# Patient Record
Sex: FEMALE | Race: BLACK OR AFRICAN AMERICAN | NOT HISPANIC OR LATINO | Employment: OTHER | URBAN - METROPOLITAN AREA
[De-identification: names, ages, dates, MRNs, and addresses within clinical notes are randomized per-mention and may not be internally consistent; named-entity substitution may affect disease eponyms.]

---

## 2017-05-23 ENCOUNTER — OFFICE VISIT (OUTPATIENT)
Dept: CARDIOLOGY | Facility: CLINIC | Age: 72
End: 2017-05-23
Payer: COMMERCIAL

## 2017-05-23 VITALS
WEIGHT: 238 LBS | SYSTOLIC BLOOD PRESSURE: 147 MMHG | HEIGHT: 67 IN | OXYGEN SATURATION: 97 % | HEART RATE: 85 BPM | BODY MASS INDEX: 37.35 KG/M2 | DIASTOLIC BLOOD PRESSURE: 65 MMHG

## 2017-05-23 DIAGNOSIS — R94.31 ABNORMAL EKG: ICD-10-CM

## 2017-05-23 DIAGNOSIS — R09.89 BILATERAL CAROTID BRUITS: ICD-10-CM

## 2017-05-23 DIAGNOSIS — R01.1 HEART MURMUR: ICD-10-CM

## 2017-05-23 DIAGNOSIS — E11.9 TYPE 2 DIABETES MELLITUS WITHOUT COMPLICATION, WITHOUT LONG-TERM CURRENT USE OF INSULIN: ICD-10-CM

## 2017-05-23 DIAGNOSIS — R07.9 CHEST PAIN, UNSPECIFIED TYPE: Primary | ICD-10-CM

## 2017-05-23 DIAGNOSIS — I10 ESSENTIAL HYPERTENSION: ICD-10-CM

## 2017-05-23 DIAGNOSIS — I15.9 SECONDARY HYPERTENSION: ICD-10-CM

## 2017-05-23 DIAGNOSIS — E78.2 MIXED HYPERLIPIDEMIA: ICD-10-CM

## 2017-05-23 PROCEDURE — 1126F AMNT PAIN NOTED NONE PRSNT: CPT | Mod: S$GLB,,, | Performed by: INTERNAL MEDICINE

## 2017-05-23 PROCEDURE — 3045F PR MOST RECENT HEMOGLOBIN A1C LEVEL 7.0-9.0%: CPT | Mod: S$GLB,,, | Performed by: INTERNAL MEDICINE

## 2017-05-23 PROCEDURE — 3077F SYST BP >= 140 MM HG: CPT | Mod: S$GLB,,, | Performed by: INTERNAL MEDICINE

## 2017-05-23 PROCEDURE — 99204 OFFICE O/P NEW MOD 45 MIN: CPT | Mod: S$GLB,,, | Performed by: INTERNAL MEDICINE

## 2017-05-23 PROCEDURE — 3078F DIAST BP <80 MM HG: CPT | Mod: S$GLB,,, | Performed by: INTERNAL MEDICINE

## 2017-05-23 PROCEDURE — 4010F ACE/ARB THERAPY RXD/TAKEN: CPT | Mod: S$GLB,,, | Performed by: INTERNAL MEDICINE

## 2017-05-23 PROCEDURE — 1160F RVW MEDS BY RX/DR IN RCRD: CPT | Mod: S$GLB,,, | Performed by: INTERNAL MEDICINE

## 2017-05-23 PROCEDURE — 1159F MED LIST DOCD IN RCRD: CPT | Mod: S$GLB,,, | Performed by: INTERNAL MEDICINE

## 2017-05-23 PROCEDURE — 99999 PR PBB SHADOW E&M-EST. PATIENT-LVL III: CPT | Mod: PBBFAC,,, | Performed by: INTERNAL MEDICINE

## 2017-05-23 NOTE — PROGRESS NOTES
Subjective:    Patient ID:  Mena Odonnell is a 71 y.o. female who presents for evaluation of Establish Care      HPI  Patient is here for evaluation of abnormal EKG and shortness of breath.  She's previously been seen at main campus and underwent PET stress and echo last year which were within normal limits.  She denies any worsening car to primary complaints.  She mainly is just been feeling more fatigued lately.  She gets short of breath with minimal activity but relieved with rest.  She denies any chest pain or palpitations.  She's expands no PND, orthopnea or lower edema.  She denies any dizziness, presyncope or syncope.    Review of Systems   Constitution: Positive for weakness and malaise/fatigue.   HENT: Negative.    Eyes: Negative.    Cardiovascular: Positive for dyspnea on exertion. Negative for chest pain, irregular heartbeat, leg swelling, near-syncope, orthopnea, palpitations, paroxysmal nocturnal dyspnea and syncope.   Respiratory: Positive for shortness of breath.    Skin: Negative.    Musculoskeletal: Negative.    Gastrointestinal: Negative for abdominal pain, constipation and diarrhea.   Genitourinary: Negative for dysuria.   Psychiatric/Behavioral: Negative.      Past Medical History:   Diagnosis Date    Diabetes mellitus type II     Hyperlipidemia     Hypertension     Obesity     Osteoarthritis     Peripheral neuropathy     Tendonitis     left foot     Past Surgical History:   Procedure Laterality Date    ABDOMINAL SURGERY      BLADDER SUSPENSION      CATARACT EXTRACTION      COLONOSCOPY N/A 9/26/2015    Procedure: COLONOSCOPY;  Surgeon: Javed Wood MD;  Location: [address];  Service: Endoscopy;  Laterality: N/A;    HYSTERECTOMY  1978    fibroids    Tonsillectomy      TONSILLECTOMY       Social History   Substance Use Topics    Smoking status: Former Smoker     Years: 15.00     Quit date: 12/20/1982    Smokeless tobacco: Never Used    Alcohol use No     Family  History   Problem Relation Age of Onset    Thyroid disease Mother     Cataracts Mother     Diabetes Mother     Stroke Mother     Hypertension Mother     Thyroid disease Sister     Diabetes Sister     Hypertension Sister     Hypertension Daughter     Diabetes Son     Diabetes Sister     Hypertension Sister     Hypertension Brother     Hypertension Sister     Hypertension Sister     Hypertension Brother     Thyroid disease Maternal Aunt     Thyroid disease Maternal Aunt     Colon cancer Maternal Grandfather     Breast cancer Neg Hx     Ovarian cancer Neg Hx     Amblyopia Neg Hx     Blindness Neg Hx     Cancer Neg Hx     Glaucoma Neg Hx     Macular degeneration Neg Hx     Retinal detachment Neg Hx     Strabismus Neg Hx         Objective:    Physical Exam   Constitutional: She is oriented to person, place, and time. She appears well-developed and well-nourished.   HENT:   Head: Normocephalic and atraumatic.   Eyes: Conjunctivae and EOM are normal. Pupils are equal, round, and reactive to light.   Neck: Normal range of motion. Neck supple. No thyromegaly present.   Cardiovascular: Normal rate and regular rhythm.    No murmur heard.  Pulmonary/Chest: Effort normal and breath sounds normal. No respiratory distress.   Abdominal: Soft. Bowel sounds are normal.   Musculoskeletal: She exhibits no edema.   Neurological: She is alert and oriented to person, place, and time.   Skin: Skin is warm and dry.   Psychiatric: She has a normal mood and affect. Her behavior is normal.           Echo: 2-16  CONCLUSIONS     1 - Normal left ventricular systolic function (EF 60-65%).     2 - Mild left atrial enlargement.     3 - Left ventricular diastolic dysfunction.     4 - Normal right ventricular systolic function .     5 - The estimated PA systolic pressure is 26 mmHg.     PET stress: 2-16  CONCLUSIONS: NORMAL MYOCARDIAL PERFUSION PET STRESS TEST  1. The perfusion scan is free of evidence for myocardial  ischemia or injury.   2. Resting wall motion is physiologic. Stress wall motion is physiologic.   3. Visually estimated LV function is normal at rest and normal at stress.   4. The ventricular volumes are normal at rest and stress.   5. The extracardiac distribution of radioactivity is normal.   6. There was no previous study available to compare.    Assessment:       1. Chest pain, unspecified type    2. Essential hypertension    3. Secondary hypertension    4. Heart murmur    5. Abnormal EKG    6. Bilateral carotid bruits    7. Type 2 diabetes mellitus without complication, without long-term current use of insulin    8. Mixed hyperlipidemia         Plan:       -Mainly reassurance in light of previous testing, repeat echo to ensure no change  -Likely symptoms currently secondary to deconditioning  -Encouraged diet and exercise    Return to clinic in 3 months

## 2017-05-23 NOTE — LETTER
May 23, 2017      Gerry Coreas MD  [address] [facility] [address]  [facility] [address]           [facility]  [address] [facility] [address]  [place] [address]  Phone: [phone]          Patient: Mena Odonnell   MR Number: [id number]   YOB: 1945   Date of Visit: 5/23/2017       Dear Dr. Gerry Coreas:    Thank you for referring Mena Odonnell to me for evaluation. Attached you will find relevant portions of my assessment and plan of care.    If you have questions, please do not hesitate to call me. I look forward to following Mena Odonnell along with you.    Sincerely,    Negro Lincoln MD    Enclosure  CC:  No Recipients    If you would like to receive this communication electronically, please contact [email or web address] or [phone] to request more information on [facility] access.    For providers and/or their staff who would like to refer a patient to [facility], please contact us through our one-stop-shop provider referral line, [facility] , at [phone].    If you feel you have received this communication in error or would no longer like to receive these types of communications, please e-mail [email or web address]

## 2017-05-29 ENCOUNTER — HOSPITAL ENCOUNTER (OUTPATIENT)
Dept: CARDIOLOGY | Facility: HOSPITAL | Age: 72
Discharge: HOME | End: 2017-05-29
Attending: INTERNAL MEDICINE
Payer: COMMERCIAL

## 2017-05-29 DIAGNOSIS — R07.9 CHEST PAIN, UNSPECIFIED TYPE: ICD-10-CM

## 2017-05-29 LAB
AORTIC VALVE STENOSIS: ABNORMAL
ESTIMATED PA SYSTOLIC PRESSURE: 14.02
GLOBAL PERICARDIAL EFFUSION: ABNORMAL
RETIRED EF AND QEF - SEE NOTES: 60 (ref 55–65)
TRICUSPID VALVE REGURGITATION: ABNORMAL

## 2017-05-29 PROCEDURE — 93306 TTE W/DOPPLER COMPLETE: CPT

## 2017-05-29 PROCEDURE — 93306 TTE W/DOPPLER COMPLETE: CPT | Mod: 26,,, | Performed by: INTERNAL MEDICINE

## 2017-07-02 DIAGNOSIS — Z79.4 TYPE 2 DIABETES MELLITUS WITH DIABETIC POLYNEUROPATHY, WITH LONG-TERM CURRENT USE OF INSULIN: ICD-10-CM

## 2017-07-02 DIAGNOSIS — E11.42 TYPE 2 DIABETES MELLITUS WITH DIABETIC POLYNEUROPATHY, WITH LONG-TERM CURRENT USE OF INSULIN: ICD-10-CM

## 2017-07-03 RX ORDER — INSULIN DEGLUDEC 200 U/ML
INJECTION, SOLUTION SUBCUTANEOUS
Qty: 6 SYRINGE | Refills: 3 | Status: SHIPPED | OUTPATIENT
Start: 2017-07-03 | End: 2017-10-21 | Stop reason: SDUPTHER

## 2017-09-18 ENCOUNTER — TELEPHONE (OUTPATIENT)
Dept: ENDOCRINOLOGY | Facility: CLINIC | Age: 72
End: 2017-09-18

## 2017-09-18 DIAGNOSIS — E11.42 TYPE 2 DIABETES MELLITUS WITH DIABETIC POLYNEUROPATHY, WITH LONG-TERM CURRENT USE OF INSULIN: Primary | ICD-10-CM

## 2017-09-18 DIAGNOSIS — Z79.4 TYPE 2 DIABETES MELLITUS WITH DIABETIC POLYNEUROPATHY, WITH LONG-TERM CURRENT USE OF INSULIN: Primary | ICD-10-CM

## 2017-09-18 NOTE — TELEPHONE ENCOUNTER
----- Message from Lainey Spencer sent at 9/18/2017 12:54 PM CDT -----  Contact: Pt Mena Odonnell [phone]  Pt is requesting her additional lab orders be entered and attached to her appt that is scheduled for Sat 11.4.17.  Pt normally gets the metabolic panel and the orders are not there but we did schedule her for the A1C and Lipid to reserve her time prior to her appt on 11.10.17.    Pt may be reached at [phone].    Thank you.  YVONNE

## 2017-10-21 DIAGNOSIS — E78.2 MIXED HYPERLIPIDEMIA: ICD-10-CM

## 2017-10-21 DIAGNOSIS — Z79.4 TYPE 2 DIABETES MELLITUS WITH DIABETIC POLYNEUROPATHY, WITH LONG-TERM CURRENT USE OF INSULIN: ICD-10-CM

## 2017-10-21 DIAGNOSIS — E11.42 TYPE 2 DIABETES MELLITUS WITH DIABETIC POLYNEUROPATHY, WITH LONG-TERM CURRENT USE OF INSULIN: ICD-10-CM

## 2017-10-23 RX ORDER — INSULIN DEGLUDEC 200 U/ML
INJECTION, SOLUTION SUBCUTANEOUS
Qty: 6 SYRINGE | Refills: 6 | Status: SHIPPED | OUTPATIENT
Start: 2017-10-23 | End: 2018-04-23 | Stop reason: SDUPTHER

## 2017-10-24 RX ORDER — ATORVASTATIN CALCIUM 40 MG/1
TABLET, FILM COATED ORAL
Qty: 30 TABLET | Refills: 11 | Status: SHIPPED | OUTPATIENT
Start: 2017-10-24 | End: 2018-11-28 | Stop reason: SDUPTHER

## 2017-11-04 ENCOUNTER — LAB VISIT (OUTPATIENT)
Dept: LAB | Facility: HOSPITAL | Age: 72
End: 2017-11-04
Payer: COMMERCIAL

## 2017-11-04 DIAGNOSIS — Z79.4 TYPE 2 DIABETES MELLITUS WITH DIABETIC POLYNEUROPATHY, WITH LONG-TERM CURRENT USE OF INSULIN: ICD-10-CM

## 2017-11-04 DIAGNOSIS — E11.42 TYPE 2 DIABETES MELLITUS WITH DIABETIC POLYNEUROPATHY, WITH LONG-TERM CURRENT USE OF INSULIN: ICD-10-CM

## 2017-11-04 DIAGNOSIS — Z86.39 H/O VITAMIN D DEFICIENCY: Chronic | ICD-10-CM

## 2017-11-04 LAB
25(OH)D3+25(OH)D2 SERPL-MCNC: 31 NG/ML
ALBUMIN SERPL BCP-MCNC: 3.6 G/DL
ALP SERPL-CCNC: 76 U/L
ALT SERPL W/O P-5'-P-CCNC: 20 U/L
ANION GAP SERPL CALC-SCNC: 10 MMOL/L
AST SERPL-CCNC: 15 U/L
BILIRUB SERPL-MCNC: 0.4 MG/DL
BUN SERPL-MCNC: 19 MG/DL
CALCIUM SERPL-MCNC: 10.2 MG/DL
CHLORIDE SERPL-SCNC: 106 MMOL/L
CHOLEST SERPL-MCNC: 186 MG/DL
CHOLEST/HDLC SERPL: 4.2 {RATIO}
CO2 SERPL-SCNC: 27 MMOL/L
CREAT SERPL-MCNC: 0.8 MG/DL
EST. GFR  (AFRICAN AMERICAN): >60 ML/MIN/1.73 M^2
EST. GFR  (NON AFRICAN AMERICAN): >60 ML/MIN/1.73 M^2
ESTIMATED AVG GLUCOSE: 174 MG/DL
GLUCOSE SERPL-MCNC: 117 MG/DL
HBA1C MFR BLD HPLC: 7.7 %
HDLC SERPL-MCNC: 44 MG/DL
HDLC SERPL: 23.7 %
LDLC SERPL CALC-MCNC: 117.2 MG/DL
NONHDLC SERPL-MCNC: 142 MG/DL
POTASSIUM SERPL-SCNC: 4.1 MMOL/L
PROT SERPL-MCNC: 7.5 G/DL
SODIUM SERPL-SCNC: 143 MMOL/L
TRIGL SERPL-MCNC: 124 MG/DL

## 2017-11-04 PROCEDURE — 82306 VITAMIN D 25 HYDROXY: CPT

## 2017-11-04 PROCEDURE — 80061 LIPID PANEL: CPT

## 2017-11-04 PROCEDURE — 36415 COLL VENOUS BLD VENIPUNCTURE: CPT | Mod: PO

## 2017-11-04 PROCEDURE — 83036 HEMOGLOBIN GLYCOSYLATED A1C: CPT

## 2017-11-04 PROCEDURE — 80053 COMPREHEN METABOLIC PANEL: CPT

## 2017-11-10 ENCOUNTER — OFFICE VISIT (OUTPATIENT)
Dept: ENDOCRINOLOGY | Facility: CLINIC | Age: 72
End: 2017-11-10
Payer: COMMERCIAL

## 2017-11-10 VITALS
SYSTOLIC BLOOD PRESSURE: 137 MMHG | DIASTOLIC BLOOD PRESSURE: 69 MMHG | HEIGHT: 67 IN | BODY MASS INDEX: 37.84 KG/M2 | WEIGHT: 241.13 LBS | HEART RATE: 79 BPM

## 2017-11-10 DIAGNOSIS — E66.01 SEVERE OBESITY (BMI 35.0-39.9) WITH COMORBIDITY: ICD-10-CM

## 2017-11-10 DIAGNOSIS — Z79.4 TYPE 2 DIABETES MELLITUS WITH DIABETIC POLYNEUROPATHY, WITH LONG-TERM CURRENT USE OF INSULIN: Primary | ICD-10-CM

## 2017-11-10 DIAGNOSIS — E78.2 MIXED HYPERLIPIDEMIA: ICD-10-CM

## 2017-11-10 DIAGNOSIS — G63 POLYNEUROPATHY ASSOCIATED WITH UNDERLYING DISEASE: ICD-10-CM

## 2017-11-10 DIAGNOSIS — E11.42 TYPE 2 DIABETES MELLITUS WITH DIABETIC POLYNEUROPATHY, WITH LONG-TERM CURRENT USE OF INSULIN: Primary | ICD-10-CM

## 2017-11-10 DIAGNOSIS — M17.11 OSTEOARTHRITIS OF RIGHT KNEE, UNSPECIFIED OSTEOARTHRITIS TYPE: ICD-10-CM

## 2017-11-10 DIAGNOSIS — I10 ESSENTIAL HYPERTENSION: ICD-10-CM

## 2017-11-10 DIAGNOSIS — R09.89 BILATERAL CAROTID BRUITS: ICD-10-CM

## 2017-11-10 PROCEDURE — 99214 OFFICE O/P EST MOD 30 MIN: CPT | Mod: S$GLB,,, | Performed by: NURSE PRACTITIONER

## 2017-11-10 PROCEDURE — 99999 PR PBB SHADOW E&M-EST. PATIENT-LVL III: CPT | Mod: PBBFAC,,, | Performed by: NURSE PRACTITIONER

## 2017-11-10 RX ORDER — GABAPENTIN 100 MG/1
200 CAPSULE ORAL 3 TIMES DAILY
Qty: 540 CAPSULE | Refills: 3 | Status: SHIPPED | OUTPATIENT
Start: 2017-11-10 | End: 2018-04-26

## 2017-11-10 RX ORDER — INSULIN ASPART 100 [IU]/ML
30 INJECTION, SOLUTION INTRAVENOUS; SUBCUTANEOUS
Qty: 6 BOX | Refills: 11 | Status: SHIPPED | OUTPATIENT
Start: 2017-11-10 | End: 2018-11-28 | Stop reason: SDUPTHER

## 2017-11-10 RX ORDER — CHLORTHALIDONE 25 MG/1
25 TABLET ORAL DAILY
Qty: 90 TABLET | Refills: 3 | Status: SHIPPED | OUTPATIENT
Start: 2017-11-10 | End: 2019-10-30 | Stop reason: CLARIF

## 2017-11-10 RX ORDER — AMLODIPINE BESYLATE 10 MG/1
10 TABLET ORAL DAILY
Qty: 90 TABLET | Refills: 3 | Status: SHIPPED | OUTPATIENT
Start: 2017-11-10 | End: 2018-11-28 | Stop reason: SDUPTHER

## 2017-11-10 RX ORDER — LISINOPRIL 20 MG/1
20 TABLET ORAL DAILY
Qty: 90 TABLET | Refills: 3 | Status: SHIPPED | OUTPATIENT
Start: 2017-11-10 | End: 2018-11-21 | Stop reason: SDUPTHER

## 2017-11-10 NOTE — PROGRESS NOTES
"CC: Management of T2DM, review of chronic medical conditions    HPI: Ms. Mena Odonnell is a 71 y.o. female who was diagnosed with Type 2 in [id number]. Experienced blurry vision at the time of diagnosis r/t BG >400. Started on orals. Began insulin in 2005. She is currently on MDI. Metformin stopped December 2016 r/t GI upset. No DM hospitalizations.     Last visit 12/2016: Metformin d/c'd r/t GI upset, kept on MDI + Invokana.     Continues to see chiropractor monthly for sciatica.     Drinks ~5-6 bottles of water daily.     No BG logs presented to review. States the last time she checked her BG was one week ago and this reading was 169. Infrequently checking BG readings.     Denies hypoglycemic symptoms.     Reports good appetite eating 3 meals a day. Doesn't often snack between meals    No exercise.     CURRENT DIABETIC MEDS: Tresiba 110 units DAILY, Novolog 30 units AC + correction scale (ISF 25, goal 150); Invokana 300 mg daily    Last Eye Exam: May 2016. Had cataract surgery in June 2016 on bilateral eyes  Last Podiatry Exam: None     REVIEW OF SYSTEMS  General: no fatigue or fever.   Eyes: denies blurry vision, eye pain, or redness.  Cardiac: no palpitations or chest pain; chronic pedal edema  Respiratory: no cough; (+) intermittent dyspnea on exertion at times.   GI: good appetite, no nausea or abdominal pain. - diarrhea    Skin: no rashes, itching, or injection site reactions.   Neuro: right leg/foot pain, tingling at times  MS: chronic lower back pain    Vital Signs  /69 (BP Location: Left arm, Patient Position: Sitting)   Pulse 79   Ht 5' 7" (1.702 m)   Wt 109.4 kg (241 lb 1.6 oz)   BMI 37.76 kg/m²     Hemoglobin A1C   Date Value Ref Range Status   11/04/2017 7.7 (H) 4.0 - 5.6 % Final     Comment:     According to ADA guidelines, hemoglobin A1c <7.0% represents  optimal control in non-pregnant diabetic patients. Different  metrics may apply to specific patient populations.   Standards of " Medical Care in Diabetes-2016.  For the purpose of screening for the presence of diabetes:  <5.7%     Consistent with the absence of diabetes  5.7-6.4%  Consistent with increasing risk for diabetes   (prediabetes)  >or=6.5%  Consistent with diabetes  Currently, no consensus exists for use of hemoglobin A1c  for diagnosis of diabetes for children.  This Hemoglobin A1c assay has significant interference with fetal   hemoglobin   (HbF). The results are invalid for patients with abnormal amounts of   HbF,   including those with known Hereditary Persistence   of Fetal Hemoglobin. Heterozygous hemoglobin variants (HbAS, HbAC,   HbAD, HbAE, HbA2) do not significantly interfere with this assay;   however, presence of multiple variants in a sample may impact the %   interference.     12/10/2016 7.1 (H) 4.5 - 6.2 % Final     Comment:     According to ADA guidelines, hemoglobin A1C <7.0% represents  optimal control in non-pregnant diabetic patients.  Different  metrics may apply to specific populations.   Standards of Medical Care in Diabetes - 2016.  For the purpose of screening for the presence of diabetes:  <5.7%     Consistent with the absence of diabetes  5.7-6.4%  Consistent with increasing risk for diabetes   (prediabetes)  >or=6.5%  Consistent with diabetes  Currently no consensus exists for use of hemoglobin A1C  for diagnosis of diabetes for children.     09/24/2016 7.4 (H) 4.5 - 6.2 % Final     Comment:     According to ADA guidelines, hemoglobin A1C <7.0% represents  optimal control in non-pregnant diabetic patients.  Different  metrics may apply to specific populations.   Standards of Medical Care in Diabetes - 2016.  For the purpose of screening for the presence of diabetes:  <5.7%     Consistent with the absence of diabetes  5.7-6.4%  Consistent with increasing risk for diabetes   (prediabetes)  >or=6.5%  Consistent with diabetes  Currently no consensus exists for use of hemoglobin A1C  for diagnosis of diabetes  for children.         Chemistry        Component Value Date/Time     11/04/2017 0814    K 4.1 11/04/2017 0814     11/04/2017 0814    CO2 27 11/04/2017 0814    BUN 19 11/04/2017 0814    CREATININE 0.8 11/04/2017 0814     (H) 11/04/2017 0814        Component Value Date/Time    CALCIUM 10.2 11/04/2017 0814    ALKPHOS 76 11/04/2017 0814    AST 15 11/04/2017 0814    ALT 20 11/04/2017 0814    BILITOT 0.4 11/04/2017 0814          Lab Results   Component Value Date    CHOL 186 11/04/2017    CHOL 152 04/16/2016    CHOL 159 06/27/2015     Lab Results   Component Value Date    HDL 44 11/04/2017    HDL 44 04/16/2016    HDL 43 06/27/2015     Lab Results   Component Value Date    LDLCALC 117.2 11/04/2017    LDLCALC 87.2 04/16/2016    LDLCALC 97.2 06/27/2015     Lab Results   Component Value Date    TRIG 124 11/04/2017    TRIG 104 04/16/2016    TRIG 94 06/27/2015     Lab Results   Component Value Date    CHOLHDL 23.7 11/04/2017    CHOLHDL 28.9 04/16/2016    CHOLHDL 27.0 06/27/2015       Lab Results   Component Value Date    MICALBCREAT 7.9 09/29/2016       Lab Results   Component Value Date    TSH 0.472 09/24/2016     Component      Latest Ref Rng & Units 11/4/2017   Vit D, 25-Hydroxy      30 - 96 ng/mL 31     PHYSICAL EXAMINATION  Constitutional: Appears well, no distress  Neck: Supple, trachea midline. No thyromegaly.    Respiratory: CTA without wheezes, even and unlabored.  Cardiovascular: RRR; bilateral carotid bruits, cardiac murmur   Lymph: bilateral 1+ edema (R>L).   Skin: warm and dry; no injection site reactions; slight acanthosis nigracans observed.  Neuro:patient alert and cooperative.   Feet: appropriate footwear; see foot exam dated 12/2016    ASSESSMENT and PLAN    Type 2 diabetes mellitus with diabetic polyneuropathy   A1C trended up  No logs to review, no changes today  Resume BG monitoring 4x/day and bring logs to next visit  Continue diabetes regimen as documented above  Continue Gabapentin  (taking 2 tablets AM, 1 lunch, 1 at bedtime)    Hyperlipidemia  Stable  LDL trended up  Continue Atorvastatin    Essential hypertension  Stable  Continue Norvasc, Lisinopril, and Chlorthalidone    Non morbid obesity, unspecified obesity type, Body mass index is 37.76 kg/m².   9 lb weight gain since last visit  Increases insulin resistance  Discussed diet and exercise  Weight loss encouraged    Bilateral Carotid Bruits  Has been evaluated by cardiology in the past  On ASA, statin     Right Knee Osteoarthritis  Pain can worsen BG readings  Sees Chiropractor     FOLLOW UP  Return in about 3 months (around 2/10/2018).

## 2018-04-21 ENCOUNTER — LAB VISIT (OUTPATIENT)
Dept: LAB | Facility: HOSPITAL | Age: 73
End: 2018-04-21
Attending: NURSE PRACTITIONER
Payer: COMMERCIAL

## 2018-04-21 DIAGNOSIS — Z79.4 TYPE 2 DIABETES MELLITUS WITH DIABETIC POLYNEUROPATHY, WITH LONG-TERM CURRENT USE OF INSULIN: ICD-10-CM

## 2018-04-21 DIAGNOSIS — E11.42 TYPE 2 DIABETES MELLITUS WITH DIABETIC POLYNEUROPATHY, WITH LONG-TERM CURRENT USE OF INSULIN: ICD-10-CM

## 2018-04-21 LAB
ESTIMATED AVG GLUCOSE: 151 MG/DL
HBA1C MFR BLD HPLC: 6.9 %
T4 FREE SERPL-MCNC: 0.8 NG/DL
TSH SERPL DL<=0.005 MIU/L-ACNC: 0.34 UIU/ML

## 2018-04-21 PROCEDURE — 83036 HEMOGLOBIN GLYCOSYLATED A1C: CPT

## 2018-04-21 PROCEDURE — 84443 ASSAY THYROID STIM HORMONE: CPT

## 2018-04-21 PROCEDURE — 36415 COLL VENOUS BLD VENIPUNCTURE: CPT | Mod: PO

## 2018-04-21 PROCEDURE — 84439 ASSAY OF FREE THYROXINE: CPT

## 2018-04-23 DIAGNOSIS — E11.42 TYPE 2 DIABETES MELLITUS WITH DIABETIC POLYNEUROPATHY, WITH LONG-TERM CURRENT USE OF INSULIN: ICD-10-CM

## 2018-04-23 DIAGNOSIS — Z79.4 TYPE 2 DIABETES MELLITUS WITH DIABETIC POLYNEUROPATHY, WITH LONG-TERM CURRENT USE OF INSULIN: ICD-10-CM

## 2018-04-23 RX ORDER — INSULIN DEGLUDEC 200 U/ML
110 INJECTION, SOLUTION SUBCUTANEOUS DAILY
Qty: 6 SYRINGE | Refills: 0 | Status: SHIPPED | OUTPATIENT
Start: 2018-04-23 | End: 2018-05-19 | Stop reason: SDUPTHER

## 2018-04-25 NOTE — PROGRESS NOTES
"CC: Management of T2DM, review of chronic medical conditions    HPI: Ms. Mena Odonnell is a 72 y.o. female who was diagnosed with Type 2 in [id number]. Experienced blurry vision at the time of diagnosis r/t BG >400. Started on orals. Began insulin in 2005. She is currently on MDI. Metformin stopped December 2016 r/t GI upset. No DM hospitalizations.     Last visit 12/2016: Metformin d/c'd r/t GI upset, kept on MDI + Invokana.     Continues to see chiropractor monthly for sciatica.     Drinks ~5-6 bottles of water daily.     No BG logs presented to review.    Much improved. Last a1c went from 7.7% to 6.9%     Social hx: continues to work.   Lab Results   Component Value Date    HGBA1C 6.9 (H) 04/21/2018       Pt was last seen by ELENO Rodriguez DNP - 11/2017 and is now being seen by me for the first time.     Denies hypoglycemic symptoms.     Reports good appetite eating 3 meals a day. Doesn't often snack between meals    No exercise.     CURRENT DIABETIC MEDS: Tresiba 110 units DAILY, Novolog 30 units AC + correction scale (ISF 25, goal 150); Invokana 300 mg daily    Last Eye Exam: 6/2017  Last Podiatry Exam: None     REVIEW OF SYSTEMS  General: no fatigue or fever.   Eyes: denies blurry vision, eye pain, or redness.  Cardiac: no palpitations or chest pain; chronic pedal edema  Respiratory: no cough; (+) intermittent dyspnea on exertion at times.   GI: good appetite, no nausea or abdominal pain. - diarrhea    Skin: no rashes, itching, or injection site reactions.   Neuro: right leg/foot pain, tingling at times  MS: chronic lower back pain    Vital Signs  /80 (BP Location: Right arm, Patient Position: Sitting, BP Method: Large (Manual))   Pulse 92   Ht 5' 5" (1.651 m)   Wt 108.6 kg (239 lb 6.4 oz)   BMI 39.84 kg/m²     Hemoglobin A1C   Date Value Ref Range Status   04/21/2018 6.9 (H) 4.0 - 5.6 % Final     Comment:     According to ADA guidelines, hemoglobin A1c <7.0% represents  optimal control in non-pregnant " diabetic patients. Different  metrics may apply to specific patient populations.   Standards of Medical Care in Diabetes-2016.  For the purpose of screening for the presence of diabetes:  <5.7%     Consistent with the absence of diabetes  5.7-6.4%  Consistent with increasing risk for diabetes   (prediabetes)  >or=6.5%  Consistent with diabetes  Currently, no consensus exists for use of hemoglobin A1c  for diagnosis of diabetes for children.  This Hemoglobin A1c assay has significant interference with fetal   hemoglobin   (HbF). The results are invalid for patients with abnormal amounts of   HbF,   including those with known Hereditary Persistence   of Fetal Hemoglobin. Heterozygous hemoglobin variants (HbAS, HbAC,   HbAD, HbAE, HbA2) do not significantly interfere with this assay;   however, presence of multiple variants in a sample may impact the %   interference.     11/04/2017 7.7 (H) 4.0 - 5.6 % Final     Comment:     According to ADA guidelines, hemoglobin A1c <7.0% represents  optimal control in non-pregnant diabetic patients. Different  metrics may apply to specific patient populations.   Standards of Medical Care in Diabetes-2016.  For the purpose of screening for the presence of diabetes:  <5.7%     Consistent with the absence of diabetes  5.7-6.4%  Consistent with increasing risk for diabetes   (prediabetes)  >or=6.5%  Consistent with diabetes  Currently, no consensus exists for use of hemoglobin A1c  for diagnosis of diabetes for children.  This Hemoglobin A1c assay has significant interference with fetal   hemoglobin   (HbF). The results are invalid for patients with abnormal amounts of   HbF,   including those with known Hereditary Persistence   of Fetal Hemoglobin. Heterozygous hemoglobin variants (HbAS, HbAC,   HbAD, HbAE, HbA2) do not significantly interfere with this assay;   however, presence of multiple variants in a sample may impact the %   interference.     12/10/2016 7.1 (H) 4.5 - 6.2 % Final      Comment:     According to ADA guidelines, hemoglobin A1C <7.0% represents  optimal control in non-pregnant diabetic patients.  Different  metrics may apply to specific populations.   Standards of Medical Care in Diabetes - 2016.  For the purpose of screening for the presence of diabetes:  <5.7%     Consistent with the absence of diabetes  5.7-6.4%  Consistent with increasing risk for diabetes   (prediabetes)  >or=6.5%  Consistent with diabetes  Currently no consensus exists for use of hemoglobin A1C  for diagnosis of diabetes for children.         Chemistry        Component Value Date/Time     11/04/2017 0814    K 4.1 11/04/2017 0814     11/04/2017 0814    CO2 27 11/04/2017 0814    BUN 19 11/04/2017 0814    CREATININE 0.8 11/04/2017 0814     (H) 11/04/2017 0814        Component Value Date/Time    CALCIUM 10.2 11/04/2017 0814    ALKPHOS 76 11/04/2017 0814    AST 15 11/04/2017 0814    ALT 20 11/04/2017 0814    BILITOT 0.4 11/04/2017 0814          Lab Results   Component Value Date    CHOL 186 11/04/2017    CHOL 152 04/16/2016    CHOL 159 06/27/2015     Lab Results   Component Value Date    HDL 44 11/04/2017    HDL 44 04/16/2016    HDL 43 06/27/2015     Lab Results   Component Value Date    LDLCALC 117.2 11/04/2017    LDLCALC 87.2 04/16/2016    LDLCALC 97.2 06/27/2015     Lab Results   Component Value Date    TRIG 124 11/04/2017    TRIG 104 04/16/2016    TRIG 94 06/27/2015     Lab Results   Component Value Date    CHOLHDL 23.7 11/04/2017    CHOLHDL 28.9 04/16/2016    CHOLHDL 27.0 06/27/2015       Lab Results   Component Value Date    MICALBCREAT 7.9 09/29/2016       Lab Results   Component Value Date    TSH 0.343 (L) 04/21/2018     Component      Latest Ref Rng & Units 11/4/2017   Vit D, 25-Hydroxy      30 - 96 ng/mL 31     PHYSICAL EXAMINATION  Constitutional: Appears well, no distress  Neck: Supple, trachea midline. No thyromegaly.    Respiratory: No wheezes, even and  unlabored.  Cardiovascular: RRR  Lymph: bilateral 1+ edema (R>L).   Skin: warm and dry; no injection site reactions; slight acanthosis nigracans observed.  Neuro:patient alert and cooperative.   Feet: appropriate footwear; defer til next time    ASSESSMENT and PLAN  1. Type 2 diabetes mellitus with diabetic polyneuropathy, with long-term current use of insulin  Hemoglobin A1c next time  a1c at goal  Goal less than 7%  Continue regimen above  bg at least 2 times a day-rx printed for freestyle jannet  Contact info given  Counseling >35 mins     TSH next time     RENAL FUNCTION PANEL next time-on invokana 300 mg daily   2. Severe obesity (BMI 35.0-39.9) with comorbidity  Body mass index is 39.84 kg/m². may increase insulin resistance  Goal lose 5-10% in the next 3-6 mos  Walking at least 3 times a week for 30 mins -instructed   3. Mixed hyperlipidemia  Lab Results   Component Value Date    LDLCALC 117.2 11/04/2017   continue lipitor/atorvastatin     4. Essential hypertension  RENAL FUNCTION PANEL next time-controlled, continue med(s)   5. Osteoarthritis of right knee, unspecified osteoarthritis type  F/u with specialist and/or pcp  If pain worsens, may increase insulin resistance  Increase gabapentin to 300 mg tid-for next time.     FOLLOW UP  Follow-up in about 3 months (around 7/26/2018).

## 2018-04-26 ENCOUNTER — OFFICE VISIT (OUTPATIENT)
Dept: ENDOCRINOLOGY | Facility: CLINIC | Age: 73
End: 2018-04-26
Payer: COMMERCIAL

## 2018-04-26 VITALS
DIASTOLIC BLOOD PRESSURE: 80 MMHG | SYSTOLIC BLOOD PRESSURE: 138 MMHG | HEART RATE: 92 BPM | WEIGHT: 239.38 LBS | HEIGHT: 65 IN | BODY MASS INDEX: 39.88 KG/M2

## 2018-04-26 DIAGNOSIS — M17.11 OSTEOARTHRITIS OF RIGHT KNEE, UNSPECIFIED OSTEOARTHRITIS TYPE: ICD-10-CM

## 2018-04-26 DIAGNOSIS — Z79.4 TYPE 2 DIABETES MELLITUS WITH DIABETIC POLYNEUROPATHY, WITH LONG-TERM CURRENT USE OF INSULIN: Primary | ICD-10-CM

## 2018-04-26 DIAGNOSIS — E11.42 TYPE 2 DIABETES MELLITUS WITH DIABETIC POLYNEUROPATHY, WITH LONG-TERM CURRENT USE OF INSULIN: Primary | ICD-10-CM

## 2018-04-26 DIAGNOSIS — I10 ESSENTIAL HYPERTENSION: ICD-10-CM

## 2018-04-26 DIAGNOSIS — E78.2 MIXED HYPERLIPIDEMIA: ICD-10-CM

## 2018-04-26 DIAGNOSIS — E66.01 SEVERE OBESITY (BMI 35.0-39.9) WITH COMORBIDITY: ICD-10-CM

## 2018-04-26 PROCEDURE — 3075F SYST BP GE 130 - 139MM HG: CPT | Mod: CPTII,S$GLB,, | Performed by: NURSE PRACTITIONER

## 2018-04-26 PROCEDURE — 3079F DIAST BP 80-89 MM HG: CPT | Mod: CPTII,S$GLB,, | Performed by: NURSE PRACTITIONER

## 2018-04-26 PROCEDURE — 99999 PR PBB SHADOW E&M-EST. PATIENT-LVL III: CPT | Mod: PBBFAC,,, | Performed by: NURSE PRACTITIONER

## 2018-04-26 PROCEDURE — 99215 OFFICE O/P EST HI 40 MIN: CPT | Mod: S$GLB,,, | Performed by: NURSE PRACTITIONER

## 2018-04-26 PROCEDURE — 3044F HG A1C LEVEL LT 7.0%: CPT | Mod: CPTII,S$GLB,, | Performed by: NURSE PRACTITIONER

## 2018-04-26 RX ORDER — GABAPENTIN 300 MG/1
300 CAPSULE ORAL 3 TIMES DAILY
Qty: 90 CAPSULE | Refills: 11
Start: 2018-04-26 | End: 2018-11-28 | Stop reason: SDUPTHER

## 2018-04-26 NOTE — PATIENT INSTRUCTIONS
Snacks can be an important part of a balanced, healthy meal plan. They allow you to eat more frequently, feeling full and satisfied throughout the day. Also, they allow you to spread carbohydrates evenly, which may stabilize blood sugars.  Plus, snacks are enjoyable!     The amount of carbohydrate needed at snacks varies. Generally, about 15-30 grams of carbohydrate per snack is recommended.  Below you will find some tasty treats.       0-5 gm carb   Crystal Light   Vitamin Water Zero   Herbal tea, unsweetened   2 tsp peanut butter on celery   1./2 cup sugar-free jell-o   1 sugar-free popsicle   ¼ cup blueberries   8oz Blue Jewell unsweetened almond milk   5 baby carrots & celery sticks, cucumbers, bell peppers dipped in ¼ cup salsa, 2Tbsp light ranch dressing or 2Tbsp plain Greek yogurt   10 Goldfish crackers   ½ oz low-fat cheese or string cheese   1 closed handful of nuts, unsalted   1 Tbsp of sunflower seeds, unsalted   1 cup Smart Pop popcorn   1 whole grain brown rice cake        15 gm carb   1 small piece of fruit or ½ banana or 1/2 cup lite canned fruit   3 omar cracker squares   3 cups Smart Pop popcorn, top spray butter, Self lite salt or cinnamon and Truvia   5 Vanilla Wafers   ½ cup low fat, no added sugar ice cream or frozen yogurt (Blue bell, Blue Bunny, Weight Watchers, Skinny Cow)   ½ turkey, ham, or chicken sandwich   ½ c fruit with ½ c Cottage cheese   4-6 unsalted wheat crackers with 1 oz low fat cheese or 1 tbsp peanut butter    30-45 goldfish crackers (depending on flavor)    7-8 Caodaism mini brown rice cakes (caramel, apple cinnamon, chocolate)    12 Caodaism mini brown rice cakes (cheddar, bbq, ranch)    1/3 cup hummus dip with raw veg   1/2 whole wheat emile, 1Tbsp hummus   Mini Pizza (1/2 whole wheat English muffin, low-fat  cheese, tomato sauce)   100 calorie snack pack (Oreo, Chips Ahoy, Ritz Mix, Baked Cheetos)   4-6 oz. light or Greek Style yogurt  (Silvana Coyle, Jonna, [facility])   ½ cup sugar-free pudding     6 in. wheat tortilla or emile oven toasted chips (topped with spray butter flavoring, cinnamon, Truvia OR spray butter, garlic powder, chili powder)    18 BBQ Popchips (available at [employer], [employer] Foods, Fresh Market)                   Diabetes Support Group Meetings         Date: Topic:   February 8 Health Promotion/Cooking Demo   March 8 Taking Care of Your Kidneys   April 12 Taking Care of Your Feet   May 10 Ease Your Mind with Diabetes   Lorena 14 Summer Treats/Cooking Demo   July 12 Super Market Sweep   August 9 Taking Care of Your Eyes   Sept 13 Technology/ADA updates   October 11 Recipes & Treats/Cooking Demo   November 8 Heart Health/Pump it up!   December 13 Year-End Close Out        Meetings are held in the [facility] (A) of the [facility] located at [address] [place] [address]. Please call [phone] for additional information.    Free service, offered every 2nd Thursday of every month! Family members and/or friends are welcome as well!  Support group is for patients with type 1 or type 2 diabetes.    From 3:30p to 4:30p

## 2018-05-19 DIAGNOSIS — E11.42 TYPE 2 DIABETES MELLITUS WITH DIABETIC POLYNEUROPATHY, WITH LONG-TERM CURRENT USE OF INSULIN: ICD-10-CM

## 2018-05-19 DIAGNOSIS — Z79.4 TYPE 2 DIABETES MELLITUS WITH DIABETIC POLYNEUROPATHY, WITH LONG-TERM CURRENT USE OF INSULIN: ICD-10-CM

## 2018-05-20 RX ORDER — INSULIN DEGLUDEC 200 U/ML
INJECTION, SOLUTION SUBCUTANEOUS
Qty: 15 ML | Refills: 0 | Status: SHIPPED | OUTPATIENT
Start: 2018-05-20 | End: 2018-09-17 | Stop reason: SDUPTHER

## 2018-09-17 DIAGNOSIS — E11.42 TYPE 2 DIABETES MELLITUS WITH DIABETIC POLYNEUROPATHY, WITH LONG-TERM CURRENT USE OF INSULIN: ICD-10-CM

## 2018-09-17 DIAGNOSIS — Z79.4 TYPE 2 DIABETES MELLITUS WITH DIABETIC POLYNEUROPATHY, WITH LONG-TERM CURRENT USE OF INSULIN: ICD-10-CM

## 2018-09-17 RX ORDER — INSULIN DEGLUDEC 200 U/ML
INJECTION, SOLUTION SUBCUTANEOUS
Qty: 18 ML | Refills: 1 | Status: SHIPPED | OUTPATIENT
Start: 2018-09-17 | End: 2018-11-05 | Stop reason: SDUPTHER

## 2018-11-05 ENCOUNTER — TELEPHONE (OUTPATIENT)
Dept: ENDOCRINOLOGY | Facility: CLINIC | Age: 73
End: 2018-11-05

## 2018-11-05 DIAGNOSIS — Z79.4 TYPE 2 DIABETES MELLITUS WITH DIABETIC POLYNEUROPATHY, WITH LONG-TERM CURRENT USE OF INSULIN: ICD-10-CM

## 2018-11-05 DIAGNOSIS — E11.42 TYPE 2 DIABETES MELLITUS WITH DIABETIC POLYNEUROPATHY, WITH LONG-TERM CURRENT USE OF INSULIN: ICD-10-CM

## 2018-11-05 RX ORDER — INSULIN DEGLUDEC 200 U/ML
INJECTION, SOLUTION SUBCUTANEOUS
Qty: 6 SYRINGE | Refills: 3 | Status: SHIPPED | OUTPATIENT
Start: 2018-11-05 | End: 2018-11-28 | Stop reason: SDUPTHER

## 2018-11-17 ENCOUNTER — LAB VISIT (OUTPATIENT)
Dept: LAB | Facility: HOSPITAL | Age: 73
End: 2018-11-17
Attending: NURSE PRACTITIONER
Payer: COMMERCIAL

## 2018-11-17 DIAGNOSIS — I10 ESSENTIAL HYPERTENSION: ICD-10-CM

## 2018-11-17 DIAGNOSIS — E11.42 TYPE 2 DIABETES MELLITUS WITH DIABETIC POLYNEUROPATHY, WITH LONG-TERM CURRENT USE OF INSULIN: ICD-10-CM

## 2018-11-17 DIAGNOSIS — Z79.4 TYPE 2 DIABETES MELLITUS WITH DIABETIC POLYNEUROPATHY, WITH LONG-TERM CURRENT USE OF INSULIN: ICD-10-CM

## 2018-11-17 LAB
ALBUMIN SERPL BCP-MCNC: 3.7 G/DL
ANION GAP SERPL CALC-SCNC: 11 MMOL/L
BUN SERPL-MCNC: 23 MG/DL
CALCIUM SERPL-MCNC: 10.1 MG/DL
CHLORIDE SERPL-SCNC: 105 MMOL/L
CO2 SERPL-SCNC: 28 MMOL/L
CREAT SERPL-MCNC: 0.8 MG/DL
EST. GFR  (AFRICAN AMERICAN): >60 ML/MIN/1.73 M^2
EST. GFR  (NON AFRICAN AMERICAN): >60 ML/MIN/1.73 M^2
ESTIMATED AVG GLUCOSE: 169 MG/DL
GLUCOSE SERPL-MCNC: 122 MG/DL
HBA1C MFR BLD HPLC: 7.5 %
PHOSPHATE SERPL-MCNC: 3.3 MG/DL
POTASSIUM SERPL-SCNC: 4 MMOL/L
SODIUM SERPL-SCNC: 144 MMOL/L
TSH SERPL DL<=0.005 MIU/L-ACNC: 0.45 UIU/ML

## 2018-11-17 PROCEDURE — 80069 RENAL FUNCTION PANEL: CPT

## 2018-11-17 PROCEDURE — 84443 ASSAY THYROID STIM HORMONE: CPT

## 2018-11-17 PROCEDURE — 83036 HEMOGLOBIN GLYCOSYLATED A1C: CPT

## 2018-11-17 PROCEDURE — 36415 COLL VENOUS BLD VENIPUNCTURE: CPT | Mod: PO

## 2018-11-23 RX ORDER — LISINOPRIL 20 MG/1
20 TABLET ORAL DAILY
Qty: 90 TABLET | Refills: 3 | Status: SHIPPED | OUTPATIENT
Start: 2018-11-23 | End: 2018-11-28

## 2018-11-28 ENCOUNTER — OFFICE VISIT (OUTPATIENT)
Dept: ENDOCRINOLOGY | Facility: CLINIC | Age: 73
End: 2018-11-28
Payer: COMMERCIAL

## 2018-11-28 VITALS
WEIGHT: 244.94 LBS | SYSTOLIC BLOOD PRESSURE: 134 MMHG | DIASTOLIC BLOOD PRESSURE: 72 MMHG | BODY MASS INDEX: 40.81 KG/M2 | HEART RATE: 78 BPM | HEIGHT: 65 IN

## 2018-11-28 DIAGNOSIS — E66.01 SEVERE OBESITY (BMI 35.0-39.9) WITH COMORBIDITY: ICD-10-CM

## 2018-11-28 DIAGNOSIS — E11.42 TYPE 2 DIABETES MELLITUS WITH DIABETIC POLYNEUROPATHY, WITH LONG-TERM CURRENT USE OF INSULIN: Primary | ICD-10-CM

## 2018-11-28 DIAGNOSIS — E78.2 MIXED HYPERLIPIDEMIA: ICD-10-CM

## 2018-11-28 DIAGNOSIS — I10 ESSENTIAL HYPERTENSION: ICD-10-CM

## 2018-11-28 DIAGNOSIS — E66.01 MORBID OBESITY WITH BODY MASS INDEX (BMI) OF 40.0 OR HIGHER: ICD-10-CM

## 2018-11-28 DIAGNOSIS — Z79.4 TYPE 2 DIABETES MELLITUS WITH DIABETIC POLYNEUROPATHY, WITH LONG-TERM CURRENT USE OF INSULIN: Primary | ICD-10-CM

## 2018-11-28 PROCEDURE — 99214 OFFICE O/P EST MOD 30 MIN: CPT | Mod: S$GLB,,, | Performed by: NURSE PRACTITIONER

## 2018-11-28 PROCEDURE — 3075F SYST BP GE 130 - 139MM HG: CPT | Mod: CPTII,S$GLB,, | Performed by: NURSE PRACTITIONER

## 2018-11-28 PROCEDURE — 99999 PR PBB SHADOW E&M-EST. PATIENT-LVL IV: CPT | Mod: PBBFAC,,, | Performed by: NURSE PRACTITIONER

## 2018-11-28 PROCEDURE — 3045F PR MOST RECENT HEMOGLOBIN A1C LEVEL 7.0-9.0%: CPT | Mod: CPTII,S$GLB,, | Performed by: NURSE PRACTITIONER

## 2018-11-28 PROCEDURE — 3078F DIAST BP <80 MM HG: CPT | Mod: CPTII,S$GLB,, | Performed by: NURSE PRACTITIONER

## 2018-11-28 RX ORDER — ATORVASTATIN CALCIUM 40 MG/1
40 TABLET, FILM COATED ORAL DAILY
Qty: 30 TABLET | Refills: 11 | Status: SHIPPED | OUTPATIENT
Start: 2018-11-28 | End: 2018-11-28

## 2018-11-28 RX ORDER — GABAPENTIN 300 MG/1
300 CAPSULE ORAL 3 TIMES DAILY
Qty: 90 CAPSULE | Refills: 11 | Status: SHIPPED | OUTPATIENT
Start: 2018-11-28 | End: 2020-01-02 | Stop reason: SDUPTHER

## 2018-11-28 RX ORDER — INSULIN DEGLUDEC 200 U/ML
INJECTION, SOLUTION SUBCUTANEOUS
Qty: 6 SYRINGE | Refills: 11 | Status: SHIPPED | OUTPATIENT
Start: 2018-11-28 | End: 2019-10-30 | Stop reason: CLARIF

## 2018-11-28 RX ORDER — INSULIN ASPART 100 [IU]/ML
30 INJECTION, SOLUTION INTRAVENOUS; SUBCUTANEOUS
Qty: 2 BOX | Refills: 11 | Status: SHIPPED | OUTPATIENT
Start: 2018-11-28 | End: 2019-09-17

## 2018-11-28 RX ORDER — AMLODIPINE BESYLATE 10 MG/1
10 TABLET ORAL DAILY
Qty: 90 TABLET | Refills: 3 | Status: SHIPPED | OUTPATIENT
Start: 2018-11-28 | End: 2019-05-22 | Stop reason: SDUPTHER

## 2018-11-28 RX ORDER — ATORVASTATIN CALCIUM 40 MG/1
40 TABLET, FILM COATED ORAL DAILY
Qty: 30 TABLET | Refills: 11
Start: 2018-11-28 | End: 2019-04-15

## 2018-11-28 NOTE — PATIENT INSTRUCTIONS
Snacks can be an important part of a balanced, healthy meal plan. They allow you to eat more frequently, feeling full and satisfied throughout the day. Also, they allow you to spread carbohydrates evenly, which may stabilize blood sugars.  Plus, snacks are enjoyable!     The amount of carbohydrate needed at snacks varies. Generally, about 15-30 grams of carbohydrate per snack is recommended.  Below you will find some tasty treats.       0-5 gm carb   Crystal Light   Vitamin Water Zero   Herbal tea, unsweetened   2 tsp peanut butter on celery   1./2 cup sugar-free jell-o   1 sugar-free popsicle   ¼ cup blueberries   8oz Blue [place]ell unsweetened almond milk   5 baby carrots & celery sticks, cucumbers, bell peppers dipped in ¼ cup salsa, 2Tbsp light ranch dressing or 2Tbsp plain Greek yogurt   10 Goldfish crackers   ½ oz low-fat cheese or string cheese   1 closed handful of nuts, unsalted   1 Tbsp of sunflower seeds, unsalted   1 cup Smart Pop popcorn   1 whole grain brown rice cake        15 gm carb   1 small piece of fruit or ½ banana or 1/2 cup lite canned fruit   3 omar cracker squares   3 cups Smart Pop popcorn, top spray butter, Self lite salt or cinnamon and Truvia   5 Vanilla Wafers   ½ cup low fat, no added sugar ice cream or frozen yogurt (Blue bell, Blue Bunny, Weight Watchers, Skinny Cow)   ½ turkey, ham, or chicken sandwich   ½ c fruit with ½ c Cottage cheese   4-6 unsalted wheat crackers with 1 oz low fat cheese or 1 tbsp peanut butter    30-45 goldfish crackers (depending on flavor)    7-8 Mormonism mini brown rice cakes (caramel, apple cinnamon, chocolate)    12 Mormonism mini brown rice cakes (cheddar, bbq, ranch)    1/3 cup hummus dip with raw veg   1/2 whole wheat emile, 1Tbsp hummus   Mini Pizza (1/2 whole wheat English muffin, low-fat  cheese, tomato sauce)   100 calorie snack pack (Oreo, Chips Ahoy, Ritz Mix, Baked Cheetos)   4-6 oz. light or Greek Style yogurt  (Leonides, Silvana, [facility], [facility])   ½ cup sugar-free pudding     6 in. wheat tortilla or emile oven toasted chips (topped with spray butter flavoring, cinnamon, Truvia OR spray butter, garlic powder, chili powder)    18 BBQ Popchips (available at [employer], [employer], [employer])

## 2018-11-28 NOTE — PROGRESS NOTES
"CC: Management of T2DM, review of chronic medical conditions    HPI: Ms. Mena Odonnell is a 73 y.o. female who was diagnosed with Type 2 in 9788-4095. Experienced blurry vision at the time of diagnosis r/t BG >400. Started on orals. Began insulin in 2005. She is currently on MDI. Metformin stopped December 2016 r/t GI upset. No DM hospitalizations.     Metformin d/c'd r/t GI upset, kept on MDI + Invokana.     Continues to see chiropractor monthly for sciatica.     Drinks ~5-6 bottles of water daily.     No BG logs presented to review.    Last a1c went from 6.9% to 7.5%      Social hx: continues to work.     Familial hx diabetes-sisters, mother    Pt has concerns w/ kidney functions,   Lab Results   Component Value Date    HGBA1C 7.5 (H) 11/17/2018     Last seen by me in April 2018 and is now being seen by me again today.   Has interest in freestyle jannet/[employer].     Denies hypoglycemic symptoms.     Reports good appetite eating 3 meals a day. Doesn't often snack between meals    No exercise.     CURRENT DIABETIC MEDS: Tresiba 110 units DAILY, Novolog 30 units AC + correction scale (ISF 25, goal 150); Invokana 300 mg daily    Last Eye Exam: 6/2017  Last Podiatry Exam: None     REVIEW OF SYSTEMS  General: no fatigue or fever.   Eyes: denies blurry vision, eye pain, or redness.  Cardiac: no palpitations or chest pain; chronic pedal edema  Respiratory: no cough; (+) intermittent dyspnea on exertion at times.   GI: good appetite, no nausea or abdominal pain. - diarrhea    Skin: no rashes, itching, or injection site reactions.   Neuro: right leg/foot pain, tingling at times  MS: chronic lower back pain    Vital Signs  /72 (BP Location: Left arm, Patient Position: Sitting, BP Method: Medium (Manual))   Pulse 78   Ht 5' 5" (1.651 m)   Wt 111.1 kg (244 lb 14.9 oz)   BMI 40.76 kg/m²     Hemoglobin A1C   Date Value Ref Range Status   11/17/2018 7.5 (H) 4.0 - 5.6 % Final     Comment:     ADA Screening " Guidelines:  5.7-6.4%  Consistent with prediabetes  >or=6.5%  Consistent with diabetes  High levels of fetal hemoglobin interfere with the HbA1C  assay. Heterozygous hemoglobin variants (HbS, HgC, etc)do  not significantly interfere with this assay.   However, presence of multiple variants may affect accuracy.     04/21/2018 6.9 (H) 4.0 - 5.6 % Final     Comment:     According to ADA guidelines, hemoglobin A1c <7.0% represents  optimal control in non-pregnant diabetic patients. Different  metrics may apply to specific patient populations.   Standards of Medical Care in Diabetes-2016.  For the purpose of screening for the presence of diabetes:  <5.7%     Consistent with the absence of diabetes  5.7-6.4%  Consistent with increasing risk for diabetes   (prediabetes)  >or=6.5%  Consistent with diabetes  Currently, no consensus exists for use of hemoglobin A1c  for diagnosis of diabetes for children.  This Hemoglobin A1c assay has significant interference with fetal   hemoglobin   (HbF). The results are invalid for patients with abnormal amounts of   HbF,   including those with known Hereditary Persistence   of Fetal Hemoglobin. Heterozygous hemoglobin variants (HbAS, HbAC,   HbAD, HbAE, HbA2) do not significantly interfere with this assay;   however, presence of multiple variants in a sample may impact the %   interference.     11/04/2017 7.7 (H) 4.0 - 5.6 % Final     Comment:     According to ADA guidelines, hemoglobin A1c <7.0% represents  optimal control in non-pregnant diabetic patients. Different  metrics may apply to specific patient populations.   Standards of Medical Care in Diabetes-2016.  For the purpose of screening for the presence of diabetes:  <5.7%     Consistent with the absence of diabetes  5.7-6.4%  Consistent with increasing risk for diabetes   (prediabetes)  >or=6.5%  Consistent with diabetes  Currently, no consensus exists for use of hemoglobin A1c  for diagnosis of diabetes for children.  This  Hemoglobin A1c assay has significant interference with fetal   hemoglobin   (HbF). The results are invalid for patients with abnormal amounts of   HbF,   including those with known Hereditary Persistence   of Fetal Hemoglobin. Heterozygous hemoglobin variants (HbAS, HbAC,   HbAD, HbAE, HbA2) do not significantly interfere with this assay;   however, presence of multiple variants in a sample may impact the %   interference.         Chemistry        Component Value Date/Time     11/17/2018 0820    K 4.0 11/17/2018 0820     11/17/2018 0820    CO2 28 11/17/2018 0820    BUN 23 11/17/2018 0820    CREATININE 0.8 11/17/2018 0820     (H) 11/17/2018 0820        Component Value Date/Time    CALCIUM 10.1 11/17/2018 0820    ALKPHOS 76 11/04/2017 0814    AST 15 11/04/2017 0814    ALT 20 11/04/2017 0814    BILITOT 0.4 11/04/2017 0814          Lab Results   Component Value Date    CHOL 186 11/04/2017    CHOL 152 04/16/2016    CHOL 159 06/27/2015     Lab Results   Component Value Date    HDL 44 11/04/2017    HDL 44 04/16/2016    HDL 43 06/27/2015     Lab Results   Component Value Date    LDLCALC 117.2 11/04/2017    LDLCALC 87.2 04/16/2016    LDLCALC 97.2 06/27/2015     Lab Results   Component Value Date    TRIG 124 11/04/2017    TRIG 104 04/16/2016    TRIG 94 06/27/2015     Lab Results   Component Value Date    CHOLHDL 23.7 11/04/2017    CHOLHDL 28.9 04/16/2016    CHOLHDL 27.0 06/27/2015       Lab Results   Component Value Date    MICALBCREAT 7.9 09/29/2016       Lab Results   Component Value Date    TSH 0.445 11/17/2018     Component      Latest Ref Rng & Units 11/4/2017   Vit D, 25-Hydroxy      30 - 96 ng/mL 31     PHYSICAL EXAMINATION  Constitutional: Appears well, no distress  Neck: Supple, trachea midline. No thyromegaly.    Respiratory: No wheezes, even and unlabored.  Cardiovascular: RRR  Lymph: bilateral 1+ edema (R>L).   Skin: warm and dry; no injection site reactions; slight acanthosis nigracans  observed.  Neuro:patient alert and cooperative.   Feet: appropriate footwear;    Visual Inspection:  Normal -  Bilateral and Dry Skin -  Bilateral    Pedal Pulses:   Right: Present  Left: Present    Posterior tibialis:   Right:Present  Left: Present    ASSESSMENT and PLAN  1. Type 2 diabetes mellitus with diabetic polyneuropathy, with long-term current use of insulin  insulin degludec (TRESIBA FLEXTOUCH U-200) 200 unit/mL (3 mL) InPn    canagliflozin (INVOKANA) 300 mg Tab tablet    insulin aspart U-100 (NOVOLOG FLEXPEN U-100 INSULIN) 100 unit/mL InPn pen    Ambulatory Referral to Diabetes Education      F/u in 3 mos  a1c goal less than 7%     2. Essential hypertension  Controlled, continue med(s)   3. Mixed hyperlipidemia  atorvastatin (LIPITOR) 40 MG tablet  Lab Results   Component Value Date    LDLCALC 117.2 11/04/2017     Above goal    4. Severe obesity (BMI 35.0-39.9) with comorbidity  Body mass index is 40.76 kg/m². may increase insulin resistance.    5. Morbid obesity with body mass index (BMI) of 40.0 or higher  Body mass index is 40.76 kg/m². may increase insulin resistance.    FOLLOW UP  Follow-up in about 3 months (around 2/28/2019).

## 2018-12-04 ENCOUNTER — OFFICE VISIT (OUTPATIENT)
Dept: CARDIOLOGY | Facility: CLINIC | Age: 73
End: 2018-12-04
Payer: COMMERCIAL

## 2018-12-04 VITALS
HEART RATE: 90 BPM | OXYGEN SATURATION: 92 % | SYSTOLIC BLOOD PRESSURE: 122 MMHG | RESPIRATION RATE: 20 BRPM | DIASTOLIC BLOOD PRESSURE: 68 MMHG | WEIGHT: 242.5 LBS | BODY MASS INDEX: 40.36 KG/M2

## 2018-12-04 DIAGNOSIS — R01.1 HEART MURMUR: ICD-10-CM

## 2018-12-04 DIAGNOSIS — E11.9 TYPE 2 DIABETES MELLITUS WITHOUT COMPLICATION, WITHOUT LONG-TERM CURRENT USE OF INSULIN: ICD-10-CM

## 2018-12-04 DIAGNOSIS — R94.31 ABNORMAL EKG: ICD-10-CM

## 2018-12-04 DIAGNOSIS — E78.2 MIXED HYPERLIPIDEMIA: ICD-10-CM

## 2018-12-04 DIAGNOSIS — R09.89 BILATERAL CAROTID BRUITS: ICD-10-CM

## 2018-12-04 DIAGNOSIS — I15.9 SECONDARY HYPERTENSION: ICD-10-CM

## 2018-12-04 DIAGNOSIS — R07.9 CHEST PAIN, UNSPECIFIED TYPE: Primary | ICD-10-CM

## 2018-12-04 DIAGNOSIS — Z09 FOLLOW UP: ICD-10-CM

## 2018-12-04 DIAGNOSIS — I10 ESSENTIAL HYPERTENSION: ICD-10-CM

## 2018-12-04 PROCEDURE — 3078F DIAST BP <80 MM HG: CPT | Mod: CPTII,S$GLB,, | Performed by: INTERNAL MEDICINE

## 2018-12-04 PROCEDURE — 93000 ELECTROCARDIOGRAM COMPLETE: CPT | Mod: S$GLB,,, | Performed by: INTERNAL MEDICINE

## 2018-12-04 PROCEDURE — 3045F PR MOST RECENT HEMOGLOBIN A1C LEVEL 7.0-9.0%: CPT | Mod: CPTII,S$GLB,, | Performed by: INTERNAL MEDICINE

## 2018-12-04 PROCEDURE — 99999 PR PBB SHADOW E&M-EST. PATIENT-LVL III: CPT | Mod: PBBFAC,,, | Performed by: INTERNAL MEDICINE

## 2018-12-04 PROCEDURE — 99214 OFFICE O/P EST MOD 30 MIN: CPT | Mod: S$GLB,,, | Performed by: INTERNAL MEDICINE

## 2018-12-04 PROCEDURE — 1101F PT FALLS ASSESS-DOCD LE1/YR: CPT | Mod: CPTII,S$GLB,, | Performed by: INTERNAL MEDICINE

## 2018-12-04 PROCEDURE — 3074F SYST BP LT 130 MM HG: CPT | Mod: CPTII,S$GLB,, | Performed by: INTERNAL MEDICINE

## 2018-12-04 NOTE — PROGRESS NOTES
Subjective:    Patient ID:  Mena Odonnell is a 73 y.o. female who presents for follow-up of No chief complaint on file.      HPI  Here for follow-up of chest pain, shortness of breath and abnormal EKG.  She mainly just feels tired and weak.  She has not been exercising like we discussed last visit.  He has to pay 30 [place] month with her insurance to get a gym membership.  She denies any chest pain symptoms.  She does get shortness of breath with heavy activity but relieved with rest.  She denies any PND, orthopnea or lower extremity edema.  She has not experienced any dizziness, presyncope or syncope.    Review of Systems   Constitution: Negative.   HENT: Negative.    Eyes: Negative.    Cardiovascular: Negative for chest pain, dyspnea on exertion, irregular heartbeat, leg swelling, near-syncope, orthopnea, palpitations, paroxysmal nocturnal dyspnea and syncope.   Respiratory: Negative for shortness of breath.    Skin: Negative.    Musculoskeletal: Negative.    Gastrointestinal: Negative for abdominal pain, constipation and diarrhea.   Genitourinary: Negative for dysuria.   Neurological: Negative.    Psychiatric/Behavioral: Negative.         Objective:    Physical Exam   Constitutional: She is oriented to person, place, and time. She appears well-developed and well-nourished.   HENT:   Head: Normocephalic and atraumatic.   Eyes: Conjunctivae and EOM are normal. Pupils are equal, round, and reactive to light.   Neck: Normal range of motion. Neck supple. No thyromegaly present.   Cardiovascular: Normal rate and regular rhythm.   No murmur heard.  Pulmonary/Chest: Effort normal and breath sounds normal. No respiratory distress.   Abdominal: Soft. Bowel sounds are normal.   Musculoskeletal: She exhibits no edema.   Neurological: She is alert and oriented to person, place, and time.   Skin: Skin is warm and dry.   Psychiatric: She has a normal mood and affect. Her behavior is normal.       Echo: 2-16  CONCLUSIONS      1 - Normal left ventricular systolic function (EF 60-65%).     2 - Mild left atrial enlargement.     3 - Left ventricular diastolic dysfunction.     4 - Normal right ventricular systolic function .     5 - The estimated PA systolic pressure is 26 mmHg.      PET stress: 2-16  CONCLUSIONS: NORMAL MYOCARDIAL PERFUSION PET STRESS TEST  1. The perfusion scan is free of evidence for myocardial ischemia or injury.   2. Resting wall motion is physiologic. Stress wall motion is physiologic.   3. Visually estimated LV function is normal at rest and normal at stress.   4. The ventricular volumes are normal at rest and stress.   5. The extracardiac distribution of radioactivity is normal.   6. There was no previous study available to compare.    ECHO:  5-17  CONCLUSIONS     1 - Normal left ventricular systolic function (EF 60-65%).     2 - Concentric remodeling.     3 - Indeterminate LV diastolic function.     4 - Mild aortic stenosis, CHRIS = 2.04 cm2, peak velocity = 2.61 m/s, mean gradient = 17 mmHg.     EKG today shows normal sinus rhythm with criteria for LVH and secondary ST-T changes, no significant change from previous  Assessment:       1. Chest pain, unspecified type    2. Essential hypertension    3. Secondary hypertension    4. Heart murmur    5. Abnormal EKG    6. Bilateral carotid bruits    7. Type 2 diabetes mellitus without complication, without long-term current use of insulin    8. Mixed hyperlipidemia         Plan:       -mainly reassurance in light of previous testing in no current symptoms  -strongly encouraged exercise which she knows she needs to do    Return to clinic in 1 year

## 2019-03-09 ENCOUNTER — LAB VISIT (OUTPATIENT)
Dept: LAB | Facility: HOSPITAL | Age: 74
End: 2019-03-09
Attending: NURSE PRACTITIONER
Payer: COMMERCIAL

## 2019-03-09 DIAGNOSIS — E11.42 TYPE 2 DIABETES MELLITUS WITH DIABETIC POLYNEUROPATHY, WITH LONG-TERM CURRENT USE OF INSULIN: ICD-10-CM

## 2019-03-09 DIAGNOSIS — E78.2 MIXED HYPERLIPIDEMIA: ICD-10-CM

## 2019-03-09 DIAGNOSIS — Z79.4 TYPE 2 DIABETES MELLITUS WITH DIABETIC POLYNEUROPATHY, WITH LONG-TERM CURRENT USE OF INSULIN: ICD-10-CM

## 2019-03-09 LAB
ALBUMIN SERPL BCP-MCNC: 3.7 G/DL
ALBUMIN SERPL BCP-MCNC: 3.7 G/DL
ALP SERPL-CCNC: 68 U/L
ALT SERPL W/O P-5'-P-CCNC: 19 U/L
ANION GAP SERPL CALC-SCNC: 9 MMOL/L
ANION GAP SERPL CALC-SCNC: 9 MMOL/L
AST SERPL-CCNC: 17 U/L
BILIRUB SERPL-MCNC: 0.4 MG/DL
BUN SERPL-MCNC: 22 MG/DL
BUN SERPL-MCNC: 22 MG/DL
CALCIUM SERPL-MCNC: 10.1 MG/DL
CALCIUM SERPL-MCNC: 10.1 MG/DL
CHLORIDE SERPL-SCNC: 107 MMOL/L
CHLORIDE SERPL-SCNC: 107 MMOL/L
CHOLEST SERPL-MCNC: 271 MG/DL
CHOLEST/HDLC SERPL: 5.4 {RATIO}
CO2 SERPL-SCNC: 29 MMOL/L
CO2 SERPL-SCNC: 29 MMOL/L
CREAT SERPL-MCNC: 0.7 MG/DL
CREAT SERPL-MCNC: 0.7 MG/DL
EST. GFR  (AFRICAN AMERICAN): >60 ML/MIN/1.73 M^2
EST. GFR  (AFRICAN AMERICAN): >60 ML/MIN/1.73 M^2
EST. GFR  (NON AFRICAN AMERICAN): >60 ML/MIN/1.73 M^2
EST. GFR  (NON AFRICAN AMERICAN): >60 ML/MIN/1.73 M^2
GLUCOSE SERPL-MCNC: 107 MG/DL
GLUCOSE SERPL-MCNC: 107 MG/DL
HDLC SERPL-MCNC: 50 MG/DL
HDLC SERPL: 18.5 %
LDLC SERPL CALC-MCNC: 194.6 MG/DL
NONHDLC SERPL-MCNC: 221 MG/DL
PHOSPHATE SERPL-MCNC: 3.8 MG/DL
POTASSIUM SERPL-SCNC: 3.9 MMOL/L
POTASSIUM SERPL-SCNC: 3.9 MMOL/L
PROT SERPL-MCNC: 7 G/DL
SODIUM SERPL-SCNC: 145 MMOL/L
SODIUM SERPL-SCNC: 145 MMOL/L
TRIGL SERPL-MCNC: 132 MG/DL

## 2019-03-09 PROCEDURE — 80061 LIPID PANEL: CPT

## 2019-03-09 PROCEDURE — 84100 ASSAY OF PHOSPHORUS: CPT

## 2019-03-09 PROCEDURE — 36415 COLL VENOUS BLD VENIPUNCTURE: CPT | Mod: PO

## 2019-03-09 PROCEDURE — 80053 COMPREHEN METABOLIC PANEL: CPT

## 2019-03-13 ENCOUNTER — LAB VISIT (OUTPATIENT)
Dept: LAB | Facility: HOSPITAL | Age: 74
End: 2019-03-13
Payer: COMMERCIAL

## 2019-03-13 ENCOUNTER — OFFICE VISIT (OUTPATIENT)
Dept: ENDOCRINOLOGY | Facility: CLINIC | Age: 74
End: 2019-03-13
Payer: COMMERCIAL

## 2019-03-13 VITALS
DIASTOLIC BLOOD PRESSURE: 73 MMHG | WEIGHT: 246.94 LBS | SYSTOLIC BLOOD PRESSURE: 118 MMHG | BODY MASS INDEX: 41.14 KG/M2 | HEART RATE: 71 BPM | HEIGHT: 65 IN

## 2019-03-13 DIAGNOSIS — E78.2 MIXED HYPERLIPIDEMIA: ICD-10-CM

## 2019-03-13 DIAGNOSIS — E11.42 TYPE 2 DIABETES MELLITUS WITH DIABETIC POLYNEUROPATHY, WITH LONG-TERM CURRENT USE OF INSULIN: Primary | ICD-10-CM

## 2019-03-13 DIAGNOSIS — E11.42 TYPE 2 DIABETES MELLITUS WITH DIABETIC POLYNEUROPATHY, WITH LONG-TERM CURRENT USE OF INSULIN: ICD-10-CM

## 2019-03-13 DIAGNOSIS — R09.89 BILATERAL CAROTID BRUITS: ICD-10-CM

## 2019-03-13 DIAGNOSIS — Z79.4 TYPE 2 DIABETES MELLITUS WITH DIABETIC POLYNEUROPATHY, WITH LONG-TERM CURRENT USE OF INSULIN: ICD-10-CM

## 2019-03-13 DIAGNOSIS — I10 ESSENTIAL HYPERTENSION: ICD-10-CM

## 2019-03-13 DIAGNOSIS — E66.01 MORBID OBESITY WITH BODY MASS INDEX (BMI) OF 40.0 OR HIGHER: ICD-10-CM

## 2019-03-13 DIAGNOSIS — Z79.4 TYPE 2 DIABETES MELLITUS WITH DIABETIC POLYNEUROPATHY, WITH LONG-TERM CURRENT USE OF INSULIN: Primary | ICD-10-CM

## 2019-03-13 LAB
ESTIMATED AVG GLUCOSE: 140 MG/DL
HBA1C MFR BLD HPLC: 6.5 %

## 2019-03-13 PROCEDURE — 99215 PR OFFICE/OUTPT VISIT, EST, LEVL V, 40-54 MIN: ICD-10-PCS | Mod: S$GLB,,, | Performed by: NURSE PRACTITIONER

## 2019-03-13 PROCEDURE — 36415 COLL VENOUS BLD VENIPUNCTURE: CPT

## 2019-03-13 PROCEDURE — 83036 HEMOGLOBIN GLYCOSYLATED A1C: CPT

## 2019-03-13 PROCEDURE — 99215 OFFICE O/P EST HI 40 MIN: CPT | Mod: S$GLB,,, | Performed by: NURSE PRACTITIONER

## 2019-03-13 PROCEDURE — 99999 PR PBB SHADOW E&M-EST. PATIENT-LVL V: CPT | Mod: PBBFAC,,, | Performed by: NURSE PRACTITIONER

## 2019-03-13 PROCEDURE — 99999 PR PBB SHADOW E&M-EST. PATIENT-LVL V: ICD-10-PCS | Mod: PBBFAC,,, | Performed by: NURSE PRACTITIONER

## 2019-03-13 RX ORDER — INSULIN LISPRO 100 [IU]/ML
INJECTION, SOLUTION INTRAVENOUS; SUBCUTANEOUS
Qty: 2 BOX | Refills: 6 | Status: SHIPPED | OUTPATIENT
Start: 2019-03-13 | End: 2019-04-15

## 2019-03-13 RX ORDER — INSULIN GLARGINE 300 [IU]/ML
INJECTION, SOLUTION SUBCUTANEOUS
Qty: 4 SYRINGE | Refills: 6 | Status: SHIPPED | OUTPATIENT
Start: 2019-03-13 | End: 2019-03-13

## 2019-03-13 RX ORDER — INSULIN GLARGINE 300 [IU]/ML
INJECTION, SOLUTION SUBCUTANEOUS
Qty: 4 SYRINGE | Refills: 6 | Status: SHIPPED | OUTPATIENT
Start: 2019-03-13 | End: 2019-04-15

## 2019-03-13 RX ORDER — INSULIN LISPRO 100 [IU]/ML
INJECTION, SOLUTION INTRAVENOUS; SUBCUTANEOUS
Qty: 2 BOX | Refills: 6 | Status: SHIPPED | OUTPATIENT
Start: 2019-03-13 | End: 2019-03-13

## 2019-03-13 RX ORDER — INSULIN GLARGINE 300 [IU]/ML
INJECTION, SOLUTION SUBCUTANEOUS
Qty: 4 SYRINGE | Refills: 6 | Status: SHIPPED | OUTPATIENT
Start: 2019-03-13 | End: 2019-03-13 | Stop reason: SDUPTHER

## 2019-03-13 NOTE — PATIENT INSTRUCTIONS
Alirocumab injection  What is this medicine?  ALIROCUMAB (al i HAYDEN ue mab) is known as a PCSK9 inhibitor. It is used to lower the level of cholesterol in the blood. This medicine is only for patients whose cholesterol is not controlled by diet and statin therapy.  How should I use this medicine?  This medicine is for injection under the skin. You will be taught how to prepare and give this medicine. Use exactly as directed. Take your medicine at regular intervals. Do not take your medicine more often than directed.  It is important that you put your used needles and syringes in a special sharps container. Do not put them in a trash can. If you do not have a sharps container, call your pharmacist or healthcare provider to get one.  Talk to your pediatrician regarding the use of this medicine in children. Special care may be needed.  What side effects may I notice from receiving this medicine?  Side effects that you should report to your doctor or health care professional as soon as possible:  · allergic reactions like skin rash, itching or hives, swelling of the face, lips, or tongue  · signs and symptoms of infection like fever or chills; cough; sore throat; pain or trouble passing urine  · signs and symptoms of liver injury like dark yellow or brown urine; general ill feeling or flu-like symptoms; light-colored stools; loss of appetite; nausea; right upper belly pain; unusually weak or tired; yellowing of the eyes or skin  Side effects that usually do not require medical attention (report these to your doctor or health care professional if they continue or are bothersome):  · diarrhea  · muscle cramps  · muscle pain  · pain, redness, or irritation at site where injected  What may interact with this medicine?  Interactions are not expected.  What if I miss a dose?  If you miss a dose, take it as soon as you can. If your next dose is to be taken in less than 7 days, then do not take the missed dose. Take the next  dose at your regular time. Do not take double or extra doses.  Where should I keep my medicine?  Keep out of the reach of children.  You will be instructed on how to store this medicine. Throw away any unused medicine after the expiration date on the label.  What should I tell my health care provider before I take this medicine?  They need to know if you have any of these conditions:  · any unusual or allergic reaction to alirocumab, other medicines, foods, dyes, or preservatives  · pregnant or trying to get pregnant  · breast-feeding  What should I watch for while using this medicine?  You may need blood work done while you are taking this medicine.  NOTE:This sheet is a summary. It may not cover all possible information. If you have questions about this medicine, talk to your doctor, pharmacist, or health care provider. Copyright© 2017 [employer]

## 2019-03-13 NOTE — PROGRESS NOTES
"CC: Management of T2DM, review of chronic medical conditions    HPI: Ms. Mena Odonnell is a 73 y.o. female who was diagnosed with Type 2 in 1374-3760. Experienced blurry vision at the time of diagnosis r/t BG >400. Started on orals. Began insulin in 2005. She is currently on MDI. Metformin stopped December 2016 r/t GI upset. No DM hospitalizations.     Metformin d/c'd r/t GI upset, kept on MDI + Invokana.     Continues to see chiropractor monthly for sciatica.     Drinks ~5-6 bottles of water daily.                         Needs lab work.   Lab Results   Component Value Date    HGBA1C 7.5 (H) 11/17/2018     Social hx: continues to work.     Familial hx diabetes-sisters, mother    Pt has concerns w/ kidney functions,     Last seen by me in November 2018 and is now being seen by me again today.   Has interest in freestyle jannet/[employer].     Denies hypoglycemic symptoms.     Reports good appetite eating 3 meals a day. Doesn't often snack between meals    No exercise.     CURRENT DIABETIC MEDS: Tresiba 110 units DAILY, Novolog 30 units AC + correction scale (ISF 25, goal 150); Invokana 300 mg daily    Last Eye Exam: 7/2018  Last Podiatry Exam: 11/2018 (foot exam)     REVIEW OF SYSTEMS  General: no fatigue or fever. Wt loss #2-in the past 4 mos   Eyes: denies blurry vision, eye pain, or redness.  Cardiac: no palpitations or chest pain; chronic pedal edema  Respiratory: no cough; (+) intermittent dyspnea on exertion at times.   GI: good appetite, no nausea or abdominal pain. - diarrhea    Skin: no rashes, itching, or injection site reactions.   Neuro: right leg/foot pain, tingling at times  MS: chronic lower back pain    Vital Signs  /73 (BP Location: Left arm, Patient Position: Sitting, BP Method: Medium (Manual))   Pulse 71   Ht 5' 5" (1.651 m)   Wt 112 kg (246 lb 14.6 oz)   BMI 41.09 kg/m²     Hemoglobin A1C   Date Value Ref Range Status   11/17/2018 7.5 (H) 4.0 - 5.6 % Final     Comment:     ADA Screening " Guidelines:  5.7-6.4%  Consistent with prediabetes  >or=6.5%  Consistent with diabetes  High levels of fetal hemoglobin interfere with the HbA1C  assay. Heterozygous hemoglobin variants (HbS, HgC, etc)do  not significantly interfere with this assay.   However, presence of multiple variants may affect accuracy.     04/21/2018 6.9 (H) 4.0 - 5.6 % Final     Comment:     According to ADA guidelines, hemoglobin A1c <7.0% represents  optimal control in non-pregnant diabetic patients. Different  metrics may apply to specific patient populations.   Standards of Medical Care in Diabetes-2016.  For the purpose of screening for the presence of diabetes:  <5.7%     Consistent with the absence of diabetes  5.7-6.4%  Consistent with increasing risk for diabetes   (prediabetes)  >or=6.5%  Consistent with diabetes  Currently, no consensus exists for use of hemoglobin A1c  for diagnosis of diabetes for children.  This Hemoglobin A1c assay has significant interference with fetal   hemoglobin   (HbF). The results are invalid for patients with abnormal amounts of   HbF,   including those with known Hereditary Persistence   of Fetal Hemoglobin. Heterozygous hemoglobin variants (HbAS, HbAC,   HbAD, HbAE, HbA2) do not significantly interfere with this assay;   however, presence of multiple variants in a sample may impact the %   interference.     11/04/2017 7.7 (H) 4.0 - 5.6 % Final     Comment:     According to ADA guidelines, hemoglobin A1c <7.0% represents  optimal control in non-pregnant diabetic patients. Different  metrics may apply to specific patient populations.   Standards of Medical Care in Diabetes-2016.  For the purpose of screening for the presence of diabetes:  <5.7%     Consistent with the absence of diabetes  5.7-6.4%  Consistent with increasing risk for diabetes   (prediabetes)  >or=6.5%  Consistent with diabetes  Currently, no consensus exists for use of hemoglobin A1c  for diagnosis of diabetes for children.  This  Hemoglobin A1c assay has significant interference with fetal   hemoglobin   (HbF). The results are invalid for patients with abnormal amounts of   HbF,   including those with known Hereditary Persistence   of Fetal Hemoglobin. Heterozygous hemoglobin variants (HbAS, HbAC,   HbAD, HbAE, HbA2) do not significantly interfere with this assay;   however, presence of multiple variants in a sample may impact the %   interference.         Chemistry        Component Value Date/Time     03/09/2019 0833     03/09/2019 0833    K 3.9 03/09/2019 0833    K 3.9 03/09/2019 0833     03/09/2019 0833     03/09/2019 0833    CO2 29 03/09/2019 0833    CO2 29 03/09/2019 0833    BUN 22 03/09/2019 0833    BUN 22 03/09/2019 0833    CREATININE 0.7 03/09/2019 0833    CREATININE 0.7 03/09/2019 0833     03/09/2019 0833     03/09/2019 0833        Component Value Date/Time    CALCIUM 10.1 03/09/2019 0833    CALCIUM 10.1 03/09/2019 0833    ALKPHOS 68 03/09/2019 0833    AST 17 03/09/2019 0833    ALT 19 03/09/2019 0833    BILITOT 0.4 03/09/2019 0833          Lab Results   Component Value Date    CHOL 271 (H) 03/09/2019    CHOL 186 11/04/2017    CHOL 152 04/16/2016     Lab Results   Component Value Date    HDL 50 03/09/2019    HDL 44 11/04/2017    HDL 44 04/16/2016     Lab Results   Component Value Date    LDLCALC 194.6 (H) 03/09/2019    LDLCALC 117.2 11/04/2017    LDLCALC 87.2 04/16/2016     Lab Results   Component Value Date    TRIG 132 03/09/2019    TRIG 124 11/04/2017    TRIG 104 04/16/2016     Lab Results   Component Value Date    CHOLHDL 18.5 (L) 03/09/2019    CHOLHDL 23.7 11/04/2017    CHOLHDL 28.9 04/16/2016       Lab Results   Component Value Date    MICALBCREAT 7.9 09/29/2016       Lab Results   Component Value Date    TSH 0.445 11/17/2018     Component      Latest Ref Rng & Units 11/4/2017   Vit D, 25-Hydroxy      30 - 96 ng/mL 31     PHYSICAL EXAMINATION  Constitutional: Appears well, no distress  Neck:  Supple, trachea midline. No thyromegaly.    Respiratory: No wheezes, even and unlabored.  Cardiovascular: RRR  Lymph: bilateral 1+ edema (R>L).   Skin: warm and dry; no injection site reactions; slight acanthosis nigracans observed.  Neuro:patient alert and cooperative.   Feet: appropriate footwear;    ASSESSMENT and PLAN  1. Type 2 diabetes mellitus with diabetic polyneuropathy, with long-term current use of insulin  Hemoglobin A1c next time    Vit d next time     Hemoglobin A1c today  a1c goal less than 7%    Print out rx for toujeo 100 units and humalog 26 units ac w/ mod dose scale  Same doses for tresiba and novolog-back up at home  Add ozempic 0.25 mg weekly week 1-4 then 0.5 mg weekly week 5 and on  Discussed SEs, MOA  No h/o medullary thyroid ca or pancreatitis  bg monitoring 4 times a day  Pt stated that she will do finger-sticks, freestyle jannet $60   Printed rx for toujeo and humalog alternatives for this year  Printed rx for ozempic -coupon given for glp1a and toujeo     Counseling >35 mins    A1c today   Lab Results   Component Value Date    HGBA1C 6.5 (H) 03/13/2019          2. Morbid obesity with body mass index (BMI) of 40.0 or higher  Body mass index is 41.09 kg/m². may increase insulin resistance.  Encourage exercise 3-5 times a week.      3. Mixed hyperlipidemia  Lab Results   Component Value Date    LDLCALC 194.6 (H) 03/09/2019     Did not tolerate crestor or lipitor  Send rx for praluent lower dose q 14 days to specialty pharmacy  Discussed MOA, ldl goal   4. Essential hypertension  Controlled, continue med(s)   5. Bilateral carotid bruits  F/u with cards, [id number] will be beneficial      FOLLOW UP  Follow-up in about 4 months (around 7/13/2019).

## 2019-03-15 ENCOUNTER — TELEPHONE (OUTPATIENT)
Dept: PHARMACY | Facility: CLINIC | Age: 74
End: 2019-03-15

## 2019-04-01 NOTE — TELEPHONE ENCOUNTER
----- Message from Bettie Hernandez sent at 4/1/2019  1:39 PM CDT -----  Contact: [phone]  /[phone]  Pt is calling to speak with the nurse concerning her medication for cholestrol medication name is Praluent .     Pt has not been able to get medication because prescription for medication has not been received at pharmacy.      Please give pt a callback.      Thank you!

## 2019-04-03 ENCOUNTER — TELEPHONE (OUTPATIENT)
Dept: PHARMACY | Facility: CLINIC | Age: 74
End: 2019-04-03

## 2019-04-15 ENCOUNTER — OFFICE VISIT (OUTPATIENT)
Dept: PULMONOLOGY | Facility: CLINIC | Age: 74
End: 2019-04-15
Payer: COMMERCIAL

## 2019-04-15 ENCOUNTER — HOSPITAL ENCOUNTER (OUTPATIENT)
Dept: RADIOLOGY | Facility: HOSPITAL | Age: 74
Discharge: HOME | End: 2019-04-15
Attending: INTERNAL MEDICINE
Payer: COMMERCIAL

## 2019-04-15 VITALS
HEIGHT: 65 IN | DIASTOLIC BLOOD PRESSURE: 73 MMHG | BODY MASS INDEX: 41.51 KG/M2 | WEIGHT: 249.13 LBS | SYSTOLIC BLOOD PRESSURE: 127 MMHG | OXYGEN SATURATION: 96 % | HEART RATE: 88 BPM

## 2019-04-15 DIAGNOSIS — R05.3 CHRONIC COUGH: ICD-10-CM

## 2019-04-15 DIAGNOSIS — R06.09 DYSPNEA ON EXERTION: ICD-10-CM

## 2019-04-15 DIAGNOSIS — G47.33 OSA (OBSTRUCTIVE SLEEP APNEA): Primary | ICD-10-CM

## 2019-04-15 PROCEDURE — 71046 XR CHEST PA AND LATERAL: ICD-10-PCS | Mod: 26,,, | Performed by: RADIOLOGY

## 2019-04-15 PROCEDURE — 71046 X-RAY EXAM CHEST 2 VIEWS: CPT | Mod: 26,,, | Performed by: RADIOLOGY

## 2019-04-15 PROCEDURE — 99999 PR PBB SHADOW E&M-EST. PATIENT-LVL III: CPT | Mod: PBBFAC,,, | Performed by: INTERNAL MEDICINE

## 2019-04-15 PROCEDURE — 99999 PR PBB SHADOW E&M-EST. PATIENT-LVL III: ICD-10-PCS | Mod: PBBFAC,,, | Performed by: INTERNAL MEDICINE

## 2019-04-15 PROCEDURE — 99214 OFFICE O/P EST MOD 30 MIN: CPT | Mod: S$GLB,,, | Performed by: INTERNAL MEDICINE

## 2019-04-15 PROCEDURE — 71046 X-RAY EXAM CHEST 2 VIEWS: CPT | Mod: TC,FY

## 2019-04-15 PROCEDURE — 99214 PR OFFICE/OUTPT VISIT, EST, LEVL IV, 30-39 MIN: ICD-10-PCS | Mod: S$GLB,,, | Performed by: INTERNAL MEDICINE

## 2019-04-15 RX ORDER — BUDESONIDE AND FORMOTEROL FUMARATE DIHYDRATE 80; 4.5 UG/1; UG/1
2 AEROSOL RESPIRATORY (INHALATION) 2 TIMES DAILY
Qty: 10.2 G | Refills: 3 | Status: SHIPPED | OUTPATIENT
Start: 2019-04-15 | End: 2019-10-30 | Stop reason: CLARIF

## 2019-04-15 RX ORDER — LOSARTAN POTASSIUM AND HYDROCHLOROTHIAZIDE 12.5; 1 MG/1; MG/1
1 TABLET ORAL DAILY
COMMUNITY
End: 2019-09-17 | Stop reason: SDUPTHER

## 2019-04-15 NOTE — PROGRESS NOTES
Mena Odonnell  was seen as a new patient at the request  Gerry Coreas MD for the evaluation of  sob.    CHIEF COMPLAINT:  Wheezing      HISTORY OF PRESENT ILLNESS: Mena Odonnell is a 73 y.o. female  has a past medical history of Diabetes mellitus type II, Hyperlipidemia, Hypertension, Obesity, Osteoarthritis, Peripheral neuropathy, Severe obesity (BMI 35.0-39.9) with comorbidity (12/30/2016), and Tendonitis.  Patient with chronic brennan.  Over past year, patient noticed worsening dyspnea and associated with coughing and wheezing.  No chest pain.  Coughing is intermittent and non-productive.  Cough improve with otc tussin.  Patient was provide albuterol with improvement in cough and dyspnea.  No fever/chills.  Wheezing and cough worsen with supine position.  Sleep on 2 pillow.  No nasal congestion.  No gerd symptoms.  No pnd.      PAST MEDICAL HISTORY:    Active Ambulatory Problems     Diagnosis Date Noted    Type 2 diabetes mellitus with diabetic polyneuropathy     Hyperlipidemia     HTN (hypertension) 06/20/2013    Knee pain 04/25/2014    H/O vitamin D deficiency 11/06/2014    Non morbid obesity due to excess calories 04/21/2016    Bilateral carotid bruits 04/21/2016    Osteoarthritis of right knee 12/30/2016    Morbid obesity with body mass index (BMI) of 40.0 or higher 11/28/2018    Dyspnea on exertion 04/15/2019    Chronic cough 04/15/2019    MEKA (obstructive sleep apnea) 04/15/2019     Resolved Ambulatory Problems     Diagnosis Date Noted    Hypertension     Peripheral neuropathy     Chest pain at rest 06/20/2013    Unspecified essential hypertension 06/20/2013    Vaginal candidiasis 03/27/2015    BMI 39.0-39.9,adult 06/30/2015    Special screening for malignant neoplasms, colon 09/26/2015    BMI 37.0-37.9, adult 01/25/2016    Severe obesity (BMI 35.0-39.9) with comorbidity 12/30/2016     Past Medical History:   Diagnosis Date    Diabetes mellitus type II     Hyperlipidemia      Hypertension     Obesity     Osteoarthritis     Peripheral neuropathy     Severe obesity (BMI 35.0-39.9) with comorbidity 2016    Tendonitis                 PAST SURGICAL HISTORY:    Past Surgical History:   Procedure Laterality Date    BLADDER SUSPENSION      CATARACT EXTRACTION      COLONOSCOPY N/A 2015    Performed by J[facility] at [facility] ([facility])    HYSTERECTOMY  1978    fibroids    TONSILLECTOMY           FAMILY HISTORY:                Family History   Problem Relation Age of Onset    Thyroid disease Mother     Cataracts Mother     Diabetes Mother     Stroke Mother     Hypertension Mother     Thyroid disease Sister     Diabetes Sister     Hypertension Sister     Hypertension Daughter     Diabetes Son     Diabetes Sister     Hypertension Sister     Hypertension Brother     Hypertension Sister     Hypertension Sister     Hypertension Brother     Thyroid disease Maternal Aunt     Thyroid disease Maternal Aunt     Colon cancer Maternal Grandfather     Breast cancer Neg Hx     Ovarian cancer Neg Hx     Amblyopia Neg Hx     Blindness Neg Hx     Cancer Neg Hx     Glaucoma Neg Hx     Macular degeneration Neg Hx     Retinal detachment Neg Hx     Strabismus Neg Hx        SOCIAL HISTORY:          Tobacco:   Social History     Tobacco Use   Smoking Status Former Smoker    Packs/day: 0.25    Years: 15.00    Pack years: 3.75    Last attempt to quit: 1982    Years since quittin.3   Smokeless Tobacco Never Used     alcohol use:    Social History     Substance and Sexual Activity   Alcohol Use No               Occupation:  [employer]  in [place]    ALLERGIES:    Review of patient's allergies indicates:   Allergen Reactions    Crestor [rosuvastatin] Other (See Comments)     Leg Weakness.        CURRENT MEDICATIONS:    Current Outpatient Medications   Medication Sig Dispense Refill    amLODIPine (NORVASC) 10 MG tablet Take 1  "tablet (10 mg total) by mouth once daily. 90 tablet 3    aspirin 81 MG Chew Take 1 tablet (81 mg total) by mouth once daily. 30 tablet 0    canagliflozin (INVOKANA) 300 mg Tab tablet Take 1 tablet (300 mg total) by mouth once daily. 30 tablet 11    gabapentin (NEURONTIN) 300 MG capsule Take 1 capsule (300 mg total) by mouth 3 (three) times daily. 90 capsule 11    insulin aspart U-100 (NOVOLOG FLEXPEN U-100 INSULIN) 100 unit/mL InPn pen Inject 30 Units into the skin 3 (three) times daily with meals. 2 Box 11    insulin degludec (TRESIBA FLEXTOUCH U-200) 200 unit/mL (3 mL) InPn Inject 110 units daily. 6 Syringe 11    losartan-hydrochlorothiazide 100-12.5 mg (HYZAAR) 100-12.5 mg Tab Take 1 tablet by mouth once daily.      semaglutide (OZEMPIC) 0.25 mg or 0.5 mg(2 mg/1.5 mL) PnIj Inject 0.25 mg weekly week 1-4, 0.5 mg weekly, week 5-8. 1 Syringe 4    budesonide-formoterol 80-4.5 mcg (SYMBICORT) 80-4.5 mcg/actuation HFAA Inhale 2 puffs into the lungs 2 (two) times daily. 10.2 g 3    chlorthalidone (HYGROTEN) 25 MG Tab Take 1 tablet (25 mg total) by mouth once daily. 90 tablet 3     No current facility-administered medications for this visit.                   REVIEW OF SYSTEMS:     Pulmonary related symptoms as per HPI.  Gen:  no weight loss, no fever, no night sweat  HEENT:  no visual changes, no sore throat, no hearing loss  CV:  No chest pain, no orthopnea, no PND  GI:  no melena, no hematochezia, no diarhea, no constipation.  :  no dysuria, no hematuria, no hesistancy, no dribbling  Neuro:  no syncope, no vertigo, no tinitus  Psych:  No homocide or suicide ideation; no depression.  Endocrine:  No heat or cold intolerance.  Sleep:  +loud snoring; no witnessed apnea.  Tire upon awake.    Otherwise, a balance of systems reviewed is negative.          PHYSICAL EXAM:  Vitals:    04/15/19 1027   BP: 127/73   Pulse: 88   SpO2: 96%   Weight: 113 kg (249 lb 1.6 oz)   Height: 5' 5" (1.651 m)   PainSc:   7   PainLoc: " Leg     Body mass index is 41.45 kg/m².     GENERAL:  well develop; no apparent distress  HEENT:  no nasal congestion; no discharge noted; class 2 modified mallampatti.   NECK:  supple; no palpable masses.  CARDIO: regular rate and rhythm  PULM:  clear to auscultation bilaterally; no intercostals retractions; no accessory muscle usage   ABDOMEN:  soft nontender/nondistended.  +bowel sound  EXTREMITIES no cc; +pitting edema  NEURO:  CN II-XII intact.  5/5 motor in all extremities.  sensation grossly intact   to light touch.  PSYCH:  normal affect.  Alert and oriented x 4    LABS  Pulmonary Functions Testing Results(personally reviewed):  none  ABG (personally reviewed):  none  CXR (personally reviewed):  4/15/19 no effusion or consolidation  CT CHEST(personally reviewed):  none    bnp 4/15/19 20    ECHO:  5-17  CONCLUSIONS     1 - Normal left ventricular systolic function (EF 60-65%).     2 - Concentric remodeling.     3 - Indeterminate LV diastolic function.     4 - Mild aortic stenosis, CHRIS = 2.04 cm2, peak velocity = 2.61 m/s, mean gradient = 17 mmHg.       ASSESSMENT/PLAN  Problem List Items Addressed This Visit     Chronic cough    Overview     await cxr.  Suspect reactive airway.  Will reassess with laba/ics.           Relevant Medications    budesonide-formoterol 80-4.5 mcg (SYMBICORT) 80-4.5 mcg/actuation HFAA    Dyspnea on exertion    Overview     habitus +/- volume +/- reactive airway.  Baseline  Pft, 6 min and cxr.  Improve with albuterol.  Will start ics/laba and reassess.  Bnp wnl.  Will not start diuretic.          Relevant Medications    budesonide-formoterol 80-4.5 mcg (SYMBICORT) 80-4.5 mcg/actuation HFAA    Other Relevant Orders    Brain natriuretic peptide (Completed)    X-Ray Chest PA And Lateral (Completed)    Complete PFT without bronchodilator    Stress test, pulmonary    MEKA (obstructive sleep apnea) - Primary    Overview     The patient symptomatically has loud snoring, restless sleep with  findings of elevated bmi, htn. This warrants further investigation for possible obstructive sleep apnea.  Patient will be contacted after sleep study is done.            Relevant Orders    Polysomnogram (CPAP will be added if patient meets diagnostic criteria.)            Patient will Follow up after sleep study. with md/np.    CC: Send copy of this note to Gerry Coreas MD

## 2019-04-15 NOTE — PATIENT INSTRUCTIONS
Bri or Re will contact you to schedule your sleep study. Their number is [phone]. The [facility] is located on 2nd floor of [facility].    We will call you when the sleep study results are ready - if you have not heard from us by 2 weeks from the date of the study, please call [phone].    You are advised to abstain from driving should you feel sleepy or drowsy.

## 2019-04-15 NOTE — LETTER
April 15, 2019      Gerry Coreas MD  [address]  [facility] [address]           [facility]  [address] [facility] [address]  [place] [address]  Phone: [phone]  Fax: [phone]          Patient: Mena Odonenll   MR Number: [id number]   YOB: 1945   Date of Visit: 4/15/2019       Dear Dr. Gerry Coreas:    Thank you for referring Mena Odonnell to me for evaluation. Attached you will find relevant portions of my assessment and plan of care.    If you have questions, please do not hesitate to call me. I look forward to following Mena Odonnell along with you.    Sincerely,    Harvinder Mejias MD    Enclosure  CC:  No Recipients    If you would like to receive this communication electronically, please contact [email or web address] or [phone] to request more information on [employer] access.    For providers and/or their staff who would like to refer a patient to [facility], please contact us through our one-stop-shop provider referral line, [facility] [place], at [phone].    If you feel you have received this communication in error or would no longer like to receive these types of communications, please e-mail [email or web address]

## 2019-04-18 ENCOUNTER — TELEPHONE (OUTPATIENT)
Dept: SLEEP MEDICINE | Facility: HOSPITAL | Age: 74
End: 2019-04-18

## 2019-04-19 ENCOUNTER — HOSPITAL ENCOUNTER (OUTPATIENT)
Dept: SLEEP MEDICINE | Facility: HOSPITAL | Age: 74
Discharge: HOME | End: 2019-04-19
Attending: INTERNAL MEDICINE
Payer: COMMERCIAL

## 2019-04-19 DIAGNOSIS — G47.33 OSA (OBSTRUCTIVE SLEEP APNEA): ICD-10-CM

## 2019-04-19 PROCEDURE — 95810 POLYSOM 6/> YRS 4/> PARAM: CPT | Mod: 26,,, | Performed by: INTERNAL MEDICINE

## 2019-04-19 PROCEDURE — 95810 PR POLYSOMNOGRAPHY, 4 OR MORE: ICD-10-PCS | Mod: 26,,, | Performed by: INTERNAL MEDICINE

## 2019-04-19 PROCEDURE — 95810 POLYSOM 6/> YRS 4/> PARAM: CPT

## 2019-04-20 NOTE — PROGRESS NOTES
Baseline PSG was performed on EDDY BENDER. The entire procedure was explained, including Bio calibration procedure, patient was informed that there may be a need to enter the room during the night to fix lead or make adjustments to the equipment. Questions were answered prior to start of study. She was given instructions on how to call out for help including how to use the nurse call unit in the bathroom.     Patient education was performed. This includes the possible use of CPAP machine and different CPAP masks. She was given the after visit summary.     The lowest oxygen saturation observed during sleep was 84%     She did not meet criteria PAP treatment.     EKG: The EKG appeared to be NSR.

## 2019-04-20 NOTE — PATIENT INSTRUCTIONS
Your sleep study will be scored and interpreted by one of our physicians who are board certified in sleep medicine.  Within two weeks the results will be sent to the physician who referred you. Your physician should then contact you to go over the results, along with any recommendations. If you do not hear from your physician within two weeks, please call them.  Please feel free to contact us with any questions or concerns regarding this process.  Once again, thank you for allowing us serve you.  We can be reached at [phone].

## 2019-04-30 ENCOUNTER — TELEPHONE (OUTPATIENT)
Dept: PULMONOLOGY | Facility: CLINIC | Age: 74
End: 2019-04-30

## 2019-04-30 NOTE — PROCEDURES
"Dear Provider,     You have ordered sleep LAB services to perform the sleep study for Mena Odonnell.  The sleep study that you ordered is complete.      Please find Sleep Study result in "Chart Review" under the "Media tab."      As the ordering provider, you are responsible for reviewing the results and implementing a treatment plan with your patient.    If you need a Sleep Medicine provider to explain the sleep study findings and arrange treatment for the patient, please refer patient for consultation to our Sleep Clinic via [facility] with Ambulatory Consult Sleep.    To do that please place an order for an  "Ambulatory Consult Sleep" - it will go to our clinic work queue for our Medical Assistant to contact the patient for an appointment.     For any questions, please contact our clinic staff at [phone] to talk to clinical staff.   "

## 2019-04-30 NOTE — TELEPHONE ENCOUNTER
----- Message from Harvinder Mejias MD sent at 4/30/2019  8:47 AM CDT -----  Please schedule sleep clinic appointmetnt with md/np in 1-2 weeks to discuss sleep study result.

## 2019-05-13 ENCOUNTER — CLINICAL SUPPORT (OUTPATIENT)
Dept: OTOLARYNGOLOGY | Facility: CLINIC | Age: 74
End: 2019-05-13
Payer: COMMERCIAL

## 2019-05-13 ENCOUNTER — OFFICE VISIT (OUTPATIENT)
Dept: OTOLARYNGOLOGY | Facility: CLINIC | Age: 74
End: 2019-05-13
Payer: COMMERCIAL

## 2019-05-13 ENCOUNTER — LAB VISIT (OUTPATIENT)
Dept: LAB | Facility: OTHER | Age: 74
End: 2019-05-13
Attending: SPECIALIST
Payer: COMMERCIAL

## 2019-05-13 VITALS — BODY MASS INDEX: 41.48 KG/M2 | WEIGHT: 249 LBS | HEIGHT: 65 IN

## 2019-05-13 DIAGNOSIS — H90.A22 SENSORINEURAL HEARING LOSS (SNHL) OF LEFT EAR WITH RESTRICTED HEARING OF RIGHT EAR: ICD-10-CM

## 2019-05-13 DIAGNOSIS — Z13.9 SCREENING FOR CONDITION: ICD-10-CM

## 2019-05-13 DIAGNOSIS — H93.13 TINNITUS OF BOTH EARS: ICD-10-CM

## 2019-05-13 DIAGNOSIS — H90.A31 MIXED CONDUCTIVE AND SENSORINEURAL HEARING LOSS OF RIGHT EAR WITH RESTRICTED HEARING OF LEFT EAR: Primary | ICD-10-CM

## 2019-05-13 DIAGNOSIS — H90.A21 SENSORINEURAL HEARING LOSS (SNHL) OF RIGHT EAR WITH RESTRICTED HEARING OF LEFT EAR: Primary | ICD-10-CM

## 2019-05-13 DIAGNOSIS — H61.22 IMPACTED CERUMEN OF LEFT EAR: ICD-10-CM

## 2019-05-13 DIAGNOSIS — H60.8X3 CHRONIC ECZEMATOUS OTITIS EXTERNA OF BOTH EARS: ICD-10-CM

## 2019-05-13 DIAGNOSIS — J30.9 ALLERGIC RHINITIS, UNSPECIFIED SEASONALITY, UNSPECIFIED TRIGGER: ICD-10-CM

## 2019-05-13 DIAGNOSIS — H69.93 DYSFUNCTION OF BOTH EUSTACHIAN TUBES: ICD-10-CM

## 2019-05-13 DIAGNOSIS — R29.2 ABNORMAL ACOUSTIC REFLEX: ICD-10-CM

## 2019-05-13 DIAGNOSIS — H69.93 TYPE C TYMPANOGRAM OF BOTH EARS: ICD-10-CM

## 2019-05-13 DIAGNOSIS — J34.2 NASAL SEPTAL DEVIATION: ICD-10-CM

## 2019-05-13 LAB
BUN SERPL-MCNC: 18 MG/DL (ref 8–23)
CREAT SERPL-MCNC: 0.7 MG/DL (ref 0.5–1.4)
EST. GFR  (AFRICAN AMERICAN): >60 ML/MIN/1.73 M^2
EST. GFR  (NON AFRICAN AMERICAN): >60 ML/MIN/1.73 M^2

## 2019-05-13 PROCEDURE — 36415 COLL VENOUS BLD VENIPUNCTURE: CPT

## 2019-05-13 PROCEDURE — 92550 PR TYMPANOMETRY AND REFLEX THRESHOLD MEASUREMENTS: ICD-10-PCS | Mod: S$GLB,,, | Performed by: AUDIOLOGIST-HEARING AID FITTER

## 2019-05-13 PROCEDURE — 69210 PR REMOVAL IMPACTED CERUMEN REQUIRING INSTRUMENTATION, UNILATERAL: ICD-10-PCS | Mod: S$GLB,,, | Performed by: SPECIALIST

## 2019-05-13 PROCEDURE — 99214 PR OFFICE/OUTPT VISIT, EST, LEVL IV, 30-39 MIN: ICD-10-PCS | Mod: 25,S$GLB,, | Performed by: SPECIALIST

## 2019-05-13 PROCEDURE — 99214 OFFICE O/P EST MOD 30 MIN: CPT | Mod: 25,S$GLB,, | Performed by: SPECIALIST

## 2019-05-13 PROCEDURE — 69210 REMOVE IMPACTED EAR WAX UNI: CPT | Mod: S$GLB,,, | Performed by: SPECIALIST

## 2019-05-13 PROCEDURE — 92557 COMPREHENSIVE HEARING TEST: CPT | Mod: S$GLB,,, | Performed by: AUDIOLOGIST-HEARING AID FITTER

## 2019-05-13 PROCEDURE — 92550 TYMPANOMETRY & REFLEX THRESH: CPT | Mod: S$GLB,,, | Performed by: AUDIOLOGIST-HEARING AID FITTER

## 2019-05-13 PROCEDURE — 92557 PR COMPREHENSIVE HEARING TEST: ICD-10-PCS | Mod: S$GLB,,, | Performed by: AUDIOLOGIST-HEARING AID FITTER

## 2019-05-13 PROCEDURE — 82565 ASSAY OF CREATININE: CPT

## 2019-05-13 PROCEDURE — 84520 ASSAY OF UREA NITROGEN: CPT

## 2019-05-13 RX ORDER — INSULIN GLARGINE 300 U/ML
INJECTION, SOLUTION SUBCUTANEOUS
Refills: 6 | COMMUNITY
Start: 2019-04-23 | End: 2019-05-22 | Stop reason: SDUPTHER

## 2019-05-13 RX ORDER — DEXAMETHASONE 4 MG/1
TABLET ORAL
Refills: 1 | COMMUNITY
Start: 2019-03-26 | End: 2019-09-17

## 2019-05-13 NOTE — LETTER
May 13, 2019      Gerry Coreas MD  [address]  [place] [address]           [facility] [address]  [address]  [place] [address]  Phone: [phone]  Fax: [phone]          Patient: Mena Odonnell   MR Number: [id number]   YOB: 1945   Date of Visit: 5/13/2019       Dear Dr. Gerry Coreas:    Thank you for referring Mena Odonnell to me for evaluation. Attached you will find relevant portions of my assessment and plan of care.    If you have questions, please do not hesitate to call me. I look forward to following Mena Odonnell along with you.    Sincerely,    MIKALA Galvan MD    Enclosure  CC:  No Recipients    If you would like to receive this communication electronically, please contact [email or web address] or [phone] to request more information on [employer] access.    For providers and/or their staff who would like to refer a patient to [facility], please contact us through our one-stop-shop provider referral line, [facility], at [phone].    If you feel you have received this communication in error or would no longer like to receive these types of communications, please e-mail [email or web address]

## 2019-05-13 NOTE — PROCEDURES
"Ear Cerumen Removal  Date/Time: 5/13/2019 3:18 PM  Performed by: MIKALA Galvan MD  Authorized by: MIKALA Galvan MD     Time out: Immediately prior to procedure a "time out" was called to verify the correct patient, procedure, equipment, support staff and site/side marked as required.    Consent Done?:  Yes (Verbal)    Local anesthetic:  None  Location details:  Left ear  Procedure type comment:  Wire loop  Cerumen  Removal Results:  Cerumen completely removed  Patient tolerance:  Patient tolerated the procedure well with no immediate complications      "

## 2019-05-13 NOTE — PROGRESS NOTES
Subjective:       Patient ID: Mena Odonnell is a 73 y.o. female.    Chief Complaint: Sinus Problem and Hearing Loss    The patient is coming in for an evaluation of her hearing.  She is having to ask people to repeat this.  She is not have any episodes with imbalance or vertigo.  Symptoms have been worsening over the last year.  She does have 1 cousin who wears hearing aids.  There is no significant exposure to noise in her workplace history.    Review of Systems   Constitutional: Negative for activity change, appetite change, chills, fatigue, fever and unexpected weight change.   HENT: Positive for congestion, ear pain, hearing loss, postnasal drip, rhinorrhea, sore throat and tinnitus. Negative for ear discharge, facial swelling, mouth sores, sinus pressure, sinus pain, sneezing, trouble swallowing and voice change.    Eyes: Negative for photophobia, pain, discharge, redness, itching and visual disturbance.   Respiratory: Positive for cough. Negative for apnea, choking, shortness of breath and wheezing.    Cardiovascular: Negative for chest pain and palpitations.   Gastrointestinal: Negative for abdominal distention, abdominal pain, nausea and vomiting.   Musculoskeletal: Negative for arthralgias, myalgias, neck pain and neck stiffness.   Skin: Negative.  Negative for color change, pallor and rash.   Allergic/Immunologic: Positive for environmental allergies. Negative for food allergies and immunocompromised state.   Neurological: Positive for headaches. Negative for dizziness, facial asymmetry, speech difficulty, weakness, light-headedness and numbness.   Hematological: Negative for adenopathy. Does not bruise/bleed easily.   Psychiatric/Behavioral: Negative for confusion, decreased concentration and sleep disturbance.       Procedure-wax is removed from the left ear canal with wire loop.  The patient tolerated procedure well.    Objective:      Physical Exam   Constitutional: She is oriented to person,  place, and time. She appears well-developed and well-nourished. She is cooperative.   HENT:   Head: Normocephalic.   Right Ear: External ear and ear canal normal. Tympanic membrane is retracted.   Left Ear: External ear and ear canal normal. No drainage (Wax blocks the ear canal). Tympanic membrane is retracted.   Nose: Mucosal edema (cyanotic, boggy inferior turbinates bilaterally), rhinorrhea (clear mucus bilaterally) and septal deviation (To the right) present.   Mouth/Throat: Uvula is midline, oropharynx is clear and moist and mucous membranes are normal. No oral lesions. Tonsils are 2+ on the right. Tonsils are 2+ on the left. No tonsillar exudate.   Eyes: Pupils are equal, round, and reactive to light. EOM and lids are normal. Right eye exhibits no discharge and no exudate. Left eye exhibits no discharge and no exudate. Right conjunctiva is injected. Left conjunctiva is injected.   Neck: Trachea normal and normal range of motion. No muscular tenderness present. No tracheal deviation present. No thyroid mass and no thyromegaly present.   Cardiovascular: Normal rate, regular rhythm, normal heart sounds and normal pulses.   Pulmonary/Chest: Effort normal and breath sounds normal. No stridor. She has no decreased breath sounds. She has no wheezes. She has no rhonchi. She has no rales.   Abdominal: Soft. Bowel sounds are normal. There is no tenderness.   Musculoskeletal: Normal range of motion.   Lymphadenopathy:        Head (right side): No submental, no submandibular, no preauricular, no posterior auricular and no occipital adenopathy present.        Head (left side): No submental, no submandibular, no preauricular, no posterior auricular and no occipital adenopathy present.     She has no cervical adenopathy.   Neurological: She is alert and oriented to person, place, and time. She has normal strength. No cranial nerve deficit or sensory deficit. Gait normal.   Skin: Skin is warm and dry. No petechiae and no  rash noted. No cyanosis. Nails show no clubbing.   Psychiatric: She has a normal mood and affect. Her speech is normal and behavior is normal. Judgment and thought content normal. Cognition and memory are normal.           Assessment:       1. Sensorineural hearing loss (SNHL) of right ear with restricted hearing of left ear    2. Tinnitus of both ears    3. Allergic rhinitis, unspecified seasonality, unspecified trigger    4. Dysfunction of both eustachian tubes    5. Nasal septal deviation    6. Impacted cerumen of left ear    7. Screening for condition    8. Chronic eczematous otitis externa of both ears        Plan:       I will schedule patient for for an MRI of the temporal bone with and without IV contrast   to rule out an acoustic neuroma.  She will need BUN and creatinine drawn before the test.  Relative to her allergies and eustachian tube dysfunction I am starting her on daily use of Zyrtec and Flonase.  She will use Dermotic oil for the itching in her ears.  I will recheck her in 4 weeks.

## 2019-05-15 ENCOUNTER — TELEPHONE (OUTPATIENT)
Dept: PULMONOLOGY | Facility: CLINIC | Age: 74
End: 2019-05-15

## 2019-05-16 ENCOUNTER — OFFICE VISIT (OUTPATIENT)
Dept: PULMONOLOGY | Facility: CLINIC | Age: 74
End: 2019-05-16
Payer: COMMERCIAL

## 2019-05-16 VITALS
OXYGEN SATURATION: 97 % | SYSTOLIC BLOOD PRESSURE: 151 MMHG | HEIGHT: 65 IN | WEIGHT: 250.31 LBS | DIASTOLIC BLOOD PRESSURE: 73 MMHG | HEART RATE: 100 BPM | BODY MASS INDEX: 41.7 KG/M2

## 2019-05-16 DIAGNOSIS — G47.33 OSA (OBSTRUCTIVE SLEEP APNEA): ICD-10-CM

## 2019-05-16 DIAGNOSIS — R05.3 CHRONIC COUGH: ICD-10-CM

## 2019-05-16 DIAGNOSIS — R06.09 DYSPNEA ON EXERTION: ICD-10-CM

## 2019-05-16 PROCEDURE — 99999 PR PBB SHADOW E&M-EST. PATIENT-LVL III: ICD-10-PCS | Mod: PBBFAC,,, | Performed by: INTERNAL MEDICINE

## 2019-05-16 PROCEDURE — 99214 PR OFFICE/OUTPT VISIT, EST, LEVL IV, 30-39 MIN: ICD-10-PCS | Mod: S$GLB,,, | Performed by: INTERNAL MEDICINE

## 2019-05-16 PROCEDURE — 99214 OFFICE O/P EST MOD 30 MIN: CPT | Mod: S$GLB,,, | Performed by: INTERNAL MEDICINE

## 2019-05-16 PROCEDURE — 99999 PR PBB SHADOW E&M-EST. PATIENT-LVL III: CPT | Mod: PBBFAC,,, | Performed by: INTERNAL MEDICINE

## 2019-05-16 NOTE — PROGRESS NOTES
Mena Odonnell  was seen as a follow up  CHIEF COMPLAINT:  No chief complaint on file.      HISTORY OF PRESENT ILLNESS: Mena Odonnell is a 73 y.o. female  has a past medical history of Diabetes mellitus type II, Hyperlipidemia, Hypertension, Obesity, Osteoarthritis, Peripheral neuropathy, Severe obesity (BMI 35.0-39.9) with comorbidity (12/30/2016), and Tendonitis.  Patient with chronic brennan.  Over past year, patient noticed worsening dyspnea and associated with coughing and wheezing.  No chest pain.  Coughing is intermittent and non-productive.  Cough improve with otc tussin.  Patient was provide albuterol with improvement in cough and dyspnea.  No fever/chills.  Wheezing and cough worsen with supine position.  Sleep on 2 pillow.  No nasal congestion.  No gerd symptoms.  No pnd.      S/p sleep study since last visit.  Cough has resolved since last visit.  Patient stopped symbicort after 2 times.  Stopped due tachycardia.  S/p ent evaluation with Dr. House and was told to have allergy.      SLEEP ROUTINE:  Activity the hour prior to sleep: watch tv    Bed partner:  alone  Time to bed:  9 pm   Lights off:  off  Sleep onset latency:  Few minutes        Disruptions or awakenings:    none    Wakeup time:      3:30 pm  Perceived sleep quality:  rested       Daytime naps:      Nap on saturday  Weekend sleep routine:      9 pm to 6 am   Caffeine use: none  exercise habit:   none         PAST MEDICAL HISTORY:    Active Ambulatory Problems     Diagnosis Date Noted    Type 2 diabetes mellitus with diabetic polyneuropathy     Hyperlipidemia     HTN (hypertension) 06/20/2013    Knee pain 04/25/2014    H/O vitamin D deficiency 11/06/2014    Non morbid obesity due to excess calories 04/21/2016    Bilateral carotid bruits 04/21/2016    Osteoarthritis of right knee 12/30/2016    Morbid obesity with body mass index (BMI) of 40.0 or higher 11/28/2018    Dyspnea on exertion 04/15/2019    Chronic cough 04/15/2019    MEKA  (obstructive sleep apnea) 04/15/2019    Sensorineural hearing loss (SNHL) of right ear with restricted hearing of left ear 05/13/2019    Tinnitus of both ears 05/13/2019    Allergic rhinitis 05/13/2019    Dysfunction of both eustachian tubes 05/13/2019    Nasal septal deviation 05/13/2019    Chronic eczematous otitis externa of both ears 05/13/2019     Resolved Ambulatory Problems     Diagnosis Date Noted    Hypertension     Peripheral neuropathy     Chest pain at rest 06/20/2013    Unspecified essential hypertension 06/20/2013    Vaginal candidiasis 03/27/2015    BMI 39.0-39.9,adult 06/30/2015    Special screening for malignant neoplasms, colon 09/26/2015    BMI 37.0-37.9, adult 01/25/2016    Severe obesity (BMI 35.0-39.9) with comorbidity 12/30/2016     Past Medical History:   Diagnosis Date    Diabetes mellitus type II     Hyperlipidemia     Hypertension     Obesity     Osteoarthritis     Peripheral neuropathy     Severe obesity (BMI 35.0-39.9) with comorbidity 12/30/2016    Tendonitis                 PAST SURGICAL HISTORY:    Past Surgical History:   Procedure Laterality Date    BLADDER SUSPENSION      CATARACT EXTRACTION      COLONOSCOPY N/A 9/26/2015    Performed by Javed [facility] MD at [facility] (4[facility] FLR)    HYSTERECTOMY  1978    fibroids    TONSILLECTOMY           FAMILY HISTORY:                Family History   Problem Relation Age of Onset    Thyroid disease Mother     Cataracts Mother     Diabetes Mother     Stroke Mother     Hypertension Mother     Thyroid disease Sister     Diabetes Sister     Hypertension Sister     Hypertension Daughter     Diabetes Son     Diabetes Sister     Hypertension Sister     Hypertension Brother     Hypertension Sister     Hypertension Sister     Hypertension Brother     Thyroid disease Maternal Aunt     Thyroid disease Maternal Aunt     Colon cancer Maternal Grandfather     Breast cancer Neg Hx     Ovarian cancer Neg Hx      Amblyopia Neg Hx     Blindness Neg Hx     Cancer Neg Hx     Glaucoma Neg Hx     Macular degeneration Neg Hx     Retinal detachment Neg Hx     Strabismus Neg Hx        SOCIAL HISTORY:          Tobacco:   Social History     Tobacco Use   Smoking Status Former Smoker    Packs/day: 0.25    Years: 15.00    Pack years: 3.75    Last attempt to quit: 1982    Years since quittin.4   Smokeless Tobacco Never Used     alcohol use:    Social History     Substance and Sexual Activity   Alcohol Use No               Occupation:  [employer]  in [place]    ALLERGIES:    Review of patient's allergies indicates:   Allergen Reactions    Crestor [rosuvastatin] Other (See Comments)     Leg Weakness.        CURRENT MEDICATIONS:    Current Outpatient Medications   Medication Sig Dispense Refill    amLODIPine (NORVASC) 10 MG tablet Take 1 tablet (10 mg total) by mouth once daily. 90 tablet 3    budesonide-formoterol 80-4.5 mcg (SYMBICORT) 80-4.5 mcg/actuation HFAA Inhale 2 puffs into the lungs 2 (two) times daily. 10.2 g 3    canagliflozin (INVOKANA) 300 mg Tab tablet Take 1 tablet (300 mg total) by mouth once daily. 30 tablet 11    FLOVENT  mcg/actuation inhaler   1    gabapentin (NEURONTIN) 300 MG capsule Take 1 capsule (300 mg total) by mouth 3 (three) times daily. 90 capsule 11    insulin aspart U-100 (NOVOLOG FLEXPEN U-100 INSULIN) 100 unit/mL InPn pen Inject 30 Units into the skin 3 (three) times daily with meals. 2 Box 11    insulin degludec (TRESIBA FLEXTOUCH U-200) 200 unit/mL (3 mL) InPn Inject 110 units daily. 6 Syringe 11    losartan-hydrochlorothiazide 100-12.5 mg (HYZAAR) 100-12.5 mg Tab Take 1 tablet by mouth once daily.      semaglutide (OZEMPIC) 0.25 mg or 0.5 mg(2 mg/1.5 mL) PnIj Inject 0.25 mg weekly week 1-4, 0.5 mg weekly, week 5-8. 1 Syringe 4    TOUJEO MAX U-300 SOLOSTAR 300 unit/mL (3 mL) InPn   6    aspirin 81 MG Chew Take 1 tablet (81 mg total) by  "mouth once daily. 30 tablet 0    chlorthalidone (HYGROTEN) 25 MG Tab Take 1 tablet (25 mg total) by mouth once daily. 90 tablet 3     No current facility-administered medications for this visit.                   REVIEW OF SYSTEMS:   No acute changes from previous encounter dated 4/15/2019 with exceptions mentioned in HPI.      PHYSICAL EXAM:  Vitals:    05/16/19 1010   BP: (!) 151/73   Pulse: 100   SpO2: 97%   Weight: 113.6 kg (250 lb 5.3 oz)   Height: 5' 5" (1.651 m)   PainSc: 0-No pain     Body mass index is 41.66 kg/m².     GENERAL:  well develop; no apparent distress  HEENT:  no nasal congestion; no discharge noted; class 2 modified mallampatti.   NECK:  supple; no palpable masses.  CARDIO: regular rate and rhythm  PULM:  clear to auscultation bilaterally; no intercostals retractions; no accessory muscle usage   ABDOMEN:  soft nontender/nondistended.  +bowel sound  EXTREMITIES no cc; +pitting edema  NEURO:  CN II-XII intact.  5/5 motor in all extremities.  sensation grossly intact   to light touch.  PSYCH:  normal affect.  Alert and oriented x 4    LABS  Pulmonary Functions Testing Results(personally reviewed):  none  ABG (personally reviewed):  none  CXR (personally reviewed):  4/15/19 no effusion or consolidation  CT CHEST(personally reviewed):  none    4/19/19 psg ahi of 12.7; rdi 16    bnp 4/15/19 20    ECHO:  5-17  CONCLUSIONS     1 - Normal left ventricular systolic function (EF 60-65%).     2 - Concentric remodeling.     3 - Indeterminate LV diastolic function.     4 - Mild aortic stenosis, CHRIS = 2.04 cm2, peak velocity = 2.61 m/s, mean gradient = 17 mmHg.       ASSESSMENT/PLAN  Problem List Items Addressed This Visit     Chronic cough    Overview     Resolved.           Dyspnea on exertion    Overview     habitus +/- volume +/- reactive airway.  Bnp wnl.           MEKA (obstructive sleep apnea)    Overview     Mild to mod with  ahi of 12, rdi of 16         Current Assessment & Plan     Recommend " treatment due to severity and baseline htn.  Patient is willing to look into apap.  Will set up.           Relevant Orders    CPAP FOR HOME USE            Patient will No follow-ups on file. with md/np.

## 2019-05-16 NOTE — PATIENT INSTRUCTIONS
Step 1:  Wear the CPAP mask at home while awake for one hour each day. Practice breathing through the mask while watching television, reading, or performing another sedentary activity that keeps your mind off your anxiety.     Step 2: Use the CPAP mask  during scheduled one hour naps at home.     Step 3: Use CPAP mask  during the initial 3-4 hours of nocturnal sleep.     Step 4: Use CPAP mask  through the entire night of sleep.

## 2019-05-16 NOTE — ASSESSMENT & PLAN NOTE
Recommend treatment due to severity and baseline htn.  Patient is willing to look into apap.  Will set up.

## 2019-05-18 NOTE — PROGRESS NOTES
Keenan Hernandes, CCC-A  Audiologist - [facility] [address] [place] [address]  [email or web address]  [phone]    Patient: Mena Odonnell   MRN: [id number]  : 1945  TRACY: 2019      AUDIOLOGICAL EVALUATION        RECOMMENDATIONS:   It is recommended that she:  Follow up medically with a physician to assess hearing loss.  Receive binaural hearing aids to improve speech understanding.  Continue to receive audiological monitoring annually.  Use precaution and/or hearing protection in noisy environments.    If you should have any questions or concerns regarding the above information, please do not hesitate to contact me at [phone].      _______________________________  Keenan Hernandes, GARTH-A  Audiologist

## 2019-05-21 ENCOUNTER — TELEPHONE (OUTPATIENT)
Dept: OTOLARYNGOLOGY | Facility: CLINIC | Age: 74
End: 2019-05-21

## 2019-05-21 NOTE — TELEPHONE ENCOUNTER
Informed pt her ear drop rx was sent to [facility] pharmacy yesterday. She verbalizes understanding.        Informed pt Dr Galvan will send in her rx upon his return    Office note confirms Dr. Galvan wanted pt to start Dermotic ear drops. Informed pt that provider is out of the office. However will send message to covering provider. Will contact her with response. She verbalizes understanding.    ----- Message from Jelani Holliday sent at 5/21/2019 10:13 AM CDT -----  Contact: EDDY BENDER [[id number]]  Name of Who is Calling: EDDY BENDER [[id number]]      What is the request in detail: Would like to speak with staff in regards to ear drop that was suppose to be prescribed from 5/13 appointment. Pharm is [facility] [address] [place] [facility] [address] [facility]      Can the clinic reply by [facility]: no      What Number to Call Back if not in [facility]: [phone]

## 2019-05-22 DIAGNOSIS — I10 ESSENTIAL HYPERTENSION: ICD-10-CM

## 2019-05-22 DIAGNOSIS — E11.42 TYPE 2 DIABETES MELLITUS WITH DIABETIC POLYNEUROPATHY, WITH LONG-TERM CURRENT USE OF INSULIN: ICD-10-CM

## 2019-05-22 DIAGNOSIS — Z79.4 TYPE 2 DIABETES MELLITUS WITH DIABETIC POLYNEUROPATHY, WITH LONG-TERM CURRENT USE OF INSULIN: ICD-10-CM

## 2019-05-22 RX ORDER — INSULIN GLARGINE 300 U/ML
INJECTION, SOLUTION SUBCUTANEOUS
Qty: 4 SYRINGE | Refills: 6 | Status: SHIPPED | OUTPATIENT
Start: 2019-05-22 | End: 2020-01-02 | Stop reason: SDUPTHER

## 2019-05-22 RX ORDER — AMLODIPINE BESYLATE 10 MG/1
10 TABLET ORAL DAILY
Qty: 90 TABLET | Refills: 3 | Status: SHIPPED | OUTPATIENT
Start: 2019-05-22 | End: 2019-06-18 | Stop reason: SDUPTHER

## 2019-05-22 NOTE — TELEPHONE ENCOUNTER
----- Message from Marjorie Watts sent at 5/22/2019  2:25 PM CDT -----  Contact: self [phone]  .Needs Advice    Reason for call:        Communication Preference:phone    Additional Information:  TOUJEO MAX U-300 SOLOSTAR 300 unit/mL (3 mL) InPn   amLODIPine (NORVASC) 10 MG tablet  semaglutide (OZEMPIC) 0.25 mg or 0.5 mg(2 mg/1.5 mL) PnIj     Pt states she needs all these medications to be refilled states she needs an apt and blood work orders to be placed states she needs to speak to nurse because she took herself off of her canagliflozin (INVOKANA) 300 mg Tab tablet

## 2019-05-28 ENCOUNTER — TELEPHONE (OUTPATIENT)
Dept: ENDOCRINOLOGY | Facility: CLINIC | Age: 74
End: 2019-05-28

## 2019-05-28 NOTE — TELEPHONE ENCOUNTER
----- Message from Ankita Haque sent at 5/28/2019 10:17 AM CDT -----  Contact: [phone]  brandyn   / Mena  Needs Advice    Reason for call:   Caller says, to this date, she still has not received the Cholesterol Pen  Medication that was supposed to have been sent to her since March, and it was never ordered according to her pharmacy.  Karrie has no orders on file from you.     Wants to come in and see you asap.            Communication Preference:  Phone     Additional Information:  Pls call ref.this.

## 2019-05-29 ENCOUNTER — TELEPHONE (OUTPATIENT)
Dept: ENDOCRINOLOGY | Facility: CLINIC | Age: 74
End: 2019-05-29

## 2019-05-29 RX ORDER — FLUOCINOLONE ACETONIDE 0.11 MG/ML
4 OIL AURICULAR (OTIC) 2 TIMES DAILY
Qty: 20 ML | Refills: 5 | Status: SHIPPED | OUTPATIENT
Start: 2019-05-29 | End: 2023-04-03

## 2019-05-29 NOTE — TELEPHONE ENCOUNTER
----- Message from Ankita Haque sent at 5/29/2019 10:44 AM CDT -----  Contact: Agnieszka w/ [facility]   tel: [phone]  ref.no:[id number]   Needs Advice    Reason for call:  Caller is asking for the ICD.10 code/   Medication:  PRALUENT .         Communication Preference:  Phone     Additional Information:  Pls call w/ this.

## 2019-06-18 ENCOUNTER — CLINICAL SUPPORT (OUTPATIENT)
Dept: OTOLARYNGOLOGY | Facility: CLINIC | Age: 74
End: 2019-06-18
Payer: COMMERCIAL

## 2019-06-18 ENCOUNTER — OFFICE VISIT (OUTPATIENT)
Dept: OTOLARYNGOLOGY | Facility: CLINIC | Age: 74
End: 2019-06-18
Payer: COMMERCIAL

## 2019-06-18 ENCOUNTER — LAB VISIT (OUTPATIENT)
Dept: LAB | Facility: OTHER | Age: 74
End: 2019-06-18
Attending: SPECIALIST
Payer: COMMERCIAL

## 2019-06-18 VITALS
HEIGHT: 65 IN | SYSTOLIC BLOOD PRESSURE: 166 MMHG | HEART RATE: 84 BPM | BODY MASS INDEX: 41.85 KG/M2 | DIASTOLIC BLOOD PRESSURE: 76 MMHG | WEIGHT: 251.19 LBS | TEMPERATURE: 99 F

## 2019-06-18 DIAGNOSIS — J34.2 NASAL SEPTAL DEVIATION: ICD-10-CM

## 2019-06-18 DIAGNOSIS — H90.A21 SENSORINEURAL HEARING LOSS (SNHL) OF RIGHT EAR WITH RESTRICTED HEARING OF LEFT EAR: ICD-10-CM

## 2019-06-18 DIAGNOSIS — Z13.9 SCREENING FOR CONDITION: ICD-10-CM

## 2019-06-18 DIAGNOSIS — J30.9 ALLERGIC RHINITIS, UNSPECIFIED SEASONALITY, UNSPECIFIED TRIGGER: ICD-10-CM

## 2019-06-18 DIAGNOSIS — Z71.89 ENCOUNTER FOR HEARING AID CONSULTATION: Primary | ICD-10-CM

## 2019-06-18 DIAGNOSIS — H60.8X3 CHRONIC ECZEMATOUS OTITIS EXTERNA OF BOTH EARS: ICD-10-CM

## 2019-06-18 DIAGNOSIS — H69.93 DYSFUNCTION OF BOTH EUSTACHIAN TUBES: ICD-10-CM

## 2019-06-18 DIAGNOSIS — Z13.9 SCREENING FOR CONDITION: Primary | ICD-10-CM

## 2019-06-18 LAB
BUN SERPL-MCNC: 23 MG/DL (ref 8–23)
CREAT SERPL-MCNC: 0.8 MG/DL (ref 0.5–1.4)
EST. GFR  (AFRICAN AMERICAN): >60 ML/MIN/1.73 M^2
EST. GFR  (NON AFRICAN AMERICAN): >60 ML/MIN/1.73 M^2

## 2019-06-18 PROCEDURE — 99499 UNLISTED E&M SERVICE: CPT | Mod: S$GLB,,, | Performed by: AUDIOLOGIST-HEARING AID FITTER

## 2019-06-18 PROCEDURE — 36415 COLL VENOUS BLD VENIPUNCTURE: CPT

## 2019-06-18 PROCEDURE — 84520 ASSAY OF UREA NITROGEN: CPT

## 2019-06-18 PROCEDURE — 99214 OFFICE O/P EST MOD 30 MIN: CPT | Mod: S$GLB,,, | Performed by: SPECIALIST

## 2019-06-18 PROCEDURE — 99499 NO LOS: ICD-10-PCS | Mod: S$GLB,,, | Performed by: AUDIOLOGIST-HEARING AID FITTER

## 2019-06-18 PROCEDURE — 99214 PR OFFICE/OUTPT VISIT, EST, LEVL IV, 30-39 MIN: ICD-10-PCS | Mod: S$GLB,,, | Performed by: SPECIALIST

## 2019-06-18 PROCEDURE — 82565 ASSAY OF CREATININE: CPT

## 2019-06-18 RX ORDER — METOPROLOL SUCCINATE 25 MG/1
TABLET, EXTENDED RELEASE ORAL
Refills: 2 | COMMUNITY
Start: 2019-05-27 | End: 2020-08-18

## 2019-06-18 RX ORDER — MONTELUKAST SODIUM 10 MG/1
TABLET ORAL
Qty: 30 TABLET | Refills: 11 | Status: SHIPPED | OUTPATIENT
Start: 2019-06-18 | End: 2019-10-30 | Stop reason: CLARIF

## 2019-06-18 RX ORDER — CETIRIZINE HYDROCHLORIDE 10 MG/1
TABLET ORAL
Refills: 3 | COMMUNITY
Start: 2019-05-27 | End: 2023-04-03 | Stop reason: SDUPTHER

## 2019-06-18 NOTE — PROGRESS NOTES
Subjective:       Patient ID: Mena Odonnell is a 73 y.o. female.    Chief Complaint: Follow-up (with results)    The patient is returning for a follow-up visit.  The hearing in her left ear has improved.  There is no change in the hearing in her right ear. She feels as though there is some drainage in the right ear although none is produce.  She is not having itching.  The MRI ordered was not done because of questions about insurance authorization.  She is taking Zyrtec and Flonase every day.  She has been using the Dermotic since the last visit.    Review of Systems   Constitutional: Positive for fatigue. Negative for activity change, appetite change, chills, fever and unexpected weight change.   HENT: Positive for congestion, ear discharge, hearing loss, postnasal drip, rhinorrhea and sore throat. Negative for ear pain (Fullness and blockage in the right ear), facial swelling, mouth sores, sinus pressure, sinus pain, sneezing, tinnitus, trouble swallowing and voice change.    Eyes: Negative for photophobia, pain, discharge, redness, itching and visual disturbance.   Respiratory: Negative for apnea, cough, choking, shortness of breath and wheezing.    Cardiovascular: Negative for chest pain and palpitations.   Gastrointestinal: Negative for abdominal distention, abdominal pain, nausea and vomiting.   Musculoskeletal: Negative for arthralgias, myalgias, neck pain and neck stiffness.   Skin: Negative.  Negative for color change, pallor and rash.   Allergic/Immunologic: Positive for environmental allergies. Negative for food allergies and immunocompromised state.   Neurological: Positive for headaches. Negative for dizziness, facial asymmetry, speech difficulty, weakness, light-headedness and numbness.   Hematological: Negative for adenopathy. Does not bruise/bleed easily.   Psychiatric/Behavioral: Negative for confusion, decreased concentration and sleep disturbance.       Objective:      Physical Exam    Constitutional: She is oriented to person, place, and time. She appears well-developed and well-nourished. She is cooperative.   HENT:   Head: Normocephalic.   Right Ear: External ear and ear canal normal. Tympanic membrane is retracted. Tympanic membrane mobility is abnormal.   Left Ear: External ear and ear canal normal. Tympanic membrane is retracted. Tympanic membrane mobility is abnormal.   Nose: Mucosal edema (cyanotic, boggy inferior turbinates bilaterally), rhinorrhea (clear mucus bilaterally) and septal deviation (To the right) present.   Mouth/Throat: Uvula is midline, oropharynx is clear and moist and mucous membranes are normal. No oral lesions. Tonsils are 2+ on the right. Tonsils are 2+ on the left. No tonsillar exudate.   Eyes: Pupils are equal, round, and reactive to light. EOM and lids are normal. Right eye exhibits no discharge and no exudate. Left eye exhibits no discharge and no exudate. Right conjunctiva is injected. Left conjunctiva is injected.   Neck: Trachea normal and normal range of motion. No muscular tenderness present. No tracheal deviation present. No thyroid mass and no thyromegaly present.   Cardiovascular: Normal rate, regular rhythm, normal heart sounds and normal pulses.   Pulmonary/Chest: Effort normal and breath sounds normal. No stridor. She has no decreased breath sounds. She has no wheezes. She has no rhonchi. She has no rales.   Abdominal: Soft. Bowel sounds are normal. There is no tenderness.   Musculoskeletal: Normal range of motion.   Lymphadenopathy:        Head (right side): No submental, no submandibular, no preauricular, no posterior auricular and no occipital adenopathy present.        Head (left side): No submental, no submandibular, no preauricular, no posterior auricular and no occipital adenopathy present.     She has no cervical adenopathy.   Neurological: She is alert and oriented to person, place, and time. She has normal strength. No cranial nerve deficit  or sensory deficit. Gait normal.   Skin: Skin is warm and dry. No petechiae and no rash noted. No cyanosis. Nails show no clubbing.   Psychiatric: She has a normal mood and affect. Her speech is normal and behavior is normal. Judgment and thought content normal. Cognition and memory are normal.       Assessment:       1. Screening for condition    2. Chronic eczematous otitis externa of both ears    3. Allergic rhinitis, unspecified seasonality, unspecified trigger    4. Nasal septal deviation    5. Dysfunction of both eustachian tubes    6. Sensorineural hearing loss (SNHL) of right ear with restricted hearing of left ear        Plan:       We are scheduling the patient for her MRI.  I will have her take Singulair at night and Zyrtec in the morning and I will recheck her in 4 weeks.

## 2019-06-26 ENCOUNTER — HOSPITAL ENCOUNTER (OUTPATIENT)
Dept: RADIOLOGY | Facility: OTHER | Age: 74
Discharge: HOME | End: 2019-06-26
Attending: SPECIALIST
Payer: COMMERCIAL

## 2019-06-26 DIAGNOSIS — H90.A21 SENSORINEURAL HEARING LOSS (SNHL) OF RIGHT EAR WITH RESTRICTED HEARING OF LEFT EAR: ICD-10-CM

## 2019-06-26 DIAGNOSIS — H93.13 TINNITUS OF BOTH EARS: ICD-10-CM

## 2019-06-26 PROCEDURE — A9585 GADOBUTROL INJECTION: HCPCS | Performed by: SPECIALIST

## 2019-06-26 PROCEDURE — 25500020 PHARM REV CODE 255: Performed by: SPECIALIST

## 2019-06-26 PROCEDURE — 70553 MRI BRAIN STEM W/O & W/DYE: CPT | Mod: TC

## 2019-06-26 PROCEDURE — 70553 MRI IAC/TEMPORAL BONES W W/O CONTRAST: ICD-10-PCS | Mod: 26,,, | Performed by: INTERNAL MEDICINE

## 2019-06-26 PROCEDURE — 70553 MRI BRAIN STEM W/O & W/DYE: CPT | Mod: 26,,, | Performed by: INTERNAL MEDICINE

## 2019-06-26 RX ORDER — GADOBUTROL 604.72 MG/ML
10 INJECTION INTRAVENOUS
Status: COMPLETED | OUTPATIENT
Start: 2019-06-26 | End: 2019-06-26

## 2019-06-26 RX ADMIN — GADOBUTROL 10 ML: 604.72 INJECTION INTRAVENOUS at 08:06

## 2019-06-27 ENCOUNTER — CLINICAL SUPPORT (OUTPATIENT)
Dept: DIABETES | Facility: CLINIC | Age: 74
End: 2019-06-27
Payer: COMMERCIAL

## 2019-06-27 DIAGNOSIS — E11.42 TYPE 2 DIABETES MELLITUS WITH DIABETIC POLYNEUROPATHY, WITH LONG-TERM CURRENT USE OF INSULIN: Primary | ICD-10-CM

## 2019-06-27 DIAGNOSIS — E11.42 TYPE 2 DIABETES MELLITUS WITH DIABETIC POLYNEUROPATHY, WITH LONG-TERM CURRENT USE OF INSULIN: ICD-10-CM

## 2019-06-27 DIAGNOSIS — Z79.4 TYPE 2 DIABETES MELLITUS WITH DIABETIC POLYNEUROPATHY, WITH LONG-TERM CURRENT USE OF INSULIN: Primary | ICD-10-CM

## 2019-06-27 DIAGNOSIS — Z79.4 TYPE 2 DIABETES MELLITUS WITH DIABETIC POLYNEUROPATHY, WITH LONG-TERM CURRENT USE OF INSULIN: ICD-10-CM

## 2019-06-27 PROCEDURE — G0108 DIAB MANAGE TRN  PER INDIV: HCPCS | Mod: S$GLB,,, | Performed by: DIETITIAN, REGISTERED

## 2019-06-27 PROCEDURE — G0108 PR DIAB MANAGE TRN  PER INDIV: ICD-10-PCS | Mod: S$GLB,,, | Performed by: DIETITIAN, REGISTERED

## 2019-06-27 NOTE — PROGRESS NOTES
"Diabetes Education  Author: Phuong Palmer RD, CDE  Date: 6/27/2019    Diabetes Care Management Summary  Diabetes Education Record Assessment: Initial    Current Diabetes Risk Level: Low     Diabetes Type : Type II  Diabetes Diagnosis: >10 years (dx ~1995)      Current Treatment: Insulin, Oral Medication, Injectable   Toujeo 100 units every morning;   Novolog 26 units with meals;   Ozempic 0.5 mg weekly    stopped Invokana 2-3 months ago 2/2 "too much insulin"          Reviewed Problem List with Patient: Yes    Health Maintenance was reviewed today with patient. Discussed with patient importance of routine eye exams, foot exams/foot care, blood work (i.e.: A1c, microalbumin, and lipid), dental visits, yearly flu vaccine, and pneumonia vaccine as indicated by PCP. Patient verbalized understanding.     Health Maintenance Topics with due status: Not Due       Topic Last Completion Date    Colonoscopy 09/26/2015    Foot Exam 11/28/2018    Eye Exam 12/21/2018    Influenza Vaccine 01/20/2019    Lipid Panel 03/09/2019    Hemoglobin A1c 03/13/2019     Health Maintenance Due   Topic Date Due    Hepatitis C Screening  1945    TETANUS VACCINE  11/27/1964    Low Dose Statin  11/27/1967    Pneumococcal Vaccine (65+ Low/Medium Risk) (1 of 2 - PCV13) 11/27/2011    Mammogram  07/29/2017    DEXA SCAN  08/10/2018       Nutrition  Meal Planning: 3 meals per day, drinks regular soda (cooks bigger meals on the weekends: spaghetti, pasta dish)  What type of beverages do you drink?: regular soda/tea, diet soda/tea (u/s tea, Coke or Root Beer)    Meal Plan 24 Hour Recall - Breakfast: (M-F ham and cheese sandwich)  Meal Plan 24 Hour Recall - Lunch: (Subway sandwich OR salad)  Meal Plan 24 Hour Recall - Dinner: (meat and vegetables)      Monitoring   Self Monitoring : (not testing)  Blood Glucose Logs: No  Do you use a personal continuous glucose monitor?: No  In the last month, how often have you had a low blood sugar reaction?: " never  Can you tell when your blood sugar is too high?: (c/o headaches and some blurry vision but not testing BG, so unsure if related to hyperglycemia)    Exercise   Exercise Type: none    Social History  Preferred Learning Method: Face to Face, Demonstration, Hands On  Primary Support: Self, Daughter, Family  Occupation: (works FT managing an apartment complex)  Smoking Status: Ex Smoker  Alcohol Use: Rarely    Barriers to Change: None  Learning Challenges : None  Readiness to Learn : Acceptance  Cultural Influences: No          Diabetes Education Assessment/Progress  Diabetes Disease Process (diabetes disease process and treatment options): Discussion, Instructed, Individual Session, Written Materials Provided, Comprehends Key Points  Reviewed disease process and progression. Discussed pt's most likely causes of recently elevated A1c. Discussed current treatment options, especially dietary and lifestyle changes as well as medication additions and/or changes.    Nutrition (Incorporating nutritional management into one's lifestyle): Discussion, Instructed, Individual Session, Written Materials Provided, Comprehends Key Points  Emphasized importance of eliminating all SSB. Discussed SF drink options. Discussed carb vs non-carb foods and reviewed appropriate amounts of carbs to have at meals vs snacks.     Physical Activity (incorporating physical activity into one's lifestyle): Discussion, Instructed, Individual Session, Written Materials Provided, Comprehends Key Points  Discussed goals and benefits of regular PA. Encouraged getting up from her desk every hour as she is sedentary for long periods of time.    Medications (states correct name, dose, onset, peak, duration, side effects & timing of meds): Discussion, Instructed, Individual Session, Written Materials Provided, Comprehends Key Points  Instructed her to resume invokana as prescribed. No s/e reported when she stopped, simply stopped bc she felt she was  taking too much medicine. Reviewed MOA of invokana and how it will help with decreasing insulin needs and weight loss. Discussed possibility of increasing Ozempic to max dose of 1.0 mg weekly. Pt would like updated Rx - will request from NP.    Monitoring (monitoring blood glucose/other parameters & using results): Discussion, Instructed, Individual Session, Written Materials Provided, Comprehends Key Points  Discussed goal BGs for different times of day and in relation to meals. Instructed pt to test BG 3 times daily AC. Reviewed need for updated BG logs for all endo, PCP, and education appts.    Acute Complications (preventing, detecting, and treating acute complications): Discussion, Instructed, Individual Session, Written Materials Provided, Comprehends Key Points  Discussed hypoglycemia vs hyperglycemia symptoms and discussed appropriate treatments for each. Reviewed that pt is at high risk of hypo with current medication regimen. Discussed general vs severe hyperglycemia and risk of DKA.    Chronic Complications (preventing, detecting, and treating chronic complications): Discussion, Instructed, Individual Session, Written Materials Provided, Comprehends Key Points  Reviewed annual diabetes care schedule and patient priorities.    Cognitive (knowledge of self-management skills, functional health literacy): Individual Session  Arrives with general health management knowledge - with some specific knowledge of diabetes management. Leaves with increased knowledge base - will benefit from f/u in 2 months.    Psychosocial (emotional response to diabetes): Individual Session  No negative feelings toward diabetes dx or disease management noted.    Diabetes Distress and Support Systems: Individual Session  No distress noted.    Behavioral (readiness for change, lifestyle practices, self-care behaviors): Individual Session  Question motivation to make recommended changes.          Goals  Patient has selected/evaluated  goals during today's session: Yes, selected    Monitoring: Set (SMBG 2+ times daily and bring logs to f/u OR purchase Freestyle Giulia and begin use)  Start Date: 06/27/19  Target Date: 08/30/19         Diabetes Care Plan/Intervention  Education Plan/Intervention: Individual Follow-Up DSMT   2 mo f/u scheduled - bring logs having testing twice daily; resume invokana as prescribed; increase Ozempic to 1.0 mg weekly per NP; assess cost of freestyle giulia sensors and call me if purchased so we can set up training; try to eliminate all SSB      Diabetes Meal Plan  Restrictions: Restricted Carbohydrate  Carbohydrate Per Meal: 30-45g  Carbohydrate Per Snack : 7-15g      Today's Self-Management Care Plan was developed with the patient's input and is based on barriers identified during today's assessment.    The long and short-term goals in the care plan were written with the patient/caregiver's input. The patient has agreed to work toward these goals to improve her overall diabetes control.      The patient received a copy of today's self-management plan and verbalized understanding of the care plan, goals, and all of today's instructions.      The patient was encouraged to communicate with her physician and care team regarding her condition(s) and treatment.  I provided the patient with my contact information today and encouraged her to contact me via phone or patient portal as needed.         Education Units of Time   Time Spent: 60 min

## 2019-07-03 NOTE — PROGRESS NOTES
Keenan Hernandes, CCC-A  Audiologist - [facility] [address] [place] [address]  [email or web address]  [phone]    Patient: Mena Odonnell   MRN: [id number]  : 1945  TRACY: 2019      HEARING AID CONSULT      Hearing aid prices and styles were discussed with patient at length.  She would like to consider her financial options with her insurance and will call the clinic if she decides to place an order.        _______________________________  Keenan Hernandes, GARTH-A  Audiologist

## 2019-08-02 ENCOUNTER — TELEPHONE (OUTPATIENT)
Dept: ENDOCRINOLOGY | Facility: CLINIC | Age: 74
End: 2019-08-02

## 2019-08-02 DIAGNOSIS — E11.42 TYPE 2 DIABETES MELLITUS WITH DIABETIC POLYNEUROPATHY, WITH LONG-TERM CURRENT USE OF INSULIN: Primary | ICD-10-CM

## 2019-08-02 DIAGNOSIS — Z79.4 TYPE 2 DIABETES MELLITUS WITH DIABETIC POLYNEUROPATHY, WITH LONG-TERM CURRENT USE OF INSULIN: Primary | ICD-10-CM

## 2019-08-02 NOTE — TELEPHONE ENCOUNTER
Called patient and tried to make her a sooner appointment and she refused.  Patient would like to keep her appointment in September.  I scheduled her Lab appt 1 wk before her office visit.

## 2019-08-02 NOTE — TELEPHONE ENCOUNTER
----- Message from Lala Patel sent at 8/2/2019  2:59 PM CDT -----  Contact: Self/  [phone]  Type: Patient Call Back    Who called:  Patient    What is the request in detail:  Patient would like orders for blood work.  Thank you    Would the patient rather a call back or a response via [facility]?  Call back    Best call back number:  [phone]

## 2019-08-02 NOTE — TELEPHONE ENCOUNTER
Labs ordered.   Please see if pt can be rescheduled to an earlier date since her appt with me is scheduled in Sept. Schedule labs about a week prior to next visit.

## 2019-09-07 ENCOUNTER — LAB VISIT (OUTPATIENT)
Dept: LAB | Facility: HOSPITAL | Age: 74
End: 2019-09-07
Payer: COMMERCIAL

## 2019-09-07 DIAGNOSIS — Z79.4 TYPE 2 DIABETES MELLITUS WITH DIABETIC POLYNEUROPATHY, WITH LONG-TERM CURRENT USE OF INSULIN: ICD-10-CM

## 2019-09-07 DIAGNOSIS — E11.42 TYPE 2 DIABETES MELLITUS WITH DIABETIC POLYNEUROPATHY, WITH LONG-TERM CURRENT USE OF INSULIN: ICD-10-CM

## 2019-09-07 LAB
ANION GAP SERPL CALC-SCNC: 10 MMOL/L (ref 8–16)
BUN SERPL-MCNC: 16 MG/DL (ref 8–23)
CALCIUM SERPL-MCNC: 9.7 MG/DL (ref 8.7–10.5)
CHLORIDE SERPL-SCNC: 109 MMOL/L (ref 95–110)
CHOLEST SERPL-MCNC: 240 MG/DL (ref 120–199)
CHOLEST/HDLC SERPL: 5.6 {RATIO} (ref 2–5)
CO2 SERPL-SCNC: 26 MMOL/L (ref 23–29)
CREAT SERPL-MCNC: 0.7 MG/DL (ref 0.5–1.4)
EST. GFR  (AFRICAN AMERICAN): >60 ML/MIN/1.73 M^2
EST. GFR  (NON AFRICAN AMERICAN): >60 ML/MIN/1.73 M^2
ESTIMATED AVG GLUCOSE: 140 MG/DL (ref 68–131)
GLUCOSE SERPL-MCNC: 103 MG/DL (ref 70–110)
HBA1C MFR BLD HPLC: 6.5 % (ref 4–5.6)
HDLC SERPL-MCNC: 43 MG/DL (ref 40–75)
HDLC SERPL: 17.9 % (ref 20–50)
LDLC SERPL CALC-MCNC: 165.8 MG/DL (ref 63–159)
NONHDLC SERPL-MCNC: 197 MG/DL
POTASSIUM SERPL-SCNC: 4.2 MMOL/L (ref 3.5–5.1)
SODIUM SERPL-SCNC: 145 MMOL/L (ref 136–145)
TRIGL SERPL-MCNC: 156 MG/DL (ref 30–150)

## 2019-09-07 PROCEDURE — 36415 COLL VENOUS BLD VENIPUNCTURE: CPT | Mod: PO

## 2019-09-07 PROCEDURE — 80061 LIPID PANEL: CPT

## 2019-09-07 PROCEDURE — 83036 HEMOGLOBIN GLYCOSYLATED A1C: CPT

## 2019-09-07 PROCEDURE — 80048 BASIC METABOLIC PNL TOTAL CA: CPT

## 2019-09-12 ENCOUNTER — OFFICE VISIT (OUTPATIENT)
Dept: ENDOCRINOLOGY | Facility: CLINIC | Age: 74
End: 2019-09-12
Payer: COMMERCIAL

## 2019-09-12 VITALS
HEART RATE: 94 BPM | DIASTOLIC BLOOD PRESSURE: 83 MMHG | WEIGHT: 250.25 LBS | BODY MASS INDEX: 41.64 KG/M2 | SYSTOLIC BLOOD PRESSURE: 185 MMHG

## 2019-09-12 DIAGNOSIS — I10 ESSENTIAL HYPERTENSION: ICD-10-CM

## 2019-09-12 DIAGNOSIS — E66.01 MORBID OBESITY WITH BODY MASS INDEX (BMI) OF 40.0 OR HIGHER: ICD-10-CM

## 2019-09-12 DIAGNOSIS — E11.42 TYPE 2 DIABETES MELLITUS WITH DIABETIC POLYNEUROPATHY, WITH LONG-TERM CURRENT USE OF INSULIN: Primary | ICD-10-CM

## 2019-09-12 DIAGNOSIS — Z79.4 TYPE 2 DIABETES MELLITUS WITH DIABETIC POLYNEUROPATHY, WITH LONG-TERM CURRENT USE OF INSULIN: Primary | ICD-10-CM

## 2019-09-12 DIAGNOSIS — L02.91 ABSCESS: ICD-10-CM

## 2019-09-12 PROCEDURE — 99999 PR PBB SHADOW E&M-EST. PATIENT-LVL IV: CPT | Mod: PBBFAC,,, | Performed by: NURSE PRACTITIONER

## 2019-09-12 PROCEDURE — 99214 PR OFFICE/OUTPT VISIT, EST, LEVL IV, 30-39 MIN: ICD-10-PCS | Mod: S$GLB,,, | Performed by: NURSE PRACTITIONER

## 2019-09-12 PROCEDURE — 99999 PR PBB SHADOW E&M-EST. PATIENT-LVL IV: ICD-10-PCS | Mod: PBBFAC,,, | Performed by: NURSE PRACTITIONER

## 2019-09-12 PROCEDURE — 99214 OFFICE O/P EST MOD 30 MIN: CPT | Mod: S$GLB,,, | Performed by: NURSE PRACTITIONER

## 2019-09-12 NOTE — PATIENT INSTRUCTIONS
Please start checking blood pressure 1-2x/day. Keep a log.   Follow up with your PCP for blood pressure control and headache.     Resume Invokana 100 mg once daily   Reduce Novolog to 20 units before breakfast.   Reduce Toujeo to 90 units once daily in the morning.   Increase Ozempic to 1 mg once weekly. -- she has 2 more pens of 0.5 mg dose however.     Monitor blood sugar before meals and bedtime.     Difficult to optimize insulin doses since blood sugar logs not available.   A1c of 6.5% indicates controlled diabetes but does raise suspicion of hypoglycemia.

## 2019-09-17 ENCOUNTER — OFFICE VISIT (OUTPATIENT)
Dept: FAMILY MEDICINE | Facility: CLINIC | Age: 74
End: 2019-09-17
Payer: COMMERCIAL

## 2019-09-17 ENCOUNTER — INITIAL CONSULT (OUTPATIENT)
Dept: SURGERY | Facility: CLINIC | Age: 74
End: 2019-09-17
Payer: COMMERCIAL

## 2019-09-17 VITALS
OXYGEN SATURATION: 96 % | WEIGHT: 250.25 LBS | SYSTOLIC BLOOD PRESSURE: 170 MMHG | HEART RATE: 78 BPM | RESPIRATION RATE: 17 BRPM | TEMPERATURE: 99 F | DIASTOLIC BLOOD PRESSURE: 80 MMHG | HEIGHT: 65 IN | BODY MASS INDEX: 41.69 KG/M2

## 2019-09-17 VITALS
DIASTOLIC BLOOD PRESSURE: 75 MMHG | WEIGHT: 211.19 LBS | HEART RATE: 92 BPM | SYSTOLIC BLOOD PRESSURE: 130 MMHG | BODY MASS INDEX: 35.18 KG/M2 | HEIGHT: 65 IN

## 2019-09-17 DIAGNOSIS — Z86.39 H/O VITAMIN D DEFICIENCY: Chronic | ICD-10-CM

## 2019-09-17 DIAGNOSIS — Z79.4 TYPE 2 DIABETES MELLITUS WITH DIABETIC POLYNEUROPATHY, WITH LONG-TERM CURRENT USE OF INSULIN: ICD-10-CM

## 2019-09-17 DIAGNOSIS — E66.01 MORBID OBESITY WITH BODY MASS INDEX (BMI) OF 40.0 OR HIGHER: ICD-10-CM

## 2019-09-17 DIAGNOSIS — I10 ESSENTIAL HYPERTENSION: ICD-10-CM

## 2019-09-17 DIAGNOSIS — Z00.00 HEALTHCARE MAINTENANCE: ICD-10-CM

## 2019-09-17 DIAGNOSIS — E78.2 MIXED HYPERLIPIDEMIA: ICD-10-CM

## 2019-09-17 DIAGNOSIS — L02.11 NECK ABSCESS: Primary | ICD-10-CM

## 2019-09-17 DIAGNOSIS — E11.42 TYPE 2 DIABETES MELLITUS WITH DIABETIC POLYNEUROPATHY, WITH LONG-TERM CURRENT USE OF INSULIN: ICD-10-CM

## 2019-09-17 DIAGNOSIS — G47.33 OSA (OBSTRUCTIVE SLEEP APNEA): ICD-10-CM

## 2019-09-17 DIAGNOSIS — R03.0 ELEVATED BLOOD PRESSURE READING: ICD-10-CM

## 2019-09-17 DIAGNOSIS — L72.0 EPIDERMAL INCLUSION CYST: Primary | ICD-10-CM

## 2019-09-17 PROCEDURE — 90471 FLU VACCINE - HIGH DOSE (65+) PRESERVATIVE FREE IM: ICD-10-PCS | Mod: S$GLB,,, | Performed by: INTERNAL MEDICINE

## 2019-09-17 PROCEDURE — 99204 PR OFFICE/OUTPT VISIT, NEW, LEVL IV, 45-59 MIN: ICD-10-PCS | Mod: 25,S$GLB,, | Performed by: INTERNAL MEDICINE

## 2019-09-17 PROCEDURE — 99999 PR PBB SHADOW E&M-EST. PATIENT-LVL V: CPT | Mod: PBBFAC,,, | Performed by: INTERNAL MEDICINE

## 2019-09-17 PROCEDURE — 90662 IIV NO PRSV INCREASED AG IM: CPT | Mod: S$GLB,,, | Performed by: INTERNAL MEDICINE

## 2019-09-17 PROCEDURE — 99204 OFFICE O/P NEW MOD 45 MIN: CPT | Mod: S$GLB,,, | Performed by: SURGERY

## 2019-09-17 PROCEDURE — 90471 IMMUNIZATION ADMIN: CPT | Mod: S$GLB,,, | Performed by: INTERNAL MEDICINE

## 2019-09-17 PROCEDURE — 99204 OFFICE O/P NEW MOD 45 MIN: CPT | Mod: 25,S$GLB,, | Performed by: INTERNAL MEDICINE

## 2019-09-17 PROCEDURE — 99204 PR OFFICE/OUTPT VISIT, NEW, LEVL IV, 45-59 MIN: ICD-10-PCS | Mod: S$GLB,,, | Performed by: SURGERY

## 2019-09-17 PROCEDURE — 90662 FLU VACCINE - HIGH DOSE (65+) PRESERVATIVE FREE IM: ICD-10-PCS | Mod: S$GLB,,, | Performed by: INTERNAL MEDICINE

## 2019-09-17 PROCEDURE — 99999 PR PBB SHADOW E&M-EST. PATIENT-LVL V: ICD-10-PCS | Mod: PBBFAC,,, | Performed by: INTERNAL MEDICINE

## 2019-09-17 RX ORDER — NITROFURANTOIN 25; 75 MG/1; MG/1
CAPSULE ORAL
Refills: 0 | COMMUNITY
Start: 2019-08-09 | End: 2019-10-30 | Stop reason: CLARIF

## 2019-09-17 RX ORDER — INSULIN ASPART 100 [IU]/ML
INJECTION, SOLUTION INTRAVENOUS; SUBCUTANEOUS
Qty: 2 BOX | Refills: 11 | Status: SHIPPED | OUTPATIENT
Start: 2019-09-17 | End: 2019-10-03 | Stop reason: ALTCHOICE

## 2019-09-17 RX ORDER — PRAVASTATIN SODIUM 20 MG/1
20 TABLET ORAL NIGHTLY
Qty: 30 TABLET | Refills: 5 | Status: SHIPPED | OUTPATIENT
Start: 2019-09-17 | End: 2020-01-02 | Stop reason: DRUGHIGH

## 2019-09-17 RX ORDER — LOSARTAN POTASSIUM AND HYDROCHLOROTHIAZIDE 25; 100 MG/1; MG/1
1 TABLET ORAL 2 TIMES DAILY
Refills: 0 | COMMUNITY
Start: 2019-08-09 | End: 2019-10-30 | Stop reason: CLARIF

## 2019-09-17 RX ORDER — CEPHALEXIN 250 MG/1
250 CAPSULE ORAL 4 TIMES DAILY
Qty: 40 CAPSULE | Refills: 0 | Status: SHIPPED | OUTPATIENT
Start: 2019-09-17 | End: 2019-10-30 | Stop reason: CLARIF

## 2019-09-17 RX ORDER — LOSARTAN POTASSIUM AND HYDROCHLOROTHIAZIDE 12.5; 1 MG/1; MG/1
1 TABLET ORAL DAILY
Qty: 90 TABLET | Refills: 1 | Status: SHIPPED | OUTPATIENT
Start: 2019-09-17 | End: 2020-01-02 | Stop reason: SDUPTHER

## 2019-09-17 RX ORDER — SEMAGLUTIDE 1.34 MG/ML
INJECTION, SOLUTION SUBCUTANEOUS
Refills: 4 | Status: ON HOLD | COMMUNITY
Start: 2019-09-12 | End: 2019-11-06 | Stop reason: DRUGHIGH

## 2019-09-17 RX ORDER — FLUCONAZOLE 100 MG/1
100 TABLET ORAL DAILY
Qty: 30 TABLET | Refills: 0 | Status: SHIPPED | OUTPATIENT
Start: 2019-09-17 | End: 2019-10-17

## 2019-09-17 RX ORDER — CIPROFLOXACIN HYDROCHLORIDE 3 MG/ML
SOLUTION/ DROPS OPHTHALMIC
COMMUNITY
Start: 2019-07-31 | End: 2019-10-30 | Stop reason: CLARIF

## 2019-09-17 RX ORDER — DOXYCYCLINE 100 MG/1
100 CAPSULE ORAL 2 TIMES DAILY
Refills: 0 | COMMUNITY
Start: 2019-09-13 | End: 2019-10-30 | Stop reason: CLARIF

## 2019-09-17 RX ORDER — AMLODIPINE BESYLATE 5 MG/1
5 TABLET ORAL DAILY
Qty: 30 TABLET | Refills: 11 | Status: SHIPPED | OUTPATIENT
Start: 2019-09-17 | End: 2020-01-02 | Stop reason: DRUGHIGH

## 2019-09-17 RX ORDER — PHENAZOPYRIDINE HYDROCHLORIDE 200 MG/1
TABLET, FILM COATED ORAL
Refills: 0 | COMMUNITY
Start: 2019-08-09 | End: 2019-09-17

## 2019-09-17 NOTE — PROGRESS NOTES
History & Physical    SUBJECTIVE:     History of Present Illness:  Patient is a 73 y.o. female presents with infected epidermal inclusion cyst with one course of abx.     Chief Complaint   Patient presents with    Consult    Mass     Neck       Review of patient's allergies indicates:   Allergen Reactions    Crestor [rosuvastatin] Other (See Comments)     Leg Weakness.        Current Outpatient Medications   Medication Sig Dispense Refill    alirocumab (PRALUENT PEN) 75 mg/mL PnIj Inject 1 mL (75 mg total) into the skin every 14 (fourteen) days. 6 Syringe 3    amLODIPine (NORVASC) 5 MG tablet Take 1 tablet (5 mg total) by mouth once daily. 30 tablet 11    aspirin 81 MG Chew Take 1 tablet (81 mg total) by mouth once daily. 30 tablet 0    budesonide-formoterol 80-4.5 mcg (SYMBICORT) 80-4.5 mcg/actuation HFAA Inhale 2 puffs into the lungs 2 (two) times daily. 10.2 g 3    canagliflozin (INVOKANA) 300 mg Tab tablet Take 1 tablet (300 mg total) by mouth once daily. 30 tablet 11    cetirizine (ZYRTEC) 10 MG tablet TAKE 1 TABLET BY MOUTH ONCE DAILY FOR 30 DAYS  3    ciprofloxacin HCl (CILOXAN) 0.3 % ophthalmic solution       doxycycline (VIBRAMYCIN) 100 MG Cap Take 100 mg by mouth 2 (two) times daily.  0    flash glucose sensor (FREESTYLE ROSAURA 14 DAY SENSOR) Kit 1 sensor kit every 14 days (requires 2 kits per month) 2 kit 6    fluocinolone acetonide oil 0.01 % Drop Place 4 drops in ear(s) 2 (two) times daily. 20 mL 5    gabapentin (NEURONTIN) 300 MG capsule Take 1 capsule (300 mg total) by mouth 3 (three) times daily. (Patient taking differently: Take 200 mg by mouth 3 (three) times daily. ) 90 capsule 11    insulin aspart U-100 (NOVOLOG FLEXPEN U-100 INSULIN) 100 unit/mL (3 mL) InPn pen Take 20 units before breakfast and 26 units before lunch, 26 units before dinner SC. 2 Box 11    insulin degludec (TRESIBA FLEXTOUCH U-200) 200 unit/mL (3 mL) InPn Inject 110 units daily. 6 Syringe 11     losartan-hydrochlorothiazide 100-12.5 mg (HYZAAR) 100-12.5 mg Tab Take 1 tablet by mouth once daily. 90 tablet 1    losartan-hydrochlorothiazide 100-25 mg (HYZAAR) 100-25 mg per tablet Take 1 tablet by mouth 2 (two) times daily.  0    metoprolol succinate (TOPROL-XL) 25 MG 24 hr tablet TAKE 1 TABLET BY MOUTH ONCE DAILY FOR 30 DAYS  2    montelukast (SINGULAIR) 10 mg tablet One tablet by mouth every evening 30 tablet 11    OZEMPIC 0.25 mg or 0.5 mg(2 mg/1.5 mL) PnIj INJECT 0.25 MG SUBCUTANEOUSLY ONCE WEEKLY FOR WEEK 1 4 THEN 0.5 MG WEEKLY ON WEEK 5 8.  4    pravastatin (PRAVACHOL) 20 MG tablet Take 1 tablet (20 mg total) by mouth every evening. 30 tablet 5    TOUJEO MAX U-300 SOLOSTAR 300 unit/mL (3 mL) InPn Inject 100 units daily. 4 Syringe 6    chlorthalidone (HYGROTEN) 25 MG Tab Take 1 tablet (25 mg total) by mouth once daily. 90 tablet 3    nitrofurantoin, macrocrystal-monohydrate, (MACROBID) 100 MG capsule TAKE 1 CAPSULE BY MOUTH EVERY 12 HOURS FOR 7 DAYS  0     No current facility-administered medications for this visit.        Past Medical History:   Diagnosis Date    Diabetes mellitus type II     Hyperlipidemia     Hypertension     Obesity     Osteoarthritis     Peripheral neuropathy     Severe obesity (BMI 35.0-39.9) with comorbidity 12/30/2016    Tendonitis     left foot     Past Surgical History:   Procedure Laterality Date    BLADDER SUSPENSION      CATARACT EXTRACTION      COLONOSCOPY N/A 9/26/2015    [facility]formed [facility] at [facility] ([facility]    HYSTERECTOMY  1978    fibroids    TONSILLECTOMY       Family History   Problem Relation Age of Onset    Thyroid disease Mother     Cataracts Mother     Diabetes Mother     Stroke Mother     Hypertension Mother     Thyroid disease Sister     Diabetes Sister     Hypertension Sister     Hypertension Daughter     Diabetes Son     Diabetes Sister     Hypertension Sister     Hypertension Brother     Hypertension Sister  "    Hypertension Sister     Hypertension Brother     Thyroid disease Maternal Aunt     Thyroid disease Maternal Aunt     Colon cancer Maternal Grandfather     Breast cancer Neg Hx     Ovarian cancer Neg Hx     Amblyopia Neg Hx     Blindness Neg Hx     Cancer Neg Hx     Glaucoma Neg Hx     Macular degeneration Neg Hx     Retinal detachment Neg Hx     Strabismus Neg Hx      Social History     Tobacco Use    Smoking status: Former Smoker     Packs/day: 0.25     Years: 15.00     Pack years: 3.75     Last attempt to quit: 1982     Years since quittin.7    Smokeless tobacco: Never Used   Substance Use Topics    Alcohol use: No    Drug use: No        Review of Systems:  Review of Systems   Constitutional: Negative.    HENT: Negative.    Eyes: Negative.    Respiratory: Negative.    Cardiovascular: Negative.    Gastrointestinal: Negative.    Endocrine: Negative.    Musculoskeletal: Negative.    Skin: Negative.    Allergic/Immunologic: Negative.    Neurological: Negative.    Hematological: Negative.    Psychiatric/Behavioral: Negative.    All other systems reviewed and are negative.      OBJECTIVE:     Vital Signs (Most Recent)  Pulse: 92 (19 1317)  BP: 130/75 (19 1317)  5' 5" (1.651 m)  95.8 kg (211 lb 3.2 oz)     Physical Exam:  Physical Exam   Constitutional: She is oriented to person, place, and time. She appears well-developed and well-nourished.   HENT:   Head: Normocephalic and atraumatic.   Right Ear: External ear normal.   Left Ear: External ear normal.   Nose: Nose normal.   Mouth/Throat: Oropharynx is clear and moist.   Eyes: Pupils are equal, round, and reactive to light. Conjunctivae and EOM are normal.   Neck: Normal range of motion. Neck supple.       Cardiovascular: Normal rate, regular rhythm, normal heart sounds and intact distal pulses.   Pulmonary/Chest: Effort normal and breath sounds normal.   Abdominal: Soft. Bowel sounds are normal.   Musculoskeletal: Normal " range of motion.   Neurological: She is alert and oriented to person, place, and time. She has normal reflexes.   Skin: Skin is warm and dry.   Psychiatric: She has a normal mood and affect. Her behavior is normal. Thought content normal.   Vitals reviewed.      Laboratory  none    Diagnostic Results:  none    ASSESSMENT/PLAN:     Infected epidermal inclusion cyst    PLAN:Plan     I will start Keflex and then see her back in 2 weeks

## 2019-09-17 NOTE — PROGRESS NOTES
Patient tolerated flu med. administration well.  Instructed to wait for 15 minutes to monitor for any adverse reactions.

## 2019-09-17 NOTE — PROGRESS NOTES
"HISTORY OF PRESENT ILLNESS:  Mena Odonnell is a 73 y.o. female who presents to the clinic today for bump on neck    Last PCP was Dr. Coreas and now establishing with us.    "Bump on neck"  Noticed for first time on Thursday of last week.  She reports she had never noted any similar lesions like it before, no h/o abscesses.  Went to urgent care on [facility] on last Friday.  She said they tried to do an incision at that visit and only blood came out.  She was Rx'd doxycycline twice daily.  Redness and heat are reduced since taking abx.  She has scheduled appt with dermatologist for this Friday.    Type 2 DM  Seen by Candy Villanueva 9/12/19.  Current Tx: Invokana 100 mg daily, Novolog 20 units before bkfst and Novolog 26 units before lunch and dinner, Toujeo 90 units in the morning, Ozempic 1 mg q weekly.  Last A1C 6.5% on 9/7/19.  Eye exam by Dr. Bebe Gee 12/21/18.  Reports one low reading on Sunday at 77.  Does not have log with her today.    Hypertension  Current tx: Losartan-HCTZ 100-12.5 mg daily.  BP reading noted to be elevated on last several visits.    Hyperlipidemia  Praluent was too expensive and so she did not take it.  Reports muscle pain with Crestor and atorvastatin.    MEKA  Has been seen in sleep apnea clinic but she does not wish to use the CPAP.    Works as apt  Mon-Fri.    PAST MEDICAL HISTORY:  Past Medical History:   Diagnosis Date    Diabetes mellitus type II     Hyperlipidemia     Hypertension     Obesity     Osteoarthritis     Peripheral neuropathy     Severe obesity (BMI 35.0-39.9) with comorbidity 12/30/2016    Tendonitis     left foot       PAST SURGICAL HISTORY:  Past Surgical History:   Procedure Laterality Date    BLADDER SUSPENSION      CATARACT EXTRACTION      COLONOSCOPY N/A 9/26/2015    Performed by [facility] at [facility] (4TH FLR)    HYSTERECTOMY  1978    fibroids    TONSILLECTOMY         SOCIAL HISTORY:  Social History "     Socioeconomic History    Marital status: Single     Spouse name: Not on file    Number of children: 2    Years of education: Not on file    Highest education level: Not on file   Occupational History     Employer: [employer]   Social Needs    Financial resource strain: Not on file    Food insecurity:     Worry: Not on file     Inability: Not on file    Transportation needs:     Medical: Not on file     Non-medical: Not on file   Tobacco Use    Smoking status: Former Smoker     Packs/day: 0.25     Years: 15.00     Pack years: 3.75     Last attempt to quit: 1982     Years since quittin.7    Smokeless tobacco: Never Used   Substance and Sexual Activity    Alcohol use: No    Drug use: No    Sexual activity: Not Currently     Partners: Male     Birth control/protection: Abstinence   Lifestyle    Physical activity:     Days per week: Not on file     Minutes per session: Not on file    Stress: Not on file   Relationships    Social connections:     Talks on phone: Not on file     Gets together: Not on file     Attends Bahai service: Not on file     Active member of club or organization: Not on file     Attends meetings of clubs or organizations: Not on file     Relationship status: Not on file   Other Topics Concern    Not on file   Social History Narrative    . One daughter. Works as an Apartment        FAMILY HISTORY:  Family History   Problem Relation Age of Onset    Thyroid disease Mother     Cataracts Mother     Diabetes Mother     Stroke Mother     Hypertension Mother     Thyroid disease Sister     Diabetes Sister     Hypertension Sister     Hypertension Daughter     Diabetes Son     Diabetes Sister     Hypertension Sister     Hypertension Brother     Hypertension Sister     Hypertension Sister     Hypertension Brother     Thyroid disease Maternal Aunt     Thyroid disease Maternal Aunt     Colon cancer Maternal Grandfather      Breast cancer Neg Hx     Ovarian cancer Neg Hx     Amblyopia Neg Hx     Blindness Neg Hx     Cancer Neg Hx     Glaucoma Neg Hx     Macular degeneration Neg Hx     Retinal detachment Neg Hx     Strabismus Neg Hx        ALLERGIES AND MEDICATIONS: updated and reviewed.  Review of patient's allergies indicates:   Allergen Reactions    Crestor [rosuvastatin] Other (See Comments)     Leg Weakness.      Medication List with Changes/Refills   Current Medications    ALIROCUMAB (PRALUENT PEN) 75 MG/ML PNIJ    Inject 1 mL (75 mg total) into the skin every 14 (fourteen) days.    ASPIRIN 81 MG CHEW    Take 1 tablet (81 mg total) by mouth once daily.    BUDESONIDE-FORMOTEROL 80-4.5 MCG (SYMBICORT) 80-4.5 MCG/ACTUATION HFAA    Inhale 2 puffs into the lungs 2 (two) times daily.    CANAGLIFLOZIN (INVOKANA) 300 MG TAB TABLET    Take 1 tablet (300 mg total) by mouth once daily.    CETIRIZINE (ZYRTEC) 10 MG TABLET    TAKE 1 TABLET BY MOUTH ONCE DAILY FOR 30 DAYS    CHLORTHALIDONE (HYGROTEN) 25 MG TAB    Take 1 tablet (25 mg total) by mouth once daily.    FLASH GLUCOSE SENSOR (FREESTYLE ROSAURA 14 DAY SENSOR) KIT    1 sensor kit every 14 days (requires 2 kits per month)    FLOVENT  MCG/ACTUATION INHALER        FLUOCINOLONE ACETONIDE OIL 0.01 % DROP    Place 4 drops in ear(s) 2 (two) times daily.    GABAPENTIN (NEURONTIN) 300 MG CAPSULE    Take 1 capsule (300 mg total) by mouth 3 (three) times daily.    INSULIN ASPART U-100 (NOVOLOG FLEXPEN U-100 INSULIN) 100 UNIT/ML INPN PEN    Inject 30 Units into the skin 3 (three) times daily with meals.    INSULIN DEGLUDEC (TRESIBA FLEXTOUCH U-200) 200 UNIT/ML (3 ML) INPN    Inject 110 units daily.    LOSARTAN-HYDROCHLOROTHIAZIDE 100-12.5 MG (HYZAAR) 100-12.5 MG TAB    Take 1 tablet by mouth once daily.    METOPROLOL SUCCINATE (TOPROL-XL) 25 MG 24 HR TABLET    TAKE 1 TABLET BY MOUTH ONCE DAILY FOR 30 DAYS    MONTELUKAST (SINGULAIR) 10 MG TABLET    One tablet by mouth every evening     TOUJEO MAX U-300 SOLOSTAR 300 UNIT/ML (3 ML) INPN    Inject 100 units daily.          CARE TEAM:  Patient Care Team:  Leonard Wells MD as PCP - General (Internal Medicine)         REVIEW OF SYSTEMS:  Review of Systems   Constitutional: Negative for chills and fever.   HENT: Negative for congestion and postnasal drip.    Eyes: Negative for photophobia and visual disturbance.   Respiratory: Negative for cough and shortness of breath.    Cardiovascular: Negative for chest pain and palpitations.   Gastrointestinal: Negative for abdominal pain, nausea and vomiting.   Genitourinary: Negative for dysuria and frequency.   Musculoskeletal: Negative for back pain and neck pain.   Skin: Positive for wound. Negative for rash.   Neurological: Negative for light-headedness and headaches.   Psychiatric/Behavioral: Negative for dysphoric mood and sleep disturbance.         PHYSICAL EXAM:   Vitals:    09/17/19 0902   BP: (!) 170/80   Pulse: 78   Resp: 17   Temp: 98.6 °F (37 °C)             Body mass index is 41.64 kg/m².     General appearance - alert, well appearing, and in no distress and obese  Mental status - normal mood, behavior, speech, dress, motor activity, and thought processes  Eyes - sclera anicteric, left eye normal, right eye normal  Chest - clear to auscultation, no wheezes, rales or rhonchi, symmetric air entry  Heart - normal rate and regular rhythm, no murmurs noted  Neurological - alert, oriented, normal speech, no focal findings or movement disorder noted, motor and sensory grossly normal bilaterally  Extremities - no pedal edema noted, intact peripheral pulses  Skin - 4 x 4 cm area of induration to posterior neck, no drainage, no warmth      ASSESSMENT AND PLAN:  1. Neck abscess  - complete course of doxycycline; appt with surgery (Dr. Barrow) today to assess for I&D  - CBC auto differential; Future  - Ambulatory referral to General Surgery    2. Essential hypertension  3. Elevated blood pressure reading  -  uncontrolled - to add amlodipine 5 mg daily and return for nurse BP check.  Referral to digital medicine program.  - Comprehensive metabolic panel; Future  - losartan-hydrochlorothiazide 100-12.5 mg (HYZAAR) 100-12.5 mg Tab; Take 1 tablet by mouth once daily.  Dispense: 90 tablet; Refill: 1  - amLODIPine (NORVASC) 5 MG tablet; Take 1 tablet (5 mg total) by mouth once daily.  Dispense: 30 tablet; Refill: 11  - Hypertension Digital Medicine (HDMP) Enrollment Order  - Hypertension Digital Medicine (HDMP): Assign Onboarding Questionnaires    4. Type 2 diabetes mellitus with diabetic polyneuropathy, with long-term current use of insulin  - Advised to bring log to her next visit.  - controlled with A1C 6.5% on 9/7/19. Recheck in 3 months.  - reinforced healthy lifestyle choices.  - insulin aspart U-100 (NOVOLOG FLEXPEN U-100 INSULIN) 100 unit/mL (3 mL) InPn pen; Take 20 units before breakfast and 26 units before lunch, 26 units before dinner SC.  Dispense: 2 Box; Refill: 11  - pravastatin (PRAVACHOL) 20 MG tablet; Take 1 tablet (20 mg total) by mouth every evening.  Dispense: 30 tablet; Refill: 5  - Diabetes Digital Medicine (DDMP) Enrollment Order  - Diabetes Digital Medicine (DDMP): Assign Onboarding Questionnaires    5. Morbid obesity with body mass index (BMI) of 40.0 or higher    6. Mixed hyperlipidemia  - adverse reaction to Crestor and atorvastatin.  Trial of pravastatin and advised to call should she experience any muscle soreness.  - Recheck lipids at 3 months with CMP.  - pravastatin (PRAVACHOL) 20 MG tablet; Take 1 tablet (20 mg total) by mouth every evening.  Dispense: 30 tablet; Refill: 5  - Lipid panel; Future    7. H/O vitamin D deficiency  - Vitamin D; Future    8. MEKA (obstructive sleep apnea)  - reviewed symptoms and effects of MEKA. Does not wish to use CPAP at this time.    9. Healthcare maintenance  - Hepatitis C antibody; Future  - Mammo Digital Screening Bilat; Future  - DXA Bone Density Spine And  Hip; Future  - Influenza - High Dose (65+) (PF) (IM)  - Comprehensive metabolic panel; Future  - Vitamin D; Future  - CBC auto differential; Future           Follow up 3 months or sooner as needed.

## 2019-09-17 NOTE — LETTER
September 17, 2019      Leonard Wells MD  [address]  [facility] [address]           [facility]  [address] [facility] [address], [address]  [facility] [address]  Phone: [phone]  Fax: [phone]          Patient: Mena Odonnell   MR Number: [id number]   YOB: 1945   Date of Visit: 9/17/2019       Dear Dr. Leonard Wells:    Thank you for referring Mena Odonnell to me for evaluation. Attached you will find relevant portions of my assessment and plan of care.    If you have questions, please do not hesitate to call me. I look forward to following Mena Odonnell along with you.    Sincerely,    Edwin Barrow MD    Enclosure  CC:  No Recipients    If you would like to receive this communication electronically, please contact [email or web address] or [phone] to request more information on [facility] Link access.    For providers and/or their staff who would like to refer a patient to [facility], please contact us through our one-stop-shop provider referral line, [facility], at [phone].    If you feel you have received this communication in error or would no longer like to receive these types of communications, please e-mail [email or web address]

## 2019-09-19 ENCOUNTER — TELEPHONE (OUTPATIENT)
Dept: FAMILY MEDICINE | Facility: CLINIC | Age: 74
End: 2019-09-19

## 2019-09-19 NOTE — TELEPHONE ENCOUNTER
PA for Novolog Flex Pen was denied. I spoke to [facility] and they will cover Humalog. Please advise.

## 2019-10-01 ENCOUNTER — OFFICE VISIT (OUTPATIENT)
Dept: SURGERY | Facility: CLINIC | Age: 74
End: 2019-10-01
Payer: COMMERCIAL

## 2019-10-01 ENCOUNTER — CLINICAL SUPPORT (OUTPATIENT)
Dept: FAMILY MEDICINE | Facility: CLINIC | Age: 74
End: 2019-10-01
Payer: COMMERCIAL

## 2019-10-01 ENCOUNTER — APPOINTMENT (OUTPATIENT)
Dept: LAB | Facility: HOSPITAL | Age: 74
End: 2019-10-01
Attending: SURGERY
Payer: COMMERCIAL

## 2019-10-01 VITALS — OXYGEN SATURATION: 95 % | SYSTOLIC BLOOD PRESSURE: 132 MMHG | DIASTOLIC BLOOD PRESSURE: 68 MMHG | HEART RATE: 92 BPM

## 2019-10-01 VITALS
HEIGHT: 65 IN | DIASTOLIC BLOOD PRESSURE: 74 MMHG | WEIGHT: 244.63 LBS | SYSTOLIC BLOOD PRESSURE: 128 MMHG | OXYGEN SATURATION: 97 % | BODY MASS INDEX: 40.76 KG/M2 | HEART RATE: 95 BPM

## 2019-10-01 DIAGNOSIS — L72.0 EPIDERMAL INCLUSION CYST: Primary | ICD-10-CM

## 2019-10-01 DIAGNOSIS — I10 ESSENTIAL HYPERTENSION: Primary | ICD-10-CM

## 2019-10-01 DIAGNOSIS — L72.0 EPITHELIAL INCLUSION CYST: Primary | ICD-10-CM

## 2019-10-01 PROCEDURE — 99214 OFFICE O/P EST MOD 30 MIN: CPT | Mod: 25,S$GLB,, | Performed by: SURGERY

## 2019-10-01 PROCEDURE — 87076 CULTURE ANAEROBE IDENT EACH: CPT | Mod: 59

## 2019-10-01 PROCEDURE — 99999 PR PBB SHADOW E&M-EST. PATIENT-LVL II: CPT | Mod: PBBFAC,,,

## 2019-10-01 PROCEDURE — 10061 I&D ABSCESS COMP/MULTIPLE: CPT | Mod: S$GLB,,, | Performed by: SURGERY

## 2019-10-01 PROCEDURE — 99499 NO LOS: ICD-10-PCS | Mod: S$GLB,,, | Performed by: INTERNAL MEDICINE

## 2019-10-01 PROCEDURE — 87070 CULTURE OTHR SPECIMN AEROBIC: CPT

## 2019-10-01 PROCEDURE — 99999 PR PBB SHADOW E&M-EST. PATIENT-LVL II: ICD-10-PCS | Mod: PBBFAC,,,

## 2019-10-01 PROCEDURE — 99499 UNLISTED E&M SERVICE: CPT | Mod: S$GLB,,, | Performed by: INTERNAL MEDICINE

## 2019-10-01 PROCEDURE — 99214 PR OFFICE/OUTPT VISIT, EST, LEVL IV, 30-39 MIN: ICD-10-PCS | Mod: 25,S$GLB,, | Performed by: SURGERY

## 2019-10-01 PROCEDURE — 87075 CULTR BACTERIA EXCEPT BLOOD: CPT

## 2019-10-01 PROCEDURE — 10061 PR DRAIN SKIN ABSCESS COMPLIC: ICD-10-PCS | Mod: S$GLB,,, | Performed by: SURGERY

## 2019-10-01 RX ORDER — OXYCODONE AND ACETAMINOPHEN 5; 325 MG/1; MG/1
1 TABLET ORAL EVERY 4 HOURS PRN
Qty: 30 TABLET | Refills: 0 | Status: SHIPPED | OUTPATIENT
Start: 2019-10-01 | End: 2021-02-09

## 2019-10-01 NOTE — PROGRESS NOTES
Subjective:       Patient ID: Mena Odonnell is a 73 y.o. female.    Chief Complaint: Cyst (Pt states of having throbbing pain, it's causing her to have pain down her neck and on her shoulder.)    HPI 72 yo female with neck mass epidermal inclusion cyst that is infected and has increased in symptoms  Review of Systems   Constitutional: Negative.    HENT: Negative.    Eyes: Negative.    Respiratory: Negative.    Cardiovascular: Negative.    Gastrointestinal: Negative.    Endocrine: Negative.    Musculoskeletal: Negative.    Skin: Negative.    Allergic/Immunologic: Negative.    Neurological: Negative.    Hematological: Negative.    Psychiatric/Behavioral: Negative.    All other systems reviewed and are negative.      Objective:      Physical Exam   Constitutional: She is oriented to person, place, and time. She appears well-developed and well-nourished.   HENT:   Head: Normocephalic and atraumatic.   Right Ear: External ear normal.   Left Ear: External ear normal.   Nose: Nose normal.   Mouth/Throat: Oropharynx is clear and moist.   Eyes: Pupils are equal, round, and reactive to light. Conjunctivae and EOM are normal.   Neck: Normal range of motion. Neck supple.       Cardiovascular: Normal rate, regular rhythm, normal heart sounds and intact distal pulses.   Pulmonary/Chest: Effort normal and breath sounds normal.   Abdominal: Soft. Bowel sounds are normal.   Musculoskeletal: Normal range of motion.   Neurological: She is alert and oriented to person, place, and time. She has normal reflexes.   Skin: Skin is warm and dry.   Psychiatric: She has a normal mood and affect. Her behavior is normal. Thought content normal.   Vitals reviewed.      Assessment:       1. Epidermal inclusion cyst      infected  Plan:       I will I&D it and then see her back in 1 week

## 2019-10-01 NOTE — PROGRESS NOTES
.  Mena Odonnell 73 y.o. female is here today for Blood Pressure check.   History of HTN yes.    Review of patient's allergies indicates:   Allergen Reactions    Crestor [rosuvastatin] Other (See Comments)     Leg Weakness.      Creatinine   Date Value Ref Range Status   09/07/2019 0.7 0.5 - 1.4 mg/dL Final     Sodium   Date Value Ref Range Status   09/07/2019 145 136 - 145 mmol/L Final     Potassium   Date Value Ref Range Status   09/07/2019 4.2 3.5 - 5.1 mmol/L Final   ]  Patient verifies taking blood pressure medications on a regular basis at the same time of the day.     Current Outpatient Medications:     alirocumab (PRALUENT PEN) 75 mg/mL PnIj, Inject 1 mL (75 mg total) into the skin every 14 (fourteen) days., Disp: 6 Syringe, Rfl: 3    amLODIPine (NORVASC) 5 MG tablet, Take 1 tablet (5 mg total) by mouth once daily., Disp: 30 tablet, Rfl: 11    aspirin 81 MG Chew, Take 1 tablet (81 mg total) by mouth once daily., Disp: 30 tablet, Rfl: 0    budesonide-formoterol 80-4.5 mcg (SYMBICORT) 80-4.5 mcg/actuation HFAA, Inhale 2 puffs into the lungs 2 (two) times daily., Disp: 10.2 g, Rfl: 3    canagliflozin (INVOKANA) 300 mg Tab tablet, Take 1 tablet (300 mg total) by mouth once daily., Disp: 30 tablet, Rfl: 11    cephALEXin (KEFLEX) 250 MG capsule, Take 1 capsule (250 mg total) by mouth 4 (four) times daily., Disp: 40 capsule, Rfl: 0    cetirizine (ZYRTEC) 10 MG tablet, TAKE 1 TABLET BY MOUTH ONCE DAILY FOR 30 DAYS, Disp: , Rfl: 3    chlorthalidone (HYGROTEN) 25 MG Tab, Take 1 tablet (25 mg total) by mouth once daily., Disp: 90 tablet, Rfl: 3    ciprofloxacin HCl (CILOXAN) 0.3 % ophthalmic solution, , Disp: , Rfl:     doxycycline (VIBRAMYCIN) 100 MG Cap, Take 100 mg by mouth 2 (two) times daily., Disp: , Rfl: 0    flash glucose sensor (FREESTYLE ROSAURA 14 DAY SENSOR) Kit, 1 sensor kit every 14 days (requires 2 kits per month), Disp: 2 kit, Rfl: 6    fluconazole (DIFLUCAN) 100 MG tablet, Take 1 tablet  (100 mg total) by mouth once daily., Disp: 30 tablet, Rfl: 0    fluocinolone acetonide oil 0.01 % Drop, Place 4 drops in ear(s) 2 (two) times daily., Disp: 20 mL, Rfl: 5    gabapentin (NEURONTIN) 300 MG capsule, Take 1 capsule (300 mg total) by mouth 3 (three) times daily. (Patient taking differently: Take 200 mg by mouth 3 (three) times daily. ), Disp: 90 capsule, Rfl: 11    insulin aspart U-100 (NOVOLOG FLEXPEN U-100 INSULIN) 100 unit/mL (3 mL) InPn pen, Take 20 units before breakfast and 26 units before lunch, 26 units before dinner SC., Disp: 2 Box, Rfl: 11    insulin degludec (TRESIBA FLEXTOUCH U-200) 200 unit/mL (3 mL) InPn, Inject 110 units daily., Disp: 6 Syringe, Rfl: 11    losartan-hydrochlorothiazide 100-12.5 mg (HYZAAR) 100-12.5 mg Tab, Take 1 tablet by mouth once daily., Disp: 90 tablet, Rfl: 1    losartan-hydrochlorothiazide 100-25 mg (HYZAAR) 100-25 mg per tablet, Take 1 tablet by mouth 2 (two) times daily., Disp: , Rfl: 0    metoprolol succinate (TOPROL-XL) 25 MG 24 hr tablet, TAKE 1 TABLET BY MOUTH ONCE DAILY FOR 30 DAYS, Disp: , Rfl: 2    montelukast (SINGULAIR) 10 mg tablet, One tablet by mouth every evening, Disp: 30 tablet, Rfl: 11    nitrofurantoin, macrocrystal-monohydrate, (MACROBID) 100 MG capsule, TAKE 1 CAPSULE BY MOUTH EVERY 12 HOURS FOR 7 DAYS, Disp: , Rfl: 0    oxyCODONE-acetaminophen (PERCOCET) 5-325 mg per tablet, Take 1 tablet by mouth every 4 (four) hours as needed for Pain., Disp: 30 tablet, Rfl: 0    OZEMPIC 0.25 mg or 0.5 mg(2 mg/1.5 mL) PnIj, INJECT 0.25 MG SUBCUTANEOUSLY ONCE WEEKLY FOR WEEK 1 4 THEN 0.5 MG WEEKLY ON WEEK 5 8., Disp: , Rfl: 4    pravastatin (PRAVACHOL) 20 MG tablet, Take 1 tablet (20 mg total) by mouth every evening., Disp: 30 tablet, Rfl: 5    TOUJEO MAX U-300 SOLOSTAR 300 unit/mL (3 mL) InPn, Inject 100 units daily., Disp: 4 Syringe, Rfl: 6  Does patient have record of home blood pressure readings no. Readings have been averaging .   Last dose of  blood pressure medication was taken at 6:00AM.  Patient is asymptomatic.   Complains of no complaints.    BP: 132/68 , Pulse: 92 .    Blood pressure reading after 15 minutes was not repeated, Pulse  Dr. Wells notified.  Pt had cyst drained this afternoon with Dr. Stephenson.

## 2019-10-03 DIAGNOSIS — E11.42 TYPE 2 DIABETES MELLITUS WITH DIABETIC POLYNEUROPATHY, WITH LONG-TERM CURRENT USE OF INSULIN: Primary | ICD-10-CM

## 2019-10-03 DIAGNOSIS — Z79.4 TYPE 2 DIABETES MELLITUS WITH DIABETIC POLYNEUROPATHY, WITH LONG-TERM CURRENT USE OF INSULIN: Primary | ICD-10-CM

## 2019-10-03 RX ORDER — INSULIN LISPRO 100 [IU]/ML
INJECTION, SOLUTION INTRAVENOUS; SUBCUTANEOUS
Qty: 8 SYRINGE | Refills: 5 | Status: SHIPPED | OUTPATIENT
Start: 2019-10-03 | End: 2020-03-30

## 2019-10-05 LAB — BACTERIA SPEC AEROBE CULT: ABNORMAL

## 2019-10-06 LAB
BACTERIA SPEC ANAEROBE CULT: ABNORMAL
BACTERIA SPEC ANAEROBE CULT: ABNORMAL

## 2019-10-08 ENCOUNTER — OFFICE VISIT (OUTPATIENT)
Dept: SURGERY | Facility: CLINIC | Age: 74
End: 2019-10-08
Payer: COMMERCIAL

## 2019-10-08 VITALS
WEIGHT: 245.94 LBS | HEART RATE: 86 BPM | SYSTOLIC BLOOD PRESSURE: 151 MMHG | OXYGEN SATURATION: 97 % | HEIGHT: 65 IN | DIASTOLIC BLOOD PRESSURE: 77 MMHG | BODY MASS INDEX: 40.98 KG/M2

## 2019-10-08 DIAGNOSIS — L72.0 EPIDERMAL INCLUSION CYST: Primary | ICD-10-CM

## 2019-10-08 PROCEDURE — 99024 POSTOP FOLLOW-UP VISIT: CPT | Mod: S$GLB,,, | Performed by: SURGERY

## 2019-10-08 PROCEDURE — 99024 PR POST-OP FOLLOW-UP VISIT: ICD-10-PCS | Mod: S$GLB,,, | Performed by: SURGERY

## 2019-10-08 RX ORDER — MUPIROCIN 20 MG/G
OINTMENT TOPICAL
Status: CANCELLED | OUTPATIENT
Start: 2019-10-08

## 2019-10-08 RX ORDER — SODIUM CHLORIDE 9 MG/ML
INJECTION, SOLUTION INTRAVENOUS CONTINUOUS
Status: CANCELLED | OUTPATIENT
Start: 2019-10-08

## 2019-10-08 RX ORDER — LIDOCAINE HYDROCHLORIDE 10 MG/ML
1 INJECTION, SOLUTION EPIDURAL; INFILTRATION; INTRACAUDAL; PERINEURAL ONCE
Status: DISCONTINUED | OUTPATIENT
Start: 2019-10-08 | End: 2019-11-06 | Stop reason: HOSPADM

## 2019-10-08 NOTE — PROGRESS NOTES
History & Physical    SUBJECTIVE:     History of Present Illness:  Patient is a 73 y.o. female presents with epidermal inclusion cyst with recent infection and now resolved.    Chief Complaint   Patient presents with    Post-op Evaluation       Review of patient's allergies indicates:   Allergen Reactions    Crestor [rosuvastatin] Other (See Comments)     Leg Weakness.        Current Outpatient Medications   Medication Sig Dispense Refill    alirocumab (PRALUENT PEN) 75 mg/mL PnIj Inject 1 mL (75 mg total) into the skin every 14 (fourteen) days. 6 Syringe 3    amLODIPine (NORVASC) 5 MG tablet Take 1 tablet (5 mg total) by mouth once daily. 30 tablet 11    aspirin 81 MG Chew Take 1 tablet (81 mg total) by mouth once daily. 30 tablet 0    budesonide-formoterol 80-4.5 mcg (SYMBICORT) 80-4.5 mcg/actuation HFAA Inhale 2 puffs into the lungs 2 (two) times daily. 10.2 g 3    canagliflozin (INVOKANA) 300 mg Tab tablet Take 1 tablet (300 mg total) by mouth once daily. 30 tablet 11    cephALEXin (KEFLEX) 250 MG capsule Take 1 capsule (250 mg total) by mouth 4 (four) times daily. 40 capsule 0    cetirizine (ZYRTEC) 10 MG tablet TAKE 1 TABLET BY MOUTH ONCE DAILY FOR 30 DAYS  3    chlorthalidone (HYGROTEN) 25 MG Tab Take 1 tablet (25 mg total) by mouth once daily. 90 tablet 3    ciprofloxacin HCl (CILOXAN) 0.3 % ophthalmic solution       doxycycline (VIBRAMYCIN) 100 MG Cap Take 100 mg by mouth 2 (two) times daily.  0    flash glucose sensor (FREESTYLE ROSAURA 14 DAY SENSOR) Kit 1 sensor kit every 14 days (requires 2 kits per month) 2 kit 6    fluconazole (DIFLUCAN) 100 MG tablet Take 1 tablet (100 mg total) by mouth once daily. 30 tablet 0    fluocinolone acetonide oil 0.01 % Drop Place 4 drops in ear(s) 2 (two) times daily. 20 mL 5    gabapentin (NEURONTIN) 300 MG capsule Take 1 capsule (300 mg total) by mouth 3 (three) times daily. (Patient taking differently: Take 200 mg by mouth 3 (three) times daily. ) 90  capsule 11    insulin degludec (TRESIBA FLEXTOUCH U-200) 200 unit/mL (3 mL) InPn Inject 110 units daily. 6 Syringe 11    insulin lispro (HUMALOG KWIKPEN INSULIN) 100 unit/mL pen Take 20 units before breakfast and 26 units before lunch, 26 units before dinner SC 8 Syringe 5    losartan-hydrochlorothiazide 100-12.5 mg (HYZAAR) 100-12.5 mg Tab Take 1 tablet by mouth once daily. 90 tablet 1    losartan-hydrochlorothiazide 100-25 mg (HYZAAR) 100-25 mg per tablet Take 1 tablet by mouth 2 (two) times daily.  0    metoprolol succinate (TOPROL-XL) 25 MG 24 hr tablet TAKE 1 TABLET BY MOUTH ONCE DAILY FOR 30 DAYS  2    montelukast (SINGULAIR) 10 mg tablet One tablet by mouth every evening 30 tablet 11    nitrofurantoin, macrocrystal-monohydrate, (MACROBID) 100 MG capsule TAKE 1 CAPSULE BY MOUTH EVERY 12 HOURS FOR 7 DAYS  0    oxyCODONE-acetaminophen (PERCOCET) 5-325 mg per tablet Take 1 tablet by mouth every 4 (four) hours as needed for Pain. 30 tablet 0    OZEMPIC 0.25 mg or 0.5 mg(2 mg/1.5 mL) PnIj INJECT 0.25 MG SUBCUTANEOUSLY ONCE WEEKLY FOR WEEK 1 4 THEN 0.5 MG WEEKLY ON WEEK 5 8.  4    pravastatin (PRAVACHOL) 20 MG tablet Take 1 tablet (20 mg total) by mouth every evening. 30 tablet 5    TOUJEO MAX U-300 SOLOSTAR 300 unit/mL (3 mL) InPn Inject 100 units daily. 4 Syringe 6     No current facility-administered medications for this visit.        Past Medical History:   Diagnosis Date    Diabetes mellitus type II     Hyperlipidemia     Hypertension     Obesity     Osteoarthritis     Peripheral neuropathy     Severe obesity (BMI 35.0-39.9) with comorbidity 12/30/2016    Tendonitis     left foot     Past Surgical History:   Procedure Laterality Date    BLADDER SUSPENSION      CATARACT EXTRACTION      COLONOSCOPY N/A 9/26/2015    Procedure: COLONOSCOPY;  Surgeon: Javed Wood MD;  Location: [address]);  Service: Endoscopy;  Laterality: N/A;    HYSTERECTOMY  1978    fibroids     "TONSILLECTOMY       Family History   Problem Relation Age of Onset    Thyroid disease Mother     Cataracts Mother     Diabetes Mother     Stroke Mother     Hypertension Mother     Thyroid disease Sister     Diabetes Sister     Hypertension Sister     Hypertension Daughter     Diabetes Son     Diabetes Sister     Hypertension Sister     Hypertension Brother     Hypertension Sister     Hypertension Sister     Hypertension Brother     Thyroid disease Maternal Aunt     Thyroid disease Maternal Aunt     Colon cancer Maternal Grandfather     Breast cancer Neg Hx     Ovarian cancer Neg Hx     Amblyopia Neg Hx     Blindness Neg Hx     Cancer Neg Hx     Glaucoma Neg Hx     Macular degeneration Neg Hx     Retinal detachment Neg Hx     Strabismus Neg Hx      Social History     Tobacco Use    Smoking status: Former Smoker     Packs/day: 0.25     Years: 15.00     Pack years: 3.75     Last attempt to quit: 1982     Years since quittin.8    Smokeless tobacco: Never Used   Substance Use Topics    Alcohol use: No    Drug use: No        Review of Systems:  Review of Systems   Constitutional: Negative.    HENT: Negative.    Eyes: Negative.    Respiratory: Negative.    Cardiovascular: Negative.    Gastrointestinal: Negative.    Endocrine: Negative.    Musculoskeletal: Negative.    Skin: Negative.    Allergic/Immunologic: Negative.    Neurological: Negative.    Hematological: Negative.    Psychiatric/Behavioral: Negative.    All other systems reviewed and are negative.      OBJECTIVE:     Vital Signs (Most Recent)  Pulse: 86 (10/08/19 1443)  BP: (!) 151/77 (10/08/19 1443)  SpO2: 97 % (10/08/19 1443)  5' 5" (1.651 m)  111.5 kg (245 lb 14.8 oz)     Physical Exam:  Physical Exam   Constitutional: She is oriented to person, place, and time. She appears well-developed and well-nourished.   HENT:   Head: Normocephalic and atraumatic.   Right Ear: External ear normal.   Left Ear: External ear normal. "   Nose: Nose normal.   Mouth/Throat: Oropharynx is clear and moist.   Eyes: Pupils are equal, round, and reactive to light. Conjunctivae and EOM are normal.   Neck: Normal range of motion. Neck supple.       Cardiovascular: Normal rate, regular rhythm, normal heart sounds and intact distal pulses.   Pulmonary/Chest: Effort normal and breath sounds normal.   Abdominal: Soft. Bowel sounds are normal.   Musculoskeletal: Normal range of motion.   Neurological: She is alert and oriented to person, place, and time. She has normal reflexes.   Skin: Skin is warm and dry.   Psychiatric: She has a normal mood and affect. Her behavior is normal. Thought content normal.   Vitals reviewed.      Laboratory  none    Diagnostic Results:  none    ASSESSMENT/PLAN:     Epidermal inclusion cyst    PLAN:Plan     I will take her to the OR for excision of mass with the risks explained

## 2019-10-08 NOTE — H&P (VIEW-ONLY)
History & Physical    SUBJECTIVE:     History of Present Illness:  Patient is a 73 y.o. female presents with epidermal inclusion cyst with recent infection and now resolved.    Chief Complaint   Patient presents with    Post-op Evaluation       Review of patient's allergies indicates:   Allergen Reactions    Crestor [rosuvastatin] Other (See Comments)     Leg Weakness.        Current Outpatient Medications   Medication Sig Dispense Refill    alirocumab (PRALUENT PEN) 75 mg/mL PnIj Inject 1 mL (75 mg total) into the skin every 14 (fourteen) days. 6 Syringe 3    amLODIPine (NORVASC) 5 MG tablet Take 1 tablet (5 mg total) by mouth once daily. 30 tablet 11    aspirin 81 MG Chew Take 1 tablet (81 mg total) by mouth once daily. 30 tablet 0    budesonide-formoterol 80-4.5 mcg (SYMBICORT) 80-4.5 mcg/actuation HFAA Inhale 2 puffs into the lungs 2 (two) times daily. 10.2 g 3    canagliflozin (INVOKANA) 300 mg Tab tablet Take 1 tablet (300 mg total) by mouth once daily. 30 tablet 11    cephALEXin (KEFLEX) 250 MG capsule Take 1 capsule (250 mg total) by mouth 4 (four) times daily. 40 capsule 0    cetirizine (ZYRTEC) 10 MG tablet TAKE 1 TABLET BY MOUTH ONCE DAILY FOR 30 DAYS  3    chlorthalidone (HYGROTEN) 25 MG Tab Take 1 tablet (25 mg total) by mouth once daily. 90 tablet 3    ciprofloxacin HCl (CILOXAN) 0.3 % ophthalmic solution       doxycycline (VIBRAMYCIN) 100 MG Cap Take 100 mg by mouth 2 (two) times daily.  0    flash glucose sensor (FREESTYLE ROSAURA 14 DAY SENSOR) Kit 1 sensor kit every 14 days (requires 2 kits per month) 2 kit 6    fluconazole (DIFLUCAN) 100 MG tablet Take 1 tablet (100 mg total) by mouth once daily. 30 tablet 0    fluocinolone acetonide oil 0.01 % Drop Place 4 drops in ear(s) 2 (two) times daily. 20 mL 5    gabapentin (NEURONTIN) 300 MG capsule Take 1 capsule (300 mg total) by mouth 3 (three) times daily. (Patient taking differently: Take 200 mg by mouth 3 (three) times daily. ) 90  capsule 11    insulin degludec (TRESIBA FLEXTOUCH U-200) 200 unit/mL (3 mL) InPn Inject 110 units daily. 6 Syringe 11    insulin lispro (HUMALOG KWIKPEN INSULIN) 100 unit/mL pen Take 20 units before breakfast and 26 units before lunch, 26 units before dinner SC 8 Syringe 5    losartan-hydrochlorothiazide 100-12.5 mg (HYZAAR) 100-12.5 mg Tab Take 1 tablet by mouth once daily. 90 tablet 1    losartan-hydrochlorothiazide 100-25 mg (HYZAAR) 100-25 mg per tablet Take 1 tablet by mouth 2 (two) times daily.  0    metoprolol succinate (TOPROL-XL) 25 MG 24 hr tablet TAKE 1 TABLET BY MOUTH ONCE DAILY FOR 30 DAYS  2    montelukast (SINGULAIR) 10 mg tablet One tablet by mouth every evening 30 tablet 11    nitrofurantoin, macrocrystal-monohydrate, (MACROBID) 100 MG capsule TAKE 1 CAPSULE BY MOUTH EVERY 12 HOURS FOR 7 DAYS  0    oxyCODONE-acetaminophen (PERCOCET) 5-325 mg per tablet Take 1 tablet by mouth every 4 (four) hours as needed for Pain. 30 tablet 0    OZEMPIC 0.25 mg or 0.5 mg(2 mg/1.5 mL) PnIj INJECT 0.25 MG SUBCUTANEOUSLY ONCE WEEKLY FOR WEEK 1 4 THEN 0.5 MG WEEKLY ON WEEK 5 8.  4    pravastatin (PRAVACHOL) 20 MG tablet Take 1 tablet (20 mg total) by mouth every evening. 30 tablet 5    TOUJEO MAX U-300 SOLOSTAR 300 unit/mL (3 mL) InPn Inject 100 units daily. 4 Syringe 6     No current facility-administered medications for this visit.        Past Medical History:   Diagnosis Date    Diabetes mellitus type II     Hyperlipidemia     Hypertension     Obesity     Osteoarthritis     Peripheral neuropathy     Severe obesity (BMI 35.0-39.9) with comorbidity 12/30/2016    Tendonitis     left foot     Past Surgical History:   Procedure Laterality Date    BLADDER SUSPENSION      CATARACT EXTRACTION      COLONOSCOPY N/A 9/26/2015    Procedure: COLONOSCOPY;  Surgeon: Javed Wood MD;  Location: [address]);  Service: Endoscopy;  Laterality: N/A;    HYSTERECTOMY  1978    fibroids     "TONSILLECTOMY       Family History   Problem Relation Age of Onset    Thyroid disease Mother     Cataracts Mother     Diabetes Mother     Stroke Mother     Hypertension Mother     Thyroid disease Sister     Diabetes Sister     Hypertension Sister     Hypertension Daughter     Diabetes Son     Diabetes Sister     Hypertension Sister     Hypertension Brother     Hypertension Sister     Hypertension Sister     Hypertension Brother     Thyroid disease Maternal Aunt     Thyroid disease Maternal Aunt     Colon cancer Maternal Grandfather     Breast cancer Neg Hx     Ovarian cancer Neg Hx     Amblyopia Neg Hx     Blindness Neg Hx     Cancer Neg Hx     Glaucoma Neg Hx     Macular degeneration Neg Hx     Retinal detachment Neg Hx     Strabismus Neg Hx      Social History     Tobacco Use    Smoking status: Former Smoker     Packs/day: 0.25     Years: 15.00     Pack years: 3.75     Last attempt to quit: 1982     Years since quittin.8    Smokeless tobacco: Never Used   Substance Use Topics    Alcohol use: No    Drug use: No        Review of Systems:  Review of Systems   Constitutional: Negative.    HENT: Negative.    Eyes: Negative.    Respiratory: Negative.    Cardiovascular: Negative.    Gastrointestinal: Negative.    Endocrine: Negative.    Musculoskeletal: Negative.    Skin: Negative.    Allergic/Immunologic: Negative.    Neurological: Negative.    Hematological: Negative.    Psychiatric/Behavioral: Negative.    All other systems reviewed and are negative.      OBJECTIVE:     Vital Signs (Most Recent)  Pulse: 86 (10/08/19 1443)  BP: (!) 151/77 (10/08/19 1443)  SpO2: 97 % (10/08/19 1443)  5' 5" (1.651 m)  111.5 kg (245 lb 14.8 oz)     Physical Exam:  Physical Exam   Constitutional: She is oriented to person, place, and time. She appears well-developed and well-nourished.   HENT:   Head: Normocephalic and atraumatic.   Right Ear: External ear normal.   Left Ear: External ear normal. "   Nose: Nose normal.   Mouth/Throat: Oropharynx is clear and moist.   Eyes: Pupils are equal, round, and reactive to light. Conjunctivae and EOM are normal.   Neck: Normal range of motion. Neck supple.       Cardiovascular: Normal rate, regular rhythm, normal heart sounds and intact distal pulses.   Pulmonary/Chest: Effort normal and breath sounds normal.   Abdominal: Soft. Bowel sounds are normal.   Musculoskeletal: Normal range of motion.   Neurological: She is alert and oriented to person, place, and time. She has normal reflexes.   Skin: Skin is warm and dry.   Psychiatric: She has a normal mood and affect. Her behavior is normal. Thought content normal.   Vitals reviewed.      Laboratory  none    Diagnostic Results:  none    ASSESSMENT/PLAN:     Epidermal inclusion cyst    PLAN:Plan     I will take her to the OR for excision of mass with the risks explained

## 2019-10-30 ENCOUNTER — HOSPITAL ENCOUNTER (OUTPATIENT)
Dept: PREADMISSION TESTING | Facility: HOSPITAL | Age: 74
Discharge: HOME | End: 2019-10-30
Attending: SURGERY
Payer: COMMERCIAL

## 2019-10-30 VITALS
RESPIRATION RATE: 18 BRPM | TEMPERATURE: 98 F | HEART RATE: 72 BPM | HEIGHT: 66 IN | DIASTOLIC BLOOD PRESSURE: 75 MMHG | BODY MASS INDEX: 39.46 KG/M2 | SYSTOLIC BLOOD PRESSURE: 160 MMHG | OXYGEN SATURATION: 97 % | WEIGHT: 245.56 LBS

## 2019-10-30 DIAGNOSIS — L72.0 EPIDERMAL INCLUSION CYST: ICD-10-CM

## 2019-10-30 DIAGNOSIS — Z01.818 PREOP TESTING: Primary | ICD-10-CM

## 2019-10-30 LAB
ALBUMIN SERPL BCP-MCNC: 3.8 G/DL (ref 3.5–5.2)
ALP SERPL-CCNC: 72 U/L (ref 55–135)
ALT SERPL W/O P-5'-P-CCNC: 18 U/L (ref 10–44)
ANION GAP SERPL CALC-SCNC: 8 MMOL/L (ref 8–16)
AST SERPL-CCNC: 13 U/L (ref 10–40)
BASOPHILS # BLD AUTO: 0.08 K/UL (ref 0–0.2)
BASOPHILS NFR BLD: 1 % (ref 0–1.9)
BILIRUB SERPL-MCNC: 0.3 MG/DL (ref 0.1–1)
BUN SERPL-MCNC: 17 MG/DL (ref 8–23)
CALCIUM SERPL-MCNC: 9.6 MG/DL (ref 8.7–10.5)
CHLORIDE SERPL-SCNC: 109 MMOL/L (ref 95–110)
CO2 SERPL-SCNC: 29 MMOL/L (ref 23–29)
CREAT SERPL-MCNC: 0.9 MG/DL (ref 0.5–1.4)
DIFFERENTIAL METHOD: ABNORMAL
EOSINOPHIL # BLD AUTO: 0.3 K/UL (ref 0–0.5)
EOSINOPHIL NFR BLD: 3.8 % (ref 0–8)
ERYTHROCYTE [DISTWIDTH] IN BLOOD BY AUTOMATED COUNT: 14.6 % (ref 11.5–14.5)
EST. GFR  (AFRICAN AMERICAN): >60 ML/MIN/1.73 M^2
EST. GFR  (NON AFRICAN AMERICAN): >60 ML/MIN/1.73 M^2
GLUCOSE SERPL-MCNC: 106 MG/DL (ref 70–110)
HCT VFR BLD AUTO: 40.5 % (ref 37–48.5)
HGB BLD-MCNC: 13.1 G/DL (ref 12–16)
IMM GRANULOCYTES # BLD AUTO: 0.03 K/UL (ref 0–0.04)
IMM GRANULOCYTES NFR BLD AUTO: 0.4 % (ref 0–0.5)
LYMPHOCYTES # BLD AUTO: 2.7 K/UL (ref 1–4.8)
LYMPHOCYTES NFR BLD: 34.4 % (ref 18–48)
MCH RBC QN AUTO: 29.7 PG (ref 27–31)
MCHC RBC AUTO-ENTMCNC: 32.3 G/DL (ref 32–36)
MCV RBC AUTO: 92 FL (ref 82–98)
MONOCYTES # BLD AUTO: 0.9 K/UL (ref 0.3–1)
MONOCYTES NFR BLD: 11.1 % (ref 4–15)
NEUTROPHILS # BLD AUTO: 3.9 K/UL (ref 1.8–7.7)
NEUTROPHILS NFR BLD: 49.3 % (ref 38–73)
NRBC BLD-RTO: 0 /100 WBC
PLATELET # BLD AUTO: 270 K/UL (ref 150–350)
PMV BLD AUTO: 11 FL (ref 9.2–12.9)
POTASSIUM SERPL-SCNC: 3.8 MMOL/L (ref 3.5–5.1)
PROT SERPL-MCNC: 7.1 G/DL (ref 6–8.4)
RBC # BLD AUTO: 4.41 M/UL (ref 4–5.4)
SODIUM SERPL-SCNC: 146 MMOL/L (ref 136–145)
WBC # BLD AUTO: 7.91 K/UL (ref 3.9–12.7)

## 2019-10-30 PROCEDURE — 93010 EKG 12-LEAD: ICD-10-PCS | Mod: ,,, | Performed by: INTERNAL MEDICINE

## 2019-10-30 PROCEDURE — 93010 ELECTROCARDIOGRAM REPORT: CPT | Mod: ,,, | Performed by: INTERNAL MEDICINE

## 2019-10-30 PROCEDURE — 85025 COMPLETE CBC W/AUTO DIFF WBC: CPT

## 2019-10-30 PROCEDURE — 93005 ELECTROCARDIOGRAM TRACING: CPT

## 2019-10-30 PROCEDURE — 80053 COMPREHEN METABOLIC PANEL: CPT

## 2019-10-30 NOTE — DISCHARGE INSTRUCTIONS
"Your procedure  is scheduled for _11/6/2019_________.    Call [phone] between 2pm and 5pm on _11/5/2019______to find out your arrival time for the day of surgery.    Report to Same Day Surgery Unit at ____ AM on the 2nd floor of the hospital.  Use the front entrance of the hospital.  The front doors of the hospital open promptly at 5:30am.  If you need wheelchair assistance, call [phone] from your cell phone, or call "0" from the courtesy phone in the lobby.    Important instructions:   Do not eat or drink after 12 midnight, including water.  It is okay to brush your teeth.  Do not have gum, candy or mints.     Take only these medications with a small swallow of water on the morning of your surgery _amlodipine, metoprolol, gabapentin____________    Do not take any diabetic medication on the morning of surgery unless instructed to do so by your doctor or pre op nurse.    Stop taking Aspirin, Ibuprofen, Motrin and Aleve , Fish oil, and Vitamin E for at least 7 days before your surgery. You may use Tylenol unless otherwise instructed by your doctor.         Please shower the night before and the morning of your surgery.        Use Hibiclens soap as instructed by your pre op nurse.   Please place clean linens on your bed the night before surgery. Please wear fresh clean clothing after each shower.     No shaving of procedural area at least 4-5 days before surgery due to increased risk of skin irritation and/or possible infection.      Do not wear make- up, including mascara.     You may wear deodorant only.      Do not wear powder, body lotion or perfume/cologne.     Do not wear any jewelry or have any metal on your body.     You will be asked to remove any dentures or partials for the procedure.     Please bring any documents given to you by your doctor.     If you are going home on the same day of surgery, you must arrange for a family member or a friend to drive you home.  Public transportation is " prohibited.  You will not be able to drive home if you were given anesthesia or sedation.     Wear loose fitting clothes allowing for bandages.     Please leave money and valuables home.       You may bring your cell phone.     Call the doctor if fever or illness should occur before your surgery.    Call [phone] to contact us here if needed.

## 2019-10-31 ENCOUNTER — ANESTHESIA EVENT (OUTPATIENT)
Dept: SURGERY | Facility: HOSPITAL | Age: 74
End: 2019-10-31
Payer: COMMERCIAL

## 2019-11-04 ENCOUNTER — HOSPITAL ENCOUNTER (OUTPATIENT)
Dept: RADIOLOGY | Facility: HOSPITAL | Age: 74
Discharge: HOME | End: 2019-11-04
Attending: INTERNAL MEDICINE
Payer: COMMERCIAL

## 2019-11-04 DIAGNOSIS — Z00.00 HEALTHCARE MAINTENANCE: ICD-10-CM

## 2019-11-04 DIAGNOSIS — Z13.820 SCREENING FOR OSTEOPOROSIS: ICD-10-CM

## 2019-11-04 DIAGNOSIS — Z12.31 ENCOUNTER FOR SCREENING MAMMOGRAM FOR BREAST CANCER: ICD-10-CM

## 2019-11-04 PROCEDURE — 77067 MAMMO DIGITAL SCREENING BILAT WITH TOMOSYNTHESIS_CAD: ICD-10-PCS | Mod: 26,,, | Performed by: RADIOLOGY

## 2019-11-04 PROCEDURE — 77063 MAMMO DIGITAL SCREENING BILAT WITH TOMOSYNTHESIS_CAD: ICD-10-PCS | Mod: 26,,, | Performed by: RADIOLOGY

## 2019-11-04 PROCEDURE — 77067 SCR MAMMO BI INCL CAD: CPT | Mod: TC

## 2019-11-04 PROCEDURE — 77080 DEXA BONE DENSITY SPINE HIP: ICD-10-PCS | Mod: 26,,, | Performed by: RADIOLOGY

## 2019-11-04 PROCEDURE — 77067 SCR MAMMO BI INCL CAD: CPT | Mod: 26,,, | Performed by: RADIOLOGY

## 2019-11-04 PROCEDURE — 77080 DXA BONE DENSITY AXIAL: CPT | Mod: TC

## 2019-11-04 PROCEDURE — 77080 DXA BONE DENSITY AXIAL: CPT | Mod: 26,,, | Performed by: RADIOLOGY

## 2019-11-04 PROCEDURE — 77063 BREAST TOMOSYNTHESIS BI: CPT | Mod: 26,,, | Performed by: RADIOLOGY

## 2019-11-04 PROCEDURE — 77063 BREAST TOMOSYNTHESIS BI: CPT | Mod: TC

## 2019-11-06 ENCOUNTER — TELEPHONE (OUTPATIENT)
Dept: SURGERY | Facility: CLINIC | Age: 74
End: 2019-11-06

## 2019-11-06 ENCOUNTER — TELEPHONE (OUTPATIENT)
Dept: FAMILY MEDICINE | Facility: CLINIC | Age: 74
End: 2019-11-06

## 2019-11-06 ENCOUNTER — ANESTHESIA (OUTPATIENT)
Dept: SURGERY | Facility: HOSPITAL | Age: 74
End: 2019-11-06
Payer: COMMERCIAL

## 2019-11-06 ENCOUNTER — HOSPITAL ENCOUNTER (OUTPATIENT)
Facility: HOSPITAL | Age: 74
Discharge: HOME | End: 2019-11-06
Attending: SURGERY | Admitting: SURGERY
Payer: COMMERCIAL

## 2019-11-06 VITALS
SYSTOLIC BLOOD PRESSURE: 178 MMHG | HEART RATE: 82 BPM | HEIGHT: 66 IN | TEMPERATURE: 98 F | WEIGHT: 245.56 LBS | BODY MASS INDEX: 39.46 KG/M2 | OXYGEN SATURATION: 99 % | RESPIRATION RATE: 19 BRPM | DIASTOLIC BLOOD PRESSURE: 75 MMHG

## 2019-11-06 DIAGNOSIS — L72.0 EPIDERMAL INCLUSION CYST: Primary | ICD-10-CM

## 2019-11-06 LAB — POCT GLUCOSE: 167 MG/DL (ref 70–110)

## 2019-11-06 PROCEDURE — 36000706: Performed by: SURGERY

## 2019-11-06 PROCEDURE — 12041 PR LAYR CLOS WND REST BODY <2.5 CM: ICD-10-PCS | Mod: ,,, | Performed by: SURGERY

## 2019-11-06 PROCEDURE — 37000009 HC ANESTHESIA EA ADD 15 MINS: Performed by: SURGERY

## 2019-11-06 PROCEDURE — 63600175 PHARM REV CODE 636 W HCPCS: Performed by: NURSE ANESTHETIST, CERTIFIED REGISTERED

## 2019-11-06 PROCEDURE — D9220A PRA ANESTHESIA: ICD-10-PCS | Mod: ,,, | Performed by: ANESTHESIOLOGY

## 2019-11-06 PROCEDURE — D9220A PRA ANESTHESIA: Mod: ,,, | Performed by: ANESTHESIOLOGY

## 2019-11-06 PROCEDURE — 82962 GLUCOSE BLOOD TEST: CPT | Performed by: SURGERY

## 2019-11-06 PROCEDURE — 25000003 PHARM REV CODE 250: Performed by: SURGERY

## 2019-11-06 PROCEDURE — 37000008 HC ANESTHESIA 1ST 15 MINUTES: Performed by: SURGERY

## 2019-11-06 PROCEDURE — 88304 TISSUE SPECIMEN TO PATHOLOGY - SURGERY: ICD-10-PCS | Mod: 26,,, | Performed by: PATHOLOGY

## 2019-11-06 PROCEDURE — 71000016 HC POSTOP RECOV ADDL HR: Performed by: SURGERY

## 2019-11-06 PROCEDURE — 11422 PR EXC SKIN BENIG 1.1-2 CM REMAINDR BODY: ICD-10-PCS | Mod: 51,,, | Performed by: SURGERY

## 2019-11-06 PROCEDURE — 36000707: Performed by: SURGERY

## 2019-11-06 PROCEDURE — 71000015 HC POSTOP RECOV 1ST HR: Performed by: SURGERY

## 2019-11-06 PROCEDURE — 63600175 PHARM REV CODE 636 W HCPCS: Performed by: SURGERY

## 2019-11-06 PROCEDURE — 88304 TISSUE EXAM BY PATHOLOGIST: CPT | Performed by: PATHOLOGY

## 2019-11-06 PROCEDURE — 12041 INTMD RPR N-HF/GENIT 2.5CM/<: CPT | Mod: ,,, | Performed by: SURGERY

## 2019-11-06 PROCEDURE — 11422 EXC H-F-NK-SP B9+MARG 1.1-2: CPT | Mod: 51,,, | Performed by: SURGERY

## 2019-11-06 PROCEDURE — 88304 TISSUE EXAM BY PATHOLOGIST: CPT | Mod: 26,,, | Performed by: PATHOLOGY

## 2019-11-06 RX ORDER — SODIUM CHLORIDE 9 MG/ML
INJECTION, SOLUTION INTRAVENOUS CONTINUOUS
Status: DISCONTINUED | OUTPATIENT
Start: 2019-11-06 | End: 2019-11-06 | Stop reason: HOSPADM

## 2019-11-06 RX ORDER — SEMAGLUTIDE 1.34 MG/ML
INJECTION, SOLUTION SUBCUTANEOUS
Refills: 4 | OUTPATIENT
Start: 2019-11-06

## 2019-11-06 RX ORDER — MIDAZOLAM HYDROCHLORIDE 1 MG/ML
INJECTION, SOLUTION INTRAMUSCULAR; INTRAVENOUS
Status: DISCONTINUED | OUTPATIENT
Start: 2019-11-06 | End: 2019-11-06

## 2019-11-06 RX ORDER — BUPIVACAINE HYDROCHLORIDE 2.5 MG/ML
INJECTION, SOLUTION EPIDURAL; INFILTRATION; INTRACAUDAL
Status: DISCONTINUED | OUTPATIENT
Start: 2019-11-06 | End: 2019-11-06 | Stop reason: HOSPADM

## 2019-11-06 RX ORDER — LIDOCAINE HYDROCHLORIDE 10 MG/ML
1 INJECTION, SOLUTION EPIDURAL; INFILTRATION; INTRACAUDAL; PERINEURAL ONCE
Status: DISCONTINUED | OUTPATIENT
Start: 2019-11-06 | End: 2019-11-06 | Stop reason: HOSPADM

## 2019-11-06 RX ORDER — MORPHINE SULFATE 10 MG/ML
3 INJECTION INTRAMUSCULAR; INTRAVENOUS; SUBCUTANEOUS
Status: DISCONTINUED | OUTPATIENT
Start: 2019-11-06 | End: 2019-11-06 | Stop reason: HOSPADM

## 2019-11-06 RX ORDER — FENTANYL CITRATE 50 UG/ML
INJECTION, SOLUTION INTRAMUSCULAR; INTRAVENOUS
Status: DISCONTINUED | OUTPATIENT
Start: 2019-11-06 | End: 2019-11-06

## 2019-11-06 RX ORDER — HYDROMORPHONE HYDROCHLORIDE 2 MG/ML
0.2 INJECTION, SOLUTION INTRAMUSCULAR; INTRAVENOUS; SUBCUTANEOUS EVERY 5 MIN PRN
Status: DISCONTINUED | OUTPATIENT
Start: 2019-11-06 | End: 2019-11-06 | Stop reason: HOSPADM

## 2019-11-06 RX ORDER — CEFAZOLIN SODIUM 2 G/50ML
2 SOLUTION INTRAVENOUS
Status: COMPLETED | OUTPATIENT
Start: 2019-11-06 | End: 2019-11-06

## 2019-11-06 RX ORDER — ACETAMINOPHEN 10 MG/ML
1000 INJECTION, SOLUTION INTRAVENOUS ONCE
Status: DISCONTINUED | OUTPATIENT
Start: 2019-11-06 | End: 2019-11-06 | Stop reason: HOSPADM

## 2019-11-06 RX ORDER — PROPOFOL 10 MG/ML
INJECTION, EMULSION INTRAVENOUS
Status: DISCONTINUED | OUTPATIENT
Start: 2019-11-06 | End: 2019-11-06

## 2019-11-06 RX ORDER — SODIUM CHLORIDE, SODIUM LACTATE, POTASSIUM CHLORIDE, CALCIUM CHLORIDE 600; 310; 30; 20 MG/100ML; MG/100ML; MG/100ML; MG/100ML
INJECTION, SOLUTION INTRAVENOUS CONTINUOUS PRN
Status: DISCONTINUED | OUTPATIENT
Start: 2019-11-06 | End: 2019-11-06

## 2019-11-06 RX ORDER — LIDOCAINE HYDROCHLORIDE 10 MG/ML
INJECTION, SOLUTION EPIDURAL; INFILTRATION; INTRACAUDAL; PERINEURAL
Status: DISCONTINUED | OUTPATIENT
Start: 2019-11-06 | End: 2019-11-06 | Stop reason: HOSPADM

## 2019-11-06 RX ORDER — OXYCODONE AND ACETAMINOPHEN 5; 325 MG/1; MG/1
1 TABLET ORAL EVERY 4 HOURS PRN
Qty: 30 TABLET | Refills: 0 | Status: SHIPPED | OUTPATIENT
Start: 2019-11-06 | End: 2021-02-09

## 2019-11-06 RX ORDER — MUPIROCIN 20 MG/G
OINTMENT TOPICAL
Status: DISCONTINUED | OUTPATIENT
Start: 2019-11-06 | End: 2019-11-06 | Stop reason: HOSPADM

## 2019-11-06 RX ORDER — SODIUM CHLORIDE 0.9 % (FLUSH) 0.9 %
10 SYRINGE (ML) INJECTION
Status: DISCONTINUED | OUTPATIENT
Start: 2019-11-06 | End: 2019-11-06 | Stop reason: HOSPADM

## 2019-11-06 RX ORDER — LIDOCAINE HCL/PF 100 MG/5ML
SYRINGE (ML) INTRAVENOUS
Status: DISCONTINUED | OUTPATIENT
Start: 2019-11-06 | End: 2019-11-06

## 2019-11-06 RX ADMIN — MUPIROCIN: 20 OINTMENT TOPICAL at 06:11

## 2019-11-06 RX ADMIN — PROPOFOL 20 MG: 10 INJECTION, EMULSION INTRAVENOUS at 07:11

## 2019-11-06 RX ADMIN — CEFAZOLIN SODIUM 2 G: 2 SOLUTION INTRAVENOUS at 07:11

## 2019-11-06 RX ADMIN — SODIUM CHLORIDE, SODIUM LACTATE, POTASSIUM CHLORIDE, AND CALCIUM CHLORIDE: .6; .31; .03; .02 INJECTION, SOLUTION INTRAVENOUS at 07:11

## 2019-11-06 RX ADMIN — MIDAZOLAM HYDROCHLORIDE 2 MG: 1 INJECTION, SOLUTION INTRAMUSCULAR; INTRAVENOUS at 07:11

## 2019-11-06 RX ADMIN — FENTANYL CITRATE 100 MCG: 50 INJECTION INTRAMUSCULAR; INTRAVENOUS at 07:11

## 2019-11-06 RX ADMIN — LIDOCAINE HYDROCHLORIDE 50 MG: 20 INJECTION, SOLUTION INTRAVENOUS at 07:11

## 2019-11-06 NOTE — TELEPHONE ENCOUNTER
----- Message from Leonard Wells MD sent at 11/4/2019  4:55 PM CST -----  Please call the patient regarding her result.  Normal bone mineral density test, no medication needed.

## 2019-11-06 NOTE — BRIEF OP NOTE
[facility]  Brief Operative Note     SUMMARY     Surgery Date: 11/6/2019     Surgeon(s) and Role:     * Edwin Barrow MD - Primary    Assisting Surgeon: Zeke Kearns MD    Pre-op Diagnosis:  Epidermal inclusion cyst [L72.0]    Post-op Diagnosis:  Post-Op Diagnosis Codes:     * Epidermal inclusion cyst [L72.0]    Procedure(s):  EXCISION, MASS, NECK    Anesthesia: Local MAC    Description of the findings of the procedure: 1.5 cm mass    Findings/Key Components: same    Estimated Blood Loss: minimal         Specimens:   Specimen (12h ago, onward)     Start     Ordered    11/06/19 0743  Specimen to Pathology - Surgery  Once     Comments:  Pre-op Diagnosis: Epidermal inclusion cyst [L72.0]Procedure(s):EXCISION, MASS, NECK Number of specimens: 1Name of specimens: Incisional Neck Mass      11/06/19 0744                Discharge Note    SUMMARY     Admit Date: 11/6/2019    Discharge Date and Time:  11/06/2019 8:20 AM    Hospital Course (synopsis of major diagnoses, care, treatment, and services provided during the course of the hospital stay): uneventful post op course     Final Diagnosis: Post-Op Diagnosis Codes:     * Epidermal inclusion cyst [L72.0]    Disposition: Home or Self Care    Follow Up/Patient Instructions:     Medications:  Reconciled Home Medications:      Medication List      CHANGE how you take these medications    gabapentin 300 MG capsule  Commonly known as:  NEURONTIN  Take 1 capsule (300 mg total) by mouth 3 (three) times daily.  What changed:  how much to take     * oxyCODONE-acetaminophen 5-325 mg per tablet  Commonly known as:  PERCOCET  Take 1 tablet by mouth every 4 (four) hours as needed for Pain.  What changed:  Another medication with the same name was added. Make sure you understand how and when to take each.     * oxyCODONE-acetaminophen 5-325 mg per tablet  Commonly known as:  PERCOCET  Take 1 tablet by mouth every 4 (four) hours as needed for Pain.  What changed:  You  were already taking a medication with the same name, and this prescription was added. Make sure you understand how and when to take each.         * This list has 2 medication(s) that are the same as other medications prescribed for you. Read the directions carefully, and ask your doctor or other care provider to review them with you.            CONTINUE taking these medications    amLODIPine 5 MG tablet  Commonly known as:  NORVASC  Take 1 tablet (5 mg total) by mouth once daily.     aspirin 81 MG Chew  Take 1 tablet (81 mg total) by mouth once daily.     canagliflozin 300 mg Tab tablet  Commonly known as:  INVOKANA  Take 1 tablet (300 mg total) by mouth once daily.     cetirizine 10 MG tablet  Commonly known as:  ZYRTEC  TAKE 1 TABLET BY MOUTH ONCE DAILY FOR 30 DAYS     flash glucose sensor Kit  Commonly known as:  FREESTYLE ROSAURA 14 DAY SENSOR  1 sensor kit every 14 days (requires 2 kits per month)     fluocinolone acetonide oil 0.01 % Drop  Place 4 drops in ear(s) 2 (two) times daily.     insulin lispro 100 unit/mL pen  Commonly known as:  HumaLOG KwikPen Insulin  Take 20 units before breakfast and 26 units before lunch, 26 units before dinner SC     losartan-hydrochlorothiazide 100-12.5 mg 100-12.5 mg Tab  Commonly known as:  HYZAAR  Take 1 tablet by mouth once daily.     metoprolol succinate 25 MG 24 hr tablet  Commonly known as:  TOPROL-XL  TAKE 1 TABLET BY MOUTH ONCE DAILY FOR 30 DAYS     OZEMPIC 0.25 mg or 0.5 mg(2 mg/1.5 mL) Pnij  Generic drug:  semaglutide  INJECT 0.25 MG SUBCUTANEOUSLY ONCE WEEKLY FOR WEEK 1 4 THEN 0.5 MG WEEKLY ON WEEK 5 8.     pravastatin 20 MG tablet  Commonly known as:  PRAVACHOL  Take 1 tablet (20 mg total) by mouth every evening.     TOUJEO MAX U-300 SOLOSTAR 300 unit/mL (3 mL) Inpn  Generic drug:  insulin glargine U-300 conc  Inject 100 units daily.          Discharge Procedure Orders   Diet general     Call MD for:  temperature >100.4     Call MD for:  persistent nausea and  vomiting     Call MD for:  severe uncontrolled pain     Call MD for:  difficulty breathing, headache or visual disturbances     Call MD for:  redness, tenderness, or signs of infection (pain, swelling, redness, odor or green/yellow discharge around incision site)     Call MD for:  hives     Remove dressing in 24 hours     Shower on day dressing removed (No bath)     Follow-up Information     Edwin Barrow MD In 1 week.    Specialties:  General Surgery, Oncology  Contact information:  [address]  [address] [place], [place] [id number] [phone]

## 2019-11-06 NOTE — ANESTHESIA POSTPROCEDURE EVALUATION
Anesthesia Post Evaluation    Patient: Mena Odonnell    Procedure(s) Performed: Procedure(s):  EXCISION, MASS, NECK    Final Anesthesia Type: general  Patient location during evaluation: PACU  Patient participation: Yes- Able to Participate  Level of consciousness: awake and alert, oriented and awake  Post-procedure vital signs: reviewed and stable  Airway patency: patent  PONV status at discharge: No PONV  Anesthetic complications: no      Cardiovascular status: blood pressure returned to baseline  Respiratory status: unassisted, spontaneous ventilation and room air  Hydration status: euvolemic  Follow-up not needed.          Vitals Value Taken Time   /75 11/6/2019  9:30 AM   Temp 36.5 °C (97.7 °F) 11/6/2019  9:30 AM   Pulse 82 11/6/2019  9:30 AM   Resp 19 11/6/2019  9:30 AM   SpO2 99 % 11/6/2019  9:30 AM         No case tracking events are documented in the log.      Pain/Sylwia Score: Modified Sylwia Score: 20 (11/6/2019  9:30 AM)

## 2019-11-06 NOTE — TRANSFER OF CARE
"Anesthesia Transfer of Care Note    Patient: Mena Odonnell    Procedure(s) Performed: Procedure(s):  EXCISION, MASS, NECK    Patient location: [facility]    Anesthesia Type: MAC    Transport from OR: Transported from OR on room air with adequate spontaneous ventilation    Post pain: adequate analgesia    Post assessment: no apparent anesthetic complications and tolerated procedure well    Post vital signs: stable    Level of consciousness: awake, alert and oriented    Nausea/Vomiting: no nausea/vomiting    Complications: none    Transfer of care protocol was followed      Last vitals:   Visit Vitals  BP (!) 115/51 (BP Location: Right arm)   Pulse 79   Temp 36.5 °C (97.7 °F) (Oral)   Resp 17   Ht 5' 6" (1.676 m)   Wt 111.4 kg (245 lb 9.5 oz)   SpO2 99%   Breastfeeding? No   BMI 39.64 kg/m²     "

## 2019-11-06 NOTE — ANESTHESIA PREPROCEDURE EVALUATION
11/06/2019  Mena Odonnell is a 73 y.o., female.  Pre-operative evaluation for Procedure(s):  COLONOSCOPY    Mena Odonnell is a 73 y.o. female     Patient Active Problem List   Diagnosis    Type 2 diabetes mellitus with diabetic polyneuropathy    Hyperlipidemia    HTN (hypertension)    Knee pain    H/O vitamin D deficiency    Non morbid obesity due to excess calories    Bilateral carotid bruits    Osteoarthritis of right knee    Morbid obesity with body mass index (BMI) of 40.0 or higher    Dyspnea on exertion    Chronic cough    MEKA (obstructive sleep apnea)    Sensorineural hearing loss (SNHL) of right ear with restricted hearing of left ear    Tinnitus of both ears    Allergic rhinitis    Dysfunction of both eustachian tubes    Nasal septal deviation    Chronic eczematous otitis externa of both ears    Neck abscess    Elevated blood pressure reading    Healthcare maintenance    Epidermal inclusion cyst       Review of patient's allergies indicates:   Allergen Reactions    Crestor [rosuvastatin] Other (See Comments)     Leg Weakness.        Current Facility-Administered Medications on File Prior to Visit   Medication Dose Route Frequency Provider Last Rate Last Dose    0.9%  NaCl infusion   Intravenous Continuous Cindy Luis MD        0.9%  NaCl infusion   Intravenous Continuous Edwin Barrow MD        cefazolin (ANCEF) 2 gram in dextrose 5% 50 mL IVPB (premix)  2 g Intravenous On Call Procedure Edwin Barrow MD        lidocaine (PF) 10 mg/ml (1%) injection 10 mg  1 mL Intradermal Once Cindy Luis MD        mupirocin 2 % ointment   Nasal On Call Procedure Edwin Barrow MD         Current Outpatient Medications on File Prior to Visit   Medication Sig Dispense Refill    amLODIPine (NORVASC) 5 MG tablet Take 1 tablet (5 mg total) by mouth once daily.  30 tablet 11    aspirin 81 MG Chew Take 1 tablet (81 mg total) by mouth once daily. 30 tablet 0    canagliflozin (INVOKANA) 300 mg Tab tablet Take 1 tablet (300 mg total) by mouth once daily. 30 tablet 11    cetirizine (ZYRTEC) 10 MG tablet TAKE 1 TABLET BY MOUTH ONCE DAILY FOR 30 DAYS  3    flash glucose sensor (FREESTYLE ROSAURA 14 DAY SENSOR) Kit 1 sensor kit every 14 days (requires 2 kits per month) 2 kit 6    fluocinolone acetonide oil 0.01 % Drop Place 4 drops in ear(s) 2 (two) times daily. 20 mL 5    gabapentin (NEURONTIN) 300 MG capsule Take 1 capsule (300 mg total) by mouth 3 (three) times daily. (Patient taking differently: Take 200 mg by mouth 3 (three) times daily. ) 90 capsule 11    insulin lispro (HUMALOG KWIKPEN INSULIN) 100 unit/mL pen Take 20 units before breakfast and 26 units before lunch, 26 units before dinner SC 8 Syringe 5    losartan-hydrochlorothiazide 100-12.5 mg (HYZAAR) 100-12.5 mg Tab Take 1 tablet by mouth once daily. 90 tablet 1    metoprolol succinate (TOPROL-XL) 25 MG 24 hr tablet TAKE 1 TABLET BY MOUTH ONCE DAILY FOR 30 DAYS  2    oxyCODONE-acetaminophen (PERCOCET) 5-325 mg per tablet Take 1 tablet by mouth every 4 (four) hours as needed for Pain. 30 tablet 0    OZEMPIC 0.25 mg or 0.5 mg(2 mg/1.5 mL) PnIj INJECT 0.25 MG SUBCUTANEOUSLY ONCE WEEKLY FOR WEEK 1 4 THEN 0.5 MG WEEKLY ON WEEK 5 8.  4    pravastatin (PRAVACHOL) 20 MG tablet Take 1 tablet (20 mg total) by mouth every evening. 30 tablet 5    TOUJEO MAX U-300 SOLOSTAR 300 unit/mL (3 mL) InPn Inject 100 units daily. 4 Syringe 6       Past Surgical History:   Procedure Laterality Date    BLADDER SUSPENSION      CATARACT EXTRACTION      COLONOSCOPY N/A 9/26/2015    Procedure: COLONOSCOPY;  Surgeon: Javed Wood MD;  Location: [address]);  Service: Endoscopy;  Laterality: N/A;    HYSTERECTOMY  1978    fibroids    TONSILLECTOMY         Social History     Socioeconomic History    Marital status:       Spouse name: Not on file    Number of children: 2    Years of education: Not on file    Highest education level: Not on file   Occupational History     Employer: [employer]   Social Needs    Financial resource strain: Not on file    Food insecurity:     Worry: Not on file     Inability: Not on file    Transportation needs:     Medical: Not on file     Non-medical: Not on file   Tobacco Use    Smoking status: Former Smoker     Packs/day: 0.25     Years: 15.00     Pack years: 3.75     Last attempt to quit: 1982     Years since quittin.9    Smokeless tobacco: Never Used   Substance and Sexual Activity    Alcohol use: No    Drug use: No    Sexual activity: Not Currently     Partners: Male     Birth control/protection: Abstinence   Lifestyle    Physical activity:     Days per week: Not on file     Minutes per session: Not on file    Stress: Not on file   Relationships    Social connections:     Talks on phone: Not on file     Gets together: Not on file     Attends [employer]ism service: Not on file     Active member of club or organization: Not on file     Attends meetings of clubs or organizations: Not on file     Relationship status: Not on file   Other Topics Concern    Not on file   Social History Narrative    . One daughter. Works as an Apartment      Stress Echo 2013:    1. The EKG portion of this study is uninterpretable for ischemia at a peak heart rate of 110 bpm (74% of predicted).   2. Blood pressure remained stable throughout the protocol (Presenting BP: 138/67 Peak BP: 142/68).   3. No significant arrhythmias were present.   4. There were no symptoms of chest discomfort or significant dyspnea throughout the protocol.     Nuclear Quantitative Functional Analysis:   LVEF: 64 %    Impression: NORMAL MYOCARDIAL PERFUSION  1. The perfusion scan is free of evidence for myocardial ischemia or injury.   2. Resting wall motion is physiologic.   3.  Resting LV function is normal.   4. The ventricular volumes are normal at rest and stress.   5. The extracardiac distribution of radioactivity is normal.     Pre-op Assessment    I have reviewed the Patient Summary Reports.     I have reviewed the Nursing Notes.   I have reviewed the Medications.     Review of Systems  Anesthesia Hx:  No problems with previous Anesthesia Denies Hx of Anesthetic complications  Neg history of prior surgery. Denies Family Hx of Anesthesia complications.   Denies Personal Hx of Anesthesia complications.   Social:  Non-Smoker, No Alcohol Use    Hematology/Oncology:  Hematology Normal   Oncology Normal     EENT/Dental:EENT/Dental Normal   Cardiovascular:   Exercise tolerance: good Hypertension, well controlled Denies CP or SOB   Pulmonary:   Sleep Apnea    Renal/:  Renal/ Normal     Hepatic/GI:  Hepatic/GI Normal    Musculoskeletal:  Musculoskeletal Normal    Neurological:  Neurology Normal    Endocrine:   Diabetes, well controlled, type 2, using insulin    Dermatological:  Skin Normal    Psych:  Psychiatric Normal           Physical Exam  General:  Well nourished    Airway/Jaw/Neck:  Airway Findings: Mouth Opening: Normal Tongue: Normal  General Airway Assessment: Adult  Mallampati: I      Dental:  Dental Findings: Upper Dentures, Lower Dentures   Chest/Lungs:  Chest/Lungs Findings: Clear to auscultation, Normal Respiratory Rate     Heart/Vascular:  Heart Findings: Rate: Normal  Rhythm: Regular Rhythm        Mental Status:  Mental Status Findings:  Cooperative, Alert and Oriented         Anesthesia Plan  Type of Anesthesia, risks & benefits discussed:  Anesthesia Type:  general  Patient's Preference:   Intra-op Monitoring Plan: standard ASA monitors  Intra-op Monitoring Plan Comments:   Post Op Pain Control Plan:   Post Op Pain Control Plan Comments:   Induction:   IV  Beta Blocker:         Informed Consent: Patient understands risks and agrees with Anesthesia plan.  Questions  answered. Anesthesia consent signed with patient.  ASA Score: 3     Day of Surgery Review of History & Physical:    H&P update referred to the surgeon.         Ready For Surgery From Anesthesia Perspective.

## 2019-11-06 NOTE — DISCHARGE INSTRUCTIONS
[facility]   Office # [phone]     Discharge Instructions for Same Day Surgery     Call the office for and appointment if one has not already been made.     Diet: Drink plenty of fluids the first 48 hours and you may resume your   usual diet.     Activity: No heavy lifting (over 10 pounds), pushing or pulling until your   post op visit. Your doctor's office may have told you to limit your lifting to less weight, or even no weight.  Be sure to follow those instructions.    Note: You may ride in a car and you may drive when comfortable.     Do not drive, drink alcohol, or sign legal documents for 24 hours, or if taking narcotic pain medication.    Dressings: Remove the dressing 48 hours after surgery. You may shower  48 hours after surgery and you may wash your hair.     If you have steri strips ( appears to be strips of white tape) on   your incision, leave them on.       Medical: Call the doctor for any of the following problems: fever above 101,   severe pain, bleeding, or abdominal distention (swelling).   If constipated you may take any stool softener you choose.     Occasionally small areas of skin numbness or an unpleasant skin sensation can result. Also, you may find that your incision is swollen and tender for a few days.  Some redness around sutures and staples is a normal reaction, but if the discomfort persists or worsens, call you doctor.       Fall Prevention  Millions of people fall every year and injure themselves. You may have had anesthesia or sedation which may increase your risk of falling. You may have health issues that put you at an increased risk of falling.     Here are ways to reduce your risk of falling.  ·   · Make your home safe by keeping walkways clear of objects you may trip over.  · Use non-slip pads under rugs. Do not use area rugs or small throw rugs.  · Use non-slip mats in bathtubs and showers.  · Install handrails and lights on staircases.  · Do not walk in poorly  lit areas.  · Do not stand on chairs or wobbly ladders.  · Use caution when reaching overhead or looking upward. This position can cause a loss of balance.  · Be sure your shoes fit properly, have non-slip bottoms and are in good condition.   · Wear shoes both inside and out. Avoid going barefoot or wearing slippers.  · Be cautious when going up and down stairs, curbs, and when walking on uneven sidewalks.  · If your balance is poor, consider using a cane or walker.  · If your fall was related to alcohol use, stop or limit alcohol intake.   · If your fall was related to use of sleeping medicines, talk to your doctor about this. You may need to reduce your dosage at bedtime if you awaken during the night to go to the bathroom.    · To reduce the need for nighttime bathroom trips:  ¨ Avoid drinking fluids for several hours before going to bed  ¨ Empty your bladder before going to bed  ¨ Men can keep a urinal at the bedside  · Stay as active as you can. Balance, flexibility, strength, and endurance all come from exercise. They all play a role in preventing falls. Ask your healthcare provider which types of activity are right for you.  · Get your vision checked on a regular basis.  · If you have pets, know where they are before you stand up or walk so you don't trip over them.  · Use night lights.

## 2019-11-06 NOTE — OP NOTE
[facility]  General Surgery  Operative Note    SUMMARY     Date of Procedure: 11/6/2019     Procedure: Procedure(s):  EXCISION, MASS, NECK       Surgeon(s) and Role:     * Edwin Barrow MD - Primary    Assisting Surgeon: Zeke Kearns MD    Pre-Operative Diagnosis: Epidermal inclusion cyst [L72.0]    Post-Operative Diagnosis: Post-Op Diagnosis Codes:     * Epidermal inclusion cyst [L72.0]    Anesthesia: Local MAC    Technical Procedures Used:  Patient was taken to the operating room and placed in a right lateral decubitus position prepped and draped around her neck in the usual sterile fashion. An ellipse of tissue was taken around the mass in the back of her neck.  This was deepened down and totally excised all the way to subcutaneous tissue. Hemostasis was obtained electrocautery. Specimen was sent to pathology for analysis and then wounds closed in layers with absorbable suture deep and interrupted nylon suture for the skin. Bandage was applied she was awakened transported to room in satisfactory condition.    Description of the Findings of the Procedure:  1.5 cm mass    Significant Surgical Tasks Conducted by the Assistant(s), if Applicable:  Greater than 50%    Complications: No    Estimated Blood Loss (EBL):  Minimal           Implants: * No implants in log *    Specimens:   Specimen (12h ago, onward)     Start     Ordered    11/06/19 0743  Specimen to Pathology - Surgery  Once     Comments:  Pre-op Diagnosis: Epidermal inclusion cyst [L72.0]Procedure(s):EXCISION, MASS, NECK Number of specimens: 1Name of specimens: Incisional Neck Mass      11/06/19 0744                        Condition: Good    Disposition: PACU - hemodynamically stable.    Attestation: I was present and scrubbed for the entire procedure.

## 2019-11-07 ENCOUNTER — TELEPHONE (OUTPATIENT)
Dept: SURGERY | Facility: CLINIC | Age: 74
End: 2019-11-07

## 2019-11-07 NOTE — TELEPHONE ENCOUNTER
"Left message for pts daughter re:work note      ----- Message from Christo Maddox MA sent at 11/6/2019  4:40 PM CST -----  Terri Pineda Staff  Caller: Jeff"daughter" [phone] (Today,  4:33 PM)         Type: Patient Call Back     Who called:Jeff "daughter"     What is the request in detail: calling in regards to getting a doctor's note for work the patient had a procedure done on today     Can the clinic reply by [facility]? Call back     Would the patient rather a call back or a response via [facility]? Call back     Best call back number: [phone]         "

## 2019-11-13 ENCOUNTER — OFFICE VISIT (OUTPATIENT)
Dept: SURGERY | Facility: CLINIC | Age: 74
End: 2019-11-13
Payer: COMMERCIAL

## 2019-11-13 VITALS
HEIGHT: 66 IN | DIASTOLIC BLOOD PRESSURE: 78 MMHG | HEART RATE: 87 BPM | WEIGHT: 240 LBS | SYSTOLIC BLOOD PRESSURE: 160 MMHG | BODY MASS INDEX: 38.57 KG/M2

## 2019-11-13 DIAGNOSIS — L72.0 EPIDERMAL INCLUSION CYST: Primary | ICD-10-CM

## 2019-11-13 PROCEDURE — 99024 PR POST-OP FOLLOW-UP VISIT: ICD-10-PCS | Mod: S$GLB,,, | Performed by: SURGERY

## 2019-11-13 PROCEDURE — 99024 POSTOP FOLLOW-UP VISIT: CPT | Mod: S$GLB,,, | Performed by: SURGERY

## 2019-11-13 NOTE — PROGRESS NOTES
Subjective:       Patient ID: Mena Odonnell is a 73 y.o. female.    Chief Complaint: Post-op Evaluation    HPI73 yo female with excision of EIC without complaints  Review of Systems   Constitutional: Negative.    HENT: Negative.    Eyes: Negative.    Respiratory: Negative.    Cardiovascular: Negative.    Gastrointestinal: Negative.    Endocrine: Negative.    Musculoskeletal: Negative.    Skin: Negative.    Allergic/Immunologic: Negative.    Neurological: Negative.    Hematological: Negative.    Psychiatric/Behavioral: Negative.    All other systems reviewed and are negative.      Objective:      Physical Exam   Constitutional: She is oriented to person, place, and time. She appears well-developed and well-nourished.   HENT:   Head: Normocephalic and atraumatic.   Right Ear: External ear normal.   Left Ear: External ear normal.   Nose: Nose normal.   Mouth/Throat: Oropharynx is clear and moist.   Eyes: Pupils are equal, round, and reactive to light. Conjunctivae and EOM are normal.   Neck: Normal range of motion. Neck supple.   Cardiovascular: Normal rate, regular rhythm, normal heart sounds and intact distal pulses.   Pulmonary/Chest: Effort normal and breath sounds normal.   Abdominal: Soft. Bowel sounds are normal.   Musculoskeletal: Normal range of motion.   Neurological: She is alert and oriented to person, place, and time. She has normal reflexes.   Skin: Skin is warm and dry.   Psychiatric: She has a normal mood and affect. Her behavior is normal. Thought content normal.   Vitals reviewed.      Assessment:       1. Epidermal inclusion cyst      healing well  Plan:       Follow up prn

## 2019-12-07 ENCOUNTER — LAB VISIT (OUTPATIENT)
Dept: LAB | Facility: HOSPITAL | Age: 74
End: 2019-12-07
Attending: INTERNAL MEDICINE
Payer: COMMERCIAL

## 2019-12-07 DIAGNOSIS — E78.2 MIXED HYPERLIPIDEMIA: ICD-10-CM

## 2019-12-07 DIAGNOSIS — L02.11 NECK ABSCESS: ICD-10-CM

## 2019-12-07 DIAGNOSIS — Z86.39 H/O VITAMIN D DEFICIENCY: Chronic | ICD-10-CM

## 2019-12-07 DIAGNOSIS — E11.42 TYPE 2 DIABETES MELLITUS WITH DIABETIC POLYNEUROPATHY, WITH LONG-TERM CURRENT USE OF INSULIN: ICD-10-CM

## 2019-12-07 DIAGNOSIS — Z79.4 TYPE 2 DIABETES MELLITUS WITH DIABETIC POLYNEUROPATHY, WITH LONG-TERM CURRENT USE OF INSULIN: ICD-10-CM

## 2019-12-07 DIAGNOSIS — I10 ESSENTIAL HYPERTENSION: ICD-10-CM

## 2019-12-07 DIAGNOSIS — Z00.00 HEALTHCARE MAINTENANCE: ICD-10-CM

## 2019-12-07 LAB
25(OH)D3+25(OH)D2 SERPL-MCNC: 40 NG/ML (ref 30–96)
ALBUMIN SERPL BCP-MCNC: 3.5 G/DL (ref 3.5–5.2)
ALP SERPL-CCNC: 70 U/L (ref 55–135)
ALT SERPL W/O P-5'-P-CCNC: 19 U/L (ref 10–44)
ANION GAP SERPL CALC-SCNC: 11 MMOL/L (ref 8–16)
AST SERPL-CCNC: 16 U/L (ref 10–40)
BASOPHILS # BLD AUTO: 0.07 K/UL (ref 0–0.2)
BASOPHILS NFR BLD: 1 % (ref 0–1.9)
BILIRUB SERPL-MCNC: 0.4 MG/DL (ref 0.1–1)
BUN SERPL-MCNC: 14 MG/DL (ref 8–23)
CALCIUM SERPL-MCNC: 9.8 MG/DL (ref 8.7–10.5)
CHLORIDE SERPL-SCNC: 108 MMOL/L (ref 95–110)
CHOLEST SERPL-MCNC: 223 MG/DL (ref 120–199)
CHOLEST/HDLC SERPL: 5.2 {RATIO} (ref 2–5)
CO2 SERPL-SCNC: 26 MMOL/L (ref 23–29)
CREAT SERPL-MCNC: 0.7 MG/DL (ref 0.5–1.4)
DIFFERENTIAL METHOD: ABNORMAL
EOSINOPHIL # BLD AUTO: 0.2 K/UL (ref 0–0.5)
EOSINOPHIL NFR BLD: 3.4 % (ref 0–8)
ERYTHROCYTE [DISTWIDTH] IN BLOOD BY AUTOMATED COUNT: 14.5 % (ref 11.5–14.5)
EST. GFR  (AFRICAN AMERICAN): >60 ML/MIN/1.73 M^2
EST. GFR  (NON AFRICAN AMERICAN): >60 ML/MIN/1.73 M^2
ESTIMATED AVG GLUCOSE: 166 MG/DL (ref 68–131)
ESTIMATED AVG GLUCOSE: 166 MG/DL (ref 68–131)
GLUCOSE SERPL-MCNC: 142 MG/DL (ref 70–110)
HBA1C MFR BLD HPLC: 7.4 % (ref 4–5.6)
HBA1C MFR BLD HPLC: 7.4 % (ref 4–5.6)
HCT VFR BLD AUTO: 43.3 % (ref 37–48.5)
HDLC SERPL-MCNC: 43 MG/DL (ref 40–75)
HDLC SERPL: 19.3 % (ref 20–50)
HGB BLD-MCNC: 13.4 G/DL (ref 12–16)
IMM GRANULOCYTES # BLD AUTO: 0.02 K/UL (ref 0–0.04)
IMM GRANULOCYTES NFR BLD AUTO: 0.3 % (ref 0–0.5)
LDLC SERPL CALC-MCNC: 153 MG/DL (ref 63–159)
LYMPHOCYTES # BLD AUTO: 2.8 K/UL (ref 1–4.8)
LYMPHOCYTES NFR BLD: 39.2 % (ref 18–48)
MCH RBC QN AUTO: 29.6 PG (ref 27–31)
MCHC RBC AUTO-ENTMCNC: 30.9 G/DL (ref 32–36)
MCV RBC AUTO: 96 FL (ref 82–98)
MONOCYTES # BLD AUTO: 0.6 K/UL (ref 0.3–1)
MONOCYTES NFR BLD: 8.8 % (ref 4–15)
NEUTROPHILS # BLD AUTO: 3.4 K/UL (ref 1.8–7.7)
NEUTROPHILS NFR BLD: 47.3 % (ref 38–73)
NONHDLC SERPL-MCNC: 180 MG/DL
NRBC BLD-RTO: 0 /100 WBC
PLATELET # BLD AUTO: 288 K/UL (ref 150–350)
PMV BLD AUTO: 12.2 FL (ref 9.2–12.9)
POTASSIUM SERPL-SCNC: 4.2 MMOL/L (ref 3.5–5.1)
PROT SERPL-MCNC: 7 G/DL (ref 6–8.4)
RBC # BLD AUTO: 4.52 M/UL (ref 4–5.4)
SODIUM SERPL-SCNC: 145 MMOL/L (ref 136–145)
TRIGL SERPL-MCNC: 135 MG/DL (ref 30–150)
WBC # BLD AUTO: 7.07 K/UL (ref 3.9–12.7)

## 2019-12-07 PROCEDURE — 83036 HEMOGLOBIN GLYCOSYLATED A1C: CPT

## 2019-12-07 PROCEDURE — 85025 COMPLETE CBC W/AUTO DIFF WBC: CPT

## 2019-12-07 PROCEDURE — 86803 HEPATITIS C AB TEST: CPT

## 2019-12-07 PROCEDURE — 82306 VITAMIN D 25 HYDROXY: CPT

## 2019-12-07 PROCEDURE — 80053 COMPREHEN METABOLIC PANEL: CPT

## 2019-12-07 PROCEDURE — 80061 LIPID PANEL: CPT

## 2019-12-07 PROCEDURE — 36415 COLL VENOUS BLD VENIPUNCTURE: CPT | Mod: PO

## 2019-12-09 LAB — HCV AB SERPL QL IA: NEGATIVE

## 2019-12-09 RX ORDER — CANAGLIFLOZIN 300 MG/1
TABLET, FILM COATED ORAL
Qty: 30 TABLET | Refills: 3 | Status: SHIPPED | OUTPATIENT
Start: 2019-12-09 | End: 2020-01-02 | Stop reason: SDUPTHER

## 2019-12-12 ENCOUNTER — PATIENT OUTREACH (OUTPATIENT)
Dept: ADMINISTRATIVE | Facility: OTHER | Age: 74
End: 2019-12-12

## 2019-12-18 ENCOUNTER — TELEPHONE (OUTPATIENT)
Dept: FAMILY MEDICINE | Facility: CLINIC | Age: 74
End: 2019-12-18

## 2019-12-18 NOTE — TELEPHONE ENCOUNTER
----- Message from Leonard Wells MD sent at 12/17/2019  5:00 PM CST -----  Please call the patient regarding her result.  Labs in stable range.  Please call her to schedule visit with in upcoming month to discuss results.

## 2019-12-20 LAB
LEFT EYE DM RETINOPATHY: POSITIVE
RIGHT EYE DM RETINOPATHY: POSITIVE

## 2019-12-23 PROBLEM — Z00.00 HEALTHCARE MAINTENANCE: Status: RESOLVED | Noted: 2019-09-17 | Resolved: 2019-12-23

## 2019-12-31 ENCOUNTER — PATIENT OUTREACH (OUTPATIENT)
Dept: ADMINISTRATIVE | Facility: OTHER | Age: 74
End: 2019-12-31

## 2020-01-02 ENCOUNTER — OFFICE VISIT (OUTPATIENT)
Dept: ENDOCRINOLOGY | Facility: CLINIC | Age: 75
End: 2020-01-02
Payer: COMMERCIAL

## 2020-01-02 VITALS
WEIGHT: 240.5 LBS | DIASTOLIC BLOOD PRESSURE: 68 MMHG | SYSTOLIC BLOOD PRESSURE: 154 MMHG | BODY MASS INDEX: 38.82 KG/M2 | HEART RATE: 81 BPM

## 2020-01-02 DIAGNOSIS — E78.5 HYPERLIPIDEMIA, UNSPECIFIED HYPERLIPIDEMIA TYPE: ICD-10-CM

## 2020-01-02 DIAGNOSIS — I10 ESSENTIAL HYPERTENSION: ICD-10-CM

## 2020-01-02 DIAGNOSIS — Z71.9 VISIT FOR COUNSELING: ICD-10-CM

## 2020-01-02 DIAGNOSIS — Z79.4 TYPE 2 DIABETES MELLITUS WITH DIABETIC POLYNEUROPATHY, WITH LONG-TERM CURRENT USE OF INSULIN: Primary | ICD-10-CM

## 2020-01-02 DIAGNOSIS — E66.01 SEVERE OBESITY (BMI 35.0-39.9) WITH COMORBIDITY: ICD-10-CM

## 2020-01-02 DIAGNOSIS — E11.42 TYPE 2 DIABETES MELLITUS WITH DIABETIC POLYNEUROPATHY, WITH LONG-TERM CURRENT USE OF INSULIN: Primary | ICD-10-CM

## 2020-01-02 PROCEDURE — 1126F PR PAIN SEVERITY QUANTIFIED, NO PAIN PRESENT: ICD-10-PCS | Mod: S$GLB,,, | Performed by: NURSE PRACTITIONER

## 2020-01-02 PROCEDURE — 99999 PR PBB SHADOW E&M-EST. PATIENT-LVL III: ICD-10-PCS | Mod: PBBFAC,,, | Performed by: NURSE PRACTITIONER

## 2020-01-02 PROCEDURE — 1126F AMNT PAIN NOTED NONE PRSNT: CPT | Mod: S$GLB,,, | Performed by: NURSE PRACTITIONER

## 2020-01-02 PROCEDURE — 99215 OFFICE O/P EST HI 40 MIN: CPT | Mod: S$GLB,,, | Performed by: NURSE PRACTITIONER

## 2020-01-02 PROCEDURE — 1159F MED LIST DOCD IN RCRD: CPT | Mod: S$GLB,,, | Performed by: NURSE PRACTITIONER

## 2020-01-02 PROCEDURE — 99999 PR PBB SHADOW E&M-EST. PATIENT-LVL III: CPT | Mod: PBBFAC,,, | Performed by: NURSE PRACTITIONER

## 2020-01-02 PROCEDURE — 99215 PR OFFICE/OUTPT VISIT, EST, LEVL V, 40-54 MIN: ICD-10-PCS | Mod: S$GLB,,, | Performed by: NURSE PRACTITIONER

## 2020-01-02 PROCEDURE — 1159F PR MEDICATION LIST DOCUMENTED IN MEDICAL RECORD: ICD-10-PCS | Mod: S$GLB,,, | Performed by: NURSE PRACTITIONER

## 2020-01-02 RX ORDER — LOSARTAN POTASSIUM AND HYDROCHLOROTHIAZIDE 12.5; 1 MG/1; MG/1
1 TABLET ORAL DAILY
Qty: 90 TABLET | Refills: 0 | Status: SHIPPED | OUTPATIENT
Start: 2020-01-02 | End: 2020-03-10 | Stop reason: ALTCHOICE

## 2020-01-02 RX ORDER — AMLODIPINE BESYLATE 10 MG/1
10 TABLET ORAL DAILY
Qty: 30 TABLET | Refills: 3 | Status: SHIPPED | OUTPATIENT
Start: 2020-01-02 | End: 2020-07-08 | Stop reason: SDUPTHER

## 2020-01-02 RX ORDER — GABAPENTIN 100 MG/1
200 CAPSULE ORAL 2 TIMES DAILY
Qty: 360 CAPSULE | Refills: 2 | Status: SHIPPED | OUTPATIENT
Start: 2020-01-02 | End: 2020-11-04 | Stop reason: SDUPTHER

## 2020-01-02 RX ORDER — PRAVASTATIN SODIUM 40 MG/1
40 TABLET ORAL DAILY
Qty: 30 TABLET | Refills: 3 | Status: SHIPPED | OUTPATIENT
Start: 2020-01-02 | End: 2020-03-24 | Stop reason: DRUGHIGH

## 2020-01-02 RX ORDER — INSULIN GLARGINE 300 [IU]/ML
INJECTION, SOLUTION SUBCUTANEOUS
Qty: 4 SYRINGE | Refills: 6 | Status: SHIPPED | OUTPATIENT
Start: 2020-01-02 | End: 2020-03-30

## 2020-01-02 NOTE — PATIENT INSTRUCTIONS
No changes to medications at this time as patient has not been testing glucoses often enough to determine safe insulin titration.   Test glucose 4x/day before meals and bedtime. Patient declines Giulia. You can send a 2 week glucose log for further instruction. Bring logs to every visit.   Return to clinic in 3 months with labs prior.    Check blood pressure at home a few times a week in the evenings - it should be less than 140/90.

## 2020-01-02 NOTE — PROGRESS NOTES
CC: This 74 y.o. Black or  female  is here for evaluation of  T2DM along with comorbidities indicated in the Visit Diagnosis section of this encounter.    HPI: Mena Odonnell was diagnosed with T2DM in ~ 1995. Experienced blurry vision at the time of diagnosis r/t BG >400. Started on orals. Began insulin in 2005. She is currently on MDI.    Initial visit 9/12/19  Last seen by IZABEL Landaverde NP, in 3/2019. New to me.   C/o headache x 3 weeks.  BP is high today at 185/83.   She stopped Invokana this year because of concerns seeing the Paxer. Denies  infection, dizziness, or dry mouth on Invokana. Cannot tell if it helped her BGs or not.   C/o tenderness and redness to the back of her neck.   Plan Resume Invokana 100 mg once daily   Reduce Novolog to 20 units before breakfast.   Reduce Toujeo to 90 units once daily in the morning.   Increase Ozempic to 1 mg once weekly. -- she has 2 more pens of 0.5 mg dose however.   Monitor blood sugar before meals and bedtime.   Difficult to optimize insulin doses since blood sugar logs not available.   A1c of 6.5% indicates controlled diabetes but does raise suspicion of hypoglycemia.   rtc in 3 mo with labs prior.       Interval history  A1c is up from 6.5 to 7.4%.   She has restarted Invokana and denies  symptoms. No dysuria. Invokana dose was increased to 300 mg once daily.   She has increased Ozempic from 0.5 to 1 mg weekly. Denies GI s/e - no diarrhea, constipation, nausea.   She has continued to take Toujeo at 100 units daily.   She has lost 10 lb since last visit. She did find her appetite was lower after increasing Ozempic dose.         LAST DIABETES EDUCATION: 6/2019 mira Palmer - Found visit helpful     HOSPITALIZED FOR DIABETES -  No.    DIABETES MEDICATIONS: Invokana 300 mg once daily in the AM, Ozempic 1 mg once weekly, Toujeo Max 100 units once daily in 6 am, Humalog Kwikpen 20-26-26 units ac     Misses medication doses - no     DM  COMPLICATIONS: peripheral neuropathy    SIGNIFICANT DIABETES MED HISTORY:   Metformin stopped December 2016 r/t GI upset and diarrhea     SELF MONITORING BLOOD GLUCOSE: Checks blood glucose at home a few times in the morning - -166, mostly 130-150s. Sometimes she might test her BG at lunch and it's lower like 95.     HYPOGLYCEMIC EPISODES: infrequent      CURRENT DIET: sometimes coke or ginger ale; drinking mostly water.   Breakfast ~ 8 am; lunch ~ 12 pm.   Lunch is 1/2 coke, mac n cheese, ham.      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 No acute distress.   PSYCH: AAOx3, appropriate mood and affect, conversant, well-groomed. Judgement and insight good.   NEURO: Cranial nerves grossly intact. Speech clear, no tremor.   CHEST: Respirations even and unlabored.         Hemoglobin A1C   Date Value Ref Range Status   12/07/2019 7.4 (H) 4.0 - 5.6 % Final     Comment:     ADA Screening Guidelines:  5.7-6.4%  Consistent with prediabetes  >or=6.5%  Consistent with diabetes  High levels of fetal hemoglobin interfere with the HbA1C  assay. Heterozygous hemoglobin variants (HbS, HgC, etc)do  not significantly interfere with this assay.   However, presence of multiple variants may affect accuracy.     12/07/2019 7.4 (H) 4.0 - 5.6 % Final     Comment:     ADA Screening Guidelines:  5.7-6.4%  Consistent with prediabetes  >or=6.5%  Consistent with diabetes  High levels of fetal hemoglobin interfere with the HbA1C  assay. Heterozygous hemoglobin variants (HbS, HgC, etc)do  not significantly interfere with this assay.   However, presence of  multiple variants may affect accuracy.     09/07/2019 6.5 (H) 4.0 - 5.6 % Final     Comment:     ADA Screening Guidelines:  5.7-6.4%  Consistent with prediabetes  >or=6.5%  Consistent with diabetes  High levels of fetal hemoglobin interfere with the HbA1C  assay. Heterozygous hemoglobin variants (HbS, HgC, etc)do  not significantly interfere with this assay.   However, presence of multiple variants may affect accuracy.             Chemistry        Component Value Date/Time     12/07/2019 0831    K 4.2 12/07/2019 0831     12/07/2019 0831    CO2 26 12/07/2019 0831    BUN 14 12/07/2019 0831    CREATININE 0.7 12/07/2019 0831     (H) 12/07/2019 0831        Component Value Date/Time    CALCIUM 9.8 12/07/2019 0831    ALKPHOS 70 12/07/2019 0831    AST 16 12/07/2019 0831    ALT 19 12/07/2019 0831    BILITOT 0.4 12/07/2019 0831    ESTGFRAFRICA >60.0 12/07/2019 0831    EGFRNONAA >60.0 12/07/2019 0831          Lab Results   Component Value Date    LDLCALC 153.0 12/07/2019          Ref. Range 12/7/2019 08:31   Cholesterol Latest Ref Range: 120 - 199 mg/dL 223 (H)   HDL Latest Ref Range: 40 - 75 mg/dL 43   Hdl/Cholesterol Ratio Latest Ref Range: 20.0 - 50.0 % 19.3 (L)   LDL Cholesterol External Latest Ref Range: 63.0 - 159.0 mg/dL 153.0   Non-HDL Cholesterol Latest Units: mg/dL 180   Total Cholesterol/HDL Ratio Latest Ref Range: 2.0 - 5.0  5.2 (H)   Triglycerides Latest Ref Range: 30 - 150 mg/dL 135         Lab Results   Component Value Date    MICALBCREAT 7.9 09/29/2016               ASSESSMENT and PLAN:    A1C GOAL: < 7%     1. Type 2 diabetes mellitus with diabetic polyneuropathy, with long-term current use of insulin  No changes to medications at this time as patient has not been testing glucoses often enough to determine safe insulin titration.   Test glucose 4x/day before meals and bedtime. Patient declines Giulia. You can send a 2 week glucose log for further instruction. Bring logs to every visit.    Return to clinic in 3 months with labs prior.    Check blood pressure at home a few times a week in the evenings - it should be less than 140/90.       Hemoglobin A1c    canagliflozin (INVOKANA) 300 mg Tab tablet   2. Essential hypertension  Check blood pressure at home a few times a week in the evenings - it should be less than 140/90.     INCREASE amLODIPine (NORVASC) 10 MG tablet    losartan-hydrochlorothiazide 100-12.5 mg (HYZAAR) 100-12.5 mg Tab   3. Hyperlipidemia, unspecified hyperlipidemia type  INCREASE pravastatin (PRAVACHOL) 40 MG tablet    Lipid panel    Hepatic function panel   4. Severe obesity (BMI 35.0-39.9) with comorbidity  Increases insulin resistance.          Spent 40 minutes with patient with >50% time spent in counseling, as noted in # 1-4.          Orders Placed This Encounter   Procedures    Hemoglobin A1c     Standing Status:   Future     Standing Expiration Date:   3/2/2021    Lipid panel     Standing Status:   Future     Standing Expiration Date:   1/1/2021    Hepatic function panel     Standing Status:   Future     Standing Expiration Date:   3/2/2021        Follow up in about 3 months (around 4/2/2020).

## 2020-01-22 ENCOUNTER — PATIENT OUTREACH (OUTPATIENT)
Dept: ADMINISTRATIVE | Facility: OTHER | Age: 75
End: 2020-01-22

## 2020-01-22 ENCOUNTER — TELEPHONE (OUTPATIENT)
Dept: OTOLARYNGOLOGY | Facility: CLINIC | Age: 75
End: 2020-01-22

## 2020-01-22 NOTE — TELEPHONE ENCOUNTER
----- Message from Kurtis Manjarrez sent at 1/22/2020  7:56 AM CST -----  Contact: EDDY BENDER   Name of Who is Calling: EDDY BENDER       What is the request in detail: Patient is requesting a call back regarding information about the person she was referred to for her hearing aids      Can the clinic reply by [facility]: NO      What Number to Call Back if not in [facility]: [phone]

## 2020-01-22 NOTE — LETTER
AUTHORIZATION FOR RELEASE OF   CONFIDENTIAL INFORMATION    Dear Bebe Gee MD,    We are seeing Mena Odonnell, date of birth 1945, in the clinic at [facility]. Leonard Wells MD is the patient's PCP. Mena Odonnell has an outstanding lab/procedure at the time we reviewed her chart. In order to help keep her health information updated, she has authorized us to request the following medical record(s):        (  )  MAMMOGRAM                                      (  )  COLONOSCOPY      (  )  PAP SMEAR                                          (  )  OUTSIDE LAB RESULTS     (  )  DEXA SCAN                                          ( x )  EYE EXAM            (  )  FOOT EXAM                                          (  )  ENTIRE RECORD     (  )  OUTSIDE IMMUNIZATIONS                 (  )  _______________         Please fax records to [facility], Kiran K Anand, MD, [phone]     If you have any questions, please contact Patsy Luz at [phone].           Patient Name: Mena Odonnell  : 1945  Patient Phone #: [phone]

## 2020-01-23 ENCOUNTER — CLINICAL SUPPORT (OUTPATIENT)
Dept: OTOLARYNGOLOGY | Facility: CLINIC | Age: 75
End: 2020-01-23
Payer: COMMERCIAL

## 2020-01-23 DIAGNOSIS — Z71.89 ENCOUNTER FOR HEARING AID CONSULTATION: Primary | ICD-10-CM

## 2020-01-23 PROCEDURE — 99499 NO LOS: ICD-10-PCS | Mod: S$GLB,,, | Performed by: AUDIOLOGIST-HEARING AID FITTER

## 2020-01-23 PROCEDURE — 99499 UNLISTED E&M SERVICE: CPT | Mod: S$GLB,,, | Performed by: AUDIOLOGIST-HEARING AID FITTER

## 2020-01-23 NOTE — PROGRESS NOTES
Keenan Hernandes, CCC-A  Audiologist - [facility] [address] [place] [address]  [email or web address]  [phone]    Patient: Mena Odonnell   MRN: [id number]  [address] [facility]  Home Phone [phone]   Work Phone [phone]   Mobile [phone]   : 1945  TRACY: 2020      HEARING AID CONSULT    Hearing aid prices and styles were discussed with Mena Odonnell at length.  She would like to order a hearing aid for both ears.  A hearing aid contract was reviewed and signed.     _______________________________  Keenan Hernandes, GARTH-A  Audiologist

## 2020-01-29 ENCOUNTER — PATIENT OUTREACH (OUTPATIENT)
Dept: ADMINISTRATIVE | Facility: OTHER | Age: 75
End: 2020-01-29

## 2020-02-03 ENCOUNTER — CLINICAL SUPPORT (OUTPATIENT)
Dept: OTOLARYNGOLOGY | Facility: CLINIC | Age: 75
End: 2020-02-03

## 2020-02-03 DIAGNOSIS — Z46.1 ENCOUNTER FOR HEARING AID FITTING OF BOTH EARS: Primary | ICD-10-CM

## 2020-02-03 PROCEDURE — V5261 PR HEARING AID, DIGIT, BIN, BTE: ICD-10-PCS | Mod: CSM,,, | Performed by: AUDIOLOGIST-HEARING AID FITTER

## 2020-02-03 PROCEDURE — 99499 NO LOS: ICD-10-PCS | Mod: S$GLB,,, | Performed by: AUDIOLOGIST-HEARING AID FITTER

## 2020-02-03 PROCEDURE — NOLTAX-P ORLEANS PRESCRIBED 4.5%: ICD-10-PCS | Mod: CSM,,, | Performed by: AUDIOLOGIST-HEARING AID FITTER

## 2020-02-03 PROCEDURE — NOLTAX-P ORLEANS PRESCRIBED 4.5%: Mod: CSM,,, | Performed by: AUDIOLOGIST-HEARING AID FITTER

## 2020-02-03 PROCEDURE — 99499 UNLISTED E&M SERVICE: CPT | Mod: S$GLB,,, | Performed by: AUDIOLOGIST-HEARING AID FITTER

## 2020-02-03 PROCEDURE — V5261 HEARING AID, DIGIT, BIN, BTE: HCPCS | Mod: CSM,,, | Performed by: AUDIOLOGIST-HEARING AID FITTER

## 2020-02-03 NOTE — PROGRESS NOTES
Keenan Hernandes, CCC-A  Audiologist - [facility] [address] [place] [address]  [email or web address]  [phone]    Patient: Mena Odonnell   MRN: [id number]  [address]  Home Phone [phone]   Work Phone [phone]   Mobile [phone]   : 1945  TRACY: 2/3/2020      HEARING AID FITTING      Right ear:  Serial number: [id number]  : Phonak  Model: M50-RT  Type: Audeo  Color: Sandalwood  Battery size: Lithium Ion  Tube length: 1R  Speaker power: M  Dome size and style: Medium vented  Warranty expiration: 2023  L and D expiration: 2023    Left ear:  Serial number: [id number]  : Phonak  Model: M50-RT  Type: Audeo  Color: Sandalwood  Battery size: Lithium Ion  Tube length: 1L  Speaker power: M  Dome size and style: Medium vented  Warranty expiration: 2023  L and D expiration: 2023      Accessory:   - #[id number]  Power Pack - #[id number]    Otoscopy unremarkable AU.  Dispensed hearing aids at 80% gain with MPO maxed and good feedback tests AU.  Set Left ear Bluetooth Phone, but patient not interested in connectivity at this time.  Onboard controls set for VC - red, Right, raises volume; blue, Left, lowers volume - practiced in office.  Reviewed Right vs. Left, On vs. Off (with  and light indicators), low battery & VC indicators, insertion, removal and storage.      _______________________________  Keenan Hernandes, GARTH-A  Audiologist

## 2020-02-08 ENCOUNTER — PATIENT OUTREACH (OUTPATIENT)
Dept: ADMINISTRATIVE | Facility: OTHER | Age: 75
End: 2020-02-08

## 2020-02-08 DIAGNOSIS — Z79.4 TYPE 2 DIABETES MELLITUS WITH DIABETIC POLYNEUROPATHY, WITH LONG-TERM CURRENT USE OF INSULIN: Primary | ICD-10-CM

## 2020-02-08 DIAGNOSIS — E11.42 TYPE 2 DIABETES MELLITUS WITH DIABETIC POLYNEUROPATHY, WITH LONG-TERM CURRENT USE OF INSULIN: Primary | ICD-10-CM

## 2020-02-08 NOTE — PROGRESS NOTES
Chart reviewed.   Immunizations: Triggered Imm Registry     Orders placed: n/a  Upcoming appts to satisfy ISI topics: n/a

## 2020-02-11 ENCOUNTER — CLINICAL SUPPORT (OUTPATIENT)
Dept: OTOLARYNGOLOGY | Facility: CLINIC | Age: 75
End: 2020-02-11
Payer: COMMERCIAL

## 2020-02-11 DIAGNOSIS — Z46.1 ENCOUNTER FOR FITTING AND ADJUSTMENT OF HEARING AID OF BOTH EARS: Primary | ICD-10-CM

## 2020-02-11 PROCEDURE — 99499 NO LOS: ICD-10-PCS | Mod: S$GLB,,, | Performed by: AUDIOLOGIST-HEARING AID FITTER

## 2020-02-11 PROCEDURE — 99499 UNLISTED E&M SERVICE: CPT | Mod: S$GLB,,, | Performed by: AUDIOLOGIST-HEARING AID FITTER

## 2020-02-11 NOTE — PROGRESS NOTES
"  Keenan Hernandes, CCC-A  Audiologist - [facility] [address] [place] [address]  [email or web address]  [phone]    Patient: Mena Odonnell   MRN: [id number]  [address]  Home Phone [phone]   Work Phone [phone]   Mobile [phone]   : 1945  TRACY: 2020      HEARING AID FOLLOW-UP    Mena Odonnell is doing well with her hearing aids and does not have any issues with hearing or charging her devices.  However, she is still having some difficulty with insertion, especially L > R.    Connected to 2/3/2020 session and datalog shows wearing devices ~11 hrs/day.  She admits she does not wear her hearing aids on weekends.  Per her request, no change to gain at 80% due to c/o "world is noisy."      Reviewed daily cleaning with cloth and brush, as well as, how to remove dome, change out cerushield and properly replace dome onto .  Also reviewed storage when using the hard case (not ) - turning hearing aids off using button (red light) and back on (blinking green light).   Mena Odonnell did well with today's instruction and demonstration in office.  She verbalized understanding and will call as needed.    _______________________________  Keenan Hernandes, CCC-A  Audiologist    "

## 2020-02-21 DIAGNOSIS — E11.9 TYPE 2 DIABETES MELLITUS WITHOUT COMPLICATION, UNSPECIFIED WHETHER LONG TERM INSULIN USE: ICD-10-CM

## 2020-03-10 ENCOUNTER — OFFICE VISIT (OUTPATIENT)
Dept: FAMILY MEDICINE | Facility: CLINIC | Age: 75
End: 2020-03-10
Payer: COMMERCIAL

## 2020-03-10 ENCOUNTER — PATIENT OUTREACH (OUTPATIENT)
Dept: ADMINISTRATIVE | Facility: OTHER | Age: 75
End: 2020-03-10

## 2020-03-10 VITALS
HEIGHT: 66 IN | HEART RATE: 83 BPM | RESPIRATION RATE: 17 BRPM | OXYGEN SATURATION: 97 % | DIASTOLIC BLOOD PRESSURE: 76 MMHG | TEMPERATURE: 98 F | BODY MASS INDEX: 38.67 KG/M2 | WEIGHT: 240.63 LBS | SYSTOLIC BLOOD PRESSURE: 154 MMHG

## 2020-03-10 DIAGNOSIS — E11.42 TYPE 2 DIABETES MELLITUS WITH DIABETIC POLYNEUROPATHY, WITH LONG-TERM CURRENT USE OF INSULIN: ICD-10-CM

## 2020-03-10 DIAGNOSIS — Z79.4 TYPE 2 DIABETES MELLITUS WITH DIABETIC POLYNEUROPATHY, WITH LONG-TERM CURRENT USE OF INSULIN: ICD-10-CM

## 2020-03-10 DIAGNOSIS — I10 ESSENTIAL HYPERTENSION: Primary | ICD-10-CM

## 2020-03-10 DIAGNOSIS — Z00.00 HEALTHCARE MAINTENANCE: ICD-10-CM

## 2020-03-10 DIAGNOSIS — G63 POLYNEUROPATHY ASSOCIATED WITH UNDERLYING DISEASE: ICD-10-CM

## 2020-03-10 PROCEDURE — 90670 PNEUMOCOCCAL CONJUGATE VACCINE 13-VALENT LESS THAN 5YO & GREATER THAN: ICD-10-PCS | Mod: S$GLB,,, | Performed by: INTERNAL MEDICINE

## 2020-03-10 PROCEDURE — 99999 PR PBB SHADOW E&M-EST. PATIENT-LVL IV: CPT | Mod: PBBFAC,,, | Performed by: INTERNAL MEDICINE

## 2020-03-10 PROCEDURE — 90471 IMMUNIZATION ADMIN: CPT | Mod: S$GLB,,, | Performed by: INTERNAL MEDICINE

## 2020-03-10 PROCEDURE — 90471 PNEUMOCOCCAL CONJUGATE VACCINE 13-VALENT LESS THAN 5YO & GREATER THAN: ICD-10-PCS | Mod: S$GLB,,, | Performed by: INTERNAL MEDICINE

## 2020-03-10 PROCEDURE — 99999 PR PBB SHADOW E&M-EST. PATIENT-LVL IV: ICD-10-PCS | Mod: PBBFAC,,, | Performed by: INTERNAL MEDICINE

## 2020-03-10 PROCEDURE — 90670 PCV13 VACCINE IM: CPT | Mod: S$GLB,,, | Performed by: INTERNAL MEDICINE

## 2020-03-10 PROCEDURE — 99214 PR OFFICE/OUTPT VISIT, EST, LEVL IV, 30-39 MIN: ICD-10-PCS | Mod: 25,S$GLB,, | Performed by: INTERNAL MEDICINE

## 2020-03-10 PROCEDURE — 99214 OFFICE O/P EST MOD 30 MIN: CPT | Mod: 25,S$GLB,, | Performed by: INTERNAL MEDICINE

## 2020-03-10 RX ORDER — BLOOD SUGAR DIAGNOSTIC
STRIP MISCELLANEOUS
COMMUNITY
Start: 2020-03-02 | End: 2021-05-10 | Stop reason: SDUPTHER

## 2020-03-10 RX ORDER — AMLODIPINE BESYLATE 5 MG/1
TABLET ORAL
COMMUNITY
Start: 2020-03-03 | End: 2020-07-08

## 2020-03-10 RX ORDER — HYDROCHLOROTHIAZIDE 12.5 MG/1
CAPSULE ORAL
Qty: 30 CAPSULE | Refills: 0 | Status: SHIPPED | OUTPATIENT
Start: 2020-03-10 | End: 2020-07-08

## 2020-03-10 RX ORDER — MONTELUKAST SODIUM 10 MG/1
TABLET ORAL
COMMUNITY
Start: 2020-03-03 | End: 2020-11-04 | Stop reason: SDUPTHER

## 2020-03-10 RX ORDER — LOSARTAN POTASSIUM AND HYDROCHLOROTHIAZIDE 25; 100 MG/1; MG/1
1 TABLET ORAL DAILY
Qty: 90 TABLET | Refills: 3 | Status: SHIPPED | OUTPATIENT
Start: 2020-03-10 | End: 2021-02-09 | Stop reason: SDUPTHER

## 2020-03-10 NOTE — PROGRESS NOTES
HISTORY OF PRESENT ILLNESS:  Mena Odonnell is a 74 y.o. female who presents to the clinic today for Follow-up    LOV 19.    Had excision of epidermal inclusion cyst on neck Dr. Barrow 19.    Type 2 DM  Tx: Invokana 300 mg daily, Ozempic 1 mg weekly, Toujeo 100 units daily.  Last A1C 7.4% on 19.    Essential HTN  On amlodipine 10 mg daily, Hyzaar 100-12.5 mg daily, Toprol XL 25 mg daily.  BP at home: 140's-150's/60's-70's.    PAST MEDICAL HISTORY:  Past Medical History:   Diagnosis Date    Diabetes mellitus type II     Hyperlipidemia     Hypertension     Obesity     Osteoarthritis     Peripheral neuropathy     Severe obesity (BMI 35.0-39.9) with comorbidity 2016    Tendonitis     left foot       PAST SURGICAL HISTORY:  Past Surgical History:   Procedure Laterality Date    BLADDER SUSPENSION      CATARACT EXTRACTION      COLONOSCOPY N/A 2015    Procedure: COLONOSCOPY;  Surgeon: Javed Wood MD;  Location: [address]);  Service: Endoscopy;  Laterality: N/A;    HYSTERECTOMY  1978    fibroids    NECK MASS EXCISION  2019    Procedure: EXCISION, MASS, NECK;  Surgeon: Edwin Barrow MD;  Location: [facility];  Service: General;;  RN PREOP 10/30/2019    TONSILLECTOMY         SOCIAL HISTORY:  Social History     Socioeconomic History    Marital status:      Spouse name: Not on file    Number of children: 2    Years of education: Not on file    Highest education level: Not on file   Occupational History     Employer: [employer]   Social Needs    Financial resource strain: Not on file    Food insecurity:     Worry: Not on file     Inability: Not on file    Transportation needs:     Medical: Not on file     Non-medical: Not on file   Tobacco Use    Smoking status: Former Smoker     Packs/day: 0.25     Years: 15.00     Pack years: 3.75     Last attempt to quit: 1982     Years since quittin.2    Smokeless tobacco: Never Used    Substance and Sexual Activity    Alcohol use: No    Drug use: No    Sexual activity: Not Currently     Partners: Male     Birth control/protection: Abstinence   Lifestyle    Physical activity:     Days per week: Not on file     Minutes per session: Not on file    Stress: Not on file   Relationships    Social connections:     Talks on phone: Not on file     Gets together: Not on file     Attends Yazdanism service: Not on file     Active member of club or organization: Not on file     Attends meetings of clubs or organizations: Not on file     Relationship status: Not on file   Other Topics Concern    Not on file   Social History Narrative    . One daughter. Works as an Apartment        FAMILY HISTORY:  Family History   Problem Relation Age of Onset    Thyroid disease Mother     Cataracts Mother     Diabetes Mother     Stroke Mother     Hypertension Mother     Thyroid disease Sister     Diabetes Sister     Hypertension Sister     Hypertension Daughter     Diabetes Son     Diabetes Sister     Hypertension Sister     Hypertension Brother     Hypertension Sister     Hypertension Sister     Hypertension Brother     Thyroid disease Maternal Aunt     Thyroid disease Maternal Aunt     Colon cancer Maternal Grandfather     Breast cancer Neg Hx     Ovarian cancer Neg Hx     Amblyopia Neg Hx     Blindness Neg Hx     Cancer Neg Hx     Glaucoma Neg Hx     Macular degeneration Neg Hx     Retinal detachment Neg Hx     Strabismus Neg Hx        ALLERGIES AND MEDICATIONS: updated and reviewed.  Review of patient's allergies indicates:   Allergen Reactions    Atorvastatin      Muscle pain     Crestor [rosuvastatin] Other (See Comments)     Leg Weakness.      Medication List with Changes/Refills   Current Medications    AMLODIPINE (NORVASC) 10 MG TABLET    Take 1 tablet (10 mg total) by mouth once daily.    ASPIRIN 81 MG CHEW    Take 1 tablet (81 mg total) by mouth once  daily.    CANAGLIFLOZIN (INVOKANA) 300 MG TAB TABLET    Take 1 tablet (300 mg total) by mouth once daily.    CETIRIZINE (ZYRTEC) 10 MG TABLET    TAKE 1 TABLET BY MOUTH ONCE DAILY FOR 30 DAYS    FLASH GLUCOSE SENSOR (FREESTYLE ROSAURA 14 DAY SENSOR) KIT    1 sensor kit every 14 days (requires 2 kits per month)    FLUOCINOLONE ACETONIDE OIL 0.01 % DROP    Place 4 drops in ear(s) 2 (two) times daily.    GABAPENTIN (NEURONTIN) 100 MG CAPSULE    Take 2 capsules (200 mg total) by mouth 2 (two) times daily.    INSULIN GLARGINE U-300 CONC (TOUJEO MAX U-300 SOLOSTAR) 300 UNIT/ML (3 ML) INPN    Inject 100 units daily.    INSULIN LISPRO (HUMALOG KWIKPEN INSULIN) 100 UNIT/ML PEN    Take 20 units before breakfast and 26 units before lunch, 26 units before dinner SC    LOSARTAN-HYDROCHLOROTHIAZIDE 100-12.5 MG (HYZAAR) 100-12.5 MG TAB    Take 1 tablet by mouth once daily.    METOPROLOL SUCCINATE (TOPROL-XL) 25 MG 24 HR TABLET    TAKE 1 TABLET BY MOUTH ONCE DAILY FOR 30 DAYS    OXYCODONE-ACETAMINOPHEN (PERCOCET) 5-325 MG PER TABLET    Take 1 tablet by mouth every 4 (four) hours as needed for Pain.    OXYCODONE-ACETAMINOPHEN (PERCOCET) 5-325 MG PER TABLET    Take 1 tablet by mouth every 4 (four) hours as needed for Pain.    PRAVASTATIN (PRAVACHOL) 40 MG TABLET    Take 1 tablet (40 mg total) by mouth once daily.    SEMAGLUTIDE (OZEMPIC) 1 MG/DOSE (2 MG/1.5 ML) PNIJ    Inject 1 mg once weekly          CARE TEAM:  Patient Care Team:  Leonard Wells MD as PCP - General (Internal Medicine)  Leonard Wells MD [facility]  Nga Benjamin MA as Care Coordinator  Bebe Gee MD as Consulting Physician (Ophthalmology)         REVIEW OF SYSTEMS:  Review of Systems   Constitutional: Negative for fatigue and fever.   HENT: Negative for congestion and postnasal drip.    Eyes: Negative for photophobia and visual disturbance.   Respiratory: Negative for cough and shortness of breath.    Cardiovascular: Negative for chest pain  and palpitations.   Gastrointestinal: Negative for nausea and vomiting.   Neurological: Negative for light-headedness and headaches.   Psychiatric/Behavioral: Negative for dysphoric mood. The patient is not nervous/anxious.          PHYSICAL EXAM:   Vitals:    03/10/20 1529   BP: (!) 154/76   Pulse: 83   Resp: 17   Temp: 98.2 °F (36.8 °C)             Body mass index is 38.85 kg/m².     General appearance - alert, well appearing, and in no distress  Mental status - normal mood, behavior, speech, dress, motor activity, and thought processes  Eyes - sclera anicteric, left eye normal, right eye normal  Chest - clear to auscultation, no wheezes, rales or rhonchi, symmetric air entry  Heart - normal rate and regular rhythm  Neurological - alert, oriented, normal speech, no focal findings or movement disorder noted, motor and sensory grossly normal bilaterally  Extremities - peripheral pulses normal, no pedal edema, no clubbing or cyanosis      ASSESSMENT AND PLAN:  Essential hypertension  -     losartan-hydrochlorothiazide 100-25 mg (HYZAAR) 100-25 mg per tablet; Take 1 tablet by mouth once daily.  Dispense: 90 tablet; Refill: 3  -     hydroCHLOROthiazide (MICROZIDE) 12.5 mg capsule; Take once daily with losartan-hydrochlorothiazide 100-12.5 mg until this bottle is finished then start losartan-hydrochlorothiazide 100-25 mg daily  Dispense: 30 capsule; Refill: 0    Healthcare maintenance  -     (In Office Administered) Pneumococcal Conjugate Vaccine (13 Valent) (IM)    Polyneuropathy associated with underlying disease    Type 2 diabetes mellitus with diabetic polyneuropathy, with long-term current use of insulin        - Improving control on current regimen       Nurse visit for BP and follow up with me in 2 months or sooner as needed.

## 2020-03-10 NOTE — PROGRESS NOTES
Contacted Dr. Bebe Gee office regarding recent eye exam. Left voicemail for medical record office to return call to [phone]

## 2020-03-11 ENCOUNTER — PATIENT OUTREACH (OUTPATIENT)
Dept: ADMINISTRATIVE | Facility: HOSPITAL | Age: 75
End: 2020-03-11

## 2020-03-12 ENCOUNTER — PATIENT OUTREACH (OUTPATIENT)
Dept: ADMINISTRATIVE | Facility: HOSPITAL | Age: 75
End: 2020-03-12

## 2020-03-16 DIAGNOSIS — E11.319 DIABETIC RETINOPATHY OF BOTH EYES ASSOCIATED WITH TYPE 2 DIABETES MELLITUS, MACULAR EDEMA PRESENCE UNSPECIFIED, UNSPECIFIED RETINOPATHY SEVERITY: Primary | ICD-10-CM

## 2020-03-21 ENCOUNTER — LAB VISIT (OUTPATIENT)
Dept: LAB | Facility: HOSPITAL | Age: 75
End: 2020-03-21
Attending: INTERNAL MEDICINE
Payer: COMMERCIAL

## 2020-03-21 DIAGNOSIS — Z79.4 TYPE 2 DIABETES MELLITUS WITH DIABETIC POLYNEUROPATHY, WITH LONG-TERM CURRENT USE OF INSULIN: ICD-10-CM

## 2020-03-21 DIAGNOSIS — E78.5 HYPERLIPIDEMIA, UNSPECIFIED HYPERLIPIDEMIA TYPE: ICD-10-CM

## 2020-03-21 DIAGNOSIS — E11.42 TYPE 2 DIABETES MELLITUS WITH DIABETIC POLYNEUROPATHY, WITH LONG-TERM CURRENT USE OF INSULIN: ICD-10-CM

## 2020-03-21 LAB
ALBUMIN SERPL BCP-MCNC: 3.5 G/DL (ref 3.5–5.2)
ALP SERPL-CCNC: 75 U/L (ref 55–135)
ALT SERPL W/O P-5'-P-CCNC: 18 U/L (ref 10–44)
AST SERPL-CCNC: 15 U/L (ref 10–40)
BILIRUB DIRECT SERPL-MCNC: 0.1 MG/DL (ref 0.1–0.3)
BILIRUB SERPL-MCNC: 0.3 MG/DL (ref 0.1–1)
CHOLEST SERPL-MCNC: 184 MG/DL (ref 120–199)
CHOLEST/HDLC SERPL: 4.2 {RATIO} (ref 2–5)
ESTIMATED AVG GLUCOSE: 137 MG/DL (ref 68–131)
HBA1C MFR BLD HPLC: 6.4 % (ref 4–5.6)
HDLC SERPL-MCNC: 44 MG/DL (ref 40–75)
HDLC SERPL: 23.9 % (ref 20–50)
LDLC SERPL CALC-MCNC: 123.6 MG/DL (ref 63–159)
NONHDLC SERPL-MCNC: 140 MG/DL
PROT SERPL-MCNC: 7.2 G/DL (ref 6–8.4)
TRIGL SERPL-MCNC: 82 MG/DL (ref 30–150)

## 2020-03-21 PROCEDURE — 36415 COLL VENOUS BLD VENIPUNCTURE: CPT | Mod: PO

## 2020-03-21 PROCEDURE — 80061 LIPID PANEL: CPT

## 2020-03-21 PROCEDURE — 83036 HEMOGLOBIN GLYCOSYLATED A1C: CPT

## 2020-03-21 PROCEDURE — 80076 HEPATIC FUNCTION PANEL: CPT

## 2020-03-24 DIAGNOSIS — Z79.4 TYPE 2 DIABETES MELLITUS WITH DIABETIC POLYNEUROPATHY, WITH LONG-TERM CURRENT USE OF INSULIN: Primary | ICD-10-CM

## 2020-03-24 DIAGNOSIS — E11.42 TYPE 2 DIABETES MELLITUS WITH DIABETIC POLYNEUROPATHY, WITH LONG-TERM CURRENT USE OF INSULIN: Primary | ICD-10-CM

## 2020-03-24 RX ORDER — PRAVASTATIN SODIUM 80 MG/1
80 TABLET ORAL DAILY
Qty: 90 TABLET | Refills: 0 | Status: SHIPPED | OUTPATIENT
Start: 2020-03-24 | End: 2020-07-08 | Stop reason: SDUPTHER

## 2020-03-30 ENCOUNTER — TELEPHONE (OUTPATIENT)
Dept: ENDOCRINOLOGY | Facility: CLINIC | Age: 75
End: 2020-03-30

## 2020-03-30 DIAGNOSIS — Z79.4 TYPE 2 DIABETES MELLITUS WITH DIABETIC POLYNEUROPATHY, WITH LONG-TERM CURRENT USE OF INSULIN: Primary | ICD-10-CM

## 2020-03-30 DIAGNOSIS — E11.42 TYPE 2 DIABETES MELLITUS WITH DIABETIC POLYNEUROPATHY, WITH LONG-TERM CURRENT USE OF INSULIN: Primary | ICD-10-CM

## 2020-03-30 RX ORDER — INSULIN GLARGINE 300 [IU]/ML
INJECTION, SOLUTION SUBCUTANEOUS
Qty: 4 SYRINGE | Refills: 5 | Status: SHIPPED | OUTPATIENT
Start: 2020-03-30 | End: 2020-12-03 | Stop reason: SDUPTHER

## 2020-03-30 RX ORDER — INSULIN LISPRO 100 [IU]/ML
INJECTION, SOLUTION INTRAVENOUS; SUBCUTANEOUS
Qty: 2 BOX | Refills: 5 | Status: SHIPPED | OUTPATIENT
Start: 2020-03-30 | End: 2020-09-04

## 2020-03-30 NOTE — TELEPHONE ENCOUNTER
Discussed with patient on the phone regarding diabetes instead of an in-person visit d/t COVID19 risk. Reviewed importance of DM control to avoid severe COVID-19 infection and death.    a1c down from 7.4 to 6.4%. She has been walking every other day.     DIABETES MEDICATIONS: Invokana 300 mg once daily in the AM, Ozempic 1 mg once weekly, Toujeo Max 100 units once daily in 6 am, Humalog Kwikpen 20-26-26 units ac   - misses Humalog at lunch sometimes.     Tests BG 2x/day: FBGs 110s and before supper 90s.   Feels like her BG is low before lunch about once a week. Breakfast is 8 am. Lunch is at noon.     Plan:   Reduce Humalog from 20 to 16 units before breakfast. Reduce Toujeo from 100 to 90 units once daily.   Test blood sugar ideally ac/hs 4x/day.   rtc in 3 mo with labs prior. Labs at [facility] on Saturday mornings.

## 2020-04-15 ENCOUNTER — OFFICE VISIT (OUTPATIENT)
Dept: FAMILY MEDICINE | Facility: CLINIC | Age: 75
End: 2020-04-15
Payer: COMMERCIAL

## 2020-04-15 VITALS
HEART RATE: 95 BPM | TEMPERATURE: 98 F | SYSTOLIC BLOOD PRESSURE: 150 MMHG | HEIGHT: 66 IN | OXYGEN SATURATION: 97 % | RESPIRATION RATE: 16 BRPM | DIASTOLIC BLOOD PRESSURE: 70 MMHG | WEIGHT: 239.88 LBS | BODY MASS INDEX: 38.55 KG/M2

## 2020-04-15 DIAGNOSIS — R30.0 DYSURIA: Primary | ICD-10-CM

## 2020-04-15 DIAGNOSIS — I10 ESSENTIAL HYPERTENSION: ICD-10-CM

## 2020-04-15 DIAGNOSIS — E11.319 DIABETIC RETINOPATHY OF BOTH EYES ASSOCIATED WITH TYPE 2 DIABETES MELLITUS, MACULAR EDEMA PRESENCE UNSPECIFIED, UNSPECIFIED RETINOPATHY SEVERITY: ICD-10-CM

## 2020-04-15 LAB
BACTERIA #/AREA URNS HPF: ABNORMAL /HPF
BILIRUB UR QL STRIP: NEGATIVE
CLARITY UR: ABNORMAL
COLOR UR: YELLOW
GLUCOSE UR QL STRIP: ABNORMAL
HGB UR QL STRIP: ABNORMAL
KETONES UR QL STRIP: NEGATIVE
LEUKOCYTE ESTERASE UR QL STRIP: ABNORMAL
MICROSCOPIC COMMENT: ABNORMAL
NITRITE UR QL STRIP: NEGATIVE
PH UR STRIP: 6 [PH] (ref 5–8)
PROT UR QL STRIP: NEGATIVE
RBC #/AREA URNS HPF: 10 /HPF (ref 0–4)
SP GR UR STRIP: 1.02 (ref 1–1.03)
SQUAMOUS #/AREA URNS HPF: 2 /HPF
URN SPEC COLLECT METH UR: ABNORMAL
UROBILINOGEN UR STRIP-ACNC: NEGATIVE EU/DL
WBC #/AREA URNS HPF: >100 /HPF (ref 0–5)
WBC CLUMPS URNS QL MICRO: ABNORMAL
YEAST URNS QL MICRO: ABNORMAL

## 2020-04-15 PROCEDURE — 87086 URINE CULTURE/COLONY COUNT: CPT

## 2020-04-15 PROCEDURE — 87186 SC STD MICRODIL/AGAR DIL: CPT

## 2020-04-15 PROCEDURE — 87077 CULTURE AEROBIC IDENTIFY: CPT

## 2020-04-15 PROCEDURE — 81000 URINALYSIS NONAUTO W/SCOPE: CPT

## 2020-04-15 PROCEDURE — 99214 PR OFFICE/OUTPT VISIT, EST, LEVL IV, 30-39 MIN: ICD-10-PCS | Mod: 95,,, | Performed by: FAMILY MEDICINE

## 2020-04-15 PROCEDURE — 99214 OFFICE O/P EST MOD 30 MIN: CPT | Mod: 95,,, | Performed by: FAMILY MEDICINE

## 2020-04-15 PROCEDURE — 87088 URINE BACTERIA CULTURE: CPT

## 2020-04-15 RX ORDER — NITROFURANTOIN 25; 75 MG/1; MG/1
100 CAPSULE ORAL 2 TIMES DAILY
Qty: 14 CAPSULE | Refills: 0 | Status: SHIPPED | OUTPATIENT
Start: 2020-04-15 | End: 2020-04-22

## 2020-04-15 RX ORDER — NITROFURANTOIN 25; 75 MG/1; MG/1
100 CAPSULE ORAL 2 TIMES DAILY
Qty: 10 CAPSULE | Refills: 0 | Status: SHIPPED | OUTPATIENT
Start: 2020-04-15 | End: 2020-04-15 | Stop reason: SDUPTHER

## 2020-04-15 NOTE — PROGRESS NOTES
"Subjective:       Patient ID: Mena Odonnell is a 74 y.o. female.    Chief Complaint: Dysuria    Dysuria    This is a new problem. The current episode started in the past 7 days. The problem occurs every urination. The problem has been unchanged. The quality of the pain is described as burning. The pain is moderate. There has been no fever. She is not sexually active. There is no history of pyelonephritis. Associated symptoms include urgency. Pertinent negatives include no behavior changes, chills, hematuria, sweats, vomiting, weight loss, rash or withholding. She has tried nothing for the symptoms. Her past medical history is significant for diabetes mellitus and hypertension.   The patient reports the the first symptoms were dark foul smelling urine.    Patient states she is taking her diabetic and blood pressure medications as prescribed. Reports no changes in medications. States her sugars have been unchanged.     Review of Systems   Constitutional: Negative for chills, fever, unexpected weight change and weight loss.   Respiratory: Negative for shortness of breath.    Cardiovascular: Negative for chest pain.   Gastrointestinal: Negative for abdominal pain, blood in stool and vomiting.   Genitourinary: Positive for dysuria and urgency. Negative for hematuria.   Musculoskeletal: Positive for back pain (low back).   Skin: Negative for rash.       Objective:     BP (!) 150/70 (BP Location: Right arm, Patient Position: Sitting, BP Method: Large (Manual))   Pulse 95   Temp 97.8 °F (36.6 °C) (Tympanic)   Resp 16   Ht 5' 6" (1.676 m)   Wt 108.8 kg (239 lb 13.8 oz)   SpO2 97%   BMI 38.71 kg/m²     Physical Exam   Constitutional: She appears well-developed. No distress.   HENT:   Head: Normocephalic and atraumatic.   Cardiovascular: Normal rate.   Abdominal: Soft. Bowel sounds are normal.       Assessment:       1. Dysuria    2. Essential hypertension    3. Diabetic retinopathy of both eyes associated with type " 2 diabetes mellitus, macular edema presence unspecified, unspecified retinopathy severity        Plan:       Mena was seen today for dysuria.    Diagnoses and all orders for this visit:    Dysuria  -     Urinalysis; Future  -     Urine culture; Future  -     nitrofurantoin, macrocrystal-monohydrate, (MACROBID) 100 MG capsule; Take 1 capsule (100 mg total) by mouth 2 (two) times daily. for 7 days  Will send for culture, and empirically start on Macrobid. Patient reports no antibiotic allergies.    Essential hypertension  BP not controlled. Two doses of amlodipine in the chart- 5mg and 10 mg. Patient states she only has 5 and was not aware anyone prescribed the 10 mg.  Advised that once she is feeling better from urine infection, should make appointment with PCP, and if BP high, may need adjustment.    Diabetic retinopathy of both eyes associated with type 2 diabetes mellitus, macular edema presence unspecified, unspecified retinopathy severity  Continue current regimen

## 2020-04-17 ENCOUNTER — TELEPHONE (OUTPATIENT)
Dept: FAMILY MEDICINE | Facility: CLINIC | Age: 75
End: 2020-04-17

## 2020-04-17 LAB — BACTERIA UR CULT: ABNORMAL

## 2020-04-17 NOTE — TELEPHONE ENCOUNTER
----- Message from Rick La Jr., MD sent at 4/17/2020 10:54 AM CDT -----  Please let patient know that her urine grew a bacteria that was sensitive to the Macrobid antibiotic I prescribed.

## 2020-04-17 NOTE — PROGRESS NOTES
Please let patient know that her urine grew a bacteria that was sensitive to the Macrobid antibiotic I prescribed.

## 2020-04-20 ENCOUNTER — TELEPHONE (OUTPATIENT)
Dept: ENDOCRINOLOGY | Facility: CLINIC | Age: 75
End: 2020-04-20

## 2020-04-20 NOTE — LETTER
April 20, 2020    Mena Odonnell  [address]  [place] [address]             [facility]  [address], [address]  [place] [address]  Phone: [phone]  Fax: [phone] Dear Ms. Mena Odonnell:    Below are the results from your recent visit:    Resulted Orders   Hemoglobin A1c   Result Value Ref Range    Hemoglobin A1C 6.4 (H) 4.0 - 5.6 %      Comment:      ADA Screening Guidelines:  5.7-6.4%  Consistent with prediabetes  >or=6.5%  Consistent with diabetes  High levels of fetal hemoglobin interfere with the HbA1C  assay. Heterozygous hemoglobin variants (HbS, HgC, etc)do  not significantly interfere with this assay.   However, presence of multiple variants may affect accuracy.      Estimated Avg Glucose 137 (H) 68 - 131 mg/dL   Lipid panel   Result Value Ref Range    Cholesterol 184 120 - 199 mg/dL      Comment:      The National Cholesterol Education Program (NCEP) has set the  following guidelines (reference ranges) for Cholesterol:  Optimal.....................<200 mg/dL  Borderline High.............200-239 mg/dL  High........................> or = 240 mg/dL      Triglycerides 82 30 - 150 mg/dL      Comment:      The National Cholesterol Education Program (NCEP) has set the  following guidelines (reference values) for triglycerides:  Normal......................<150 mg/dL  Borderline High.............150-199 mg/dL  High........................200-499 mg/dL      HDL 44 40 - 75 mg/dL      Comment:      The National Cholesterol Education Program (NCEP) has set the  following guidelines (reference values) for HDL Cholesterol:  Low...............<40 mg/dL  Optimal...........>60 mg/dL      LDL Cholesterol 123.6 63.0 - 159.0 mg/dL      Comment:      The National Cholesterol Education Program (NCEP) has set the  following guidelines (reference values) for LDL Cholesterol:  Optimal.......................<130 mg/dL  Borderline High...............130-159  mg/dL  High..........................160-189 mg/dL  Very High.....................>190 mg/dL      Hdl/Cholesterol Ratio 23.9 20.0 - 50.0 %    Total Cholesterol/HDL Ratio 4.2 2.0 - 5.0    Non-HDL Cholesterol 140 mg/dL      Comment:      Risk category and Non-HDL cholesterol goals:  Coronary heart disease (CHD)or equivalent (10-year risk of CHD >20%):  Non-HDL cholesterol goal     <130 mg/dL  Two or more CHD risk factors and 10-year risk of CHD <= 20%:  Non-HDL cholesterol goal     <160 mg/dL  0 to 1 CHD risk factor:  Non-HDL cholesterol goal     <190 mg/dL     Hepatic function panel   Result Value Ref Range    Total Protein 7.2 6.0 - 8.4 g/dL    Albumin 3.5 3.5 - 5.2 g/dL    Total Bilirubin 0.3 0.1 - 1.0 mg/dL      Comment:      For infants and newborns, interpretation of results should be based  on gestational age, weight and in agreement with clinical  observations.  Premature Infant recommended reference ranges:  Up to 24 hours.............<8.0 mg/dL  Up to 48 hours............<12.0 mg/dL  3-5 days..................<15.0 mg/dL  6-29 days.................<15.0 mg/dL      Bilirubin, Direct 0.1 0.1 - 0.3 mg/dL    AST 15 10 - 40 U/L    ALT 18 10 - 44 U/L    Alkaline Phosphatase 75 55 - 135 U/L     Your results are within an acceptable range.  Please don't hesitate to call our office if you have any questions or concerns.      Sincerely,        Candy Villanueva NP

## 2020-04-20 NOTE — TELEPHONE ENCOUNTER
Called pt and reviewed results , has spoken with NP Kay already . Wants results sent through mail and apt reminder . Both mailed

## 2020-04-20 NOTE — TELEPHONE ENCOUNTER
----- Message from Candy Villanueva NP sent at 3/24/2020  1:35 PM CDT -----  Your A1c is down from 7.4 to 6.4% which indicates your glucoses are overall lower than before. How often do you feel like your glucoses are too low or maybe break out out in sweats and feel weak? How often do you see glucoses lower than 80?     Cholesterol is a bit better than 3 months ago. Please increase pravastatin to 80 mg once daily. Liver enzymes are normal.

## 2020-05-11 ENCOUNTER — TELEPHONE (OUTPATIENT)
Dept: FAMILY MEDICINE | Facility: CLINIC | Age: 75
End: 2020-05-11

## 2020-05-11 ENCOUNTER — OFFICE VISIT (OUTPATIENT)
Dept: FAMILY MEDICINE | Facility: CLINIC | Age: 75
End: 2020-05-11
Payer: COMMERCIAL

## 2020-05-11 DIAGNOSIS — Z79.4 TYPE 2 DIABETES MELLITUS WITH DIABETIC POLYNEUROPATHY, WITH LONG-TERM CURRENT USE OF INSULIN: Primary | ICD-10-CM

## 2020-05-11 DIAGNOSIS — E11.42 TYPE 2 DIABETES MELLITUS WITH DIABETIC POLYNEUROPATHY, WITH LONG-TERM CURRENT USE OF INSULIN: Primary | ICD-10-CM

## 2020-05-11 DIAGNOSIS — I10 ESSENTIAL HYPERTENSION: ICD-10-CM

## 2020-05-11 PROCEDURE — 99441 PR PHYSICIAN TELEPHONE EVALUATION 5-10 MIN: ICD-10-PCS | Mod: 95,,, | Performed by: INTERNAL MEDICINE

## 2020-05-11 PROCEDURE — 99441 PR PHYSICIAN TELEPHONE EVALUATION 5-10 MIN: CPT | Mod: 95,,, | Performed by: INTERNAL MEDICINE

## 2020-05-11 NOTE — PROGRESS NOTES
Established Patient - Audio Only Telehealth Visit     The patient location is: [place]  The chief complaint leading to consultation is: follow up of diabetes and hypertension  Visit type: Virtual visit with audio only (telephone)  Total time spent with patient: 10 minutes       The reason for the audio only service rather than synchronous audio and video virtual visit was related to technical difficulties or patient preference/necessity.     Each patient to whom I provide medical services by telemedicine is:  (1) informed of the relationship between the physician and patient and the respective role of any other health care provider with respect to management of the patient; and (2) notified that they may decline to receive medical services by telemedicine and may withdraw from such care at any time. Patient verbally consented to receive this service via voice-only telephone call.       HPI: overall no complaints today.  She feels well and has been staying home with daughter and teenaged grandson.  Reports compliance with diabetes and hypertension meds.  Checks glc tid before meals with range 109-140 that she reports today.  Reports BP 130s/60-70s in last 3 days.  Denies hypoglycemia sx.     Assessment and plan:   Stable on current long term meds.  No further changes at this time.     This service was not originating from a related E/M service provided within the previous 7 days nor will  to an E/M service or procedure within the next 24 hours or my soonest available appointment.  Prevailing standard of care was able to be met in this audio-only visit.

## 2020-05-11 NOTE — TELEPHONE ENCOUNTER
----- Message from Leonard Wells MD sent at 5/11/2020  3:14 PM CDT -----  Schedule follow up in early August.

## 2020-05-29 ENCOUNTER — PATIENT MESSAGE (OUTPATIENT)
Dept: ADMINISTRATIVE | Facility: HOSPITAL | Age: 75
End: 2020-05-29

## 2020-06-15 PROBLEM — Z00.00 HEALTHCARE MAINTENANCE: Status: RESOLVED | Noted: 2020-03-10 | Resolved: 2020-06-15

## 2020-06-23 LAB
LEFT EYE DM RETINOPATHY: POSITIVE
RIGHT EYE DM RETINOPATHY: POSITIVE

## 2020-06-24 ENCOUNTER — LAB VISIT (OUTPATIENT)
Dept: LAB | Facility: HOSPITAL | Age: 75
End: 2020-06-24
Payer: COMMERCIAL

## 2020-06-24 DIAGNOSIS — Z79.4 TYPE 2 DIABETES MELLITUS WITH DIABETIC POLYNEUROPATHY, WITH LONG-TERM CURRENT USE OF INSULIN: ICD-10-CM

## 2020-06-24 DIAGNOSIS — E11.42 TYPE 2 DIABETES MELLITUS WITH DIABETIC POLYNEUROPATHY, WITH LONG-TERM CURRENT USE OF INSULIN: ICD-10-CM

## 2020-06-24 LAB
ALBUMIN SERPL BCP-MCNC: 4 G/DL (ref 3.5–5.2)
ALP SERPL-CCNC: 64 U/L (ref 55–135)
ALT SERPL W/O P-5'-P-CCNC: 19 U/L (ref 10–44)
AST SERPL-CCNC: 15 U/L (ref 10–40)
BILIRUB DIRECT SERPL-MCNC: 0.1 MG/DL (ref 0.1–0.3)
BILIRUB SERPL-MCNC: 0.3 MG/DL (ref 0.1–1)
CHOLEST SERPL-MCNC: 203 MG/DL (ref 120–199)
CHOLEST/HDLC SERPL: 4.8 {RATIO} (ref 2–5)
HDLC SERPL-MCNC: 42 MG/DL (ref 40–75)
HDLC SERPL: 20.7 % (ref 20–50)
LDLC SERPL CALC-MCNC: 133.8 MG/DL (ref 63–159)
NONHDLC SERPL-MCNC: 161 MG/DL
PROT SERPL-MCNC: 7.6 G/DL (ref 6–8.4)
TRIGL SERPL-MCNC: 136 MG/DL (ref 30–150)

## 2020-06-24 PROCEDURE — 83036 HEMOGLOBIN GLYCOSYLATED A1C: CPT

## 2020-06-24 PROCEDURE — 80076 HEPATIC FUNCTION PANEL: CPT

## 2020-06-24 PROCEDURE — 36415 COLL VENOUS BLD VENIPUNCTURE: CPT | Mod: PN

## 2020-06-24 PROCEDURE — 80061 LIPID PANEL: CPT

## 2020-06-25 LAB
ESTIMATED AVG GLUCOSE: 143 MG/DL (ref 68–131)
HBA1C MFR BLD HPLC: 6.6 % (ref 4–5.6)

## 2020-07-08 ENCOUNTER — OFFICE VISIT (OUTPATIENT)
Dept: ENDOCRINOLOGY | Facility: CLINIC | Age: 75
End: 2020-07-08
Payer: COMMERCIAL

## 2020-07-08 VITALS
DIASTOLIC BLOOD PRESSURE: 75 MMHG | SYSTOLIC BLOOD PRESSURE: 177 MMHG | TEMPERATURE: 97 F | BODY MASS INDEX: 38.69 KG/M2 | WEIGHT: 239.69 LBS | HEART RATE: 94 BPM

## 2020-07-08 DIAGNOSIS — E11.42 TYPE 2 DIABETES MELLITUS WITH DIABETIC POLYNEUROPATHY, WITH LONG-TERM CURRENT USE OF INSULIN: Primary | ICD-10-CM

## 2020-07-08 DIAGNOSIS — E66.01 SEVERE OBESITY (BMI 35.0-39.9) WITH COMORBIDITY: ICD-10-CM

## 2020-07-08 DIAGNOSIS — I10 ESSENTIAL HYPERTENSION: ICD-10-CM

## 2020-07-08 DIAGNOSIS — Z79.4 TYPE 2 DIABETES MELLITUS WITH DIABETIC POLYNEUROPATHY, WITH LONG-TERM CURRENT USE OF INSULIN: Primary | ICD-10-CM

## 2020-07-08 PROCEDURE — 99214 PR OFFICE/OUTPT VISIT, EST, LEVL IV, 30-39 MIN: ICD-10-PCS | Mod: S$GLB,,, | Performed by: NURSE PRACTITIONER

## 2020-07-08 PROCEDURE — 99999 PR PBB SHADOW E&M-EST. PATIENT-LVL V: CPT | Mod: PBBFAC,,, | Performed by: NURSE PRACTITIONER

## 2020-07-08 PROCEDURE — 99999 PR PBB SHADOW E&M-EST. PATIENT-LVL V: ICD-10-PCS | Mod: PBBFAC,,, | Performed by: NURSE PRACTITIONER

## 2020-07-08 PROCEDURE — 99214 OFFICE O/P EST MOD 30 MIN: CPT | Mod: S$GLB,,, | Performed by: NURSE PRACTITIONER

## 2020-07-08 RX ORDER — PRAVASTATIN SODIUM 80 MG/1
80 TABLET ORAL DAILY
Qty: 90 TABLET | Refills: 3 | Status: SHIPPED | OUTPATIENT
Start: 2020-07-08 | End: 2020-07-08

## 2020-07-08 RX ORDER — AMLODIPINE BESYLATE 10 MG/1
10 TABLET ORAL DAILY
Qty: 90 TABLET | Refills: 0 | Status: SHIPPED | OUTPATIENT
Start: 2020-07-08 | End: 2020-08-03 | Stop reason: SDUPTHER

## 2020-07-08 RX ORDER — EMPAGLIFLOZIN 10 MG/1
10 TABLET, FILM COATED ORAL DAILY
Qty: 90 TABLET | Refills: 2 | Status: SHIPPED | OUTPATIENT
Start: 2020-07-08 | End: 2021-02-09 | Stop reason: SDUPTHER

## 2020-07-08 RX ORDER — EMPAGLIFLOZIN 10 MG/1
10 TABLET, FILM COATED ORAL DAILY
Qty: 90 TABLET | Refills: 2 | Status: SHIPPED | OUTPATIENT
Start: 2020-07-08 | End: 2020-07-08

## 2020-07-08 RX ORDER — SEMAGLUTIDE 1.34 MG/ML
INJECTION, SOLUTION SUBCUTANEOUS
Qty: 2 SYRINGE | Refills: 6 | Status: SHIPPED | OUTPATIENT
Start: 2020-07-08 | End: 2021-03-23 | Stop reason: SDUPTHER

## 2020-07-08 RX ORDER — AMLODIPINE BESYLATE 10 MG/1
10 TABLET ORAL DAILY
Qty: 90 TABLET | Refills: 0 | Status: SHIPPED | OUTPATIENT
Start: 2020-07-08 | End: 2020-07-08

## 2020-07-08 RX ORDER — PRAVASTATIN SODIUM 80 MG/1
80 TABLET ORAL DAILY
Qty: 90 TABLET | Refills: 3 | Status: SHIPPED | OUTPATIENT
Start: 2020-07-08 | End: 2021-02-09 | Stop reason: SDUPTHER

## 2020-07-08 NOTE — PROGRESS NOTES
CC: This 74 y.o. Black or  female  is here for evaluation of  T2DM along with comorbidities indicated in the Visit Diagnosis section of this encounter.    HPI: Mena Odonnell was diagnosed with T2DM in ~ . Experienced blurry vision at the time of diagnosis r/t BG >400. Started on orals. Began insulin in . She is currently on MDI.        Prior visit 2020  A1c is up from 6.5 to 7.4%.   She has restarted Invokana and denies  symptoms. No dysuria. Invokana dose was increased to 300 mg once daily.   She has increased Ozempic from 0.5 to 1 mg weekly. Denies GI s/e - no diarrhea, constipation, nausea.   She has continued to take Toujeo at 100 units daily.   She has lost 10 lb since last visit. She did find her appetite was lower after increasing Ozempic dose.   Plan No changes to medications at this time as patient has not been testing glucoses often enough to determine safe insulin titration.   Test glucose 4x/day before meals and bedtime. Patient declines Giulia. You can send a 2 week glucose log for further instruction. Bring logs to every visit.   Return to clinic in 3 months with labs prior.      Interval history  Advised pt to reduce insulin doses in March during phone conversation.   A1c is mildly up from A1c of 6.4% in March to now 6.6%.   Invokana no longer covered by [employer]. She will need to switch to Farxiga or Jardiance.   /62 this morning at home. Brings a BP lo-147/60s.     She never decreased Humalog dose before breakfast but did decrease Toujeo from 100 to 90.   She often injects Humalog during lunch or right after lunch.     LAST DIABETES EDUCATION: 2019 mira Palmer - Found visit helpful     HOSPITALIZED FOR DIABETES -  No.    DIABETES MEDICATIONS: Invokana 300 mg once daily in the AM, Ozempic 1 mg once weekly, Toujeo Max 90 units once daily in 6 am, Humalog Kwikpen 20-26-26 units ac     Misses medication doses - no     DM COMPLICATIONS: peripheral  neuropathy    SIGNIFICANT DIABETES MED HISTORY:   Metformin stopped December 2016 r/t GI upset and diarrhea     SELF MONITORING BLOOD GLUCOSE: Checks blood glucose at home 1x/day. Cannot afford the Giulia.   FBGs 120s-140     HYPOGLYCEMIC EPISODES: infrequent; no symptoms overnight      CURRENT DIET: sometimes coke or ginger ale a few times a week; drinking mostly water.   Breakfast ~ 8 am; lunch ~ 12 pm. Eats 3 meals/day.      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 No acute distress.   PSYCH: AAOx3, appropriate mood and affect, conversant, well-groomed. Judgement and insight good.   NEURO: Cranial nerves grossly intact. Speech clear, no tremor.   CHEST: Respirations even and unlabored.         Hemoglobin A1C   Date Value Ref Range Status   06/24/2020 6.6 (H) 4.0 - 5.6 % Final     Comment:     ADA Screening Guidelines:  5.7-6.4%  Consistent with prediabetes  >or=6.5%  Consistent with diabetes  High levels of fetal hemoglobin interfere with the HbA1C  assay. Heterozygous hemoglobin variants (HbS, HgC, etc)do  not significantly interfere with this assay.   However, presence of multiple variants may affect accuracy.     03/21/2020 6.4 (H) 4.0 - 5.6 % Final     Comment:     ADA Screening Guidelines:  5.7-6.4%  Consistent with prediabetes  >or=6.5%  Consistent with diabetes  High levels of fetal hemoglobin interfere with the HbA1C  assay. Heterozygous hemoglobin variants (HbS, HgC, etc)do  not significantly interfere with this assay.   However, presence of multiple variants may affect accuracy.      12/07/2019 7.4 (H) 4.0 - 5.6 % Final     Comment:     ADA Screening Guidelines:  5.7-6.4%  Consistent with prediabetes  >or=6.5%  Consistent with diabetes  High levels of fetal hemoglobin interfere with the HbA1C  assay. Heterozygous hemoglobin variants (HbS, HgC, etc)do  not significantly interfere with this assay.   However, presence of multiple variants may affect accuracy.     12/07/2019 7.4 (H) 4.0 - 5.6 % Final     Comment:     ADA Screening Guidelines:  5.7-6.4%  Consistent with prediabetes  >or=6.5%  Consistent with diabetes  High levels of fetal hemoglobin interfere with the HbA1C  assay. Heterozygous hemoglobin variants (HbS, HgC, etc)do  not significantly interfere with this assay.   However, presence of multiple variants may affect accuracy.             Chemistry        Component Value Date/Time     12/07/2019 0831    K 4.2 12/07/2019 0831     12/07/2019 0831    CO2 26 12/07/2019 0831    BUN 14 12/07/2019 0831    CREATININE 0.7 12/07/2019 0831     (H) 12/07/2019 0831        Component Value Date/Time    CALCIUM 9.8 12/07/2019 0831    ALKPHOS 64 06/24/2020 0715    AST 15 06/24/2020 0715    ALT 19 06/24/2020 0715    BILITOT 0.3 06/24/2020 0715    ESTGFRAFRICA >60.0 12/07/2019 0831    EGFRNONAA >60.0 12/07/2019 0831          Lab Results   Component Value Date    LDLCALC 133.8 06/24/2020        Ref. Range 3/21/2020 08:27 6/24/2020 07:15   Cholesterol Latest Ref Range: 120 - 199 mg/dL 184 203 (H)   HDL Latest Ref Range: 40 - 75 mg/dL 44 42   Hdl/Cholesterol Ratio Latest Ref Range: 20.0 - 50.0 % 23.9 20.7   LDL Cholesterol External Latest Ref Range: 63.0 - 159.0 mg/dL 123.6 133.8   Non-HDL Cholesterol Latest Units: mg/dL 140 161   Total Cholesterol/HDL Ratio Latest Ref Range: 2.0 - 5.0  4.2 4.8   Triglycerides Latest Ref Range: 30 - 150 mg/dL 82 136           Lab Results   Component Value Date    MICALBCREAT 56.4 (H) 06/24/2020               ASSESSMENT and PLAN:    A1C GOAL: < 7%     1.  Type 2 diabetes mellitus with diabetic polyneuropathy, with long-term current use of insulin  Switch from Invokana to Jardiance because of formulary change.   Unable to adjust insulin doses because complete glucose logs are not available.   Reviewed importance of regularly monitoring. Low A1c raises suspicion of hypoglycemia. Unsure if patient needs such high doses of insulin.   Advised pt to monitor glucoses before each meal and bedtime. Keep a log. Send a log in 2 weeks.   Undergo  Continuous Glucose Monitoring (CGM)   Return to clinic in 4-6 weeks for CGM study.       GLUCOSE MONITORING CONTINUOUS MIN 72 HOURS   2. Essential hypertension  amLODIPine (NORVASC) 10 MG tablet   3. Severe obesity (BMI 35.0-39.9) with comorbidity  Increases insulin resistance.                 Orders Placed This Encounter   Procedures    GLUCOSE MONITORING CONTINUOUS MIN 72 HOURS     Standing Status:   Future     Standing Expiration Date:   7/9/2021        Follow up in about 4 weeks (around 8/5/2020).

## 2020-07-08 NOTE — PATIENT INSTRUCTIONS
Switch from Invokana to Jardiance because of formulary change.   Unable to adjust insulin doses because complete glucose logs are not available.   Reviewed importance of regularly monitoring. Low A1c raises suspicion of hypoglycemia. Unsure if patient needs such high doses of insulin.   Advised pt to monitor glucoses before each meal and bedtime. Keep a log. Send a log in 2 weeks.   Undergo  Continuous Glucose Monitoring (CGM)   Return to clinic in 4-6 weeks for CGM study.

## 2020-07-17 DIAGNOSIS — Z71.89 COMPLEX CARE COORDINATION: ICD-10-CM

## 2020-08-03 ENCOUNTER — OFFICE VISIT (OUTPATIENT)
Dept: FAMILY MEDICINE | Facility: CLINIC | Age: 75
End: 2020-08-03
Payer: COMMERCIAL

## 2020-08-03 VITALS
HEART RATE: 100 BPM | OXYGEN SATURATION: 95 % | SYSTOLIC BLOOD PRESSURE: 134 MMHG | DIASTOLIC BLOOD PRESSURE: 76 MMHG | TEMPERATURE: 98 F

## 2020-08-03 DIAGNOSIS — I35.0 MILD AORTIC STENOSIS: ICD-10-CM

## 2020-08-03 DIAGNOSIS — I10 ESSENTIAL HYPERTENSION: ICD-10-CM

## 2020-08-03 DIAGNOSIS — M17.0 PRIMARY OSTEOARTHRITIS OF BOTH KNEES: Primary | ICD-10-CM

## 2020-08-03 PROCEDURE — 99999 PR PBB SHADOW E&M-EST. PATIENT-LVL IV: ICD-10-PCS | Mod: PBBFAC,,, | Performed by: INTERNAL MEDICINE

## 2020-08-03 PROCEDURE — 99999 PR PBB SHADOW E&M-EST. PATIENT-LVL IV: CPT | Mod: PBBFAC,,, | Performed by: INTERNAL MEDICINE

## 2020-08-03 PROCEDURE — 99214 OFFICE O/P EST MOD 30 MIN: CPT | Mod: S$GLB,,, | Performed by: INTERNAL MEDICINE

## 2020-08-03 PROCEDURE — 99214 PR OFFICE/OUTPT VISIT, EST, LEVL IV, 30-39 MIN: ICD-10-PCS | Mod: S$GLB,,, | Performed by: INTERNAL MEDICINE

## 2020-08-03 RX ORDER — DICLOFENAC SODIUM 10 MG/G
GEL TOPICAL
Qty: 100 G | Refills: 5 | Status: SHIPPED | OUTPATIENT
Start: 2020-08-03 | End: 2021-05-05 | Stop reason: SDUPTHER

## 2020-08-03 RX ORDER — AMLODIPINE BESYLATE 10 MG/1
10 TABLET ORAL DAILY
Qty: 90 TABLET | Refills: 1 | Status: SHIPPED | OUTPATIENT
Start: 2020-08-03 | End: 2020-09-02 | Stop reason: SDUPTHER

## 2020-08-03 NOTE — PROGRESS NOTES
HISTORY OF PRESENT ILLNESS:  Mena Odonnell is a 74 y.o. female who presents to the clinic today for follow up.    She is on Ozempic and Jardiance along with Toujeo 90 units daily and Humalog with meals (16 with breakfast and 26 with dinner) for type 2 DM.    Last A1C 6.6% from 6.4 and 7.4 prior.    Notes occasional fatigue if she over-exerts herself, no CP or dypnea.  Not exercising but tries to watch what she eats most of the time.    Is most bothered by osteoarthritic knee pain, left greater than right.    PAST MEDICAL HISTORY:  Past Medical History:   Diagnosis Date    Diabetes mellitus type II     Hyperlipidemia     Hypertension     Obesity     Osteoarthritis     Peripheral neuropathy     Severe obesity (BMI 35.0-39.9) with comorbidity 12/30/2016    Tendonitis     left foot       PAST SURGICAL HISTORY:  Past Surgical History:   Procedure Laterality Date    BLADDER SUSPENSION      CATARACT EXTRACTION      COLONOSCOPY N/A 9/26/2015    Procedure: COLONOSCOPY;  Surgeon: Javed Wood MD;  Location: [address]);  Service: Endoscopy;  Laterality: N/A;    HYSTERECTOMY  1978    fibroids    NECK MASS EXCISION  11/6/2019    Procedure: EXCISION, MASS, NECK;  Surgeon: Edwin Barrow MD;  Location: [facility];  Service: General;;  RN PREOP 10/30/2019    TONSILLECTOMY         SOCIAL HISTORY:  Social History     Socioeconomic History    Marital status:      Spouse name: Not on file    Number of children: 2    Years of education: Not on file    Highest education level: Not on file   Occupational History     Employer: [employer]   Social Needs    Financial resource strain: Not on file    Food insecurity     Worry: Not on file     Inability: Not on file    Transportation needs     Medical: Not on file     Non-medical: Not on file   Tobacco Use    Smoking status: Former Smoker     Packs/day: 0.25     Years: 15.00     Pack years: 3.75     Quit date: 12/20/1982     Years since  quittin.6    Smokeless tobacco: Never Used   Substance and Sexual Activity    Alcohol use: No    Drug use: No    Sexual activity: Not Currently     Partners: Male     Birth control/protection: Abstinence   Lifestyle    Physical activity     Days per week: Not on file     Minutes per session: Not on file    Stress: Not on file   Relationships    Social connections     Talks on phone: Not on file     Gets together: Not on file     Attends Gnosticism service: Not on file     Active member of club or organization: Not on file     Attends meetings of clubs or organizations: Not on file     Relationship status: Not on file   Other Topics Concern    Not on file   Social History Narrative    . One daughter. Works as an Apartment        FAMILY HISTORY:  Family History   Problem Relation Age of Onset    Thyroid disease Mother     Cataracts Mother     Diabetes Mother     Stroke Mother     Hypertension Mother     Thyroid disease Sister     Diabetes Sister     Hypertension Sister     Hypertension Daughter     Diabetes Son     Diabetes Sister     Hypertension Sister     Hypertension Brother     Hypertension Sister     Hypertension Sister     Hypertension Brother     Thyroid disease Maternal Aunt     Thyroid disease Maternal Aunt     Colon cancer Maternal Grandfather     Breast cancer Neg Hx     Ovarian cancer Neg Hx     Amblyopia Neg Hx     Blindness Neg Hx     Cancer Neg Hx     Glaucoma Neg Hx     Macular degeneration Neg Hx     Retinal detachment Neg Hx     Strabismus Neg Hx        ALLERGIES AND MEDICATIONS: updated and reviewed.  Review of patient's allergies indicates:   Allergen Reactions    Atorvastatin      Muscle pain     Crestor [rosuvastatin] Other (See Comments)     Leg Weakness.      Medication List with Changes/Refills   Current Medications    AMLODIPINE (NORVASC) 10 MG TABLET    Take 1 tablet (10 mg total) by mouth once daily.    ASPIRIN 81 MG CHEW     Take 1 tablet (81 mg total) by mouth once daily.    CETIRIZINE (ZYRTEC) 10 MG TABLET    TAKE 1 TABLET BY MOUTH ONCE DAILY FOR 30 DAYS    CONTOUR NEXT TEST STRIPS STRP    USE 1 STRIP TO CHECK GLUCOSE 4 TIMES DAILY    EMPAGLIFLOZIN (JARDIANCE) 10 MG TABLET    Take 1 tablet (10 mg total) by mouth once daily.    FLASH GLUCOSE SENSOR (FREESTYLE ROSAURA 14 DAY SENSOR) KIT    1 sensor kit every 14 days (requires 2 kits per month)    FLUOCINOLONE ACETONIDE OIL 0.01 % DROP    Place 4 drops in ear(s) 2 (two) times daily.    GABAPENTIN (NEURONTIN) 100 MG CAPSULE    Take 2 capsules (200 mg total) by mouth 2 (two) times daily.    INSULIN GLARGINE U-300 CONC (TOUJEO MAX U-300 SOLOSTAR) 300 UNIT/ML (3 ML) INPN    Inject 90 units daily.    INSULIN LISPRO (HUMALOG KWIKPEN INSULIN) 100 UNIT/ML PEN    Take 16 units before breakfast and 26 units before lunch, 26 units before dinner    LOSARTAN-HYDROCHLOROTHIAZIDE 100-25 MG (HYZAAR) 100-25 MG PER TABLET    Take 1 tablet by mouth once daily.    METOPROLOL SUCCINATE (TOPROL-XL) 25 MG 24 HR TABLET    TAKE 1 TABLET BY MOUTH ONCE DAILY FOR 30 DAYS    MONTELUKAST (SINGULAIR) 10 MG TABLET        OXYCODONE-ACETAMINOPHEN (PERCOCET) 5-325 MG PER TABLET    Take 1 tablet by mouth every 4 (four) hours as needed for Pain.    OXYCODONE-ACETAMINOPHEN (PERCOCET) 5-325 MG PER TABLET    Take 1 tablet by mouth every 4 (four) hours as needed for Pain.    PRAVASTATIN (PRAVACHOL) 80 MG TABLET    Take 1 tablet (80 mg total) by mouth once daily.    SEMAGLUTIDE (OZEMPIC) 1 MG/DOSE (2 MG/1.5 ML) PNIJ    Inject 1 mg once weekly          CARE TEAM:  Patient Care Team:  Leonard Wells MD as PCP - General (Internal Medicine)  Nga Benjamin MA as Care Coordinator  Bebe Gee MD as Consulting Physician (Ophthalmology)  Praneeth Rios MD as Consulting Physician (Ophthalmology)         REVIEW OF SYSTEMS:  Review of Systems   Constitutional: Negative for diaphoresis and fever.   HENT: Negative for congestion and  postnasal drip.    Eyes: Negative for photophobia and visual disturbance.   Respiratory: Negative for cough and shortness of breath.    Cardiovascular: Negative for chest pain and palpitations.   Gastrointestinal: Negative for abdominal pain, nausea and vomiting.   Genitourinary: Negative for dysuria and frequency.   Musculoskeletal: Negative for back pain and neck pain.   Neurological: Negative for light-headedness and headaches.   Psychiatric/Behavioral: Negative for sleep disturbance.         PHYSICAL EXAM:   Vitals:    08/03/20 1539   BP: 134/76   Pulse: 100   Temp: 98.4 °F (36.9 °C)             There is no height or weight on file to calculate BMI.     General appearance - alert, well appearing, and in no distress and oriented to person, place, and time  Mental status - normal mood, behavior, speech, dress, motor activity, and thought processes  Eyes - sclera anicteric, left eye normal, right eye normal  Chest - clear to auscultation, no wheezes, rales or rhonchi, symmetric air entry, no tachypnea, retractions or cyanosis  Heart - normal rate and regular rhythm, systolic murmur  Abdomen - soft, nontender, nondistended, no masses or organomegaly  Neurological - alert, oriented, normal speech, no focal findings or movement disorder noted, motor and sensory grossly normal bilaterally  Extremities - peripheral pulses normal, no pedal edema, no clubbing or cyanosis      ASSESSMENT AND PLAN:  Primary osteoarthritis of both knees  -     Ambulatory referral/consult to Orthopedics; Future; Expected date: 08/10/2020  -     diclofenac sodium (VOLTAREN) 1 % Gel; Lower extremities: Apply the gel (4 g) to the affected area 4 times daily. Do not apply more than 16 g daily to any one affected joint of the lower extremities. Upper extremities: Apply the gel (2 g) to the affected area 4 times daily. Do not apply more than 8 g daily to any one affected joint of the upper extremities. Total dose should not exceed 32 g per day,  overall affected joints.  Dispense: 100 g; Refill: 5    Essential hypertension  -     amLODIPine (NORVASC) 10 MG tablet; Take 1 tablet (10 mg total) by mouth once daily.  Dispense: 90 tablet; Refill: 1    Mild aortic stenosis  -     Echo Color Flow Doppler? Yes; Bubble Contrast? No; Future  -     Ambulatory referral/consult to Cardiology; Future; Expected date: 08/10/2020           Follow up3 months or sooner as needed.

## 2020-08-12 ENCOUNTER — HOSPITAL ENCOUNTER (OUTPATIENT)
Dept: CARDIOLOGY | Facility: HOSPITAL | Age: 75
Discharge: HOME | End: 2020-08-12
Attending: INTERNAL MEDICINE
Payer: COMMERCIAL

## 2020-08-12 DIAGNOSIS — I35.0 MILD AORTIC STENOSIS: ICD-10-CM

## 2020-08-12 LAB
AORTIC ROOT ANNULUS: 3 CM
AORTIC VALVE CUSP SEPERATION: 1.59 CM
ASCENDING AORTA: 1.69 CM
AV INDEX (PROSTH): 0.56
AV MEAN GRADIENT: 14 MMHG
AV PEAK GRADIENT: 27 MMHG
AV VALVE AREA: 1.55 CM2
AV VELOCITY RATIO: 0.52
CV ECHO LV RWT: 0.56 CM
DOP CALC AO PEAK VEL: 2.58 M/S
DOP CALC AO VTI: 49.49 CM
DOP CALC LVOT AREA: 2.8 CM2
DOP CALC LVOT DIAMETER: 1.88 CM
DOP CALC LVOT PEAK VEL: 1.33 M/S
DOP CALC LVOT STROKE VOLUME: 76.77 CM3
DOP CALCLVOT PEAK VEL VTI: 27.67 CM
E WAVE DECELERATION TIME: 361.34 MSEC
E/A RATIO: 0.62
E/E' RATIO: 18.55 M/S
ECHO LV POSTERIOR WALL: 1.21 CM (ref 0.6–1.1)
FRACTIONAL SHORTENING: 27 % (ref 28–44)
INTERVENTRICULAR SEPTUM: 1.32 CM (ref 0.6–1.1)
IVRT: 94.58 MSEC
LA MAJOR: 4.82 CM
LA MINOR: 4.51 CM
LA WIDTH: 3.57 CM
LEFT ATRIUM SIZE: 3.76 CM
LEFT ATRIUM VOLUME: 53.17 CM3
LEFT INTERNAL DIMENSION IN SYSTOLE: 3.15 CM (ref 2.1–4)
LEFT VENTRICLE DIASTOLIC VOLUME: 83.49 ML
LEFT VENTRICLE SYSTOLIC VOLUME: 39.38 ML
LEFT VENTRICULAR INTERNAL DIMENSION IN DIASTOLE: 4.31 CM (ref 3.5–6)
LEFT VENTRICULAR MASS: 200.24 G
LV LATERAL E/E' RATIO: 20.4 M/S
LV SEPTAL E/E' RATIO: 17 M/S
MV PEAK A VEL: 1.64 M/S
MV PEAK E VEL: 1.02 M/S
MV STENOSIS PRESSURE HALF TIME: 104.79 MS
MV VALVE AREA P 1/2 METHOD: 2.1 CM2
PISA TR MAX VEL: 1.56 M/S
PULM VEIN S/D RATIO: 1.37
PV PEAK D VEL: 0.35 M/S
PV PEAK S VEL: 0.48 M/S
PV PEAK VELOCITY: 0.95 CM/S
RA MAJOR: 4.01 CM
RA PRESSURE: 3 MMHG
RA WIDTH: 3.2 CM
RIGHT VENTRICULAR END-DIASTOLIC DIMENSION: 3.37 CM
SINUS: 2.38 CM
STJ: 1.6 CM
TDI LATERAL: 0.05 M/S
TDI SEPTAL: 0.06 M/S
TDI: 0.06 M/S
TR MAX PG: 10 MMHG
TRICUSPID ANNULAR PLANE SYSTOLIC EXCURSION: 2.31 CM
TV REST PULMONARY ARTERY PRESSURE: 13 MMHG

## 2020-08-12 PROCEDURE — 93306 TTE W/DOPPLER COMPLETE: CPT

## 2020-08-12 PROCEDURE — 93306 TTE W/DOPPLER COMPLETE: CPT | Mod: 26,,, | Performed by: INTERNAL MEDICINE

## 2020-08-12 PROCEDURE — 93306 ECHO (CUPID ONLY): ICD-10-PCS | Mod: 26,,, | Performed by: INTERNAL MEDICINE

## 2020-08-18 ENCOUNTER — OFFICE VISIT (OUTPATIENT)
Dept: CARDIOLOGY | Facility: CLINIC | Age: 75
End: 2020-08-18
Payer: COMMERCIAL

## 2020-08-18 VITALS
HEIGHT: 65 IN | OXYGEN SATURATION: 95 % | SYSTOLIC BLOOD PRESSURE: 174 MMHG | BODY MASS INDEX: 39.67 KG/M2 | WEIGHT: 238.13 LBS | DIASTOLIC BLOOD PRESSURE: 77 MMHG | HEART RATE: 89 BPM

## 2020-08-18 DIAGNOSIS — E11.9 TYPE 2 DIABETES MELLITUS WITHOUT COMPLICATION, WITHOUT LONG-TERM CURRENT USE OF INSULIN: ICD-10-CM

## 2020-08-18 DIAGNOSIS — R07.9 CHEST PAIN, UNSPECIFIED TYPE: Primary | ICD-10-CM

## 2020-08-18 DIAGNOSIS — I35.0 NONRHEUMATIC AORTIC VALVE STENOSIS: ICD-10-CM

## 2020-08-18 DIAGNOSIS — I35.0 MILD AORTIC STENOSIS: ICD-10-CM

## 2020-08-18 DIAGNOSIS — E66.01 MORBID OBESITY: ICD-10-CM

## 2020-08-18 DIAGNOSIS — R94.31 ABNORMAL EKG: ICD-10-CM

## 2020-08-18 DIAGNOSIS — I10 ESSENTIAL HYPERTENSION: ICD-10-CM

## 2020-08-18 DIAGNOSIS — I15.9 SECONDARY HYPERTENSION: ICD-10-CM

## 2020-08-18 DIAGNOSIS — E78.2 MIXED HYPERLIPIDEMIA: ICD-10-CM

## 2020-08-18 DIAGNOSIS — R01.1 HEART MURMUR: ICD-10-CM

## 2020-08-18 PROCEDURE — 99205 OFFICE O/P NEW HI 60 MIN: CPT | Mod: S$GLB,,, | Performed by: INTERNAL MEDICINE

## 2020-08-18 PROCEDURE — 99205 PR OFFICE/OUTPT VISIT, NEW, LEVL V, 60-74 MIN: ICD-10-PCS | Mod: S$GLB,,, | Performed by: INTERNAL MEDICINE

## 2020-08-18 PROCEDURE — 99999 PR PBB SHADOW E&M-EST. PATIENT-LVL V: CPT | Mod: PBBFAC,,, | Performed by: INTERNAL MEDICINE

## 2020-08-18 PROCEDURE — 99999 PR PBB SHADOW E&M-EST. PATIENT-LVL V: ICD-10-PCS | Mod: PBBFAC,,, | Performed by: INTERNAL MEDICINE

## 2020-08-18 RX ORDER — METOPROLOL SUCCINATE 50 MG/1
50 TABLET, EXTENDED RELEASE ORAL DAILY
Qty: 90 TABLET | Refills: 3 | Status: SHIPPED | OUTPATIENT
Start: 2020-08-18 | End: 2020-08-18

## 2020-08-18 RX ORDER — METOPROLOL SUCCINATE 25 MG/1
25 TABLET, EXTENDED RELEASE ORAL DAILY
Qty: 90 TABLET | Refills: 3 | Status: SHIPPED | OUTPATIENT
Start: 2020-08-18 | End: 2020-08-18 | Stop reason: SDUPTHER

## 2020-08-18 NOTE — PROGRESS NOTES
CARDIOVASCULAR CONSULTATION    REASON FOR CONSULT:   Mena Odonnell is a 74 y.o. female who presents for aortic valve stenosis and dyspnea on exertion      HISTORY OF PRESENT ILLNESS:     Patient is a pleasant 74-year-old lady.  Multiple risk factors for coronary artery disease.  Recently had an echocardiogram done.  Showed normal left ventricle systolic function with mild aortic valve stenosis.  Has been sent to Cardiology Clinic further evaluation.  Denies any chest pains at rest on exertion, however does complain of dyspnea on exertion.  Does have elevated blood pressure in the clinic and states that home when she checks it stays around 150-160 mm systolic.  She is currently on losartan hydrochlorothiazide as well as Norvasc at home.  Denies orthopnea, PND, swelling of feet.      · Hyperdynamic left ventricular systolic function. The estimated ejection fraction is 75%.  · Concentric left ventricular hypertrophy.  · Indeterminate left ventricular diastolic function.  · Normal right ventricular systolic function.  · Mild left atrial enlargement.  · Mild aortic valve stenosis.  · Aortic valve area is 1.55 cm2; peak velocity is 2.58 m/s; mean gradient is 14 mmHg.  · The estimated PA systolic pressure is 13 mmHg.            PAST MEDICAL HISTORY:     Past Medical History:   Diagnosis Date    Diabetes mellitus type II     Hyperlipidemia     Hypertension     Obesity     Osteoarthritis     Peripheral neuropathy     Severe obesity (BMI 35.0-39.9) with comorbidity 12/30/2016    Tendonitis     left foot       PAST SURGICAL HISTORY:     Past Surgical History:   Procedure Laterality Date    BLADDER SUSPENSION      CATARACT EXTRACTION      COLONOSCOPY N/A 9/26/2015    Procedure: COLONOSCOPY;  Surgeon: Javed Wood MD;  Location: [address]);  Service: Endoscopy;  Laterality: N/A;    HYSTERECTOMY  1978    fibroids    NECK MASS EXCISION  11/6/2019    Procedure: EXCISION, MASS, NECK;  Surgeon: Edwin  TANESHA Barrow MD;  Location: [facility] OR;  Service: General;;  RN PREOP 10/30/2019    TONSILLECTOMY         ALLERGIES AND MEDICATION:     Review of patient's allergies indicates:   Allergen Reactions    Atorvastatin      Muscle pain     Crestor [rosuvastatin] Other (See Comments)     Leg Weakness.         Medication List          Accurate as of August 18, 2020  3:41 PM. If you have any questions, ask your nurse or doctor.            CHANGE how you take these medications    metoprolol succinate 25 MG 24 hr tablet  Commonly known as: TOPROL-XL  Take 1 tablet (25 mg total) by mouth once daily.  What changed: See the new instructions.  Changed by: Jenn Arreola MD        CONTINUE taking these medications    amLODIPine 10 MG tablet  Commonly known as: NORVASC  Take 1 tablet (10 mg total) by mouth once daily.     aspirin 81 MG Chew  Take 1 tablet (81 mg total) by mouth once daily.     cetirizine 10 MG tablet  Commonly known as: ZYRTEC     CONTOUR NEXT TEST STRIPS Strp  Generic drug: blood sugar diagnostic     diclofenac sodium 1 % Gel  Commonly known as: VOLTAREN  Lower extremities: Apply the gel (4 g) to the affected area 4 times daily. Do not apply more than 16 g daily to any one affected joint of the lower extremities. Upper extremities: Apply the gel (2 g) to the affected area 4 times daily. Do not apply more than 8 g daily to any one affected joint of the upper extremities. Total dose should not exceed 32 g per day, overall affected joints.     flash glucose sensor Kit  Commonly known as: FREESTYLE ROSAURA 14 DAY SENSOR  1 sensor kit every 14 days (requires 2 kits per month)     fluocinolone acetonide oiL 0.01 % Drop  Place 4 drops in ear(s) 2 (two) times daily.     gabapentin 100 MG capsule  Commonly known as: NEURONTIN  Take 2 capsules (200 mg total) by mouth 2 (two) times daily.     insulin glargine U-300 conc 300 unit/mL (3 mL) Inpn  Commonly known as: TOUJEO MAX U-300 SOLOSTAR  Inject 90 units daily.     insulin  lispro 100 unit/mL pen  Commonly known as: HumaLOG KwikPen Insulin  Take 16 units before breakfast and 26 units before lunch, 26 units before dinner     JARDIANCE 10 mg tablet  Generic drug: empagliflozin  Take 1 tablet (10 mg total) by mouth once daily.     losartan-hydrochlorothiazide 100-25 mg 100-25 mg per tablet  Commonly known as: HYZAAR  Take 1 tablet by mouth once daily.     montelukast 10 mg tablet  Commonly known as: SINGULAIR     * oxyCODONE-acetaminophen 5-325 mg per tablet  Commonly known as: PERCOCET  Take 1 tablet by mouth every 4 (four) hours as needed for Pain.     * oxyCODONE-acetaminophen 5-325 mg per tablet  Commonly known as: PERCOCET  Take 1 tablet by mouth every 4 (four) hours as needed for Pain.     OZEMPIC 1 mg/dose (2 mg/1.5 mL) Pnij  Generic drug: semaglutide  Inject 1 mg once weekly     pravastatin 80 MG tablet  Commonly known as: PRAVACHOL  Take 1 tablet (80 mg total) by mouth once daily.         * This list has 2 medication(s) that are the same as other medications prescribed for you. Read the directions carefully, and ask your doctor or other care provider to review them with you.               Where to Get Your Medications      These medications were sent to [id number] - JONATHAN HUFFMAN - [address] [facility]  [address] [address]    Phone: [phone]   · metoprolol succinate 25 MG 24 hr tablet         SOCIAL HISTORY:     Social History     Socioeconomic History    Marital status:      Spouse name: Not on file    Number of children: 2    Years of education: Not on file    Highest education level: Not on file   Occupational History     Employer: [employer]   Social Needs    Financial resource strain: Not on file    Food insecurity     Worry: Not on file     Inability: Not on file    Transportation needs     Medical: Not on file     Non-medical: Not on file   Tobacco Use    Smoking status: Former Smoker     Packs/day: 0.25      Years: 15.00     Pack years: 3.75     Quit date: 1982     Years since quittin.6    Smokeless tobacco: Never Used   Substance and Sexual Activity    Alcohol use: No    Drug use: No    Sexual activity: Not Currently     Partners: Male     Birth control/protection: Abstinence   Lifestyle    Physical activity     Days per week: Not on file     Minutes per session: Not on file    Stress: Not on file   Relationships    Social connections     Talks on phone: Not on file     Gets together: Not on file     Attends Scientologist service: Not on file     Active member of club or organization: Not on file     Attends meetings of clubs or organizations: Not on file     Relationship status: Not on file   Other Topics Concern    Not on file   Social History Narrative    . One daughter. Works as an Apartment        FAMILY HISTORY:     Family History   Problem Relation Age of Onset    Thyroid disease Mother     Cataracts Mother     Diabetes Mother     Stroke Mother     Hypertension Mother     Thyroid disease Sister     Diabetes Sister     Hypertension Sister     Hypertension Daughter     Diabetes Son     Diabetes Sister     Hypertension Sister     Hypertension Brother     Hypertension Sister     Hypertension Sister     Hypertension Brother     Thyroid disease Maternal Aunt     Thyroid disease Maternal Aunt     Colon cancer Maternal Grandfather     Breast cancer Neg Hx     Ovarian cancer Neg Hx     Amblyopia Neg Hx     Blindness Neg Hx     Cancer Neg Hx     Glaucoma Neg Hx     Macular degeneration Neg Hx     Retinal detachment Neg Hx     Strabismus Neg Hx        REVIEW OF SYSTEMS:   Review of Systems   Constitution: Negative.   HENT: Negative.    Eyes: Negative.    Cardiovascular: Positive for dyspnea on exertion.   Respiratory: Negative.    Endocrine: Negative.    Hematologic/Lymphatic: Negative.    Skin: Negative.    Musculoskeletal: Negative.    Gastrointestinal:  "Negative.    Genitourinary: Negative.    Neurological: Negative.    Psychiatric/Behavioral: Negative.    Allergic/Immunologic: Negative.        A 10 point review of systems was performed and all the pertinent positives have been mentioned. Rest of review of systems was negative.        PHYSICAL EXAM:     Vitals:    08/18/20 1454   BP: (!) 174/77   Pulse: 89    Body mass index is 39.62 kg/m².  Weight: 108 kg (238 lb 1.6 oz)   Height: 5' 5" (165.1 cm)     Physical Exam   Constitutional: She is oriented to person, place, and time. She appears well-developed and well-nourished.   HENT:   Head: Normocephalic.   Eyes: Pupils are equal, round, and reactive to light.   Neck: Normal range of motion. Neck supple.   Cardiovascular: Normal rate and regular rhythm.   Murmur heard.  Pulmonary/Chest: Effort normal and breath sounds normal.   Abdominal: Soft. Normal appearance and bowel sounds are normal. There is no abdominal tenderness.   Musculoskeletal: Normal range of motion.   Neurological: She is alert and oriented to person, place, and time.   Skin: Skin is warm.   Psychiatric: She has a normal mood and affect.         DATA:     Laboratory:  CBC:  Recent Labs   Lab 10/30/19  1500 12/07/19  0831   WBC 7.91 7.07   Hemoglobin 13.1 13.4   Hematocrit 40.5 43.3   Platelets 270 288       CHEMISTRIES:  Recent Labs   Lab 09/07/19  0858 10/30/19  1500 12/07/19  0831   Glucose 103 106 142 H   Sodium 145 146 H 145   Potassium 4.2 3.8 4.2   BUN, Bld 16 17 14   Creatinine 0.7 0.9 0.7   eGFR if  >60.0 >60 >60.0   eGFR if non African American >60.0 >60 >60.0   Calcium 9.7 9.6 9.8       CARDIAC BIOMARKERS:        COAGS:        LIPIDS/LFTS:  Recent Labs   Lab 12/07/19  0831 03/21/20  0827 06/24/20  0715   Cholesterol 223 H 184 203 H   Triglycerides 135 82 136   HDL 43 44 42   LDL Cholesterol 153.0 123.6 133.8   Non-HDL Cholesterol 180 140 161   AST 16 15 15   ALT 19 18 19       Hemoglobin A1C   Date Value Ref Range Status "   06/24/2020 6.6 (H) 4.0 - 5.6 % Final     Comment:     ADA Screening Guidelines:  5.7-6.4%  Consistent with prediabetes  >or=6.5%  Consistent with diabetes  High levels of fetal hemoglobin interfere with the HbA1C  assay. Heterozygous hemoglobin variants (HbS, HgC, etc)do  not significantly interfere with this assay.   However, presence of multiple variants may affect accuracy.     03/21/2020 6.4 (H) 4.0 - 5.6 % Final     Comment:     ADA Screening Guidelines:  5.7-6.4%  Consistent with prediabetes  >or=6.5%  Consistent with diabetes  High levels of fetal hemoglobin interfere with the HbA1C  assay. Heterozygous hemoglobin variants (HbS, HgC, etc)do  not significantly interfere with this assay.   However, presence of multiple variants may affect accuracy.     12/07/2019 7.4 (H) 4.0 - 5.6 % Final     Comment:     ADA Screening Guidelines:  5.7-6.4%  Consistent with prediabetes  >or=6.5%  Consistent with diabetes  High levels of fetal hemoglobin interfere with the HbA1C  assay. Heterozygous hemoglobin variants (HbS, HgC, etc)do  not significantly interfere with this assay.   However, presence of multiple variants may affect accuracy.     12/07/2019 7.4 (H) 4.0 - 5.6 % Final     Comment:     ADA Screening Guidelines:  5.7-6.4%  Consistent with prediabetes  >or=6.5%  Consistent with diabetes  High levels of fetal hemoglobin interfere with the HbA1C  assay. Heterozygous hemoglobin variants (HbS, HgC, etc)do  not significantly interfere with this assay.   However, presence of multiple variants may affect accuracy.         TSH  Recent Labs   Lab 04/21/18  0848 11/17/18  0820   TSH 0.343 L 0.445       The 10-year ASCVD risk score (Sang FRIDA Jr., et al., 2013) is: 43.3%    Values used to calculate the score:      Age: 74 years      Sex: Female      Is Non- : Yes      Diabetic: Yes      Tobacco smoker: No      Systolic Blood Pressure: 174 mmHg      Is BP treated: Yes      HDL Cholesterol: 42 mg/dL       Total Cholesterol: 203 mg/dL             ASSESSMENT AND PLAN     Patient Active Problem List   Diagnosis    Type 2 diabetes mellitus with diabetic polyneuropathy    Hyperlipidemia    HTN (hypertension)    Knee pain    H/O vitamin D deficiency    Non morbid obesity due to excess calories    Bilateral carotid bruits    Osteoarthritis of right knee    Morbid obesity with body mass index (BMI) of 40.0 or higher    Dyspnea on exertion    Chronic cough    MEKA (obstructive sleep apnea)    Sensorineural hearing loss (SNHL) of right ear with restricted hearing of left ear    Tinnitus of both ears    Allergic rhinitis    Dysfunction of both eustachian tubes    Nasal septal deviation    Chronic eczematous otitis externa of both ears    Neck abscess    Elevated blood pressure reading    Epidermal inclusion cyst    Polyneuropathy associated with underlying disease       Patient with multiple risk factors for coronary artery disease.  Complaining of dyspnea on exertion for the past 6 months.  Normal LV systolic function.  Mild AS.  Further evaluation with the help of a stress test to rule out large areas of ischemia as a cause of her dyspnea on exertion.    Mild AS    Diabetes    Hypertension:  Uncontrolled:  Add metoprolol 25 mg daily.  Titrate as needed for further blood pressure control.          Thank you very much for involving me in the care of your patient.  Please do not hesitate to contact me if there are any questions.      Jenn Arreola MD, FACC, [facility]  Interventional Cardiologist, [facility].           This note was dictated with the help of speech recognition software.  There might be un-intended errors and/or substitutions.

## 2020-08-18 NOTE — LETTER
August 18, 2020      Leonard Wells MD  [address]  [facility] [address]           [facility]  [address]  [place] [place] [address]  Phone: [phone]          Patient: Mena Odonnell   MR Number: [id number]   YOB: 1945   Date of Visit: 8/18/2020       Dear Dr. Leonard Wells:    Thank you for referring Mena Odonnell to me for evaluation. Attached you will find relevant portions of my assessment and plan of care.    If you have questions, please do not hesitate to call me. I look forward to following Mena Odonnell along with you.    Sincerely,    Jenn Arreola MD    Enclosure  CC:  No Recipients    If you would like to receive this communication electronically, please contact [email or web address] or [phone] to request more information on [employer] Link access.    For providers and/or their staff who would like to refer a patient to [facility], please contact us through our one-stop-shop provider referral line, [facility] , at [phone].    If you feel you have received this communication in error or would no longer like to receive these types of communications, please e-mail [email or web address]

## 2020-08-21 ENCOUNTER — CLINICAL SUPPORT (OUTPATIENT)
Dept: ENDOCRINOLOGY | Facility: CLINIC | Age: 75
End: 2020-08-21
Payer: COMMERCIAL

## 2020-08-21 NOTE — PROGRESS NOTES
"  PLACEMENT OF FREESTYLE ROSAURA PRO SENSOR    CONTINOUS GLUCOSE MONITORING SYSTEM (CGMS)    Patient is here in clinic today for placement of continuous glucose monitoring sensor.    Patient verified that they were here for CGMS procedure ordered by their provider and that they have a working glucose meter and supplies at home.    Patient will be provided with a Freestyle Rosaura Sensor and a copy of the Continuous Glucose Monitoring Patient Log to fill out during the study.    A detailed explanation of Continuous Glucose Monitoring was provided. Patient informed that this is a blind procedure and that they will not actually see the blood sugar tracing in real time. Reviewed with patient the patient education handout called "Freestyle Rosaura Pro Sensor User Instructions" to review self-care during the study to avoid sensor loosening or removal ie... Bathing, Swimming, dressing, and exercising.    Instructed patient to check blood sugar using home glucometer and to record the following on provided patient log sheets: Blood sugar taken at home, Meals and snacks, Activity, and Diabetes medications taken and dosage    Patient was brought to a private location. Arm for insertion was selected and prepared and allowed to dry. Glucose Sensor Serial Number [id number] was inserted to back of patient's Left upper arm.    The following forms were given and reviewed in detail with patient and all questions answered.    · Continuous Glucose Monitoring Patient Log #[id number]    · Freestyle Manufacture Patient Handout "Your Freestyle Rosaura Pro Sensor: What you need to know"        "

## 2020-08-21 NOTE — CONSULTS
"PLACEMENT OF FREESTYLE ROSAURA PRO SENSOR    CONTINOUS GLUCOSE MONITORING SYSTEM (CGMS)    Patient is here in clinic today for placement of continuous glucose monitoring sensor.    Patient verified that they were here for CGMS procedure ordered by their provider and that they have a working glucose meter and supplies at home.    Patient will be provided with a Freestyle Rosaura Sensor and a copy of the Continuous Glucose Monitoring Patient Log to fill out during the study.    A detailed explanation of Continuous Glucose Monitoring was provided. Patient informed that this is a blind procedure and that they will not actually see the blood sugar tracing in real time. Reviewed with patient the patient education handout called "Freestyle Rosaura Pro Sensor User Instructions" to review self-care during the study to avoid sensor loosening or removal ie... Bathing, Swimming, dressing, and exercising.    Instructed patient to check blood sugar using home glucometer and to record the following on provided patient log sheets: Blood sugar taken at home, Meals and snacks, Activity, and Diabetes medications taken and dosage    Patient was brought to a private location. Arm for insertion was selected and prepared and allowed to dry. Glucose Sensor Serial Number [id number] was inserted to back of patient's Left upper arm.    The following forms were given and reviewed in detail with patient and all questions answered.    · Continuous Glucose Monitoring Patient Log #[id number]    · Freestyle Manufacture Patient Handout "Your Freestyle Rosaura Pro Sensor: What you need to know"        "

## 2020-08-22 RX ORDER — METOPROLOL SUCCINATE 25 MG/1
25 TABLET, EXTENDED RELEASE ORAL DAILY
Qty: 90 TABLET | Refills: 3 | Status: SHIPPED | OUTPATIENT
Start: 2020-08-22 | End: 2021-05-10

## 2020-08-24 ENCOUNTER — TELEPHONE (OUTPATIENT)
Dept: FAMILY MEDICINE | Facility: CLINIC | Age: 75
End: 2020-08-24

## 2020-08-24 DIAGNOSIS — M25.561 PAIN IN BOTH KNEES, UNSPECIFIED CHRONICITY: Primary | ICD-10-CM

## 2020-08-24 DIAGNOSIS — M25.562 PAIN IN BOTH KNEES, UNSPECIFIED CHRONICITY: Primary | ICD-10-CM

## 2020-08-24 NOTE — TELEPHONE ENCOUNTER
----- Message from Verónica Nj sent at 8/24/2020  3:11 PM CDT -----  Regarding: pt  Type: Patient Call Back    Who called:pt    What is the request in detail:pt returned the nurse's phone call. Call pt    Can the clinic reply by [facility]?    Would the patient rather a call back or a response via [facility]? call    Best call back number:[phone] (home) [phone] (work)      Additional Information

## 2020-08-25 ENCOUNTER — OFFICE VISIT (OUTPATIENT)
Dept: ORTHOPEDICS | Facility: CLINIC | Age: 75
End: 2020-08-25
Payer: COMMERCIAL

## 2020-08-25 VITALS
RESPIRATION RATE: 17 BRPM | SYSTOLIC BLOOD PRESSURE: 130 MMHG | HEART RATE: 76 BPM | OXYGEN SATURATION: 98 % | BODY MASS INDEX: 39.08 KG/M2 | DIASTOLIC BLOOD PRESSURE: 80 MMHG | HEIGHT: 65 IN | WEIGHT: 234.56 LBS | TEMPERATURE: 99 F

## 2020-08-25 DIAGNOSIS — M17.0 PRIMARY OSTEOARTHRITIS OF BOTH KNEES: Primary | ICD-10-CM

## 2020-08-25 PROCEDURE — 99215 OFFICE O/P EST HI 40 MIN: CPT | Mod: PBBFAC,25,PN | Performed by: ORTHOPAEDIC SURGERY

## 2020-08-25 PROCEDURE — 20610 DRAIN/INJ JOINT/BURSA W/O US: CPT | Mod: PBBFAC,PN | Performed by: ORTHOPAEDIC SURGERY

## 2020-08-25 PROCEDURE — 99999 PR PBB SHADOW E&M-EST. PATIENT-LVL V: CPT | Mod: PBBFAC,,, | Performed by: ORTHOPAEDIC SURGERY

## 2020-08-25 PROCEDURE — 99204 PR OFFICE/OUTPT VISIT, NEW, LEVL IV, 45-59 MIN: ICD-10-PCS | Mod: 25,S$PBB,, | Performed by: ORTHOPAEDIC SURGERY

## 2020-08-25 PROCEDURE — 20610 LARGE JOINT ASPIRATION/INJECTION: L KNEE: ICD-10-PCS | Mod: S$PBB,LT,, | Performed by: ORTHOPAEDIC SURGERY

## 2020-08-25 PROCEDURE — 99999 PR PBB SHADOW E&M-EST. PATIENT-LVL V: ICD-10-PCS | Mod: PBBFAC,,, | Performed by: ORTHOPAEDIC SURGERY

## 2020-08-25 PROCEDURE — 99204 OFFICE O/P NEW MOD 45 MIN: CPT | Mod: 25,S$PBB,, | Performed by: ORTHOPAEDIC SURGERY

## 2020-08-25 RX ADMIN — LIDOCAINE HYDROCHLORIDE 5 ML: 10 INJECTION, SOLUTION EPIDURAL; INFILTRATION; INTRACAUDAL; PERINEURAL at 01:08

## 2020-08-25 RX ADMIN — TRIAMCINOLONE ACETONIDE 40 MG: 40 INJECTION, SUSPENSION INTRA-ARTICULAR; INTRAMUSCULAR at 01:08

## 2020-08-25 NOTE — LETTER
August 26, 2020      Leonard Wells MD  [address]  [facility] [address]           [facility]  [address]  [place] [address]  Phone: [phone]          Patient: Mena Odonnell   MR Number: [id number]   YOB: 1945   Date of Visit: 8/25/2020       Dear Dr. Leonard Wells:    Thank you for referring Mena Odonnell to me for evaluation. Attached you will find relevant portions of my assessment and plan of care.    If you have questions, please do not hesitate to call me. I look forward to following Mena Odonnell along with you.    Sincerely,    Maile Cuello MD    Enclosure  CC:  No Recipients    If you would like to receive this communication electronically, please contact [email or web address] or [phone] to request more information on [employer] Link access.    For providers and/or their staff who would like to refer a patient to [facility], please contact us through our one-stop-shop provider referral line, [facility], at [phone].    If you feel you have received this communication in error or would no longer like to receive these types of communications, please e-mail [email or web address]

## 2020-08-25 NOTE — PROGRESS NOTES
Chief Complaint   Patient presents with    Right Knee - Pain    Left Knee - Pain       This patient was seen in consultation at the request of Dr. Leonard Wells     [facility] (08/25/2020): Mena Odonnell is a 74 y.o. female who presents today complaining of Pain of the Right Knee and Pain of the Left Knee     Duration of symptoms: a little over 1  month  Trauma or new activity: no  Pain is constant   Left worse than right   Aggravating factors: weight bearing activity, going from sit to stand, stairs   Relieving factors: rest  Walks with a cane   Radicular symptoms: no numbness, paresthesias   Associated symptoms:  swelling, giving way and stiffness.    Prior treatment:  tylenol  or advil when pain is really bad. Has been seen by a chiropractor who has done acupuncture, ultrasound and electric stim without relief   Some relief with alternating voltaren gel and biofreeze. Has not done injections or PT     Pain does interfere with activities of daily living .    This is the extent of the patient's complaints at this time.     Occupation: Desk Worker     Review of Systems   All other systems reviewed and are negative.        Review of patient's allergies indicates:   Allergen Reactions    Atorvastatin      Muscle pain     Crestor [rosuvastatin] Other (See Comments)     Leg Weakness.          Current Outpatient Medications:     amLODIPine (NORVASC) 10 MG tablet, Take 1 tablet (10 mg total) by mouth once daily., Disp: 90 tablet, Rfl: 1    aspirin 81 MG Chew, Take 1 tablet (81 mg total) by mouth once daily., Disp: 30 tablet, Rfl: 0    cetirizine (ZYRTEC) 10 MG tablet, TAKE 1 TABLET BY MOUTH ONCE DAILY FOR 30 DAYS, Disp: , Rfl: 3    CONTOUR NEXT TEST STRIPS Strp, USE 1 STRIP TO CHECK GLUCOSE 4 TIMES DAILY, Disp: , Rfl:     diclofenac sodium (VOLTAREN) 1 % Gel, Lower extremities: Apply the gel (4 g) to the affected area 4 times daily. Do not apply more than 16 g daily to any one affected joint of the lower  extremities. Upper extremities: Apply the gel (2 g) to the affected area 4 times daily. Do not apply more than 8 g daily to any one affected joint of the upper extremities. Total dose should not exceed 32 g per day, overall affected joints., Disp: 100 g, Rfl: 5    empagliflozin (JARDIANCE) 10 mg tablet, Take 1 tablet (10 mg total) by mouth once daily., Disp: 90 tablet, Rfl: 2    flash glucose sensor (FREESTYLE ORSAURA 14 DAY SENSOR) Kit, 1 sensor kit every 14 days (requires 2 kits per month), Disp: 2 kit, Rfl: 6    fluocinolone acetonide oil 0.01 % Drop, Place 4 drops in ear(s) 2 (two) times daily., Disp: 20 mL, Rfl: 5    gabapentin (NEURONTIN) 100 MG capsule, Take 2 capsules (200 mg total) by mouth 2 (two) times daily., Disp: 360 capsule, Rfl: 2    insulin glargine U-300 conc (TOUJEO MAX U-300 SOLOSTAR) 300 unit/mL (3 mL) InPn, Inject 90 units daily., Disp: 4 Syringe, Rfl: 5    insulin lispro (HUMALOG KWIKPEN INSULIN) 100 unit/mL pen, Take 16 units before breakfast and 26 units before lunch, 26 units before dinner, Disp: 2 Box, Rfl: 5    losartan-hydrochlorothiazide 100-25 mg (HYZAAR) 100-25 mg per tablet, Take 1 tablet by mouth once daily., Disp: 90 tablet, Rfl: 3    metoprolol succinate (TOPROL-XL) 25 MG 24 hr tablet, Take 1 tablet (25 mg total) by mouth once daily., Disp: 90 tablet, Rfl: 3    montelukast (SINGULAIR) 10 mg tablet, , Disp: , Rfl:     oxyCODONE-acetaminophen (PERCOCET) 5-325 mg per tablet, Take 1 tablet by mouth every 4 (four) hours as needed for Pain., Disp: 30 tablet, Rfl: 0    oxyCODONE-acetaminophen (PERCOCET) 5-325 mg per tablet, Take 1 tablet by mouth every 4 (four) hours as needed for Pain., Disp: 30 tablet, Rfl: 0    pravastatin (PRAVACHOL) 80 MG tablet, Take 1 tablet (80 mg total) by mouth once daily., Disp: 90 tablet, Rfl: 3    semaglutide (OZEMPIC) 1 mg/dose (2 mg/1.5 mL) PnIj, Inject 1 mg once weekly, Disp: 2 Syringe, Rfl: 6    Past Medical History:   Diagnosis Date     Diabetes mellitus type II     Hyperlipidemia     Hypertension     Obesity     Osteoarthritis     Peripheral neuropathy     Severe obesity (BMI 35.0-39.9) with comorbidity 2016    Tendonitis     left foot       Patient Active Problem List   Diagnosis    Type 2 diabetes mellitus with diabetic polyneuropathy    Hyperlipidemia    HTN (hypertension)    Knee pain    H/O vitamin D deficiency    Non morbid obesity due to excess calories    Bilateral carotid bruits    Osteoarthritis of right knee    Morbid obesity with body mass index (BMI) of 40.0 or higher    Dyspnea on exertion    Chronic cough    MEKA (obstructive sleep apnea)    Sensorineural hearing loss (SNHL) of right ear with restricted hearing of left ear    Tinnitus of both ears    Allergic rhinitis    Dysfunction of both eustachian tubes    Nasal septal deviation    Chronic eczematous otitis externa of both ears    Neck abscess    Elevated blood pressure reading    Epidermal inclusion cyst    Polyneuropathy associated with underlying disease       Past Surgical History:   Procedure Laterality Date    BLADDER SUSPENSION      CATARACT EXTRACTION      COLONOSCOPY N/A 2015    Procedure: COLONOSCOPY;  Surgeon: Javed Wood MD;  Location: [address]);  Service: Endoscopy;  Laterality: N/A;    HYSTERECTOMY  1978    fibroids    NECK MASS EXCISION  2019    Procedure: EXCISION, MASS, NECK;  Surgeon: Edwin Barrow MD;  Location: [facility];  Service: General;;  RN PREOP 10/30/2019    TONSILLECTOMY         Social History     Tobacco Use    Smoking status: Former Smoker     Packs/day: 0.25     Years: 15.00     Pack years: 3.75     Quit date: 1982     Years since quittin.7    Smokeless tobacco: Never Used   Substance Use Topics    Alcohol use: No    Drug use: No       Family History   Problem Relation Age of Onset    Thyroid disease Mother     Cataracts Mother     Diabetes Mother     Stroke  "Mother     Hypertension Mother     Thyroid disease Sister     Diabetes Sister     Hypertension Sister     Hypertension Daughter     Diabetes Son     Diabetes Sister     Hypertension Sister     Hypertension Brother     Hypertension Sister     Hypertension Sister     Hypertension Brother     Thyroid disease Maternal Aunt     Thyroid disease Maternal Aunt     Colon cancer Maternal Grandfather     Breast cancer Neg Hx     Ovarian cancer Neg Hx     Amblyopia Neg Hx     Blindness Neg Hx     Cancer Neg Hx     Glaucoma Neg Hx     Macular degeneration Neg Hx     Retinal detachment Neg Hx     Strabismus Neg Hx        Physical Exam:   Vitals:    08/25/20 1313   BP: 130/80   Pulse: 76   Resp: 17   Temp: 98.6 °F (37 °C)   TempSrc: Oral   SpO2: 98%   Weight: 106.4 kg (234 lb 9.1 oz)   Height: 5' 5" (1.651 m)   PainSc: 10-Worst pain ever   PainLoc: Knee       General: Weight: 106.4 kg (234 lb 9.1 oz) Body mass index is 39.03 kg/m².  Patient is alert, awake and oriented to time, place and person. Mood and affect are appropriate.  Patient does not appear to be in any distress, denies any constitutional symptoms and appears stated age.   HEENT: Pupils are equal and round, sclera are not injected. External examination of ears and nose reveals no abnormalities. Cranial nerves II-X are grossly intact  Skin: no rashes, abrasions or open wounds on the affected extremity   Resp: No respiratory distress or audible wheezing   CV: 2+  pulses, all extremities warm and well perfused   Bilateral  knee(s)  Localizes pain over MJL  No soft tissue swelling or erythema   Mild effusion on R   Active ROM: R 0 - 120 L  0-100   no varus/valgus deformity   Tender to palpation over medial joint line   good  quadricep muscle tone and bulk; no atrophy compared to contralateral extremity   normal (0 - 2 mm) Anterior drawer   normal (0 - 2 mm) posterior drawer   negative valgus instability   negative varus instability   Normal patellar " tracking   No pain with patellar compression   Ltsi s/s/sp/dp/t  + ehl/fhl/ta/gs  2+ DP       Imaging:  3 views bilateral knees:  moderate tricompartmental OA with subchondral sclerosis, joint space narrowing, osteophyte formation    I personally reviewed and interpreted the patient's imaging obtained today in clinic     Assessment: 74 y.o. female with Bilateral  knee osteoarthritis     We discussed the findings on xray and clinical exam as well as the natural history of arthritis. We discussed non operative and operative treatment options including injections, viscosupplementation, physical therapy and PO NSAIDs.  She would like to start with non-operative treatment in the form of injection.     Plan:   - Injection of the left knee  performed, please see procedure note for more details.  Prior to the injection risks and benefits of corticosteroid injection were discussed with the patient including pain, infection, bleeding, skin color changes, swelling, steroid flare. We discussed that over time injections can result in chondral damage, acceleration of arthritis formation, damage to tendons and damage to joints.  The patient consented for the procedure.  Post-injection instructions were given to the patient in writing.I discussed the risk of increased blood glucose following steroid injection in the setting of diabetes- the patient will monitor closely for the next 24 hours and call her primary care physician with any questions or concerns regarding blood glucose control   - Return to clinic PRN    All questions were answered in detail. The patient is in full agreement with the treatment plan and will proceed accordingly.    A note notifying Dr. Leonard Wells of my findings was sent via the electronic medical record     This note was created by combination of typed  and M-Modal dictation. Transcription and phonetic errors may be present.  If there are any questions, please contact me.

## 2020-08-26 ENCOUNTER — HOSPITAL ENCOUNTER (OUTPATIENT)
Dept: RADIOLOGY | Facility: HOSPITAL | Age: 75
Discharge: HOME | End: 2020-08-26
Attending: INTERNAL MEDICINE
Payer: MEDICARE

## 2020-08-26 ENCOUNTER — HOSPITAL ENCOUNTER (OUTPATIENT)
Dept: RADIOLOGY | Facility: HOSPITAL | Age: 75
Discharge: HOME | End: 2020-08-26
Attending: INTERNAL MEDICINE
Payer: COMMERCIAL

## 2020-08-26 ENCOUNTER — HOSPITAL ENCOUNTER (OUTPATIENT)
Dept: CARDIOLOGY | Facility: HOSPITAL | Age: 75
Discharge: HOME | End: 2020-08-26
Attending: INTERNAL MEDICINE
Payer: COMMERCIAL

## 2020-08-26 DIAGNOSIS — I35.0 MILD AORTIC STENOSIS: ICD-10-CM

## 2020-08-26 LAB
CV STRESS BASE HR: 79 BPM
DIASTOLIC BLOOD PRESSURE: 62 MMHG
NUC REST EJECTION FRACTION: 78
OHS CV CPX 85 PERCENT MAX PREDICTED HEART RATE MALE: 120
OHS CV CPX MAX PREDICTED HEART RATE: 142
OHS CV CPX PATIENT IS FEMALE: 1
OHS CV CPX PATIENT IS MALE: 0
OHS CV CPX PEAK DIASTOLIC BLOOD PRESSURE: 44 MMHG
OHS CV CPX PEAK HEAR RATE: 103 BPM
OHS CV CPX PEAK RATE PRESSURE PRODUCT: NORMAL
OHS CV CPX PEAK SYSTOLIC BLOOD PRESSURE: 116 MMHG
OHS CV CPX PERCENT MAX PREDICTED HEART RATE ACHIEVED: 73
OHS CV CPX RATE PRESSURE PRODUCT PRESENTING: NORMAL
SYSTOLIC BLOOD PRESSURE: 138 MMHG

## 2020-08-26 PROCEDURE — 63600175 PHARM REV CODE 636 W HCPCS: Performed by: INTERNAL MEDICINE

## 2020-08-26 PROCEDURE — 93018 STRESS TEST WITH MYOCARDIAL PERFUSION (CUPID ONLY): ICD-10-PCS | Mod: ,,, | Performed by: INTERNAL MEDICINE

## 2020-08-26 PROCEDURE — 93016 CV STRESS TEST SUPVJ ONLY: CPT | Mod: ,,, | Performed by: INTERNAL MEDICINE

## 2020-08-26 PROCEDURE — 93017 CV STRESS TEST TRACING ONLY: CPT

## 2020-08-26 PROCEDURE — 78452 HT MUSCLE IMAGE SPECT MULT: CPT | Mod: 26,,, | Performed by: INTERNAL MEDICINE

## 2020-08-26 PROCEDURE — 93018 CV STRESS TEST I&R ONLY: CPT | Mod: ,,, | Performed by: INTERNAL MEDICINE

## 2020-08-26 PROCEDURE — 78452 STRESS TEST WITH MYOCARDIAL PERFUSION (CUPID ONLY): ICD-10-PCS | Mod: 26,,, | Performed by: INTERNAL MEDICINE

## 2020-08-26 PROCEDURE — 93016 STRESS TEST WITH MYOCARDIAL PERFUSION (CUPID ONLY): ICD-10-PCS | Mod: ,,, | Performed by: INTERNAL MEDICINE

## 2020-08-26 PROCEDURE — A9502 TC99M TETROFOSMIN: HCPCS

## 2020-08-26 RX ORDER — REGADENOSON 0.08 MG/ML
0.4 INJECTION, SOLUTION INTRAVENOUS ONCE
Status: COMPLETED | OUTPATIENT
Start: 2020-08-26 | End: 2020-08-26

## 2020-08-26 RX ORDER — TRIAMCINOLONE ACETONIDE 40 MG/ML
40 INJECTION, SUSPENSION INTRA-ARTICULAR; INTRAMUSCULAR
Status: DISCONTINUED | OUTPATIENT
Start: 2020-08-25 | End: 2020-08-26 | Stop reason: HOSPADM

## 2020-08-26 RX ORDER — LIDOCAINE HYDROCHLORIDE 10 MG/ML
5 INJECTION, SOLUTION EPIDURAL; INFILTRATION; INTRACAUDAL; PERINEURAL
Status: DISCONTINUED | OUTPATIENT
Start: 2020-08-25 | End: 2020-08-26 | Stop reason: HOSPADM

## 2020-08-26 RX ADMIN — REGADENOSON 0.4 MG: 0.08 INJECTION, SOLUTION INTRAVENOUS at 08:08

## 2020-08-26 NOTE — PROCEDURES
Large Joint Aspiration/Injection: L knee    Date/Time: 8/25/2020 1:15 PM  Performed by: Maile Cuello MD  Authorized by: Maile Cuello MD     Consent Done?:  Yes (Written)  Indications:  Arthritis and pain  Timeout: prior to procedure the correct patient, procedure, and site was verified    Prep: patient was prepped and draped in usual sterile fashion      Local anesthesia used?: Yes    Local anesthetic:  Topical anesthetic    Details:  Needle Size:  22 G  Approach:  Anteromedial  Location:  Knee  Site:  L knee  Medications:  5 mL lidocaine (PF) 10 mg/ml (1%) 10 mg/mL (1 %); 40 mg triamcinolone acetonide 40 mg/mL  Patient tolerance:  Patient tolerated the procedure well with no immediate complications

## 2020-08-27 ENCOUNTER — OFFICE VISIT (OUTPATIENT)
Dept: ORTHOPEDICS | Facility: CLINIC | Age: 75
End: 2020-08-27
Payer: MEDICARE

## 2020-08-27 VITALS
OXYGEN SATURATION: 96 % | BODY MASS INDEX: 39.34 KG/M2 | WEIGHT: 236.13 LBS | TEMPERATURE: 97 F | RESPIRATION RATE: 17 BRPM | SYSTOLIC BLOOD PRESSURE: 138 MMHG | DIASTOLIC BLOOD PRESSURE: 6 MMHG | HEIGHT: 65 IN | HEART RATE: 86 BPM

## 2020-08-27 DIAGNOSIS — M17.0 PRIMARY OSTEOARTHRITIS OF BOTH KNEES: Primary | ICD-10-CM

## 2020-08-27 PROCEDURE — 99499 UNLISTED E&M SERVICE: CPT | Mod: S$PBB,,, | Performed by: ORTHOPAEDIC SURGERY

## 2020-08-27 PROCEDURE — 99999 PR PBB SHADOW E&M-EST. PATIENT-LVL IV: CPT | Mod: PBBFAC,,, | Performed by: ORTHOPAEDIC SURGERY

## 2020-08-27 PROCEDURE — 99499 NO LOS: ICD-10-PCS | Mod: S$PBB,,, | Performed by: ORTHOPAEDIC SURGERY

## 2020-08-27 PROCEDURE — 99999 PR PBB SHADOW E&M-EST. PATIENT-LVL IV: ICD-10-PCS | Mod: PBBFAC,,, | Performed by: ORTHOPAEDIC SURGERY

## 2020-08-27 PROCEDURE — 20610 DRAIN/INJ JOINT/BURSA W/O US: CPT | Mod: 50,PBBFAC,PN | Performed by: ORTHOPAEDIC SURGERY

## 2020-08-27 PROCEDURE — 20610 LARGE JOINT ASPIRATION/INJECTION: BILATERAL KNEE: ICD-10-PCS | Mod: 50,S$PBB,, | Performed by: ORTHOPAEDIC SURGERY

## 2020-08-27 RX ORDER — TRIAMCINOLONE ACETONIDE 40 MG/ML
40 INJECTION, SUSPENSION INTRA-ARTICULAR; INTRAMUSCULAR
Status: DISCONTINUED | OUTPATIENT
Start: 2020-08-27 | End: 2020-08-27 | Stop reason: HOSPADM

## 2020-08-27 RX ORDER — LIDOCAINE HYDROCHLORIDE 10 MG/ML
5 INJECTION, SOLUTION EPIDURAL; INFILTRATION; INTRACAUDAL; PERINEURAL
Status: DISCONTINUED | OUTPATIENT
Start: 2020-08-27 | End: 2020-08-27 | Stop reason: HOSPADM

## 2020-08-27 RX ADMIN — LIDOCAINE HYDROCHLORIDE 5 ML: 10 INJECTION, SOLUTION EPIDURAL; INFILTRATION; INTRACAUDAL; PERINEURAL at 07:08

## 2020-08-27 RX ADMIN — TRIAMCINOLONE ACETONIDE 40 MG: 40 INJECTION, SUSPENSION INTRA-ARTICULAR; INTRAMUSCULAR at 07:08

## 2020-08-27 NOTE — PROGRESS NOTES
Follow up visit    History of Present Illness:   Mena comes to the office for follow up evaluation of bilateral knee arthritis .  Recommended treatment at the last visit included injection, delayed until after her stress test yesterday.  Has appt with endocrine tomorrow. Last A1c 6.6    ROS: unremarkable and no change since last visit    Physical Examination:    NAD  Left and Right knee  No change in exam     Radiographic imaging: no new images    Assessment/Plan:  74 y.o. female  with Bilateral knee arthritis     We discussed the etiology of persistent pain and further treatment options.  Injection of the bilateral knee  performed, please see procedure note for more details.  Prior to the injection risks and benefits of corticosteroid injection were discussed with the patient including pain, infection, bleeding, skin color changes, swelling, steroid flare. We discussed that over time injections can result in chondral damage, acceleration of arthritis formation, damage to tendons and damage to joints.  The patient consented for the procedure.  Post-injection instructions were given to the patient in writing.I discussed the risk of increased blood glucose following steroid injection in the setting of diabetes- the patient will monitor closely for the next 24 hours and call her primary care physician with any questions or concerns regarding blood glucose control   1. Return for follow up visit: PRN    All questions were answered in detail. The patient  verbalized the understanding of the treatment plan and is in full agreement with the treatment plan.

## 2020-08-27 NOTE — PROCEDURES
Large Joint Aspiration/Injection: bilateral knee    Date/Time: 8/27/2020 7:45 AM  Performed by: Maile Cuello MD  Authorized by: Maile Cuello MD     Consent Done?:  Yes (Written)  Indications:  Pain  Timeout: prior to procedure the correct patient, procedure, and site was verified    Prep: patient was prepped and draped in usual sterile fashion      Local anesthesia used?: Yes    Local anesthetic:  Topical anesthetic    Details:  Needle Size:  22 G  Approach: anteromedial R and superior L - changed approach due to pain w R knee injection.  Location:  Knee  Laterality:  Bilateral  Site:  Bilateral knee  Medications (Right):  5 mL lidocaine (PF) 10 mg/ml (1%) 10 mg/mL (1 %); 40 mg triamcinolone acetonide 40 mg/mL  Medications (Left):  5 mL lidocaine (PF) 10 mg/ml (1%) 10 mg/mL (1 %); 40 mg triamcinolone acetonide 40 mg/mL  Patient tolerance:  Patient tolerated the procedure well with no immediate complications

## 2020-09-02 ENCOUNTER — OFFICE VISIT (OUTPATIENT)
Dept: ORTHOPEDICS | Facility: CLINIC | Age: 75
End: 2020-09-02
Payer: COMMERCIAL

## 2020-09-02 VITALS
HEART RATE: 78 BPM | WEIGHT: 231.5 LBS | TEMPERATURE: 99 F | RESPIRATION RATE: 18 BRPM | BODY MASS INDEX: 38.57 KG/M2 | SYSTOLIC BLOOD PRESSURE: 142 MMHG | HEIGHT: 65 IN | OXYGEN SATURATION: 97 % | DIASTOLIC BLOOD PRESSURE: 64 MMHG

## 2020-09-02 DIAGNOSIS — M25.562 PAIN IN BOTH KNEES, UNSPECIFIED CHRONICITY: Primary | ICD-10-CM

## 2020-09-02 DIAGNOSIS — M25.561 PAIN IN BOTH KNEES, UNSPECIFIED CHRONICITY: Primary | ICD-10-CM

## 2020-09-02 DIAGNOSIS — I10 ESSENTIAL HYPERTENSION: ICD-10-CM

## 2020-09-02 PROCEDURE — 99213 OFFICE O/P EST LOW 20 MIN: CPT | Mod: S$GLB,,, | Performed by: ORTHOPAEDIC SURGERY

## 2020-09-02 PROCEDURE — 99213 PR OFFICE/OUTPT VISIT, EST, LEVL III, 20-29 MIN: ICD-10-PCS | Mod: S$GLB,,, | Performed by: ORTHOPAEDIC SURGERY

## 2020-09-02 PROCEDURE — 99999 PR PBB SHADOW E&M-EST. PATIENT-LVL IV: CPT | Mod: PBBFAC,,, | Performed by: ORTHOPAEDIC SURGERY

## 2020-09-02 PROCEDURE — 99999 PR PBB SHADOW E&M-EST. PATIENT-LVL IV: ICD-10-PCS | Mod: PBBFAC,,, | Performed by: ORTHOPAEDIC SURGERY

## 2020-09-02 RX ORDER — DICLOFENAC SODIUM 50 MG/1
50 TABLET, DELAYED RELEASE ORAL 2 TIMES DAILY
Qty: 60 TABLET | Refills: 0 | Status: SHIPPED | OUTPATIENT
Start: 2020-09-02 | End: 2021-01-28 | Stop reason: SDUPTHER

## 2020-09-02 NOTE — PROGRESS NOTES
Follow up visit    Interval history (09/02/2020): Here after injection into the bilateral knees  Right feels fine  Had relief of pain in the left knee Friday and Saturday but pain returned Sunday when she got out of bed, Rating the pain as 9/10     Interval history (8/27/20):  Mena comes to the office for follow up evaluation of bilateral knee arthritis .  Recommended treatment at the last visit included injection, delayed until after her stress test yesterday.  Has appt with endocrine tomorrow. Last A1c 6.6:      HPI (08/25/2020): Mena Odonnell is a 74 y.o. female who presents today complaining of Pain of the Right Knee and Pain of the Left Knee     Duration of symptoms: a little over 1  month  Trauma or new activity: no  Pain is constant   Left worse than right   Aggravating factors: weight bearing activity, going from sit to stand, stairs   Relieving factors: rest  Walks with a cane   Radicular symptoms: no numbness, paresthesias   Associated symptoms:  swelling, giving way and stiffness.    Prior treatment:  tylenol  or advil when pain is really bad. Has been seen by a chiropractor who has done acupuncture, ultrasound and electric stim without relief   Some relief with alternating voltaren gel and biofreeze. Has not done injections or PT     Pain does interfere with activities of daily living .     This is the extent of the patient's complaints at this time.      Occupation: Desk Worker     Review of Systems   All other systems reviewed and are negative.              Review of patient's allergies indicates:   Allergen Reactions    Atorvastatin         Muscle pain     Crestor [rosuvastatin] Other (See Comments)       Leg Weakness.             Current Outpatient Medications:     amLODIPine (NORVASC) 10 MG tablet, Take 1 tablet (10 mg total) by mouth once daily., Disp: 90 tablet, Rfl: 1    aspirin 81 MG Chew, Take 1 tablet (81 mg total) by mouth once daily., Disp: 30 tablet, Rfl: 0    cetirizine (ZYRTEC)  10 MG tablet, TAKE 1 TABLET BY MOUTH ONCE DAILY FOR 30 DAYS, Disp: , Rfl: 3    CONTOUR NEXT TEST STRIPS Strp, USE 1 STRIP TO CHECK GLUCOSE 4 TIMES DAILY, Disp: , Rfl:     diclofenac sodium (VOLTAREN) 1 % Gel, Lower extremities: Apply the gel (4 g) to the affected area 4 times daily. Do not apply more than 16 g daily to any one affected joint of the lower extremities. Upper extremities: Apply the gel (2 g) to the affected area 4 times daily. Do not apply more than 8 g daily to any one affected joint of the upper extremities. Total dose should not exceed 32 g per day, overall affected joints., Disp: 100 g, Rfl: 5    empagliflozin (JARDIANCE) 10 mg tablet, Take 1 tablet (10 mg total) by mouth once daily., Disp: 90 tablet, Rfl: 2    flash glucose sensor (FREESTYLE ROSAURA 14 DAY SENSOR) Kit, 1 sensor kit every 14 days (requires 2 kits per month), Disp: 2 kit, Rfl: 6    fluocinolone acetonide oil 0.01 % Drop, Place 4 drops in ear(s) 2 (two) times daily., Disp: 20 mL, Rfl: 5    gabapentin (NEURONTIN) 100 MG capsule, Take 2 capsules (200 mg total) by mouth 2 (two) times daily., Disp: 360 capsule, Rfl: 2    insulin glargine U-300 conc (TOUJEO MAX U-300 SOLOSTAR) 300 unit/mL (3 mL) InPn, Inject 90 units daily., Disp: 4 Syringe, Rfl: 5    insulin lispro (HUMALOG KWIKPEN INSULIN) 100 unit/mL pen, Take 16 units before breakfast and 26 units before lunch, 26 units before dinner, Disp: 2 Box, Rfl: 5    losartan-hydrochlorothiazide 100-25 mg (HYZAAR) 100-25 mg per tablet, Take 1 tablet by mouth once daily., Disp: 90 tablet, Rfl: 3    metoprolol succinate (TOPROL-XL) 25 MG 24 hr tablet, Take 1 tablet (25 mg total) by mouth once daily., Disp: 90 tablet, Rfl: 3    montelukast (SINGULAIR) 10 mg tablet, , Disp: , Rfl:     oxyCODONE-acetaminophen (PERCOCET) 5-325 mg per tablet, Take 1 tablet by mouth every 4 (four) hours as needed for Pain., Disp: 30 tablet, Rfl: 0    oxyCODONE-acetaminophen (PERCOCET) 5-325 mg per tablet,  Take 1 tablet by mouth every 4 (four) hours as needed for Pain., Disp: 30 tablet, Rfl: 0    pravastatin (PRAVACHOL) 80 MG tablet, Take 1 tablet (80 mg total) by mouth once daily., Disp: 90 tablet, Rfl: 3    semaglutide (OZEMPIC) 1 mg/dose (2 mg/1.5 mL) PnIj, Inject 1 mg once weekly, Disp: 2 Syringe, Rfl: 6          Past Medical History:   Diagnosis Date    Diabetes mellitus type II      Hyperlipidemia      Hypertension      Obesity      Osteoarthritis      Peripheral neuropathy      Severe obesity (BMI 35.0-39.9) with comorbidity 12/30/2016    Tendonitis       left foot             Patient Active Problem List   Diagnosis    Type 2 diabetes mellitus with diabetic polyneuropathy    Hyperlipidemia    HTN (hypertension)    Knee pain    H/O vitamin D deficiency    Non morbid obesity due to excess calories    Bilateral carotid bruits    Osteoarthritis of right knee    Morbid obesity with body mass index (BMI) of 40.0 or higher    Dyspnea on exertion    Chronic cough    MEKA (obstructive sleep apnea)    Sensorineural hearing loss (SNHL) of right ear with restricted hearing of left ear    Tinnitus of both ears    Allergic rhinitis    Dysfunction of both eustachian tubes    Nasal septal deviation    Chronic eczematous otitis externa of both ears    Neck abscess    Elevated blood pressure reading    Epidermal inclusion cyst    Polyneuropathy associated with underlying disease               Past Surgical History:   Procedure Laterality Date    BLADDER SUSPENSION        CATARACT EXTRACTION        COLONOSCOPY N/A 9/26/2015     Procedure: COLONOSCOPY;  Surgeon: Javed Wood MD;  Location: [address]);  Service: Endoscopy;  Laterality: N/A;    HYSTERECTOMY   1978     fibroids    NECK MASS EXCISION   11/6/2019     Procedure: EXCISION, MASS, NECK;  Surgeon: Edwin Barrow MD;  Location: [facility];  Service: General;;  RN PREOP 10/30/2019    TONSILLECTOMY             Social History  "           Tobacco Use    Smoking status: Former Smoker       Packs/day: 0.25       Years: 15.00       Pack years: 3.75       Quit date: 1982       Years since quittin.7    Smokeless tobacco: Never Used   Substance Use Topics    Alcohol use: No    Drug use: No               Family History   Problem Relation Age of Onset    Thyroid disease Mother      Cataracts Mother      Diabetes Mother      Stroke Mother      Hypertension Mother      Thyroid disease Sister      Diabetes Sister      Hypertension Sister      Hypertension Daughter      Diabetes Son      Diabetes Sister      Hypertension Sister      Hypertension Brother      Hypertension Sister      Hypertension Sister      Hypertension Brother      Thyroid disease Maternal Aunt      Thyroid disease Maternal Aunt      Colon cancer Maternal Grandfather      Breast cancer Neg Hx      Ovarian cancer Neg Hx      Amblyopia Neg Hx      Blindness Neg Hx      Cancer Neg Hx      Glaucoma Neg Hx      Macular degeneration Neg Hx      Retinal detachment Neg Hx      Strabismus Neg Hx           Physical Exam:   Vitals:    20 1046   BP: (!) 142/64   Pulse: 78   Resp: 18   Temp: 98.6 °F (37 °C)   SpO2: 97%   Weight: 105 kg (231 lb 7.7 oz)   Height: 5' 5" (1.651 m)   PainSc:   9   PainLoc: Knee         General: Weight: 106.4 kg (234 lb 9.1 oz) Body mass index is 39.03 kg/m².  Patient is alert, awake and oriented to time, place and person. Mood and affect are appropriate.  Patient does not appear to be in any distress, denies any constitutional symptoms and appears stated age.   HEENT: Pupils are equal and round, sclera are not injected. External examination of ears and nose reveals no abnormalities. Cranial nerves II-X are grossly intact  Skin: no rashes, abrasions or open wounds on the affected extremity   Resp: No respiratory distress or audible wheezing   CV: 2+  pulses, all extremities warm and well perfused   Left  " knee(s)  Localizes pain over MJL  No soft tissue swelling or erythema   Active ROM: R 0 - 120 L  0-100   no varus/valgus deformity   Tender to palpation over medial joint line   good  quadricep muscle tone and bulk; no atrophy compared to contralateral extremity   normal (0 - 2 mm) Anterior drawer   normal (0 - 2 mm) posterior drawer   negative valgus instability   negative varus instability   Normal patellar tracking   No pain with patellar compression   Ltsi s/s/sp/dp/t  + ehl/fhl/ta/gs  2+ DP        Imaging:  3 views bilateral knees:  moderate tricompartmental OA with subchondral sclerosis, joint space narrowing, osteophyte formation on R.  Minimal OA changes on L.      I personally reviewed and interpreted the patient's imaging obtained today in clinic     Assessment: 74 y.o. female with Bilateral  knee osteoarthritis      We discussed the findings on xray and clinical exam as well as the natural history of arthritis. We discussed non operative and operative treatment options including injections, viscosupplementation, physical therapy and PO NSAIDs.  She would like to start with non-operative treatment in the form of injection.      Plan:   - Diclofenac 50 mg PO BID x 2 weeks then PRN. Take with food. The patient was advised that NSAID-type medications have multiple very important potential side effects: gastrointestinal irritation including Gi bleed, cardiac effects and renal injuries. Instructions were given to take the medication with food and to stop for any GI upset. Call for vomiting, abdominal pain or black/bloody stools. Do not take with other NSAIDs such as ibuprofen or naproxen.  The patient expresses understanding of these issues and questions were answered.   - If no improvement can consider MRI       All questions were answered in detail. The patient is in full agreement with the treatment plan and will proceed accordingly.     A note notifying Dr. Leonard Wells of my findings was sent via the  electronic medical record      This note was created by combination of typed  and M-Modal dictation. Transcription and phonetic errors may be present.  If there are any questions, please contact me.

## 2020-09-03 RX ORDER — AMLODIPINE BESYLATE 10 MG/1
10 TABLET ORAL DAILY
Qty: 90 TABLET | Refills: 1 | Status: SHIPPED | OUTPATIENT
Start: 2020-09-03 | End: 2020-11-04 | Stop reason: SDUPTHER

## 2020-09-03 NOTE — TELEPHONE ENCOUNTER
Last Office Visit Info:   The patient's last visit with Leonard Wells MD was on 8/3/2020.    The patient's last visit in current department was on 8/3/2020.        Last CBC Results:   Lab Results   Component Value Date    WBC 7.07 12/07/2019    HGB 13.4 12/07/2019    HCT 43.3 12/07/2019     12/07/2019       Last CMP Results  Lab Results   Component Value Date     12/07/2019    K 4.2 12/07/2019     12/07/2019    CO2 26 12/07/2019    BUN 14 12/07/2019    CREATININE 0.7 12/07/2019    CALCIUM 9.8 12/07/2019    ALBUMIN 4.0 06/24/2020    AST 15 06/24/2020    ALT 19 06/24/2020       Last Lipids  Lab Results   Component Value Date    CHOL 203 (H) 06/24/2020    TRIG 136 06/24/2020    HDL 42 06/24/2020    LDLCALC 133.8 06/24/2020       Last A1C  Lab Results   Component Value Date    HGBA1C 6.6 (H) 06/24/2020       Last TSH  Lab Results   Component Value Date    TSH 0.445 11/17/2018         Current Med Refills  Medication List with Changes/Refills   Current Medications    AMLODIPINE (NORVASC) 10 MG TABLET    Take 1 tablet (10 mg total) by mouth once daily.       Start Date: 8/3/2020  End Date: 1/30/2021    ASPIRIN 81 MG CHEW    Take 1 tablet (81 mg total) by mouth once daily.       Start Date: 11/6/2014 End Date: 9/17/2020    CETIRIZINE (ZYRTEC) 10 MG TABLET    TAKE 1 TABLET BY MOUTH ONCE DAILY FOR 30 DAYS       Start Date: 5/27/2019 End Date: --    CONTOUR NEXT TEST STRIPS STRP    USE 1 STRIP TO CHECK GLUCOSE 4 TIMES DAILY       Start Date: 3/2/2020  End Date: --    DICLOFENAC (VOLTAREN) 50 MG EC TABLET    Take 1 tablet (50 mg total) by mouth 2 (two) times daily.       Start Date: 9/2/2020  End Date: --    DICLOFENAC SODIUM (VOLTAREN) 1 % GEL    Lower extremities: Apply the gel (4 g) to the affected area 4 times daily. Do not apply more than 16 g daily to any one affected joint of the lower extremities. Upper extremities: Apply the gel (2 g) to the affected area 4 times daily. Do not apply more than 8 g  daily to any one affected joint of the upper extremities. Total dose should not exceed 32 g per day, overall affected joints.       Start Date: 8/3/2020  End Date: --    EMPAGLIFLOZIN (JARDIANCE) 10 MG TABLET    Take 1 tablet (10 mg total) by mouth once daily.       Start Date: 7/8/2020  End Date: --    FLASH GLUCOSE SENSOR (FREESTYLE ROSAURA 14 DAY SENSOR) KIT    1 sensor kit every 14 days (requires 2 kits per month)       Start Date: 6/27/2019 End Date: --    FLUOCINOLONE ACETONIDE OIL 0.01 % DROP    Place 4 drops in ear(s) 2 (two) times daily.       Start Date: 5/29/2019 End Date: --    GABAPENTIN (NEURONTIN) 100 MG CAPSULE    Take 2 capsules (200 mg total) by mouth 2 (two) times daily.       Start Date: 1/2/2020  End Date: 1/1/2021    INSULIN GLARGINE U-300 CONC (TOUJEO MAX U-300 SOLOSTAR) 300 UNIT/ML (3 ML) INPN    Inject 90 units daily.       Start Date: 3/30/2020 End Date: --    INSULIN LISPRO (HUMALOG KWIKPEN INSULIN) 100 UNIT/ML PEN    Take 16 units before breakfast and 26 units before lunch, 26 units before dinner       Start Date: 3/30/2020 End Date: --    LOSARTAN-HYDROCHLOROTHIAZIDE 100-25 MG (HYZAAR) 100-25 MG PER TABLET    Take 1 tablet by mouth once daily.       Start Date: 3/10/2020 End Date: 3/10/2021    METOPROLOL SUCCINATE (TOPROL-XL) 25 MG 24 HR TABLET    Take 1 tablet (25 mg total) by mouth once daily.       Start Date: 8/22/2020 End Date: --    MONTELUKAST (SINGULAIR) 10 MG TABLET           Start Date: 3/3/2020  End Date: --    OXYCODONE-ACETAMINOPHEN (PERCOCET) 5-325 MG PER TABLET    Take 1 tablet by mouth every 4 (four) hours as needed for Pain.       Start Date: 10/1/2019 End Date: --    OXYCODONE-ACETAMINOPHEN (PERCOCET) 5-325 MG PER TABLET    Take 1 tablet by mouth every 4 (four) hours as needed for Pain.       Start Date: 11/6/2019 End Date: --    PRAVASTATIN (PRAVACHOL) 80 MG TABLET    Take 1 tablet (80 mg total) by mouth once daily.       Start Date: 7/8/2020  End Date: 7/8/2021     SEMAGLUTIDE (OZEMPIC) 1 MG/DOSE (2 MG/1.5 ML) PNIJ    Inject 1 mg once weekly       Start Date: 7/8/2020  End Date: --       Order(s) placed per written order guidelines:    Please advise.

## 2020-09-04 ENCOUNTER — CLINICAL SUPPORT (OUTPATIENT)
Dept: ENDOCRINOLOGY | Facility: CLINIC | Age: 75
End: 2020-09-04
Payer: COMMERCIAL

## 2020-09-04 ENCOUNTER — OFFICE VISIT (OUTPATIENT)
Dept: ENDOCRINOLOGY | Facility: CLINIC | Age: 75
End: 2020-09-04
Payer: COMMERCIAL

## 2020-09-04 VITALS
DIASTOLIC BLOOD PRESSURE: 75 MMHG | TEMPERATURE: 98 F | SYSTOLIC BLOOD PRESSURE: 177 MMHG | BODY MASS INDEX: 38.72 KG/M2 | HEART RATE: 69 BPM | WEIGHT: 232.69 LBS

## 2020-09-04 DIAGNOSIS — E78.5 HYPERLIPIDEMIA, UNSPECIFIED HYPERLIPIDEMIA TYPE: ICD-10-CM

## 2020-09-04 DIAGNOSIS — E66.01 SEVERE OBESITY (BMI 35.0-39.9) WITH COMORBIDITY: ICD-10-CM

## 2020-09-04 DIAGNOSIS — E11.42 TYPE 2 DIABETES MELLITUS WITH DIABETIC POLYNEUROPATHY, WITH LONG-TERM CURRENT USE OF INSULIN: Primary | ICD-10-CM

## 2020-09-04 DIAGNOSIS — Z79.4 TYPE 2 DIABETES MELLITUS WITH DIABETIC POLYNEUROPATHY, WITH LONG-TERM CURRENT USE OF INSULIN: Primary | ICD-10-CM

## 2020-09-04 DIAGNOSIS — Z71.9 VISIT FOR COUNSELING: ICD-10-CM

## 2020-09-04 DIAGNOSIS — E11.649 HYPOGLYCEMIA UNAWARENESS ASSOCIATED WITH TYPE 2 DIABETES MELLITUS: ICD-10-CM

## 2020-09-04 PROCEDURE — 99999 PR PBB SHADOW E&M-EST. PATIENT-LVL V: ICD-10-PCS | Mod: PBBFAC,,, | Performed by: NURSE PRACTITIONER

## 2020-09-04 PROCEDURE — 99215 OFFICE O/P EST HI 40 MIN: CPT | Mod: S$GLB,,, | Performed by: NURSE PRACTITIONER

## 2020-09-04 PROCEDURE — 99999 PR PBB SHADOW E&M-EST. PATIENT-LVL V: CPT | Mod: PBBFAC,,, | Performed by: NURSE PRACTITIONER

## 2020-09-04 PROCEDURE — 99215 PR OFFICE/OUTPT VISIT, EST, LEVL V, 40-54 MIN: ICD-10-PCS | Mod: S$GLB,,, | Performed by: NURSE PRACTITIONER

## 2020-09-04 PROCEDURE — 95251 CONT GLUC MNTR ANALYSIS I&R: CPT | Mod: S$GLB,,, | Performed by: NURSE PRACTITIONER

## 2020-09-04 PROCEDURE — 95251 PR GLUCOSE MONITOR, 72 HOUR, PHYS INTERP: ICD-10-PCS | Mod: S$GLB,,, | Performed by: NURSE PRACTITIONER

## 2020-09-04 RX ORDER — INSULIN LISPRO 100 [IU]/ML
INJECTION, SOLUTION INTRAVENOUS; SUBCUTANEOUS
Qty: 2 BOX | Refills: 5
Start: 2020-09-04 | End: 2021-03-05 | Stop reason: SDUPTHER

## 2020-09-04 NOTE — PATIENT INSTRUCTIONS
Will reduce insulin doses due to frequent hypoglycemia and also because she has a lot of room to improve in diet, which she is willing to do.   Decrease Touejo Max to 64 units once daily.  Reduce Humalog to 12 units before breakfast and 16 units before dinner.   Test glucose 4x/day. Before each meal and bedtime. Bring logs to every visit.   Follow up in 2 months with labs prior.       Avoid saturated fat. Saturated fats are found in animal foods such as butter, lard, botello, sausage, dark meat chicken, chicken skin, and high-fat dairy like cheese.

## 2020-09-04 NOTE — CONSULTS
Return of the Freestyle Giulia Pro Sensor and Patient Log.    Patient returned to clinic today to return Glucose Sensor.    The CGMS Sensor will be scanned and downloaded. All reports will be imported into the patient's electronic medical record.    Endocrine Nurse Practitioner will complete data interpretation and make recommendations.

## 2020-09-04 NOTE — PROGRESS NOTES
CC: This 74 y.o. Black or  female  is here for evaluation of  T2DM along with comorbidities indicated in the Visit Diagnosis section of this encounter.    HPI: Mena Odonnell was diagnosed with T2DM in ~ . Experienced blurry vision at the time of diagnosis r/t BG >400. Started on orals. Began insulin in . She is currently on MDI.          Prior visit 20  Advised pt to reduce insulin doses in March during phone conversation.   A1c is mildly up from A1c of 6.4% in March to now 6.6%.   Invokana no longer covered by [employer]. She will need to switch to Farxiga or Jardiance.   /62 this morning at home. Brings a BP lo-147/60s.   She never decreased Humalog dose before breakfast but did decrease Toujeo from 100 to 90.   She often injects Humalog during lunch or right after lunch.   Plan Switch from Invokana to Jardiance because of formulary change.   Unable to adjust insulin doses because complete glucose logs are not available.   Reviewed importance of regularly monitoring. Low A1c raises suspicion of hypoglycemia. Unsure if patient needs such high doses of insulin.   Advised pt to monitor glucoses before each meal and bedtime. Keep a log. Send a log in 2 weeks.   Undergo  Continuous Glucose Monitoring (CGM)   Return to clinic in 4-6 weeks for CGM study    Interval history  Pt returns for CGM f/u appt. CGM study was ordered d/t suspicion of undetected hypoglycemia from low A1c's. However, pt received steroid injection on  to both knees the day before she had CGM sensor placed.     She has lost 7 lb since last visit.     See CGM report below.     LAST DIABETES EDUCATION: 2019 mira Palmer - Found visit helpful     HOSPITALIZED FOR DIABETES -  No.    DIABETES MEDICATIONS: Jardiance 25 mg once daily in the AM, Ozempic 1 mg once weekly, Toujeo Max 90 units once daily in 6 am, Humalog Kwikpen 16-0-26 units ac     Misses medication doses - no   If she forgets to take 16 units  for breakfast, she will take it for lunch.     DM COMPLICATIONS: peripheral neuropathy    SIGNIFICANT DIABETES MED HISTORY:   Metformin stopped December 2016 r/t GI upset and diarrhea     SELF MONITORING BLOOD GLUCOSE: Checks blood glucose at home 1x/day. Cannot afford the Giulia.   CGM interpretation: glucoses overall mostly in range but she is having frequent lows after dinner and also overnight extending into the morning. Food log reveals that she is eating very high carb items and low vegetable intake. Examples:shrimp fried rice with coke, raisin toast with honey and juice, burger with chocolate malt. Likes to drink coke.     Average glucose 108, GMI 5.5%, SD 33.7%.       HYPOGLYCEMIC EPISODES: gets shaky sometimes and knows that she needs to eat.      CURRENT DIET:  drinking mostly water.   Breakfast ~ 8 am; lunch ~ 12 pm. Eats 3 meals/day.      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 No acute distress.   PSYCH: AAOx3, appropriate mood and affect, conversant, well-groomed. Judgement and insight good.   NEURO: Cranial nerves grossly intact. Speech clear, no tremor.   CHEST: Respirations even and unlabored.         Hemoglobin A1C   Date Value Ref Range Status   06/24/2020 6.6 (H) 4.0 - 5.6 % Final     Comment:     ADA Screening Guidelines:  5.7-6.4%  Consistent with prediabetes  >or=6.5%  Consistent with diabetes  High levels of fetal hemoglobin interfere with the HbA1C  assay. Heterozygous hemoglobin variants (HbS, HgC, etc)do  not significantly interfere with this assay.   However, presence of multiple variants may affect accuracy.     03/21/2020 6.4 (H) 4.0 - 5.6 % Final      Comment:     ADA Screening Guidelines:  5.7-6.4%  Consistent with prediabetes  >or=6.5%  Consistent with diabetes  High levels of fetal hemoglobin interfere with the HbA1C  assay. Heterozygous hemoglobin variants (HbS, HgC, etc)do  not significantly interfere with this assay.   However, presence of multiple variants may affect accuracy.     12/07/2019 7.4 (H) 4.0 - 5.6 % Final     Comment:     ADA Screening Guidelines:  5.7-6.4%  Consistent with prediabetes  >or=6.5%  Consistent with diabetes  High levels of fetal hemoglobin interfere with the HbA1C  assay. Heterozygous hemoglobin variants (HbS, HgC, etc)do  not significantly interfere with this assay.   However, presence of multiple variants may affect accuracy.     12/07/2019 7.4 (H) 4.0 - 5.6 % Final     Comment:     ADA Screening Guidelines:  5.7-6.4%  Consistent with prediabetes  >or=6.5%  Consistent with diabetes  High levels of fetal hemoglobin interfere with the HbA1C  assay. Heterozygous hemoglobin variants (HbS, HgC, etc)do  not significantly interfere with this assay.   However, presence of multiple variants may affect accuracy.             Chemistry        Component Value Date/Time     12/07/2019 0831    K 4.2 12/07/2019 0831     12/07/2019 0831    CO2 26 12/07/2019 0831    BUN 14 12/07/2019 0831    CREATININE 0.7 12/07/2019 0831     (H) 12/07/2019 0831        Component Value Date/Time    CALCIUM 9.8 12/07/2019 0831    ALKPHOS 64 06/24/2020 0715    AST 15 06/24/2020 0715    ALT 19 06/24/2020 0715    BILITOT 0.3 06/24/2020 0715    ESTGFRAFRICA >60.0 12/07/2019 0831    EGFRNONAA >60.0 12/07/2019 0831          Lab Results   Component Value Date    LDLCALC 133.8 06/24/2020        Ref. Range 3/21/2020 08:27 6/24/2020 07:15   Cholesterol Latest Ref Range: 120 - 199 mg/dL 184 203 (H)   HDL Latest Ref Range: 40 - 75 mg/dL 44 42   Hdl/Cholesterol Ratio Latest Ref Range: 20.0 - 50.0 % 23.9 20.7   LDL Cholesterol External Latest Ref Range: 63.0 -  159.0 mg/dL 123.6 133.8   Non-HDL Cholesterol Latest Units: mg/dL 140 161   Total Cholesterol/HDL Ratio Latest Ref Range: 2.0 - 5.0  4.2 4.8   Triglycerides Latest Ref Range: 30 - 150 mg/dL 82 136           Lab Results   Component Value Date    AIDA 56.4 (H) 06/24/2020               ASSESSMENT and PLAN:    A1C GOAL: < 7%     1. Type 2 diabetes mellitus with diabetic polyneuropathy, with long-term current use of insulin  Will reduce insulin doses due to frequent hypoglycemia and also because she has a lot of room to improve in diet, which she is willing to do. Diet is extremely poor.   Decrease Touejo Max to 64 units once daily.  Reduce Humalog to 12 units before breakfast and 16 units before dinner.   Test glucose 4x/day. Before each meal and bedtime. Bring logs to every visit.   Follow up in 2 months with labs prior.             Hemoglobin A1C    Basic metabolic panel    insulin lispro (HUMALOG KWIKPEN INSULIN) 100 unit/mL pen   2. Hyperlipidemia, unspecified hyperlipidemia type  Continue pravastatin. Intolerant of rosuvastatin and atorvastatin.     Avoid saturated fat. Saturated fats are found in animal foods such as butter, lard, botello, sausage, dark meat chicken, chicken skin, and high-fat dairy like cheese.      3.  Severe obesity Increases insulin resistance.   She would like to lose weight. Stressed importance of improving diet.    4.  Hypoglycemia unawareness  As above. Reviewed risks of frequent hypoglycemia.             Spent 40 minutes with patient with >50% time spent in counseling, as noted in # 1.         Orders Placed This Encounter   Procedures    Hemoglobin A1C     Standing Status:   Future     Standing Expiration Date:   11/3/2021    Basic metabolic panel     Standing Status:   Future     Standing Expiration Date:   11/3/2021        Follow up in about 2 months (around 11/4/2020).

## 2020-09-08 ENCOUNTER — PATIENT OUTREACH (OUTPATIENT)
Dept: ADMINISTRATIVE | Facility: OTHER | Age: 75
End: 2020-09-08

## 2020-09-08 DIAGNOSIS — G89.29 CHRONIC PAIN OF LEFT KNEE: ICD-10-CM

## 2020-09-08 DIAGNOSIS — M25.562 CHRONIC PAIN OF LEFT KNEE: ICD-10-CM

## 2020-09-08 NOTE — PROGRESS NOTES
Health Maintenance Due   Topic Date Due    Shingles Vaccine (2 of 3) 11/02/2014    Foot Exam  11/28/2019    Influenza Vaccine (1) 08/01/2020     Updates were requested from care everywhere.  Chart was reviewed for overdue Proactive [facility] Encounters (ISI) topics (CRS, Breast Cancer Screening, Eye exam)  Health Maintenance has been updated.  [place] immunization registry triggered.  Immunizations were reconciled.

## 2020-09-09 ENCOUNTER — OFFICE VISIT (OUTPATIENT)
Dept: CARDIOLOGY | Facility: CLINIC | Age: 75
End: 2020-09-09
Payer: COMMERCIAL

## 2020-09-09 VITALS
OXYGEN SATURATION: 96 % | DIASTOLIC BLOOD PRESSURE: 68 MMHG | SYSTOLIC BLOOD PRESSURE: 130 MMHG | HEART RATE: 77 BPM | BODY MASS INDEX: 38.79 KG/M2 | HEIGHT: 65 IN | WEIGHT: 232.81 LBS

## 2020-09-09 DIAGNOSIS — I35.0 NONRHEUMATIC AORTIC VALVE STENOSIS: Primary | ICD-10-CM

## 2020-09-09 DIAGNOSIS — R06.09 DYSPNEA ON EXERTION: ICD-10-CM

## 2020-09-09 DIAGNOSIS — E78.2 MIXED HYPERLIPIDEMIA: ICD-10-CM

## 2020-09-09 DIAGNOSIS — I10 ESSENTIAL HYPERTENSION: ICD-10-CM

## 2020-09-09 DIAGNOSIS — G47.33 OSA (OBSTRUCTIVE SLEEP APNEA): ICD-10-CM

## 2020-09-09 DIAGNOSIS — E66.01 MORBID OBESITY WITH BODY MASS INDEX (BMI) OF 40.0 OR HIGHER: ICD-10-CM

## 2020-09-09 DIAGNOSIS — E11.42 TYPE 2 DIABETES MELLITUS WITH DIABETIC POLYNEUROPATHY, WITH LONG-TERM CURRENT USE OF INSULIN: ICD-10-CM

## 2020-09-09 DIAGNOSIS — Z79.4 TYPE 2 DIABETES MELLITUS WITH DIABETIC POLYNEUROPATHY, WITH LONG-TERM CURRENT USE OF INSULIN: ICD-10-CM

## 2020-09-09 DIAGNOSIS — R03.0 ELEVATED BLOOD PRESSURE READING: ICD-10-CM

## 2020-09-09 PROCEDURE — 99999 PR PBB SHADOW E&M-EST. PATIENT-LVL IV: ICD-10-PCS | Mod: PBBFAC,,, | Performed by: INTERNAL MEDICINE

## 2020-09-09 PROCEDURE — 99214 OFFICE O/P EST MOD 30 MIN: CPT | Mod: S$GLB,,, | Performed by: INTERNAL MEDICINE

## 2020-09-09 PROCEDURE — 99214 PR OFFICE/OUTPT VISIT, EST, LEVL IV, 30-39 MIN: ICD-10-PCS | Mod: S$GLB,,, | Performed by: INTERNAL MEDICINE

## 2020-09-09 PROCEDURE — 99999 PR PBB SHADOW E&M-EST. PATIENT-LVL IV: CPT | Mod: PBBFAC,,, | Performed by: INTERNAL MEDICINE

## 2020-09-09 NOTE — PROGRESS NOTES
CARDIOVASCULAR CONSULTATION    REASON FOR CONSULT:   Mena Odonnell is a 74 y.o. female who presents for aortic valve stenosis and dyspnea on exertion      HISTORY OF PRESENT ILLNESS:     Patient is a pleasant 74-year-old lady.  Multiple risk factors for coronary artery disease.  Recently had an echocardiogram done.  Showed normal left ventricle systolic function with mild aortic valve stenosis.  Has been sent to Cardiology Clinic further evaluation.  Denies any chest pains at rest on exertion, however does complain of dyspnea on exertion.  Does have elevated blood pressure in the clinic and states that home when she checks it stays around 150-160 mm systolic.  She is currently on losartan hydrochlorothiazide as well as Norvasc at home.  Denies orthopnea, PND, swelling of feet.      · Hyperdynamic left ventricular systolic function. The estimated ejection fraction is 75%.  · Concentric left ventricular hypertrophy.  · Indeterminate left ventricular diastolic function.  · Normal right ventricular systolic function.  · Mild left atrial enlargement.  · Mild aortic valve stenosis.  · Aortic valve area is 1.55 cm2; peak velocity is 2.58 m/s; mean gradient is 14 mmHg.  · The estimated PA systolic pressure is 13 mmHg.      Notes from September 2020:    Patient doing fine.  Denies any chest pains at rest on exertion.  Stress test did not show any significant blockage.    The study shows normal myocardial perfusion.    The perfusion scan is free of evidence from myocardial ischemia or injury.    There is a mild to moderate intensity defect in the anterior wall of the left ventricle, secondary to breast attenuation.    Gated perfusion images showed an ejection fraction of 78%    There is normal wall motion at rest and post stress.    The EKG portion of this study is negative for ischemia.    The patient reported no chest pain during the stress test.    There were no arrhythmias during stress.      PAST MEDICAL  HISTORY:     Past Medical History:   Diagnosis Date    Cataract     Diabetes mellitus type II     DM retinopathy     Hyperlipidemia     Hypertension     Obesity     Osteoarthritis     Peripheral neuropathy     Severe obesity (BMI 35.0-39.9) with comorbidity 12/30/2016    Tendonitis     left foot       PAST SURGICAL HISTORY:     Past Surgical History:   Procedure Laterality Date    BLADDER SUSPENSION      CATARACT EXTRACTION      COLONOSCOPY N/A 9/26/2015    Procedure: COLONOSCOPY;  Surgeon: Javed Wood MD;  Location: [address]);  Service: Endoscopy;  Laterality: N/A;    HYSTERECTOMY  1978    fibroids    NECK MASS EXCISION  11/6/2019    Procedure: EXCISION, MASS, NECK;  Surgeon: Edwin Barrow MD;  Location: [facility];  Service: General;;  RN PREOP 10/30/2019    TONSILLECTOMY         ALLERGIES AND MEDICATION:     Review of patient's allergies indicates:   Allergen Reactions    Atorvastatin      Muscle pain     Crestor [rosuvastatin] Other (See Comments)     Leg Weakness.         Medication List          Accurate as of September 9, 2020  2:58 PM. If you have any questions, ask your nurse or doctor.            CONTINUE taking these medications    amLODIPine 10 MG tablet  Commonly known as: NORVASC  Take 1 tablet (10 mg total) by mouth once daily.     aspirin 81 MG Chew  Take 1 tablet (81 mg total) by mouth once daily.     cetirizine 10 MG tablet  Commonly known as: ZYRTEC     CONTOUR NEXT TEST STRIPS Strp  Generic drug: blood sugar diagnostic     * diclofenac sodium 1 % Gel  Commonly known as: VOLTAREN  Lower extremities: Apply the gel (4 g) to the affected area 4 times daily. Do not apply more than 16 g daily to any one affected joint of the lower extremities. Upper extremities: Apply the gel (2 g) to the affected area 4 times daily. Do not apply more than 8 g daily to any one affected joint of the upper extremities. Total dose should not exceed 32 g per day, overall affected  joints.     * diclofenac 50 MG EC tablet  Commonly known as: VOLTAREN  Take 1 tablet (50 mg total) by mouth 2 (two) times daily.     flash glucose sensor Kit  Commonly known as: FREESTYLE ROSAURA 14 DAY SENSOR  1 sensor kit every 14 days (requires 2 kits per month)     fluocinolone acetonide oiL 0.01 % Drop  Place 4 drops in ear(s) 2 (two) times daily.     gabapentin 100 MG capsule  Commonly known as: NEURONTIN  Take 2 capsules (200 mg total) by mouth 2 (two) times daily.     insulin glargine U-300 conc 300 unit/mL (3 mL) Inpn  Commonly known as: TOUJEO MAX U-300 SOLOSTAR  Inject 90 units daily.     insulin lispro 100 unit/mL pen  Commonly known as: HumaLOG KwikPen Insulin  Inject 12 units before breakfast and 16 units before dinner     JARDIANCE 10 mg tablet  Generic drug: empagliflozin  Take 1 tablet (10 mg total) by mouth once daily.     losartan-hydrochlorothiazide 100-25 mg 100-25 mg per tablet  Commonly known as: HYZAAR  Take 1 tablet by mouth once daily.     metoprolol succinate 25 MG 24 hr tablet  Commonly known as: TOPROL-XL  Take 1 tablet (25 mg total) by mouth once daily.     montelukast 10 mg tablet  Commonly known as: SINGULAIR     * oxyCODONE-acetaminophen 5-325 mg per tablet  Commonly known as: PERCOCET  Take 1 tablet by mouth every 4 (four) hours as needed for Pain.     * oxyCODONE-acetaminophen 5-325 mg per tablet  Commonly known as: PERCOCET  Take 1 tablet by mouth every 4 (four) hours as needed for Pain.     OZEMPIC 1 mg/dose (2 mg/1.5 mL) Pnij  Generic drug: semaglutide  Inject 1 mg once weekly     pravastatin 80 MG tablet  Commonly known as: PRAVACHOL  Take 1 tablet (80 mg total) by mouth once daily.         * This list has 4 medication(s) that are the same as other medications prescribed for you. Read the directions carefully, and ask your doctor or other care provider to review them with you.                SOCIAL HISTORY:     Social History     Socioeconomic History    Marital status:       Spouse name: Not on file    Number of children: 2    Years of education: Not on file    Highest education level: Not on file   Occupational History     Employer: [employer]   Social Needs    Financial resource strain: Not on file    Food insecurity     Worry: Not on file     Inability: Not on file    Transportation needs     Medical: Not on file     Non-medical: Not on file   Tobacco Use    Smoking status: Former Smoker     Packs/day: 0.25     Years: 15.00     Pack years: 3.75     Quit date: 1982     Years since quittin.7    Smokeless tobacco: Never Used   Substance and Sexual Activity    Alcohol use: No    Drug use: No    Sexual activity: Not Currently     Partners: Male     Birth control/protection: Abstinence   Lifestyle    Physical activity     Days per week: Not on file     Minutes per session: Not on file    Stress: Not on file   Relationships    Social connections     Talks on phone: Not on file     Gets together: Not on file     Attends Restorationism service: Not on file     Active member of club or organization: Not on file     Attends meetings of clubs or organizations: Not on file     Relationship status: Not on file   Other Topics Concern    Not on file   Social History Narrative    . One daughter. Works as an Apartment        FAMILY HISTORY:     Family History   Problem Relation Age of Onset    Thyroid disease Mother     Cataracts Mother     Diabetes Mother     Stroke Mother     Hypertension Mother     Thyroid disease Sister     Diabetes Sister     Hypertension Sister     Hypertension Daughter     Diabetes Son     Diabetes Sister     Hypertension Sister     Hypertension Brother     Hypertension Sister     Hypertension Sister     Hypertension Brother     Thyroid disease Maternal Aunt     Thyroid disease Maternal Aunt     Colon cancer Maternal Grandfather     Breast cancer Neg Hx     Ovarian cancer Neg Hx     Amblyopia Neg Hx   "   Blindness Neg Hx     Cancer Neg Hx     Glaucoma Neg Hx     Macular degeneration Neg Hx     Retinal detachment Neg Hx     Strabismus Neg Hx        REVIEW OF SYSTEMS:   Review of Systems   Constitution: Negative.   HENT: Negative.    Eyes: Negative.    Cardiovascular: Positive for dyspnea on exertion.   Respiratory: Negative.    Endocrine: Negative.    Hematologic/Lymphatic: Negative.    Skin: Negative.    Musculoskeletal: Negative.    Gastrointestinal: Negative.    Genitourinary: Negative.    Neurological: Negative.    Psychiatric/Behavioral: Negative.    Allergic/Immunologic: Negative.        A 10 point review of systems was performed and all the pertinent positives have been mentioned. Rest of review of systems was negative.        PHYSICAL EXAM:     Vitals:    09/09/20 1438   BP: 130/68   Pulse: 77    Body mass index is 38.74 kg/m².  Weight: 105.6 kg (232 lb 12.9 oz)   Height: 5' 5" (165.1 cm)     Physical Exam   Constitutional: She is oriented to person, place, and time. She appears well-developed and well-nourished.   HENT:   Head: Normocephalic.   Eyes: Pupils are equal, round, and reactive to light.   Neck: Normal range of motion. Neck supple.   Cardiovascular: Normal rate and regular rhythm.   Murmur heard.  Pulmonary/Chest: Effort normal and breath sounds normal.   Abdominal: Soft. Normal appearance and bowel sounds are normal. There is no abdominal tenderness.   Musculoskeletal: Normal range of motion.   Neurological: She is alert and oriented to person, place, and time.   Skin: Skin is warm.   Psychiatric: She has a normal mood and affect.         DATA:     Laboratory:  CBC:  Recent Labs   Lab 10/30/19  1500 12/07/19  0831   WBC 7.91 7.07   Hemoglobin 13.1 13.4   Hematocrit 40.5 43.3   Platelets 270 288       CHEMISTRIES:  Recent Labs   Lab 09/07/19  0858 10/30/19  1500 12/07/19  0831   Glucose 103 106 142 H   Sodium 145 146 H 145   Potassium 4.2 3.8 4.2   BUN, Bld 16 17 14   Creatinine 0.7 0.9 " 0.7   eGFR if  >60.0 >60 >60.0   eGFR if non African American >60.0 >60 >60.0   Calcium 9.7 9.6 9.8       CARDIAC BIOMARKERS:        COAGS:        LIPIDS/LFTS:  Recent Labs   Lab 12/07/19  0831 03/21/20  0827 06/24/20  0715   Cholesterol 223 H 184 203 H   Triglycerides 135 82 136   HDL 43 44 42   LDL Cholesterol 153.0 123.6 133.8   Non-HDL Cholesterol 180 140 161   AST 16 15 15   ALT 19 18 19       Hemoglobin A1C   Date Value Ref Range Status   06/24/2020 6.6 (H) 4.0 - 5.6 % Final     Comment:     ADA Screening Guidelines:  5.7-6.4%  Consistent with prediabetes  >or=6.5%  Consistent with diabetes  High levels of fetal hemoglobin interfere with the HbA1C  assay. Heterozygous hemoglobin variants (HbS, HgC, etc)do  not significantly interfere with this assay.   However, presence of multiple variants may affect accuracy.     03/21/2020 6.4 (H) 4.0 - 5.6 % Final     Comment:     ADA Screening Guidelines:  5.7-6.4%  Consistent with prediabetes  >or=6.5%  Consistent with diabetes  High levels of fetal hemoglobin interfere with the HbA1C  assay. Heterozygous hemoglobin variants (HbS, HgC, etc)do  not significantly interfere with this assay.   However, presence of multiple variants may affect accuracy.     12/07/2019 7.4 (H) 4.0 - 5.6 % Final     Comment:     ADA Screening Guidelines:  5.7-6.4%  Consistent with prediabetes  >or=6.5%  Consistent with diabetes  High levels of fetal hemoglobin interfere with the HbA1C  assay. Heterozygous hemoglobin variants (HbS, HgC, etc)do  not significantly interfere with this assay.   However, presence of multiple variants may affect accuracy.     12/07/2019 7.4 (H) 4.0 - 5.6 % Final     Comment:     ADA Screening Guidelines:  5.7-6.4%  Consistent with prediabetes  >or=6.5%  Consistent with diabetes  High levels of fetal hemoglobin interfere with the HbA1C  assay. Heterozygous hemoglobin variants (HbS, HgC, etc)do  not significantly interfere with this assay.   However,  presence of multiple variants may affect accuracy.         TSH  Recent Labs   Lab 04/21/18  0848 11/17/18  0820   TSH 0.343 L 0.445       The 10-year ASCVD risk score (Sang FRIDA Jr., et al., 2013) is: 29.3%    Values used to calculate the score:      Age: 74 years      Sex: Female      Is Non- : Yes      Diabetic: Yes      Tobacco smoker: No      Systolic Blood Pressure: 130 mmHg      Is BP treated: Yes      HDL Cholesterol: 42 mg/dL      Total Cholesterol: 203 mg/dL             ASSESSMENT AND PLAN     Patient Active Problem List   Diagnosis    Type 2 diabetes mellitus with diabetic polyneuropathy    Hyperlipidemia    HTN (hypertension)    Knee pain    H/O vitamin D deficiency    Non morbid obesity due to excess calories    Bilateral carotid bruits    Osteoarthritis of right knee    Morbid obesity with body mass index (BMI) of 40.0 or higher    Dyspnea on exertion    Chronic cough    MEKA (obstructive sleep apnea)    Sensorineural hearing loss (SNHL) of right ear with restricted hearing of left ear    Tinnitus of both ears    Allergic rhinitis    Dysfunction of both eustachian tubes    Nasal septal deviation    Chronic eczematous otitis externa of both ears    Neck abscess    Elevated blood pressure reading    Epidermal inclusion cyst    Polyneuropathy associated with underlying disease       Patient with multiple risk factors for coronary artery disease.  Complaining of dyspnea on exertion for the past 6 months.  Normal LV systolic function.  Mild AS.  Stress test did not show any significant ischemia.    Mild AS    Diabetes    Hypertension:  Well controlled on current therapy.          Thank you very much for involving me in the care of your patient.  Please do not hesitate to contact me if there are any questions.      Jenn Arreola MD, FACC, [facility]  Interventional Cardiologist, [facility].     Follow-up in 6 months      This note was dictated with the help of  speech recognition software.  There might be un-intended errors and/or substitutions.

## 2020-09-12 ENCOUNTER — HOSPITAL ENCOUNTER (OUTPATIENT)
Dept: RADIOLOGY | Facility: HOSPITAL | Age: 75
Discharge: HOME | End: 2020-09-12
Attending: ORTHOPAEDIC SURGERY
Payer: COMMERCIAL

## 2020-09-12 DIAGNOSIS — M25.562 CHRONIC PAIN OF LEFT KNEE: ICD-10-CM

## 2020-09-12 DIAGNOSIS — G89.29 CHRONIC PAIN OF LEFT KNEE: ICD-10-CM

## 2020-09-12 PROCEDURE — 73721 MRI JNT OF LWR EXTRE W/O DYE: CPT | Mod: 26,LT,, | Performed by: RADIOLOGY

## 2020-09-12 PROCEDURE — 73721 MRI JNT OF LWR EXTRE W/O DYE: CPT | Mod: TC,LT

## 2020-09-12 PROCEDURE — 73721 MRI KNEE WITHOUT CONTRAST LEFT: ICD-10-PCS | Mod: 26,LT,, | Performed by: RADIOLOGY

## 2020-09-16 ENCOUNTER — TELEPHONE (OUTPATIENT)
Dept: ORTHOPEDICS | Facility: CLINIC | Age: 75
End: 2020-09-16

## 2020-09-17 ENCOUNTER — OFFICE VISIT (OUTPATIENT)
Dept: ORTHOPEDICS | Facility: CLINIC | Age: 75
End: 2020-09-17
Payer: COMMERCIAL

## 2020-09-17 VITALS
BODY MASS INDEX: 38.57 KG/M2 | DIASTOLIC BLOOD PRESSURE: 60 MMHG | RESPIRATION RATE: 18 BRPM | HEART RATE: 86 BPM | OXYGEN SATURATION: 97 % | WEIGHT: 231.5 LBS | HEIGHT: 65 IN | SYSTOLIC BLOOD PRESSURE: 142 MMHG

## 2020-09-17 DIAGNOSIS — M25.562 CHRONIC PAIN OF LEFT KNEE: Primary | ICD-10-CM

## 2020-09-17 DIAGNOSIS — G89.29 CHRONIC PAIN OF LEFT KNEE: Primary | ICD-10-CM

## 2020-09-17 PROCEDURE — 99999 PR PBB SHADOW E&M-EST. PATIENT-LVL V: CPT | Mod: PBBFAC,,, | Performed by: ORTHOPAEDIC SURGERY

## 2020-09-17 PROCEDURE — 99213 OFFICE O/P EST LOW 20 MIN: CPT | Mod: S$GLB,,, | Performed by: ORTHOPAEDIC SURGERY

## 2020-09-17 PROCEDURE — 99213 PR OFFICE/OUTPT VISIT, EST, LEVL III, 20-29 MIN: ICD-10-PCS | Mod: S$GLB,,, | Performed by: ORTHOPAEDIC SURGERY

## 2020-09-17 PROCEDURE — 99999 PR PBB SHADOW E&M-EST. PATIENT-LVL V: ICD-10-PCS | Mod: PBBFAC,,, | Performed by: ORTHOPAEDIC SURGERY

## 2020-09-17 NOTE — PROGRESS NOTES
Follow up visit    Interval history (09/17/2020): pain improved with diclofenac but not resolved. Here to discuss MRI results       Interval history (09/02/2020): Here after injection into the bilateral knees  Right feels fine  Had relief of pain in the left knee Friday and Saturday but pain returned Sunday when she got out of bed, Rating the pain as 9/10     Interval history (8/27/20):  Mena comes to the office for follow up evaluation of bilateral knee arthritis .  Recommended treatment at the last visit included injection, delayed until after her stress test yesterday.  Has appt with endocrine tomorrow. Last A1c 6.6:      HPI (08/25/2020): Mena Odonnell is a 74 y.o. female who presents today complaining of Pain of the Right Knee and Pain of the Left Knee     Duration of symptoms: a little over 1  month  Trauma or new activity: no  Pain is constant   Left worse than right   Aggravating factors: weight bearing activity, going from sit to stand, stairs   Relieving factors: rest  Walks with a cane   Radicular symptoms: no numbness, paresthesias   Associated symptoms:  swelling, giving way and stiffness.    Prior treatment:  tylenol  or advil when pain is really bad. Has been seen by a chiropractor who has done acupuncture, ultrasound and electric stim without relief   Some relief with alternating voltaren gel and biofreeze. Has not done injections or PT     Pain does interfere with activities of daily living .     This is the extent of the patient's complaints at this time.      Occupation: Desk Worker     Review of Systems   Unremarkable, no changes since last visit.     No change in medical, surgical, family or surgical history except as stated above              Review of patient's allergies indicates:   Allergen Reactions    Atorvastatin         Muscle pain     Crestor [rosuvastatin] Other (See Comments)       Leg Weakness.                  Physical Exam:   Vitals:    09/17/20 0917   BP: (!) 142/60   Pulse:  "86   Resp: 18   SpO2: 97%   Weight: 105 kg (231 lb 7.7 oz)   Height: 5' 5" (1.651 m)   PainSc:   6   PainLoc: Knee      General: Weight: 106.4 kg (234 lb 9.1 oz) Body mass index is 39.03 kg/m².  Patient is alert, awake and oriented to time, place and person. Mood and affect are appropriate.  Patient does not appear to be in any distress, denies any constitutional symptoms and appears stated age.   HEENT: Pupils are equal and round, sclera are not injected. External examination of ears and nose reveals no abnormalities. Cranial nerves II-X are grossly intact  Skin: no rashes, abrasions or open wounds on the affected extremity   Resp: No respiratory distress or audible wheezing   CV: 2+  pulses, all extremities warm and well perfused   Left  knee(s)  Localizes pain over MJL  No soft tissue swelling or erythema   Active ROM: R 0 - 120 L  0-100   no varus/valgus deformity   Tender to palpation over medial joint line   good  quadricep muscle tone and bulk; no atrophy compared to contralateral extremity   normal (0 - 2 mm) Anterior drawer   normal (0 - 2 mm) posterior drawer   negative valgus instability   negative varus instability   Normal patellar tracking   No pain with patellar compression   Ltsi s/s/sp/dp/t  + ehl/fhl/ta/gs  2+ DP        Imaging: MRI: tricompartmental osteoarthritis of the knee     Assessment: 74 y.o. female with Bilateral  knee osteoarthritis      Discussed further treatment options including:   PT  Weight loss   Euflexxa  RFA  TKA    Plan:   - Continue diclofenac   - PT referral  - Will refer for RFA if no improvement with PT        All questions were answered in detail. The patient is in full agreement with the treatment plan and will proceed accordingly.     A note notifying Dr. Leonard Wells of my findings was sent via the electronic medical record      This note was created by combination of typed  and M-Modal dictation. Transcription and phonetic errors may be present.  If there " are any questions, please contact me.    Current Outpatient Medications:     amLODIPine (NORVASC) 10 MG tablet, Take 1 tablet (10 mg total) by mouth once daily., Disp: 90 tablet, Rfl: 1    aspirin 81 MG Chew, Take 1 tablet (81 mg total) by mouth once daily., Disp: 30 tablet, Rfl: 0    cetirizine (ZYRTEC) 10 MG tablet, TAKE 1 TABLET BY MOUTH ONCE DAILY FOR 30 DAYS, Disp: , Rfl: 3    CONTOUR NEXT TEST STRIPS Strp, USE 1 STRIP TO CHECK GLUCOSE 4 TIMES DAILY, Disp: , Rfl:     diclofenac sodium (VOLTAREN) 1 % Gel, Lower extremities: Apply the gel (4 g) to the affected area 4 times daily. Do not apply more than 16 g daily to any one affected joint of the lower extremities. Upper extremities: Apply the gel (2 g) to the affected area 4 times daily. Do not apply more than 8 g daily to any one affected joint of the upper extremities. Total dose should not exceed 32 g per day, overall affected joints., Disp: 100 g, Rfl: 5    empagliflozin (JARDIANCE) 10 mg tablet, Take 1 tablet (10 mg total) by mouth once daily., Disp: 90 tablet, Rfl: 2    flash glucose sensor (FREESTYLE ROSAURA 14 DAY SENSOR) Kit, 1 sensor kit every 14 days (requires 2 kits per month), Disp: 2 kit, Rfl: 6    fluocinolone acetonide oil 0.01 % Drop, Place 4 drops in ear(s) 2 (two) times daily., Disp: 20 mL, Rfl: 5    gabapentin (NEURONTIN) 100 MG capsule, Take 2 capsules (200 mg total) by mouth 2 (two) times daily., Disp: 360 capsule, Rfl: 2    insulin glargine U-300 conc (TOUJEO MAX U-300 SOLOSTAR) 300 unit/mL (3 mL) InPn, Inject 90 units daily., Disp: 4 Syringe, Rfl: 5    insulin lispro (HUMALOG KWIKPEN INSULIN) 100 unit/mL pen, Take 16 units before breakfast and 26 units before lunch, 26 units before dinner, Disp: 2 Box, Rfl: 5    losartan-hydrochlorothiazide 100-25 mg (HYZAAR) 100-25 mg per tablet, Take 1 tablet by mouth once daily., Disp: 90 tablet, Rfl: 3    metoprolol succinate (TOPROL-XL) 25 MG 24 hr tablet, Take 1 tablet (25 mg total) by  mouth once daily., Disp: 90 tablet, Rfl: 3    montelukast (SINGULAIR) 10 mg tablet, , Disp: , Rfl:     oxyCODONE-acetaminophen (PERCOCET) 5-325 mg per tablet, Take 1 tablet by mouth every 4 (four) hours as needed for Pain., Disp: 30 tablet, Rfl: 0    oxyCODONE-acetaminophen (PERCOCET) 5-325 mg per tablet, Take 1 tablet by mouth every 4 (four) hours as needed for Pain., Disp: 30 tablet, Rfl: 0    pravastatin (PRAVACHOL) 80 MG tablet, Take 1 tablet (80 mg total) by mouth once daily., Disp: 90 tablet, Rfl: 3    semaglutide (OZEMPIC) 1 mg/dose (2 mg/1.5 mL) PnIj, Inject 1 mg once weekly, Disp: 2 Syringe, Rfl: 6             Past Medical History:   Diagnosis Date    Diabetes mellitus type II      Hyperlipidemia      Hypertension      Obesity      Osteoarthritis      Peripheral neuropathy      Severe obesity (BMI 35.0-39.9) with comorbidity 12/30/2016    Tendonitis       left foot               Patient Active Problem List   Diagnosis    Type 2 diabetes mellitus with diabetic polyneuropathy    Hyperlipidemia    HTN (hypertension)    Knee pain    H/O vitamin D deficiency    Non morbid obesity due to excess calories    Bilateral carotid bruits    Osteoarthritis of right knee    Morbid obesity with body mass index (BMI) of 40.0 or higher    Dyspnea on exertion    Chronic cough    MEKA (obstructive sleep apnea)    Sensorineural hearing loss (SNHL) of right ear with restricted hearing of left ear    Tinnitus of both ears    Allergic rhinitis    Dysfunction of both eustachian tubes    Nasal septal deviation    Chronic eczematous otitis externa of both ears    Neck abscess    Elevated blood pressure reading    Epidermal inclusion cyst    Polyneuropathy associated with underlying disease                   Past Surgical History:   Procedure Laterality Date    BLADDER SUSPENSION        CATARACT EXTRACTION        COLONOSCOPY N/A 9/26/2015     Procedure: COLONOSCOPY;  Surgeon: Javed Wood MD;   [facility] ([facility]);  Service: Endoscopy;  Laterality: N/A;    HYSTERECTOMY   1978     fibroids    NECK MASS EXCISION   2019     Procedure: EXCISION, MASS, NECK;  Surgeon: Edwin Barrow MD;  Location: [facility];  Service: General;;  RN PREOP 10/30/2019    TONSILLECTOMY             Social History                Tobacco Use    Smoking status: Former Smoker       Packs/day: 0.25       Years: 15.00       Pack years: 3.75       Quit date: 1982       Years since quittin.7    Smokeless tobacco: Never Used   Substance Use Topics    Alcohol use: No    Drug use: No                   Family History   Problem Relation Age of Onset    Thyroid disease Mother      Cataracts Mother      Diabetes Mother      Stroke Mother      Hypertension Mother      Thyroid disease Sister      Diabetes Sister      Hypertension Sister      Hypertension Daughter      Diabetes Son      Diabetes Sister      Hypertension Sister      Hypertension Brother      Hypertension Sister      Hypertension Sister      Hypertension Brother      Thyroid disease Maternal Aunt      Thyroid disease Maternal Aunt      Colon cancer Maternal Grandfather      Breast cancer Neg Hx      Ovarian cancer Neg Hx      Amblyopia Neg Hx      Blindness Neg Hx      Cancer Neg Hx      Glaucoma Neg Hx      Macular degeneration Neg Hx      Retinal detachment Neg Hx      Strabismus Neg Hx

## 2020-10-01 ENCOUNTER — CLINICAL SUPPORT (OUTPATIENT)
Dept: REHABILITATION | Facility: HOSPITAL | Age: 75
End: 2020-10-01
Attending: ORTHOPAEDIC SURGERY
Payer: COMMERCIAL

## 2020-10-01 DIAGNOSIS — G89.29 CHRONIC PAIN OF LEFT KNEE: ICD-10-CM

## 2020-10-01 DIAGNOSIS — R26.2 DIFFICULTY WALKING: ICD-10-CM

## 2020-10-01 DIAGNOSIS — R68.89 DECREASED STRENGTH, ENDURANCE, AND MOBILITY: ICD-10-CM

## 2020-10-01 DIAGNOSIS — R53.1 DECREASED STRENGTH, ENDURANCE, AND MOBILITY: ICD-10-CM

## 2020-10-01 DIAGNOSIS — M25.561 CHRONIC PAIN OF BOTH KNEES: ICD-10-CM

## 2020-10-01 DIAGNOSIS — M25.562 CHRONIC PAIN OF LEFT KNEE: ICD-10-CM

## 2020-10-01 DIAGNOSIS — Z74.09 DECREASED STRENGTH, ENDURANCE, AND MOBILITY: ICD-10-CM

## 2020-10-01 DIAGNOSIS — M25.562 CHRONIC PAIN OF BOTH KNEES: ICD-10-CM

## 2020-10-01 DIAGNOSIS — G89.29 CHRONIC PAIN OF BOTH KNEES: ICD-10-CM

## 2020-10-01 PROCEDURE — 97110 THERAPEUTIC EXERCISES: CPT | Mod: PN

## 2020-10-01 PROCEDURE — 97161 PT EVAL LOW COMPLEX 20 MIN: CPT | Mod: PN

## 2020-10-01 NOTE — PLAN OF CARE
[facility]  Physical Therapy Initial Evaluation    Name: Mena Odonnell  Clinic Number: [id number]    Therapy Diagnosis:   Encounter Diagnoses   Name Primary?    Chronic pain of left knee     Chronic pain of both knees     Decreased strength, endurance, and mobility     Difficulty walking      Physician: Maile Cuello MD    Physician Orders: PT Eval and Treat   Medical Diagnosis from Referral: Chronic pain of left knee  Evaluation Date: 10/1/2020  Authorization Period Expiration: 9/17/2021  Plan of Care Expiration: 1/1/2021  Visit # / Visits authorized: 1/ 1    Time In: 10:40a  Time Out: 11:20a  Total Billable Time: 45 minutes    Precautions: Standard, Diabetes and Fall    Subjective   Date of onset: 2 months   History of current condition - Mena reports: she is having pain in both knees and maybe the right knee started because she is compensating for her left knee pain. She started with leg cramps last weekend and unsure why. She states she soaks in hot tub and then also ices her left knee and has a cream that she rubs on it and it soothes it. She also uses biofreeze. She takes tylenol and she was given a medicine that doesn't work. There are some days where it feels hot and it throbs like a toothache. She gets her leg cramps in her calves and inner thigh up to groin and this is random - doesn't know when it happens. When she sits the knee pain calms down and when she goes into motion it starts to act up. No falls this year. She was given an injection in both knees 3 weeks ago. The shot doesn't make her left knee feel any better. She has pain on the inside of her knee and goes around the quad. She has difficulty walking, climbing stairs, standing (< 1/2 hour), cooking, cleaning. She uses a rollator at home but she has a cane today. She feels like her mobility is better with the rollator. Just started using AD about 2 weeks ago.   Clicking: yes   Buckling: yes   Locking: no       Medical History:   Past Medical History:   Diagnosis Date    Cataract     Diabetes mellitus type II     DM retinopathy     Hyperlipidemia     Hypertension     Obesity     Osteoarthritis     Peripheral neuropathy     Severe obesity (BMI 35.0-39.9) with comorbidity 12/30/2016    Tendonitis     left foot       Surgical History:   Mena Odonnell  has a past surgical history that includes Bladder suspension; Hysterectomy (1978); Tonsillectomy; Colonoscopy (N/A, 9/26/2015); Cataract extraction; and Neck mass excision (11/6/2019).    Medications:   Mena has a current medication list which includes the following prescription(s): amlodipine, aspirin, cetirizine, contour next test strips, diclofenac, diclofenac sodium, jardiance, flash glucose sensor, fluocinolone acetonide oil, gabapentin, insulin glargine u-300 conc, insulin lispro, losartan-hydrochlorothiazide 100-25 mg, metoprolol succinate, montelukast, oxycodone-acetaminophen, oxycodone-acetaminophen, pravastatin, and ozempic.    Allergies:   Review of patient's allergies indicates:   Allergen Reactions    Atorvastatin      Muscle pain     Crestor [rosuvastatin] Other (See Comments)     Leg Weakness.         Imaging, MRI studies: L Knee: Impression: 9/8/2020:   1. Tricompartmental osteoarthritis most pronounced within the medial tibiofemoral compartment.  2. Complex tear posterior horn medial meniscus which closely approximates the posterior root ligament.  Adjacent body segment demonstrates mucoid degeneration and free edge fraying with possible nondisplaced horizontal tear.  3. Degenerative free edge tearing body segment lateral meniscus.  4. Moderate joint effusion with synovitis.  Moderate complex popliteal cyst.    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   Prior Level of Function: independent   Current Level of Function: difficulty with walking,  stairs, standing     Pain:  Current 10/10, worst 10/10, best 0/10   Location: left knee   Description: Aching, Throbbing and cramping  Aggravating Factors: Standing, Walking and Getting out of bed/chair  Easing Factors: ice and hot bath, medicine    Pts goals: reduce pain, be able to walk on her own    Objective     Observation: swelling over L popliteal fossa    Gait: walks with SPC, slow laurel, WBOS, antalgic gait; lateral leaning     Range of Motion:   Knee Left active Right Active   Flexion 125 125   Extension 0 +1     Lower Extremity Strength  Right LE  Left LE    Knee extension: 4+/5 Knee extension: 4/5   Knee flexion: 5/5 Knee flexion: 3+/5 with pain on lateral posterior knee   Hip flexion: 4+/5 Hip flexion: 4+/5   Hip extension:  4-/5 Hip extension: 3+/5   Hip abduction: 4-/5 Hip abduction: 4-/5   Hip ER: 4-/5 Hip Er:  3+/5   Hip IR:  4/5 Hip IR: 4-/5 pain in low back and buttocks   Ankle dorsiflexion: 5/5 Ankle dorsiflexion: 5/5   Ankle plantarflexion: 4+/5 Ankle plantarflexion: 4+/5     Special Tests:   Left Right   Valgus Stress Test + -   Varus Stress test - -   Ashutosh's Test - -   Noble's compression test - -   Patellar Grind Test - -     Lumbar flexion: difficulty   Lumbar extension: back pain   SLR: -     Function:  - Step down test: NT today   - SLS R: decreased stability; unable to perform without UE support   - SLS L: increased pain; decreased stability   - Squat: increased pain; more leaning towards RLE   - Sit <--> Stand: use of UE support to get out of chair    - Bed Mobility: independent     Joint Mobility: hypomobile left patella joint as compared to right patella - may be due to muscle guarding     Palpation: ttp to medial joint line and anterior medial patella groove     Sensation: intact to light touch grossly     Flexibility:    Ely's test: R = +; L = +    Edema: minimal noted over L knee   Absent on R       CMS Impairment/Limitation/Restriction for FOTO Knee Survey    Therapist  reviewed FOTO scores for Mena Odonnell on 10/1/2020.   FOTO documents entered into [employer] - see Media section.    Limitation Score: 59%  Category: Mobility    Current : CK = at least 40% but < 60% impaired, limited or restricted  Goal: CK = at least 40% but < 60% impaired, limited or restricted       TREATMENT   Treatment Time In: 11:10a  Treatment Time Out: 11:20a  Total Treatment time separate from Evaluation: 10 minutes    Mena received therapeutic exercises to develop strength, endurance, ROM, flexibility, posture and core stabilization for 10 minutes including:  Supine quad sets x10, 10 sec hold   Seated quad set into SLR x5  Bridges x10  Seated AAROM of knee flexion x5    Home Exercises and Patient Education Provided    Education provided:   - PT plan of care   - reasoning for exercises     Written Home Exercises Provided: yes.  Exercises were reviewed and Mena was able to demonstrate them prior to the end of the session.  Mena demonstrated good  understanding of the education provided.     See EMR under Patient Instructions for exercises provided 10/1/2020.    Assessment   Mena is a 74 y.o. female referred to outpatient Physical Therapy with a medical diagnosis of Chronic pain of left knee. Pt presents with complaints of bilateral knee pain (L > R). She enters clinic with a SPC, but typically uses a rollator at home for the past two weeks. Upon examination, pt demonstrates fair knee ROM, decreased BLE strength and endurance, impaired joint mobility, soft tissue dysfunction, balance impairments, and decreased tolerance to weight bearing activities and functional mobility. Pt has difficulty standing and walking and demonstrates gait abnormalities due to weakness and pain. Pt had positive valgus test on L side and tenderness to palpation over medial joint line and MCL. She has a symptom of LLE muscle cramping that began last week, so lumbar was screened but nothing reproduced this cramping sensation during  this evaluation. Plan to reassess as needed throughout treatment. She had good response to all exercises given and left evaluation with decreased pain than she started. Pt is appropriate for physical therapy at this time to reduce pain during ADLs, improve sleep function, and increase independence with home activities.     Pt prognosis is Good.   Pt will benefit from skilled outpatient Physical Therapy to address the deficits stated above and in the chart below, provide pt/family education, and to maximize pt's level of independence.     Plan of care discussed with patient: Yes  Pt's spiritual, cultural and educational needs considered and patient is agreeable to the plan of care and goals as stated below:     Anticipated Barriers for therapy: none     Medical Necessity is demonstrated by the following  History  Co-morbidities and personal factors that may impact the plan of care Co-morbidities:   prior abdominal surgery, prior pelvic surgery and diabetes mellitus type II, retinopathy, hyperlipidemia, hypertension, obesity, OA, peripheral neuropathy    Personal Factors:   no deficits     moderate   Examination  Body Structures and Functions, activity limitations and participation restrictions that may impact the plan of care Body Regions:   back  lower extremities  trunk    Body Systems:    gross symmetry  ROM  strength  gross coordinated movement  balance  gait  transfers  transitions  motor control  motor learning    Participation Restrictions:   Moderate     Activity limitations:   Learning and applying knowledge  No deficits     General Tasks and Commands  No deficits     Communication  no deficits    Mobility  lifting and carrying objects  walking  driving (bike, car, motorcycle)    Self care  no deficits    Domestic Life  shopping  cooking  doing house work (cleaning house, washing dishes, laundry)  assisting others    Interactions/Relationships  no deficits    Life Areas  employment    Community and Social  Life  community life  recreation and leisure         moderate   Clinical Presentation stable and uncomplicated low   Decision Making/ Complexity Score: low     Goals:  Short Term Goals: 5 weeks      1. Pt will be independent with HEP in order to demonstrate self management.   2. Pt will report a decrease in pain at its worse to 8/10 in order to improve quality of life.   3. Pt will be able to walk around her house without rollator and no increase in pain to improve independence and mobility   4. Pt will increase BLE MMT by 1/3 muscle grade in order to demonstrate increased strength.   5. Pt will be able to walk 500 ft with LRAD and minimal to no increase in pain in knees to improve household and community mobility.      Long Term Goals: 10 weeks      1. Pt will be independent with updated HEP in order to demonstrate self management.   2. Pt will report a decrease in pain at its worse to 5/10 in order to improve quality of life.   3. Pt will be able to stand for 30 minutes with minimal difficulty to improve ability to cook and shop.   4. Pt will increase BLE MMT to 4+/5 or greater to improve strength and endurance to perform daily activities.   5. Pt will have a FOTO limitation score of < or = to 48% to demonstrate improved functional mobility.   6. Pt will be able to perform light activities around her house with minimal difficulty to improve independence and decrease reliance on daughter.    Plan   Plan of care Certification: 10/1/2020 to 1/1/2021.    Outpatient Physical Therapy 1-2 times weekly for 12 weeks to include the following interventions: Gait Training, Manual Therapy, Moist Heat/ Ice, Neuromuscular Re-ed, Patient Education, Self Care, Therapeutic Activites and Therapeutic Exercise.     Charo Cool, PT  10/01/2020    I have seen the patient, reviewed the therapist's plan of care, and I agree with the plan of care.      I certify the need for these services furnished under this plan of treatment and while  under my care.     ___________________ ________ Physician/Referring Practitioner            ___________________________ Date of Signature

## 2020-11-04 ENCOUNTER — LAB VISIT (OUTPATIENT)
Dept: LAB | Facility: HOSPITAL | Age: 75
End: 2020-11-04
Attending: INTERNAL MEDICINE
Payer: COMMERCIAL

## 2020-11-04 ENCOUNTER — OFFICE VISIT (OUTPATIENT)
Dept: FAMILY MEDICINE | Facility: CLINIC | Age: 75
End: 2020-11-04
Payer: COMMERCIAL

## 2020-11-04 ENCOUNTER — CLINICAL SUPPORT (OUTPATIENT)
Dept: FAMILY MEDICINE | Facility: CLINIC | Age: 75
End: 2020-11-04
Payer: COMMERCIAL

## 2020-11-04 VITALS
WEIGHT: 232.69 LBS | BODY MASS INDEX: 38.72 KG/M2 | TEMPERATURE: 98 F | RESPIRATION RATE: 16 BRPM | DIASTOLIC BLOOD PRESSURE: 78 MMHG | SYSTOLIC BLOOD PRESSURE: 138 MMHG | HEART RATE: 89 BPM | OXYGEN SATURATION: 96 %

## 2020-11-04 DIAGNOSIS — Z23 INFLUENZA VACCINE NEEDED: ICD-10-CM

## 2020-11-04 DIAGNOSIS — Z79.4 TYPE 2 DIABETES MELLITUS WITH DIABETIC POLYNEUROPATHY, WITH LONG-TERM CURRENT USE OF INSULIN: ICD-10-CM

## 2020-11-04 DIAGNOSIS — E11.42 TYPE 2 DIABETES MELLITUS WITH DIABETIC POLYNEUROPATHY, WITH LONG-TERM CURRENT USE OF INSULIN: ICD-10-CM

## 2020-11-04 DIAGNOSIS — G63 POLYNEUROPATHY ASSOCIATED WITH UNDERLYING DISEASE: ICD-10-CM

## 2020-11-04 DIAGNOSIS — Z23 NEEDS FLU SHOT: ICD-10-CM

## 2020-11-04 DIAGNOSIS — E78.2 MIXED HYPERLIPIDEMIA: ICD-10-CM

## 2020-11-04 DIAGNOSIS — Z12.11 COLON CANCER SCREENING: ICD-10-CM

## 2020-11-04 DIAGNOSIS — R05.3 CHRONIC COUGH: ICD-10-CM

## 2020-11-04 DIAGNOSIS — I10 ESSENTIAL HYPERTENSION: ICD-10-CM

## 2020-11-04 DIAGNOSIS — I10 ESSENTIAL HYPERTENSION: Primary | ICD-10-CM

## 2020-11-04 DIAGNOSIS — R25.2 MUSCLE CRAMP: ICD-10-CM

## 2020-11-04 LAB
ALBUMIN SERPL BCP-MCNC: 3.7 G/DL (ref 3.5–5.2)
ALP SERPL-CCNC: 74 U/L (ref 55–135)
ALT SERPL W/O P-5'-P-CCNC: 16 U/L (ref 10–44)
ANION GAP SERPL CALC-SCNC: 11 MMOL/L (ref 8–16)
AST SERPL-CCNC: 12 U/L (ref 10–40)
BILIRUB SERPL-MCNC: 0.3 MG/DL (ref 0.1–1)
BUN SERPL-MCNC: 20 MG/DL (ref 8–23)
CALCIUM SERPL-MCNC: 10 MG/DL (ref 8.7–10.5)
CALCIUM SERPL-MCNC: 10 MG/DL (ref 8.7–10.5)
CHLORIDE SERPL-SCNC: 102 MMOL/L (ref 95–110)
CO2 SERPL-SCNC: 28 MMOL/L (ref 23–29)
CREAT SERPL-MCNC: 0.8 MG/DL (ref 0.5–1.4)
EST. GFR  (AFRICAN AMERICAN): >60 ML/MIN/1.73 M^2
EST. GFR  (NON AFRICAN AMERICAN): >60 ML/MIN/1.73 M^2
GLUCOSE SERPL-MCNC: 175 MG/DL (ref 70–110)
POTASSIUM SERPL-SCNC: 3.5 MMOL/L (ref 3.5–5.1)
PROT SERPL-MCNC: 7.1 G/DL (ref 6–8.4)
SODIUM SERPL-SCNC: 141 MMOL/L (ref 136–145)
T4 FREE SERPL-MCNC: 0.88 NG/DL (ref 0.71–1.51)
TSH SERPL DL<=0.005 MIU/L-ACNC: 0.21 UIU/ML (ref 0.4–4)

## 2020-11-04 PROCEDURE — 84439 ASSAY OF FREE THYROXINE: CPT

## 2020-11-04 PROCEDURE — 90471 IMMUNIZATION ADMIN: CPT | Mod: S$GLB,,, | Performed by: INTERNAL MEDICINE

## 2020-11-04 PROCEDURE — 90694 VACC AIIV4 NO PRSRV 0.5ML IM: CPT | Mod: S$GLB,,, | Performed by: INTERNAL MEDICINE

## 2020-11-04 PROCEDURE — 36415 COLL VENOUS BLD VENIPUNCTURE: CPT | Mod: PO

## 2020-11-04 PROCEDURE — 84443 ASSAY THYROID STIM HORMONE: CPT

## 2020-11-04 PROCEDURE — 99214 OFFICE O/P EST MOD 30 MIN: CPT | Mod: 25,S$GLB,, | Performed by: INTERNAL MEDICINE

## 2020-11-04 PROCEDURE — 90694 FLU VACCINE - QUADRIVALENT - ADJUVANTED: ICD-10-PCS | Mod: S$GLB,,, | Performed by: INTERNAL MEDICINE

## 2020-11-04 PROCEDURE — 99999 PR PBB SHADOW E&M-EST. PATIENT-LVL III: ICD-10-PCS | Mod: PBBFAC,,, | Performed by: INTERNAL MEDICINE

## 2020-11-04 PROCEDURE — 99999 PR PBB SHADOW E&M-EST. PATIENT-LVL III: CPT | Mod: PBBFAC,,, | Performed by: INTERNAL MEDICINE

## 2020-11-04 PROCEDURE — 99214 PR OFFICE/OUTPT VISIT, EST, LEVL IV, 30-39 MIN: ICD-10-PCS | Mod: 25,S$GLB,, | Performed by: INTERNAL MEDICINE

## 2020-11-04 PROCEDURE — 80053 COMPREHEN METABOLIC PANEL: CPT

## 2020-11-04 PROCEDURE — 90471 FLU VACCINE - QUADRIVALENT - ADJUVANTED: ICD-10-PCS | Mod: S$GLB,,, | Performed by: INTERNAL MEDICINE

## 2020-11-04 RX ORDER — AMLODIPINE BESYLATE 10 MG/1
10 TABLET ORAL DAILY
Qty: 90 TABLET | Refills: 1 | Status: SHIPPED | OUTPATIENT
Start: 2021-03-01 | End: 2021-11-29 | Stop reason: SDUPTHER

## 2020-11-04 RX ORDER — GABAPENTIN 100 MG/1
200 CAPSULE ORAL 2 TIMES DAILY
Qty: 360 CAPSULE | Refills: 1 | Status: SHIPPED | OUTPATIENT
Start: 2021-01-01 | End: 2021-02-09 | Stop reason: SDUPTHER

## 2020-11-04 RX ORDER — MONTELUKAST SODIUM 10 MG/1
10 TABLET ORAL DAILY
Qty: 90 TABLET | Refills: 1 | Status: SHIPPED | OUTPATIENT
Start: 2020-11-04 | End: 2021-03-23 | Stop reason: SDUPTHER

## 2020-11-04 NOTE — PROGRESS NOTES
HISTORY OF PRESENT ILLNESS:  Mena Odonnell is a 74 y.o. female who presents to the clinic today for follow up.    LOV 8/3/20.    She is being seen by orthopedics for bilateral knee osteoarthritis. Notes she has cramps in her hands and legs in the last 2 weeks.  She started taking an OTC potassium and magnesium supplement and also OTC leg cramp medicine from [employer] (not sure of the name).    Daughter and teen grandson are with her at home.  Uses rolling walker at home and cane outside.  She works as apartment .    Evaluated by cardiology for mild aortic valve stenosis.  No SOB, CP.    Type 2 DM is controlled with Toujeo, Humalog, Jardiance, and Ozempic.    PAST MEDICAL HISTORY:  Past Medical History:   Diagnosis Date    Cataract     Diabetes mellitus type II     DM retinopathy     Hyperlipidemia     Hypertension     Obesity     Osteoarthritis     Peripheral neuropathy     Severe obesity (BMI 35.0-39.9) with comorbidity 12/30/2016    Tendonitis     left foot       PAST SURGICAL HISTORY:  Past Surgical History:   Procedure Laterality Date    BLADDER SUSPENSION      CATARACT EXTRACTION      COLONOSCOPY N/A 9/26/2015    Procedure: COLONOSCOPY;  Surgeon: Javed Wood MD;  Location: [facility] ([address] [place] [address]);  Service: Endoscopy;  Laterality: N/A;    HYSTERECTOMY  1978    fibroids    NECK MASS EXCISION  11/6/2019    Procedure: EXCISION, MASS, NECK;  Surgeon: Edwin Barrow MD;  Location: [facility];  Service: General;;  RN PREOP 10/30/2019    TONSILLECTOMY         SOCIAL HISTORY:  Social History     Socioeconomic History    Marital status:      Spouse name: Not on file    Number of children: 2    Years of education: Not on file    Highest education level: Not on file   Occupational History     Employer: [employer]   Social Needs    Financial resource strain: Not on file    Food insecurity     Worry: Not on file     Inability: Not on file    Transportation needs      Medical: Not on file     Non-medical: Not on file   Tobacco Use    Smoking status: Former Smoker     Packs/day: 0.25     Years: 15.00     Pack years: 3.75     Quit date: 1982     Years since quittin.9    Smokeless tobacco: Never Used   Substance and Sexual Activity    Alcohol use: No    Drug use: No    Sexual activity: Not Currently     Partners: Male     Birth control/protection: Abstinence   Lifestyle    Physical activity     Days per week: Not on file     Minutes per session: Not on file    Stress: Not on file   Relationships    Social connections     Talks on phone: Not on file     Gets together: Not on file     Attends Worship service: Not on file     Active member of club or organization: Not on file     Attends meetings of clubs or organizations: Not on file     Relationship status: Not on file   Other Topics Concern    Not on file   Social History Narrative    . One daughter. Works as an Apartment        FAMILY HISTORY:  Family History   Problem Relation Age of Onset    Thyroid disease Mother     Cataracts Mother     Diabetes Mother     Stroke Mother     Hypertension Mother     Thyroid disease Sister     Diabetes Sister     Hypertension Sister     Hypertension Daughter     Diabetes Son     Diabetes Sister     Hypertension Sister     Hypertension Brother     Hypertension Sister     Hypertension Sister     Hypertension Brother     Thyroid disease Maternal Aunt     Thyroid disease Maternal Aunt     Colon cancer Maternal Grandfather     Breast cancer Neg Hx     Ovarian cancer Neg Hx     Amblyopia Neg Hx     Blindness Neg Hx     Cancer Neg Hx     Glaucoma Neg Hx     Macular degeneration Neg Hx     Retinal detachment Neg Hx     Strabismus Neg Hx        ALLERGIES AND MEDICATIONS: updated and reviewed.  Review of patient's allergies indicates:   Allergen Reactions    Atorvastatin      Muscle pain     Crestor [rosuvastatin] Other (See  Comments)     Leg Weakness.      Medication List with Changes/Refills   Current Medications    AMLODIPINE (NORVASC) 10 MG TABLET    Take 1 tablet (10 mg total) by mouth once daily.    ASPIRIN 81 MG CHEW    Take 1 tablet (81 mg total) by mouth once daily.    CETIRIZINE (ZYRTEC) 10 MG TABLET    TAKE 1 TABLET BY MOUTH ONCE DAILY FOR 30 DAYS    CONTOUR NEXT TEST STRIPS STRP    USE 1 STRIP TO CHECK GLUCOSE 4 TIMES DAILY    DICLOFENAC (VOLTAREN) 50 MG EC TABLET    Take 1 tablet (50 mg total) by mouth 2 (two) times daily.    DICLOFENAC SODIUM (VOLTAREN) 1 % GEL    Lower extremities: Apply the gel (4 g) to the affected area 4 times daily. Do not apply more than 16 g daily to any one affected joint of the lower extremities. Upper extremities: Apply the gel (2 g) to the affected area 4 times daily. Do not apply more than 8 g daily to any one affected joint of the upper extremities. Total dose should not exceed 32 g per day, overall affected joints.    EMPAGLIFLOZIN (JARDIANCE) 10 MG TABLET    Take 1 tablet (10 mg total) by mouth once daily.    FLASH GLUCOSE SENSOR (FREESTYLE ROSAURA 14 DAY SENSOR) KIT    1 sensor kit every 14 days (requires 2 kits per month)    FLUOCINOLONE ACETONIDE OIL 0.01 % DROP    Place 4 drops in ear(s) 2 (two) times daily.    GABAPENTIN (NEURONTIN) 100 MG CAPSULE    Take 2 capsules (200 mg total) by mouth 2 (two) times daily.    INSULIN GLARGINE U-300 CONC (TOUJEO MAX U-300 SOLOSTAR) 300 UNIT/ML (3 ML) INPN    Inject 90 units daily.    INSULIN LISPRO (HUMALOG KWIKPEN INSULIN) 100 UNIT/ML PEN    Inject 12 units before breakfast and 16 units before dinner    LOSARTAN-HYDROCHLOROTHIAZIDE 100-25 MG (HYZAAR) 100-25 MG PER TABLET    Take 1 tablet by mouth once daily.    METOPROLOL SUCCINATE (TOPROL-XL) 25 MG 24 HR TABLET    Take 1 tablet (25 mg total) by mouth once daily.    MONTELUKAST (SINGULAIR) 10 MG TABLET        OXYCODONE-ACETAMINOPHEN (PERCOCET) 5-325 MG PER TABLET    Take 1 tablet by mouth every 4  (four) hours as needed for Pain.    OXYCODONE-ACETAMINOPHEN (PERCOCET) 5-325 MG PER TABLET    Take 1 tablet by mouth every 4 (four) hours as needed for Pain.    PRAVASTATIN (PRAVACHOL) 80 MG TABLET    Take 1 tablet (80 mg total) by mouth once daily.    SEMAGLUTIDE (OZEMPIC) 1 MG/DOSE (2 MG/1.5 ML) PNIJ    Inject 1 mg once weekly          CARE TEAM:  Patient Care Team:  Leonard Wells MD as PCP - General (Internal Medicine)  Nga Benjamin MA as Care Coordinator  Bebe Gee MD as Consulting Physician (Ophthalmology)  Praneeth Rios MD as Consulting Physician (Ophthalmology)         REVIEW OF SYSTEMS:  Review of Systems   Constitutional: Negative for chills and fever.   HENT: Negative for congestion and postnasal drip.    Eyes: Negative for photophobia and visual disturbance.   Respiratory: Negative for cough and shortness of breath.    Cardiovascular: Negative for chest pain and palpitations.   Gastrointestinal: Negative for abdominal pain, nausea and vomiting.   Genitourinary: Negative for dysuria and frequency.   Musculoskeletal: Positive for arthralgias (bilateral knees) and gait problem.   Neurological: Negative for light-headedness and headaches.   Psychiatric/Behavioral: Negative for dysphoric mood. The patient is not nervous/anxious.          PHYSICAL EXAM:   Vitals:    11/04/20 1453   BP: 138/78   Pulse: 89   Resp:    Temp: 98.2             Body mass index is 38.72 kg/m².     General appearance - alert, well appearing, and in no distress and oriented to person, place, and time  Mental status - normal mood, behavior, speech, dress, motor activity, and thought processes  Eyes - sclera anicteric, left eye normal, right eye normal  Chest - clear to auscultation, no wheezes, rales or rhonchi, symmetric air entry, no tachypnea, retractions or cyanosis  Heart - normal rate and regular rhythm, systolic murmur  Extremities - peripheral pulses normal, no pedal edema, no clubbing or cyanosis  Skin - normal  coloration and turgor, no rashes, no suspicious skin lesions noted      ASSESSMENT AND PLAN:  Essential hypertension  -     Comprehensive Metabolic Panel; Future; Expected date: 11/04/2020  -     amLODIPine (NORVASC) 10 MG tablet; Take 1 tablet (10 mg total) by mouth once daily.  Dispense: 90 tablet; Refill: 1    Muscle cramp  -     Comprehensive Metabolic Panel; Future; Expected date: 11/04/2020  -     Calcium; Future; Expected date: 11/04/2020  -     TSH; Future; Expected date: 11/04/2020  - Advised for stretching, prn heat to the area/epsom salt soak as well as trial of B complex and magnesium    Type 2 diabetes mellitus with diabetic polyneuropathy, with long-term current use of insulin  -     Comprehensive Metabolic Panel; Future; Expected date: 11/04/2020    Colon cancer screening  -     Cologuard Screening (Multitarget Stool DNA); Future; Expected date: 11/04/2020    Mixed hyperlipidemia    Influenza vaccine needed  -     Influenza (FLUAD) - Quadrivalent (Adjuvanted) *Preferred* (65+) (PF)    Chronic cough  -     montelukast (SINGULAIR) 10 mg tablet; Take 1 tablet (10 mg total) by mouth once daily.  Dispense: 90 tablet; Refill: 1    Polyneuropathy associated with underlying disease  -     gabapentin (NEURONTIN) 100 MG capsule; Take 2 capsules (200 mg total) by mouth 2 (two) times daily.  Dispense: 360 capsule; Refill: 1           Follow up 3 months or sooner as needed.

## 2020-11-04 NOTE — PROGRESS NOTES
Patient tolerated Flu injection well, also asked patient to sit in lobby for 15 minutes for observation and if she has any questions please let us know. Patient voice understanding.

## 2020-11-17 LAB — GASTROCULT GAST QL: NEGATIVE

## 2020-11-18 ENCOUNTER — CLINICAL SUPPORT (OUTPATIENT)
Dept: REHABILITATION | Facility: HOSPITAL | Age: 75
End: 2020-11-18
Attending: ORTHOPAEDIC SURGERY
Payer: COMMERCIAL

## 2020-11-18 DIAGNOSIS — R26.2 DIFFICULTY WALKING: ICD-10-CM

## 2020-11-18 DIAGNOSIS — M25.561 CHRONIC PAIN OF BOTH KNEES: ICD-10-CM

## 2020-11-18 DIAGNOSIS — R53.1 DECREASED STRENGTH, ENDURANCE, AND MOBILITY: ICD-10-CM

## 2020-11-18 DIAGNOSIS — G89.29 CHRONIC PAIN OF BOTH KNEES: ICD-10-CM

## 2020-11-18 DIAGNOSIS — M25.562 CHRONIC PAIN OF BOTH KNEES: ICD-10-CM

## 2020-11-18 DIAGNOSIS — R68.89 DECREASED STRENGTH, ENDURANCE, AND MOBILITY: ICD-10-CM

## 2020-11-18 DIAGNOSIS — Z74.09 DECREASED STRENGTH, ENDURANCE, AND MOBILITY: ICD-10-CM

## 2020-11-18 PROCEDURE — 97110 THERAPEUTIC EXERCISES: CPT | Mod: PN

## 2020-11-18 NOTE — PROGRESS NOTES
"  Physical Therapy Treatment Note     Name: Mena Posadas Odonnell  Clinic Number: [id number]    Therapy Diagnosis:   Encounter Diagnoses   Name Primary?    Chronic pain of both knees     Decreased strength, endurance, and mobility     Difficulty walking      Physician: Maile Cuello MD    Visit Date: 11/18/2020     Physician Orders: PT Eval and Treat   Medical Diagnosis from Referral: Chronic pain of left knee  Evaluation Date: 10/1/2020  Authorization Period Expiration: 9/17/2021  Plan of Care Expiration: 1/1/2021  Visit # / Visits authorized: 1/ 19    Time In: 1415  Time Out: 1500  Total Billable Time: 45 minutes    Precautions: Standard, Diabetes and Fall    Subjective     Pt reports: knee is feeling stiff today. Continued cramping in B hamstrings     She was compliant with home exercise program.  Response to previous treatment: No adverse reactions noted   Functional change: Ongoing    Pain: 8/10  Location: left knee      Objective     Mena received therapeutic exercises to develop strength, endurance, ROM and flexibility for 45 minutes including:    Nu-step x6'   + Heel slides 2x10 on L   + HS stretch 3x30" ea   + HS set 2x10 x3" - unable to complete   Supine quad sets x10, 5 sec hold   Seated quad set into SLR 2x10 ea   Bridges 2x10     NV: + Ball squeezes 2x20, + Clamshells 2x10 ea     Mena received hot pack for 00 minutes to .    Mena received cold pack for 00 minutes to .      Home Exercises Provided and Patient Education Provided     Education provided:   - Rationale for POC and progression   - Continued use of HEP    - Potential causes of muscle cramps     Written Home Exercises Provided: yes.  Exercises were reviewed and Mena was able to demonstrate them prior to the end of the session.  Mena demonstrated good  understanding of the education provided.     See EMR under Patient Instructions for exercises provided 11/18/2020.    Assessment     Mena was seen for first follow up this session. She performed " well, though she remained limited by B HS cramping and L knee pain. Due to increased symptoms in R LE, most exercises performed bilaterally. Cramping most severe following hamstring sets; because of this, hamstring set discontinued. PT to consider re-introducing in future sessions. Improvements in symptoms reported following B HS stretching. Significant limitations in flexibility noted, so HEP updated to include methods of HS stretches in various positions. Mena remains limited by pain and fearfulness of movement. PT to continue educating on involved anatomy/physiology and rationale for POC while progressing exercises for strength, endurance, and flexibility.     Mena is progressing well towards her goals.   Pt prognosis is Good.     Pt will continue to benefit from skilled outpatient physical therapy to address the deficits listed in the problem list box on initial evaluation, provide pt/family education and to maximize pt's level of independence in the home and community environment.     Pt's spiritual, cultural and educational needs considered and pt agreeable to plan of care and goals.     Anticipated barriers to physical therapy: None     Goals:   Short Term Goals: 5 weeks      1. Pt will be independent with HEP in order to demonstrate self management. - progressing   2. Pt will report a decrease in pain at its worse to 8/10 in order to improve quality of life. - progressing   3. Pt will be able to walk around her house without rollator and no increase in pain to- progressing  improve independence and mobility - progressing   4. Pt will increase BLE MMT by 1/3 muscle grade in order to demonstrate increased strength. - progressing   5. Pt will be able to walk 500 ft with LRAD and minimal to no increase in pain in knees to improve household and community mobility. - progressing      Long Term Goals: 10 weeks      1. Pt will be independent with updated HEP in order to demonstrate self management. - progressing    2. Pt will report a decrease in pain at its worse to 5/10 in order to improve quality of life. - progressing   3. Pt will be able to stand for 30 minutes with minimal difficulty to improve ability to cook and shop. - progressing   4. Pt will increase BLE MMT to 4+/5 or greater to improve strength and endurance to perform daily activities. - progressing   5. Pt will have a FOTO limitation score of < or = to 48% to demonstrate improved functional mobility. - progressing   6. Pt will be able to perform light activities around her house with minimal difficulty to improve independence and decrease reliance on daughter.- progressing     Plan     Cont with POC, progressing with LE strengthening, endurance, and flexibility as tolerated.     RHEA GIPSON, PT   11/18/2020

## 2020-11-20 DIAGNOSIS — E05.90 SUBCLINICAL HYPERTHYROIDISM: Primary | ICD-10-CM

## 2020-11-21 ENCOUNTER — LAB VISIT (OUTPATIENT)
Dept: LAB | Facility: HOSPITAL | Age: 75
End: 2020-11-21
Attending: NURSE PRACTITIONER
Payer: MEDICARE

## 2020-11-21 DIAGNOSIS — Z79.4 TYPE 2 DIABETES MELLITUS WITH DIABETIC POLYNEUROPATHY, WITH LONG-TERM CURRENT USE OF INSULIN: ICD-10-CM

## 2020-11-21 DIAGNOSIS — E11.42 TYPE 2 DIABETES MELLITUS WITH DIABETIC POLYNEUROPATHY, WITH LONG-TERM CURRENT USE OF INSULIN: ICD-10-CM

## 2020-11-21 LAB
ANION GAP SERPL CALC-SCNC: 7 MMOL/L (ref 8–16)
BUN SERPL-MCNC: 19 MG/DL (ref 8–23)
CALCIUM SERPL-MCNC: 9.3 MG/DL (ref 8.7–10.5)
CHLORIDE SERPL-SCNC: 108 MMOL/L (ref 95–110)
CO2 SERPL-SCNC: 29 MMOL/L (ref 23–29)
CREAT SERPL-MCNC: 0.7 MG/DL (ref 0.5–1.4)
EST. GFR  (AFRICAN AMERICAN): >60 ML/MIN/1.73 M^2
EST. GFR  (NON AFRICAN AMERICAN): >60 ML/MIN/1.73 M^2
ESTIMATED AVG GLUCOSE: 140 MG/DL (ref 68–131)
GLUCOSE SERPL-MCNC: 122 MG/DL (ref 70–110)
HBA1C MFR BLD HPLC: 6.5 % (ref 4–5.6)
POTASSIUM SERPL-SCNC: 3.8 MMOL/L (ref 3.5–5.1)
SODIUM SERPL-SCNC: 144 MMOL/L (ref 136–145)

## 2020-11-21 PROCEDURE — 80048 BASIC METABOLIC PNL TOTAL CA: CPT

## 2020-11-21 PROCEDURE — 83036 HEMOGLOBIN GLYCOSYLATED A1C: CPT

## 2020-11-21 PROCEDURE — 36415 COLL VENOUS BLD VENIPUNCTURE: CPT | Mod: PO

## 2020-11-24 ENCOUNTER — CLINICAL SUPPORT (OUTPATIENT)
Dept: REHABILITATION | Facility: HOSPITAL | Age: 75
End: 2020-11-24
Attending: ORTHOPAEDIC SURGERY
Payer: COMMERCIAL

## 2020-11-24 DIAGNOSIS — G89.29 CHRONIC PAIN OF BOTH KNEES: ICD-10-CM

## 2020-11-24 DIAGNOSIS — R53.1 DECREASED STRENGTH, ENDURANCE, AND MOBILITY: ICD-10-CM

## 2020-11-24 DIAGNOSIS — M25.561 CHRONIC PAIN OF BOTH KNEES: ICD-10-CM

## 2020-11-24 DIAGNOSIS — Z74.09 DECREASED STRENGTH, ENDURANCE, AND MOBILITY: ICD-10-CM

## 2020-11-24 DIAGNOSIS — M25.562 CHRONIC PAIN OF BOTH KNEES: ICD-10-CM

## 2020-11-24 DIAGNOSIS — R68.89 DECREASED STRENGTH, ENDURANCE, AND MOBILITY: ICD-10-CM

## 2020-11-24 DIAGNOSIS — R26.2 DIFFICULTY WALKING: ICD-10-CM

## 2020-11-24 NOTE — PROGRESS NOTES
"  Physical Therapy Treatment Note     Name: Mena Odonnell  Clinic Number: [id number]    Therapy Diagnosis:   Encounter Diagnoses   Name Primary?    Chronic pain of both knees     Decreased strength, endurance, and mobility     Difficulty walking      Physician: Maile Cuello MD    Visit Date: 11/24/2020     Physician Orders: PT Eval and Treat   Medical Diagnosis from Referral: Chronic pain of left knee  Evaluation Date: 10/1/2020  Authorization Period Expiration: 9/17/2021  Plan of Care Expiration: 1/1/2021  Visit # / Visits authorized: 2/ 19    Time In: 1011 (10 minutes late)  Time Out: 1100  Total Time: 49  Total Billable Time: 49 minutes    Precautions: Standard, Diabetes and Fall    Subjective     Pt reports: yesterday was a bad day, but today     She was compliant with home exercise program.  Response to previous treatment: No adverse reactions noted   Functional change: Ongoing    Pain: 3/10 " A little"  Location: left knee      Objective     Mena received therapeutic exercises to develop strength, endurance, ROM and flexibility for 49 minutes including:      Nu-step x6'     Heel slides 2x10 on L   Long sitting quad set into SLR 2x10 ea    HS stretch 3x30" ea   Supine HS Sets 5" x15 reps  Supine quad sets x15, 5 sec hold   Bridges 2x10 (unable to perform this date 11/24 due to low back cramping and causing pain)   +Ball Squeezes x20  + Supine hip abduction with RED TB 3" x20    Next vist: + Clamshells, sit<>stands      Mena received hot pack for 10 minutes to lumbar during supine exercises.    Mena received cold pack for 00 minutes to .      Home Exercises Provided and Patient Education Provided     Education provided:   - Rationale for POC and progression   - Continued use of HEP    - Potential causes of muscle cramps     Written Home Exercises Provided: yes.  Exercises were reviewed and Mena was able to demonstrate them prior to the end of the session.  Mena demonstrated good  understanding of the " education provided.     See EMR under Patient Instructions for exercises provided 11/18/2020.    Assessment     Pt tolerated treatment fairly this session. Pt entered clinic with decreased symptoms and reports of compliance with HEP. ;lPt was able to perform hamstring sets with no complaints of cramping to either LE. However, while attempting to perform bridges pt experienced cramping to R lumbar region. LTRs, manual R SKTC, manual HSS  and moist heat pack to lumbar region was applied with decreased symptoms to follow. Held bridges this session 2/2 cramping.  Added hip abductor/adductor strengthening this session with no adverse reactions or exacerbation of pain. PT to continue educating on involved anatomy/physiology and rationale for POC while progressing exercises for strength, endurance, and flexibility.     Mena is progressing well towards her goals.   Pt prognosis is Good.     Pt will continue to benefit from skilled outpatient physical therapy to address the deficits listed in the problem list box on initial evaluation, provide pt/family education and to maximize pt's level of independence in the home and community environment.     Pt's spiritual, cultural and educational needs considered and pt agreeable to plan of care and goals.     Anticipated barriers to physical therapy: None     Goals:   Short Term Goals: 5 weeks      1. Pt will be independent with HEP in order to demonstrate self management. - progressing   2. Pt will report a decrease in pain at its worse to 8/10 in order to improve quality of life. - progressing   3. Pt will be able to walk around her house without rollator and no increase in pain to- progressing  improve independence and mobility - progressing   4. Pt will increase BLE MMT by 1/3 muscle grade in order to demonstrate increased strength. - progressing   5. Pt will be able to walk 500 ft with LRAD and minimal to no increase in pain in knees to improve household and community mobility.  - progressing      Long Term Goals: 10 weeks      1. Pt will be independent with updated HEP in order to demonstrate self management. - progressing   2. Pt will report a decrease in pain at its worse to 5/10 in order to improve quality of life. - progressing   3. Pt will be able to stand for 30 minutes with minimal difficulty to improve ability to cook and shop. - progressing   4. Pt will increase BLE MMT to 4+/5 or greater to improve strength and endurance to perform daily activities. - progressing   5. Pt will have a FOTO limitation score of < or = to 48% to demonstrate improved functional mobility. - progressing   6. Pt will be able to perform light activities around her house with minimal difficulty to improve independence and decrease reliance on daughter.- progressing     Plan     Cont with POC, progressing with LE strengthening, endurance, and flexibility as tolerated.     Mar Pugh, PTA   11/24/2020

## 2020-11-30 ENCOUNTER — DOCUMENTATION ONLY (OUTPATIENT)
Dept: REHABILITATION | Facility: HOSPITAL | Age: 75
End: 2020-11-30

## 2020-11-30 ENCOUNTER — PATIENT OUTREACH (OUTPATIENT)
Dept: ADMINISTRATIVE | Facility: HOSPITAL | Age: 75
End: 2020-11-30

## 2020-12-03 ENCOUNTER — OFFICE VISIT (OUTPATIENT)
Dept: ENDOCRINOLOGY | Facility: CLINIC | Age: 75
End: 2020-12-03
Payer: COMMERCIAL

## 2020-12-03 VITALS
BODY MASS INDEX: 39.19 KG/M2 | DIASTOLIC BLOOD PRESSURE: 73 MMHG | TEMPERATURE: 98 F | SYSTOLIC BLOOD PRESSURE: 141 MMHG | WEIGHT: 235.5 LBS | HEART RATE: 84 BPM

## 2020-12-03 DIAGNOSIS — E11.42 TYPE 2 DIABETES MELLITUS WITH DIABETIC POLYNEUROPATHY, WITH LONG-TERM CURRENT USE OF INSULIN: Primary | ICD-10-CM

## 2020-12-03 DIAGNOSIS — E66.01 SEVERE OBESITY (BMI 35.0-39.9) WITH COMORBIDITY: ICD-10-CM

## 2020-12-03 DIAGNOSIS — E78.5 HYPERLIPIDEMIA, UNSPECIFIED HYPERLIPIDEMIA TYPE: ICD-10-CM

## 2020-12-03 DIAGNOSIS — Z79.4 TYPE 2 DIABETES MELLITUS WITH DIABETIC POLYNEUROPATHY, WITH LONG-TERM CURRENT USE OF INSULIN: Primary | ICD-10-CM

## 2020-12-03 DIAGNOSIS — E11.649 HYPOGLYCEMIA UNAWARENESS ASSOCIATED WITH TYPE 2 DIABETES MELLITUS: ICD-10-CM

## 2020-12-03 PROCEDURE — 99214 PR OFFICE/OUTPT VISIT, EST, LEVL IV, 30-39 MIN: ICD-10-PCS | Mod: S$GLB,,, | Performed by: NURSE PRACTITIONER

## 2020-12-03 PROCEDURE — 99214 OFFICE O/P EST MOD 30 MIN: CPT | Mod: S$GLB,,, | Performed by: NURSE PRACTITIONER

## 2020-12-03 PROCEDURE — 99999 PR PBB SHADOW E&M-EST. PATIENT-LVL V: CPT | Mod: PBBFAC,,, | Performed by: NURSE PRACTITIONER

## 2020-12-03 PROCEDURE — 99999 PR PBB SHADOW E&M-EST. PATIENT-LVL V: ICD-10-PCS | Mod: PBBFAC,,, | Performed by: NURSE PRACTITIONER

## 2020-12-03 RX ORDER — INSULIN GLARGINE 300 U/ML
INJECTION, SOLUTION SUBCUTANEOUS
Qty: 2 SYRINGE | Refills: 3 | Status: SHIPPED | OUTPATIENT
Start: 2020-12-03 | End: 2021-07-14

## 2020-12-03 NOTE — PROGRESS NOTES
CC: This 75 y.o. Black or  female  is here for evaluation of  T2DM along with comorbidities indicated in the Visit Diagnosis section of this encounter.    HPI: Mena Odonnell was diagnosed with T2DM in ~ . Experienced blurry vision at the time of diagnosis r/t BG >400. Started on orals. Began insulin in . She is currently on MDI.          Prior visit 20  Advised pt to reduce insulin doses in March during phone conversation.   A1c is mildly up from A1c of 6.4% in March to now 6.6%.   Invokana no longer covered by [employer]. She will need to switch to Farxiga or Jardiance.   /62 this morning at home. Brings a BP lo-147/60s.   She never decreased Humalog dose before breakfast but did decrease Toujeo from 100 to 90.   She often injects Humalog during lunch or right after lunch.   Plan Switch from Invokana to Jardiance because of formulary change.   Unable to adjust insulin doses because complete glucose logs are not available.   Reviewed importance of regularly monitoring. Low A1c raises suspicion of hypoglycemia. Unsure if patient needs such high doses of insulin.   Advised pt to monitor glucoses before each meal and bedtime. Keep a log. Send a log in 2 weeks.   Undergo  Continuous Glucose Monitoring (CGM)   Return to clinic in 4-6 weeks for CGM study    Prior visit 20  Pt returns for CGM f/u appt. CGM study was ordered d/t suspicion of undetected hypoglycemia from low A1c's. However, pt received steroid injection on  to both knees the day before she had CGM sensor placed.   She has lost 7 lb since last visit.   See CGM report below.   Plan Will reduce insulin doses due to frequent hypoglycemia and also because she has a lot of room to improve in diet, which she is willing to do. Diet is extremely poor.   Decrease Touejo Max to 64 units once daily.  Reduce Humalog to 12 units before breakfast and 16 units before dinner.   Test glucose 4x/day. Before each meal and  bedtime. Bring logs to every visit.   Follow up in 2 months with labs prior.       Interval history  a1c is about the same from 6.6. to 6.5%.   She states that her diet is about the same. She has been taking lower doses of Toujeo Max 64 units and Humalog 12-0-16 units ac.   Energy improved and has been able to do housework.         LAST DIABETES EDUCATION: 6/2019 mira Palmer - Found visit helpful     HOSPITALIZED FOR DIABETES -  No.    DIABETES MEDICATIONS: Jardiance 25 mg once daily in the AM, Ozempic 1 mg once weekly, Toujeo Max 64 units once daily in 6 am, Humalog Kwikpen 12-0-16 units ac     Misses medication doses - no   If she forgets to take 16 units for breakfast, she will take it for lunch.     DM COMPLICATIONS: peripheral neuropathy    SIGNIFICANT DIABETES MED HISTORY:   Metformin stopped December 2016 r/t GI upset and diarrhea     SELF MONITORING BLOOD GLUCOSE: Checks blood glucose at home  - none.     HYPOGLYCEMIC EPISODES:  Sometimes feels her BG is low before lunch or when she wakes up. Symptoms: tremors. Occurs about once weekly.       CURRENT DIET:  drinking mostly water.   Breakfast ~ 8 am; lunch ~ 12 pm. Eats 3 meals/day.   Biggest meal at lunch.     Diet recall: breakfast was a sandwich. Lunch was stuffed pepper, okra and rice. Dinner was a bowl of soup.       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 No acute distress.   PSYCH: AAOx3, appropriate mood and affect, conversant, well-groomed. Judgement and insight good.   NEURO: Cranial nerves grossly intact. Speech clear, no tremor.   CHEST: Respirations  even and unlabored.   Foot exam: pt declined exam today         Hemoglobin A1C   Date Value Ref Range Status   11/21/2020 6.5 (H) 4.0 - 5.6 % Final     Comment:     ADA Screening Guidelines:  5.7-6.4%  Consistent with prediabetes  >or=6.5%  Consistent with diabetes  High levels of fetal hemoglobin interfere with the HbA1C  assay. Heterozygous hemoglobin variants (HbS, HgC, etc)do  not significantly interfere with this assay.   However, presence of multiple variants may affect accuracy.     06/24/2020 6.6 (H) 4.0 - 5.6 % Final     Comment:     ADA Screening Guidelines:  5.7-6.4%  Consistent with prediabetes  >or=6.5%  Consistent with diabetes  High levels of fetal hemoglobin interfere with the HbA1C  assay. Heterozygous hemoglobin variants (HbS, HgC, etc)do  not significantly interfere with this assay.   However, presence of multiple variants may affect accuracy.     03/21/2020 6.4 (H) 4.0 - 5.6 % Final     Comment:     ADA Screening Guidelines:  5.7-6.4%  Consistent with prediabetes  >or=6.5%  Consistent with diabetes  High levels of fetal hemoglobin interfere with the HbA1C  assay. Heterozygous hemoglobin variants (HbS, HgC, etc)do  not significantly interfere with this assay.   However, presence of multiple variants may affect accuracy.             Chemistry        Component Value Date/Time     11/21/2020 0845    K 3.8 11/21/2020 0845     11/21/2020 0845    CO2 29 11/21/2020 0845    BUN 19 11/21/2020 0845    CREATININE 0.7 11/21/2020 0845     (H) 11/21/2020 0845        Component Value Date/Time    CALCIUM 9.3 11/21/2020 0845    ALKPHOS 74 11/04/2020 1519    AST 12 11/04/2020 1519    ALT 16 11/04/2020 1519    BILITOT 0.3 11/04/2020 1519    ESTGFRAFRICA >60.0 11/21/2020 0845    EGFRNONAA >60.0 11/21/2020 0845          Lab Results   Component Value Date    LDLCALC 133.8 06/24/2020        Ref. Range 3/21/2020 08:27 6/24/2020 07:15   Cholesterol Latest Ref Range: 120 - 199 mg/dL 184 203 (H)   HDL  Latest Ref Range: 40 - 75 mg/dL 44 42   Hdl/Cholesterol Ratio Latest Ref Range: 20.0 - 50.0 % 23.9 20.7   LDL Cholesterol External Latest Ref Range: 63.0 - 159.0 mg/dL 123.6 133.8   Non-HDL Cholesterol Latest Units: mg/dL 140 161   Total Cholesterol/HDL Ratio Latest Ref Range: 2.0 - 5.0  4.2 4.8   Triglycerides Latest Ref Range: 30 - 150 mg/dL 82 136           Lab Results   Component Value Date    MICALBCREAT 56.4 (H) 06/24/2020               ASSESSMENT and PLAN:    A1C GOAL: < 7%       1. Type 2 diabetes mellitus with diabetic polyneuropathy, with long-term current use of insulin  Reduce Toujeo to 50 units once daily in AM.   Continue Jardiance and Ozempic.   Discussed switching from Humalog to glimepiride pill but patient still has 4 boxes of Humalog left.   Test glucose 3x/day. Bring logs to visit.   Patient will consider switching to glimepiride in 3 months at next visit. Continue current doses of Humalog (12 units before breakfast and 16 units before supper). Unable to adjust insulin doses without logs. Labs in 3 months prior to visit.       Hemoglobin A1C   2. Hyperlipidemia, unspecified hyperlipidemia type  Continue pravastatin    3. Severe obesity (BMI 35.0-39.9) with comorbidity  Increases insulin resistance.      4. 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

## 2020-12-03 NOTE — PATIENT INSTRUCTIONS
Reduce Toujeo to 50 units once daily in AM.   Continue Jardiance and Ozempic.   Discussed switching from Humalog to glimepiride pill but patient still has 4 boxes of Humalog left.   Test glucose 3x/day. Bring logs to visit.   Patient will consider switching to glimepiride in 3 months at next visit. Continue current doses of Humalog (12 units before breakfast and 16 units before supper). Unable to adjust insulin doses without logs. Labs in 3 months prior to visit.

## 2020-12-08 ENCOUNTER — TELEPHONE (OUTPATIENT)
Dept: OTOLARYNGOLOGY | Facility: CLINIC | Age: 75
End: 2020-12-08

## 2020-12-08 NOTE — TELEPHONE ENCOUNTER
Spoke with patient, scheduled appointment for tomorrow at 9:00 am due to c/o hearing aids not working.    ----- Message from Pantera Márquez sent at 12/8/2020  4:20 PM CST -----  Name of Who is Calling: EDDY BENDER [[id number]]    What is the request in detail: EDDY BENDER [[id number]] is calling in  regards to sooner appointment ... Please contact to further discuss and advise      Can the clinic reply by [facility]: no     What Number to [facility] Back [facility]:  [phone] (home) [phone] (work)

## 2020-12-09 ENCOUNTER — CLINICAL SUPPORT (OUTPATIENT)
Dept: OTOLARYNGOLOGY | Facility: CLINIC | Age: 75
End: 2020-12-09
Payer: COMMERCIAL

## 2020-12-09 DIAGNOSIS — Z46.1 ENCOUNTER FOR FITTING AND ADJUSTMENT OF HEARING AID OF BOTH EARS: Primary | ICD-10-CM

## 2020-12-09 PROCEDURE — 99499 UNLISTED E&M SERVICE: CPT | Mod: S$GLB,,, | Performed by: AUDIOLOGIST-HEARING AID FITTER

## 2020-12-09 PROCEDURE — 99499 NO LOS: ICD-10-PCS | Mod: S$GLB,,, | Performed by: AUDIOLOGIST-HEARING AID FITTER

## 2020-12-09 NOTE — PROGRESS NOTES
Keenan Hernandes, CCC-A, F-AAA  Audiologist - [facility] [address] [address]  [email or web address]  [phone]    Patient: Mena Odonnell   MRN: [id number]  [address]  Home Phone [phone]   Work Phone [phone]   Mobile [phone]   : 1945  TRACY: 2020      HEARING AID FOLLOW-UP    Mena Odonnell is here today reporting that she has not been able to hear out of her hearing aids for a while, but she accidentally left her Right hearing aid in the car.      Otoscopy revealed slight cerumen with TM's visible AU, AS>AD.  Listening check revealed Left hearing aid very weak.  Cleaned hearing aid (L) &  using wipe and brush.  Changed out wax filter & medium vented dome from stock AS.  Listening check much better post-cleaning - noted instant improvement in office.    Demonstrated at length how to remove dome and change out cerushield, as well as, how to know when cerushield needs to be changed and new cerushield disk should be started.  Reviewed daily cleaning with cloth and brush to avoid buildup of debris on dome.      Mena Odonnell verbalized understanding and satisfaction with today's visit.  She will call if service is needed before her next appointment.  Advised her to be cautious with hearing aids and wearing masks.  She mentioned that at her next visit, she wants to connect her hearing aids to her phone.      _______________________________  Keenan Hernandes, CCC-A, F-AAA  Audiologist

## 2020-12-10 ENCOUNTER — TELEPHONE (OUTPATIENT)
Dept: REHABILITATION | Facility: HOSPITAL | Age: 75
End: 2020-12-10

## 2020-12-10 NOTE — TELEPHONE ENCOUNTER
Spoke with patient about compliance with physical therapy. She wants to cancel her remaining appointments because she believes her medicine is giving her problems. She is going to see a doctor about this. If this is not the case she will resume as able.     Charo Cool, PT, DPT   12/10/2020

## 2021-01-11 ENCOUNTER — OFFICE VISIT (OUTPATIENT)
Dept: FAMILY MEDICINE | Facility: CLINIC | Age: 76
End: 2021-01-11
Payer: COMMERCIAL

## 2021-01-11 DIAGNOSIS — R05.9 COUGH: ICD-10-CM

## 2021-01-11 DIAGNOSIS — U07.1 COVID-19 VIRUS INFECTION: Primary | ICD-10-CM

## 2021-01-11 PROCEDURE — 99441 PR PHYSICIAN TELEPHONE EVALUATION 5-10 MIN: ICD-10-PCS | Mod: 95,,, | Performed by: INTERNAL MEDICINE

## 2021-01-11 PROCEDURE — 99441 PR PHYSICIAN TELEPHONE EVALUATION 5-10 MIN: CPT | Mod: 95,,, | Performed by: INTERNAL MEDICINE

## 2021-01-11 RX ORDER — BENZONATATE 200 MG/1
200 CAPSULE ORAL 3 TIMES DAILY PRN
Qty: 20 CAPSULE | Refills: 0 | Status: SHIPPED | OUTPATIENT
Start: 2021-01-11 | End: 2021-01-21

## 2021-01-26 ENCOUNTER — PATIENT OUTREACH (OUTPATIENT)
Dept: ADMINISTRATIVE | Facility: OTHER | Age: 76
End: 2021-01-26

## 2021-01-28 ENCOUNTER — OFFICE VISIT (OUTPATIENT)
Dept: ORTHOPEDICS | Facility: CLINIC | Age: 76
End: 2021-01-28
Payer: COMMERCIAL

## 2021-01-28 VITALS
WEIGHT: 234 LBS | RESPIRATION RATE: 18 BRPM | SYSTOLIC BLOOD PRESSURE: 160 MMHG | BODY MASS INDEX: 38.99 KG/M2 | HEART RATE: 80 BPM | DIASTOLIC BLOOD PRESSURE: 64 MMHG | OXYGEN SATURATION: 98 % | HEIGHT: 65 IN

## 2021-01-28 DIAGNOSIS — M17.0 PRIMARY OSTEOARTHRITIS OF BOTH KNEES: Primary | ICD-10-CM

## 2021-01-28 PROCEDURE — 99213 OFFICE O/P EST LOW 20 MIN: CPT | Mod: S$GLB,,, | Performed by: ORTHOPAEDIC SURGERY

## 2021-01-28 PROCEDURE — 99999 PR PBB SHADOW E&M-EST. PATIENT-LVL V: ICD-10-PCS | Mod: PBBFAC,,, | Performed by: ORTHOPAEDIC SURGERY

## 2021-01-28 PROCEDURE — 99999 PR PBB SHADOW E&M-EST. PATIENT-LVL V: CPT | Mod: PBBFAC,,, | Performed by: ORTHOPAEDIC SURGERY

## 2021-01-28 PROCEDURE — 99213 PR OFFICE/OUTPT VISIT, EST, LEVL III, 20-29 MIN: ICD-10-PCS | Mod: S$GLB,,, | Performed by: ORTHOPAEDIC SURGERY

## 2021-01-28 RX ORDER — DICLOFENAC SODIUM 50 MG/1
50 TABLET, DELAYED RELEASE ORAL 2 TIMES DAILY
Qty: 60 TABLET | Refills: 0 | Status: SHIPPED | OUTPATIENT
Start: 2021-01-28 | End: 2021-11-29

## 2021-01-29 ENCOUNTER — PATIENT MESSAGE (OUTPATIENT)
Dept: ADMINISTRATIVE | Facility: HOSPITAL | Age: 76
End: 2021-01-29

## 2021-02-03 PROBLEM — M25.462 PAIN AND SWELLING OF LEFT KNEE: Status: ACTIVE | Noted: 2021-02-03

## 2021-02-03 PROBLEM — M25.562 PAIN AND SWELLING OF LEFT KNEE: Status: ACTIVE | Noted: 2021-02-03

## 2021-02-03 PROBLEM — M25.561 PAIN AND SWELLING OF RIGHT KNEE: Status: ACTIVE | Noted: 2021-02-03

## 2021-02-03 PROBLEM — M25.461 PAIN AND SWELLING OF RIGHT KNEE: Status: ACTIVE | Noted: 2021-02-03

## 2021-02-03 PROBLEM — R53.1 WEAKNESS: Status: ACTIVE | Noted: 2021-02-03

## 2021-02-04 ENCOUNTER — CLINICAL SUPPORT (OUTPATIENT)
Dept: OTOLARYNGOLOGY | Facility: CLINIC | Age: 76
End: 2021-02-04
Payer: COMMERCIAL

## 2021-02-04 ENCOUNTER — OFFICE VISIT (OUTPATIENT)
Dept: OTOLARYNGOLOGY | Facility: CLINIC | Age: 76
End: 2021-02-04
Payer: COMMERCIAL

## 2021-02-04 VITALS
SYSTOLIC BLOOD PRESSURE: 157 MMHG | WEIGHT: 233.69 LBS | HEIGHT: 65 IN | TEMPERATURE: 98 F | DIASTOLIC BLOOD PRESSURE: 73 MMHG | HEART RATE: 84 BPM | BODY MASS INDEX: 38.93 KG/M2

## 2021-02-04 DIAGNOSIS — H93.13 BILATERAL TINNITUS: ICD-10-CM

## 2021-02-04 DIAGNOSIS — R29.2 ABNORMAL ACOUSTIC REFLEX: ICD-10-CM

## 2021-02-04 DIAGNOSIS — Z97.4 WEARS HEARING AID IN BOTH EARS: ICD-10-CM

## 2021-02-04 DIAGNOSIS — H69.91 TYPE C TYMPANOGRAM OF RIGHT EAR: ICD-10-CM

## 2021-02-04 DIAGNOSIS — H93.13 TINNITUS, BILATERAL: ICD-10-CM

## 2021-02-04 DIAGNOSIS — H90.3 ASYMMETRICAL SENSORINEURAL HEARING LOSS: Primary | ICD-10-CM

## 2021-02-04 DIAGNOSIS — Z46.1 ENCOUNTER FOR FITTING AND ADJUSTMENT OF HEARING AID OF BOTH EARS: Primary | ICD-10-CM

## 2021-02-04 DIAGNOSIS — H93.8X1 SENSATION OF FULLNESS IN EAR, RIGHT: ICD-10-CM

## 2021-02-04 DIAGNOSIS — H61.23 EXCESSIVE CERUMEN IN EAR CANAL, BILATERAL: ICD-10-CM

## 2021-02-04 DIAGNOSIS — H92.01 EAR PAIN, RIGHT: ICD-10-CM

## 2021-02-04 PROCEDURE — 92557 PR COMPREHENSIVE HEARING TEST: ICD-10-PCS | Mod: S$GLB,,, | Performed by: AUDIOLOGIST-HEARING AID FITTER

## 2021-02-04 PROCEDURE — 99499 UNLISTED E&M SERVICE: CPT | Mod: S$GLB,,, | Performed by: AUDIOLOGIST-HEARING AID FITTER

## 2021-02-04 PROCEDURE — 92550 TYMPANOMETRY & REFLEX THRESH: CPT | Mod: S$GLB,,, | Performed by: AUDIOLOGIST-HEARING AID FITTER

## 2021-02-04 PROCEDURE — 92557 COMPREHENSIVE HEARING TEST: CPT | Mod: S$GLB,,, | Performed by: AUDIOLOGIST-HEARING AID FITTER

## 2021-02-04 PROCEDURE — 92550 PR TYMPANOMETRY AND REFLEX THRESHOLD MEASUREMENTS: ICD-10-PCS | Mod: S$GLB,,, | Performed by: AUDIOLOGIST-HEARING AID FITTER

## 2021-02-04 PROCEDURE — 99499 NO LOS: ICD-10-PCS | Mod: S$GLB,,, | Performed by: AUDIOLOGIST-HEARING AID FITTER

## 2021-02-04 PROCEDURE — 99214 PR OFFICE/OUTPT VISIT, EST, LEVL IV, 30-39 MIN: ICD-10-PCS | Mod: S$GLB,,, | Performed by: OTOLARYNGOLOGY

## 2021-02-04 PROCEDURE — 99214 OFFICE O/P EST MOD 30 MIN: CPT | Mod: S$GLB,,, | Performed by: OTOLARYNGOLOGY

## 2021-02-09 ENCOUNTER — OFFICE VISIT (OUTPATIENT)
Dept: FAMILY MEDICINE | Facility: CLINIC | Age: 76
End: 2021-02-09
Payer: COMMERCIAL

## 2021-02-09 VITALS
RESPIRATION RATE: 17 BRPM | DIASTOLIC BLOOD PRESSURE: 80 MMHG | SYSTOLIC BLOOD PRESSURE: 134 MMHG | OXYGEN SATURATION: 97 % | HEART RATE: 76 BPM | BODY MASS INDEX: 39.25 KG/M2 | WEIGHT: 235.88 LBS

## 2021-02-09 DIAGNOSIS — E78.2 MIXED HYPERLIPIDEMIA: ICD-10-CM

## 2021-02-09 DIAGNOSIS — M25.562 CHRONIC PAIN OF BOTH KNEES: ICD-10-CM

## 2021-02-09 DIAGNOSIS — R79.89 LOW TSH LEVEL: Primary | ICD-10-CM

## 2021-02-09 DIAGNOSIS — G89.29 CHRONIC PAIN OF BOTH KNEES: ICD-10-CM

## 2021-02-09 DIAGNOSIS — E11.42 TYPE 2 DIABETES MELLITUS WITH DIABETIC POLYNEUROPATHY, WITH LONG-TERM CURRENT USE OF INSULIN: ICD-10-CM

## 2021-02-09 DIAGNOSIS — M25.561 CHRONIC PAIN OF BOTH KNEES: ICD-10-CM

## 2021-02-09 DIAGNOSIS — I10 ESSENTIAL HYPERTENSION: ICD-10-CM

## 2021-02-09 DIAGNOSIS — G63 POLYNEUROPATHY ASSOCIATED WITH UNDERLYING DISEASE: ICD-10-CM

## 2021-02-09 DIAGNOSIS — Z79.4 TYPE 2 DIABETES MELLITUS WITH DIABETIC POLYNEUROPATHY, WITH LONG-TERM CURRENT USE OF INSULIN: ICD-10-CM

## 2021-02-09 PROCEDURE — 99214 OFFICE O/P EST MOD 30 MIN: CPT | Mod: S$GLB,,, | Performed by: INTERNAL MEDICINE

## 2021-02-09 PROCEDURE — 99999 PR PBB SHADOW E&M-EST. PATIENT-LVL III: CPT | Mod: PBBFAC,,, | Performed by: INTERNAL MEDICINE

## 2021-02-09 PROCEDURE — 99214 PR OFFICE/OUTPT VISIT, EST, LEVL IV, 30-39 MIN: ICD-10-PCS | Mod: S$GLB,,, | Performed by: INTERNAL MEDICINE

## 2021-02-09 PROCEDURE — 99999 PR PBB SHADOW E&M-EST. PATIENT-LVL III: ICD-10-PCS | Mod: PBBFAC,,, | Performed by: INTERNAL MEDICINE

## 2021-02-09 RX ORDER — PRAVASTATIN SODIUM 80 MG/1
80 TABLET ORAL DAILY
Qty: 90 TABLET | Refills: 3 | Status: SHIPPED | OUTPATIENT
Start: 2021-02-09 | End: 2021-11-29 | Stop reason: SDUPTHER

## 2021-02-09 RX ORDER — LOSARTAN POTASSIUM AND HYDROCHLOROTHIAZIDE 25; 100 MG/1; MG/1
1 TABLET ORAL DAILY
Qty: 90 TABLET | Refills: 3 | Status: SHIPPED | OUTPATIENT
Start: 2021-02-09 | End: 2021-11-29 | Stop reason: SDUPTHER

## 2021-02-09 RX ORDER — EMPAGLIFLOZIN 10 MG/1
10 TABLET, FILM COATED ORAL DAILY
Qty: 90 TABLET | Refills: 2 | Status: SHIPPED | OUTPATIENT
Start: 2021-02-09 | End: 2021-12-13 | Stop reason: SDUPTHER

## 2021-02-09 RX ORDER — GABAPENTIN 100 MG/1
200 CAPSULE ORAL 2 TIMES DAILY
Qty: 360 CAPSULE | Refills: 1 | Status: SHIPPED | OUTPATIENT
Start: 2021-02-09 | End: 2021-11-29

## 2021-02-17 ENCOUNTER — PATIENT MESSAGE (OUTPATIENT)
Dept: ORTHOPEDICS | Facility: CLINIC | Age: 76
End: 2021-02-17

## 2021-02-18 ENCOUNTER — OFFICE VISIT (OUTPATIENT)
Dept: ORTHOPEDICS | Facility: CLINIC | Age: 76
End: 2021-02-18
Payer: COMMERCIAL

## 2021-02-18 VITALS
HEART RATE: 85 BPM | RESPIRATION RATE: 18 BRPM | SYSTOLIC BLOOD PRESSURE: 142 MMHG | HEIGHT: 65 IN | DIASTOLIC BLOOD PRESSURE: 80 MMHG | WEIGHT: 231.5 LBS | OXYGEN SATURATION: 97 % | BODY MASS INDEX: 38.57 KG/M2

## 2021-02-18 DIAGNOSIS — M17.0 PRIMARY OSTEOARTHRITIS OF BOTH KNEES: Primary | ICD-10-CM

## 2021-02-18 PROCEDURE — 20610 LARGE JOINT ASPIRATION/INJECTION: BILATERAL KNEE: ICD-10-PCS | Mod: 50,S$GLB,, | Performed by: ORTHOPAEDIC SURGERY

## 2021-02-18 PROCEDURE — 20610 DRAIN/INJ JOINT/BURSA W/O US: CPT | Mod: 50,S$GLB,, | Performed by: ORTHOPAEDIC SURGERY

## 2021-02-18 PROCEDURE — 99999 PR PBB SHADOW E&M-EST. PATIENT-LVL IV: CPT | Mod: PBBFAC,,, | Performed by: ORTHOPAEDIC SURGERY

## 2021-02-18 PROCEDURE — 99499 NO LOS: ICD-10-PCS | Mod: S$GLB,,, | Performed by: ORTHOPAEDIC SURGERY

## 2021-02-18 PROCEDURE — 99999 PR PBB SHADOW E&M-EST. PATIENT-LVL IV: ICD-10-PCS | Mod: PBBFAC,,, | Performed by: ORTHOPAEDIC SURGERY

## 2021-02-18 PROCEDURE — 99499 UNLISTED E&M SERVICE: CPT | Mod: S$GLB,,, | Performed by: ORTHOPAEDIC SURGERY

## 2021-02-25 ENCOUNTER — OFFICE VISIT (OUTPATIENT)
Dept: ORTHOPEDICS | Facility: CLINIC | Age: 76
End: 2021-02-25
Payer: COMMERCIAL

## 2021-02-25 VITALS
BODY MASS INDEX: 39.3 KG/M2 | HEART RATE: 83 BPM | SYSTOLIC BLOOD PRESSURE: 140 MMHG | DIASTOLIC BLOOD PRESSURE: 60 MMHG | OXYGEN SATURATION: 98 % | HEIGHT: 65 IN | RESPIRATION RATE: 18 BRPM | WEIGHT: 235.88 LBS

## 2021-02-25 DIAGNOSIS — M17.0 PRIMARY OSTEOARTHRITIS OF BOTH KNEES: Primary | ICD-10-CM

## 2021-02-25 PROCEDURE — 99499 NO LOS: ICD-10-PCS | Mod: S$GLB,,, | Performed by: ORTHOPAEDIC SURGERY

## 2021-02-25 PROCEDURE — 20610 DRAIN/INJ JOINT/BURSA W/O US: CPT | Mod: 50,S$GLB,, | Performed by: ORTHOPAEDIC SURGERY

## 2021-02-25 PROCEDURE — 99499 UNLISTED E&M SERVICE: CPT | Mod: S$GLB,,, | Performed by: ORTHOPAEDIC SURGERY

## 2021-02-25 PROCEDURE — 20610 LARGE JOINT ASPIRATION/INJECTION: BILATERAL KNEE: ICD-10-PCS | Mod: 50,S$GLB,, | Performed by: ORTHOPAEDIC SURGERY

## 2021-02-25 PROCEDURE — 99999 PR PBB SHADOW E&M-EST. PATIENT-LVL IV: ICD-10-PCS | Mod: PBBFAC,,, | Performed by: ORTHOPAEDIC SURGERY

## 2021-02-25 PROCEDURE — 99999 PR PBB SHADOW E&M-EST. PATIENT-LVL IV: CPT | Mod: PBBFAC,,, | Performed by: ORTHOPAEDIC SURGERY

## 2021-02-27 ENCOUNTER — LAB VISIT (OUTPATIENT)
Dept: LAB | Facility: HOSPITAL | Age: 76
End: 2021-02-27
Payer: COMMERCIAL

## 2021-02-27 DIAGNOSIS — E11.42 TYPE 2 DIABETES MELLITUS WITH DIABETIC POLYNEUROPATHY, WITH LONG-TERM CURRENT USE OF INSULIN: ICD-10-CM

## 2021-02-27 DIAGNOSIS — Z79.4 TYPE 2 DIABETES MELLITUS WITH DIABETIC POLYNEUROPATHY, WITH LONG-TERM CURRENT USE OF INSULIN: ICD-10-CM

## 2021-02-27 LAB
ESTIMATED AVG GLUCOSE: 157 MG/DL (ref 68–131)
HBA1C MFR BLD: 7.1 % (ref 4–5.6)

## 2021-02-27 PROCEDURE — 36415 COLL VENOUS BLD VENIPUNCTURE: CPT | Mod: PO

## 2021-02-27 PROCEDURE — 83036 HEMOGLOBIN GLYCOSYLATED A1C: CPT

## 2021-03-02 ENCOUNTER — OFFICE VISIT (OUTPATIENT)
Dept: ORTHOPEDICS | Facility: CLINIC | Age: 76
End: 2021-03-02
Payer: COMMERCIAL

## 2021-03-02 VITALS
DIASTOLIC BLOOD PRESSURE: 70 MMHG | HEART RATE: 84 BPM | OXYGEN SATURATION: 96 % | RESPIRATION RATE: 18 BRPM | WEIGHT: 235 LBS | SYSTOLIC BLOOD PRESSURE: 142 MMHG | BODY MASS INDEX: 39.15 KG/M2 | HEIGHT: 65 IN

## 2021-03-02 DIAGNOSIS — M17.0 PRIMARY OSTEOARTHRITIS OF BOTH KNEES: Primary | ICD-10-CM

## 2021-03-02 PROCEDURE — 20610 DRAIN/INJ JOINT/BURSA W/O US: CPT | Mod: 50,S$GLB,, | Performed by: ORTHOPAEDIC SURGERY

## 2021-03-02 PROCEDURE — 99499 NO LOS: ICD-10-PCS | Mod: S$GLB,,, | Performed by: ORTHOPAEDIC SURGERY

## 2021-03-02 PROCEDURE — 99499 UNLISTED E&M SERVICE: CPT | Mod: S$GLB,,, | Performed by: ORTHOPAEDIC SURGERY

## 2021-03-02 PROCEDURE — 99999 PR PBB SHADOW E&M-EST. PATIENT-LVL IV: ICD-10-PCS | Mod: PBBFAC,,, | Performed by: ORTHOPAEDIC SURGERY

## 2021-03-02 PROCEDURE — 99999 PR PBB SHADOW E&M-EST. PATIENT-LVL IV: CPT | Mod: PBBFAC,,, | Performed by: ORTHOPAEDIC SURGERY

## 2021-03-02 PROCEDURE — 20610 LARGE JOINT ASPIRATION/INJECTION: BILATERAL KNEE: ICD-10-PCS | Mod: 50,S$GLB,, | Performed by: ORTHOPAEDIC SURGERY

## 2021-03-05 ENCOUNTER — OFFICE VISIT (OUTPATIENT)
Dept: ENDOCRINOLOGY | Facility: CLINIC | Age: 76
End: 2021-03-05
Payer: COMMERCIAL

## 2021-03-05 VITALS
SYSTOLIC BLOOD PRESSURE: 153 MMHG | RESPIRATION RATE: 18 BRPM | OXYGEN SATURATION: 94 % | DIASTOLIC BLOOD PRESSURE: 69 MMHG | TEMPERATURE: 99 F | HEART RATE: 91 BPM | WEIGHT: 230 LBS | BODY MASS INDEX: 38.32 KG/M2 | HEIGHT: 65 IN

## 2021-03-05 DIAGNOSIS — E11.649 HYPOGLYCEMIA ASSOCIATED WITH DIABETES: ICD-10-CM

## 2021-03-05 DIAGNOSIS — E66.01 SEVERE OBESITY (BMI 35.0-39.9) WITH COMORBIDITY: ICD-10-CM

## 2021-03-05 DIAGNOSIS — E11.42 TYPE 2 DIABETES MELLITUS WITH DIABETIC POLYNEUROPATHY, WITH LONG-TERM CURRENT USE OF INSULIN: Primary | ICD-10-CM

## 2021-03-05 DIAGNOSIS — Z79.4 TYPE 2 DIABETES MELLITUS WITH DIABETIC POLYNEUROPATHY, WITH LONG-TERM CURRENT USE OF INSULIN: Primary | ICD-10-CM

## 2021-03-05 DIAGNOSIS — E78.5 HYPERLIPIDEMIA, UNSPECIFIED HYPERLIPIDEMIA TYPE: ICD-10-CM

## 2021-03-05 PROCEDURE — 99999 PR PBB SHADOW E&M-EST. PATIENT-LVL V: CPT | Mod: PBBFAC,,, | Performed by: NURSE PRACTITIONER

## 2021-03-05 PROCEDURE — 99999 PR PBB SHADOW E&M-EST. PATIENT-LVL V: ICD-10-PCS | Mod: PBBFAC,,, | Performed by: NURSE PRACTITIONER

## 2021-03-05 PROCEDURE — 99214 OFFICE O/P EST MOD 30 MIN: CPT | Mod: S$GLB,,, | Performed by: NURSE PRACTITIONER

## 2021-03-05 PROCEDURE — 99214 PR OFFICE/OUTPT VISIT, EST, LEVL IV, 30-39 MIN: ICD-10-PCS | Mod: S$GLB,,, | Performed by: NURSE PRACTITIONER

## 2021-03-05 RX ORDER — INSULIN LISPRO 100 [IU]/ML
INJECTION, SOLUTION INTRAVENOUS; SUBCUTANEOUS
Qty: 2 BOX | Refills: 5
Start: 2021-03-05 | End: 2021-06-10 | Stop reason: SDUPTHER

## 2021-03-11 ENCOUNTER — PES CALL (OUTPATIENT)
Dept: ADMINISTRATIVE | Facility: CLINIC | Age: 76
End: 2021-03-11

## 2021-03-23 ENCOUNTER — OFFICE VISIT (OUTPATIENT)
Dept: FAMILY MEDICINE | Facility: CLINIC | Age: 76
End: 2021-03-23
Payer: COMMERCIAL

## 2021-03-23 VITALS
TEMPERATURE: 99 F | WEIGHT: 230.63 LBS | HEIGHT: 65 IN | HEART RATE: 92 BPM | BODY MASS INDEX: 38.42 KG/M2 | DIASTOLIC BLOOD PRESSURE: 60 MMHG | RESPIRATION RATE: 19 BRPM | OXYGEN SATURATION: 98 % | SYSTOLIC BLOOD PRESSURE: 146 MMHG

## 2021-03-23 DIAGNOSIS — I10 ESSENTIAL HYPERTENSION: ICD-10-CM

## 2021-03-23 DIAGNOSIS — R05.3 CHRONIC COUGH: ICD-10-CM

## 2021-03-23 DIAGNOSIS — Z00.00 ENCOUNTER FOR PREVENTIVE HEALTH EXAMINATION: Primary | ICD-10-CM

## 2021-03-23 DIAGNOSIS — I35.0 MILD AORTIC STENOSIS: ICD-10-CM

## 2021-03-23 DIAGNOSIS — E11.42 TYPE 2 DIABETES MELLITUS WITH DIABETIC POLYNEUROPATHY, WITH LONG-TERM CURRENT USE OF INSULIN: ICD-10-CM

## 2021-03-23 DIAGNOSIS — Z79.4 TYPE 2 DIABETES MELLITUS WITH DIABETIC POLYNEUROPATHY, WITH LONG-TERM CURRENT USE OF INSULIN: ICD-10-CM

## 2021-03-23 DIAGNOSIS — Z23 NEED FOR VACCINATION AGAINST STREPTOCOCCUS PNEUMONIAE: ICD-10-CM

## 2021-03-23 DIAGNOSIS — Z23 NEED FOR SHINGLES VACCINE: ICD-10-CM

## 2021-03-23 PROCEDURE — 99999 PR PBB SHADOW E&M-EST. PATIENT-LVL V: ICD-10-PCS | Mod: PBBFAC,,, | Performed by: NURSE PRACTITIONER

## 2021-03-23 PROCEDURE — 99999 PR PBB SHADOW E&M-EST. PATIENT-LVL V: CPT | Mod: PBBFAC,,, | Performed by: NURSE PRACTITIONER

## 2021-03-23 PROCEDURE — 90732 PNEUMOCOCCAL POLYSACCHARIDE VACCINE 23-VALENT =>2YO SQ IM: ICD-10-PCS | Mod: S$GLB,,, | Performed by: NURSE PRACTITIONER

## 2021-03-23 PROCEDURE — 90471 IMMUNIZATION ADMIN: CPT | Mod: S$GLB,,, | Performed by: NURSE PRACTITIONER

## 2021-03-23 PROCEDURE — 90732 PPSV23 VACC 2 YRS+ SUBQ/IM: CPT | Mod: S$GLB,,, | Performed by: NURSE PRACTITIONER

## 2021-03-23 PROCEDURE — G0439 PPPS, SUBSEQ VISIT: HCPCS | Mod: S$GLB,,, | Performed by: NURSE PRACTITIONER

## 2021-03-23 PROCEDURE — 90471 PNEUMOCOCCAL POLYSACCHARIDE VACCINE 23-VALENT =>2YO SQ IM: ICD-10-PCS | Mod: S$GLB,,, | Performed by: NURSE PRACTITIONER

## 2021-03-23 PROCEDURE — G0439 PR MEDICARE ANNUAL WELLNESS SUBSEQUENT VISIT: ICD-10-PCS | Mod: S$GLB,,, | Performed by: NURSE PRACTITIONER

## 2021-03-23 RX ORDER — MONTELUKAST SODIUM 10 MG/1
10 TABLET ORAL DAILY
Qty: 90 TABLET | Refills: 1 | Status: SHIPPED | OUTPATIENT
Start: 2021-03-23 | End: 2021-04-14 | Stop reason: SDUPTHER

## 2021-03-23 RX ORDER — SEMAGLUTIDE 1.34 MG/ML
INJECTION, SOLUTION SUBCUTANEOUS
Qty: 2 SYRINGE | Refills: 2 | Status: SHIPPED | OUTPATIENT
Start: 2021-03-23 | End: 2021-06-10 | Stop reason: SDUPTHER

## 2021-04-14 DIAGNOSIS — R05.3 CHRONIC COUGH: ICD-10-CM

## 2021-04-14 RX ORDER — MONTELUKAST SODIUM 10 MG/1
10 TABLET ORAL DAILY
Qty: 90 TABLET | Refills: 1 | Status: SHIPPED | OUTPATIENT
Start: 2021-04-14 | End: 2023-10-26 | Stop reason: SDUPTHER

## 2021-04-20 LAB
LEFT EYE DM RETINOPATHY: POSITIVE
RIGHT EYE DM RETINOPATHY: POSITIVE

## 2021-05-05 DIAGNOSIS — M17.0 PRIMARY OSTEOARTHRITIS OF BOTH KNEES: ICD-10-CM

## 2021-05-07 RX ORDER — DICLOFENAC SODIUM 10 MG/G
GEL TOPICAL
Qty: 100 G | Refills: 5 | Status: SHIPPED | OUTPATIENT
Start: 2021-05-07 | End: 2021-11-15 | Stop reason: SDUPTHER

## 2021-05-10 ENCOUNTER — LAB VISIT (OUTPATIENT)
Dept: LAB | Facility: HOSPITAL | Age: 76
End: 2021-05-10
Attending: INTERNAL MEDICINE
Payer: COMMERCIAL

## 2021-05-10 ENCOUNTER — OFFICE VISIT (OUTPATIENT)
Dept: FAMILY MEDICINE | Facility: CLINIC | Age: 76
End: 2021-05-10
Payer: COMMERCIAL

## 2021-05-10 VITALS
DIASTOLIC BLOOD PRESSURE: 60 MMHG | OXYGEN SATURATION: 95 % | RESPIRATION RATE: 18 BRPM | BODY MASS INDEX: 38.09 KG/M2 | TEMPERATURE: 99 F | WEIGHT: 228.63 LBS | HEIGHT: 65 IN | HEART RATE: 89 BPM | SYSTOLIC BLOOD PRESSURE: 144 MMHG

## 2021-05-10 DIAGNOSIS — Z12.39 ENCOUNTER FOR SCREENING FOR MALIGNANT NEOPLASM OF BREAST, UNSPECIFIED SCREENING MODALITY: ICD-10-CM

## 2021-05-10 DIAGNOSIS — I10 ESSENTIAL HYPERTENSION: ICD-10-CM

## 2021-05-10 DIAGNOSIS — Z79.4 TYPE 2 DIABETES MELLITUS WITH DIABETIC POLYNEUROPATHY, WITH LONG-TERM CURRENT USE OF INSULIN: ICD-10-CM

## 2021-05-10 DIAGNOSIS — E11.42 TYPE 2 DIABETES MELLITUS WITH DIABETIC POLYNEUROPATHY, WITH LONG-TERM CURRENT USE OF INSULIN: ICD-10-CM

## 2021-05-10 DIAGNOSIS — G89.29 CHRONIC PAIN OF RIGHT KNEE: ICD-10-CM

## 2021-05-10 DIAGNOSIS — Z79.4 TYPE 2 DIABETES MELLITUS WITH DIABETIC POLYNEUROPATHY, WITH LONG-TERM CURRENT USE OF INSULIN: Primary | ICD-10-CM

## 2021-05-10 DIAGNOSIS — E78.2 MIXED HYPERLIPIDEMIA: ICD-10-CM

## 2021-05-10 DIAGNOSIS — M25.561 CHRONIC PAIN OF RIGHT KNEE: ICD-10-CM

## 2021-05-10 DIAGNOSIS — E11.42 TYPE 2 DIABETES MELLITUS WITH DIABETIC POLYNEUROPATHY, WITH LONG-TERM CURRENT USE OF INSULIN: Primary | ICD-10-CM

## 2021-05-10 PROCEDURE — 99999 PR PBB SHADOW E&M-EST. PATIENT-LVL V: ICD-10-PCS | Mod: PBBFAC,,, | Performed by: INTERNAL MEDICINE

## 2021-05-10 PROCEDURE — 82043 UR ALBUMIN QUANTITATIVE: CPT | Performed by: INTERNAL MEDICINE

## 2021-05-10 PROCEDURE — 99214 OFFICE O/P EST MOD 30 MIN: CPT | Mod: S$GLB,,, | Performed by: INTERNAL MEDICINE

## 2021-05-10 PROCEDURE — 82570 ASSAY OF URINE CREATININE: CPT | Performed by: INTERNAL MEDICINE

## 2021-05-10 PROCEDURE — 99999 PR PBB SHADOW E&M-EST. PATIENT-LVL V: CPT | Mod: PBBFAC,,, | Performed by: INTERNAL MEDICINE

## 2021-05-10 PROCEDURE — 99214 PR OFFICE/OUTPT VISIT, EST, LEVL IV, 30-39 MIN: ICD-10-PCS | Mod: S$GLB,,, | Performed by: INTERNAL MEDICINE

## 2021-05-10 RX ORDER — BLOOD SUGAR DIAGNOSTIC
STRIP MISCELLANEOUS
Qty: 100 EACH | Refills: 5 | Status: SHIPPED | OUTPATIENT
Start: 2021-05-10 | End: 2024-01-29

## 2021-05-10 RX ORDER — BENZONATATE 200 MG/1
CAPSULE ORAL
COMMUNITY
End: 2021-11-29

## 2021-05-10 RX ORDER — METOPROLOL SUCCINATE 50 MG/1
50 TABLET, EXTENDED RELEASE ORAL DAILY
Qty: 30 TABLET | Refills: 5 | Status: SHIPPED | OUTPATIENT
Start: 2021-05-10 | End: 2021-08-24 | Stop reason: SDUPTHER

## 2021-05-11 LAB
ALBUMIN/CREAT UR: 28.1 UG/MG (ref 0–30)
CREAT UR-MCNC: 57 MG/DL (ref 15–325)
MICROALBUMIN UR DL<=1MG/L-MCNC: 16 UG/ML

## 2021-05-17 ENCOUNTER — HOSPITAL ENCOUNTER (OUTPATIENT)
Dept: RADIOLOGY | Facility: HOSPITAL | Age: 76
Discharge: HOME | End: 2021-05-17
Attending: INTERNAL MEDICINE
Payer: COMMERCIAL

## 2021-05-17 DIAGNOSIS — Z12.39 ENCOUNTER FOR SCREENING FOR MALIGNANT NEOPLASM OF BREAST, UNSPECIFIED SCREENING MODALITY: ICD-10-CM

## 2021-05-17 PROCEDURE — 77067 SCR MAMMO BI INCL CAD: CPT | Mod: 26,,, | Performed by: RADIOLOGY

## 2021-05-17 PROCEDURE — 77063 MAMMO DIGITAL SCREENING BILAT WITH TOMO: ICD-10-PCS | Mod: 26,,, | Performed by: RADIOLOGY

## 2021-05-17 PROCEDURE — 77067 SCR MAMMO BI INCL CAD: CPT | Mod: TC

## 2021-05-17 PROCEDURE — 77063 BREAST TOMOSYNTHESIS BI: CPT | Mod: 26,,, | Performed by: RADIOLOGY

## 2021-05-17 PROCEDURE — 77067 MAMMO DIGITAL SCREENING BILAT WITH TOMO: ICD-10-PCS | Mod: 26,,, | Performed by: RADIOLOGY

## 2021-06-05 ENCOUNTER — LAB VISIT (OUTPATIENT)
Dept: LAB | Facility: HOSPITAL | Age: 76
End: 2021-06-05
Payer: COMMERCIAL

## 2021-06-05 DIAGNOSIS — E11.42 TYPE 2 DIABETES MELLITUS WITH DIABETIC POLYNEUROPATHY, WITH LONG-TERM CURRENT USE OF INSULIN: ICD-10-CM

## 2021-06-05 DIAGNOSIS — E78.5 HYPERLIPIDEMIA, UNSPECIFIED HYPERLIPIDEMIA TYPE: ICD-10-CM

## 2021-06-05 DIAGNOSIS — Z79.4 TYPE 2 DIABETES MELLITUS WITH DIABETIC POLYNEUROPATHY, WITH LONG-TERM CURRENT USE OF INSULIN: ICD-10-CM

## 2021-06-05 LAB
CHOLEST SERPL-MCNC: 155 MG/DL (ref 120–199)
CHOLEST/HDLC SERPL: 3.5 {RATIO} (ref 2–5)
ESTIMATED AVG GLUCOSE: 163 MG/DL (ref 68–131)
HBA1C MFR BLD: 7.3 % (ref 4–5.6)
HDLC SERPL-MCNC: 44 MG/DL (ref 40–75)
HDLC SERPL: 28.4 % (ref 20–50)
LDLC SERPL CALC-MCNC: 87 MG/DL (ref 63–159)
NONHDLC SERPL-MCNC: 111 MG/DL
TRIGL SERPL-MCNC: 120 MG/DL (ref 30–150)

## 2021-06-05 PROCEDURE — 83036 HEMOGLOBIN GLYCOSYLATED A1C: CPT | Performed by: NURSE PRACTITIONER

## 2021-06-05 PROCEDURE — 80061 LIPID PANEL: CPT | Performed by: NURSE PRACTITIONER

## 2021-06-05 PROCEDURE — 36415 COLL VENOUS BLD VENIPUNCTURE: CPT | Mod: PO | Performed by: NURSE PRACTITIONER

## 2021-06-08 ENCOUNTER — PATIENT OUTREACH (OUTPATIENT)
Dept: ADMINISTRATIVE | Facility: OTHER | Age: 76
End: 2021-06-08

## 2021-06-10 ENCOUNTER — OFFICE VISIT (OUTPATIENT)
Dept: ENDOCRINOLOGY | Facility: CLINIC | Age: 76
End: 2021-06-10
Payer: COMMERCIAL

## 2021-06-10 VITALS
BODY MASS INDEX: 38.27 KG/M2 | TEMPERATURE: 99 F | SYSTOLIC BLOOD PRESSURE: 156 MMHG | DIASTOLIC BLOOD PRESSURE: 60 MMHG | HEART RATE: 88 BPM | WEIGHT: 230 LBS

## 2021-06-10 DIAGNOSIS — E78.5 HYPERLIPIDEMIA, UNSPECIFIED HYPERLIPIDEMIA TYPE: ICD-10-CM

## 2021-06-10 DIAGNOSIS — E66.01 SEVERE OBESITY (BMI 35.0-39.9) WITH COMORBIDITY: ICD-10-CM

## 2021-06-10 DIAGNOSIS — E11.42 TYPE 2 DIABETES MELLITUS WITH DIABETIC POLYNEUROPATHY, WITH LONG-TERM CURRENT USE OF INSULIN: Primary | ICD-10-CM

## 2021-06-10 DIAGNOSIS — Z79.4 TYPE 2 DIABETES MELLITUS WITH DIABETIC POLYNEUROPATHY, WITH LONG-TERM CURRENT USE OF INSULIN: Primary | ICD-10-CM

## 2021-06-10 PROCEDURE — 99214 OFFICE O/P EST MOD 30 MIN: CPT | Mod: S$GLB,,, | Performed by: NURSE PRACTITIONER

## 2021-06-10 PROCEDURE — 99999 PR PBB SHADOW E&M-EST. PATIENT-LVL IV: ICD-10-PCS | Mod: PBBFAC,,, | Performed by: NURSE PRACTITIONER

## 2021-06-10 PROCEDURE — 99214 PR OFFICE/OUTPT VISIT, EST, LEVL IV, 30-39 MIN: ICD-10-PCS | Mod: S$GLB,,, | Performed by: NURSE PRACTITIONER

## 2021-06-10 PROCEDURE — 99999 PR PBB SHADOW E&M-EST. PATIENT-LVL IV: CPT | Mod: PBBFAC,,, | Performed by: NURSE PRACTITIONER

## 2021-06-10 RX ORDER — FLASH GLUCOSE SENSOR
KIT MISCELLANEOUS
Qty: 2 KIT | Refills: 6 | Status: SHIPPED | OUTPATIENT
Start: 2021-06-10 | End: 2024-01-29

## 2021-06-10 RX ORDER — INSULIN LISPRO 100 [IU]/ML
INJECTION, SOLUTION INTRAVENOUS; SUBCUTANEOUS
Qty: 2 BOX | Refills: 5 | Status: SHIPPED | OUTPATIENT
Start: 2021-06-10 | End: 2022-04-19

## 2021-06-10 RX ORDER — SEMAGLUTIDE 1.34 MG/ML
INJECTION, SOLUTION SUBCUTANEOUS
Qty: 2 PEN | Refills: 5 | Status: SHIPPED | OUTPATIENT
Start: 2021-06-10 | End: 2021-07-14

## 2021-08-24 ENCOUNTER — OFFICE VISIT (OUTPATIENT)
Dept: FAMILY MEDICINE | Facility: CLINIC | Age: 76
End: 2021-08-24
Payer: COMMERCIAL

## 2021-08-24 ENCOUNTER — LAB VISIT (OUTPATIENT)
Dept: LAB | Facility: HOSPITAL | Age: 76
End: 2021-08-24
Attending: NURSE PRACTITIONER
Payer: COMMERCIAL

## 2021-08-24 VITALS
DIASTOLIC BLOOD PRESSURE: 70 MMHG | BODY MASS INDEX: 37.53 KG/M2 | RESPIRATION RATE: 16 BRPM | OXYGEN SATURATION: 95 % | HEART RATE: 77 BPM | WEIGHT: 225.5 LBS | SYSTOLIC BLOOD PRESSURE: 148 MMHG

## 2021-08-24 DIAGNOSIS — I10 ESSENTIAL HYPERTENSION: ICD-10-CM

## 2021-08-24 DIAGNOSIS — E66.09 NON MORBID OBESITY DUE TO EXCESS CALORIES: ICD-10-CM

## 2021-08-24 DIAGNOSIS — R03.0 ELEVATED BLOOD PRESSURE READING: Primary | ICD-10-CM

## 2021-08-24 DIAGNOSIS — E11.42 TYPE 2 DIABETES MELLITUS WITH DIABETIC POLYNEUROPATHY, WITH LONG-TERM CURRENT USE OF INSULIN: ICD-10-CM

## 2021-08-24 DIAGNOSIS — Z79.4 TYPE 2 DIABETES MELLITUS WITH DIABETIC POLYNEUROPATHY, WITH LONG-TERM CURRENT USE OF INSULIN: ICD-10-CM

## 2021-08-24 PROCEDURE — 99214 PR OFFICE/OUTPT VISIT, EST, LEVL IV, 30-39 MIN: ICD-10-PCS | Mod: S$GLB,,, | Performed by: INTERNAL MEDICINE

## 2021-08-24 PROCEDURE — 36415 COLL VENOUS BLD VENIPUNCTURE: CPT | Mod: PN | Performed by: NURSE PRACTITIONER

## 2021-08-24 PROCEDURE — 99999 PR PBB SHADOW E&M-EST. PATIENT-LVL III: CPT | Mod: PBBFAC,,, | Performed by: INTERNAL MEDICINE

## 2021-08-24 PROCEDURE — 99999 PR PBB SHADOW E&M-EST. PATIENT-LVL III: ICD-10-PCS | Mod: PBBFAC,,, | Performed by: INTERNAL MEDICINE

## 2021-08-24 PROCEDURE — 99214 OFFICE O/P EST MOD 30 MIN: CPT | Mod: S$GLB,,, | Performed by: INTERNAL MEDICINE

## 2021-08-24 PROCEDURE — 83036 HEMOGLOBIN GLYCOSYLATED A1C: CPT | Performed by: NURSE PRACTITIONER

## 2021-08-24 RX ORDER — METOPROLOL SUCCINATE 50 MG/1
50 TABLET, EXTENDED RELEASE ORAL DAILY
Qty: 30 TABLET | Refills: 5 | Status: SHIPPED | OUTPATIENT
Start: 2021-08-24 | End: 2021-11-29 | Stop reason: SDUPTHER

## 2021-08-25 ENCOUNTER — PATIENT OUTREACH (OUTPATIENT)
Dept: ADMINISTRATIVE | Facility: OTHER | Age: 76
End: 2021-08-25

## 2021-08-25 ENCOUNTER — OFFICE VISIT (OUTPATIENT)
Dept: ORTHOPEDICS | Facility: CLINIC | Age: 76
End: 2021-08-25
Payer: COMMERCIAL

## 2021-08-25 VITALS
DIASTOLIC BLOOD PRESSURE: 60 MMHG | HEART RATE: 78 BPM | WEIGHT: 225.5 LBS | RESPIRATION RATE: 18 BRPM | HEIGHT: 65 IN | OXYGEN SATURATION: 97 % | BODY MASS INDEX: 37.57 KG/M2 | SYSTOLIC BLOOD PRESSURE: 138 MMHG

## 2021-08-25 DIAGNOSIS — M17.0 PRIMARY OSTEOARTHRITIS OF BOTH KNEES: Primary | ICD-10-CM

## 2021-08-25 LAB
ESTIMATED AVG GLUCOSE: 166 MG/DL (ref 68–131)
HBA1C MFR BLD: 7.4 % (ref 4–5.6)

## 2021-08-25 PROCEDURE — 99999 PR PBB SHADOW E&M-EST. PATIENT-LVL V: ICD-10-PCS | Mod: PBBFAC,,, | Performed by: ORTHOPAEDIC SURGERY

## 2021-08-25 PROCEDURE — 99999 PR PBB SHADOW E&M-EST. PATIENT-LVL V: CPT | Mod: PBBFAC,,, | Performed by: ORTHOPAEDIC SURGERY

## 2021-08-25 PROCEDURE — 99213 PR OFFICE/OUTPT VISIT, EST, LEVL III, 20-29 MIN: ICD-10-PCS | Mod: S$GLB,,, | Performed by: ORTHOPAEDIC SURGERY

## 2021-08-25 PROCEDURE — 99213 OFFICE O/P EST LOW 20 MIN: CPT | Mod: S$GLB,,, | Performed by: ORTHOPAEDIC SURGERY

## 2021-09-21 ENCOUNTER — OFFICE VISIT (OUTPATIENT)
Dept: ORTHOPEDICS | Facility: CLINIC | Age: 76
End: 2021-09-21
Payer: COMMERCIAL

## 2021-09-21 VITALS
SYSTOLIC BLOOD PRESSURE: 132 MMHG | HEIGHT: 65 IN | OXYGEN SATURATION: 98 % | HEART RATE: 80 BPM | BODY MASS INDEX: 37.83 KG/M2 | WEIGHT: 227.06 LBS | DIASTOLIC BLOOD PRESSURE: 60 MMHG | RESPIRATION RATE: 18 BRPM

## 2021-09-21 DIAGNOSIS — M17.0 PRIMARY OSTEOARTHRITIS OF BOTH KNEES: Primary | ICD-10-CM

## 2021-09-21 PROCEDURE — 99999 PR PBB SHADOW E&M-EST. PATIENT-LVL V: CPT | Mod: PBBFAC,,, | Performed by: ORTHOPAEDIC SURGERY

## 2021-09-21 PROCEDURE — 20610 DRAIN/INJ JOINT/BURSA W/O US: CPT | Mod: RT,S$GLB,, | Performed by: ORTHOPAEDIC SURGERY

## 2021-09-21 PROCEDURE — 99999 PR PBB SHADOW E&M-EST. PATIENT-LVL V: ICD-10-PCS | Mod: PBBFAC,,, | Performed by: ORTHOPAEDIC SURGERY

## 2021-09-21 PROCEDURE — 20610 LARGE JOINT ASPIRATION/INJECTION: R KNEE: ICD-10-PCS | Mod: RT,S$GLB,, | Performed by: ORTHOPAEDIC SURGERY

## 2021-09-21 PROCEDURE — 99499 NO LOS: ICD-10-PCS | Mod: S$GLB,,, | Performed by: ORTHOPAEDIC SURGERY

## 2021-09-21 PROCEDURE — 99499 UNLISTED E&M SERVICE: CPT | Mod: S$GLB,,, | Performed by: ORTHOPAEDIC SURGERY

## 2021-09-29 ENCOUNTER — PATIENT OUTREACH (OUTPATIENT)
Dept: ADMINISTRATIVE | Facility: OTHER | Age: 76
End: 2021-09-29

## 2021-10-11 ENCOUNTER — OFFICE VISIT (OUTPATIENT)
Dept: ORTHOPEDICS | Facility: CLINIC | Age: 76
End: 2021-10-11
Payer: COMMERCIAL

## 2021-10-11 VITALS
SYSTOLIC BLOOD PRESSURE: 130 MMHG | DIASTOLIC BLOOD PRESSURE: 70 MMHG | HEART RATE: 89 BPM | BODY MASS INDEX: 37.19 KG/M2 | OXYGEN SATURATION: 95 % | TEMPERATURE: 98 F | WEIGHT: 223.5 LBS

## 2021-10-11 DIAGNOSIS — Z79.4 TYPE 2 DIABETES MELLITUS WITH DIABETIC POLYNEUROPATHY, WITH LONG-TERM CURRENT USE OF INSULIN: ICD-10-CM

## 2021-10-11 DIAGNOSIS — M17.0 PRIMARY OSTEOARTHRITIS OF BOTH KNEES: Primary | ICD-10-CM

## 2021-10-11 DIAGNOSIS — E11.42 TYPE 2 DIABETES MELLITUS WITH DIABETIC POLYNEUROPATHY, WITH LONG-TERM CURRENT USE OF INSULIN: ICD-10-CM

## 2021-10-11 PROCEDURE — 99499 NO LOS: ICD-10-PCS | Mod: S$GLB,,, | Performed by: ORTHOPAEDIC SURGERY

## 2021-10-11 PROCEDURE — 99999 PR PBB SHADOW E&M-EST. PATIENT-LVL V: CPT | Mod: PBBFAC,,, | Performed by: ORTHOPAEDIC SURGERY

## 2021-10-11 PROCEDURE — 20610 LARGE JOINT ASPIRATION/INJECTION: R KNEE: ICD-10-PCS | Mod: RT,S$GLB,, | Performed by: ORTHOPAEDIC SURGERY

## 2021-10-11 PROCEDURE — 99999 PR PBB SHADOW E&M-EST. PATIENT-LVL V: ICD-10-PCS | Mod: PBBFAC,,, | Performed by: ORTHOPAEDIC SURGERY

## 2021-10-11 PROCEDURE — 20610 DRAIN/INJ JOINT/BURSA W/O US: CPT | Mod: RT,S$GLB,, | Performed by: ORTHOPAEDIC SURGERY

## 2021-10-11 PROCEDURE — 99499 UNLISTED E&M SERVICE: CPT | Mod: S$GLB,,, | Performed by: ORTHOPAEDIC SURGERY

## 2021-10-11 RX ORDER — SEMAGLUTIDE 1.34 MG/ML
INJECTION, SOLUTION SUBCUTANEOUS
Refills: 0 | OUTPATIENT
Start: 2021-10-11

## 2021-10-13 ENCOUNTER — TELEPHONE (OUTPATIENT)
Dept: ORTHOPEDICS | Facility: CLINIC | Age: 76
End: 2021-10-13

## 2021-10-19 ENCOUNTER — OFFICE VISIT (OUTPATIENT)
Dept: ORTHOPEDICS | Facility: CLINIC | Age: 76
End: 2021-10-19
Payer: COMMERCIAL

## 2021-10-19 VITALS
DIASTOLIC BLOOD PRESSURE: 60 MMHG | BODY MASS INDEX: 37.15 KG/M2 | WEIGHT: 223 LBS | HEART RATE: 76 BPM | OXYGEN SATURATION: 97 % | SYSTOLIC BLOOD PRESSURE: 130 MMHG | RESPIRATION RATE: 18 BRPM | HEIGHT: 65 IN

## 2021-10-19 DIAGNOSIS — M17.0 PRIMARY OSTEOARTHRITIS OF BOTH KNEES: Primary | ICD-10-CM

## 2021-10-19 PROCEDURE — 99499 NO LOS: ICD-10-PCS | Mod: S$GLB,,, | Performed by: ORTHOPAEDIC SURGERY

## 2021-10-19 PROCEDURE — 99999 PR PBB SHADOW E&M-EST. PATIENT-LVL IV: ICD-10-PCS | Mod: PBBFAC,,, | Performed by: ORTHOPAEDIC SURGERY

## 2021-10-19 PROCEDURE — 20610 LARGE JOINT ASPIRATION/INJECTION: R KNEE: ICD-10-PCS | Mod: RT,S$GLB,, | Performed by: ORTHOPAEDIC SURGERY

## 2021-10-19 PROCEDURE — 99499 UNLISTED E&M SERVICE: CPT | Mod: S$GLB,,, | Performed by: ORTHOPAEDIC SURGERY

## 2021-10-19 PROCEDURE — 99999 PR PBB SHADOW E&M-EST. PATIENT-LVL IV: CPT | Mod: PBBFAC,,, | Performed by: ORTHOPAEDIC SURGERY

## 2021-10-19 PROCEDURE — 20610 DRAIN/INJ JOINT/BURSA W/O US: CPT | Mod: RT,S$GLB,, | Performed by: ORTHOPAEDIC SURGERY

## 2021-10-25 ENCOUNTER — OFFICE VISIT (OUTPATIENT)
Dept: ORTHOPEDICS | Facility: CLINIC | Age: 76
End: 2021-10-25
Payer: COMMERCIAL

## 2021-10-25 VITALS
DIASTOLIC BLOOD PRESSURE: 66 MMHG | SYSTOLIC BLOOD PRESSURE: 130 MMHG | BODY MASS INDEX: 37.2 KG/M2 | OXYGEN SATURATION: 98 % | TEMPERATURE: 99 F | HEIGHT: 65 IN | HEART RATE: 78 BPM | WEIGHT: 223.31 LBS

## 2021-10-25 DIAGNOSIS — M17.11 OSTEOARTHRITIS OF RIGHT KNEE, UNSPECIFIED OSTEOARTHRITIS TYPE: Primary | ICD-10-CM

## 2021-10-25 PROCEDURE — 99999 PR PBB SHADOW E&M-EST. PATIENT-LVL IV: ICD-10-PCS | Mod: PBBFAC,,, | Performed by: ORTHOPAEDIC SURGERY

## 2021-10-25 PROCEDURE — 99213 OFFICE O/P EST LOW 20 MIN: CPT | Mod: S$GLB,,, | Performed by: ORTHOPAEDIC SURGERY

## 2021-10-25 PROCEDURE — 99999 PR PBB SHADOW E&M-EST. PATIENT-LVL IV: CPT | Mod: PBBFAC,,, | Performed by: ORTHOPAEDIC SURGERY

## 2021-10-25 PROCEDURE — 99213 PR OFFICE/OUTPT VISIT, EST, LEVL III, 20-29 MIN: ICD-10-PCS | Mod: S$GLB,,, | Performed by: ORTHOPAEDIC SURGERY

## 2021-11-15 DIAGNOSIS — M17.0 PRIMARY OSTEOARTHRITIS OF BOTH KNEES: ICD-10-CM

## 2021-11-15 RX ORDER — DICLOFENAC SODIUM 10 MG/G
GEL TOPICAL
Qty: 100 G | Refills: 5 | Status: SHIPPED | OUTPATIENT
Start: 2021-11-15 | End: 2024-03-05 | Stop reason: SDUPTHER

## 2021-11-29 ENCOUNTER — OFFICE VISIT (OUTPATIENT)
Dept: FAMILY MEDICINE | Facility: CLINIC | Age: 76
End: 2021-11-29
Payer: COMMERCIAL

## 2021-11-29 ENCOUNTER — LAB VISIT (OUTPATIENT)
Dept: LAB | Facility: HOSPITAL | Age: 76
End: 2021-11-29
Attending: NURSE PRACTITIONER
Payer: COMMERCIAL

## 2021-11-29 VITALS
HEART RATE: 77 BPM | WEIGHT: 222.88 LBS | BODY MASS INDEX: 37.09 KG/M2 | SYSTOLIC BLOOD PRESSURE: 157 MMHG | RESPIRATION RATE: 18 BRPM | OXYGEN SATURATION: 98 % | DIASTOLIC BLOOD PRESSURE: 67 MMHG

## 2021-11-29 DIAGNOSIS — E78.2 MIXED HYPERLIPIDEMIA: ICD-10-CM

## 2021-11-29 DIAGNOSIS — I10 PRIMARY HYPERTENSION: ICD-10-CM

## 2021-11-29 DIAGNOSIS — Z79.4 TYPE 2 DIABETES MELLITUS WITH DIABETIC POLYNEUROPATHY, WITH LONG-TERM CURRENT USE OF INSULIN: ICD-10-CM

## 2021-11-29 DIAGNOSIS — R25.2 LEG CRAMPS: ICD-10-CM

## 2021-11-29 DIAGNOSIS — G63 POLYNEUROPATHY ASSOCIATED WITH UNDERLYING DISEASE: ICD-10-CM

## 2021-11-29 DIAGNOSIS — E11.42 TYPE 2 DIABETES MELLITUS WITH DIABETIC POLYNEUROPATHY, WITH LONG-TERM CURRENT USE OF INSULIN: ICD-10-CM

## 2021-11-29 DIAGNOSIS — I10 ESSENTIAL HYPERTENSION: Primary | ICD-10-CM

## 2021-11-29 PROCEDURE — 83036 HEMOGLOBIN GLYCOSYLATED A1C: CPT | Performed by: NURSE PRACTITIONER

## 2021-11-29 PROCEDURE — 99999 PR PBB SHADOW E&M-EST. PATIENT-LVL III: ICD-10-PCS | Mod: PBBFAC,,, | Performed by: INTERNAL MEDICINE

## 2021-11-29 PROCEDURE — 99214 OFFICE O/P EST MOD 30 MIN: CPT | Mod: S$GLB,,, | Performed by: INTERNAL MEDICINE

## 2021-11-29 PROCEDURE — 99999 PR PBB SHADOW E&M-EST. PATIENT-LVL III: CPT | Mod: PBBFAC,,, | Performed by: INTERNAL MEDICINE

## 2021-11-29 PROCEDURE — 99214 PR OFFICE/OUTPT VISIT, EST, LEVL IV, 30-39 MIN: ICD-10-PCS | Mod: S$GLB,,, | Performed by: INTERNAL MEDICINE

## 2021-11-29 PROCEDURE — 36415 COLL VENOUS BLD VENIPUNCTURE: CPT | Mod: PN | Performed by: NURSE PRACTITIONER

## 2021-11-29 RX ORDER — AMLODIPINE BESYLATE 10 MG/1
10 TABLET ORAL DAILY
Qty: 90 TABLET | Refills: 1 | Status: SHIPPED | OUTPATIENT
Start: 2021-11-29 | End: 2022-03-16 | Stop reason: SDUPTHER

## 2021-11-29 RX ORDER — NAPROXEN SODIUM 220 MG/1
81 TABLET, FILM COATED ORAL DAILY
Qty: 30 TABLET | Refills: 0
Start: 2021-11-29 | End: 2024-03-05

## 2021-11-29 RX ORDER — LOSARTAN POTASSIUM AND HYDROCHLOROTHIAZIDE 25; 100 MG/1; MG/1
1 TABLET ORAL DAILY
Qty: 90 TABLET | Refills: 3 | Status: SHIPPED | OUTPATIENT
Start: 2021-11-29 | End: 2022-03-29 | Stop reason: SDUPTHER

## 2021-11-29 RX ORDER — METOPROLOL SUCCINATE 50 MG/1
50 TABLET, EXTENDED RELEASE ORAL DAILY
Qty: 30 TABLET | Refills: 5 | Status: SHIPPED | OUTPATIENT
Start: 2021-11-29 | End: 2023-01-10 | Stop reason: SDUPTHER

## 2021-11-29 RX ORDER — GABAPENTIN 300 MG/1
300 CAPSULE ORAL 3 TIMES DAILY
Qty: 90 CAPSULE | Refills: 11 | Status: SHIPPED | OUTPATIENT
Start: 2021-11-29 | End: 2023-01-10 | Stop reason: SDUPTHER

## 2021-11-29 RX ORDER — PRAVASTATIN SODIUM 80 MG/1
80 TABLET ORAL DAILY
Qty: 90 TABLET | Refills: 3 | Status: SHIPPED | OUTPATIENT
Start: 2021-11-29 | End: 2022-03-29 | Stop reason: SDUPTHER

## 2021-11-30 LAB
ESTIMATED AVG GLUCOSE: 148 MG/DL (ref 68–131)
HBA1C MFR BLD: 6.8 % (ref 4–5.6)

## 2021-12-03 ENCOUNTER — OFFICE VISIT (OUTPATIENT)
Dept: FAMILY MEDICINE | Facility: CLINIC | Age: 76
End: 2021-12-03
Payer: COMMERCIAL

## 2021-12-03 VITALS
HEART RATE: 86 BPM | DIASTOLIC BLOOD PRESSURE: 50 MMHG | OXYGEN SATURATION: 97 % | SYSTOLIC BLOOD PRESSURE: 140 MMHG | TEMPERATURE: 99 F | HEIGHT: 65 IN | WEIGHT: 220.81 LBS | BODY MASS INDEX: 36.79 KG/M2

## 2021-12-03 DIAGNOSIS — R10.9 FLANK PAIN: Primary | ICD-10-CM

## 2021-12-03 LAB
BILIRUB SERPL-MCNC: NEGATIVE MG/DL
BLOOD URINE, POC: NEGATIVE
COLOR, POC UA: ABNORMAL
GLUCOSE UR QL STRIP: 1000
KETONES UR QL STRIP: NEGATIVE
LEUKOCYTE ESTERASE URINE, POC: NEGATIVE
NITRITE, POC UA: NEGATIVE
PH, POC UA: 5
PROTEIN, POC: NORMAL
SPECIFIC GRAVITY, POC UA: 1.01
UROBILINOGEN, POC UA: NORMAL

## 2021-12-03 PROCEDURE — 81001 URINALYSIS AUTO W/SCOPE: CPT | Mod: S$GLB,,, | Performed by: FAMILY MEDICINE

## 2021-12-03 PROCEDURE — 87086 URINE CULTURE/COLONY COUNT: CPT | Performed by: FAMILY MEDICINE

## 2021-12-03 PROCEDURE — 3061F NEG MICROALBUMINURIA REV: CPT | Mod: CPTII,S$GLB,, | Performed by: FAMILY MEDICINE

## 2021-12-03 PROCEDURE — 3061F PR NEG MICROALBUMINURIA RESULT DOCUMENTED/REVIEW: ICD-10-PCS | Mod: CPTII,S$GLB,, | Performed by: FAMILY MEDICINE

## 2021-12-03 PROCEDURE — 81001 POCT URINALYSIS, DIPSTICK OR TABLET REAGENT, AUTOMATED, WITH MICROSCOP: ICD-10-PCS | Mod: S$GLB,,, | Performed by: FAMILY MEDICINE

## 2021-12-03 PROCEDURE — 3066F PR DOCUMENTATION OF TREATMENT FOR NEPHROPATHY: ICD-10-PCS | Mod: CPTII,S$GLB,, | Performed by: FAMILY MEDICINE

## 2021-12-03 PROCEDURE — 3066F NEPHROPATHY DOC TX: CPT | Mod: CPTII,S$GLB,, | Performed by: FAMILY MEDICINE

## 2021-12-03 PROCEDURE — 99999 PR PBB SHADOW E&M-EST. PATIENT-LVL IV: CPT | Mod: PBBFAC,,, | Performed by: FAMILY MEDICINE

## 2021-12-03 PROCEDURE — 99213 PR OFFICE/OUTPT VISIT, EST, LEVL III, 20-29 MIN: ICD-10-PCS | Mod: S$GLB,,, | Performed by: FAMILY MEDICINE

## 2021-12-03 PROCEDURE — 99213 OFFICE O/P EST LOW 20 MIN: CPT | Mod: S$GLB,,, | Performed by: FAMILY MEDICINE

## 2021-12-03 PROCEDURE — 99999 PR PBB SHADOW E&M-EST. PATIENT-LVL IV: ICD-10-PCS | Mod: PBBFAC,,, | Performed by: FAMILY MEDICINE

## 2021-12-05 LAB — BACTERIA UR CULT: NORMAL

## 2021-12-06 ENCOUNTER — TELEPHONE (OUTPATIENT)
Dept: FAMILY MEDICINE | Facility: CLINIC | Age: 76
End: 2021-12-06
Payer: COMMERCIAL

## 2021-12-13 ENCOUNTER — OFFICE VISIT (OUTPATIENT)
Dept: ENDOCRINOLOGY | Facility: CLINIC | Age: 76
End: 2021-12-13
Payer: COMMERCIAL

## 2021-12-13 VITALS
BODY MASS INDEX: 37.24 KG/M2 | SYSTOLIC BLOOD PRESSURE: 130 MMHG | HEART RATE: 81 BPM | DIASTOLIC BLOOD PRESSURE: 60 MMHG | WEIGHT: 223.75 LBS | TEMPERATURE: 99 F

## 2021-12-13 DIAGNOSIS — E78.5 HYPERLIPIDEMIA, UNSPECIFIED HYPERLIPIDEMIA TYPE: ICD-10-CM

## 2021-12-13 DIAGNOSIS — Z79.4 TYPE 2 DIABETES MELLITUS WITH DIABETIC POLYNEUROPATHY, WITH LONG-TERM CURRENT USE OF INSULIN: Primary | ICD-10-CM

## 2021-12-13 DIAGNOSIS — E11.42 TYPE 2 DIABETES MELLITUS WITH DIABETIC POLYNEUROPATHY, WITH LONG-TERM CURRENT USE OF INSULIN: Primary | ICD-10-CM

## 2021-12-13 DIAGNOSIS — E66.01 SEVERE OBESITY (BMI 35.0-39.9) WITH COMORBIDITY: ICD-10-CM

## 2021-12-13 PROCEDURE — 3061F PR NEG MICROALBUMINURIA RESULT DOCUMENTED/REVIEW: ICD-10-PCS | Mod: CPTII,S$GLB,, | Performed by: NURSE PRACTITIONER

## 2021-12-13 PROCEDURE — 3066F PR DOCUMENTATION OF TREATMENT FOR NEPHROPATHY: ICD-10-PCS | Mod: CPTII,S$GLB,, | Performed by: NURSE PRACTITIONER

## 2021-12-13 PROCEDURE — 99214 OFFICE O/P EST MOD 30 MIN: CPT | Mod: S$GLB,,, | Performed by: NURSE PRACTITIONER

## 2021-12-13 PROCEDURE — 99999 PR PBB SHADOW E&M-EST. PATIENT-LVL IV: CPT | Mod: PBBFAC,,, | Performed by: NURSE PRACTITIONER

## 2021-12-13 PROCEDURE — 3061F NEG MICROALBUMINURIA REV: CPT | Mod: CPTII,S$GLB,, | Performed by: NURSE PRACTITIONER

## 2021-12-13 PROCEDURE — 3066F NEPHROPATHY DOC TX: CPT | Mod: CPTII,S$GLB,, | Performed by: NURSE PRACTITIONER

## 2021-12-13 PROCEDURE — 99999 PR PBB SHADOW E&M-EST. PATIENT-LVL IV: ICD-10-PCS | Mod: PBBFAC,,, | Performed by: NURSE PRACTITIONER

## 2021-12-13 PROCEDURE — 99214 PR OFFICE/OUTPT VISIT, EST, LEVL IV, 30-39 MIN: ICD-10-PCS | Mod: S$GLB,,, | Performed by: NURSE PRACTITIONER

## 2021-12-13 RX ORDER — INSULIN GLARGINE 300 U/ML
INJECTION, SOLUTION SUBCUTANEOUS
Qty: 2 PEN | Refills: 5 | Status: SHIPPED | OUTPATIENT
Start: 2021-12-13 | End: 2022-04-19 | Stop reason: SDUPTHER

## 2021-12-13 RX ORDER — EMPAGLIFLOZIN 10 MG/1
10 TABLET, FILM COATED ORAL DAILY
Qty: 90 TABLET | Refills: 2 | Status: SHIPPED | OUTPATIENT
Start: 2021-12-13 | End: 2022-03-11 | Stop reason: SDUPTHER

## 2021-12-14 RX ORDER — BLOOD-GLUCOSE SENSOR
EACH MISCELLANEOUS
Qty: 3 EACH | Refills: 11 | Status: SHIPPED | OUTPATIENT
Start: 2021-12-14 | End: 2024-01-29

## 2021-12-14 RX ORDER — BLOOD-GLUCOSE TRANSMITTER
EACH MISCELLANEOUS
Qty: 1 EACH | Refills: 3 | Status: SHIPPED | OUTPATIENT
Start: 2021-12-14 | End: 2024-01-29

## 2021-12-28 ENCOUNTER — PATIENT MESSAGE (OUTPATIENT)
Dept: FAMILY MEDICINE | Facility: CLINIC | Age: 76
End: 2021-12-28
Payer: COMMERCIAL

## 2022-01-03 ENCOUNTER — TELEPHONE (OUTPATIENT)
Dept: DIABETES | Facility: CLINIC | Age: 77
End: 2022-01-03
Payer: COMMERCIAL

## 2022-01-03 NOTE — TELEPHONE ENCOUNTER
LVM re rescheduling appt for CGM (Dexcom G6) start. Pt asked to call [phone] to schedule this appt request for our new  location ([address] [place] [address], [address])

## 2022-01-14 ENCOUNTER — HOSPITAL ENCOUNTER (OUTPATIENT)
Dept: RADIOLOGY | Facility: HOSPITAL | Age: 77
Discharge: HOME | End: 2022-01-14
Attending: INTERNAL MEDICINE
Payer: COMMERCIAL

## 2022-01-14 ENCOUNTER — NUTRITION (OUTPATIENT)
Dept: DIABETES | Facility: CLINIC | Age: 77
End: 2022-01-14
Payer: COMMERCIAL

## 2022-01-14 DIAGNOSIS — Z79.4 TYPE 2 DIABETES MELLITUS WITH DIABETIC POLYNEUROPATHY, WITH LONG-TERM CURRENT USE OF INSULIN: ICD-10-CM

## 2022-01-14 DIAGNOSIS — E11.42 TYPE 2 DIABETES MELLITUS WITH DIABETIC POLYNEUROPATHY, WITH LONG-TERM CURRENT USE OF INSULIN: ICD-10-CM

## 2022-01-14 DIAGNOSIS — R25.2 LEG CRAMPS: ICD-10-CM

## 2022-01-14 PROCEDURE — 93925 LOWER EXTREMITY STUDY: CPT | Mod: 26,,, | Performed by: RADIOLOGY

## 2022-01-14 PROCEDURE — 93922 UPR/L XTREMITY ART 2 LEVELS: CPT | Mod: 26,,, | Performed by: RADIOLOGY

## 2022-01-14 PROCEDURE — G0108 DIAB MANAGE TRN  PER INDIV: HCPCS | Mod: S$GLB,,, | Performed by: DIETITIAN, REGISTERED

## 2022-01-14 PROCEDURE — 99999 PR PBB SHADOW E&M-EST. PATIENT-LVL I: CPT | Mod: PBBFAC,,, | Performed by: DIETITIAN, REGISTERED

## 2022-01-14 PROCEDURE — 93925 LOWER EXTREMITY STUDY: CPT | Mod: TC

## 2022-01-14 PROCEDURE — 93925 US ARTERIAL LOWER EXTREMITY BILAT WITH ABI (XPD): ICD-10-PCS | Mod: 26,,, | Performed by: RADIOLOGY

## 2022-01-14 PROCEDURE — G0108 PR DIAB MANAGE TRN  PER INDIV: ICD-10-PCS | Mod: S$GLB,,, | Performed by: DIETITIAN, REGISTERED

## 2022-01-14 PROCEDURE — 93922 US ARTERIAL LOWER EXTREMITY BILAT WITH ABI (XPD): ICD-10-PCS | Mod: 26,,, | Performed by: RADIOLOGY

## 2022-01-14 PROCEDURE — 99999 PR PBB SHADOW E&M-EST. PATIENT-LVL I: ICD-10-PCS | Mod: PBBFAC,,, | Performed by: DIETITIAN, REGISTERED

## 2022-01-14 NOTE — PROGRESS NOTES
Diabetes Care Specialist Progress Note  Author: Phyllis Sanchez, NURA  Date: 1/14/2022    Program Intake  Reason for Diabetes Program Visit:: Intervention  Type of Intervention:: Individual  Individual: Device Training  Device Training: Professional CGM  Current diabetes risk level:: moderate  Permission to speak with others about care:: yes    Lab Results   Component Value Date    HGBA1C 6.8 (H) 11/29/2021     Diabetes Self-Management Skills Assessment    Diabetes Self Support Plan  Assessment Summary and Plan    Based on today's diabetes care assessment, the following areas of need were identified:         Today's interventions were provided through individual discussion, instruction, and written materials were provided.      Patient verbalized understanding of instruction and written materials.  Pt was able to return back demonstration of instructions today. Patient understood key points, needs reinforcement and further instruction.     Diabetes Self-Management Care Plan:    Today's Diabetes Self-Management Care Plan was developed with Mena's input. Mena has agreed to work toward the following goal(s) to improve his/her overall diabetes control.      Care Plan: Diabetes Management   Updates made since 12/15/2021 12:00 AM      Problem: Blood Glucose Self-Monitoring       Goal: Start Dexcom CGM    Start Date: 1/14/2022   Priority: High   Barriers: Lack of Supplies   Note:    Pt presents today w supplies to start Dexcom G6 but is unable to recall log in information for her cell phone and also noted she has an MRI scheduled for later this afternoon which she does not want to reschedule. Thus unable to start dexcom CGM today. Appt rescheduled for 1/20/21 at 230pm    Pt asked to:  -contact Dexcom for assistance w log in to her cell phone and ask for accepted /alternate sensor locations     Task: Provided patient with a meter today and sent Rx request to provider to send to patients pharmacy.       Task: Reviewed the  importance of self-monitoring blood glucose and keeping logs.       Task: Instructed on how to self-monitor blood glucose using a home glucometer, how to properly dispose of used strips and lancets after use, and how to appropriately store meter and supplies.       Task: Provided patient with blood glucose logs, reviewed appropriate timing and frequency to SMBG, education on parameters on when to notify provider and advised patient to bring logs to all appts with PCP/Endocrinologist/Diabetes Care Specialist.       Task: Discussed ways to minimize pain when monitoring blood glucose.           Follow Up Plan     Follow up in about 6 days (around 1/20/2022).    Today's care plan and follow up schedule was discussed with patient.  Mena verbalized understanding of the care plan, goals, and agrees to follow up plan.        The patient was encouraged to communicate with his/her health care provider/physician and care team regarding his/her condition(s) and treatment.  I provided the patient with my contact information today and encouraged to contact me via phone or [facility] as needed.     Length of Visit   Total Time: 30 Minutes

## 2022-01-18 DIAGNOSIS — I73.9 PERIPHERAL ARTERIAL DISEASE: Primary | ICD-10-CM

## 2022-01-20 ENCOUNTER — CLINICAL SUPPORT (OUTPATIENT)
Dept: DIABETES | Facility: CLINIC | Age: 77
End: 2022-01-20
Payer: COMMERCIAL

## 2022-01-20 DIAGNOSIS — E11.42 TYPE 2 DIABETES MELLITUS WITH DIABETIC POLYNEUROPATHY, WITHOUT LONG-TERM CURRENT USE OF INSULIN: Primary | ICD-10-CM

## 2022-01-20 PROCEDURE — G0108 DIAB MANAGE TRN  PER INDIV: HCPCS | Mod: S$GLB,,, | Performed by: DIETITIAN, REGISTERED

## 2022-01-20 PROCEDURE — G0108 PR DIAB MANAGE TRN  PER INDIV: ICD-10-PCS | Mod: S$GLB,,, | Performed by: DIETITIAN, REGISTERED

## 2022-01-20 NOTE — PROGRESS NOTES
Diabetes Care Specialist Progress Note  Author: Phyllis Sanchez RD  Date: 1/20/2022    Program Intake  Reason for Diabetes Program Visit:: Intervention  Type of Intervention:: Individual  Individual: Device Training  Device Training: Personal CGM  Current diabetes risk level:: moderate  In the last 12 months, have you:: none    Lab Results   Component Value Date    HGBA1C 6.8 (H) 11/29/2021   Diabetes Self-Management Skills Assessment  Home Blood Glucose Monitoring  Patient states that blood sugar is checked at home daily.: yes  Monitoring Method:: home glucometer  How often do you check your blood sugar?: Occasionally  When do you check your blood sugar?: Other  Home Blood Glucose Monitoring Skills Assessment Completed: : Yes  Assessment indicates:: Knowledge deficit,Adequate understanding    Diabetes Self Support Plan    Assessment Summary and Plan    Based on today's diabetes care assessment, the following areas of need were identified:      Today's interventions were provided through individual discussion, instruction, and written materials were provided.    Patient verbalized understanding of instruction and written materials.  Pt was able to return back demonstration of instructions today. Patient understood key points, needs reinforcement and further instruction.     Diabetes Self-Management Care Plan:    Today's Diabetes Self-Management Care Plan was developed with Mena's input. Mena has agreed to work toward the following goal(s) to improve his/her overall diabetes control.      Care Plan: Diabetes Management   Updates made since 12/21/2021 12:00 AM      Problem: Blood Glucose Self-Monitoring       Goal: Start Dexcom CGM    Start Date: 1/14/2022   Priority: High   Barriers: Lack of Supplies   Note:    Pt presents today w supplies to start Dexcom G6 but is unable to recall log in information for her cell phone and also noted she has an MRI scheduled for later this afternoon which she does not want to  "reschedule. Thus unable to start dexcom CGM today. Appt rescheduled for 1/20/21 at 230pm    Pt asked to:  -contact Dexcom for assistance w log in to her cell phone and ask for accepted /alternate sensor locations     Task: Provided patient with a meter today and sent Rx request to provider to send to patients pharmacy.       Task: Reviewed the importance of self-monitoring blood glucose and keeping logs.       Task: Instructed on how to self-monitor blood glucose using a home glucometer, how to properly dispose of used strips and lancets after use, and how to appropriately store meter and supplies.       Task: Provided patient with blood glucose logs, reviewed appropriate timing and frequency to SMBG, education on parameters on when to notify provider and advised patient to bring logs to all appts with PCP/Endocrinologist/Diabetes Care Specialist.       Task: Discussed ways to minimize pain when monitoring blood glucose.       Problem: Blood Glucose Self-Monitoring       Goal: Complete Dexcom G6 training    Start Date: 1/20/2022   Priority: High   Barriers: No Barriers Identified   Note:    Pt able to complete cellphone acct and able to complete the start-up for Dexcom G6 CGM system     Using the Dexcom G6 CGM system, pt will measure BG continuously    DIABETES EDUCATION  DEXCOM G6 CGM TRAINING     Patient referred to clinic today for Dexcom G6 continuous glucose sensor system training.   Education was provided using "Quick Start Guide" per Dexcom protocol.     Pt will be using cellphone as the primary .     § Overview:  5min Glucose Reading Updates, Trending Arrows, BG Graph Screens, Battery Life Indicator, Blue Tooth Symbol.  § Menus: Trend Graph, Start Sensor, Enter BG, Events, Alerts, Settings, Shutdown, Stop Sensor  §  Settings:                          * Urgent Low: 55 mg/dL                          * Urgent Low Soon: On 20 min                          * Low alert: 80 mg/dl 15 min              "             * High alert:300 mg/dl 15 min                          * Rise rate: 3 mg/dl                          * Fall Rate: 3 mg/dl                          * Signal Loss: 20 min                          * No Readin min                          * Always sound: ON                          * Alert Schedule: (instructed on how to set up alert schedule if desired)        · Reviewed additional setting options with patient, including Graph Height and Transmitter ID. Transmitter was paired with .    · Instructed patient on need to remove sensor if having MRI, CT Scan, or a diathermy treatment.  Sensor ID: [id number]  Transmitter SN: [id number]    · Reviewed where to find sensor insertion time and sensor expiration date.   · Discussed no need to calibrate sensor during the entirety of the 10 day wear. Discussed that pt can calibrate sensor if desired, but at that time she will need to continue calibrating every 12 hours for sensor to remain accurate. Reviewed appropriate calibration techniques.  · Practiced sensor pod/transmitter removal from site, and removal of transmitter from sensor pod.  · Patient able to demonstrate without difficulty.      · Reviewed sensor site selection. Site selected and prepped using aseptic technique Inserted to right abdomen. Transmitter placed in pod and secured.  · Encouraged to review manual prior to starting another sensor.   · Reviewed problem solving aspects of sensor transmission/ variables that can disrupt RF transmission.  range 20 feet, but the first 3 hrs keep within 3 feet of transmitter.  · Pt instructed on lag time of interstitial fluid from CBG and was advised to tx hypoglycemia and dose insulin based on SMBG values.  Discussed importance of using Trending Arrows when making treatment decisions.Dexcom technical support contact number given and examples of when to contact them discussed.   Patient invited to connect with [facility] via Dexcom CLARITY sharing code  [id number]      Time spent with patient: 90 minutes     Task: Instructed on how to self-monitor blood glucose using dEXCOM g6 cgm SYSTEM Completed 1/20/2022          Follow Up Plan     Follow up in about 3 months (around 4/20/2022).    Today's care plan and follow up schedule was discussed with patient.  Mena verbalized understanding of the care plan, goals, and agrees to follow up plan.        The patient was encouraged to communicate with his/her health care provider/physician and care team regarding his/her condition(s) and treatment.  I provided the patient with my contact information today and encouraged to contact me via phone or [facility] Patient Portal as needed.     Length of Visit   Total Time: 60 Minutes

## 2022-02-01 ENCOUNTER — PES CALL (OUTPATIENT)
Dept: ADMINISTRATIVE | Facility: CLINIC | Age: 77
End: 2022-02-01
Payer: COMMERCIAL

## 2022-02-16 ENCOUNTER — INITIAL CONSULT (OUTPATIENT)
Dept: VASCULAR SURGERY | Facility: CLINIC | Age: 77
End: 2022-02-16
Payer: COMMERCIAL

## 2022-02-16 VITALS
BODY MASS INDEX: 37.18 KG/M2 | SYSTOLIC BLOOD PRESSURE: 152 MMHG | DIASTOLIC BLOOD PRESSURE: 60 MMHG | WEIGHT: 223.44 LBS

## 2022-02-16 DIAGNOSIS — I73.9 PERIPHERAL ARTERIAL DISEASE: ICD-10-CM

## 2022-02-16 PROCEDURE — 99204 OFFICE O/P NEW MOD 45 MIN: CPT | Mod: S$GLB,,, | Performed by: SURGERY

## 2022-02-16 PROCEDURE — 99999 PR PBB SHADOW E&M-EST. PATIENT-LVL IV: ICD-10-PCS | Mod: PBBFAC,,, | Performed by: SURGERY

## 2022-02-16 PROCEDURE — 99204 PR OFFICE/OUTPT VISIT, NEW, LEVL IV, 45-59 MIN: ICD-10-PCS | Mod: S$GLB,,, | Performed by: SURGERY

## 2022-02-16 PROCEDURE — 99999 PR PBB SHADOW E&M-EST. PATIENT-LVL IV: CPT | Mod: PBBFAC,,, | Performed by: SURGERY

## 2022-02-16 NOTE — PROGRESS NOTES
Dre Blair MD VI                       [facility]                         02/16/2022    HPI:  Mena Odonnell is a 76 y.o. female with   Patient Active Problem List   Diagnosis    Type 2 diabetes mellitus with diabetic polyneuropathy    Hyperlipidemia    HTN (hypertension)    Knee pain    H/O vitamin D deficiency    Non morbid obesity due to excess calories    Bilateral carotid bruits    Osteoarthritis of right knee    Morbid obesity with body mass index (BMI) of 40.0 or higher    Dyspnea on exertion    Chronic cough    MEKA (obstructive sleep apnea)    Sensorineural hearing loss (SNHL) of right ear with restricted hearing of left ear    Tinnitus of both ears    Allergic rhinitis    Dysfunction of both eustachian tubes    Nasal septal deviation    Chronic eczematous otitis externa of both ears    Neck abscess    Elevated blood pressure reading    Epidermal inclusion cyst    Polyneuropathy associated with underlying disease    Chronic pain of both knees    Decreased strength, endurance, and mobility    Difficulty walking    Pain and swelling of left knee    Pain and swelling of right knee    Weakness    being managed by PCP and specialists who is here today for evaluation of PVD.  Patient has no complaints of claudication.  Patient states location is RLE occurring for months.  Associated signs and symptoms include edema.  Quality is burning and severity is 6/10.  Symptoms began months ago.  Alleviating factors include elevation.  Worsening factors include dependency.  no rest pain.  no tissue loss.  Patient is diabetic.  Is not on Pletal.  no previous lower extremity interventions.    no MI  no Stroke  Tobacco use: denies  Daily Aspirin: yes  Anticoagulation: no    Past Medical History:   Diagnosis Date    Cataract     Diabetes mellitus type II     Diabetes with neurologic complications     DM retinopathy     Hyperlipidemia     Hypertension      Obesity     Osteoarthritis     Peripheral neuropathy     Polyneuropathy     Severe obesity (BMI 35.0-39.9) with comorbidity 2016    Sleep apnea     Tendonitis     left foot     Past Surgical History:   Procedure Laterality Date    BLADDER SUSPENSION      CATARACT EXTRACTION      COLONOSCOPY N/A 2015    Procedure: COLONOSCOPY;  Surgeon: Javed Wood MD;  Location: [facility] ENDO (4TH FLR);  Service: Endoscopy;  Laterality: N/A;    HYSTERECTOMY  1978    fibroids    NECK MASS EXCISION  2019    Procedure: EXCISION, MASS, NECK;  Surgeon: Edwin Barrow MD;  Location: [facility];  Service: General;;  RN PREOP 10/30/2019    TONSILLECTOMY       Family History   Problem Relation Age of Onset    Thyroid disease Mother     Cataracts Mother     Diabetes Mother     Stroke Mother     Hypertension Mother     Thyroid disease Sister     Diabetes Sister     Hypertension Sister     Hypertension Daughter     Diabetes Son     Diabetes Sister     Hypertension Sister     Hypertension Brother     Hypertension Sister     Hypertension Sister     Hypertension Brother     Thyroid disease Maternal Aunt     Thyroid disease Maternal Aunt     Colon cancer Maternal Grandfather     Breast cancer Neg Hx     Ovarian cancer Neg Hx     Amblyopia Neg Hx     Blindness Neg Hx     Cancer Neg Hx     Glaucoma Neg Hx     Macular degeneration Neg Hx     Retinal detachment Neg Hx     Strabismus Neg Hx      Social History     Socioeconomic History    Marital status:     Number of children: 2   Occupational History     Employer: [employer]   Tobacco Use    Smoking status: Former Smoker     Packs/day: 0.25     Years: 15.00     Pack years: 3.75     Quit date:      Years since quittin.1    Smokeless tobacco: Never Used   Substance and Sexual Activity    Alcohol use: No    Drug use: No    Sexual activity: Not Currently     Partners: Male     Birth control/protection: Abstinence    Social History Narrative    . One daughter. Works as an Apartment        Current Outpatient Medications:     amLODIPine (NORVASC) 10 MG tablet, Take 1 tablet (10 mg total) by mouth once daily., Disp: 90 tablet, Rfl: 1    aspirin 81 MG Chew, Take 1 tablet (81 mg total) by mouth once daily., Disp: 30 tablet, Rfl: 0    blood-glucose sensor (DEXCOM G6 SENSOR) Sujey, Change sensor every 10 days, Disp: 3 each, Rfl: 11    blood-glucose transmitter (DEXCOM G6 TRANSMITTER) Sujey, Change every 3 months, Disp: 1 each, Rfl: 3    CONTOUR NEXT TEST STRIPS Strp, Check glucose before breakfast and dinner., Disp: 100 each, Rfl: 5    COVID-19 VACC,MRNA,PFIZER,,PF, IM, COVID-19 vacc,mRNA(Pfizer)(PF), Disp: , Rfl:     diabetic supplies, miscellan. Kit, Please dispense one 14 day FreeStyle Giulia Bradford, Disp: 1 kit, Rfl: 0    diclofenac sodium (VOLTAREN) 1 % Gel, Lower extremities: Apply the gel (4 g) to the affected area 4 times daily. Do not apply more than 16 g daily to any one affected joint of the lower extremities. Upper extremities: Apply the gel (2 g) to the affected area 4 times daily. Do not apply more than 8 g daily to any one affected joint of the upper extremities. Total dose should not exceed 32 g per day, overall affected joints., Disp: 100 g, Rfl: 5    empagliflozin (JARDIANCE) 10 mg tablet, Take 1 tablet (10 mg total) by mouth once daily., Disp: 90 tablet, Rfl: 2    fluocinolone acetonide oil 0.01 % Drop, Place 4 drops in ear(s) 2 (two) times daily., Disp: 20 mL, Rfl: 5    gabapentin (NEURONTIN) 300 MG capsule, Take 1 capsule (300 mg total) by mouth 3 (three) times daily., Disp: 90 capsule, Rfl: 11    insulin glargine U-300 conc (TOUJEO MAX U-300 SOLOSTAR) 300 unit/mL (3 mL) insulin pen, INJECT 50 UNITS SUBCUTANEOUSLY ONCE DAILY, Disp: 2 pen, Rfl: 5    insulin lispro (HUMALOG KWIKPEN INSULIN) 100 unit/mL pen, Inject 10 units before breakfast and 16 units before dinner, Disp: 2 Box,  Rfl: 5    losartan-hydrochlorothiazide 100-25 mg (HYZAAR) 100-25 mg per tablet, Take 1 tablet by mouth once daily., Disp: 90 tablet, Rfl: 3    metoprolol succinate (TOPROL-XL) 50 MG 24 hr tablet, Take 1 tablet (50 mg total) by mouth once daily., Disp: 30 tablet, Rfl: 5    montelukast (SINGULAIR) 10 mg tablet, Take 1 tablet (10 mg total) by mouth once daily., Disp: 90 tablet, Rfl: 1    cetirizine (ZYRTEC) 10 MG tablet, TAKE 1 TABLET BY MOUTH ONCE DAILY FOR 30 DAYS, Disp: , Rfl: 3    flash glucose sensor (FREESTYLE ROSAURA 14 DAY SENSOR) Kit, 1 sensor kit every 14 days (requires 2 kits per month) (Patient not taking: No sig reported), Disp: 2 kit, Rfl: 6    pravastatin (PRAVACHOL) 80 MG tablet, Take 1 tablet (80 mg total) by mouth once daily. (Patient not taking: Reported on 2/16/2022), Disp: 90 tablet, Rfl: 3    semaglutide (OZEMPIC) 1 mg/dose (4 mg/3 mL), Inject 1 mg into the skin every 7 days. (Patient not taking: Reported on 2/16/2022), Disp: 1 pen, Rfl: 5    Current Facility-Administered Medications:     sodium hyaluronate (EUFLEXXA) 10 mg/mL(mw 2.4 -3.6 million) injection 20 mg, 20 mg, Intra-articular, 1 time in Clinic/HOD, Maile Cuello MD    REVIEW OF SYSTEMS:  General: No fevers or chills; ENT: No sore throat; Allergy and Immunology: no persistent infections; Hematological and Lymphatic: No history of bleeding or easy bruising; Endocrine: negative; Respiratory: no cough, shortness of breath, or wheezing; Cardiovascular: no chest pain or dyspnea on exertion; no claudication, no rest pain; Gastrointestinal: no abdominal pain/back, change in bowel habits, or bloody stools; Genito-Urinary: no dysuria, trouble voiding, or hematuria; Musculoskeletal: negative, no wound; Neurological: no TIA or stroke symptoms; Psychiatric: no nervousness, anxiety or depression.    PHYSICAL EXAM:                General appearance:  Alert, well-appearing, and in no distress.  Oriented to person, place, and time                     Neurological: Normal speech, no focal findings noted; CN II - XII grossly intact. RLE with sensation to light touch, LLE with sensation to light touch.            Musculoskeletal: Digits/nail without cyanosis/clubbing.  Strength 5/5 BLE.                    Neck: Supple, no significant adenopathy                  Chest:  Clear to auscultation, no wheezes, rales or rhonchi, symmetric air entry. No use of accessory muscles               Cardiac: Normal rate and regular rhythm, S1 and S2 normal            Abdomen: Soft, nontender, nondistended, no masses or organomegaly, no hernia     No rebound tenderness noted; bowel sounds normal     Pulsatile aortic mass is non palpable.     No groin adenopathy      Extremities:     2+ R DP pulse, 2+ L DP pulse     2+ RLE edema, 1+ LLE edema    Skin: RLE no tissue loss; LLE no tissue loss    LAB RESULTS:  No results found for: CBC  Lab Results   Component Value Date    LABPROT 10.5 06/20/2013    INR 1.0 06/20/2013     Lab Results   Component Value Date     02/09/2021    K 4.1 02/09/2021     02/09/2021    CO2 28 02/09/2021     (H) 02/09/2021    BUN 18 02/09/2021    CREATININE 0.7 02/09/2021    CALCIUM 9.1 02/09/2021    ANIONGAP 10 02/09/2021    EGFRNONAA >60 02/09/2021     Lab Results   Component Value Date    WBC 7.07 12/07/2019    RBC 4.52 12/07/2019    HGB 13.4 12/07/2019    HCT 43.3 12/07/2019    MCV 96 12/07/2019    MCH 29.6 12/07/2019    MCHC 30.9 (L) 12/07/2019    RDW 14.5 12/07/2019     12/07/2019    MPV 12.2 12/07/2019    GRAN 3.4 12/07/2019    GRAN 47.3 12/07/2019    LYMPH 2.8 12/07/2019    LYMPH 39.2 12/07/2019    MONO 0.6 12/07/2019    MONO 8.8 12/07/2019    EOS 0.2 12/07/2019    BASO 0.07 12/07/2019    EOSINOPHIL 3.4 12/07/2019    BASOPHIL 1.0 12/07/2019    DIFFMETHOD Automated 12/07/2019     .  Lab Results   Component Value Date    HGBA1C 6.8 (H) 11/29/2021       IMAGING:  All pertinent imaging has been reviewed and interpreted  independently.    BERRY 1/0.65    Arterial US BLE Right lower extremity arterial ultrasound shows R PT hemodynamically significant stenosis.      IMP/PLAN:  76 y.o. female with   Patient Active Problem List   Diagnosis    Type 2 diabetes mellitus with diabetic polyneuropathy    Hyperlipidemia    HTN (hypertension)    Knee pain    H/O vitamin D deficiency    Non morbid obesity due to excess calories    Bilateral carotid bruits    Osteoarthritis of right knee    Morbid obesity with body mass index (BMI) of 40.0 or higher    Dyspnea on exertion    Chronic cough    MEKA (obstructive sleep apnea)    Sensorineural hearing loss (SNHL) of right ear with restricted hearing of left ear    Tinnitus of both ears    Allergic rhinitis    Dysfunction of both eustachian tubes    Nasal septal deviation    Chronic eczematous otitis externa of both ears    Neck abscess    Elevated blood pressure reading    Epidermal inclusion cyst    Polyneuropathy associated with underlying disease    Chronic pain of both knees    Decreased strength, endurance, and mobility    Difficulty walking    Pain and swelling of left knee    Pain and swelling of right knee    Weakness    being managed by PCP and specialists who is here today for evaluation of PVD.    -No RLE PVD with no claudication, no rest pain, no wound.  Imaging reviewed. - rec daily ASA; will obtain toe pressures  -BLE lymphedema and venous hypertension - venous insuff US and lymphedema clinic  -Patient has tried and failed compression of 20-30 mm Hg, elevation and exercise for >1 month period.  Basic pump trial performed and discontinued due to sensitivity and pain to static pressure.  Symptoms of swelling, pain and hyperplasia present - rec lymphedema therapy and pumps  -Exercise  -Heart healthy lifestyle  -RTC 3-6 mo    I spent 13 minutes evaluating this patient and greater than 50% of the time was spent counseling, coordinator care and discussing the plan of  care.  All questions were answered and patient stated understanding with agreement with the above treatment plan.    Dre Blair MD [facility]  Vascular and Endovascular Surgery

## 2022-02-26 ENCOUNTER — LAB VISIT (OUTPATIENT)
Dept: LAB | Facility: HOSPITAL | Age: 77
End: 2022-02-26
Attending: INTERNAL MEDICINE
Payer: MEDICARE

## 2022-02-26 DIAGNOSIS — E78.2 MIXED HYPERLIPIDEMIA: ICD-10-CM

## 2022-02-26 DIAGNOSIS — R25.2 LEG CRAMPS: ICD-10-CM

## 2022-02-26 DIAGNOSIS — E11.42 TYPE 2 DIABETES MELLITUS WITH DIABETIC POLYNEUROPATHY, WITH LONG-TERM CURRENT USE OF INSULIN: ICD-10-CM

## 2022-02-26 DIAGNOSIS — Z79.4 TYPE 2 DIABETES MELLITUS WITH DIABETIC POLYNEUROPATHY, WITH LONG-TERM CURRENT USE OF INSULIN: ICD-10-CM

## 2022-02-26 LAB
ALBUMIN SERPL BCP-MCNC: 3.4 G/DL (ref 3.5–5.2)
ALP SERPL-CCNC: 65 U/L (ref 55–135)
ALT SERPL W/O P-5'-P-CCNC: 15 U/L (ref 10–44)
ANION GAP SERPL CALC-SCNC: 12 MMOL/L (ref 8–16)
AST SERPL-CCNC: 13 U/L (ref 10–40)
BILIRUB SERPL-MCNC: 0.3 MG/DL (ref 0.1–1)
BUN SERPL-MCNC: 14 MG/DL (ref 8–23)
CALCIUM SERPL-MCNC: 9.2 MG/DL (ref 8.7–10.5)
CHLORIDE SERPL-SCNC: 106 MMOL/L (ref 95–110)
CHOLEST SERPL-MCNC: 167 MG/DL (ref 120–199)
CHOLEST/HDLC SERPL: 4.2 {RATIO} (ref 2–5)
CO2 SERPL-SCNC: 25 MMOL/L (ref 23–29)
CREAT SERPL-MCNC: 0.6 MG/DL (ref 0.5–1.4)
EST. GFR  (AFRICAN AMERICAN): >60 ML/MIN/1.73 M^2
EST. GFR  (NON AFRICAN AMERICAN): >60 ML/MIN/1.73 M^2
FERRITIN SERPL-MCNC: 111 NG/ML (ref 20–300)
GLUCOSE SERPL-MCNC: 113 MG/DL (ref 70–110)
HDLC SERPL-MCNC: 40 MG/DL (ref 40–75)
HDLC SERPL: 24 % (ref 20–50)
IRON SERPL-MCNC: 40 UG/DL (ref 30–160)
LDLC SERPL CALC-MCNC: 106.8 MG/DL (ref 63–159)
NONHDLC SERPL-MCNC: 127 MG/DL
POTASSIUM SERPL-SCNC: 3.4 MMOL/L (ref 3.5–5.1)
PROT SERPL-MCNC: 6.8 G/DL (ref 6–8.4)
SATURATED IRON: 12 % (ref 20–50)
SODIUM SERPL-SCNC: 143 MMOL/L (ref 136–145)
TOTAL IRON BINDING CAPACITY: 342 UG/DL (ref 250–450)
TRANSFERRIN SERPL-MCNC: 231 MG/DL (ref 200–375)
TRIGL SERPL-MCNC: 101 MG/DL (ref 30–150)
TSH SERPL DL<=0.005 MIU/L-ACNC: 0.7 UIU/ML (ref 0.4–4)

## 2022-02-26 PROCEDURE — 82728 ASSAY OF FERRITIN: CPT | Performed by: INTERNAL MEDICINE

## 2022-02-26 PROCEDURE — 36415 COLL VENOUS BLD VENIPUNCTURE: CPT | Mod: PO | Performed by: INTERNAL MEDICINE

## 2022-02-26 PROCEDURE — 84443 ASSAY THYROID STIM HORMONE: CPT | Performed by: INTERNAL MEDICINE

## 2022-02-26 PROCEDURE — 80053 COMPREHEN METABOLIC PANEL: CPT | Performed by: INTERNAL MEDICINE

## 2022-02-26 PROCEDURE — 84466 ASSAY OF TRANSFERRIN: CPT | Performed by: INTERNAL MEDICINE

## 2022-02-26 PROCEDURE — 80061 LIPID PANEL: CPT | Performed by: INTERNAL MEDICINE

## 2022-03-11 DIAGNOSIS — E11.42 TYPE 2 DIABETES MELLITUS WITH DIABETIC POLYNEUROPATHY, WITH LONG-TERM CURRENT USE OF INSULIN: ICD-10-CM

## 2022-03-11 DIAGNOSIS — Z79.4 TYPE 2 DIABETES MELLITUS WITH DIABETIC POLYNEUROPATHY, WITH LONG-TERM CURRENT USE OF INSULIN: ICD-10-CM

## 2022-03-11 NOTE — TELEPHONE ENCOUNTER
No new care gaps identified.  Powered by [employer] by "[employer]. Reference number: [id number].   3/11/2022 3:27:09 PM CST

## 2022-03-12 ENCOUNTER — LAB VISIT (OUTPATIENT)
Dept: LAB | Facility: HOSPITAL | Age: 77
End: 2022-03-12
Attending: INTERNAL MEDICINE
Payer: COMMERCIAL

## 2022-03-12 DIAGNOSIS — Z79.4 TYPE 2 DIABETES MELLITUS WITH DIABETIC POLYNEUROPATHY, WITH LONG-TERM CURRENT USE OF INSULIN: ICD-10-CM

## 2022-03-12 DIAGNOSIS — E11.42 TYPE 2 DIABETES MELLITUS WITH DIABETIC POLYNEUROPATHY, WITH LONG-TERM CURRENT USE OF INSULIN: ICD-10-CM

## 2022-03-12 LAB
ESTIMATED AVG GLUCOSE: 146 MG/DL (ref 68–131)
HBA1C MFR BLD: 6.7 % (ref 4–5.6)

## 2022-03-12 PROCEDURE — 36415 COLL VENOUS BLD VENIPUNCTURE: CPT | Mod: PO | Performed by: NURSE PRACTITIONER

## 2022-03-12 PROCEDURE — 83036 HEMOGLOBIN GLYCOSYLATED A1C: CPT | Performed by: NURSE PRACTITIONER

## 2022-03-14 DIAGNOSIS — E11.42 TYPE 2 DIABETES MELLITUS WITH DIABETIC POLYNEUROPATHY, WITH LONG-TERM CURRENT USE OF INSULIN: ICD-10-CM

## 2022-03-14 DIAGNOSIS — Z79.4 TYPE 2 DIABETES MELLITUS WITH DIABETIC POLYNEUROPATHY, WITH LONG-TERM CURRENT USE OF INSULIN: ICD-10-CM

## 2022-03-14 RX ORDER — EMPAGLIFLOZIN 10 MG/1
10 TABLET, FILM COATED ORAL DAILY
Qty: 90 TABLET | Refills: 2 | Status: SHIPPED | OUTPATIENT
Start: 2022-03-14 | End: 2023-01-10

## 2022-03-14 NOTE — TELEPHONE ENCOUNTER
No new care gaps identified.  Powered by [employer] by [employer]. Reference number: [id number].   3/14/2022 10:05:44 AM CDT

## 2022-03-15 RX ORDER — EMPAGLIFLOZIN 10 MG/1
10 TABLET, FILM COATED ORAL DAILY
Qty: 90 TABLET | Refills: 2 | OUTPATIENT
Start: 2022-03-15

## 2022-03-16 DIAGNOSIS — I10 ESSENTIAL HYPERTENSION: ICD-10-CM

## 2022-03-16 NOTE — TELEPHONE ENCOUNTER
No new care gaps identified.  Powered by [employer] by [employer]. Reference number: [id number].   3/16/2022 10:42:30 AM CDT

## 2022-03-17 RX ORDER — AMLODIPINE BESYLATE 10 MG/1
10 TABLET ORAL DAILY
Qty: 90 TABLET | Refills: 1 | Status: SHIPPED | OUTPATIENT
Start: 2022-03-17 | End: 2022-07-06 | Stop reason: SDUPTHER

## 2022-03-18 ENCOUNTER — OFFICE VISIT (OUTPATIENT)
Dept: ENDOCRINOLOGY | Facility: CLINIC | Age: 77
End: 2022-03-18
Payer: COMMERCIAL

## 2022-03-18 ENCOUNTER — TELEPHONE (OUTPATIENT)
Dept: ENDOCRINOLOGY | Facility: CLINIC | Age: 77
End: 2022-03-18

## 2022-03-18 VITALS
WEIGHT: 224.81 LBS | DIASTOLIC BLOOD PRESSURE: 72 MMHG | TEMPERATURE: 98 F | BODY MASS INDEX: 37.41 KG/M2 | SYSTOLIC BLOOD PRESSURE: 150 MMHG | HEART RATE: 80 BPM

## 2022-03-18 DIAGNOSIS — E11.42 TYPE 2 DIABETES MELLITUS WITH DIABETIC POLYNEUROPATHY, WITH LONG-TERM CURRENT USE OF INSULIN: Primary | ICD-10-CM

## 2022-03-18 DIAGNOSIS — E78.5 HYPERLIPIDEMIA, UNSPECIFIED HYPERLIPIDEMIA TYPE: ICD-10-CM

## 2022-03-18 DIAGNOSIS — Z79.4 TYPE 2 DIABETES MELLITUS WITH DIABETIC POLYNEUROPATHY, WITH LONG-TERM CURRENT USE OF INSULIN: Primary | ICD-10-CM

## 2022-03-18 DIAGNOSIS — E66.01 SEVERE OBESITY (BMI 35.0-39.9) WITH COMORBIDITY: ICD-10-CM

## 2022-03-18 PROCEDURE — 3077F PR MOST RECENT SYSTOLIC BLOOD PRESSURE >= 140 MM HG: ICD-10-PCS | Mod: CPTII,S$GLB,, | Performed by: NURSE PRACTITIONER

## 2022-03-18 PROCEDURE — 99999 PR PBB SHADOW E&M-EST. PATIENT-LVL V: CPT | Mod: PBBFAC,,, | Performed by: NURSE PRACTITIONER

## 2022-03-18 PROCEDURE — 1159F PR MEDICATION LIST DOCUMENTED IN MEDICAL RECORD: ICD-10-PCS | Mod: CPTII,S$GLB,, | Performed by: NURSE PRACTITIONER

## 2022-03-18 PROCEDURE — 1159F MED LIST DOCD IN RCRD: CPT | Mod: CPTII,S$GLB,, | Performed by: NURSE PRACTITIONER

## 2022-03-18 PROCEDURE — 3078F DIAST BP <80 MM HG: CPT | Mod: CPTII,S$GLB,, | Performed by: NURSE PRACTITIONER

## 2022-03-18 PROCEDURE — 3044F PR MOST RECENT HEMOGLOBIN A1C LEVEL <7.0%: ICD-10-PCS | Mod: CPTII,S$GLB,, | Performed by: NURSE PRACTITIONER

## 2022-03-18 PROCEDURE — 3078F PR MOST RECENT DIASTOLIC BLOOD PRESSURE < 80 MM HG: ICD-10-PCS | Mod: CPTII,S$GLB,, | Performed by: NURSE PRACTITIONER

## 2022-03-18 PROCEDURE — 1160F PR REVIEW ALL MEDS BY PRESCRIBER/CLIN PHARMACIST DOCUMENTED: ICD-10-PCS | Mod: CPTII,S$GLB,, | Performed by: NURSE PRACTITIONER

## 2022-03-18 PROCEDURE — 1126F PR PAIN SEVERITY QUANTIFIED, NO PAIN PRESENT: ICD-10-PCS | Mod: CPTII,S$GLB,, | Performed by: NURSE PRACTITIONER

## 2022-03-18 PROCEDURE — 95251 CONT GLUC MNTR ANALYSIS I&R: CPT | Mod: S$GLB,,, | Performed by: NURSE PRACTITIONER

## 2022-03-18 PROCEDURE — 3044F HG A1C LEVEL LT 7.0%: CPT | Mod: CPTII,S$GLB,, | Performed by: NURSE PRACTITIONER

## 2022-03-18 PROCEDURE — 1126F AMNT PAIN NOTED NONE PRSNT: CPT | Mod: CPTII,S$GLB,, | Performed by: NURSE PRACTITIONER

## 2022-03-18 PROCEDURE — 99215 PR OFFICE/OUTPT VISIT, EST, LEVL V, 40-54 MIN: ICD-10-PCS | Mod: S$GLB,,, | Performed by: NURSE PRACTITIONER

## 2022-03-18 PROCEDURE — 99215 OFFICE O/P EST HI 40 MIN: CPT | Mod: S$GLB,,, | Performed by: NURSE PRACTITIONER

## 2022-03-18 PROCEDURE — 95251 PR GLUCOSE MONITOR, 72 HOUR, PHYS INTERP: ICD-10-PCS | Mod: S$GLB,,, | Performed by: NURSE PRACTITIONER

## 2022-03-18 PROCEDURE — 99999 PR PBB SHADOW E&M-EST. PATIENT-LVL V: ICD-10-PCS | Mod: PBBFAC,,, | Performed by: NURSE PRACTITIONER

## 2022-03-18 PROCEDURE — 3077F SYST BP >= 140 MM HG: CPT | Mod: CPTII,S$GLB,, | Performed by: NURSE PRACTITIONER

## 2022-03-18 PROCEDURE — 1160F RVW MEDS BY RX/DR IN RCRD: CPT | Mod: CPTII,S$GLB,, | Performed by: NURSE PRACTITIONER

## 2022-03-18 RX ORDER — METFORMIN HYDROCHLORIDE 500 MG/1
1000 TABLET, EXTENDED RELEASE ORAL 2 TIMES DAILY WITH MEALS
Qty: 120 TABLET | Refills: 0 | Status: SHIPPED | OUTPATIENT
Start: 2022-03-18 | End: 2022-04-19 | Stop reason: SDUPTHER

## 2022-03-18 NOTE — TELEPHONE ENCOUNTER
----- Message from [place] sent at 3/18/2022  3:57 PM CDT -----  Regarding: self  .Type: Patient Call Back    Who called: self     What is the request in detail: patient would like a note for today uploaded to the portal please     Can the clinic reply by [facility]?    Would the patient rather a call back or a response via [facility]?      Best call back number: .[phone]

## 2022-03-18 NOTE — PATIENT INSTRUCTIONS
Diabetes appears to be well controlled based on A1c of 6.7%, lack of hypoglycemia, and CGM report from months ago.   She is open to starting metformin with goal of weaning off Humalog ac.     Week 1: Start metformin  mg tablet once daily with lunch. And stop Humalog before lunch. Continue 16 units before supper. So take 0-0-16 units before meals).     Week 2: After a week, if no upset stomach, then increase to 1 tablet with lunch and 1 tablet with supper. And reduce to 8 units before supper (so take 0-0-8 units before meals).     Week 3: After a week, if no upset stomach, then increase to 2 tablets with lunch and 1 tablet with supper. Stop taking Humalog before supper. You will not probably not need Humalog anymore.     Week 4: if your glucoses are still high, you may need to increase to 2 tablets with lunch and 2 tablets with supper.     Return to clinic in a month.

## 2022-03-18 NOTE — PROGRESS NOTES
CC: This 76 y.o. Black or  female  is here for evaluation of  T2DM along with comorbidities indicated in the Visit Diagnosis section of this encounter.    HPI: Mena Odonnell was diagnosed with T2DM in ~ 1995. Experienced blurry vision at the time of diagnosis r/t BG >400. Started on orals. Began insulin in 2005. She is currently on MDI.      Prior visit 12/2021  a1c is down from 7.4% in August to now 6.8%. She is monitoring her diet better, trying to engage in more exercise and activity.   Plan Unable to adjust insulin doses since patient is not testing glucoses frequently.   Will send orders for Dexcom CGM.   Dexcom sample provided.   See Diabetes Educator/Registered Dietician for Dexcom training.   Return to clinic in 3 months with labs prior.       Interval history  a1c is about the same, from 6.8% to now 6.7%.   Pt started Dexcom and enjoyed using but accidentally threw her transmitter away. She just got a new transmitter.   She has been taking Humalog 10 units before lunch and 16 units before supper, none for breakfast. She eats more carbs for lunch than breakfast.       LAST DIABETES EDUCATION: 6/2019 mira Palmer - Found visit helpful   1/14/22 with ELENO Sanchez RD for Dexcom training     HOSPITALIZED FOR DIABETES -  No.    DIABETES MEDICATIONS:   Jardiance 25 mg once daily in the AM  Ozempic 1 mg once weekly  Toujeo Max 50 units once daily in 6 am  Humalog Kwikpen 10-0-16 units ac     Misses medication doses - no        DM COMPLICATIONS: peripheral neuropathy and peripheral vascular disease    SIGNIFICANT DIABETES MED HISTORY:   Metformin stopped December 2016 r/t GI upset and diarrhea     SELF MONITORING BLOOD GLUCOSE: Checks blood glucose at home  - none in the last month.   Reviewed old CGM data from Dec which showed very stable and controlled glucoses. CGM average 136   See Media for CGM report.     HYPOGLYCEMIC EPISODES:  Denies     CURRENT DIET:  drinking mostly water and sometimes  juice - once a week.   Breakfast ~ 8 am; lunch ~ 12 pm. Dinner - 6 pm. Eats 3 meals/day.       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 No acute distress.   PSYCH: AAOx3, appropriate mood and affect, conversant, well-groomed. Judgement and insight good.   NEURO: Cranial nerves grossly intact. Speech clear, no tremor.   CHEST: Respirations even and unlabored.     Foot exam:     Protective Sensation (w/ 10 gram monofilament):  Right: Intact  Left: Intact    Visual Inspection:  Skin Breakdown -  Neither    Pedal Pulses:   Right: Diminished  Left: Diminished    Posterior tibialis:   Right:Diminished  Left: Diminished          Hemoglobin A1C   Date Value Ref Range Status   03/12/2022 6.7 (H) 4.0 - 5.6 % Final     Comment:     ADA Screening Guidelines:  5.7-6.4%  Consistent with prediabetes  >or=6.5%  Consistent with diabetes    High levels of fetal hemoglobin interfere with the HbA1C  assay. Heterozygous hemoglobin variants (HbS, HgC, etc)do  not significantly interfere with this assay.   However, presence of multiple variants may affect accuracy.     11/29/2021 6.8 (H) 4.0 - 5.6 % Final     Comment:     ADA Screening Guidelines:  5.7-6.4%  Consistent with prediabetes  >or=6.5%  Consistent with diabetes    High levels of fetal hemoglobin interfere with the HbA1C  assay. Heterozygous hemoglobin variants (HbS, HgC, etc)do  not significantly interfere with this assay.   However, presence of multiple variants may affect accuracy.     08/24/2021 7.4 (H) 4.0 - 5.6 % Final     Comment:     ADA Screening Guidelines:  5.7-6.4%  Consistent with prediabetes  >or=6.5%  Consistent with diabetes    High levels of fetal hemoglobin  interfere with the HbA1C  assay. Heterozygous hemoglobin variants (HbS, HgC, etc)do  not significantly interfere with this assay.   However, presence of multiple variants may affect accuracy.             Chemistry        Component Value Date/Time     02/26/2022 0818    K 3.4 (L) 02/26/2022 0818     02/26/2022 0818    CO2 25 02/26/2022 0818    BUN 14 02/26/2022 0818    CREATININE 0.6 02/26/2022 0818     (H) 02/26/2022 0818        Component Value Date/Time    CALCIUM 9.2 02/26/2022 0818    ALKPHOS 65 02/26/2022 0818    AST 13 02/26/2022 0818    ALT 15 02/26/2022 0818    BILITOT 0.3 02/26/2022 0818    ESTGFRAFRICA >60.0 02/26/2022 0818    EGFRNONAA >60.0 02/26/2022 0818          Lab Results   Component Value Date    LDLCALC 106.8 02/26/2022      Latest Reference Range & Units 02/26/22 08:18   Cholesterol 120 - 199 mg/dL 167 [1]   HDL 40 - 75 mg/dL 40 [2]   HDL/Cholesterol Ratio 20.0 - 50.0 % 24.0   LDL Cholesterol External 63.0 - 159.0 mg/dL 106.8 [3]   Non-HDL Cholesterol mg/dL 127 [4]   Total Cholesterol/HDL Ratio 2.0 - 5.0  4.2   Triglycerides 30 - 150 mg/dL 101 [5]       Lab Results   Component Value Date    MICALBCREAT 28.1 05/10/2021               ASSESSMENT and PLAN:    A1C GOAL: < 7%     1. Type 2 diabetes mellitus with diabetic polyneuropathy, with long-term current use of insulin  Diabetes appears to be well controlled based on A1c of 6.7%, lack of hypoglycemia, and CGM report from months ago.   She is open to starting metformin with goal of weaning off Humalog ac.     Week 1: Start metformin  mg tablet once daily with lunch. And stop Humalog before lunch. Continue 16 units before supper. So take 0-0-16 units before meals).     Week 2: After a week, if no upset stomach, then increase to 1 tablet with lunch and 1 tablet with supper. And reduce to 8 units before supper (so take 0-0-8 units before meals).     Week 3: After a week, if no upset stomach, then increase to 2 tablets with  lunch and 1 tablet with supper. Stop taking Humalog before supper. You will not probably not need Humalog anymore.     Week 4: if your glucoses are still high, you may need to increase to 2 tablets with lunch and 2 tablets with supper.     Return to clinic in a month.    2. Severe obesity (BMI 35.0-39.9) with comorbidity  Increases insulin resistance.   May lose some weight if she is able to wean off Humalog with metformin.    3. Hyperlipidemia, unspecified hyperlipidemia type  Continue pravastatin          Patient is taking 3 injections of insulin a day. The patient's insulin treatment regimen requires frequent adjustments by the patient on the basis of therapeutic CGM testing results.         No orders of the defined types were placed in this encounter.       Follow up in about 4 weeks (around 4/15/2022).

## 2022-03-22 ENCOUNTER — PES CALL (OUTPATIENT)
Dept: ADMINISTRATIVE | Facility: CLINIC | Age: 77
End: 2022-03-22
Payer: COMMERCIAL
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
## 2022-03-29 ENCOUNTER — TELEPHONE (OUTPATIENT)
Dept: FAMILY MEDICINE | Facility: CLINIC | Age: 77
End: 2022-03-29

## 2022-03-29 ENCOUNTER — OFFICE VISIT (OUTPATIENT)
Dept: FAMILY MEDICINE | Facility: CLINIC | Age: 77
End: 2022-03-29
Payer: COMMERCIAL

## 2022-03-29 VITALS
OXYGEN SATURATION: 98 % | RESPIRATION RATE: 18 BRPM | DIASTOLIC BLOOD PRESSURE: 70 MMHG | HEART RATE: 81 BPM | WEIGHT: 223.56 LBS | BODY MASS INDEX: 37.2 KG/M2 | SYSTOLIC BLOOD PRESSURE: 126 MMHG

## 2022-03-29 DIAGNOSIS — R30.0 DYSURIA: ICD-10-CM

## 2022-03-29 DIAGNOSIS — M17.11 PRIMARY OSTEOARTHRITIS OF RIGHT KNEE: ICD-10-CM

## 2022-03-29 DIAGNOSIS — G63 POLYNEUROPATHY ASSOCIATED WITH UNDERLYING DISEASE: ICD-10-CM

## 2022-03-29 DIAGNOSIS — B37.49 CANDIDURIA: Primary | ICD-10-CM

## 2022-03-29 DIAGNOSIS — I10 PRIMARY HYPERTENSION: Primary | ICD-10-CM

## 2022-03-29 DIAGNOSIS — I10 ESSENTIAL HYPERTENSION: ICD-10-CM

## 2022-03-29 DIAGNOSIS — E11.42 TYPE 2 DIABETES MELLITUS WITH DIABETIC POLYNEUROPATHY, WITHOUT LONG-TERM CURRENT USE OF INSULIN: ICD-10-CM

## 2022-03-29 DIAGNOSIS — E78.2 MIXED HYPERLIPIDEMIA: ICD-10-CM

## 2022-03-29 PROCEDURE — 99999 PR PBB SHADOW E&M-EST. PATIENT-LVL V: CPT | Mod: PBBFAC,,, | Performed by: INTERNAL MEDICINE

## 2022-03-29 PROCEDURE — 3288F FALL RISK ASSESSMENT DOCD: CPT | Mod: CPTII,S$GLB,, | Performed by: INTERNAL MEDICINE

## 2022-03-29 PROCEDURE — 3044F HG A1C LEVEL LT 7.0%: CPT | Mod: CPTII,S$GLB,, | Performed by: INTERNAL MEDICINE

## 2022-03-29 PROCEDURE — 3078F DIAST BP <80 MM HG: CPT | Mod: CPTII,S$GLB,, | Performed by: INTERNAL MEDICINE

## 2022-03-29 PROCEDURE — 3288F PR FALLS RISK ASSESSMENT DOCUMENTED: ICD-10-PCS | Mod: CPTII,S$GLB,, | Performed by: INTERNAL MEDICINE

## 2022-03-29 PROCEDURE — 99214 OFFICE O/P EST MOD 30 MIN: CPT | Mod: S$GLB,,, | Performed by: INTERNAL MEDICINE

## 2022-03-29 PROCEDURE — 99214 PR OFFICE/OUTPT VISIT, EST, LEVL IV, 30-39 MIN: ICD-10-PCS | Mod: S$GLB,,, | Performed by: INTERNAL MEDICINE

## 2022-03-29 PROCEDURE — 1159F MED LIST DOCD IN RCRD: CPT | Mod: CPTII,S$GLB,, | Performed by: INTERNAL MEDICINE

## 2022-03-29 PROCEDURE — 3044F PR MOST RECENT HEMOGLOBIN A1C LEVEL <7.0%: ICD-10-PCS | Mod: CPTII,S$GLB,, | Performed by: INTERNAL MEDICINE

## 2022-03-29 PROCEDURE — 99999 PR PBB SHADOW E&M-EST. PATIENT-LVL V: ICD-10-PCS | Mod: PBBFAC,,, | Performed by: INTERNAL MEDICINE

## 2022-03-29 PROCEDURE — 3078F PR MOST RECENT DIASTOLIC BLOOD PRESSURE < 80 MM HG: ICD-10-PCS | Mod: CPTII,S$GLB,, | Performed by: INTERNAL MEDICINE

## 2022-03-29 PROCEDURE — 3074F SYST BP LT 130 MM HG: CPT | Mod: CPTII,S$GLB,, | Performed by: INTERNAL MEDICINE

## 2022-03-29 PROCEDURE — 1159F PR MEDICATION LIST DOCUMENTED IN MEDICAL RECORD: ICD-10-PCS | Mod: CPTII,S$GLB,, | Performed by: INTERNAL MEDICINE

## 2022-03-29 PROCEDURE — 1101F PT FALLS ASSESS-DOCD LE1/YR: CPT | Mod: CPTII,S$GLB,, | Performed by: INTERNAL MEDICINE

## 2022-03-29 PROCEDURE — 1101F PR PT FALLS ASSESS DOC 0-1 FALLS W/OUT INJ PAST YR: ICD-10-PCS | Mod: CPTII,S$GLB,, | Performed by: INTERNAL MEDICINE

## 2022-03-29 PROCEDURE — 3074F PR MOST RECENT SYSTOLIC BLOOD PRESSURE < 130 MM HG: ICD-10-PCS | Mod: CPTII,S$GLB,, | Performed by: INTERNAL MEDICINE

## 2022-03-29 RX ORDER — FLUCONAZOLE 150 MG/1
150 TABLET ORAL DAILY
Qty: 1 TABLET | Refills: 0 | Status: SHIPPED | OUTPATIENT
Start: 2022-03-29 | End: 2022-03-30

## 2022-03-29 RX ORDER — LOSARTAN POTASSIUM AND HYDROCHLOROTHIAZIDE 25; 100 MG/1; MG/1
1 TABLET ORAL DAILY
Qty: 90 TABLET | Refills: 3 | Status: SHIPPED | OUTPATIENT
Start: 2022-03-29 | End: 2023-01-10 | Stop reason: SDUPTHER

## 2022-03-29 RX ORDER — PRAVASTATIN SODIUM 80 MG/1
80 TABLET ORAL DAILY
Qty: 90 TABLET | Refills: 3 | Status: SHIPPED | OUTPATIENT
Start: 2022-03-29 | End: 2023-01-10 | Stop reason: SDUPTHER

## 2022-03-29 NOTE — LETTER
AUTHORIZATION FOR RELEASE OF   CONFIDENTIAL INFORMATION    Dear [employer],    We are seeing Mena Odonnell, date of birth 1945, in the clinic at [facility]. Leonard Wells MD is the patient's PCP. Mena Odonnell has an outstanding lab/procedure at the time we reviewed her chart. In order to help keep her health information updated, she has authorized us to request the following medical record(s):        (  )  MAMMOGRAM                                      (  )  COLONOSCOPY      (  )  PAP SMEAR                                          (  )  OUTSIDE LAB RESULTS     (  )  DEXA SCAN                                          ( X ) DIABETIC EYE EXAM            (  )  FOOT EXAM                                          (  )  ENTIRE RECORD     (  )  OUTSIDE IMMUNIZATIONS                 (  )  _______________         Please fax records to [facility], Kiran K Anand, MD, FAX [phone] [address] [facility] [address]       If you have any questions, please contact Nga Benjamin MA,[facility] at [phone].             Patient Name: Mena Odonnell  : 1945  Patient Phone #: [phone]

## 2022-03-29 NOTE — TELEPHONE ENCOUNTER
Faxed CHELSEA request to Praneeth Rios MD for patient dm eye exam .    Faxed Chelsea request to Bebe Gee MD for patient dm eye exam .

## 2022-03-29 NOTE — LETTER
AUTHORIZATION FOR RELEASE OF   CONFIDENTIAL INFORMATION    Dear Bebe Gee MD,    We are seeing Mena Odonnell, date of birth 1945, in the clinic at [facility]. Leonard Wells MD is the patient's PCP. Mena Odonnell has an outstanding lab/procedure at the time we reviewed her chart. In order to help keep her health information updated, she has authorized us to request the following medical record(s):        (  )  MAMMOGRAM                                      (  )  COLONOSCOPY      (  )  PAP SMEAR                                          (  )  OUTSIDE LAB RESULTS     (  )  DEXA SCAN                                          ( X ) DIABETIC EYE EXAM            (  )  FOOT EXAM                                          (  )  ENTIRE RECORD     (  )  OUTSIDE IMMUNIZATIONS                 (  )  _______________         Please fax records to [facility], Kiran K Anand, MD, FAX [phone] [address] [facility] [facility] [address]       If you have any questions, please contact Nga Benjamin MA,[facility] at [phone].             Patient Name: Mena Odonnell  : 1945  Patient Phone #: [phone]

## 2022-04-04 ENCOUNTER — CLINICAL SUPPORT (OUTPATIENT)
Dept: REHABILITATION | Facility: HOSPITAL | Age: 77
End: 2022-04-04
Payer: COMMERCIAL

## 2022-04-04 DIAGNOSIS — R53.1 DECREASED STRENGTH, ENDURANCE, AND MOBILITY: Primary | ICD-10-CM

## 2022-04-04 DIAGNOSIS — R68.89 DECREASED STRENGTH, ENDURANCE, AND MOBILITY: Primary | ICD-10-CM

## 2022-04-04 DIAGNOSIS — M79.89 LEG SWELLING: ICD-10-CM

## 2022-04-04 DIAGNOSIS — I73.9 PERIPHERAL ARTERIAL DISEASE: ICD-10-CM

## 2022-04-04 DIAGNOSIS — Z74.09 DECREASED STRENGTH, ENDURANCE, AND MOBILITY: Primary | ICD-10-CM

## 2022-04-04 PROCEDURE — 97535 SELF CARE MNGMENT TRAINING: CPT

## 2022-04-04 PROCEDURE — 97162 PT EVAL MOD COMPLEX 30 MIN: CPT

## 2022-04-04 NOTE — PROGRESS NOTES
See evaluation in POC for goals and assessment     Eval Date: 04/05/2022    Gladis Medeiros, PT, DPT, CLT

## 2022-04-05 PROBLEM — M79.89 LEG SWELLING: Status: ACTIVE | Noted: 2022-04-05

## 2022-04-05 NOTE — PATIENT INSTRUCTIONS
LYMPHEDEMA    What is Lymphedema  Swelling in an arm, leg, the neck, the face, genitals and/or abdomen caused by a blockage in the lymphatic system. This type of swelling can decrease the amount of oxygen that reaches tissues and can also promote infection by serving as a medium in which bacteria may grow. An example of an infection seen commonly in those with Lymphedema is Cellulitis.  It is important to know that lymphedema is progressive and can lead to long-term, chronic conditions. Therefore, early and careful management is needed to reduce symptoms.    The Lymphatic System  A network of tissues and organs that help rid the body of toxins, waste and other unwanted materials. The lymphatic system returns fluid and protein to the circulatory system from the spaces in the body's tissues. Tiny lymph vessels of the lymphatic system  materials and waste products, along with foreign materials (bacteria) and transport them to larger lymph vessels. This fluid inside the vessels is called lymph. Surrounding muscles help move the lymph along to the larger vessels until it is returned to the circulatory system. Lymph nodes help filter the lymph fluid and break down foreign substances and help fight infection.                                                          What Causes Lymphedema?  Some people are born with an underdeveloped lymph system. Swelling may present at birth or develop during adolescence or adulthood. This is known as primary lymphedema. Secondary lymphedema may occur after lymph tissue and lymph nodes are injured or removed. This swelling may begin immediately or may not occur for several months or years. A common cause of secondary lymphedema is radiation treatment. Other causes of secondary lymphedema are surgery (removal of lymph nodes), cancer, infection and trauma. While there is presently no cure for lymphedema, it can be managed with diligent care. The earlier the stage, the easier the  management of the affected area.    Stages of Lymphedema:  Stage 0 (pre-stage): A subclinical state where swelling is not obvious. You may have complaints such as heaviness in the limb or mild aching or tightness  Stage 1: Skin of the limb is typically soft with pitting edema. Elevation of the limb may improve the swelling  Stage 2: Skin of the limb is more fibrotic. The skin may no longer pit when pressed. Limb swelling does not improve with elevation.   Stage 3: Severe stage also known as elephantiasis. Skin is fibrotic with deep skin creases which are prone to infection. Trophic skin changes, such as papillomatosis and hyperkeratosis also occur in this stage.                                                                             Precautions:  It is important to prevent skin irritation, cuts, scrapes and needle sticks in the skin to decrease risk of infection  Wear gloves for gardening and housework  Use an electric razor rather than a blade razor  Have injections and blood draws from the uninvolved side  Use a thimble when sewing  Avoid rubbing, scrubbing, waxing of involved limb  Take care to avoid bites and scratches from bugs and pets  Avoid walking barefoot  Use extreme caution when peeling shrimp, crawfish, crabs      It is important to avoid extreme heat exposure. When you increase circulation to a swollen limb, the chance of additional fluid leaking into the spaces of your body's tissue increases, which causes more swelling.  Avoid prolonged exposure to sunlight  Use sunscreen when outdoors  Use oven mitts when reaching into an oven  Avoid hot tubs and saunas  Avoid spending prolonged time under a hot hair dryer  Look for clothing made of breathable fabrics for hot weather      It is important to avoid anything that will reduce circulation of blood flow, since reduction of blood flow can lead to a restriction of lymph flow.  Have blood pressure measured in the uninvolved limb  Avoid tight or  constricting clothing such as tight sleeves and waistbands  Avoid tight watches and jewelry  Avoid sitting with your legs crossed        In general, practice healthy living habits:  Avoid alcohol and smoking  Maintain your ideal weight  Keep sodium intake at a moderate level (less than 2,400 mg/day)  Eat moderate amount of protein to maintain tissue health. Contact your primary doctor for a referral to [facility]  When you travel by air, wear your compression garment during the flight  Wash hands frequently and dry thoroughly  Keep skin moisturized and free from cracking or peeling. For sensitive skin, Eucerin lotion is a good option.   Avoid hangnails and do not pull or bite cuticles  Take care of all scratches, cuts, bites, immediately with an antibiotic cream or ointment (Neosporin, Bacitracin, Triple ABX) and cover with a clean bandage.       Call your doctor as soon as you suspect infection. Be aware of the signs of infection:  Increased swelling  Redness (generalized or localized small, red dots)  Heat (arm/leg feels warm, or you have fever)  Pain       MANAGING YOUR BANDAGES:  Although your bandages are inconvenient, they are an important part of the lymphedema program. You will receive one set of bandages for participating in [facility]. It is important to take care of these bandages during your treatment. You should not get your bandages wet! For bandages on the arm, it is possible to protect the arm bandages with a plastic bag when in a tub. No Showers with bandages on arm or leg!          WHAT TO EXPECT DURING LYMPHEDEMA TREATMENT:  Wear loose short sleeves for arm treatment. Sleeveless tops work well too. Wear skirts or baggy shorts or loose fitting/baggy pants for leg treatment.  If your leg(s) are being bandaged, find the widest shoe(s) possible. Wide slip-on shoes like crocks usually work. If you do not have a shoe that will fit over the bandages, a  disposable shoe cover will be provided to you.  Treatment sessions will last 45 minutes to 60 minutes and will consist of Manual Lymphatic Drainage (MLD), MLD training, vasopneumatic compression of the extremity as needed and bandaging of the swollen extremity.   You may be asked to remove clothes so that treatments can be performed efficiently. You will be provided with a sheet to drape yourself.   Once the compression bandage is applied, you will be expected to wear the bandage until the next visit. The bandage will loosen as the lymph fluid is pushed out of the limb. Do not attempt to unwrap and re-wrap the limb unless you have been trained to do so. In the event that the bandages are so loose that they fall off, remove them and put everything in a bag and bring it to the next therapy session.  While wearing the bandages on the arm or leg, keep fingers and toes moving, bend your elbow/knee, wrist/ankle frequently. Elevate the limb above the heart to relieve any throbbing or discomfort.  If the discomfort is unbearable, you may try to remove the outermost bandage. If this does not work, remove all bandages and bring them with you to the next therapy session.   Depending on severity of the swelling, expect weekly treatment for 2-5 days per week x 4-6 weeks.  An exercise program will be created based on individual need.  You will be fitted for a permanent compression garment (sleeve for arm/stocking for leg) prior to discharge from therapy. This permanent compression garment will take the place of the bandages and need to be worn as prescribed by your therapist.    CARE OF YOUR COMPRESSION GARMENT  Wear the compression garment daily as prescribed by your therapist. You will not wear the compression garment when sleeping. Put on the garment soon after you wake up and remove it before going to bed. This will decrease the risk of the lymph fluid returning to the extremity.   Use donning gloves/garden gloves to  the  compression garment. This will decrease tearing the material and make it easier to  the material.  If you feel the garment is too tight, notify your therapist.  It is best to have a wear one, wash one garment if possible. Check with your insurance provided to see what they will cover for lymphedema supplies.  Compression garments can be expected to last 3-6 months before they need to be replaced. When the compression garment loses its compressive ability, swelling from lymph fluid can return in the limb even if you are wearing the garment daily.   Consult your therapist or compression representative in the event you need to be re-measured.     Exercise:    Your therapist will instruct you in an exercise program tailored to you. Muscle contractions help promote the flow of lymph out of the swollen limb.  To prevent an increase in swelling of the limb, it is always best practice to wear your compression garment on the affected extremity when exercising.    **IF YOUR ARM IS BANDAGED, OPEN/CLOSE YOUR HAND AND PERFORM WRIST CIRCLES THROUGHOUT THE DAY.    Perform all exercises below with good, erect posture. Stand with feet shoulder width apart, with your knees slightly bent. Repeat each exercise 10-20 reps up to 3x/week               Shoulder Blade Squeeze      Backwards Shoulder Roll  Neck Rotation    Neck Side Bends    Chest Press with Stick    Elbow Bends with Stick      Ankle Pumps (and wiggle toes; do frequently)    Hamstring Curls    Heel Raises  Knee Extension    Knee Bends    Lunges  Knee Raises    Trunk Rotation    The following are a list of resources that may be helpful for you.  Lymphnotes.com: online information source for those having or are at risk of developing Lymphedema. This site is also for family and friend's who care for those with lymphedema  NLN (National Lymphedema Network): an organization dedicated to promoting the awareness of lymphedema and empowering people with lymphedema to live life  to the fullest. Lymphnet.org  LEARN (Lymphedema Education and Research Network): answers to frequently asked questions about Lymphedema, Lipedema, and the lymphatic system. Lymphaticnetwork.org  How Rut Got her Rack Back, A Breast Cancer Memoir by Wendy Penaloza - a book that gives a laugh out loud humor to her adventure, along with poignant moments of self-discovery as she blogs her way to good health. (available on Chris and paperback)  100 Questions and Answers About Lymphedema by Debbie Vidal, Triny Patricio, and Iesha Curiel - provides clear, straightforward answers to your questions about lymphedema. (available on paperback)  Podcasts: Lymphedema Podcast, Livedema Podcast, Lymph Logic to name a few      If you need further assistance or have questions concerning your treatment, please do not hesitate to call Gladis Medeiros (therapist) at  [phone] (phone number).

## 2022-04-05 NOTE — PLAN OF CARE
[facility]  Physical Therapy Initial Evaluation    Name: Mena Odonnell  Clinic Number: [id number]    Therapy Diagnosis:   Encounter Diagnoses   Name Primary?    Peripheral arterial disease     Decreased strength, endurance, and mobility Yes    Leg swelling      Physician: Dre Blair MD    Physician Orders: PT Eval and Treat - lymphedema  Medical Diagnosis from Referral: I73.9 (ICD-10-CM) - Peripheral arterial disease   Evaluation Date: 4/4/2022  Authorization Period Expiration: 2/16/2023  Plan of Care Expiration: 6/14/2022  Visit # / Visits authorized: 1/ 1    Time In: 2:00pm   Time Out: 2:40pm   Total Billable Time: 40 minutes    Precautions: Standard and Diabetes    Subjective   Date of onset: More than a year ago   History of current condition - Mena reports: Isn't sure what caused the swelling, but it is the RLE. She has compression socks and they help     Pt denies CHF, KF, and CA.   Pt reports DM   Fluid pill none   Blood thinner none      Medical History:   Past Medical History:   Diagnosis Date    Cataract     Diabetes mellitus type II     Diabetes with neurologic complications     DM retinopathy     Hyperlipidemia     Hypertension     Obesity     Osteoarthritis     Peripheral neuropathy     Polyneuropathy     Severe obesity (BMI 35.0-39.9) with comorbidity 12/30/2016    Sleep apnea     Tendonitis     left foot       Surgical History:   Mena Odonnell  has a past surgical history that includes Bladder suspension; Hysterectomy (1978); Tonsillectomy; Colonoscopy (N/A, 9/26/2015); Cataract extraction; and Neck mass excision (11/6/2019).    Medications:   Mena has a current medication list which includes the following prescription(s): amlodipine, aspirin, dexcom g6 sensor, dexcom g6 transmitter, cetirizine, contour next test strips, covid-19 vacc, mrna(pfizer)/pf, diabetic supplies, miscellan., diclofenac sodium, jardiance, freestyle jannet 14 day  sensor, fluocinolone acetonide oil, gabapentin, toujeo max u-300 solostar, insulin lispro, losartan-hydrochlorothiazide 100-25 mg, metformin, metoprolol succinate, montelukast, pravastatin, and semaglutide, and the following Facility-Administered Medications: sodium hyaluronate (euflexxa).    Allergies:   Review of patient's allergies indicates:   Allergen Reactions    Atorvastatin      Muscle pain     Crestor [rosuvastatin] Other (See Comments)     Leg Weakness.         Imaging, Impression:     No evidence of any hemodynamically significant stenosis.     Left-sided BERRY corresponding to moderate obstruction.      Surgery: None in R leg   Radiation:  none weeks  Chemotherapy: none   Previous Lymphedema Treatment: None   Prior Therapy: yes   Social History: Lives with daughter    Environmental barriers: no stairs in the home   Abuse/Neglect: Pt shows no visible signs of abuse/neglect   Nutritional status: Pt reports no nutritional needs    Educational needs: Pt reports no educational needs    Spiritual/Cultural: Pt reports no spiritual/ cultural needs   Fall risk: none    Occupation: Working:    Prior Level of Function: Independent   Current Level of Function: Independent   Gait: Uses a Rolator when she goes out,  Also uses a cane   Transfers:Independent    Bed Mobility: independent     Pain and Swelling:   Current 0/10, worst 10/10, best 0/10   Location: R leg   Description: cramps   Aggravating Factors: Movement and then resting   Easing Factors: topical spray, medicine     Pts goals: See the swelling go down     Objective     Pt arrives with rolator     Amount of Swelling/Location of Swelling: moderate swelling of the RLE  Skin Integrity:intact, no wounds    Palpation/Texture: soft fullness, some pitting on the R ankle   Positive R Stemmer Sign  B - Mic's Sign  Circulation: intact     Posture: forward head posture        Strength: functional screen of AROM against gravity and sit to stand  transfers       Sensation:  intact to lt touch B LE    Girth Measurements (in centimeters)          CMS Impairment/Limitation/Restriction for FOTO Edema Survey  Limitation: 37%    Therapist reviewed FOTO scores for Mena Odonnell on 4/4/2022.   FOTO documents entered into [employer] - see Media section.       TREATMENT     Total Treatment time separate from Evaluation: 20 minutes    Mena received therapeutic exercises to develop strength, endurance, ROM, flexibility and posture for 2 minutes including:  Aps, knee ROM, avoid dependency, avoid immobility, elevation, walking with compression, deep breathing, use of muscle pump to assist venous return    Mena received the following manual therapy techniques: Manual Lymphatic Drainage were applied to the: RLE for 3 minutes, including:  Education and training in compression needs.  Complete Decongestive Therapy components and management with goals and plan of care review.    Demo of Manual Lymphatic Drainage and short stretch compression bandaging.       Self Care/Home Management training for 15 minutes including:    Pt was educated in potential compression needs.  Demo of products including socks, garments, and Inelastic Velcro wraps.   Discussed cost/coverage and authorization per insurance with Durable Medical Equipment(DME) provider.  Compression require orders from referring provider and coverage or purchase of products from DME or self order.      Discussed wear schedule, don/doff, wash and management of products.  Size and compression class and AM/PM needs.    Consideration for tubigrip as form of temporary compression use until securing compression needs.   Consideration for shorts/tights or capris style compression to compliment lower leg compression to support size/shape of LE.   Product information provider.   Vendor list provided.    Informed insurance coverage of compression is per DME provider and typically Medicare and Medicare group plans may not cover cost  beyond pair of standard sized knee high garments.   Commitment to attendance as well as commitment to securing compression needs is critical to edema management.      Home Exercises and Patient Education Provided  Education provided:   - Lymph booklet   - Pt was educated in lymphedema etiology and management plans.  Pt was provided with written risk reductions and precautions for managing lymphedema.      This patient is in agreement to participate in Lymphedema treatment.    Written Home Exercises Provided: yes.  Exercises were reviewed and Mena was able to demonstrate them prior to the end of the session.  Mena demonstrated good  understanding of the education provided.     See EMR under Patient Instructions for exercises provided 4/4/2022.    Assessment   Mena is a 76 y.o. female referred to outpatient Physical Therapy with a medical diagnosis of I73.9 (ICD-10-CM) - Peripheral arterial disease   . This patient presents s/p PAD   resulting in: lymphedema of the RLE, increased pain, as well as difficulty performing walking  , compression needs, and placing the pt at higher risk of infection.     Pt presents with moderate swelling of the RLE with some pitting on the R ankle. Pt's skin in intact. Pt was educated on lymphedema causes and physiology, infection signs and symptoms, complete decongestive therapy and its components, and expectations for therapy. Pt was also educated on the need for some type of compression. Pt would benefit from therapy to decrease swelling, assist with getting compression, and preparing pt for home management       Pt prognosis is Good.   Pt will benefit from skilled outpatient Physical Therapy to address the deficits stated above and in the chart below, provide pt/family education, and to maximize pt's level of independence.     Plan of care discussed with patient: Yes  Pt's spiritual, cultural and educational needs considered and patient is agreeable to the plan of care and goals as  stated below:     Medical Necessity is demonstrated by the following  History  Co-morbidities and personal factors that may impact the plan of care Co-morbidities:   advanced age, diabetes, high BMI and HTN    Personal Factors:   no deficits     high   Examination  Body Structures and Functions, activity limitations and participation restrictions that may impact the plan of care Body Regions:   lower extremities    Body Systems:    gross symmetry  edema  skin integrity    Participation Restrictions:   none    Activity limitations:   Learning and applying knowledge  no deficits    General Tasks and Commands  no deficits    Communication  no deficits    Mobility  walking    Self care  no deficits    Domestic Life  no deficits    Interactions/Relationships  no deficits    Life Areas  no deficits    Community and Social Life  no deficits         moderate   Clinical Presentation evolving clinical presentation with changing clinical characteristics moderate   Decision Making/ Complexity Score: moderate     Anticipated Barriers for therapy: none    The following goals were discussed with the patient and patient is in agreement with them as to be addressed in the treatment plan.     Short Term Goals: (6 weeks)  1. Patient will show decreased girth in R LE by up to 1 cm to allow for LE symmetry, shoe and clothing choice, and ability to apply needed compression.  (progressing, not met)   2. Patient will demonstrate 100% knowledge of lymphedema precautions and signs of infection to allow for reduced lymphedema risk, infection risk, and/or exacerbation of condition.  (progressing, not met)  3. Patient or caregiver will perform self-bandaging techniques and/or wearing of compression garments to allow for lymphatic drainage support, skin elasticity, and reduction in shape and size of limb. (progressing, not met)  4. Patient will perform self lymph drainage techniques to areas within reach to enhance lymphatic drainage and skin  condition.  (progressing, not met)  5. Patient will tolerate daily activities with multilayered bandaging to allow for lymphatic and venous support.  (progressing, not met)    Long Term Goals: (12  weeks)  1. Patient will show decreased girth in R LE by up to 2 cm  to allow for LE symmetry, shoe and clothing choice, and ability to apply needed compression daily.  (progressing, not met)  2. Patient will show reduction in density to mild or less with improved contour of limb to allow for cosmesis, LE symmetry, infection risk reduction, and clothing and compression choice.   (progressing, not met)  3. Patient to terrence/doff compression garment with daily compliance to assist in lymphedema management, skin elasticity, and tissue density.  (progressing, not met)  4. Pt to show improved postural awareness and alignment.  (progressing, not met)  5. Pt to be I and compliant with HEP to allow for increased function in affected limb.   (progressing, not met)  Plan   Plan of care Certification: 4/4/2022 to 6/14/2022.    Outpatient Physical Therapy 2 times weekly for 10 weeks to include the following interventions: Gait Training, Manual Therapy, Neuromuscular Re-ed, Patient Education, Self Care, Therapeutic Activities and Therapeutic Exercise. Complete Decongestive Therapy- compression and home equipment needs to be addressed and assisted.    Pt may be seen by a PTA as part of the Rehab treatment team.  Plan of Care was discussed with Paola Mart, MARQUISE Medeiros, PT

## 2022-04-13 ENCOUNTER — TELEPHONE (OUTPATIENT)
Dept: ORTHOPEDICS | Facility: CLINIC | Age: 77
End: 2022-04-13
Payer: COMMERCIAL

## 2022-04-13 NOTE — TELEPHONE ENCOUNTER
Spoke to patient and scheduled an appt with Dr. Erickson   ----- Message from Bri Landaverde sent at 4/13/2022  9:29 AM CDT -----  Contact: Patient [phone]  .Name of Caller Patient    Reason for Visit/Symptoms Knee Injection  Best Contact Number or Confirm if Mychart Preferred [phone]  Preferred Date/Time of Appointment As soon as she can get in, after getting preapproved.  Interested in Virtual Visit (yes/no) No

## 2022-04-14 LAB
LEFT EYE DM RETINOPATHY: POSITIVE
RIGHT EYE DM RETINOPATHY: POSITIVE

## 2022-04-18 ENCOUNTER — OFFICE VISIT (OUTPATIENT)
Dept: ORTHOPEDICS | Facility: CLINIC | Age: 77
End: 2022-04-18
Attending: ORTHOPAEDIC SURGERY
Payer: COMMERCIAL

## 2022-04-18 VITALS
BODY MASS INDEX: 37.15 KG/M2 | HEART RATE: 86 BPM | OXYGEN SATURATION: 99 % | RESPIRATION RATE: 18 BRPM | SYSTOLIC BLOOD PRESSURE: 120 MMHG | WEIGHT: 223 LBS | HEIGHT: 65 IN | DIASTOLIC BLOOD PRESSURE: 76 MMHG

## 2022-04-18 DIAGNOSIS — M17.0 PRIMARY OSTEOARTHRITIS OF BOTH KNEES: Primary | ICD-10-CM

## 2022-04-18 DIAGNOSIS — M17.11 OSTEOARTHRITIS OF RIGHT KNEE, UNSPECIFIED OSTEOARTHRITIS TYPE: Primary | ICD-10-CM

## 2022-04-18 PROCEDURE — 99213 PR OFFICE/OUTPT VISIT, EST, LEVL III, 20-29 MIN: ICD-10-PCS | Mod: S$GLB,,, | Performed by: ORTHOPAEDIC SURGERY

## 2022-04-18 PROCEDURE — 1159F PR MEDICATION LIST DOCUMENTED IN MEDICAL RECORD: ICD-10-PCS | Mod: CPTII,S$GLB,, | Performed by: ORTHOPAEDIC SURGERY

## 2022-04-18 PROCEDURE — 1159F MED LIST DOCD IN RCRD: CPT | Mod: CPTII,S$GLB,, | Performed by: ORTHOPAEDIC SURGERY

## 2022-04-18 PROCEDURE — 99213 OFFICE O/P EST LOW 20 MIN: CPT | Mod: S$GLB,,, | Performed by: ORTHOPAEDIC SURGERY

## 2022-04-18 PROCEDURE — 1125F PR PAIN SEVERITY QUANTIFIED, PAIN PRESENT: ICD-10-PCS | Mod: CPTII,S$GLB,, | Performed by: ORTHOPAEDIC SURGERY

## 2022-04-18 PROCEDURE — 99999 PR PBB SHADOW E&M-EST. PATIENT-LVL V: ICD-10-PCS | Mod: PBBFAC,,, | Performed by: ORTHOPAEDIC SURGERY

## 2022-04-18 PROCEDURE — 3078F PR MOST RECENT DIASTOLIC BLOOD PRESSURE < 80 MM HG: ICD-10-PCS | Mod: CPTII,S$GLB,, | Performed by: ORTHOPAEDIC SURGERY

## 2022-04-18 PROCEDURE — 3044F HG A1C LEVEL LT 7.0%: CPT | Mod: CPTII,S$GLB,, | Performed by: ORTHOPAEDIC SURGERY

## 2022-04-18 PROCEDURE — 1125F AMNT PAIN NOTED PAIN PRSNT: CPT | Mod: CPTII,S$GLB,, | Performed by: ORTHOPAEDIC SURGERY

## 2022-04-18 PROCEDURE — 3078F DIAST BP <80 MM HG: CPT | Mod: CPTII,S$GLB,, | Performed by: ORTHOPAEDIC SURGERY

## 2022-04-18 PROCEDURE — 3074F PR MOST RECENT SYSTOLIC BLOOD PRESSURE < 130 MM HG: ICD-10-PCS | Mod: CPTII,S$GLB,, | Performed by: ORTHOPAEDIC SURGERY

## 2022-04-18 PROCEDURE — 3044F PR MOST RECENT HEMOGLOBIN A1C LEVEL <7.0%: ICD-10-PCS | Mod: CPTII,S$GLB,, | Performed by: ORTHOPAEDIC SURGERY

## 2022-04-18 PROCEDURE — 99999 PR PBB SHADOW E&M-EST. PATIENT-LVL V: CPT | Mod: PBBFAC,,, | Performed by: ORTHOPAEDIC SURGERY

## 2022-04-18 PROCEDURE — 3074F SYST BP LT 130 MM HG: CPT | Mod: CPTII,S$GLB,, | Performed by: ORTHOPAEDIC SURGERY

## 2022-04-18 NOTE — PROGRESS NOTES
NEW PATIENT ORTHOPAEDIC: Knee    PRIMARY CARE PHYSICIAN: Leonard Wells MD   REFERRING PROVIDER: Sumanth Erickson MD  [address] [facility]  [place]  [place] [address]     ASSESSMENT & PLAN:    Impression:  Bilateral Knee Severe Degenerative Osteoarthritis, Primary     Follow Up Plan: For Euflexxa    Non operative care:    Mena Odonnell has physical exam evidence of above and wishes to pursue an non-operative care. I am recommending the following: Euflexxa series, activity modification.  She would be an appropriate candidate for a right total knee replacement in the future should her symptoms warrant.  At present time her BMI is 37 and her last A1c was 6.7.  She was referred to vascular for intermittent claudication and peripheral vascular disease seen on x-ray .  There evaluation has referred her to lymphedema therapy which she initiates in June.  She is interested in repeat gel injections today.  She is not interested in surgical intervention at this time.  The gel injections the last about 6 months.  I think it would be reasonable to repeat do this see that she had such good result the 1st time.  She is also diabetic and despite her improvement in the A1 see bilateral knee injections would likely alter her good progress that she has been doing with regard to her diabetes management.     The patient has been ordered:  None    CONSULTS:   None    ACTIVE PROBLEM LIST  Patient Active Problem List   Diagnosis    Type 2 diabetes mellitus with diabetic polyneuropathy    Hyperlipidemia    HTN (hypertension)    Knee pain    H/O vitamin D deficiency    Non morbid obesity due to excess calories    Bilateral carotid bruits    Osteoarthritis of right knee    Morbid obesity with body mass index (BMI) of 40.0 or higher    Dyspnea on exertion    Chronic cough    MEKA (obstructive sleep apnea)    Sensorineural hearing loss (SNHL) of right ear with restricted hearing of left ear    Tinnitus of both ears    Allergic rhinitis     Dysfunction of both eustachian tubes    Nasal septal deviation    Chronic eczematous otitis externa of both ears    Neck abscess    Elevated blood pressure reading    Epidermal inclusion cyst    Polyneuropathy associated with underlying disease    Chronic pain of both knees    Decreased strength, endurance, and mobility    Difficulty walking    Pain and swelling of left knee    Pain and swelling of right knee    Weakness    Leg swelling           SUBJECTIVE    CHIEF COMPLAINT: Knee Pain    HPI:   Mena Odonnell is a 76 y.o. female here for evaluation and management of right knee pain. There is not a specific incident that brought about this pain. she has had progressive problems with the knee(s) starting 2 years ago but progressing to multiple times a day over the past 6 months which is interfering with activities which include: walking 2 blocks and standing for prolonged periods of time    Currently the pain in the joint is rated at 5 out of 10 with moderate activity.  The pain is intermittent and is located in the Right knee, at level of joint line, located medially and located laterally. The pain is described as aching and sharp. Relieving factors include ambulatory device and rest.       Mena Odonnell also complaints leg cramping on that side primarily in her posterior aspect of her knee and calf.  This typically improves proves with rest and over-the-counter topical spray.    Interval History 4/18/22: Injections wearing off. Saw vascular.     PROGRESSIVE SYMPTOMS:  Pain worsened by weight bearing    FUNCTIONAL STATUS:   Do light to moderate work around the house    PREVIOUS TREATMENTS:  Medical: Attempted Weight Loss, Steroid Injections and Biologic Injections  Physical Therapy: Use of Ambulatory Aid and Activities Modified   Previous Orthopaedic Surgery: None    REVIEW OF SYSTEMS:  PAIN ASSESSMENT:  See HPI.  MUSCULOSKELETAL: See HPI.  OTHER 10 point review of systems is negative except  as stated in HPI above    PAST MEDICAL HISTORY   has a past medical history of Cataract, Diabetes mellitus type II, Diabetes with neurologic complications, DM retinopathy, Hyperlipidemia, Hypertension, Obesity, Osteoarthritis, Peripheral neuropathy, Polyneuropathy, Severe obesity (BMI 35.0-39.9) with comorbidity (12/30/2016), Sleep apnea, and Tendonitis.     PAST SURGICAL HISTORY   has a past surgical history that includes Bladder suspension; Hysterectomy (1978); Tonsillectomy; Colonoscopy (N/A, 9/26/2015); Cataract extraction; and Neck mass excision (11/6/2019).     FAMILY HISTORY  family history includes Cataracts in her mother; Colon cancer in her maternal grandfather; Diabetes in her mother, sister, sister, and son; Hypertension in her brother, brother, daughter, mother, sister, sister, sister, and sister; Stroke in her mother; Thyroid disease in her maternal aunt, maternal aunt, mother, and sister.     SOCIAL HISTORY   reports that she quit smoking about 51 years ago. She has a 3.75 pack-year smoking history. She has never used smokeless tobacco. She reports that she does not drink alcohol and does not use drugs.     ALLERGIES   Review of patient's allergies indicates:   Allergen Reactions    Atorvastatin      Muscle pain     Crestor [rosuvastatin] Other (See Comments)     Leg Weakness.         MEDICATIONS  Current Outpatient Medications on File Prior to Visit   Medication Sig Dispense Refill    amLODIPine (NORVASC) 10 MG tablet Take 1 tablet (10 mg total) by mouth once daily. 90 tablet 1    aspirin 81 MG Chew Take 1 tablet (81 mg total) by mouth once daily. 30 tablet 0    blood-glucose sensor (DEXCOM G6 SENSOR) Sujey Change sensor every 10 days 3 each 11    blood-glucose transmitter (DEXCOM G6 TRANSMITTER) Sujey Change every 3 months 1 each 3    cetirizine (ZYRTEC) 10 MG tablet TAKE 1 TABLET BY MOUTH ONCE DAILY FOR 30 DAYS  3    CONTOUR NEXT TEST STRIPS Strp Check glucose before breakfast and dinner. 100  each 5    COVID-19 VACC,MRNA,PFIZER,,PF, IM COVID-19 vacc,mRNA(Pfizer)(PF)      diabetic supplies, miscellan. Kit Please dispense one 14 day FreeStyle Giulia Bryant 1 kit 0    diclofenac sodium (VOLTAREN) 1 % Gel Lower extremities: Apply the gel (4 g) to the affected area 4 times daily. Do not apply more than 16 g daily to any one affected joint of the lower extremities. Upper extremities: Apply the gel (2 g) to the affected area 4 times daily. Do not apply more than 8 g daily to any one affected joint of the upper extremities. Total dose should not exceed 32 g per day, overall affected joints. 100 g 5    empagliflozin (JARDIANCE) 10 mg tablet Take 1 tablet (10 mg total) by mouth once daily. 90 tablet 2    flash glucose sensor (FREESTYLE GIULIA 14 DAY SENSOR) Kit 1 sensor kit every 14 days (requires 2 kits per month) 2 kit 6    fluocinolone acetonide oil 0.01 % Drop Place 4 drops in ear(s) 2 (two) times daily. 20 mL 5    gabapentin (NEURONTIN) 300 MG capsule Take 1 capsule (300 mg total) by mouth 3 (three) times daily. 90 capsule 11    insulin glargine U-300 conc (TOUJEO MAX U-300 SOLOSTAR) 300 unit/mL (3 mL) insulin pen INJECT 50 UNITS SUBCUTANEOUSLY ONCE DAILY 2 pen 5    insulin lispro (HUMALOG KWIKPEN INSULIN) 100 unit/mL pen Inject 10 units before breakfast and 16 units before dinner 2 Box 5    losartan-hydrochlorothiazide 100-25 mg (HYZAAR) 100-25 mg per tablet Take 1 tablet by mouth once daily. 90 tablet 3    metFORMIN (GLUCOPHAGE-XR) 500 MG ER 24hr tablet Take 2 tablets (1,000 mg total) by mouth 2 (two) times daily with meals. 120 tablet 0    metoprolol succinate (TOPROL-XL) 50 MG 24 hr tablet Take 1 tablet (50 mg total) by mouth once daily. 30 tablet 5    montelukast (SINGULAIR) 10 mg tablet Take 1 tablet (10 mg total) by mouth once daily. 90 tablet 1    pravastatin (PRAVACHOL) 80 MG tablet Take 1 tablet (80 mg total) by mouth once daily. 90 tablet 3    semaglutide (OZEMPIC) 1 mg/dose (4 mg/3  "mL) Inject 1 mg into the skin every 7 days. 1 pen 5     Current Facility-Administered Medications on File Prior to Visit   Medication Dose Route Frequency Provider Last Rate Last Admin    sodium hyaluronate (EUFLEXXA) 10 mg/mL(mw 2.4 -3.6 million) injection 20 mg  20 mg Intra-articular 1 time in Clinic/HOD Maile Cuello MD              PHYSICAL EXAM   height is 5' 5" (1.651 m) and weight is 101.2 kg (223 lb). Her blood pressure is 120/76 and her pulse is 86. Her respiration is 18 and oxygen saturation is 99%.   Body mass index is 37.11 kg/m².      All other systems deferred.  GENERAL:  No acute distress  HABITUS: Obese  GAIT: Antalgic  SKIN: Normal     KNEE EXAM:    Bilateral:   Effusion: Minimal joint effusion  TTP: yes over Medial Joint Line and Lateral Joint Line   no crepitus with passive knee ROM  Passive ROM: Extension 0, Flexion 130  No pain with manipulation of patella  Stable to varus/valgus stress. No increased laxity to anterior/posterior drawer testing  negative Ngozi's test  No pain with IR/ER rotation of the hip  5/5 strength in knee flexion and extension, ankle plantarflexion and dorsiflexion  Neurovascular Status: Sensation intact to light touch in Sural, Saphenous, SPN, DPN, Tibial nerve distribution  2+ pulse DP/PT, normal capillary refill, foot has normal warmth    DATA:  Diagnostic tests reviewed for today's visit:     4v of the bilateral knee reveal Moderate degenerative changes of the Lateral and Patellofemoral compartment. There is evidence of advanced osteoarthritis changes with Subchondral sclerosis, Osteophyte formation and Joint space narrowing. The limb is in netural alignment. The patella is tracking midline and is in normal alignment. Vascular calcifications present. Kellegren-Lawerence grade 4 changes on the right, grade 3 on the left.     "

## 2022-04-19 ENCOUNTER — OFFICE VISIT (OUTPATIENT)
Dept: ENDOCRINOLOGY | Facility: CLINIC | Age: 77
End: 2022-04-19
Payer: COMMERCIAL

## 2022-04-19 VITALS
DIASTOLIC BLOOD PRESSURE: 70 MMHG | HEART RATE: 89 BPM | WEIGHT: 222 LBS | SYSTOLIC BLOOD PRESSURE: 153 MMHG | TEMPERATURE: 98 F | BODY MASS INDEX: 36.94 KG/M2

## 2022-04-19 DIAGNOSIS — E66.01 SEVERE OBESITY (BMI 35.0-39.9) WITH COMORBIDITY: ICD-10-CM

## 2022-04-19 DIAGNOSIS — Z79.4 TYPE 2 DIABETES MELLITUS WITH DIABETIC POLYNEUROPATHY, WITH LONG-TERM CURRENT USE OF INSULIN: Primary | ICD-10-CM

## 2022-04-19 DIAGNOSIS — E11.42 TYPE 2 DIABETES MELLITUS WITH DIABETIC POLYNEUROPATHY, WITH LONG-TERM CURRENT USE OF INSULIN: Primary | ICD-10-CM

## 2022-04-19 DIAGNOSIS — I10 ESSENTIAL HYPERTENSION: ICD-10-CM

## 2022-04-19 PROCEDURE — 3077F PR MOST RECENT SYSTOLIC BLOOD PRESSURE >= 140 MM HG: ICD-10-PCS | Mod: CPTII,S$GLB,, | Performed by: NURSE PRACTITIONER

## 2022-04-19 PROCEDURE — 3044F PR MOST RECENT HEMOGLOBIN A1C LEVEL <7.0%: ICD-10-PCS | Mod: CPTII,S$GLB,, | Performed by: NURSE PRACTITIONER

## 2022-04-19 PROCEDURE — 99214 PR OFFICE/OUTPT VISIT, EST, LEVL IV, 30-39 MIN: ICD-10-PCS | Mod: S$GLB,,, | Performed by: NURSE PRACTITIONER

## 2022-04-19 PROCEDURE — 3078F PR MOST RECENT DIASTOLIC BLOOD PRESSURE < 80 MM HG: ICD-10-PCS | Mod: CPTII,S$GLB,, | Performed by: NURSE PRACTITIONER

## 2022-04-19 PROCEDURE — 1126F AMNT PAIN NOTED NONE PRSNT: CPT | Mod: CPTII,S$GLB,, | Performed by: NURSE PRACTITIONER

## 2022-04-19 PROCEDURE — 95251 CONT GLUC MNTR ANALYSIS I&R: CPT | Mod: S$GLB,,, | Performed by: NURSE PRACTITIONER

## 2022-04-19 PROCEDURE — 3044F HG A1C LEVEL LT 7.0%: CPT | Mod: CPTII,S$GLB,, | Performed by: NURSE PRACTITIONER

## 2022-04-19 PROCEDURE — 1159F MED LIST DOCD IN RCRD: CPT | Mod: CPTII,S$GLB,, | Performed by: NURSE PRACTITIONER

## 2022-04-19 PROCEDURE — 1160F PR REVIEW ALL MEDS BY PRESCRIBER/CLIN PHARMACIST DOCUMENTED: ICD-10-PCS | Mod: CPTII,S$GLB,, | Performed by: NURSE PRACTITIONER

## 2022-04-19 PROCEDURE — 1160F RVW MEDS BY RX/DR IN RCRD: CPT | Mod: CPTII,S$GLB,, | Performed by: NURSE PRACTITIONER

## 2022-04-19 PROCEDURE — 3077F SYST BP >= 140 MM HG: CPT | Mod: CPTII,S$GLB,, | Performed by: NURSE PRACTITIONER

## 2022-04-19 PROCEDURE — 99999 PR PBB SHADOW E&M-EST. PATIENT-LVL V: ICD-10-PCS | Mod: PBBFAC,,, | Performed by: NURSE PRACTITIONER

## 2022-04-19 PROCEDURE — 95251 PR GLUCOSE MONITOR, 72 HOUR, PHYS INTERP: ICD-10-PCS | Mod: S$GLB,,, | Performed by: NURSE PRACTITIONER

## 2022-04-19 PROCEDURE — 1159F PR MEDICATION LIST DOCUMENTED IN MEDICAL RECORD: ICD-10-PCS | Mod: CPTII,S$GLB,, | Performed by: NURSE PRACTITIONER

## 2022-04-19 PROCEDURE — 99214 OFFICE O/P EST MOD 30 MIN: CPT | Mod: S$GLB,,, | Performed by: NURSE PRACTITIONER

## 2022-04-19 PROCEDURE — 99999 PR PBB SHADOW E&M-EST. PATIENT-LVL V: CPT | Mod: PBBFAC,,, | Performed by: NURSE PRACTITIONER

## 2022-04-19 PROCEDURE — 3078F DIAST BP <80 MM HG: CPT | Mod: CPTII,S$GLB,, | Performed by: NURSE PRACTITIONER

## 2022-04-19 PROCEDURE — 1126F PR PAIN SEVERITY QUANTIFIED, NO PAIN PRESENT: ICD-10-PCS | Mod: CPTII,S$GLB,, | Performed by: NURSE PRACTITIONER

## 2022-04-19 RX ORDER — INSULIN GLARGINE 300 U/ML
INJECTION, SOLUTION SUBCUTANEOUS
Qty: 2 PEN | Refills: 5 | Status: SHIPPED | OUTPATIENT
Start: 2022-04-19 | End: 2022-08-16 | Stop reason: SDUPTHER

## 2022-04-19 RX ORDER — METFORMIN HYDROCHLORIDE 500 MG/1
500 TABLET, EXTENDED RELEASE ORAL 2 TIMES DAILY WITH MEALS
Qty: 180 TABLET | Refills: 2 | Status: SHIPPED | OUTPATIENT
Start: 2022-04-19 | End: 2022-08-16 | Stop reason: SDUPTHER

## 2022-04-19 NOTE — PROGRESS NOTES
CC: This 76 y.o. Black or  female  is here for evaluation of  T2DM along with comorbidities indicated in the Visit Diagnosis section of this encounter.    HPI: Mena Odonnell was diagnosed with T2DM in ~ 1995. Experienced blurry vision at the time of diagnosis r/t BG >400. Started on orals. Began insulin in 2005. She is currently on MDI.      Prior visit 3/18/22  a1c is about the same, from 6.8% to now 6.7%.   Pt started Dexcom and enjoyed using but accidentally threw her transmitter away. She just got a new transmitter.   She has been taking Humalog 10 units before lunch and 16 units before supper, none for breakfast. She eats more carbs for lunch than breakfast.   Plan Diabetes appears to be well controlled based on A1c of 6.7%, lack of hypoglycemia, and CGM report from months ago.   She is open to starting metformin with goal of weaning off Humalog ac.   Week 1: Start metformin  mg tablet once daily with lunch. And stop Humalog before lunch. Continue 16 units before supper. So take 0-0-16 units before meals).   Week 2: After a week, if no upset stomach, then increase to 1 tablet with lunch and 1 tablet with supper. And reduce to 8 units before supper (so take 0-0-8 units before meals).   Week 3: After a week, if no upset stomach, then increase to 2 tablets with lunch and 1 tablet with supper. Stop taking Humalog before supper. You will not probably not need Humalog anymore.   Week 4: if your glucoses are still high, you may need to increase to 2 tablets with lunch and 2 tablets with supper.   Return to clinic in a month.       Interval history  She is now off Humalog x 2 weeks ago and taking metformin  mg twice daily. C/o loose BMs.   She would like to get down < 200 lb.         LAST DIABETES EDUCATION: 6/2019 mira Palmer - Found visit helpful   1/14/22 with ELENO Sanchez RD for Dexcom training     HOSPITALIZED FOR DIABETES -  No.    DIABETES MEDICATIONS:   Jardiance 10 mg once  daily in the AM  Ozempic 1 mg once weekly  Toujeo Max 50 units once daily in 6 am  Metformin  mg bid    Misses medication doses - no        DM COMPLICATIONS: peripheral neuropathy and peripheral vascular disease    SIGNIFICANT DIABETES MED HISTORY:   Metformin stopped December 2016 r/t GI upset and diarrhea     SELF MONITORING BLOOD GLUCOSE: Checks blood glucose at home  -4x/day with Dexcom CGM on reader.   CGM interpretation: BGs overall well controlled, minimal fluctuation. No hypoglycemia. Postprandial spikes noted sometimes after breakfast/lunch d/t high carb meals.       HYPOGLYCEMIC EPISODES:  Denies     CURRENT DIET:  drinking mostly water and sometimes juice - once a week.   Breakfast ~ 8 am; lunch ~ 12 pm. Dinner - 6 pm. Eats 3 meals/day.       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 No acute distress.   PSYCH: AAOx3, appropriate mood and affect, conversant, well-groomed. Judgement and insight good.   NEURO: Cranial nerves grossly intact. Speech clear, no tremor.   CHEST: Respirations even and unlabored.       Hemoglobin A1C   Date Value Ref Range Status   03/12/2022 6.7 (H) 4.0 - 5.6 % Final     Comment:     ADA Screening Guidelines:  5.7-6.4%  Consistent with prediabetes  >or=6.5%  Consistent with diabetes    High levels of fetal hemoglobin interfere with the HbA1C  assay. Heterozygous hemoglobin variants (HbS, HgC, etc)do  not significantly interfere with this assay.   However, presence of multiple variants may affect accuracy.     11/29/2021 6.8 (H) 4.0 - 5.6 % Final     Comment:     ADA Screening Guidelines:  5.7-6.4%  Consistent with prediabetes  >or=6.5%  Consistent with diabetes    High levels of fetal hemoglobin interfere with  the HbA1C  assay. Heterozygous hemoglobin variants (HbS, HgC, etc)do  not significantly interfere with this assay.   However, presence of multiple variants may affect accuracy.     08/24/2021 7.4 (H) 4.0 - 5.6 % Final     Comment:     ADA Screening Guidelines:  5.7-6.4%  Consistent with prediabetes  >or=6.5%  Consistent with diabetes    High levels of fetal hemoglobin interfere with the HbA1C  assay. Heterozygous hemoglobin variants (HbS, HgC, etc)do  not significantly interfere with this assay.   However, presence of multiple variants may affect accuracy.             Chemistry        Component Value Date/Time     02/26/2022 0818    K 3.4 (L) 02/26/2022 0818     02/26/2022 0818    CO2 25 02/26/2022 0818    BUN 14 02/26/2022 0818    CREATININE 0.6 02/26/2022 0818     (H) 02/26/2022 0818        Component Value Date/Time    CALCIUM 9.2 02/26/2022 0818    ALKPHOS 65 02/26/2022 0818    AST 13 02/26/2022 0818    ALT 15 02/26/2022 0818    BILITOT 0.3 02/26/2022 0818    ESTGFRAFRICA >60.0 02/26/2022 0818    EGFRNONAA >60.0 02/26/2022 0818          Lab Results   Component Value Date    LDLCALC 106.8 02/26/2022      Latest Reference Range & Units 02/26/22 08:18   Cholesterol 120 - 199 mg/dL 167 [1]   HDL 40 - 75 mg/dL 40 [2]   HDL/Cholesterol Ratio 20.0 - 50.0 % 24.0   LDL Cholesterol External 63.0 - 159.0 mg/dL 106.8 [3]   Non-HDL Cholesterol mg/dL 127 [4]   Total Cholesterol/HDL Ratio 2.0 - 5.0  4.2   Triglycerides 30 - 150 mg/dL 101 [5]       Lab Results   Component Value Date    MICALBCREAT 15.8 03/29/2022               ASSESSMENT and PLAN:    A1C GOAL: < 7%       1. Type 2 diabetes mellitus with diabetic polyneuropathy, with long-term current use of insulin  Call to make appointment with Dr. Jessica Rasheed in Bariatric Medicine for weight loss.     Offered to reduce metformin dose due to loose bowel movements. However, pt would like  to continue with current therapy. Can try Metamucil to help promote  regular BMs.   Schedule appt with diabetes educator, per pt's request.   Continue toujeo 50 units, Ozempic and jardiance.   Return to clinic in 3 months with labs prior.     Hemoglobin A1C   2. Severe obesity (BMI 35.0-39.9) with comorbidity  Ambulatory referral/consult to Bariatric Medicine   3. Essential hypertension  Suboptimal. Will reassess BP at next visit.                Patient requires a therapeutic CGM and is willing to use therapeutic CGM/SMBG for Necessary frequent adjustments in insulin therapy. I have assessed adherence to CGM regimen and to the plan. Due to COVID pandemic, patient requires Dexcom CGM to manage diabetes.         Orders Placed This Encounter   Procedures    Hemoglobin A1C     Standing Status:   Future     Standing Expiration Date:   6/18/2023    Ambulatory referral/consult to Bariatric Medicine     Standing Status:   Future     Standing Expiration Date:   5/19/2023     Referral Priority:   Routine     Referral Type:   Consultation     Referral Reason:   Specialty Services Required     Requested Specialty:   Bariatrics     Number of Visits Requested:   1        Follow up in about 3 months (around 7/19/2022).

## 2022-04-19 NOTE — PATIENT INSTRUCTIONS
Call to make appointment with Dr. Jessica Rasheed in Bariatric Medicine for weight loss.     Offered to reduce metformin dose due to loose bowel movements. However, pt would like  to continue with current therapy. Can try Metamucil to help promote regular BMs.     Continue toujeo 50 units, Ozempic and jardiance.   Return to clinic in 3 months with labs prior.

## 2022-04-20 ENCOUNTER — PATIENT OUTREACH (OUTPATIENT)
Dept: ADMINISTRATIVE | Facility: HOSPITAL | Age: 77
End: 2022-04-20
Payer: COMMERCIAL

## 2022-04-26 ENCOUNTER — PES CALL (OUTPATIENT)
Dept: ADMINISTRATIVE | Facility: CLINIC | Age: 77
End: 2022-04-26
Payer: COMMERCIAL

## 2022-04-29 DIAGNOSIS — E11.319 DIABETIC RETINOPATHY OF BOTH EYES ASSOCIATED WITH TYPE 2 DIABETES MELLITUS, MACULAR EDEMA PRESENCE UNSPECIFIED, UNSPECIFIED RETINOPATHY SEVERITY: Primary | ICD-10-CM

## 2022-05-03 ENCOUNTER — PATIENT OUTREACH (OUTPATIENT)
Dept: ADMINISTRATIVE | Facility: OTHER | Age: 77
End: 2022-05-03
Payer: COMMERCIAL

## 2022-05-03 NOTE — PROGRESS NOTES
Health Maintenance Due   Topic Date Due    COVID-19 Vaccine (4 - Booster for Pfizer series) 04/03/2022     Updates were requested from care everywhere.  Chart was reviewed for overdue Proactive [facility] Encounters ([facility]I) topics (CRS, Breast Cancer Screening, Eye exam)  Health Maintenance has been updated.  [facility] immunization registry triggered.  Immunizations were reconciled.

## 2022-05-09 ENCOUNTER — OFFICE VISIT (OUTPATIENT)
Dept: ORTHOPEDICS | Facility: CLINIC | Age: 77
End: 2022-05-09
Payer: COMMERCIAL

## 2022-05-09 VITALS
OXYGEN SATURATION: 99 % | HEIGHT: 65 IN | SYSTOLIC BLOOD PRESSURE: 138 MMHG | HEART RATE: 89 BPM | WEIGHT: 222 LBS | DIASTOLIC BLOOD PRESSURE: 82 MMHG | RESPIRATION RATE: 18 BRPM | BODY MASS INDEX: 36.99 KG/M2

## 2022-05-09 DIAGNOSIS — M17.11 OSTEOARTHRITIS OF RIGHT KNEE, UNSPECIFIED OSTEOARTHRITIS TYPE: Primary | ICD-10-CM

## 2022-05-09 DIAGNOSIS — M17.0 PRIMARY OSTEOARTHRITIS OF BOTH KNEES: ICD-10-CM

## 2022-05-09 PROCEDURE — 1159F PR MEDICATION LIST DOCUMENTED IN MEDICAL RECORD: ICD-10-PCS | Mod: CPTII,S$GLB,, | Performed by: ORTHOPAEDIC SURGERY

## 2022-05-09 PROCEDURE — 99499 NO LOS: ICD-10-PCS | Mod: S$GLB,,, | Performed by: ORTHOPAEDIC SURGERY

## 2022-05-09 PROCEDURE — 99499 UNLISTED E&M SERVICE: CPT | Mod: S$GLB,,, | Performed by: ORTHOPAEDIC SURGERY

## 2022-05-09 PROCEDURE — 99999 PR PBB SHADOW E&M-EST. PATIENT-LVL V: CPT | Mod: PBBFAC,,, | Performed by: ORTHOPAEDIC SURGERY

## 2022-05-09 PROCEDURE — 1159F MED LIST DOCD IN RCRD: CPT | Mod: CPTII,S$GLB,, | Performed by: ORTHOPAEDIC SURGERY

## 2022-05-09 PROCEDURE — 3079F DIAST BP 80-89 MM HG: CPT | Mod: CPTII,S$GLB,, | Performed by: ORTHOPAEDIC SURGERY

## 2022-05-09 PROCEDURE — 99999 PR PBB SHADOW E&M-EST. PATIENT-LVL V: ICD-10-PCS | Mod: PBBFAC,,, | Performed by: ORTHOPAEDIC SURGERY

## 2022-05-09 PROCEDURE — 3075F SYST BP GE 130 - 139MM HG: CPT | Mod: CPTII,S$GLB,, | Performed by: ORTHOPAEDIC SURGERY

## 2022-05-09 PROCEDURE — 20610 LARGE JOINT ASPIRATION/INJECTION: BILATERAL KNEE: ICD-10-PCS | Mod: 50,S$GLB,, | Performed by: ORTHOPAEDIC SURGERY

## 2022-05-09 PROCEDURE — 3044F HG A1C LEVEL LT 7.0%: CPT | Mod: CPTII,S$GLB,, | Performed by: ORTHOPAEDIC SURGERY

## 2022-05-09 PROCEDURE — 3044F PR MOST RECENT HEMOGLOBIN A1C LEVEL <7.0%: ICD-10-PCS | Mod: CPTII,S$GLB,, | Performed by: ORTHOPAEDIC SURGERY

## 2022-05-09 PROCEDURE — 20610 DRAIN/INJ JOINT/BURSA W/O US: CPT | Mod: 50,S$GLB,, | Performed by: ORTHOPAEDIC SURGERY

## 2022-05-09 PROCEDURE — 1125F PR PAIN SEVERITY QUANTIFIED, PAIN PRESENT: ICD-10-PCS | Mod: CPTII,S$GLB,, | Performed by: ORTHOPAEDIC SURGERY

## 2022-05-09 PROCEDURE — 3079F PR MOST RECENT DIASTOLIC BLOOD PRESSURE 80-89 MM HG: ICD-10-PCS | Mod: CPTII,S$GLB,, | Performed by: ORTHOPAEDIC SURGERY

## 2022-05-09 PROCEDURE — 1125F AMNT PAIN NOTED PAIN PRSNT: CPT | Mod: CPTII,S$GLB,, | Performed by: ORTHOPAEDIC SURGERY

## 2022-05-09 PROCEDURE — 3075F PR MOST RECENT SYSTOLIC BLOOD PRESS GE 130-139MM HG: ICD-10-PCS | Mod: CPTII,S$GLB,, | Performed by: ORTHOPAEDIC SURGERY

## 2022-05-09 NOTE — PROGRESS NOTES
Follow Up PATIENT ORTHOPAEDIC: Knee    PRIMARY CARE PHYSICIAN: Leonard Wells MD   REFERRING PROVIDER: Sumanth Erickson MD  [address] [facility]  [place] [address]     ASSESSMENT & PLAN:    Impression:  Bilateral Knee Severe Degenerative Osteoarthritis, Primary     Follow Up Plan: Euflexxa    Non operative care:    Mena Odonnell has physical exam evidence of above and wishes to pursue an non-operative care. I am recommending the following: Euflexxa series, activity modification.  She would be an appropriate candidate for a right total knee replacement in the future should her symptoms warrant.  At present time her BMI is 37 and her last A1c was 6.7.  She was referred to vascular for intermittent claudication and peripheral vascular disease seen on x-ray .  There evaluation has referred her to lymphedema therapy which she initiates in June.  She is interested in repeat gel injections today.  She is not interested in surgical intervention at this time.  The gel injections the last about 6 months.  I think it would be reasonable to repeat do this see that she had such good result the 1st time.  She is also diabetic and despite her improvement in the A1 see bilateral knee injections would likely alter her good progress that she has been doing with regard to her diabetes management.     Patient here for 1-3 euflexxa series    The patient has been ordered:  None    CONSULTS:   None    ACTIVE PROBLEM LIST  Patient Active Problem List   Diagnosis    Type 2 diabetes mellitus with diabetic polyneuropathy    Hyperlipidemia    HTN (hypertension)    Knee pain    H/O vitamin D deficiency    Non morbid obesity due to excess calories    Bilateral carotid bruits    Osteoarthritis of right knee    Morbid obesity with body mass index (BMI) of 40.0 or higher    Dyspnea on exertion    Chronic cough    MEKA (obstructive sleep apnea)    Sensorineural hearing loss (SNHL) of right ear with restricted hearing of left ear     Tinnitus of both ears    Allergic rhinitis    Dysfunction of both eustachian tubes    Nasal septal deviation    Chronic eczematous otitis externa of both ears    Neck abscess    Elevated blood pressure reading    Epidermal inclusion cyst    Polyneuropathy associated with underlying disease    Chronic pain of both knees    Decreased strength, endurance, and mobility    Difficulty walking    Pain and swelling of left knee    Pain and swelling of right knee    Weakness    Leg swelling           SUBJECTIVE    CHIEF COMPLAINT: Knee Pain    HPI:   Mena Odonnell is a 76 y.o. female here for evaluation and management of right knee pain. There is not a specific incident that brought about this pain. she has had progressive problems with the knee(s) starting 2 years ago but progressing to multiple times a day over the past 6 months which is interfering with activities which include: walking 2 blocks and standing for prolonged periods of time    Currently the pain in the joint is rated at 5 out of 10 with moderate activity.  The pain is intermittent and is located in the Right knee, at level of joint line, located medially and located laterally. The pain is described as aching and sharp. Relieving factors include ambulatory device and rest.       Mena Odonnell also complaints leg cramping on that side primarily in her posterior aspect of her knee and calf.  This typically improves proves with rest and over-the-counter topical spray.    Interval History 4/18/22: Injections wearing off. Saw vascular.   Interval History 5/9/22: Here for 1-3 Euflexxa series     PROGRESSIVE SYMPTOMS:  Pain worsened by weight bearing    FUNCTIONAL STATUS:   Do light to moderate work around the house    PREVIOUS TREATMENTS:  Medical: Attempted Weight Loss, Steroid Injections and Biologic Injections  Physical Therapy: Use of Ambulatory Aid and Activities Modified   Previous Orthopaedic Surgery: None    REVIEW OF SYSTEMS:  PAIN  ASSESSMENT:  See HPI.  MUSCULOSKELETAL: See HPI.  OTHER 10 point review of systems is negative except as stated in HPI above    PAST MEDICAL HISTORY   has a past medical history of Cataract, Diabetes mellitus type II, Diabetes with neurologic complications, DM retinopathy, Hyperlipidemia, Hypertension, Obesity, Osteoarthritis, Peripheral neuropathy, Polyneuropathy, Severe obesity (BMI 35.0-39.9) with comorbidity (12/30/2016), Sleep apnea, and Tendonitis.     PAST SURGICAL HISTORY   has a past surgical history that includes Bladder suspension; Hysterectomy (1978); Tonsillectomy; Colonoscopy (N/A, 9/26/2015); Cataract extraction; and Neck mass excision (11/6/2019).     FAMILY HISTORY  family history includes Cataracts in her mother; Colon cancer in her maternal grandfather; Diabetes in her mother, sister, sister, and son; Hypertension in her brother, brother, daughter, mother, sister, sister, sister, and sister; Stroke in her mother; Thyroid disease in her maternal aunt, maternal aunt, mother, and sister.     SOCIAL HISTORY   reports that she quit smoking about 51 years ago. She has a 3.75 pack-year smoking history. She has never used smokeless tobacco. She reports that she does not drink alcohol and does not use drugs.     ALLERGIES   Review of patient's allergies indicates:   Allergen Reactions    Atorvastatin      Muscle pain     Crestor [rosuvastatin] Other (See Comments)     Leg Weakness.         MEDICATIONS  Current Outpatient Medications on File Prior to Visit   Medication Sig Dispense Refill    amLODIPine (NORVASC) 10 MG tablet Take 1 tablet (10 mg total) by mouth once daily. 90 tablet 1    aspirin 81 MG Chew Take 1 tablet (81 mg total) by mouth once daily. 30 tablet 0    blood-glucose sensor (DEXCOM G6 SENSOR) Sujey Change sensor every 10 days 3 each 11    blood-glucose transmitter (DEXCOM G6 TRANSMITTER) Sujey Change every 3 months 1 each 3    cetirizine (ZYRTEC) 10 MG tablet TAKE 1 TABLET BY MOUTH ONCE  DAILY FOR 30 DAYS  3    CONTOUR NEXT TEST STRIPS Strp Check glucose before breakfast and dinner. 100 each 5    COVID-19 VACC,MRNA,PFIZER,,PF, IM COVID-19 vacc,mRNA(Pfizer)(PF)      diabetic supplies, miscellan. Kit Please dispense one 14 day FreeStyle Giulia Cypress 1 kit 0    diclofenac sodium (VOLTAREN) 1 % Gel Lower extremities: Apply the gel (4 g) to the affected area 4 times daily. Do not apply more than 16 g daily to any one affected joint of the lower extremities. Upper extremities: Apply the gel (2 g) to the affected area 4 times daily. Do not apply more than 8 g daily to any one affected joint of the upper extremities. Total dose should not exceed 32 g per day, overall affected joints. 100 g 5    empagliflozin (JARDIANCE) 10 mg tablet Take 1 tablet (10 mg total) by mouth once daily. 90 tablet 2    flash glucose sensor (FREESTYLE GIULIA 14 DAY SENSOR) Kit 1 sensor kit every 14 days (requires 2 kits per month) 2 kit 6    fluocinolone acetonide oil 0.01 % Drop Place 4 drops in ear(s) 2 (two) times daily. 20 mL 5    gabapentin (NEURONTIN) 300 MG capsule Take 1 capsule (300 mg total) by mouth 3 (three) times daily. 90 capsule 11    insulin glargine U-300 conc (TOUJEO MAX U-300 SOLOSTAR) 300 unit/mL (3 mL) insulin pen INJECT 50 UNITS SUBCUTANEOUSLY ONCE DAILY 2 pen 5    losartan-hydrochlorothiazide 100-25 mg (HYZAAR) 100-25 mg per tablet Take 1 tablet by mouth once daily. 90 tablet 3    metFORMIN (GLUCOPHAGE-XR) 500 MG ER 24hr tablet Take 1 tablet (500 mg total) by mouth 2 (two) times daily with meals. 180 tablet 2    metoprolol succinate (TOPROL-XL) 50 MG 24 hr tablet Take 1 tablet (50 mg total) by mouth once daily. 30 tablet 5    montelukast (SINGULAIR) 10 mg tablet Take 1 tablet (10 mg total) by mouth once daily. 90 tablet 1    pravastatin (PRAVACHOL) 80 MG tablet Take 1 tablet (80 mg total) by mouth once daily. 90 tablet 3    semaglutide (OZEMPIC) 1 mg/dose (4 mg/3 mL) Inject 1 mg into the skin  "every 7 days. 1 pen 5     Current Facility-Administered Medications on File Prior to Visit   Medication Dose Route Frequency Provider Last Rate Last Admin    sodium hyaluronate (EUFLEXXA) 10 mg/mL(mw 2.4 -3.6 million) injection 20 mg  20 mg Intra-articular 1 time in Clinic/HOD Maile Cuello MD        sodium hyaluronate (EUFLEXXA) 10 mg/mL(mw 2.4 -3.6 million) injection 20 mg  20 mg Intra-articular 1 time in Clinic/HOD Sumanth Erickson MD              PHYSICAL EXAM   height is 5' 5" (1.651 m) and weight is 100.7 kg (222 lb). Her blood pressure is 138/82 and her pulse is 89. Her respiration is 18 and oxygen saturation is 99%.   Body mass index is 36.94 kg/m².      All other systems deferred.  GENERAL:  No acute distress  HABITUS: Obese  GAIT: Antalgic  SKIN: Normal     KNEE EXAM:    Bilateral:   Effusion: Minimal joint effusion  TTP: yes over Medial Joint Line and Lateral Joint Line   no crepitus with passive knee ROM  Passive ROM: Extension 0, Flexion 130  No pain with manipulation of patella  Stable to varus/valgus stress. No increased laxity to anterior/posterior drawer testing  negative Ngozi's test  No pain with IR/ER rotation of the hip  5/5 strength in knee flexion and extension, ankle plantarflexion and dorsiflexion  Neurovascular Status: Sensation intact to light touch in Sural, Saphenous, SPN, DPN, Tibial nerve distribution  2+ pulse DP/PT, normal capillary refill, foot has normal warmth    DATA:  Diagnostic tests reviewed for today's visit:     4v of the bilateral knee reveal Moderate degenerative changes of the Lateral and Patellofemoral compartment. There is evidence of advanced osteoarthritis changes with Subchondral sclerosis, Osteophyte formation and Joint space narrowing. The limb is in netural alignment. The patella is tracking midline and is in normal alignment. Vascular calcifications present. Kellegren-Lawerence grade 4 changes on the right, grade 3 on the left.     "

## 2022-05-09 NOTE — PROCEDURES
Large Joint Aspiration/Injection: bilateral knee    Date/Time: 5/9/2022 2:00 PM  Performed by: Sumanth Erickson MD  Authorized by: Sumanth Erickson MD     Consent Done?:  Yes (Verbal)  Indications:  Arthritis  Site marked: the procedure site was marked    Timeout: prior to procedure the correct patient, procedure, and site was verified    Prep: patient was prepped and draped in usual sterile fashion      Local anesthesia used?: Yes    Local anesthetic:  Topical anesthetic    Details:  Needle Size:  22 G  Approach:  Anterolateral  Location:  Knee  Laterality:  Bilateral  Site:  Bilateral knee  Medications (Right):  10 mg sodium hyaluronate (EUFLEXXA) 10 mg/mL(mw 2.4 -3.6 million)  Medications (Left):  10 mg sodium hyaluronate (EUFLEXXA) 10 mg/mL(mw 2.4 -3.6 million)  Patient tolerance:  Patient tolerated the procedure well with no immediate complications

## 2022-05-15 ENCOUNTER — TELEPHONE (OUTPATIENT)
Dept: ORTHOPEDICS | Facility: CLINIC | Age: 77
End: 2022-05-15
Payer: COMMERCIAL

## 2022-05-19 ENCOUNTER — HOSPITAL ENCOUNTER (OUTPATIENT)
Dept: CARDIOLOGY | Facility: HOSPITAL | Age: 77
Discharge: HOME | End: 2022-05-19
Attending: SURGERY
Payer: COMMERCIAL

## 2022-05-19 ENCOUNTER — OFFICE VISIT (OUTPATIENT)
Dept: VASCULAR SURGERY | Facility: CLINIC | Age: 77
End: 2022-05-19
Payer: COMMERCIAL

## 2022-05-19 ENCOUNTER — OFFICE VISIT (OUTPATIENT)
Dept: ORTHOPEDICS | Facility: CLINIC | Age: 77
End: 2022-05-19
Payer: COMMERCIAL

## 2022-05-19 VITALS
BODY MASS INDEX: 36.16 KG/M2 | HEART RATE: 86 BPM | WEIGHT: 225 LBS | SYSTOLIC BLOOD PRESSURE: 120 MMHG | DIASTOLIC BLOOD PRESSURE: 77 MMHG | OXYGEN SATURATION: 99 % | HEIGHT: 66 IN | RESPIRATION RATE: 15 BRPM

## 2022-05-19 VITALS
DIASTOLIC BLOOD PRESSURE: 62 MMHG | BODY MASS INDEX: 37.49 KG/M2 | SYSTOLIC BLOOD PRESSURE: 142 MMHG | WEIGHT: 225.31 LBS

## 2022-05-19 DIAGNOSIS — I73.9 PERIPHERAL ARTERIAL DISEASE: ICD-10-CM

## 2022-05-19 DIAGNOSIS — I70.203 ATHEROSCLEROSIS OF NATIVE ARTERY OF BOTH LOWER EXTREMITIES, WITH UNSPECIFIED PRESENCE OF CLINICAL MANIFESTATION: Primary | ICD-10-CM

## 2022-05-19 DIAGNOSIS — I89.0 LYMPHEDEMA OF BOTH LOWER EXTREMITIES: ICD-10-CM

## 2022-05-19 DIAGNOSIS — M17.0 PRIMARY OSTEOARTHRITIS OF BOTH KNEES: Primary | ICD-10-CM

## 2022-05-19 PROCEDURE — 99999 PR PBB SHADOW E&M-EST. PATIENT-LVL III: ICD-10-PCS | Mod: PBBFAC,,, | Performed by: SURGERY

## 2022-05-19 PROCEDURE — 3077F SYST BP >= 140 MM HG: CPT | Mod: CPTII,S$GLB,, | Performed by: SURGERY

## 2022-05-19 PROCEDURE — 1101F PT FALLS ASSESS-DOCD LE1/YR: CPT | Mod: CPTII,S$GLB,, | Performed by: SURGERY

## 2022-05-19 PROCEDURE — 99999 PR PBB SHADOW E&M-EST. PATIENT-LVL V: CPT | Mod: PBBFAC,,, | Performed by: ORTHOPAEDIC SURGERY

## 2022-05-19 PROCEDURE — 1159F PR MEDICATION LIST DOCUMENTED IN MEDICAL RECORD: ICD-10-PCS | Mod: CPTII,S$GLB,, | Performed by: SURGERY

## 2022-05-19 PROCEDURE — 3078F DIAST BP <80 MM HG: CPT | Mod: CPTII,S$GLB,, | Performed by: SURGERY

## 2022-05-19 PROCEDURE — 99499 UNLISTED E&M SERVICE: CPT | Mod: S$GLB,,, | Performed by: ORTHOPAEDIC SURGERY

## 2022-05-19 PROCEDURE — 93998 UNLISTD NONINVAS VASC DX STD: CPT

## 2022-05-19 PROCEDURE — 3074F PR MOST RECENT SYSTOLIC BLOOD PRESSURE < 130 MM HG: ICD-10-PCS | Mod: CPTII,S$GLB,, | Performed by: ORTHOPAEDIC SURGERY

## 2022-05-19 PROCEDURE — 3078F PR MOST RECENT DIASTOLIC BLOOD PRESSURE < 80 MM HG: ICD-10-PCS | Mod: CPTII,S$GLB,, | Performed by: ORTHOPAEDIC SURGERY

## 2022-05-19 PROCEDURE — 1125F PR PAIN SEVERITY QUANTIFIED, PAIN PRESENT: ICD-10-PCS | Mod: CPTII,S$GLB,, | Performed by: ORTHOPAEDIC SURGERY

## 2022-05-19 PROCEDURE — 3044F PR MOST RECENT HEMOGLOBIN A1C LEVEL <7.0%: ICD-10-PCS | Mod: CPTII,S$GLB,, | Performed by: SURGERY

## 2022-05-19 PROCEDURE — 3078F PR MOST RECENT DIASTOLIC BLOOD PRESSURE < 80 MM HG: ICD-10-PCS | Mod: CPTII,S$GLB,, | Performed by: SURGERY

## 2022-05-19 PROCEDURE — 3044F HG A1C LEVEL LT 7.0%: CPT | Mod: CPTII,S$GLB,, | Performed by: ORTHOPAEDIC SURGERY

## 2022-05-19 PROCEDURE — 3288F PR FALLS RISK ASSESSMENT DOCUMENTED: ICD-10-PCS | Mod: CPTII,S$GLB,, | Performed by: SURGERY

## 2022-05-19 PROCEDURE — 3044F PR MOST RECENT HEMOGLOBIN A1C LEVEL <7.0%: ICD-10-PCS | Mod: CPTII,S$GLB,, | Performed by: ORTHOPAEDIC SURGERY

## 2022-05-19 PROCEDURE — 1125F PR PAIN SEVERITY QUANTIFIED, PAIN PRESENT: ICD-10-PCS | Mod: CPTII,S$GLB,, | Performed by: SURGERY

## 2022-05-19 PROCEDURE — 99214 PR OFFICE/OUTPT VISIT, EST, LEVL IV, 30-39 MIN: ICD-10-PCS | Mod: S$GLB,,, | Performed by: SURGERY

## 2022-05-19 PROCEDURE — 93970 EXTREMITY STUDY: CPT | Mod: TC

## 2022-05-19 PROCEDURE — 93970 EXTREMITY STUDY: CPT | Mod: 26,,, | Performed by: SURGERY

## 2022-05-19 PROCEDURE — 1125F AMNT PAIN NOTED PAIN PRSNT: CPT | Mod: CPTII,S$GLB,, | Performed by: ORTHOPAEDIC SURGERY

## 2022-05-19 PROCEDURE — 99999 PR PBB SHADOW E&M-EST. PATIENT-LVL III: CPT | Mod: PBBFAC,,, | Performed by: SURGERY

## 2022-05-19 PROCEDURE — 99499 NO LOS: ICD-10-PCS | Mod: S$GLB,,, | Performed by: ORTHOPAEDIC SURGERY

## 2022-05-19 PROCEDURE — 20610 LARGE JOINT ASPIRATION/INJECTION: BILATERAL KNEE: ICD-10-PCS | Mod: 50,S$GLB,, | Performed by: ORTHOPAEDIC SURGERY

## 2022-05-19 PROCEDURE — 3288F FALL RISK ASSESSMENT DOCD: CPT | Mod: CPTII,S$GLB,, | Performed by: SURGERY

## 2022-05-19 PROCEDURE — 93970 CV US LOWER VENOUS INSUFFICIENCY BILATERAL (CUPID ONLY): ICD-10-PCS | Mod: 26,,, | Performed by: SURGERY

## 2022-05-19 PROCEDURE — 3074F SYST BP LT 130 MM HG: CPT | Mod: CPTII,S$GLB,, | Performed by: ORTHOPAEDIC SURGERY

## 2022-05-19 PROCEDURE — 3078F DIAST BP <80 MM HG: CPT | Mod: CPTII,S$GLB,, | Performed by: ORTHOPAEDIC SURGERY

## 2022-05-19 PROCEDURE — 93998 CV US TOE PRESSURES ONLY (CUPID ONLY): ICD-10-PCS | Mod: ,,, | Performed by: SURGERY

## 2022-05-19 PROCEDURE — 3077F PR MOST RECENT SYSTOLIC BLOOD PRESSURE >= 140 MM HG: ICD-10-PCS | Mod: CPTII,S$GLB,, | Performed by: SURGERY

## 2022-05-19 PROCEDURE — 3044F HG A1C LEVEL LT 7.0%: CPT | Mod: CPTII,S$GLB,, | Performed by: SURGERY

## 2022-05-19 PROCEDURE — 1159F MED LIST DOCD IN RCRD: CPT | Mod: CPTII,S$GLB,, | Performed by: SURGERY

## 2022-05-19 PROCEDURE — 93998 UNLISTD NONINVAS VASC DX STD: CPT | Mod: ,,, | Performed by: SURGERY

## 2022-05-19 PROCEDURE — 99214 OFFICE O/P EST MOD 30 MIN: CPT | Mod: S$GLB,,, | Performed by: SURGERY

## 2022-05-19 PROCEDURE — 20610 DRAIN/INJ JOINT/BURSA W/O US: CPT | Mod: 50,S$GLB,, | Performed by: ORTHOPAEDIC SURGERY

## 2022-05-19 PROCEDURE — 1159F MED LIST DOCD IN RCRD: CPT | Mod: CPTII,S$GLB,, | Performed by: ORTHOPAEDIC SURGERY

## 2022-05-19 PROCEDURE — 1101F PR PT FALLS ASSESS DOC 0-1 FALLS W/OUT INJ PAST YR: ICD-10-PCS | Mod: CPTII,S$GLB,, | Performed by: SURGERY

## 2022-05-19 PROCEDURE — 99999 PR PBB SHADOW E&M-EST. PATIENT-LVL V: ICD-10-PCS | Mod: PBBFAC,,, | Performed by: ORTHOPAEDIC SURGERY

## 2022-05-19 PROCEDURE — 1125F AMNT PAIN NOTED PAIN PRSNT: CPT | Mod: CPTII,S$GLB,, | Performed by: SURGERY

## 2022-05-19 PROCEDURE — 1159F PR MEDICATION LIST DOCUMENTED IN MEDICAL RECORD: ICD-10-PCS | Mod: CPTII,S$GLB,, | Performed by: ORTHOPAEDIC SURGERY

## 2022-05-19 NOTE — PROGRESS NOTES
Dre Blair MD VI                       [facility]                         05/19/2022    HPI:  Mena Odonnell is a 76 y.o. female with   Patient Active Problem List   Diagnosis    Type 2 diabetes mellitus with diabetic polyneuropathy    Hyperlipidemia    HTN (hypertension)    Knee pain    H/O vitamin D deficiency    Non morbid obesity due to excess calories    Bilateral carotid bruits    Osteoarthritis of right knee    Morbid obesity with body mass index (BMI) of 40.0 or higher    Dyspnea on exertion    Chronic cough    MEKA (obstructive sleep apnea)    Sensorineural hearing loss (SNHL) of right ear with restricted hearing of left ear    Tinnitus of both ears    Allergic rhinitis    Dysfunction of both eustachian tubes    Nasal septal deviation    Chronic eczematous otitis externa of both ears    Neck abscess    Elevated blood pressure reading    Epidermal inclusion cyst    Polyneuropathy associated with underlying disease    Chronic pain of both knees    Decreased strength, endurance, and mobility    Difficulty walking    Pain and swelling of left knee    Pain and swelling of right knee    Weakness    Leg swelling    being managed by PCP and specialists who is here today for evaluation of PVD.  Patient has no complaints of claudication.  Patient states location is RLE occurring for months.  Associated signs and symptoms include edema.  Quality is burning and severity is 6/10.  Symptoms began months ago.  Alleviating factors include elevation.  Worsening factors include dependency.  no rest pain.  no tissue loss.  Patient is diabetic.  Is not on Pletal.  no previous lower extremity interventions.    no MI  no Stroke  Tobacco use: denies  Daily Aspirin: yes  Anticoagulation: no    5/2022:    Past Medical History:   Diagnosis Date    Cataract     Diabetes mellitus type II     Diabetes with neurologic complications     DM retinopathy      Hyperlipidemia     Hypertension     Obesity     Osteoarthritis     Peripheral neuropathy     Polyneuropathy     Severe obesity (BMI 35.0-39.9) with comorbidity 2016    Sleep apnea     Tendonitis     left foot     Past Surgical History:   Procedure Laterality Date    BLADDER SUSPENSION      CATARACT EXTRACTION      COLONOSCOPY N/A 2015    Procedure: COLONOSCOPY;  Surgeon: Javed Wood MD;  [facility] (4TH F[facility]);  Service: Endoscopy;  Laterality: N/A;    HYSTERECTOMY  1978    fibroids    NECK MASS EXCISION  2019    Procedure: EXCISION, MASS, NECK;  Surgeon: Edwin Barrow MD;  [facility]: [facility];  Service: General;;  RN PREOP 10/30/2019    TONSILLECTOMY       Family History   Problem Relation Age of Onset    Thyroid disease Mother     Cataracts Mother     Diabetes Mother     Stroke Mother     Hypertension Mother     Thyroid disease Sister     Diabetes Sister     Hypertension Sister     Hypertension Daughter     Diabetes Son     Diabetes Sister     Hypertension Sister     Hypertension Brother     Hypertension Sister     Hypertension Sister     Hypertension Brother     Thyroid disease Maternal Aunt     Thyroid disease Maternal Aunt     Colon cancer Maternal Grandfather     Breast cancer Neg Hx     Ovarian cancer Neg Hx     Amblyopia Neg Hx     Blindness Neg Hx     Cancer Neg Hx     Glaucoma Neg Hx     Macular degeneration Neg Hx     Retinal detachment Neg Hx     Strabismus Neg Hx      Social History     Socioeconomic History    Marital status:     Number of children: 2   Occupational History     Employer: [employer]   Tobacco Use    Smoking status: Former Smoker     Packs/day: 0.25     Years: 15.00     Pack years: 3.75     Quit date:      Years since quittin.4    Smokeless tobacco: Never Used   Substance and Sexual Activity    Alcohol use: No    Drug use: No    Sexual activity: Not Currently     Partners: Male      Birth control/protection: Abstinence   Social History Narrative    . One daughter. Works as an Apartment        Current Outpatient Medications:     amLODIPine (NORVASC) 10 MG tablet, Take 1 tablet (10 mg total) by mouth once daily., Disp: 90 tablet, Rfl: 1    aspirin 81 MG Chew, Take 1 tablet (81 mg total) by mouth once daily., Disp: 30 tablet, Rfl: 0    blood-glucose sensor (DEXCOM G6 SENSOR) Sujey, Change sensor every 10 days, Disp: 3 each, Rfl: 11    blood-glucose transmitter (DEXCOM G6 TRANSMITTER) Sujey, Change every 3 months, Disp: 1 each, Rfl: 3    cetirizine (ZYRTEC) 10 MG tablet, TAKE 1 TABLET BY MOUTH ONCE DAILY FOR 30 DAYS, Disp: , Rfl: 3    CONTOUR NEXT TEST STRIPS Strp, Check glucose before breakfast and dinner., Disp: 100 each, Rfl: 5    COVID-19 VACC,MRNA,PFIZER,,PF, IM, COVID-19 vacc,mRNA(Pfizer)(PF), Disp: , Rfl:     diabetic supplies, miscellan. Kit, Please dispense one 14 day FreeStyle Giulia Dowling, Disp: 1 kit, Rfl: 0    diclofenac sodium (VOLTAREN) 1 % Gel, Lower extremities: Apply the gel (4 g) to the affected area 4 times daily. Do not apply more than 16 g daily to any one affected joint of the lower extremities. Upper extremities: Apply the gel (2 g) to the affected area 4 times daily. Do not apply more than 8 g daily to any one affected joint of the upper extremities. Total dose should not exceed 32 g per day, overall affected joints., Disp: 100 g, Rfl: 5    empagliflozin (JARDIANCE) 10 mg tablet, Take 1 tablet (10 mg total) by mouth once daily., Disp: 90 tablet, Rfl: 2    flash glucose sensor (FREESTYLE GIULIA 14 DAY SENSOR) Kit, 1 sensor kit every 14 days (requires 2 kits per month), Disp: 2 kit, Rfl: 6    fluocinolone acetonide oil 0.01 % Drop, Place 4 drops in ear(s) 2 (two) times daily., Disp: 20 mL, Rfl: 5    gabapentin (NEURONTIN) 300 MG capsule, Take 1 capsule (300 mg total) by mouth 3 (three) times daily., Disp: 90 capsule, Rfl: 11    insulin  glargine U-300 conc (TOUJEO MAX U-300 SOLOSTAR) 300 unit/mL (3 mL) insulin pen, INJECT 50 UNITS SUBCUTANEOUSLY ONCE DAILY, Disp: 2 pen, Rfl: 5    losartan-hydrochlorothiazide 100-25 mg (HYZAAR) 100-25 mg per tablet, Take 1 tablet by mouth once daily., Disp: 90 tablet, Rfl: 3    metFORMIN (GLUCOPHAGE-XR) 500 MG ER 24hr tablet, Take 1 tablet (500 mg total) by mouth 2 (two) times daily with meals., Disp: 180 tablet, Rfl: 2    metoprolol succinate (TOPROL-XL) 50 MG 24 hr tablet, Take 1 tablet (50 mg total) by mouth once daily., Disp: 30 tablet, Rfl: 5    montelukast (SINGULAIR) 10 mg tablet, Take 1 tablet (10 mg total) by mouth once daily., Disp: 90 tablet, Rfl: 1    pravastatin (PRAVACHOL) 80 MG tablet, Take 1 tablet (80 mg total) by mouth once daily., Disp: 90 tablet, Rfl: 3    semaglutide (OZEMPIC) 1 mg/dose (4 mg/3 mL), Inject 1 mg into the skin every 7 days., Disp: 1 pen, Rfl: 5    Current Facility-Administered Medications:     sodium hyaluronate (EUFLEXXA) 10 mg/mL(mw 2.4 -3.6 million) injection 20 mg, 20 mg, Intra-articular, 1 time in Clinic/HOD, Maile Cuello MD    sodium hyaluronate (EUFLEXXA) 10 mg/mL(mw 2.4 -3.6 million) injection 20 mg, 20 mg, Intra-articular, 1 time in Clinic/HOD, Sumanth Erickson MD    REVIEW OF SYSTEMS:  General: No fevers or chills; ENT: No sore throat; Allergy and Immunology: no persistent infections; Hematological and Lymphatic: No history of bleeding or easy bruising; Endocrine: negative; Respiratory: no cough, shortness of breath, or wheezing; Cardiovascular: no chest pain or dyspnea on exertion; no claudication, no rest pain; Gastrointestinal: no abdominal pain/back, change in bowel habits, or bloody stools; Genito-Urinary: no dysuria, trouble voiding, or hematuria; Musculoskeletal: negative, no wound; Neurological: no TIA or stroke symptoms; Psychiatric: no nervousness, anxiety or depression.    PHYSICAL EXAM:                General appearance:  Alert, well-appearing,  and in no distress.  Oriented to person, place, and time                    Neurological: Normal speech, no focal findings noted; CN II - XII grossly intact. RLE with sensation to light touch, LLE with sensation to light touch.            Musculoskeletal: Digits/nail without cyanosis/clubbing.  Strength 5/5 BLE.                    Neck: Supple, no significant adenopathy                  Chest:  Clear to auscultation, no wheezes, rales or rhonchi, symmetric air entry. No use of accessory muscles               Cardiac: Normal rate and regular rhythm, S1 and S2 normal            Abdomen: Soft, nontender, nondistended, no masses or organomegaly, no hernia     No rebound tenderness noted; bowel sounds normal     Pulsatile aortic mass is non palpable.     No groin adenopathy      Extremities:     2+ R DP pulse, 2+ L DP pulse     2+ RLE edema, 1+ LLE edema    Skin: RLE no tissue loss; LLE no tissue loss    LAB RESULTS:  No results found for: CBC  Lab Results   Component Value Date    LABPROT 10.5 06/20/2013    INR 1.0 06/20/2013     Lab Results   Component Value Date     02/26/2022    K 3.4 (L) 02/26/2022     02/26/2022    CO2 25 02/26/2022     (H) 02/26/2022    BUN 14 02/26/2022    CREATININE 0.6 02/26/2022    CALCIUM 9.2 02/26/2022    ANIONGAP 12 02/26/2022    EGFRNONAA >60.0 02/26/2022     Lab Results   Component Value Date    WBC 7.07 12/07/2019    RBC 4.52 12/07/2019    HGB 13.4 12/07/2019    HCT 43.3 12/07/2019    MCV 96 12/07/2019    MCH 29.6 12/07/2019    MCHC 30.9 (L) 12/07/2019    RDW 14.5 12/07/2019     12/07/2019    MPV 12.2 12/07/2019    GRAN 3.4 12/07/2019    GRAN 47.3 12/07/2019    LYMPH 2.8 12/07/2019    LYMPH 39.2 12/07/2019    MONO 0.6 12/07/2019    MONO 8.8 12/07/2019    EOS 0.2 12/07/2019    BASO 0.07 12/07/2019    EOSINOPHIL 3.4 12/07/2019    BASOPHIL 1.0 12/07/2019    DIFFMETHOD Automated 12/07/2019     .  Lab Results   Component Value Date    HGBA1C 6.7 (H) 03/12/2022        IMAGING:  All pertinent imaging has been reviewed and interpreted independently.    BERRY 1/0.65    Arterial US BLE Right lower extremity arterial ultrasound shows R PT hemodynamically significant stenosis.      IMP/PLAN:  76 y.o. female with   Patient Active Problem List   Diagnosis    Type 2 diabetes mellitus with diabetic polyneuropathy    Hyperlipidemia    HTN (hypertension)    Knee pain    H/O vitamin D deficiency    Non morbid obesity due to excess calories    Bilateral carotid bruits    Osteoarthritis of right knee    Morbid obesity with body mass index (BMI) of 40.0 or higher    Dyspnea on exertion    Chronic cough    MEKA (obstructive sleep apnea)    Sensorineural hearing loss (SNHL) of right ear with restricted hearing of left ear    Tinnitus of both ears    Allergic rhinitis    Dysfunction of both eustachian tubes    Nasal septal deviation    Chronic eczematous otitis externa of both ears    Neck abscess    Elevated blood pressure reading    Epidermal inclusion cyst    Polyneuropathy associated with underlying disease    Chronic pain of both knees    Decreased strength, endurance, and mobility    Difficulty walking    Pain and swelling of left knee    Pain and swelling of right knee    Weakness    Leg swelling    being managed by PCP and specialists who is here today for evaluation of PVD.    -No RLE PVD with no claudication, no rest pain, no wound.  Imaging reviewed. - rec daily ASA; mild RLE/moderate RLE PVD 5/2022  -BLE lymphedema and venous hypertension - rec lymphedema clinic  -Patient has tried and failed compression of 20-30 mm Hg, elevation and exercise for >1 month period.  Basic pump trial performed and discontinued due to sensitivity and pain to static pressure.  Symptoms of swelling, pain and hyperplasia present - rec lymphedema therapy and pumps  -Exercise  -Heart healthy lifestyle  -RTC 6 mo    I spent 13 minutes evaluating this patient and greater than 50% of  the time was spent counseling, coordinator care and discussing the plan of care.  All questions were answered and patient stated understanding with agreement with the above treatment plan.    Dre Blair MD RPVI  Vascular and Endovascular Surgery

## 2022-05-19 NOTE — PATIENT INSTRUCTIONS
Putting on Compression Stockings     Turn the stocking inside-out, then fit it over your toes and heel.          Roll the stocking up your leg.            Once stockings are on, make sure the top of the stocking is about two fingers width below the crease of the knee (or the groin if you wear thigh-high stockings).          Use equipment, such as a stocking cora, or wear rubber gloves to make it easier to put on compression stockings.         Elastic compression stockings are prescribed to treat many vein problems. Wearing them may be the most important thing you do to manage your symptoms. The stockings fit tightly around your ankle, gradually reducing in pressure as they go up your legs. This helps keep blood flowing to your heart. As a result, swelling is reduced. Your healthcare provider will prescribe stockings at a safe pressure for you. He or she will also tell you how often to wear and remove the stockings. Follow these instructions closely. Also, do not buy or wear compression stockings without first seeing your healthcare provider.  Tips for wear and care  To wear stockings safely and to get the most benefit:  Wear the length prescribed by your healthcare provider.  Pull them to the designated height and no farther. Dont let them bunch at the top. This can restrict blood flow and increase swelling.  Wear the stockings for the amount of time your healthcare provider recommends. Replace them when they start to feel loose. This will likely be every 3 to 6 months.  Remove them as your healthcare provider directs. When removed, wash your legs. Then check your legs and feet for sores. Call your healthcare provider if you find a sore. Dont put the stockings back on unless your healthcare provider directs.   Wash the stockings as instructed. They may need to be hand-washed.  Date Last Reviewed: 5/1/2016  © 4259-8305 [employer]. [address] [place] [address], [place] [address]. All rights reserved.  This information is not intended as a substitute for professional medical care. Always follow your healthcare professional's instructions.        Tips for Using Less Salt    Most people with heart problems need to eat less salt (sodium). Reducing the amount of salt you eat may help control your blood pressure. The higher your blood pressure, the greater your risk for heart disease, stroke, blindness, and kidney problems.  At the store  Make low-salt choices by reading labels carefully. Look for the total amount of sodium per serving.  Use more fresh food. Buy more fruits and vegetables. Select lean meats, fish, and poultry.  Use fewer frozen, canned, and packaged foods which often contain a lot of sodium.  Use plain frozen vegetables without sauces or toppings. These products are often low- or no-sodium.  Opt for reduced-sodium or no-salt-added versions of canned vegetables and soups.  In the kitchen  Don't add salt to food when you're cooking. Season with flavorings such as onion, garlic, pepper, salt-free herbal blends, and lemon or lime juice.  Use a cookbook containing low-salt recipes. It can give you ideas for tasty meals that are healthy for your heart.  Sprinkle salt-free herbal blends on vegetables and meat.  Drain and rinse canned foods, such as canned beans and vegetables, before cooking or eating.  Eating out  Tell the  you're on a low-salt diet. Ask questions about the menu.  Order fish, chicken, and meat broiled, baked, poached, or grilled without salt, butter, or breading.  Use lemon, pepper, and salt-free herb mixes to add flavor.  Choose plain steamed rice, boiled noodles, and baked or boiled potatoes. Top potatoes with chives and a little sour cream.     Beware! Salt goes by many other names. Limit foods with these words listed as ingredients: salt, sodium, soy sauce, baking soda, baking powder, MSG, monosodium, Na (the chemical symbol for sodium). Some antacids are also high in salt.   Date  Last Reviewed: 6/19/2015  © 9172-0969 [employer]. [address] [place] [address], [place] [address]. All rights reserved. This information is not intended as a substitute for professional medical care. Always follow your healthcare professional's instructions.        Low-Salt Diet  This diet removes foods that are high in salt. It also limits the amount of salt you use when cooking. It is most often used for people with high blood pressure, edema (fluid retention), and kidney, liver, or heart disease.  Table salt contains the mineral sodium. Your body needs sodium to work normally. But too much sodium can make your health problems worse. Your healthcare provider is recommending a low-salt (also called low-sodium) diet for you. Your total daily allowance of salt is 1,500 to 2,300 milligrams (mg). It is less than 1 teaspoon of table salt. This means you can have only about 500 to 700 mg of sodium at each meal. People with certain health problems should limit salt intake to the lower end of the recommended range.    When you cook, dont add much salt. If you can cook without using salt, even better. Dont add salt to your food at the table.  When shopping, read food labels. Salt is often called sodium on the label. Choose foods that are salt-free, low salt, or very low salt. Note that foods with reduced salt may not lower your salt intake enough.    Beans, potatoes, and pasta  Ok: Dry beans, split peas, lentils, potatoes, rice, macaroni, pasta, spaghetti without added salt  Avoid: Potato chips, tortilla chips, and similar products  Breads and cereals  Ok: Low-sodium breads, rolls, cereals, and cakes; low-salt crackers, matzo crackers  Avoid: Salted crackers, pretzels, popcorn, Kiswahili toast, pancakes, muffins  Dairy  Ok: Milk, chocolate milk, hot chocolate mix, low-salt cheeses, and yogurt  Avoid: Processed cheese and cheese spreads; Roquefort, Camembert, and cottage cheese; buttermilk, instant breakfast  drink  Desserts  Ok: Ice cream, frozen yogurt, juice bars, gelatin, cookies and pies, sugar, honey, jelly, hard candy  Avoid: Most pies, cakes and cookies prepared or processed with salt; instant pudding  Drinks  Ok: Tea, coffee, fizzy (carbonated) drinks, juices  Avoid: Flavored coffees, electrolyte replacement drinks, sports drinks  Meats  Ok: All fresh meat, fish, poultry, low-salt tuna, eggs, egg substitute  Avoid: Smoked, pickled, brine-cured, or salted meats and fish. This includes botello, chipped beef, corned beef, hot dogs, deli meats, ham, kosher meats, salt pork, sausage, canned tuna, salted codfish, smoked salmon, herring, sardines, or anchovies.  Seasonings and spices  Ok: Most seasonings are okay. Good substitutes for salt include: fresh herb blends, hot sauce, lemon, garlic, monteiro, vinegar, dry mustard, parsley, cilantro, horseradish, tomato paste, regular margarine, mayonnaise, unsalted butter, cream cheese, vegetable oil, cream, low-salt salad dressing and gravy.  Avoid: Regular ketchup, relishes, pickles, soy sauce, teriyaki sauce, Worcestershire sauce, BBQ sauce, tartar sauce, meat tenderizer, chili sauce, regular gravy, regular salad dressing, salted butter  Soups  Ok: Low-salt soups and broths made with allowed foods  Avoid: Bouillon cubes, soups with smoked or salted meats, regular soup and broth  Vegetables  Ok: Most vegetables are okay; also low-salt tomato and vegetable juices  Avoid: Sauerkraut and other brine-soaked vegetables; pickles and other pickled vegetables; tomato juice, olives  Date Last Reviewed: 8/1/2016  © 0802-4237 [employer]. [address] [place] [address], [place] [address]. All rights reserved. This information is not intended as a substitute for professional medical care. Always follow your healthcare professional's instructions.        Low-Salt Choices  Eating salt (sodium) can make your body retain too much water. Excess water makes your heart work harder. Canned,  packaged, and frozen foods are easy to prepare, but they are often high in sodium. Here are some ideas for low-salt foods you can easily prepare yourself.    For breakfast  Fruit or 100% fruit juice  Whole-wheat bread or an English muffin. Compare sodium content on labels.  Low-fat milk or yogurt  Unsalted eggs  Shredded wheat  Corn tortillas  Unsalted steamed rice  Regular (not instant) hot cereal, made without salt  Stay away from:  Sausage, botello, and ham  Flour tortillas  Packaged muffins, pancakes, and biscuits  Instant hot cereals  Cottage cheese  For lunch and dinner  Fresh fish, chicken, turkey, or meat--baked, broiled, or roasted without salt  Dry beans, cooked without salt  Tofu, stir-fried without salt  Unsalted fresh fruit and vegetables, or frozen or canned fruit and vegetables with no added salt  Stay away from:  Lunch or deli meat that is cured or smoked  Cheese  Tomato juice and catsup  Canned vegetables, soups, and fish not labeled as no-salt-added or reduced sodium  Packaged gravies and sauces  Olives, pickles, and relish  Bottled salad dressings  For snacks and desserts  Yogurt  Unsalted, air popped popcorn  Unsalted nuts or seeds  Stay away from:  Pies and cakes  Packaged dessert mixes  Pizza  Canned and packaged puddings  Pretzels, chips, crackers, and nuts--unless the label says unsalted  Date Last Reviewed: 6/17/2015  © 4653-0368 [employer]. [address] [place] [address], [place] [address]. All rights reserved. This information is not intended as a substitute for professional medical care. Always follow your healthcare professional's instructions.

## 2022-05-19 NOTE — PROCEDURES
Large Joint Aspiration/Injection: bilateral knee    Date/Time: 5/19/2022 2:00 PM  Performed by: Sumanth Erickson MD  Authorized by: Sumanth Erickson MD     Consent Done?:  Yes (Verbal)  Indications:  Arthritis  Site marked: the procedure site was marked    Timeout: prior to procedure the correct patient, procedure, and site was verified    Prep: patient was prepped and draped in usual sterile fashion    Local anesthetic:  Topical anesthetic    Details:  Needle Size:  22 G  Approach:  Anterolateral  Location:  Knee  Laterality:  Bilateral  Site:  Bilateral knee  Medications (Right):  10 mg sodium hyaluronate (EUFLEXXA) 10 mg/mL(mw 2.4 -3.6 million)  Medications (Left):  10 mg sodium hyaluronate (EUFLEXXA) 10 mg/mL(mw 2.4 -3.6 million)  Patient tolerance:  Patient tolerated the procedure well with no immediate complications

## 2022-05-19 NOTE — PROGRESS NOTES
Follow Up PATIENT ORTHOPAEDIC: Knee    PRIMARY CARE PHYSICIAN: Leonard Wells MD   REFERRING PROVIDER: Sumanth Erickson MD  [facility]  [place] [address]     ASSESSMENT & PLAN:    Impression:  Bilateral Knee Severe Degenerative Osteoarthritis, Primary     Follow Up Plan: Euflexxa    Non operative care:    Mena Odonnell has physical exam evidence of above and wishes to pursue an non-operative care. I am recommending the following: Euflexxa series, activity modification.  She would be an appropriate candidate for a right total knee replacement in the future should her symptoms warrant.  At present time her BMI is 37 and her last A1c was 6.7.  She was referred to vascular for intermittent claudication and peripheral vascular disease seen on x-ray .  There evaluation has referred her to lymphedema therapy which she initiates in June.  She is interested in repeat gel injections today.  She is not interested in surgical intervention at this time.  The gel injections the last about 6 months.  I think it would be reasonable to repeat do this see that she had such good result the 1st time.  She is also diabetic and despite her improvement in the A1 see bilateral knee injections would likely alter her good progress that she has been doing with regard to her diabetes management.     Patient here for 2-3 euflexxa series    The patient has been ordered:  None    CONSULTS:   None    ACTIVE PROBLEM LIST  Patient Active Problem List   Diagnosis    Type 2 diabetes mellitus with diabetic polyneuropathy    Hyperlipidemia    HTN (hypertension)    Knee pain    H/O vitamin D deficiency    Non morbid obesity due to excess calories    Bilateral carotid bruits    Osteoarthritis of right knee    Morbid obesity with body mass index (BMI) of 40.0 or higher    Dyspnea on exertion    Chronic cough    MEKA (obstructive sleep apnea)    Sensorineural hearing loss (SNHL) of right ear with restricted hearing of left ear     Tinnitus of both ears    Allergic rhinitis    Dysfunction of both eustachian tubes    Nasal septal deviation    Chronic eczematous otitis externa of both ears    Neck abscess    Elevated blood pressure reading    Epidermal inclusion cyst    Polyneuropathy associated with underlying disease    Chronic pain of both knees    Decreased strength, endurance, and mobility    Difficulty walking    Pain and swelling of left knee    Pain and swelling of right knee    Weakness    Leg swelling           SUBJECTIVE    CHIEF COMPLAINT: Knee Pain    HPI:   Mena Odonnell is a 76 y.o. female here for evaluation and management of right knee pain. There is not a specific incident that brought about this pain. she has had progressive problems with the knee(s) starting 2 years ago but progressing to multiple times a day over the past 6 months which is interfering with activities which include: walking 2 blocks and standing for prolonged periods of time    Currently the pain in the joint is rated at 5 out of 10 with moderate activity.  The pain is intermittent and is located in the Right knee, at level of joint line, located medially and located laterally. The pain is described as aching and sharp. Relieving factors include ambulatory device and rest.       Mena Odonnell also complaints leg cramping on that side primarily in her posterior aspect of her knee and calf.  This typically improves proves with rest and over-the-counter topical spray.    Interval History 4/18/22: Injections wearing off. Saw vascular.   Interval History 5/9/22: Here for 1-3 Euflexxa series   Interval History 5/19/22: Here for 2-3 Euflexxa series. Mild improvement in pain.     PROGRESSIVE SYMPTOMS:  Pain worsened by weight bearing    FUNCTIONAL STATUS:   Do light to moderate work around the house    PREVIOUS TREATMENTS:  Medical: Attempted Weight Loss, Steroid Injections and Biologic Injections  Physical Therapy: Use of Ambulatory Aid and  Activities Modified   Previous Orthopaedic Surgery: None    REVIEW OF SYSTEMS:  PAIN ASSESSMENT:  See HPI.  MUSCULOSKELETAL: See HPI.  OTHER 10 point review of systems is negative except as stated in HPI above    PAST MEDICAL HISTORY   has a past medical history of Cataract, Diabetes mellitus type II, Diabetes with neurologic complications, DM retinopathy, Hyperlipidemia, Hypertension, Obesity, Osteoarthritis, Peripheral neuropathy, Polyneuropathy, Severe obesity (BMI 35.0-39.9) with comorbidity (12/30/2016), Sleep apnea, and Tendonitis.     PAST SURGICAL HISTORY   has a past surgical history that includes Bladder suspension; Hysterectomy (1978); Tonsillectomy; Colonoscopy (N/A, 9/26/2015); Cataract extraction; and Neck mass excision (11/6/2019).     FAMILY HISTORY  family history includes Cataracts in her mother; Colon cancer in her maternal grandfather; Diabetes in her mother, sister, sister, and son; Hypertension in her brother, brother, daughter, mother, sister, sister, sister, and sister; Stroke in her mother; Thyroid disease in her maternal aunt, maternal aunt, mother, and sister.     SOCIAL HISTORY   reports that she quit smoking about 51 years ago. She has a 3.75 pack-year smoking history. She has never used smokeless tobacco. She reports that she does not drink alcohol and does not use drugs.     ALLERGIES   Review of patient's allergies indicates:   Allergen Reactions    Atorvastatin      Muscle pain     Crestor [rosuvastatin] Other (See Comments)     Leg Weakness.         MEDICATIONS  Current Outpatient Medications on File Prior to Visit   Medication Sig Dispense Refill    amLODIPine (NORVASC) 10 MG tablet Take 1 tablet (10 mg total) by mouth once daily. 90 tablet 1    aspirin 81 MG Chew Take 1 tablet (81 mg total) by mouth once daily. 30 tablet 0    blood-glucose sensor (DEXCOM G6 SENSOR) Sujey Change sensor every 10 days 3 each 11    blood-glucose transmitter (DEXCOM G6 TRANSMITTER) Sujey Change  every 3 months 1 each 3    cetirizine (ZYRTEC) 10 MG tablet TAKE 1 TABLET BY MOUTH ONCE DAILY FOR 30 DAYS  3    CONTOUR NEXT TEST STRIPS Strp Check glucose before breakfast and dinner. 100 each 5    COVID-19 VACC,MRNA,PFIZER,,PF, IM COVID-19 vacc,mRNA(Pfizer)(PF)      diabetic supplies, miscellan. Kit Please dispense one 14 day FreeStyle Giulia [place] 1 kit 0    diclofenac sodium (VOLTAREN) 1 % Gel Lower extremities: Apply the gel (4 g) to the affected area 4 times daily. Do not apply more than 16 g daily to any one affected joint of the lower extremities. Upper extremities: Apply the gel (2 g) to the affected area 4 times daily. Do not apply more than 8 g daily to any one affected joint of the upper extremities. Total dose should not exceed 32 g per day, overall affected joints. 100 g 5    empagliflozin (JARDIANCE) 10 mg tablet Take 1 tablet (10 mg total) by mouth once daily. 90 tablet 2    flash glucose sensor (FREESTYLE GIULIA 14 DAY SENSOR) Kit 1 sensor kit every 14 days (requires 2 kits per month) 2 kit 6    fluocinolone acetonide oil 0.01 % Drop Place 4 drops in ear(s) 2 (two) times daily. 20 mL 5    gabapentin (NEURONTIN) 300 MG capsule Take 1 capsule (300 mg total) by mouth 3 (three) times daily. 90 capsule 11    insulin glargine U-300 conc (TOUJEO MAX U-300 SOLOSTAR) 300 unit/mL (3 mL) insulin pen INJECT 50 UNITS SUBCUTANEOUSLY ONCE DAILY 2 pen 5    losartan-hydrochlorothiazide 100-25 mg (HYZAAR) 100-25 mg per tablet Take 1 tablet by mouth once daily. 90 tablet 3    metFORMIN (GLUCOPHAGE-XR) 500 MG ER 24hr tablet Take 1 tablet (500 mg total) by mouth 2 (two) times daily with meals. 180 tablet 2    metoprolol succinate (TOPROL-XL) 50 MG 24 hr tablet Take 1 tablet (50 mg total) by mouth once daily. 30 tablet 5    montelukast (SINGULAIR) 10 mg tablet Take 1 tablet (10 mg total) by mouth once daily. 90 tablet 1    pravastatin (PRAVACHOL) 80 MG tablet Take 1 tablet (80 mg total) by mouth once daily.  "90 tablet 3    semaglutide (OZEMPIC) 1 mg/dose (4 mg/3 mL) Inject 1 mg into the skin every 7 days. 1 pen 5     Current Facility-Administered Medications on File Prior to Visit   Medication Dose Route Frequency Provider Last Rate Last Admin    sodium hyaluronate (EUFLEXXA) 10 mg/mL(mw 2.4 -3.6 million) injection 20 mg  20 mg Intra-articular 1 time in Clinic/HOD Maile Cuello MD        sodium hyaluronate (EUFLEXXA) 10 mg/mL(mw 2.4 -3.6 million) injection 20 mg  20 mg Intra-articular 1 time in Clinic/HOD Sumanth Erickson MD              PHYSICAL EXAM   height is 5' 6" (1.676 m) and weight is 102.1 kg (225 lb). Her blood pressure is 120/77 and her pulse is 86. Her respiration is 15 and oxygen saturation is 99%.   Body mass index is 36.32 kg/m².      All other systems deferred.  GENERAL:  No acute distress  HABITUS: Obese  GAIT: Antalgic  SKIN: Normal     KNEE EXAM:    Bilateral:   Effusion: Minimal joint effusion  TTP: yes over Medial Joint Line and Lateral Joint Line   no crepitus with passive knee ROM  Passive ROM: Extension 0, Flexion 130  No pain with manipulation of patella  Stable to varus/valgus stress. No increased laxity to anterior/posterior drawer testing  negative Ngozi's test  No pain with IR/ER rotation of the hip  5/5 strength in knee flexion and extension, ankle plantarflexion and dorsiflexion  Neurovascular Status: Sensation intact to light touch in Sural, Saphenous, SPN, DPN, Tibial nerve distribution  2+ pulse DP/PT, normal capillary refill, foot has normal warmth    DATA:  Diagnostic tests reviewed for today's visit:     4v of the bilateral knee reveal Moderate degenerative changes of the Lateral and Patellofemoral compartment. There is evidence of advanced osteoarthritis changes with Subchondral sclerosis, Osteophyte formation and Joint space narrowing. The limb is in netural alignment. The patella is tracking midline and is in normal alignment. Vascular calcifications present. " Kellegren-Lawerence grade 4 changes on the right, grade 3 on the left.

## 2022-05-20 DIAGNOSIS — I89.0 LYMPHEDEMA OF BOTH LOWER EXTREMITIES: Primary | ICD-10-CM

## 2022-05-20 LAB
LEFT ARM BP: 138 MMHG
LEFT GREAT SAPHENOUS DISTAL THIGH DIA: 0.3 CM
LEFT GREAT SAPHENOUS JUNCTION DIA: 0.6 CM
LEFT GREAT SAPHENOUS KNEE DIA: 0.4 CM
LEFT GREAT SAPHENOUS MIDDLE THIGH DIA: 0.4 CM
LEFT GREAT SAPHENOUS PROXIMAL CALF DIA: 0.3 CM
LEFT SMALL SAPHENOUS KNEE DIA: 0.2 CM
LEFT SMALL SAPHENOUS SPJ DIA: 0.2 CM
LEFT TBI: 0.28
LEFT TOE PRESSURE: 39 MMHG
RIGHT ARM BP: 134 MMHG
RIGHT GREAT SAPHENOUS DISTAL THIGH DIA: 0.4 CM
RIGHT GREAT SAPHENOUS JUNCTION DIA: 0.5 CM
RIGHT GREAT SAPHENOUS KNEE DIA: 0.3 CM
RIGHT GREAT SAPHENOUS MIDDLE THIGH DIA: 0.4 CM
RIGHT GREAT SAPHENOUS PROXIMAL CALF DIA: 0.3 CM
RIGHT SMALL SAPHENOUS KNEE DIA: 0.1 CM
RIGHT SMALL SAPHENOUS SPJ DIA: 0.2 CM
RIGHT TBI: 0.35
RIGHT TOE PRESSURE: 48 MMHG

## 2022-05-24 ENCOUNTER — TELEPHONE (OUTPATIENT)
Dept: VASCULAR SURGERY | Facility: CLINIC | Age: 77
End: 2022-05-24
Payer: COMMERCIAL

## 2022-05-27 ENCOUNTER — OFFICE VISIT (OUTPATIENT)
Dept: ORTHOPEDICS | Facility: CLINIC | Age: 77
End: 2022-05-27
Payer: COMMERCIAL

## 2022-05-27 VITALS
OXYGEN SATURATION: 99 % | HEIGHT: 66 IN | BODY MASS INDEX: 36.16 KG/M2 | DIASTOLIC BLOOD PRESSURE: 79 MMHG | WEIGHT: 225 LBS | RESPIRATION RATE: 15 BRPM | HEART RATE: 86 BPM | SYSTOLIC BLOOD PRESSURE: 120 MMHG

## 2022-05-27 DIAGNOSIS — M17.0 PRIMARY OSTEOARTHRITIS OF BOTH KNEES: Primary | ICD-10-CM

## 2022-05-27 PROCEDURE — 1159F MED LIST DOCD IN RCRD: CPT | Mod: CPTII,S$GLB,, | Performed by: ORTHOPAEDIC SURGERY

## 2022-05-27 PROCEDURE — 99999 PR PBB SHADOW E&M-EST. PATIENT-LVL IV: CPT | Mod: PBBFAC,,, | Performed by: ORTHOPAEDIC SURGERY

## 2022-05-27 PROCEDURE — 3074F PR MOST RECENT SYSTOLIC BLOOD PRESSURE < 130 MM HG: ICD-10-PCS | Mod: CPTII,S$GLB,, | Performed by: ORTHOPAEDIC SURGERY

## 2022-05-27 PROCEDURE — 1125F AMNT PAIN NOTED PAIN PRSNT: CPT | Mod: CPTII,S$GLB,, | Performed by: ORTHOPAEDIC SURGERY

## 2022-05-27 PROCEDURE — 99499 UNLISTED E&M SERVICE: CPT | Mod: S$GLB,,, | Performed by: ORTHOPAEDIC SURGERY

## 2022-05-27 PROCEDURE — 99999 PR PBB SHADOW E&M-EST. PATIENT-LVL IV: ICD-10-PCS | Mod: PBBFAC,,, | Performed by: ORTHOPAEDIC SURGERY

## 2022-05-27 PROCEDURE — 3078F PR MOST RECENT DIASTOLIC BLOOD PRESSURE < 80 MM HG: ICD-10-PCS | Mod: CPTII,S$GLB,, | Performed by: ORTHOPAEDIC SURGERY

## 2022-05-27 PROCEDURE — 3074F SYST BP LT 130 MM HG: CPT | Mod: CPTII,S$GLB,, | Performed by: ORTHOPAEDIC SURGERY

## 2022-05-27 PROCEDURE — 3044F HG A1C LEVEL LT 7.0%: CPT | Mod: CPTII,S$GLB,, | Performed by: ORTHOPAEDIC SURGERY

## 2022-05-27 PROCEDURE — 1159F PR MEDICATION LIST DOCUMENTED IN MEDICAL RECORD: ICD-10-PCS | Mod: CPTII,S$GLB,, | Performed by: ORTHOPAEDIC SURGERY

## 2022-05-27 PROCEDURE — 20610 DRAIN/INJ JOINT/BURSA W/O US: CPT | Mod: 50,S$GLB,, | Performed by: ORTHOPAEDIC SURGERY

## 2022-05-27 PROCEDURE — 20610 LARGE JOINT ASPIRATION/INJECTION: BILATERAL KNEE: ICD-10-PCS | Mod: 50,S$GLB,, | Performed by: ORTHOPAEDIC SURGERY

## 2022-05-27 PROCEDURE — 3078F DIAST BP <80 MM HG: CPT | Mod: CPTII,S$GLB,, | Performed by: ORTHOPAEDIC SURGERY

## 2022-05-27 PROCEDURE — 99499 NO LOS: ICD-10-PCS | Mod: S$GLB,,, | Performed by: ORTHOPAEDIC SURGERY

## 2022-05-27 PROCEDURE — 1125F PR PAIN SEVERITY QUANTIFIED, PAIN PRESENT: ICD-10-PCS | Mod: CPTII,S$GLB,, | Performed by: ORTHOPAEDIC SURGERY

## 2022-05-27 PROCEDURE — 3044F PR MOST RECENT HEMOGLOBIN A1C LEVEL <7.0%: ICD-10-PCS | Mod: CPTII,S$GLB,, | Performed by: ORTHOPAEDIC SURGERY

## 2022-05-27 NOTE — PROGRESS NOTES
Follow Up PATIENT ORTHOPAEDIC: Knee    PRIMARY CARE PHYSICIAN: Leonard Wells MD   REFERRING PROVIDER: Sumanth Erickson MD  [address]  [place]  [place] [address]     ASSESSMENT & PLAN:    Impression:  Bilateral Knee Severe Degenerative Osteoarthritis, Primary     Follow Up Plan: PRN when return of pain    Non operative care:    Mena Odonnell has physical exam evidence of above and wishes to pursue an non-operative care. I am recommending the following: Euflexxa series, activity modification.  She would be an appropriate candidate for a right total knee replacement in the future should her symptoms warrant.  At present time her BMI is 37 and her last A1c was 6.7.  She was referred to vascular for intermittent claudication and peripheral vascular disease seen on x-ray .  There evaluation has referred her to lymphedema therapy which she initiates in June.  She is interested in repeat gel injections today.  She is not interested in surgical intervention at this time.  The gel injections the last about 6 months.  I think it would be reasonable to repeat do this see that she had such good result the 1st time.  She is also diabetic and despite her improvement in the A1 see bilateral knee injections would likely alter her good progress that she has been doing with regard to her diabetes management.     Patient here for 3-3 euflexxa series    The patient has been ordered:  None    CONSULTS:   None    ACTIVE PROBLEM LIST  Patient Active Problem List   Diagnosis    Type 2 diabetes mellitus with diabetic polyneuropathy    Hyperlipidemia    HTN (hypertension)    Knee pain    H/O vitamin D deficiency    Non morbid obesity due to excess calories    Bilateral carotid bruits    Osteoarthritis of right knee    Morbid obesity with body mass index (BMI) of 40.0 or higher    Dyspnea on exertion    Chronic cough    MEKA (obstructive sleep apnea)    Sensorineural hearing loss (SNHL) of right ear with restricted hearing of  left ear    Tinnitus of both ears    Allergic rhinitis    Dysfunction of both eustachian tubes    Nasal septal deviation    Chronic eczematous otitis externa of both ears    Neck abscess    Elevated blood pressure reading    Epidermal inclusion cyst    Polyneuropathy associated with underlying disease    Chronic pain of both knees    Decreased strength, endurance, and mobility    Difficulty walking    Pain and swelling of left knee    Pain and swelling of right knee    Weakness    Leg swelling           SUBJECTIVE    CHIEF COMPLAINT: Knee Pain    HPI:   Mena Odonnell is a 76 y.o. female here for evaluation and management of right knee pain. There is not a specific incident that brought about this pain. she has had progressive problems with the knee(s) starting 2 years ago but progressing to multiple times a day over the past 6 months which is interfering with activities which include: walking 2 blocks and standing for prolonged periods of time    Currently the pain in the joint is rated at 5 out of 10 with moderate activity.  The pain is intermittent and is located in the Right knee, at level of joint line, located medially and located laterally. The pain is described as aching and sharp. Relieving factors include ambulatory device and rest.       Mena Odonnell also complaints leg cramping on that side primarily in her posterior aspect of her knee and calf.  This typically improves proves with rest and over-the-counter topical spray.    Interval History 4/18/22: Injections wearing off. Saw vascular.   Interval History 5/9/22: Here for 1-3 Euflexxa series   Interval History 5/19/22: Here for 2-3 Euflexxa series. Mild improvement in pain.   Interval History 5/27/22: Here for 3-3 Euflexxa series. Moderate improvement in pain.    PROGRESSIVE SYMPTOMS:  Pain worsened by weight bearing    FUNCTIONAL STATUS:   Do light to moderate work around the house    PREVIOUS TREATMENTS:  Medical: Attempted  Weight Loss, Steroid Injections and Biologic Injections  Physical Therapy: Use of Ambulatory Aid and Activities Modified   Previous Orthopaedic Surgery: None    REVIEW OF SYSTEMS:  PAIN ASSESSMENT:  See HPI.  MUSCULOSKELETAL: See HPI.  OTHER 10 point review of systems is negative except as stated in HPI above    PAST MEDICAL HISTORY   has a past medical history of Cataract, Diabetes mellitus type II, Diabetes with neurologic complications, DM retinopathy, Hyperlipidemia, Hypertension, Obesity, Osteoarthritis, Peripheral neuropathy, Polyneuropathy, Severe obesity (BMI 35.0-39.9) with comorbidity (12/30/2016), Sleep apnea, and Tendonitis.     PAST SURGICAL HISTORY   has a past surgical history that includes Bladder suspension; Hysterectomy (1978); Tonsillectomy; Colonoscopy (N/A, 9/26/2015); Cataract extraction; and Neck mass excision (11/6/2019).     FAMILY HISTORY  family history includes Cataracts in her mother; Colon cancer in her maternal grandfather; Diabetes in her mother, sister, sister, and son; Hypertension in her brother, brother, daughter, mother, sister, sister, sister, and sister; Stroke in her mother; Thyroid disease in her maternal aunt, maternal aunt, mother, and sister.     SOCIAL HISTORY   reports that she quit smoking about 51 years ago. She has a 3.75 pack-year smoking history. She has never used smokeless tobacco. She reports that she does not drink alcohol and does not use drugs.     ALLERGIES   Review of patient's allergies indicates:   Allergen Reactions    Atorvastatin      Muscle pain     Crestor [rosuvastatin] Other (See Comments)     Leg Weakness.         MEDICATIONS  Current Outpatient Medications on File Prior to Visit   Medication Sig Dispense Refill    amLODIPine (NORVASC) 10 MG tablet Take 1 tablet (10 mg total) by mouth once daily. 90 tablet 1    aspirin 81 MG Chew Take 1 tablet (81 mg total) by mouth once daily. 30 tablet 0    blood-glucose sensor (DEXCOM G6 SENSOR) Sujey Change  sensor every 10 days 3 each 11    blood-glucose transmitter (DEXCOM G6 TRANSMITTER) Sujey Change every 3 months 1 each 3    cetirizine (ZYRTEC) 10 MG tablet TAKE 1 TABLET BY MOUTH ONCE DAILY FOR 30 DAYS  3    CONTOUR NEXT TEST STRIPS Strp Check glucose before breakfast and dinner. 100 each 5    COVID-19 VACC,MRNA,PFIZER,,PF, IM COVID-19 vacc,mRNA(Pfizer)(PF)      diabetic supplies, miscellan. Kit Please dispense one 14 day FreeStyle Giulia [place] 1 kit 0    diclofenac sodium (VOLTAREN) 1 % Gel Lower extremities: Apply the gel (4 g) to the affected area 4 times daily. Do not apply more than 16 g daily to any one affected joint of the lower extremities. Upper extremities: Apply the gel (2 g) to the affected area 4 times daily. Do not apply more than 8 g daily to any one affected joint of the upper extremities. Total dose should not exceed 32 g per day, overall affected joints. 100 g 5    empagliflozin (JARDIANCE) 10 mg tablet Take 1 tablet (10 mg total) by mouth once daily. 90 tablet 2    flash glucose sensor (FREESTYLE GIULIA 14 DAY SENSOR) Kit 1 sensor kit every 14 days (requires 2 kits per month) 2 kit 6    fluocinolone acetonide oil 0.01 % Drop Place 4 drops in ear(s) 2 (two) times daily. 20 mL 5    gabapentin (NEURONTIN) 300 MG capsule Take 1 capsule (300 mg total) by mouth 3 (three) times daily. 90 capsule 11    insulin glargine U-300 conc (TOUJEO MAX U-300 SOLOSTAR) 300 unit/mL (3 mL) insulin pen INJECT 50 UNITS SUBCUTANEOUSLY ONCE DAILY 2 pen 5    losartan-hydrochlorothiazide 100-25 mg (HYZAAR) 100-25 mg per tablet Take 1 tablet by mouth once daily. 90 tablet 3    metFORMIN (GLUCOPHAGE-XR) 500 MG ER 24hr tablet Take 1 tablet (500 mg total) by mouth 2 (two) times daily with meals. 180 tablet 2    metoprolol succinate (TOPROL-XL) 50 MG 24 hr tablet Take 1 tablet (50 mg total) by mouth once daily. 30 tablet 5    montelukast (SINGULAIR) 10 mg tablet Take 1 tablet (10 mg total) by mouth once daily. 90  "tablet 1    pravastatin (PRAVACHOL) 80 MG tablet Take 1 tablet (80 mg total) by mouth once daily. 90 tablet 3    semaglutide (OZEMPIC) 1 mg/dose (4 mg/3 mL) Inject 1 mg into the skin every 7 days. 1 pen 5     Current Facility-Administered Medications on File Prior to Visit   Medication Dose Route Frequency Provider Last Rate Last Admin    sodium hyaluronate (EUFLEXXA) 10 mg/mL(mw 2.4 -3.6 million) injection 20 mg  20 mg Intra-articular 1 time in Clinic/HOD Maile Cuello MD        sodium hyaluronate (EUFLEXXA) 10 mg/mL(mw 2.4 -3.6 million) injection 20 mg  20 mg Intra-articular 1 time in Clinic/HOD Sumanth Erickson MD              PHYSICAL EXAM   height is 5' 6" (1.676 m) and weight is 102.1 kg (225 lb). Her blood pressure is 120/79 and her pulse is 86. Her respiration is 15 and oxygen saturation is 99%.   Body mass index is 36.32 kg/m².      All other systems deferred.  GENERAL:  No acute distress  HABITUS: Obese  GAIT: Antalgic  SKIN: Normal     KNEE EXAM:    Bilateral:   Effusion: Minimal joint effusion  TTP: yes over Medial Joint Line and Lateral Joint Line   no crepitus with passive knee ROM  Passive ROM: Extension 0, Flexion 130  No pain with manipulation of patella  Stable to varus/valgus stress. No increased laxity to anterior/posterior drawer testing  negative Ngozi's test  No pain with IR/ER rotation of the hip  5/5 strength in knee flexion and extension, ankle plantarflexion and dorsiflexion  Neurovascular Status: Sensation intact to light touch in Sural, Saphenous, SPN, DPN, Tibial nerve distribution  2+ pulse DP/PT, normal capillary refill, foot has normal warmth    DATA:  Diagnostic tests reviewed for today's visit:     4v of the bilateral knee reveal Moderate degenerative changes of the Lateral and Patellofemoral compartment. There is evidence of advanced osteoarthritis changes with Subchondral sclerosis, Osteophyte formation and Joint space narrowing. The limb is in netural alignment. The " patella is tracking midline and is in normal alignment. Vascular calcifications present. Kellegren-Lawerence grade 4 changes on the right, grade 3 on the left.

## 2022-05-27 NOTE — PROCEDURES
Large Joint Aspiration/Injection: bilateral knee    Date/Time: 5/27/2022 2:00 PM  Performed by: Sumanth Erickson MD  Authorized by: Sumanth Erickson MD     Consent Done?:  Yes (Verbal)  Indications:  Arthritis  Site marked: the procedure site was marked    Timeout: prior to procedure the correct patient, procedure, and site was verified    Prep: patient was prepped and draped in usual sterile fashion      Local anesthesia used?: Yes    Local anesthetic:  Topical anesthetic    Details:  Needle Size:  22 G  Approach:  Anterolateral  Location:  Knee  Laterality:  Bilateral  Site:  Bilateral knee  Medications (Right):  10 mg sodium hyaluronate (EUFLEXXA) 10 mg/mL(mw 2.4 -3.6 million)  Medications (Left):  10 mg sodium hyaluronate (EUFLEXXA) 10 mg/mL(mw 2.4 -3.6 million)  Patient tolerance:  Patient tolerated the procedure well with no immediate complications

## 2022-06-06 ENCOUNTER — PATIENT MESSAGE (OUTPATIENT)
Dept: ENDOCRINOLOGY | Facility: CLINIC | Age: 77
End: 2022-06-06
Payer: COMMERCIAL

## 2022-06-22 ENCOUNTER — CLINICAL SUPPORT (OUTPATIENT)
Dept: REHABILITATION | Facility: HOSPITAL | Age: 77
End: 2022-06-22
Payer: MEDICARE

## 2022-06-22 DIAGNOSIS — M79.89 LEG SWELLING: Primary | ICD-10-CM

## 2022-06-22 PROCEDURE — 97140 MANUAL THERAPY 1/> REGIONS: CPT

## 2022-06-22 NOTE — PROGRESS NOTES
See evaluation in POC for goals and assessment     Eval Date: 06/29/2022    Gladis Medeiros, PT, DPT, CLT

## 2022-06-24 ENCOUNTER — CLINICAL SUPPORT (OUTPATIENT)
Dept: REHABILITATION | Facility: HOSPITAL | Age: 77
End: 2022-06-24
Payer: MEDICARE

## 2022-06-24 DIAGNOSIS — M79.89 LEG SWELLING: Primary | ICD-10-CM

## 2022-06-24 DIAGNOSIS — I89.0 LYMPHEDEMA OF BOTH LOWER EXTREMITIES: ICD-10-CM

## 2022-06-24 PROCEDURE — 97140 MANUAL THERAPY 1/> REGIONS: CPT

## 2022-06-29 ENCOUNTER — CLINICAL SUPPORT (OUTPATIENT)
Dept: REHABILITATION | Facility: HOSPITAL | Age: 77
End: 2022-06-29
Payer: COMMERCIAL

## 2022-06-29 DIAGNOSIS — M79.89 LEG SWELLING: Primary | ICD-10-CM

## 2022-06-29 PROCEDURE — 97140 MANUAL THERAPY 1/> REGIONS: CPT

## 2022-06-29 NOTE — PLAN OF CARE
Physical Therapy Daily Treatment Note     Name: Mena Posadas Odonnell  Clinic Number: [id number]    Therapy Diagnosis:   Encounter Diagnosis   Name Primary?    Leg swelling Yes     Physician: Dre Blair MD    Visit Date: 6/22/2022    Physician: Dre Blair MD     Physician Orders: PT Eval and Treat - lymphedema  Medical Diagnosis from Referral: I73.9 (ICD-10-CM) - Peripheral arterial disease   Evaluation Date: 4/4/2022  Authorization Period Expiration: 12/31/2022  Plan of Care Expiration: 6/14/2022   Requesting extension to 7/22/2022 as pt has not started treatment   Visit # / Visits authorized: 1/20     Time In: 2:00pm   Time Out: 2:55pm   Total Billable Time: 55 minutes    Subjective     Pt reports: Ready to get started, the RLE is the one that swells   She was compliant with home exercise program.  Response to previous treatment: fine   Functional change: none     Pain: 0/10  Location: BLEs    Objective         Treatment:   Mena received the following manual therapy techniques:- Manual Lymphatic Drainage were applied to the: RLE for 54 minutes, including: MLD and short stretch compression bandaging       MANUAL LYMPHATIC DRAINAGE (MLD):    While supine with LEs elevated stimulation at terminus, along GI region, B inguinal regions, drainage of entire R LE ag lower leg, ankle, and foot with return proximally,  Use of Aquaphor due to dryness.   Educated in self massage to abdominal areas, B inguinal areas, thigh, and remaining LE within reach.    MULTILAYERED BANDAGING:  issued supplies and bandaged R LE with cotton stockinette, komprex section dorsum of foot, 2 komprex wedges post malleoli, 2 komprex rolls ankle to knee, 1-8cm and 2- 10cm Durelast rolls foot to knee, to leave intact 12-24 hrs as tolerated, discontinue with any problems, return rolled bandages next session. Wash and wear schedules confirmed.     Mena received therapeutic exercises to develop strength, endurance, ROM, flexibility  and posture for 1 minutes including:   THERAPEUTIC EXERCISES:  Continue HEP of AROM, stretching, and postural correction.   Home Exercises Provided and Patient Education Provided     Education provided:    Remove bandages if painful, follow up with compression during the day   PATIENT/FAMILY Education: bandaging wear schedule,  HEP,  Beginning of self massage,  Self or assisted bandaging, compression options, and Risk reduction    Written Home Exercises Provided: Patient instructed to cont prior HEP.  Exercises were reviewed and Mena was able to demonstrate them prior to the end of the session.  Mena demonstrated good  understanding of the education provided.       Assessment     Pt tolerated treatment well with no reports of pain. Pt's RLE was massaged and bandaged to reduce swelling. Pt instructed to remove bandages if painful. Will continue to progress   Mena Is progressing well towards her goals.   Pt prognosis is Good.     Pt will continue to benefit from skilled outpatient physical therapy to address the deficits listed in the problem list box on initial evaluation, provide pt/family education and to maximize pt's level of independence in the home and community environment.     Pt's spiritual, cultural and educational needs considered and pt agreeable to plan of care and goals.     Anticipated barriers to physical therapy: none     Goals:     1. Patient will show decreased girth in R LE by up to 1 cm to allow for LE symmetry, shoe and clothing choice, and ability to apply needed compression.  (progressing, not met)   2. Patient will demonstrate 100% knowledge of lymphedema precautions and signs of infection to allow for reduced lymphedema risk, infection risk, and/or exacerbation of condition.  (progressing, not met)  3. Patient or caregiver will perform self-bandaging techniques and/or wearing of compression garments to allow for lymphatic drainage support, skin elasticity, and reduction in shape and size of  limb. (progressing, not met)  4. Patient will perform self lymph drainage techniques to areas within reach to enhance lymphatic drainage and skin condition.  (progressing, not met)  5. Patient will tolerate daily activities with multilayered bandaging to allow for lymphatic and venous support.  (progressing, not met)     Long Term Goals: (12  weeks)  1. Patient will show decreased girth in R LE by up to 2 cm  to allow for LE symmetry, shoe and clothing choice, and ability to apply needed compression daily.  (progressing, not met)  2. Patient will show reduction in density to mild or less with improved contour of limb to allow for cosmesis, LE symmetry, infection risk reduction, and clothing and compression choice.   (progressing, not met)  3. Patient to terrence/doff compression garment with daily compliance to assist in lymphedema management, skin elasticity, and tissue density.  (progressing, not met)  4. Pt to show improved postural awareness and alignment.  (progressing, not met)  5. Pt to be I and compliant with HEP to allow for increased function in affected limb.   (progressing, not met)    Plan   Continue PT  2x   weekly for Complete Decongestive Therapy:  Manual lymphatic drainage, Multilayered short stretch bandaging, Pneumatic compression, Therapeutic exercises, Patient education as deemed necessary to achieve stated goals.      Gladis Medeiros, PT

## 2022-06-29 NOTE — PROGRESS NOTES
Physical Therapy Daily Treatment Note     Name: Mena Posadas Benedict  Clinic Number: [id number]    Therapy Diagnosis:   Encounter Diagnosis   Name Primary?    Leg swelling Yes     Physician: Dre Blair MD    Visit Date: 6/29/2022    Physician Orders: PT Eval and Treat - lymphedema  Medical Diagnosis from Referral: I73.9 (ICD-10-CM) - Peripheral arterial disease   Evaluation Date: 4/4/2022  Authorization Period Expiration: 12/31/2022  Plan of Care Expiration: 6/14/2022   Requesting extension to 7/22/2022 as pt has not started treatment   Visit # / Visits authorized: 3/20     Time In: 2:00pm   Time Out: 2:50pm    Total Billable Time: 50 minutes     Subjective      Pt reports: Bandages felt fine, she saw a difference in the size of the leg when she took the bandages off  She was compliant with home exercise program.  Response to previous treatment: fine   Functional change: decreased swelling      Pain: 0/10  Location: bilateral lower legs      Objective      Pt arrives wearing knee high compression      Treatment:   Mena received the following manual therapy techniques:- Manual Lymphatic Drainage were applied to the: RLE for 50 minutes, including: MLD and short stretch compression bandaging         MANUAL LYMPHATIC DRAINAGE (MLD):    While supine with LEs elevated stimulation at terminus, along GI region, B inguinal regions, drainage of entire R LE ag lower leg, ankle, and foot with return proximally,  Use of Aquaphor due to dryness.   Educated in self massage to abdominal areas, B inguinal areas, thigh, and remaining LE within reach.        MULTILAYERED BANDAGING:  issued supplies and bandaged R LE with cotton stockinette, komprex section dorsum of foot, 2 komprex wedges post malleoli, 2 komprex rolls ankle to knee, 1-8cm and 2- 10cm Durelast rolls foot to knee, to leave intact 12-24 hrs as tolerated, discontinue with any problems, return rolled bandages next session. Wash and wear schedules confirmed.      Home Exercises Provided and Patient Education Provided      Education provided:    Remove bandages if painful   PATIENT/FAMILY Education: bandaging wear schedule,  HEP,  Beginning of self massage,  Self or assisted bandaging, compression options, and Risk reduction     Written Home Exercises Provided: Patient instructed to cont prior HEP.  Exercises were reviewed and Mena was able to demonstrate them prior to the end of the session.  Mena demonstrated good  understanding of the education provided.         Assessment     Pt tolerated treatment well with no reports of pain. Pt's RLE shows visible decreases with bandaging and pt reports no pain.  Pt is complaint with the daily wear of knee high compression and would benefit from continued sessions to try further decreases      Mena Is progressing well towards her goals.   Pt prognosis is Good.      Pt will continue to benefit from skilled outpatient physical therapy to address the deficits listed in the problem list box on initial evaluation, provide pt/family education and to maximize pt's level of independence in the home and community environment.      Pt's spiritual, cultural and educational needs considered and pt agreeable to plan of care and goals.     Anticipated barriers to physical therapy: none     Goals:     1. Patient will show decreased girth in R LE by up to 1 cm to allow for LE symmetry, shoe and clothing choice, and ability to apply needed compression.  (progressing, not met)   2. Patient will demonstrate 100% knowledge of lymphedema precautions and signs of infection to allow for reduced lymphedema risk, infection risk, and/or exacerbation of condition.  (progressing, not met)  3. Patient or caregiver will perform self-bandaging techniques and/or wearing of compression garments to allow for lymphatic drainage support, skin elasticity, and reduction in shape and size of limb. (progressing, not met)  4. Patient will perform self lymph drainage  techniques to areas within reach to enhance lymphatic drainage and skin condition.  (progressing, not met)  5. Patient will tolerate daily activities with multilayered bandaging to allow for lymphatic and venous support.  (progressing, not met)     Long Term Goals: (12  weeks)  1. Patient will show decreased girth in R LE by up to 2 cm  to allow for LE symmetry, shoe and clothing choice, and ability to apply needed compression daily.  (progressing, not met)  2. Patient will show reduction in density to mild or less with improved contour of limb to allow for cosmesis, LE symmetry, infection risk reduction, and clothing and compression choice.   (progressing, not met)  3. Patient to terrence/doff compression garment with daily compliance to assist in lymphedema management, skin elasticity, and tissue density.  (progressing, not met)  4. Pt to show improved postural awareness and alignment.  (progressing, not met)  5. Pt to be I and compliant with HEP to allow for increased function in affected limb.   (progressing, not met)     Plan   Continue PT  2x   weekly for Complete Decongestive Therapy:  Manual lymphatic drainage, Multilayered short stretch bandaging, Pneumatic compression, Therapeutic exercises, Patient education as deemed necessary to achieve stated goals.        Gladis Medeiros, PT          Gladis Medeiros, PT

## 2022-06-29 NOTE — PROGRESS NOTES
Physical Therapy Daily Treatment Note     Name: Mena Posadas [place]  Clinic Number: [id number]    Therapy Diagnosis:   Encounter Diagnoses   Name Primary?    Lymphedema of both lower extremities     Leg swelling Yes     Physician: Dre Blair MD    Visit Date: 6/24/2022    Physician: Dre Blair MD     Physician Orders: PT Eval and Treat - lymphedema  Medical Diagnosis from Referral: I73.9 (ICD-10-CM) - Peripheral arterial disease   Evaluation Date: 4/4/2022  Authorization Period Expiration: 12/31/2022  Plan of Care Expiration: 6/14/2022   Requesting extension to 7/22/2022 as pt has not started treatment   Visit # / Visits authorized: 2/20     Time In: 2:00pm   Time Out: 2:55pm    Total Billable Time: 55 minutes    Subjective     Pt reports: Bandages felt fine, she saw a difference in the size of the leg when she took the bandages off  She was compliant with home exercise program.  Response to previous treatment: fine   Functional change: decreased swelling     Pain: 0/10  Location: bilateral lower legs     Objective     Pt arrives wearing knee high compression     Treatment:   Mena received the following manual therapy techniques:- Manual Lymphatic Drainage were applied to the: RLE for 55 minutes, including: MLD and short stretch compression bandaging       MANUAL LYMPHATIC DRAINAGE (MLD):    While supine with LEs elevated stimulation at terminus, along GI region, B inguinal regions, drainage of entire R LE ag lower leg, ankle, and foot with return proximally,  Use of Aquaphor due to dryness.   Educated in self massage to abdominal areas, B inguinal areas, thigh, and remaining LE within reach.      MULTILAYERED BANDAGING:  issued supplies and bandaged R LE with cotton stockinette, komprex section dorsum of foot, 2 komprex wedges post malleoli, 2 komprex rolls ankle to knee, 1-8cm and 2- 10cm Durelast rolls foot to knee, to leave intact 12-24 hrs as tolerated, discontinue with any problems,  return rolled bandages next session. Wash and wear schedules confirmed.     Home Exercises Provided and Patient Education Provided     Education provided:    Remove bandages if painful   PATIENT/FAMILY Education: bandaging wear schedule,  HEP,  Beginning of self massage,  Self or assisted bandaging, compression options, and Risk reduction    Written Home Exercises Provided: Patient instructed to cont prior HEP.  Exercises were reviewed and Mena was able to demonstrate them prior to the end of the session.  Mena demonstrated good  understanding of the education provided.       Assessment     Pt tolerated treatment well with no reports of pain. Pt's RLE shows visible decreases after bandaging last visit. Pt is complaint with the daily wear of knee high compression and has no pain.     Mena Is progressing well towards her goals.   Pt prognosis is Good.     Pt will continue to benefit from skilled outpatient physical therapy to address the deficits listed in the problem list box on initial evaluation, provide pt/family education and to maximize pt's level of independence in the home and community environment.     Pt's spiritual, cultural and educational needs considered and pt agreeable to plan of care and goals.     Anticipated barriers to physical therapy: none    Goals:     1. Patient will show decreased girth in R LE by up to 1 cm to allow for LE symmetry, shoe and clothing choice, and ability to apply needed compression.  (progressing, not met)   2. Patient will demonstrate 100% knowledge of lymphedema precautions and signs of infection to allow for reduced lymphedema risk, infection risk, and/or exacerbation of condition.  (progressing, not met)  3. Patient or caregiver will perform self-bandaging techniques and/or wearing of compression garments to allow for lymphatic drainage support, skin elasticity, and reduction in shape and size of limb. (progressing, not met)  4. Patient will perform self lymph drainage  techniques to areas within reach to enhance lymphatic drainage and skin condition.  (progressing, not met)  5. Patient will tolerate daily activities with multilayered bandaging to allow for lymphatic and venous support.  (progressing, not met)     Long Term Goals: (12  weeks)  1. Patient will show decreased girth in R LE by up to 2 cm  to allow for LE symmetry, shoe and clothing choice, and ability to apply needed compression daily.  (progressing, not met)  2. Patient will show reduction in density to mild or less with improved contour of limb to allow for cosmesis, LE symmetry, infection risk reduction, and clothing and compression choice.   (progressing, not met)  3. Patient to terrence/doff compression garment with daily compliance to assist in lymphedema management, skin elasticity, and tissue density.  (progressing, not met)  4. Pt to show improved postural awareness and alignment.  (progressing, not met)  5. Pt to be I and compliant with HEP to allow for increased function in affected limb.   (progressing, not met)    Plan   Continue PT  2x   weekly for Complete Decongestive Therapy:  Manual lymphatic drainage, Multilayered short stretch bandaging, Pneumatic compression, Therapeutic exercises, Patient education as deemed necessary to achieve stated goals.      Gladis Medeiros, PT

## 2022-07-01 ENCOUNTER — CLINICAL SUPPORT (OUTPATIENT)
Dept: REHABILITATION | Facility: HOSPITAL | Age: 77
End: 2022-07-01
Payer: COMMERCIAL

## 2022-07-01 DIAGNOSIS — M79.89 LEG SWELLING: Primary | ICD-10-CM

## 2022-07-01 PROCEDURE — 97140 MANUAL THERAPY 1/> REGIONS: CPT

## 2022-07-01 NOTE — PROGRESS NOTES
Physical Therapy Daily Treatment Note/ Progress Note      Name: Mena Odonnell  Clinic Number: [id number]    Therapy Diagnosis:   Encounter Diagnosis   Name Primary?    Leg swelling Yes     Physician: Dre Blair MD    Visit Date: 7/1/2022    Physician Orders: PT Eval and Treat - lymphedema  Medical Diagnosis from Referral: I73.9 (ICD-10-CM) - Peripheral arterial disease   Evaluation Date: 4/4/2022  Authorization Period Expiration: 12/31/2022  Plan of Care Expiration: 6/14/2022   Requesting extension to 7/22/2022 as pt has not started treatment   Visit # / Visits authorized: 4/20     Time In: 2:00pm   Time Out: 2:55    Total Billable Time: 55 minutes     Subjective      Pt reports: Bandages felt fine, she saw a difference in the size of the leg when she took the bandages off  She was compliant with home exercise program.  Response to previous treatment: fine   Functional change: decreased swelling      Pain: 0/10  Location: bilateral lower legs      Objective      Pt arrives wearing knee high compression              Treatment:   Mena received the following manual therapy techniques:- Manual Lymphatic Drainage were applied to the: RLE for 50 minutes, including: MLD and short stretch compression bandaging         MANUAL LYMPHATIC DRAINAGE (MLD):    While supine with LEs elevated stimulation at terminus, along GI region, B inguinal regions, drainage of entire R LE ag lower leg, ankle, and foot with return proximally,  Use of Aquaphor due to dryness.   Educated in self massage to abdominal areas, B inguinal areas, thigh, and remaining LE within reach.        MULTILAYERED BANDAGING:  issued supplies and bandaged R LE with cotton stockinette, komprex section dorsum of foot, 2 komprex wedges post malleoli, 2 komprex rolls ankle to knee, 1-8cm and 2- 10cm Durelast rolls foot to knee, to leave intact 12-24 hrs as tolerated, discontinue with any problems, return rolled bandages next session. Wash and wear  schedules confirmed.   Bandaging to above the knee to try to address knee swelling     Home Exercises Provided and Patient Education Provided      Education provided:    Remove bandages if painful   PATIENT/FAMILY Education: bandaging wear schedule,  HEP,  Beginning of self massage,  Self or assisted bandaging, compression options, and Risk reduction     Written Home Exercises Provided: Patient instructed to cont prior HEP.  Exercises were reviewed and Mena was able to demonstrate them prior to the end of the session.  Mena demonstrated good  understanding of the education provided.         Assessment     Pt tolerated treatment well with no reports of pain. Measurements taken today show some decreases in the size of pt's leg.   Pt is complaint with the daily wear of knee high compression and would benefit from continued sessions to try further decrease BLE swelling       Mena Is progressing well towards her goals.   Pt prognosis is Good.      Pt will continue to benefit from skilled outpatient physical therapy to address the deficits listed in the problem list box on initial evaluation, provide pt/family education and to maximize pt's level of independence in the home and community environment.      Pt's spiritual, cultural and educational needs considered and pt agreeable to plan of care and goals.     Anticipated barriers to physical therapy: none     Goals:     1. Patient will show decreased girth in R LE by up to 1 cm to allow for LE symmetry, shoe and clothing choice, and ability to apply needed compression.  (progressing, not met)   2. Patient will demonstrate 100% knowledge of lymphedema precautions and signs of infection to allow for reduced lymphedema risk, infection risk, and/or exacerbation of condition.  (progressing, not met)  3. Patient or caregiver will perform self-bandaging techniques and/or wearing of compression garments to allow for lymphatic drainage support, skin elasticity, and reduction in  shape and size of limb. (progressing, not met)  4. Patient will perform self lymph drainage techniques to areas within reach to enhance lymphatic drainage and skin condition.  (progressing, not met)  5. Patient will tolerate daily activities with multilayered bandaging to allow for lymphatic and venous support.  (progressing, not met)     Long Term Goals: (12  weeks)  1. Patient will show decreased girth in R LE by up to 2 cm  to allow for LE symmetry, shoe and clothing choice, and ability to apply needed compression daily.  (progressing, not met)  2. Patient will show reduction in density to mild or less with improved contour of limb to allow for cosmesis, LE symmetry, infection risk reduction, and clothing and compression choice.   (progressing, not met)  3. Patient to terrence/doff compression garment with daily compliance to assist in lymphedema management, skin elasticity, and tissue density.  (progressing, not met)  4. Pt to show improved postural awareness and alignment.  (progressing, not met)  5. Pt to be I and compliant with HEP to allow for increased function in affected limb.   (progressing, not met)     Plan   Continue PT  2x   weekly for Complete Decongestive Therapy:  Manual lymphatic drainage, Multilayered short stretch bandaging, Pneumatic compression, Therapeutic exercises, Patient education as deemed necessary to achieve stated goals.        Gladis Medeiros, PT          Gladis Medeiros, PT

## 2022-07-06 ENCOUNTER — CLINICAL SUPPORT (OUTPATIENT)
Dept: REHABILITATION | Facility: HOSPITAL | Age: 77
End: 2022-07-06
Payer: COMMERCIAL

## 2022-07-06 DIAGNOSIS — M79.89 LEG SWELLING: Primary | ICD-10-CM

## 2022-07-06 DIAGNOSIS — I10 ESSENTIAL HYPERTENSION: ICD-10-CM

## 2022-07-06 PROCEDURE — 97140 MANUAL THERAPY 1/> REGIONS: CPT | Mod: CQ

## 2022-07-06 RX ORDER — AMLODIPINE BESYLATE 10 MG/1
10 TABLET ORAL DAILY
Qty: 90 TABLET | Refills: 2 | Status: SHIPPED | OUTPATIENT
Start: 2022-07-06 | End: 2023-04-03 | Stop reason: SDUPTHER

## 2022-07-06 NOTE — TELEPHONE ENCOUNTER
Refill Decision Note   Mena Odonnell  is requesting a refill authorization.  Brief Assessment and Rationale for Refill:  Approve     Medication Therapy Plan:       Medication Reconciliation Completed: No   Comments:     Provider Staff:     Action is required for this patient.   Please see care gap opportunities below in Care Due Message.     Thanks!  [facility]     Appointments      Date Provider   Last Visit   3/29/2022 Leonard Wells MD   Next Visit   10/3/2022 Leonard Wells MD     Note composed:4:11 PM 07/06/2022           Note composed:4:11 PM 07/06/2022

## 2022-07-06 NOTE — TELEPHONE ENCOUNTER
Care Due:                  Date            Visit Type   Department     Provider  --------------------------------------------------------------------------------                                [facility]                              PRIMARY      MEDICINE/  Last Visit: 03-      CARE ([facility])   INTERNAL MED   Leonard Wells                              [facility]                              PRIMARY      MEDICINE/  Next Visit: 10-      CARE ([facility])   INTERNAL MED   Leonard Wells                                                            Last  Test          Frequency    Reason                     Performed    Due Date  --------------------------------------------------------------------------------    HBA1C.......  6 months...  empagliflozin............  03- 09-    [facility] Embedded Care Gaps. Reference number: [id number]. 7/06/2022   10:49:17 AM CDT

## 2022-07-06 NOTE — PROGRESS NOTES
Physical Therapy Daily Treatment Note     Name: Mena Odonnell  Clinic Number: [id number]    Therapy Diagnosis:   Encounter Diagnosis   Name Primary?    Leg swelling Yes     Physician: Dre Blair MD    Visit Date: 7/6/2022    Physician Orders: PT Eval and Treat - lymphedema  Medical Diagnosis from Referral: I73.9 (ICD-10-CM) - Peripheral arterial disease   Evaluation Date: 4/4/2022  Authorization Period Expiration: 12/31/2022  Plan of Care Expiration: 6/14/2022   Requesting extension to 7/22/2022 as pt has not started treatment   Visit # / Visits authorized: 5/20     Time In: 2:10 pm   Time Out: 3:00 pm  Total Billable Time: 60 minutes     Subjective      Pt reports: she is doing well. She is wearing her lighter compression today and feels more swelling in her ankle then usual.   She was compliant with home exercise program.  Response to previous treatment: fine   Functional change: decreased swelling      Pain: 0/10  Location: bilateral lower legs      Objective      Pt arrives wearing knee high compression              Treatment:   Mena received the following manual therapy techniques:- Manual Lymphatic Drainage were applied to the: RLE for 50 minutes, including: MLD and short stretch compression bandaging         MANUAL LYMPHATIC DRAINAGE (MLD):    While supine with LEs elevated stimulation at terminus, along GI region, B inguinal regions, drainage of entire R LE ag lower leg, ankle, and foot with return proximally,  Use of Aquaphor due to dryness.   Educated in self massage to abdominal areas, B inguinal areas, thigh, and remaining LE within reach.        MULTILAYERED BANDAGING:  issued supplies and bandaged R LE with cotton stockinette, komprex section dorsum of foot, 2 komprex wedges post malleoli, 2 komprex rolls ankle to knee, 1-8cm and 2- 10cm Durelast rolls foot to knee, to leave intact 12-24 hrs as tolerated, discontinue with any problems, return rolled bandages next session. Wash and wear  schedules confirmed.     Home Exercises Provided and Patient Education Provided      Education provided:    Remove bandages if painful   PATIENT/FAMILY Education: bandaging wear schedule,  HEP,  Beginning of self massage,  Self or assisted bandaging, compression options, and Risk reduction     Written Home Exercises Provided: Patient instructed to cont prior HEP.  Exercises were reviewed and Mena was able to demonstrate them prior to the end of the session.  Mena demonstrated good  understanding of the education provided.         Assessment     Pt is responding well and with good reductions reported with bandaging .   Pt is complaint with the daily wear of knee high compression and would benefit from continued sessions to try further decrease BLE swelling       Mena Is progressing well towards her goals.   Pt prognosis is Good.      Pt will continue to benefit from skilled outpatient physical therapy to address the deficits listed in the problem list box on initial evaluation, provide pt/family education and to maximize pt's level of independence in the home and community environment.   Pt's spiritual, cultural and educational needs considered and pt agreeable to plan of care and goals.     Anticipated barriers to physical therapy: none     Goals:     1. Patient will show decreased girth in R LE by up to 1 cm to allow for LE symmetry, shoe and clothing choice, and ability to apply needed compression.  (progressing, not met)   2. Patient will demonstrate 100% knowledge of lymphedema precautions and signs of infection to allow for reduced lymphedema risk, infection risk, and/or exacerbation of condition.  (progressing, not met)  3. Patient or caregiver will perform self-bandaging techniques and/or wearing of compression garments to allow for lymphatic drainage support, skin elasticity, and reduction in shape and size of limb. (progressing, not met)  4. Patient will perform self lymph drainage techniques to areas  within reach to enhance lymphatic drainage and skin condition.  (progressing, not met)  5. Patient will tolerate daily activities with multilayered bandaging to allow for lymphatic and venous support.  (progressing, not met)     Long Term Goals: (12  weeks)  1. Patient will show decreased girth in R LE by up to 2 cm  to allow for LE symmetry, shoe and clothing choice, and ability to apply needed compression daily.  (progressing, not met)  2. Patient will show reduction in density to mild or less with improved contour of limb to allow for cosmesis, LE symmetry, infection risk reduction, and clothing and compression choice.   (progressing, not met)  3. Patient to terrence/doff compression garment with daily compliance to assist in lymphedema management, skin elasticity, and tissue density.  (progressing, not met)  4. Pt to show improved postural awareness and alignment.  (progressing, not met)  5. Pt to be I and compliant with HEP to allow for increased function in affected limb.   (progressing, not met)     Plan   Continue PT  2x   weekly for Complete Decongestive Therapy:  Manual lymphatic drainage, Multilayered short stretch bandaging, Pneumatic compression, Therapeutic exercises, Patient education as deemed necessary to achieve stated goals.         Paola Mart, PTA

## 2022-07-08 ENCOUNTER — CLINICAL SUPPORT (OUTPATIENT)
Dept: REHABILITATION | Facility: HOSPITAL | Age: 77
End: 2022-07-08
Payer: COMMERCIAL

## 2022-07-08 DIAGNOSIS — M79.89 LEG SWELLING: Primary | ICD-10-CM

## 2022-07-08 PROCEDURE — 97140 MANUAL THERAPY 1/> REGIONS: CPT

## 2022-07-08 NOTE — PROGRESS NOTES
Physical Therapy Daily Treatment Note     Name: Mena Posadas [place]  Clinic Number: [id number]    Therapy Diagnosis:   Encounter Diagnosis   Name Primary?    Leg swelling Yes     Physician: Dre Blair MD    Visit Date: 7/8/2022    Physician Orders: PT Eval and Treat - lymphedema  Medical Diagnosis from Referral: I73.9 (ICD-10-CM) - Peripheral arterial disease   Evaluation Date: 4/4/2022  Authorization Period Expiration: 12/31/2022  Plan of Care Expiration: 6/14/2022   Requesting extension to 7/22/2022 as pt has not started treatment   Visit # / Visits authorized: 6/20     Time In: 2:10 pm   Time Out: 3:00 pm  Total Billable Time: 50 minutes     Subjective      Pt reports: she is doing well.  She was compliant with home exercise program.  Response to previous treatment: fine   Functional change: decreased swelling      Pain: 0/10  Location: bilateral lower legs      Objective      Pt arrives wearing knee high compression              Treatment:   Mena received the following manual therapy techniques:- Manual Lymphatic Drainage were applied to the: RLE for 50 minutes, including: MLD and short stretch compression bandaging         MANUAL LYMPHATIC DRAINAGE (MLD):    While supine with LEs elevated stimulation at terminus, along GI region, B inguinal regions, drainage of entire R LE ag lower leg, ankle, and foot with return proximally,  Use of Aquaphor due to dryness.   Educated in self massage to abdominal areas, B inguinal areas, thigh, and remaining LE within reach.        MULTILAYERED BANDAGING:  issued supplies and bandaged R LE with cotton stockinette, komprex section dorsum of foot, 2 komprex wedges post malleoli, 2 komprex rolls ankle to knee, 1-8cm and 2- 10cm Durelast rolls foot to knee, to leave intact 12-24 hrs as tolerated, discontinue with any problems, return rolled bandages next session. Wash and wear schedules confirmed.     Home Exercises Provided and Patient Education Provided      Education  provided:    Remove bandages if painful   PATIENT/FAMILY Education: bandaging wear schedule,  HEP,  Beginning of self massage,  Self or assisted bandaging, compression options, and Risk reduction     Written Home Exercises Provided: Patient instructed to cont prior HEP.  Exercises were reviewed and Mena was able to demonstrate them prior to the end of the session.  Mena demonstrated good  understanding of the education provided.         Assessment     Pt has some more stubborn swelling around R ankle.  Pt is complaint with the daily wear of knee high compression and would benefit from continued sessions to try further decrease BLE swelling       Mena Is progressing well towards her goals.   Pt prognosis is Good.      Pt will continue to benefit from skilled outpatient physical therapy to address the deficits listed in the problem list box on initial evaluation, provide pt/family education and to maximize pt's level of independence in the home and community environment.   Pt's spiritual, cultural and educational needs considered and pt agreeable to plan of care and goals.     Anticipated barriers to physical therapy: none     Goals:     1. Patient will show decreased girth in R LE by up to 1 cm to allow for LE symmetry, shoe and clothing choice, and ability to apply needed compression.  (progressing, not met)   2. Patient will demonstrate 100% knowledge of lymphedema precautions and signs of infection to allow for reduced lymphedema risk, infection risk, and/or exacerbation of condition.  (progressing, not met)  3. Patient or caregiver will perform self-bandaging techniques and/or wearing of compression garments to allow for lymphatic drainage support, skin elasticity, and reduction in shape and size of limb. (progressing, not met)  4. Patient will perform self lymph drainage techniques to areas within reach to enhance lymphatic drainage and skin condition.  (progressing, not met)  5. Patient will tolerate daily  activities with multilayered bandaging to allow for lymphatic and venous support.  (progressing, not met)     Long Term Goals: (12  weeks)  1. Patient will show decreased girth in R LE by up to 2 cm  to allow for LE symmetry, shoe and clothing choice, and ability to apply needed compression daily.  (progressing, not met)  2. Patient will show reduction in density to mild or less with improved contour of limb to allow for cosmesis, LE symmetry, infection risk reduction, and clothing and compression choice.   (progressing, not met)  3. Patient to terrence/doff compression garment with daily compliance to assist in lymphedema management, skin elasticity, and tissue density.  (progressing, not met)  4. Pt to show improved postural awareness and alignment.  (progressing, not met)  5. Pt to be I and compliant with HEP to allow for increased function in affected limb.   (progressing, not met)     Plan   Continue PT  2x   weekly for Complete Decongestive Therapy:  Manual lymphatic drainage, Multilayered short stretch bandaging, Pneumatic compression, Therapeutic exercises, Patient education as deemed necessary to achieve stated goals.         Gladis Medeiros, PT

## 2022-07-13 ENCOUNTER — CLINICAL SUPPORT (OUTPATIENT)
Dept: REHABILITATION | Facility: HOSPITAL | Age: 77
End: 2022-07-13
Payer: COMMERCIAL

## 2022-07-13 DIAGNOSIS — M79.89 LEG SWELLING: Primary | ICD-10-CM

## 2022-07-13 PROCEDURE — 97140 MANUAL THERAPY 1/> REGIONS: CPT

## 2022-07-13 NOTE — PROGRESS NOTES
Physical Therapy Daily Treatment Note     Name: Mena Posadas [place]  Clinic Number: [id number]    Therapy Diagnosis:   Encounter Diagnosis   Name Primary?    Leg swelling Yes     Physician: Dre Blair MD    Visit Date: 7/13/2022    Physician Orders: PT Eval and Treat - lymphedema  Medical Diagnosis from Referral: I73.9 (ICD-10-CM) - Peripheral arterial disease   Evaluation Date: 4/4/2022  Authorization Period Expiration: 12/31/2022  Plan of Care Expiration: 6/14/2022   Requesting extension to 7/22/2022 as pt has not started treatment   Visit # / Visits authorized: 7/20     Time In: 2:00pm   Time Out: 3:00 pm  Total Billable Time: 60 minutes     Subjective      Pt reports: She is doing well, still complaint with her compression.   She was compliant with home exercise program.  Response to previous treatment: fine   Functional change: decreased swelling      Pain: 0/10  Location: bilateral lower legs      Objective      Pt arrives wearing knee high compression              Treatment:   Mena received the following manual therapy techniques:- Manual Lymphatic Drainage were applied to the: RLE for 60 minutes, including: MLD and short stretch compression bandaging         MANUAL LYMPHATIC DRAINAGE (MLD):    While supine with LEs elevated stimulation at terminus, along GI region, B inguinal regions, drainage of entire R LE ag lower leg, ankle, and foot with return proximally,  Use of Aquaphor due to dryness.   Educated in self massage to abdominal areas, B inguinal areas, thigh, and remaining LE within reach.        MULTILAYERED BANDAGING:  issued supplies and bandaged R LE with cotton stockinette, komprex section dorsum of foot, 2 komprex wedges post malleoli, 2 komprex rolls ankle to knee, 1-8cm and 2- 10cm Durelast rolls foot to knee, to leave intact 12-24 hrs as tolerated, discontinue with any problems, return rolled bandages next session. Wash and wear schedules confirmed.     Home Exercises Provided and  Patient Education Provided      Education provided:    Remove bandages if painful   PATIENT/FAMILY Education: bandaging wear schedule,  HEP,  Beginning of self massage,  Self or assisted bandaging, compression options, and Risk reduction     Written Home Exercises Provided: Patient instructed to cont prior HEP.  Exercises were reviewed and Mena was able to demonstrate them prior to the end of the session.  Mena demonstrated good  understanding of the education provided.         Assessment     Pt still has some persistent swelling around RLE ankle.  Pt is complaint with the daily wear of knee high compression and would benefit from continued sessions to try further decrease BLE swelling       Mena Is progressing well towards her goals.   Pt prognosis is Good.      Pt will continue to benefit from skilled outpatient physical therapy to address the deficits listed in the problem list box on initial evaluation, provide pt/family education and to maximize pt's level of independence in the home and community environment.   Pt's spiritual, cultural and educational needs considered and pt agreeable to plan of care and goals.     Anticipated barriers to physical therapy: none     Goals:     1. Patient will show decreased girth in R LE by up to 1 cm to allow for LE symmetry, shoe and clothing choice, and ability to apply needed compression.  (progressing, not met)   2. Patient will demonstrate 100% knowledge of lymphedema precautions and signs of infection to allow for reduced lymphedema risk, infection risk, and/or exacerbation of condition.  (progressing, not met)  3. Patient or caregiver will perform self-bandaging techniques and/or wearing of compression garments to allow for lymphatic drainage support, skin elasticity, and reduction in shape and size of limb. (progressing, not met)  4. Patient will perform self lymph drainage techniques to areas within reach to enhance lymphatic drainage and skin condition.   (progressing, not met)  5. Patient will tolerate daily activities with multilayered bandaging to allow for lymphatic and venous support.  (progressing, not met)     Long Term Goals: (12  weeks)  1. Patient will show decreased girth in R LE by up to 2 cm  to allow for LE symmetry, shoe and clothing choice, and ability to apply needed compression daily.  (progressing, not met)  2. Patient will show reduction in density to mild or less with improved contour of limb to allow for cosmesis, LE symmetry, infection risk reduction, and clothing and compression choice.   (progressing, not met)  3. Patient to terrence/doff compression garment with daily compliance to assist in lymphedema management, skin elasticity, and tissue density.  (progressing, not met)  4. Pt to show improved postural awareness and alignment.  (progressing, not met)  5. Pt to be I and compliant with HEP to allow for increased function in affected limb.   (progressing, not met)     Plan   Continue PT  2x   weekly for Complete Decongestive Therapy:  Manual lymphatic drainage, Multilayered short stretch bandaging, Pneumatic compression, Therapeutic exercises, Patient education as deemed necessary to achieve stated goals.         Gladis Medeiros, PT

## 2022-07-15 ENCOUNTER — CLINICAL SUPPORT (OUTPATIENT)
Dept: REHABILITATION | Facility: HOSPITAL | Age: 77
End: 2022-07-15
Payer: COMMERCIAL

## 2022-07-15 DIAGNOSIS — M79.89 LEG SWELLING: Primary | ICD-10-CM

## 2022-07-15 PROCEDURE — 97140 MANUAL THERAPY 1/> REGIONS: CPT

## 2022-07-16 ENCOUNTER — LAB VISIT (OUTPATIENT)
Dept: LAB | Facility: HOSPITAL | Age: 77
End: 2022-07-16
Attending: NURSE PRACTITIONER
Payer: COMMERCIAL

## 2022-07-16 DIAGNOSIS — E11.42 TYPE 2 DIABETES MELLITUS WITH DIABETIC POLYNEUROPATHY, WITH LONG-TERM CURRENT USE OF INSULIN: ICD-10-CM

## 2022-07-16 DIAGNOSIS — Z79.4 TYPE 2 DIABETES MELLITUS WITH DIABETIC POLYNEUROPATHY, WITH LONG-TERM CURRENT USE OF INSULIN: ICD-10-CM

## 2022-07-16 LAB
ESTIMATED AVG GLUCOSE: 157 MG/DL (ref 68–131)
HBA1C MFR BLD: 7.1 % (ref 4–5.6)

## 2022-07-16 PROCEDURE — 83036 HEMOGLOBIN GLYCOSYLATED A1C: CPT | Performed by: NURSE PRACTITIONER

## 2022-07-16 PROCEDURE — 36415 COLL VENOUS BLD VENIPUNCTURE: CPT | Mod: PO | Performed by: NURSE PRACTITIONER

## 2022-07-18 ENCOUNTER — CLINICAL SUPPORT (OUTPATIENT)
Dept: REHABILITATION | Facility: HOSPITAL | Age: 77
End: 2022-07-18
Payer: COMMERCIAL

## 2022-07-18 ENCOUNTER — HOSPITAL ENCOUNTER (OUTPATIENT)
Dept: CARDIOLOGY | Facility: HOSPITAL | Age: 77
Discharge: HOME | End: 2022-07-18
Attending: SURGERY
Payer: COMMERCIAL

## 2022-07-18 ENCOUNTER — OFFICE VISIT (OUTPATIENT)
Dept: VASCULAR SURGERY | Facility: CLINIC | Age: 77
End: 2022-07-18
Payer: COMMERCIAL

## 2022-07-18 VITALS — SYSTOLIC BLOOD PRESSURE: 110 MMHG | DIASTOLIC BLOOD PRESSURE: 50 MMHG

## 2022-07-18 DIAGNOSIS — I89.0 LYMPHEDEMA OF BOTH LOWER EXTREMITIES: Primary | ICD-10-CM

## 2022-07-18 DIAGNOSIS — M79.89 LEG SWELLING: Primary | ICD-10-CM

## 2022-07-18 DIAGNOSIS — I89.0 LYMPHEDEMA OF BOTH LOWER EXTREMITIES: ICD-10-CM

## 2022-07-18 PROCEDURE — 3074F SYST BP LT 130 MM HG: CPT | Mod: CPTII,S$GLB,, | Performed by: SURGERY

## 2022-07-18 PROCEDURE — 1159F PR MEDICATION LIST DOCUMENTED IN MEDICAL RECORD: ICD-10-PCS | Mod: CPTII,S$GLB,, | Performed by: SURGERY

## 2022-07-18 PROCEDURE — 93922 ANKLE BRACHIAL INDICES (ABI): ICD-10-PCS | Mod: 26,,, | Performed by: SURGERY

## 2022-07-18 PROCEDURE — 3051F HG A1C>EQUAL 7.0%<8.0%: CPT | Mod: CPTII,S$GLB,, | Performed by: SURGERY

## 2022-07-18 PROCEDURE — 3078F DIAST BP <80 MM HG: CPT | Mod: CPTII,S$GLB,, | Performed by: SURGERY

## 2022-07-18 PROCEDURE — 3074F PR MOST RECENT SYSTOLIC BLOOD PRESSURE < 130 MM HG: ICD-10-PCS | Mod: CPTII,S$GLB,, | Performed by: SURGERY

## 2022-07-18 PROCEDURE — 93922 UPR/L XTREMITY ART 2 LEVELS: CPT

## 2022-07-18 PROCEDURE — 99214 PR OFFICE/OUTPT VISIT, EST, LEVL IV, 30-39 MIN: ICD-10-PCS | Mod: S$GLB,,, | Performed by: SURGERY

## 2022-07-18 PROCEDURE — 99999 PR PBB SHADOW E&M-EST. PATIENT-LVL III: ICD-10-PCS | Mod: PBBFAC,,, | Performed by: SURGERY

## 2022-07-18 PROCEDURE — 97535 SELF CARE MNGMENT TRAINING: CPT

## 2022-07-18 PROCEDURE — 1101F PR PT FALLS ASSESS DOC 0-1 FALLS W/OUT INJ PAST YR: ICD-10-PCS | Mod: CPTII,S$GLB,, | Performed by: SURGERY

## 2022-07-18 PROCEDURE — 3288F FALL RISK ASSESSMENT DOCD: CPT | Mod: CPTII,S$GLB,, | Performed by: SURGERY

## 2022-07-18 PROCEDURE — 99214 OFFICE O/P EST MOD 30 MIN: CPT | Mod: S$GLB,,, | Performed by: SURGERY

## 2022-07-18 PROCEDURE — 99999 PR PBB SHADOW E&M-EST. PATIENT-LVL III: CPT | Mod: PBBFAC,,, | Performed by: SURGERY

## 2022-07-18 PROCEDURE — 1126F PR PAIN SEVERITY QUANTIFIED, NO PAIN PRESENT: ICD-10-PCS | Mod: CPTII,S$GLB,, | Performed by: SURGERY

## 2022-07-18 PROCEDURE — 3051F PR MOST RECENT HEMOGLOBIN A1C LEVEL 7.0 - < 8.0%: ICD-10-PCS | Mod: CPTII,S$GLB,, | Performed by: SURGERY

## 2022-07-18 PROCEDURE — 3288F PR FALLS RISK ASSESSMENT DOCUMENTED: ICD-10-PCS | Mod: CPTII,S$GLB,, | Performed by: SURGERY

## 2022-07-18 PROCEDURE — 93922 UPR/L XTREMITY ART 2 LEVELS: CPT | Mod: 26,,, | Performed by: SURGERY

## 2022-07-18 PROCEDURE — 1126F AMNT PAIN NOTED NONE PRSNT: CPT | Mod: CPTII,S$GLB,, | Performed by: SURGERY

## 2022-07-18 PROCEDURE — 3078F PR MOST RECENT DIASTOLIC BLOOD PRESSURE < 80 MM HG: ICD-10-PCS | Mod: CPTII,S$GLB,, | Performed by: SURGERY

## 2022-07-18 PROCEDURE — 1159F MED LIST DOCD IN RCRD: CPT | Mod: CPTII,S$GLB,, | Performed by: SURGERY

## 2022-07-18 PROCEDURE — 1101F PT FALLS ASSESS-DOCD LE1/YR: CPT | Mod: CPTII,S$GLB,, | Performed by: SURGERY

## 2022-07-18 NOTE — PROGRESS NOTES
Physical Therapy Daily Treatment Note/ Discharge      Name: Mena Odonnell  Clinic Number: [id number]    Therapy Diagnosis:   Encounter Diagnosis   Name Primary?    Leg swelling Yes     Physician: Dre Blair MD    Visit Date: 7/18/2022    Physician Orders: PT Eval and Treat - lymphedema  Medical Diagnosis from Referral: I73.9 (ICD-10-CM) - Peripheral arterial disease   Evaluation Date: 4/4/2022  Authorization Period Expiration: 12/31/2022  Plan of Care Expiration: 6/14/2022   Requesting extension to 7/22/2022 as pt has not started treatment   Visit # / Visits authorized: 9/20     Time In: 2:00pm   Time Out: 2:25pm  Total Billable Time: 25 minutes     Subjective      Pt reports: she is doing well. She is satisfied with her legs at this time   She was compliant with home exercise program.  Response to previous treatment: fine   Functional change: decreased swelling      Pain: 0/10  Location: bilateral lower legs      Objective      Pt arrives wearing knee high compression                    Treatment:   Mena received the following self care/ pt education:   Education on home management of lymphedema including MLD, skincare, infection, and importance of daily wear of compression     Home Exercises Provided and Patient Education Provided      Education provided:     PATIENT/FAMILY Education: bandaging wear schedule,  HEP,  Beginning of self massage,  Self or assisted bandaging, compression options, and Risk reduction     Written Home Exercises Provided: Patient instructed to cont prior HEP.  Exercises were reviewed and Mena was able to demonstrate them prior to the end of the session.  Mena demonstrated good  understanding of the education provided.         Assessment     Pt's measurements show decreases in edema. Pt is complaint with the use of compression and is ready for discharge. Pt was educated on home management and displayed good understanding. Pt is discharged at this time       Mena Is  progressing well towards her goals.   Pt prognosis is Good.      Pt will continue to benefit from skilled outpatient physical therapy to address the deficits listed in the problem list box on initial evaluation, provide pt/family education and to maximize pt's level of independence in the home and community environment.   Pt's spiritual, cultural and educational needs considered and pt agreeable to plan of care and goals.     Anticipated barriers to physical therapy: none     Goals:     1. Patient will show decreased girth in R LE by up to 1 cm to allow for LE symmetry, shoe and clothing choice, and ability to apply needed compression. MET   2. Patient will demonstrate 100% knowledge of lymphedema precautions and signs of infection to allow for reduced lymphedema risk, infection risk, and/or exacerbation of condition.  MET   3. Patient or caregiver will perform self-bandaging techniques and/or wearing of compression garments to allow for lymphatic drainage support, skin elasticity, and reduction in shape and size of limb. MET   4. Patient will perform self lymph drainage techniques to areas within reach to enhance lymphatic drainage and skin condition. MET   5. Patient will tolerate daily activities with multilayered bandaging to allow for lymphatic and venous support.  MET      Long Term Goals: (12  weeks)  1. Patient will show decreased girth in R LE by up to 2 cm  to allow for LE symmetry, shoe and clothing choice, and ability to apply needed compression daily.  (MET   2. Patient will show reduction in density to mild or less with improved contour of limb to allow for cosmesis, LE symmetry, infection risk reduction, and clothing and compression choice.  MET   3. Patient to terrence/doff compression garment with daily compliance to assist in lymphedema management, skin elasticity, and tissue density.  MET  4. Pt to show improved postural awareness and alignment.  MET   5. Pt to be I and compliant with HEP to allow for  increased function in affected limb.   MET      Plan   Continue PT  2x   weekly for Complete Decongestive Therapy:  Manual lymphatic drainage, Multilayered short stretch bandaging, Pneumatic compression, Therapeutic exercises, Patient education as deemed necessary to achieve stated goals.         Gladis Medeiros, PT

## 2022-07-18 NOTE — PROGRESS NOTES
Dre Blair MD VI                       [facility]                         07/18/2022    HPI:  Mena Odonnell is a 76 y.o. female with   Patient Active Problem List   Diagnosis    Type 2 diabetes mellitus with diabetic polyneuropathy    Hyperlipidemia    HTN (hypertension)    Knee pain    H/O vitamin D deficiency    Non morbid obesity due to excess calories    Bilateral carotid bruits    Osteoarthritis of right knee    Morbid obesity with body mass index (BMI) of 40.0 or higher    Dyspnea on exertion    Chronic cough    MEKA (obstructive sleep apnea)    Sensorineural hearing loss (SNHL) of right ear with restricted hearing of left ear    Tinnitus of both ears    Allergic rhinitis    Dysfunction of both eustachian tubes    Nasal septal deviation    Chronic eczematous otitis externa of both ears    Neck abscess    Elevated blood pressure reading    Epidermal inclusion cyst    Polyneuropathy associated with underlying disease    Chronic pain of both knees    Decreased strength, endurance, and mobility    Difficulty walking    Pain and swelling of left knee    Pain and swelling of right knee    Weakness    Leg swelling    being managed by PCP and specialists who is here today for evaluation of PVD.  Patient has no complaints of claudication.  Patient states location is RLE occurring for months.  Associated signs and symptoms include edema.  Quality is burning and severity is 6/10.  Symptoms began months ago.  Alleviating factors include elevation.  Worsening factors include dependency.  no rest pain.  no tissue loss.  Patient is diabetic.  Is not on Pletal.  no previous lower extremity interventions.    no MI  no Stroke  Tobacco use: denies  Daily Aspirin: yes  Anticoagulation: no    5/2022:  No new issues    7/2022:  States feels better, +therapy, has worn compression and elevating legs for greater than 3 mo.    Past Medical History:   Diagnosis Date     Cataract     Diabetes mellitus type II     Diabetes with neurologic complications     DM retinopathy     Hyperlipidemia     Hypertension     Obesity     Osteoarthritis     Peripheral neuropathy     Polyneuropathy     Severe obesity (BMI 35.0-39.9) with comorbidity 2016    Sleep apnea     Tendonitis     left foot     Past Surgical History:   Procedure Laterality Date    BLADDER SUSPENSION      CATARACT EXTRACTION      COLONOSCOPY N/A 2015    Procedure: COLONOSCOPY;  Surgeon: Javed Wood MD;  Location: [facility] ([address] [place] [address]);  Service: Endoscopy;  Laterality: N/A;    HYSTERECTOMY  1978    fibroids    NECK MASS EXCISION  2019    Procedure: EXCISION, MASS, NECK;  Surgeon: Edwin Barrow MD;  Location: [facility];  Service: General;;  RN PREOP 10/30/2019    TONSILLECTOMY       Family History   Problem Relation Age of Onset    Thyroid disease Mother     Cataracts Mother     Diabetes Mother     Stroke Mother     Hypertension Mother     Thyroid disease Sister     Diabetes Sister     Hypertension Sister     Hypertension Daughter     Diabetes Son     Diabetes Sister     Hypertension Sister     Hypertension Brother     Hypertension Sister     Hypertension Sister     Hypertension Brother     Thyroid disease Maternal Aunt     Thyroid disease Maternal Aunt     Colon cancer Maternal Grandfather     Breast cancer Neg Hx     Ovarian cancer Neg Hx     Amblyopia Neg Hx     Blindness Neg Hx     Cancer Neg Hx     Glaucoma Neg Hx     Macular degeneration Neg Hx     Retinal detachment Neg Hx     Strabismus Neg Hx      Social History     Socioeconomic History    Marital status:     Number of children: 2   Occupational History     Employer: [email or web address]   Tobacco Use    Smoking status: Former Smoker     Packs/day: 0.25     Years: 15.00     Pack years: 3.75     Quit date:      Years since quittin.5    Smokeless tobacco: Never Used   Substance and  Sexual Activity    Alcohol use: No    Drug use: No    Sexual activity: Not Currently     Partners: Male     Birth control/protection: Abstinence   Social History Narrative    . One daughter. Works as an Apartment        Current Outpatient Medications:     amLODIPine (NORVASC) 10 MG tablet, Take 1 tablet (10 mg total) by mouth once daily., Disp: 90 tablet, Rfl: 2    aspirin 81 MG Chew, Take 1 tablet (81 mg total) by mouth once daily., Disp: 30 tablet, Rfl: 0    blood-glucose sensor (DEXCOM G6 SENSOR) Sujey, Change sensor every 10 days, Disp: 3 each, Rfl: 11    blood-glucose transmitter (DEXCOM G6 TRANSMITTER) Sujey, Change every 3 months, Disp: 1 each, Rfl: 3    cetirizine (ZYRTEC) 10 MG tablet, TAKE 1 TABLET BY MOUTH ONCE DAILY FOR 30 DAYS, Disp: , Rfl: 3    CONTOUR NEXT TEST STRIPS Strp, Check glucose before breakfast and dinner., Disp: 100 each, Rfl: 5    COVID-19 VACC,MRNA,PFIZER,,PF, IM, COVID-19 vacc,mRNA(Pfizer)(PF), Disp: , Rfl:     diabetic supplies, miscellan. Kit, Please dispense one 14 day FreeStyle Giulia [place], Disp: 1 kit, Rfl: 0    diclofenac sodium (VOLTAREN) 1 % Gel, Lower extremities: Apply the gel (4 g) to the affected area 4 times daily. Do not apply more than 16 g daily to any one affected joint of the lower extremities. Upper extremities: Apply the gel (2 g) to the affected area 4 times daily. Do not apply more than 8 g daily to any one affected joint of the upper extremities. Total dose should not exceed 32 g per day, overall affected joints., Disp: 100 g, Rfl: 5    empagliflozin (JARDIANCE) 10 mg tablet, Take 1 tablet (10 mg total) by mouth once daily., Disp: 90 tablet, Rfl: 2    flash glucose sensor (FREESTYLE GIULIA 14 DAY SENSOR) Kit, 1 sensor kit every 14 days (requires 2 kits per month), Disp: 2 kit, Rfl: 6    fluocinolone acetonide oil 0.01 % Drop, Place 4 drops in ear(s) 2 (two) times daily., Disp: 20 mL, Rfl: 5    gabapentin (NEURONTIN) 300 MG  capsule, Take 1 capsule (300 mg total) by mouth 3 (three) times daily., Disp: 90 capsule, Rfl: 11    insulin glargine U-300 conc (TOUJEO MAX U-300 SOLOSTAR) 300 unit/mL (3 mL) insulin pen, INJECT 50 UNITS SUBCUTANEOUSLY ONCE DAILY, Disp: 2 pen, Rfl: 5    losartan-hydrochlorothiazide 100-25 mg (HYZAAR) 100-25 mg per tablet, Take 1 tablet by mouth once daily., Disp: 90 tablet, Rfl: 3    metFORMIN (GLUCOPHAGE-XR) 500 MG ER 24hr tablet, Take 1 tablet (500 mg total) by mouth 2 (two) times daily with meals., Disp: 180 tablet, Rfl: 2    montelukast (SINGULAIR) 10 mg tablet, Take 1 tablet (10 mg total) by mouth once daily., Disp: 90 tablet, Rfl: 1    pravastatin (PRAVACHOL) 80 MG tablet, Take 1 tablet (80 mg total) by mouth once daily., Disp: 90 tablet, Rfl: 3    semaglutide (OZEMPIC) 1 mg/dose (4 mg/3 mL), Inject 1 mg into the skin every 7 days., Disp: 1 pen, Rfl: 5    metoprolol succinate (TOPROL-XL) 50 MG 24 hr tablet, Take 1 tablet (50 mg total) by mouth once daily., Disp: 30 tablet, Rfl: 5    Current Facility-Administered Medications:     sodium hyaluronate (EUFLEXXA) 10 mg/mL(mw 2.4 -3.6 million) injection 20 mg, 20 mg, Intra-articular, 1 time in Clinic/HOD, Maile Cuello MD    sodium hyaluronate (EUFLEXXA) 10 mg/mL(mw 2.4 -3.6 million) injection 20 mg, 20 mg, Intra-articular, 1 time in Clinic/HOD, Sumanth Erickson MD    REVIEW OF SYSTEMS:  General: No fevers or chills; ENT: No sore throat; Allergy and Immunology: no persistent infections; Hematological and Lymphatic: No history of bleeding or easy bruising; Endocrine: negative; Respiratory: no cough, shortness of breath, or wheezing; Cardiovascular: no chest pain or dyspnea on exertion; no claudication, no rest pain; Gastrointestinal: no abdominal pain/back, change in bowel habits, or bloody stools; Genito-Urinary: no dysuria, trouble voiding, or hematuria; Musculoskeletal: negative, no wound; Neurological: no TIA or stroke symptoms; Psychiatric: no  nervousness, anxiety or depression.    PHYSICAL EXAM:                General appearance:  Alert, well-appearing, and in no distress.  Oriented to person, place, and time                    Neurological: Normal speech, no focal findings noted; CN II - XII grossly intact. RLE with sensation to light touch, LLE with sensation to light touch.            Musculoskeletal: Digits/nail without cyanosis/clubbing.  Strength 5/5 BLE.                    Neck: Supple, no significant adenopathy                  Chest:  Clear to auscultation, no wheezes, rales or rhonchi, symmetric air entry. No use of accessory muscles               Cardiac: Normal rate and regular rhythm, S1 and S2 normal            Abdomen: Soft, nontender, nondistended, no masses or organomegaly, no hernia     No rebound tenderness noted; bowel sounds normal     Pulsatile aortic mass is non palpable.     No groin adenopathy      Extremities:     2+ R DP pulse, 2+ L DP pulse     2+ RLE edema, 1+ LLE edema    Skin: RLE no tissue loss; LLE no tissue loss    LAB RESULTS:  No results found for: CBC  Lab Results   Component Value Date    LABPROT 10.5 06/20/2013    INR 1.0 06/20/2013     Lab Results   Component Value Date     02/26/2022    K 3.4 (L) 02/26/2022     02/26/2022    CO2 25 02/26/2022     (H) 02/26/2022    BUN 14 02/26/2022    CREATININE 0.6 02/26/2022    CALCIUM 9.2 02/26/2022    ANIONGAP 12 02/26/2022    EGFRNONAA >60.0 02/26/2022     Lab Results   Component Value Date    WBC 7.07 12/07/2019    RBC 4.52 12/07/2019    HGB 13.4 12/07/2019    HCT 43.3 12/07/2019    MCV 96 12/07/2019    MCH 29.6 12/07/2019    MCHC 30.9 (L) 12/07/2019    RDW 14.5 12/07/2019     12/07/2019    MPV 12.2 12/07/2019    GRAN 3.4 12/07/2019    GRAN 47.3 12/07/2019    LYMPH 2.8 12/07/2019    LYMPH 39.2 12/07/2019    MONO 0.6 12/07/2019    MONO 8.8 12/07/2019    EOS 0.2 12/07/2019    BASO 0.07 12/07/2019    EOSINOPHIL 3.4 12/07/2019    BASOPHIL 1.0 12/07/2019     DIFFMETHOD Automated 12/07/2019     .  Lab Results   Component Value Date    HGBA1C 7.1 (H) 07/16/2022       IMAGING:  All pertinent imaging has been reviewed and interpreted independently.    BERRY 1/0.65    Arterial US BLE Right lower extremity arterial ultrasound shows R PT hemodynamically significant stenosis.      7/2022  Right lower extremity pressures and waveforms indicate mild arterial occlusive disease.  Left lower extremity pressures and waveforms indicate moderate arterial occlusive disease.      IMP/PLAN:  76 y.o. female with   Patient Active Problem List   Diagnosis    Type 2 diabetes mellitus with diabetic polyneuropathy    Hyperlipidemia    HTN (hypertension)    Knee pain    H/O vitamin D deficiency    Non morbid obesity due to excess calories    Bilateral carotid bruits    Osteoarthritis of right knee    Morbid obesity with body mass index (BMI) of 40.0 or higher    Dyspnea on exertion    Chronic cough    MEKA (obstructive sleep apnea)    Sensorineural hearing loss (SNHL) of right ear with restricted hearing of left ear    Tinnitus of both ears    Allergic rhinitis    Dysfunction of both eustachian tubes    Nasal septal deviation    Chronic eczematous otitis externa of both ears    Neck abscess    Elevated blood pressure reading    Epidermal inclusion cyst    Polyneuropathy associated with underlying disease    Chronic pain of both knees    Decreased strength, endurance, and mobility    Difficulty walking    Pain and swelling of left knee    Pain and swelling of right knee    Weakness    Leg swelling    being managed by PCP and specialists who is here today for evaluation of PVD.    -No RLE PVD with no claudication, no rest pain, no wound.  Imaging reviewed. - rec daily ASA; mild RLE/moderate LLE PVD 7/2022  -BLE lymphedema and venous hypertension - rec lymphedema clinic recs and pumps Rx today  -Patient has tried and failed compression of 20-30 mm Hg, elevation and  exercise for >1 month period.  Basic pump trial performed and discontinued due to sensitivity and pain to static pressure.  Symptoms of swelling, pain and hyperplasia present - rec lymphedema therapy and pumps  -Exercise  -Heart healthy lifestyle  -RTC 6-9 mo    I spent 13 minutes evaluating this patient and greater than 50% of the time was spent counseling, coordinator care and discussing the plan of care.  All questions were answered and patient stated understanding with agreement with the above treatment plan.    Dre Blair MD [facility]  Vascular and Endovascular Surgery

## 2022-07-19 ENCOUNTER — PATIENT MESSAGE (OUTPATIENT)
Dept: RESEARCH | Facility: CLINIC | Age: 77
End: 2022-07-19
Payer: COMMERCIAL

## 2022-07-26 NOTE — PROGRESS NOTES
Physical Therapy Daily Treatment Note     Name: Mena Posadas [place]  Clinic Number: [id number]    Therapy Diagnosis:   Encounter Diagnosis   Name Primary?    Leg swelling Yes     Physician: Dre Blair MD    Visit Date: 7/15/2022    Physician Orders: PT Eval and Treat - lymphedema  Medical Diagnosis from Referral: I73.9 (ICD-10-CM) - Peripheral arterial disease   Evaluation Date: 4/4/2022  Authorization Period Expiration: 12/31/2022  Plan of Care Expiration: 6/14/2022   Requesting extension to 7/22/2022 as pt has not started treatment   Visit # / Visits authorized: 8/20     Time In: 2:00pm   Time Out: 3:00 pm  Total Billable Time: 60 minutes     Subjective      Pt reports: She is doing well, still complaint with her compression.   She was compliant with home exercise program.  Response to previous treatment: fine   Functional change: decreased swelling      Pain: 0/10  Location: bilateral lower legs      Objective      Pt arrives wearing knee high compression              Treatment:   Mena received the following manual therapy techniques:- Manual Lymphatic Drainage were applied to the: RLE for 60 minutes, including: MLD and short stretch compression bandaging         MANUAL LYMPHATIC DRAINAGE (MLD):    While supine with LEs elevated stimulation at terminus, along GI region, B inguinal regions, drainage of entire R LE ag lower leg, ankle, and foot with return proximally,  Use of Aquaphor due to dryness.   Educated in self massage to abdominal areas, B inguinal areas, thigh, and remaining LE within reach.        MULTILAYERED BANDAGING:  issued supplies and bandaged R LE with cotton stockinette, komprex section dorsum of foot, 2 komprex wedges post malleoli, 2 komprex rolls ankle to knee, 1-8cm and 2- 10cm Durelast rolls foot to knee, to leave intact 12-24 hrs as tolerated, discontinue with any problems, return rolled bandages next session. Wash and wear schedules confirmed.     Home Exercises Provided and  Patient Education Provided      Education provided:    Remove bandages if painful   PATIENT/FAMILY Education: bandaging wear schedule,  HEP,  Beginning of self massage,  Self or assisted bandaging, compression options, and Risk reduction     Written Home Exercises Provided: Patient instructed to cont prior HEP.  Exercises were reviewed and Mena was able to demonstrate them prior to the end of the session.  Mena demonstrated good  understanding of the education provided.         Assessment     Pt reports seeing overall decreases with therapy and will be ready for discharge soon.  Pt is complaint with the daily wear of knee high compression and would benefit from continued sessions to try further decrease BLE swelling       Mena Is progressing well towards her goals.   Pt prognosis is Good.      Pt will continue to benefit from skilled outpatient physical therapy to address the deficits listed in the problem list box on initial evaluation, provide pt/family education and to maximize pt's level of independence in the home and community environment.   Pt's spiritual, cultural and educational needs considered and pt agreeable to plan of care and goals.     Anticipated barriers to physical therapy: none     Goals:     1. Patient will show decreased girth in R LE by up to 1 cm to allow for LE symmetry, shoe and clothing choice, and ability to apply needed compression.  (progressing, not met)   2. Patient will demonstrate 100% knowledge of lymphedema precautions and signs of infection to allow for reduced lymphedema risk, infection risk, and/or exacerbation of condition.  (progressing, not met)  3. Patient or caregiver will perform self-bandaging techniques and/or wearing of compression garments to allow for lymphatic drainage support, skin elasticity, and reduction in shape and size of limb. (progressing, not met)  4. Patient will perform self lymph drainage techniques to areas within reach to enhance lymphatic drainage  and skin condition.  (progressing, not met)  5. Patient will tolerate daily activities with multilayered bandaging to allow for lymphatic and venous support.  (progressing, not met)     Long Term Goals: (12  weeks)  1. Patient will show decreased girth in R LE by up to 2 cm  to allow for LE symmetry, shoe and clothing choice, and ability to apply needed compression daily.  (progressing, not met)  2. Patient will show reduction in density to mild or less with improved contour of limb to allow for cosmesis, LE symmetry, infection risk reduction, and clothing and compression choice.   (progressing, not met)  3. Patient to terrence/doff compression garment with daily compliance to assist in lymphedema management, skin elasticity, and tissue density.  (progressing, not met)  4. Pt to show improved postural awareness and alignment.  (progressing, not met)  5. Pt to be I and compliant with HEP to allow for increased function in affected limb.   (progressing, not met)     Plan   Continue PT  2x   weekly for Complete Decongestive Therapy:  Manual lymphatic drainage, Multilayered short stretch bandaging, Pneumatic compression, Therapeutic exercises, Patient education as deemed necessary to achieve stated goals.         Gladis Medeiros, PT

## 2022-08-01 ENCOUNTER — PES CALL (OUTPATIENT)
Dept: ADMINISTRATIVE | Facility: CLINIC | Age: 77
End: 2022-08-01
Payer: COMMERCIAL

## 2022-08-02 DIAGNOSIS — I89.0 LYMPHEDEMA OF BOTH LOWER EXTREMITIES: Primary | ICD-10-CM

## 2022-08-03 ENCOUNTER — PATIENT OUTREACH (OUTPATIENT)
Dept: ADMINISTRATIVE | Facility: HOSPITAL | Age: 77
End: 2022-08-03
Payer: COMMERCIAL

## 2022-08-03 NOTE — PROGRESS NOTES
LVM for pt to call back and schedule a nurse BP visit, visit with PCP or give a home BP reading. Immunization's updated.

## 2022-08-05 ENCOUNTER — TELEPHONE (OUTPATIENT)
Dept: FAMILY MEDICINE | Facility: CLINIC | Age: 77
End: 2022-08-05
Payer: COMMERCIAL

## 2022-08-05 NOTE — TELEPHONE ENCOUNTER
Called patient back to inform at this time Dr. Wells isn't in clinic today and has no sooner openings . She is only in clinic on Mondays and Tuesdays , offered different locations . She preferred Monday with GONZALES Newby at 4 pm . In the meantime will visit a urgent care if need be .

## 2022-08-05 NOTE — TELEPHONE ENCOUNTER
----- Message from Edel Eboni sent at 8/5/2022 11:10 AM CDT -----  Regarding: self  .Type:  Needs Medical Advice    Who Called:  self   Symptoms (please be specific): back, knee pain   How long has patient had these symptoms:  yesterday     Pharmacy name and phone #:  .  [facility] [address]  [address]  [place] [address]  Phone: [phone] Fax: [phone]      Would the patient rather a call back or a response via [facility]?  Call   Best Call Back Number:  [phone]    Additional Information:  requesting appt today but none in epic until next week with any physician

## 2022-08-06 ENCOUNTER — HOSPITAL ENCOUNTER (EMERGENCY)
Facility: HOSPITAL | Age: 77
Discharge: HOME | End: 2022-08-06
Attending: EMERGENCY MEDICINE
Payer: COMMERCIAL

## 2022-08-06 VITALS
OXYGEN SATURATION: 97 % | HEIGHT: 66 IN | WEIGHT: 220 LBS | TEMPERATURE: 99 F | SYSTOLIC BLOOD PRESSURE: 151 MMHG | RESPIRATION RATE: 18 BRPM | DIASTOLIC BLOOD PRESSURE: 68 MMHG | BODY MASS INDEX: 35.36 KG/M2 | HEART RATE: 93 BPM

## 2022-08-06 DIAGNOSIS — M16.12 OSTEOARTHRITIS OF LEFT HIP, UNSPECIFIED OSTEOARTHRITIS TYPE: ICD-10-CM

## 2022-08-06 DIAGNOSIS — M41.86 DEXTROSCOLIOSIS OF LUMBAR SPINE: ICD-10-CM

## 2022-08-06 DIAGNOSIS — M51.36 DEGENERATION OF INTERVERTEBRAL DISC OF LUMBAR REGION WITH OSTEOPHYTE OF LUMBAR VERTEBRA: ICD-10-CM

## 2022-08-06 DIAGNOSIS — M25.552 LEFT HIP PAIN: Primary | ICD-10-CM

## 2022-08-06 DIAGNOSIS — M25.78 DEGENERATION OF INTERVERTEBRAL DISC OF LUMBAR REGION WITH OSTEOPHYTE OF LUMBAR VERTEBRA: ICD-10-CM

## 2022-08-06 PROCEDURE — 63600175 PHARM REV CODE 636 W HCPCS: Performed by: PHYSICIAN ASSISTANT

## 2022-08-06 PROCEDURE — 25000003 PHARM REV CODE 250: Performed by: PHYSICIAN ASSISTANT

## 2022-08-06 PROCEDURE — 99284 EMERGENCY DEPT VISIT MOD MDM: CPT | Mod: 25

## 2022-08-06 PROCEDURE — 96372 THER/PROPH/DIAG INJ SC/IM: CPT | Performed by: PHYSICIAN ASSISTANT

## 2022-08-06 RX ORDER — ONDANSETRON 4 MG/1
4 TABLET, ORALLY DISINTEGRATING ORAL
Status: COMPLETED | OUTPATIENT
Start: 2022-08-06 | End: 2022-08-06

## 2022-08-06 RX ORDER — HYDROCODONE BITARTRATE AND ACETAMINOPHEN 5; 325 MG/1; MG/1
1 TABLET ORAL EVERY 6 HOURS PRN
Qty: 12 TABLET | Refills: 0 | Status: SHIPPED | OUTPATIENT
Start: 2022-08-06 | End: 2022-08-08 | Stop reason: SDUPTHER

## 2022-08-06 RX ORDER — MELOXICAM 7.5 MG/1
7.5 TABLET ORAL DAILY
Qty: 12 TABLET | Refills: 0 | Status: SHIPPED | OUTPATIENT
Start: 2022-08-06 | End: 2023-01-10 | Stop reason: SDUPTHER

## 2022-08-06 RX ORDER — OXYCODONE AND ACETAMINOPHEN 5; 325 MG/1; MG/1
1 TABLET ORAL
Status: COMPLETED | OUTPATIENT
Start: 2022-08-06 | End: 2022-08-06

## 2022-08-06 RX ORDER — KETOROLAC TROMETHAMINE 30 MG/ML
30 INJECTION, SOLUTION INTRAMUSCULAR; INTRAVENOUS
Status: COMPLETED | OUTPATIENT
Start: 2022-08-06 | End: 2022-08-06

## 2022-08-06 RX ADMIN — ONDANSETRON 4 MG: 4 TABLET, ORALLY DISINTEGRATING ORAL at 03:08

## 2022-08-06 RX ADMIN — OXYCODONE AND ACETAMINOPHEN 1 TABLET: 5; 325 TABLET ORAL at 03:08

## 2022-08-06 RX ADMIN — KETOROLAC TROMETHAMINE 30 MG: 30 INJECTION, SOLUTION INTRAMUSCULAR at 03:08

## 2022-08-06 NOTE — ED PROVIDER NOTES
Encounter Date: 8/6/2022       History     Chief Complaint   Patient presents with    Leg Pain     Pt c/o of nontraumatic left leg pain x2 days. Pt stated pain goes the back of her leg from lower buttock to foot.     76-year-old female with history of diabetes and osteoarthritis presents to the emergency department for 2 day history of acute onset atraumatic sharp and positional left lateral hip and left buttock pain that intermittently radiates down the left lower extremity.  Denies back pain, urinary symptoms/incontinence, fever, nausea, vomiting, and numbness to the lower extremity.  No history of similar symptoms.  No medication prior to arrival.  Pain worse with weight-bearing.  Normally uses a walker at baseline.  She does note that she was recently in physical therapy for lymphedema on the right side but not on the left.    The history is provided by the patient.     Review of patient's allergies indicates:   Allergen Reactions    Atorvastatin      Muscle pain     Crestor [rosuvastatin] Other (See Comments)     Leg Weakness.      Past Medical History:   Diagnosis Date    Cataract     Diabetes mellitus type II     Diabetes with neurologic complications     DM retinopathy     Hyperlipidemia     Hypertension     Obesity     Osteoarthritis     Peripheral neuropathy     Polyneuropathy     Severe obesity (BMI 35.0-39.9) with comorbidity 12/30/2016    Sleep apnea     Tendonitis     left foot     Past Surgical History:   Procedure Laterality Date    BLADDER SUSPENSION      CATARACT EXTRACTION      COLONOSCOPY N/A 9/26/2015    Procedure: COLONOSCOPY;  Surgeon: Javed Wood MD;  Location: [address]);  Service: Endoscopy;  Laterality: N/A;    HYSTERECTOMY  1978    fibroids    NECK MASS EXCISION  11/6/2019    Procedure: EXCISION, MASS, NECK;  Surgeon: Edwin Barrow MD;  Location: [facility];  Service: General;;  RN PREOP 10/30/2019    TONSILLECTOMY       Family History   Problem  Relation Age of Onset    Thyroid disease Mother     Cataracts Mother     Diabetes Mother     Stroke Mother     Hypertension Mother     Thyroid disease Sister     Diabetes Sister     Hypertension Sister     Hypertension Daughter     Diabetes Son     Diabetes Sister     Hypertension Sister     Hypertension Brother     Hypertension Sister     Hypertension Sister     Hypertension Brother     Thyroid disease Maternal Aunt     Thyroid disease Maternal Aunt     Colon cancer Maternal Grandfather     Breast cancer Neg Hx     Ovarian cancer Neg Hx     Amblyopia Neg Hx     Blindness Neg Hx     Cancer Neg Hx     Glaucoma Neg Hx     Macular degeneration Neg Hx     Retinal detachment Neg Hx     Strabismus Neg Hx      Social History     Tobacco Use    Smoking status: Former Smoker     Packs/day: 0.25     Years: 15.00     Pack years: 3.75     Quit date:      Years since quittin.6    Smokeless tobacco: Never Used   Substance Use Topics    Alcohol use: No    Drug use: No     Review of Systems   Constitutional: Negative for fever.   Respiratory: Negative for shortness of breath.    Cardiovascular: Negative for chest pain.   Gastrointestinal: Negative for abdominal pain, nausea and vomiting.   Musculoskeletal: Positive for arthralgias. Negative for back pain, joint swelling and neck pain.   Skin: Negative for color change and wound.   Neurological: Negative for numbness.   All other systems reviewed and are negative.      Physical Exam     Initial Vitals [22 1438]   BP Pulse Resp Temp SpO2   (!) 162/69 97 18 99.4 °F (37.4 °C) 97 %      MAP       --         Physical Exam    Nursing note and vitals reviewed.  Constitutional: She appears well-developed and well-nourished. She is not diaphoretic. No distress.   HENT:   Head: Atraumatic.   Right Ear: External ear normal.   Left Ear: External ear normal.   Eyes: Conjunctivae and EOM are normal.   Neck: No tracheal deviation present.   Normal  range of motion.  Cardiovascular: Normal rate and regular rhythm.   Pulmonary/Chest: No accessory muscle usage or stridor. No tachypnea. No respiratory distress.   Abdominal: Abdomen is soft. She exhibits no distension. There is no abdominal tenderness. There is no guarding.   Musculoskeletal:         General: No edema.      Cervical back: Normal range of motion.      Comments: Reproducible TTP over the L sciatic notch and L gluteal musculature. No midline tenderness or bony deformities noted down the neck and spine. No bony TTP to the hips. (+) SLR on the L. No lower extremity swelling. Distal lower extremity pulses 2+ and equal. No rash/skin lesions. No foot drop. Ambulatory with assistance of walker.      Neurological: She is alert and oriented to person, place, and time. She displays no tremor. She displays no seizure activity. Coordination and gait normal.   Skin: Skin is intact. Capillary refill takes less than 2 seconds. No rash noted. No erythema.         ED Course   Procedures  Labs Reviewed - No data to display       Imaging Results          X-Ray Hip 2 or 3 views Left (with Pelvis when performed) (Final result)  Result time 08/06/22 16:21:10    Final result by Scott Donovan MD (08/06/22 16:21:10)                 Impression:      No acute radiographic abnormality.      Electronically signed by: Scott Donovan  Date:    08/06/2022  Time:    16:21             Narrative:    EXAMINATION:  XR HIP WITH PELVIS WHEN PERFORMED, 2 OR 3 VIEWS LEFT    CLINICAL HISTORY:  Pain in left hip    TECHNIQUE:  AP view of the pelvis and frog leg lateral view of the left hip were performed.    COMPARISON:  None    FINDINGS:  No acute fracture, subluxation or dislocation.  No mass or foreign body.  Mild-moderate joint space narrowing of the hips bilaterally left greater than right.  Mild osteophytic spurring of the greater trochanters left greater than right.    Degenerative changes of the lumbar spine with lateral  osteophytes.    No evidence of avascular necrosis.                               X-Ray Lumbar Spine Ap And Lateral (Final result)  Result time 08/06/22 16:23:01    Final result by Scott Donovan MD (08/06/22 16:23:01)                 Impression:      Multilevel chronic degenerative changes.  No acute radiographic abnormality.      Electronically signed by: Scott Donovan  Date:    08/06/2022  Time:    16:23             Narrative:    EXAMINATION:  XR LUMBAR SPINE AP AND LATERAL    CLINICAL HISTORY:  left hip pain;    TECHNIQUE:  AP, lateral and spot images were performed of the lumbar spine.    COMPARISON:  06/08/2009    FINDINGS:  Mild dextroscoliosis of the mid lumbar spine.  Marginal bridging osteophytes left greater than right.    Moderate disc space narrowing at L4-5 with mild narrowing elsewhere.  Moderate facet arthropathy at L5-S1 and mild facet arthropathy at L4-5.    No acute fracture or traumatic subluxation.                                 Medications   ketorolac injection 30 mg (30 mg Intramuscular Given 8/6/22 1541)   oxyCODONE-acetaminophen 5-325 mg per tablet 1 tablet (1 tablet Oral Given 8/6/22 1541)   ondansetron disintegrating tablet 4 mg (4 mg Oral Given 8/6/22 1541)     Medical Decision Making:   History:   Old Medical Records: I decided to obtain old medical records.  ED Management:  Significant degenerative changes of lumbar spine as well as the left hip which are likely contributing to her symptoms.  There may be mild impingement of the sciatic nerve.  No vascular compromise or evidence of infectious process.  No deficits.  Ambulatory.  Pain is controlled in the ED.  She is already established with an orthopedist; may benefit from further investigation with orthopedics if symptoms persist.  Advising follow-up with PCP. Strict return precautions discussed.  Agreeable to plan.                      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 Provider    meloxicam (MOBIC) 7.5 MG tablet Take 1 tablet (7.5 mg total) by mouth once daily. 12 tablet 8/6/2022  Cole Guy PA-C    HYDROcodone-acetaminophen (NORCO) 5-325 mg per tablet Take 1 tablet by mouth every 6 (six) hours as needed for Pain. 12 tablet 8/6/2022 8/9/2022 Cole Guy PA-C        Follow-up Information     Follow up With Specialties Details Why Contact Info    Leonard Wells MD Internal Medicine Schedule an appointment as soon as possible for a visit in 2 days For re-evaluation [address] [facility] [address] [facility] [address]  [phone]      [facility] Orthopedics Schedule an appointment as soon as possible for a visit in 2 days For further evaluation of your orthopedic injury [address] [facility] [address]  [phone]    [facility] Emergency Dept Emergency Medicine Go to  If symptoms worsen [address]  [facility] [id number] [phone]           Cole Guy PA-C  08/06/22 [id number]

## 2022-08-06 NOTE — Clinical Note
"Mena Odonnell (Joan) was seen and treated in our emergency department on 8/6/2022.  She may return to work on 08/11/2022.       If you have any questions or concerns, please don't hesitate to call.      Cole Guy PA-C"

## 2022-08-06 NOTE — DISCHARGE INSTRUCTIONS

## 2022-08-06 NOTE — ED TRIAGE NOTES
Pt. Reports she has left leg pain. Pt. Reports pain radiates from her left toe to her left hip/buttocks area. Pt. reports she has been having the pain for the past few days. Pt. States she has taken tylenol/ibuprofen and other OTC meds for the pain however it is not helping.   
[de-identified] : Right wrist AP/lateral/oblique Xrays OOC: The fracture healed uneventfully in an acceptable alignment with good callus formation There is no significant angulation. The growth plates appear open and unharmed. There is no premature bridging of the growth plate. There no signs of nonunion.\par \par

## 2022-08-08 ENCOUNTER — OFFICE VISIT (OUTPATIENT)
Dept: FAMILY MEDICINE | Facility: CLINIC | Age: 77
End: 2022-08-08
Payer: COMMERCIAL

## 2022-08-08 ENCOUNTER — PATIENT MESSAGE (OUTPATIENT)
Dept: REHABILITATION | Facility: HOSPITAL | Age: 77
End: 2022-08-08
Payer: COMMERCIAL

## 2022-08-08 VITALS
WEIGHT: 221.81 LBS | SYSTOLIC BLOOD PRESSURE: 138 MMHG | TEMPERATURE: 99 F | HEART RATE: 82 BPM | HEIGHT: 66 IN | DIASTOLIC BLOOD PRESSURE: 56 MMHG | OXYGEN SATURATION: 97 % | BODY MASS INDEX: 35.65 KG/M2

## 2022-08-08 DIAGNOSIS — E11.42 TYPE 2 DIABETES MELLITUS WITH DIABETIC POLYNEUROPATHY, WITHOUT LONG-TERM CURRENT USE OF INSULIN: ICD-10-CM

## 2022-08-08 DIAGNOSIS — M54.42 ACUTE LEFT-SIDED LOW BACK PAIN WITH LEFT-SIDED SCIATICA: ICD-10-CM

## 2022-08-08 DIAGNOSIS — M25.552 ACUTE HIP PAIN, LEFT: Primary | ICD-10-CM

## 2022-08-08 DIAGNOSIS — I10 PRIMARY HYPERTENSION: ICD-10-CM

## 2022-08-08 PROCEDURE — 3288F PR FALLS RISK ASSESSMENT DOCUMENTED: ICD-10-PCS | Mod: CPTII,S$GLB,, | Performed by: NURSE PRACTITIONER

## 2022-08-08 PROCEDURE — 1101F PR PT FALLS ASSESS DOC 0-1 FALLS W/OUT INJ PAST YR: ICD-10-PCS | Mod: CPTII,S$GLB,, | Performed by: NURSE PRACTITIONER

## 2022-08-08 PROCEDURE — 3288F FALL RISK ASSESSMENT DOCD: CPT | Mod: CPTII,S$GLB,, | Performed by: NURSE PRACTITIONER

## 2022-08-08 PROCEDURE — 99999 PR PBB SHADOW E&M-EST. PATIENT-LVL V: ICD-10-PCS | Mod: PBBFAC,,, | Performed by: NURSE PRACTITIONER

## 2022-08-08 PROCEDURE — 3075F SYST BP GE 130 - 139MM HG: CPT | Mod: CPTII,S$GLB,, | Performed by: NURSE PRACTITIONER

## 2022-08-08 PROCEDURE — 99215 OFFICE O/P EST HI 40 MIN: CPT | Mod: S$GLB,,, | Performed by: NURSE PRACTITIONER

## 2022-08-08 PROCEDURE — 1125F PR PAIN SEVERITY QUANTIFIED, PAIN PRESENT: ICD-10-PCS | Mod: CPTII,S$GLB,, | Performed by: NURSE PRACTITIONER

## 2022-08-08 PROCEDURE — 3075F PR MOST RECENT SYSTOLIC BLOOD PRESS GE 130-139MM HG: ICD-10-PCS | Mod: CPTII,S$GLB,, | Performed by: NURSE PRACTITIONER

## 2022-08-08 PROCEDURE — 3051F PR MOST RECENT HEMOGLOBIN A1C LEVEL 7.0 - < 8.0%: ICD-10-PCS | Mod: CPTII,S$GLB,, | Performed by: NURSE PRACTITIONER

## 2022-08-08 PROCEDURE — 3078F PR MOST RECENT DIASTOLIC BLOOD PRESSURE < 80 MM HG: ICD-10-PCS | Mod: CPTII,S$GLB,, | Performed by: NURSE PRACTITIONER

## 2022-08-08 PROCEDURE — 1125F AMNT PAIN NOTED PAIN PRSNT: CPT | Mod: CPTII,S$GLB,, | Performed by: NURSE PRACTITIONER

## 2022-08-08 PROCEDURE — 1159F PR MEDICATION LIST DOCUMENTED IN MEDICAL RECORD: ICD-10-PCS | Mod: CPTII,S$GLB,, | Performed by: NURSE PRACTITIONER

## 2022-08-08 PROCEDURE — 3078F DIAST BP <80 MM HG: CPT | Mod: CPTII,S$GLB,, | Performed by: NURSE PRACTITIONER

## 2022-08-08 PROCEDURE — 99999 PR PBB SHADOW E&M-EST. PATIENT-LVL V: CPT | Mod: PBBFAC,,, | Performed by: NURSE PRACTITIONER

## 2022-08-08 PROCEDURE — 99215 PR OFFICE/OUTPT VISIT, EST, LEVL V, 40-54 MIN: ICD-10-PCS | Mod: S$GLB,,, | Performed by: NURSE PRACTITIONER

## 2022-08-08 PROCEDURE — 1101F PT FALLS ASSESS-DOCD LE1/YR: CPT | Mod: CPTII,S$GLB,, | Performed by: NURSE PRACTITIONER

## 2022-08-08 PROCEDURE — 3051F HG A1C>EQUAL 7.0%<8.0%: CPT | Mod: CPTII,S$GLB,, | Performed by: NURSE PRACTITIONER

## 2022-08-08 PROCEDURE — 1159F MED LIST DOCD IN RCRD: CPT | Mod: CPTII,S$GLB,, | Performed by: NURSE PRACTITIONER

## 2022-08-08 RX ORDER — HYDROCODONE BITARTRATE AND ACETAMINOPHEN 5; 325 MG/1; MG/1
1 TABLET ORAL EVERY 6 HOURS PRN
Qty: 12 TABLET | Refills: 0 | Status: SHIPPED | OUTPATIENT
Start: 2022-08-08 | End: 2022-08-11

## 2022-08-08 RX ORDER — METHOCARBAMOL 500 MG/1
500 TABLET, FILM COATED ORAL NIGHTLY
Qty: 10 TABLET | Refills: 0 | Status: SHIPPED | OUTPATIENT
Start: 2022-08-08 | End: 2022-08-09

## 2022-08-08 NOTE — LETTER
"  August 8, 2022      [facility]  [address]  [place] [address]  Phone: [phone]  Fax: [phone]       Patient: Mena Odonnell  YOB: 1945  Date of Visit: 08/08/2022    To Whom It May Concern:    Jfef Odonnell" accompanied her mother Mena Odonnell to an appointment at [facility] on 08/08/2022. The Mrs ADILENE Odonnell may return to work/school on 08/09/2022 with no restrictions. If you have any questions or concerns, or if I can be of further assistance, please do not hesitate to contact me.    Sincerely,      Jacob Newby NP     "

## 2022-08-08 NOTE — LETTER
"  August 8, 2022      [facility]  [address]  [place] [address]  Phone: [phone]  Fax: [phone]       Patient: Jeff Odonnell  YOB: 1945  Date of Visit: 08/08/2022    To Whom It May Concern:    Jeff Odonnell" accompanied her mother Mena Odonnell to an appointment at [facility] on 08/08/2022. The Mrs ADILENE Odonnell may return to work/school on 08/09/2022 with no restrictions. If you have any questions or concerns, or if I can be of further assistance, please do not hesitate to contact me.    Sincerely,    Jacob Newby NP     "

## 2022-08-08 NOTE — PROGRESS NOTES
Chief Complaint  Chief Complaint   Patient presents with    ER follow up    Hip Pain     Left        HPI  Mena Odonnell is a 76 y.o. female with medical diagnoses as listed in the medical history and problem list that presents for Acute Left Hip Pain x 4 days. This patient is new to me.      1. Acute Lower Back/Left Hip Pain x 4 days: Reported to [facility] ER for acute onset of atraumatic lower back/left hip pain. Xray of Lumbar spine and Left hip confirmed multilevel degenerative changes at L4-S1 and Osteophyte formation. No Avascular necrosis noted on Xray. Patient uses a Rolator for long distances and during flare-ups. Currently using Norco 5-325 Q6H for pain and Gabapentin 300 mg TID. History of Diabetes and Osteoarthritis. Latest A1C 7.1.        PAST MEDICAL HISTORY:  Past Medical History:   Diagnosis Date    Cataract     Diabetes mellitus type II     Diabetes with neurologic complications     DM retinopathy     Hyperlipidemia     Hypertension     Obesity     Osteoarthritis     Peripheral neuropathy     Polyneuropathy     Severe obesity (BMI 35.0-39.9) with comorbidity 12/30/2016    Sleep apnea     Tendonitis     left foot       PAST SURGICAL HISTORY:  Past Surgical History:   Procedure Laterality Date    BLADDER SUSPENSION      CATARACT EXTRACTION      COLONOSCOPY N/A 9/26/2015    Procedure: COLONOSCOPY;  Surgeon: Javed Wood MD;  Location: [address]);  Service: Endoscopy;  Laterality: N/A;    HYSTERECTOMY  1978    fibroids    NECK MASS EXCISION  11/6/2019    Procedure: EXCISION, MASS, NECK;  Surgeon: Edwin Barrow MD;  Location: [facility];  Service: General;;  RN PREOP 10/30/2019    TONSILLECTOMY         SOCIAL HISTORY:  Social History     Socioeconomic History    Marital status:     Number of children: 2   Occupational History     Employer: [employer]   Tobacco Use    Smoking status: Former Smoker     Packs/day: 0.25     Years: 15.00      Pack years: 3.75     Quit date:      Years since quittin.6    Smokeless tobacco: Never Used   Substance and Sexual Activity    Alcohol use: No    Drug use: No    Sexual activity: Not Currently     Partners: Male     Birth control/protection: Abstinence   Social History Narrative    . One daughter. Works as an Apartment        FAMILY HISTORY:  Family History   Problem Relation Age of Onset    Thyroid disease Mother     Cataracts Mother     Diabetes Mother     Stroke Mother     Hypertension Mother     Thyroid disease Sister     Diabetes Sister     Hypertension Sister     Hypertension Daughter     Diabetes Son     Diabetes Sister     Hypertension Sister     Hypertension Brother     Hypertension Sister     Hypertension Sister     Hypertension Brother     Thyroid disease Maternal Aunt     Thyroid disease Maternal Aunt     Colon cancer Maternal Grandfather     Breast cancer Neg Hx     Ovarian cancer Neg Hx     Amblyopia Neg Hx     Blindness Neg Hx     Cancer Neg Hx     Glaucoma Neg Hx     Macular degeneration Neg Hx     Retinal detachment Neg Hx     Strabismus Neg Hx        ALLERGIES AND MEDICATIONS: updated and reviewed.  Review of patient's allergies indicates:   Allergen Reactions    Atorvastatin      Muscle pain     Crestor [rosuvastatin] Other (See Comments)     Leg Weakness.      Current Outpatient Medications   Medication Sig Dispense Refill    amLODIPine (NORVASC) 10 MG tablet Take 1 tablet (10 mg total) by mouth once daily. 90 tablet 2    aspirin 81 MG Chew Take 1 tablet (81 mg total) by mouth once daily. 30 tablet 0    blood-glucose sensor (DEXCOM G6 SENSOR) Sujey Change sensor every 10 days 3 each 11    blood-glucose transmitter (DEXCOM G6 TRANSMITTER) Sujey Change every 3 months 1 each 3    cetirizine (ZYRTEC) 10 MG tablet TAKE 1 TABLET BY MOUTH ONCE DAILY FOR 30 DAYS  3    CONTOUR NEXT TEST STRIPS Strp Check glucose before breakfast and  dinner. 100 each 5    diabetic supplies, miscellan. Kit Please dispense one 14 day FreeStyle Giulia [place] 1 kit 0    diclofenac sodium (VOLTAREN) 1 % Gel Lower extremities: Apply the gel (4 g) to the affected area 4 times daily. Do not apply more than 16 g daily to any one affected joint of the lower extremities. Upper extremities: Apply the gel (2 g) to the affected area 4 times daily. Do not apply more than 8 g daily to any one affected joint of the upper extremities. Total dose should not exceed 32 g per day, overall affected joints. 100 g 5    empagliflozin (JARDIANCE) 10 mg tablet Take 1 tablet (10 mg total) by mouth once daily. 90 tablet 2    gabapentin (NEURONTIN) 300 MG capsule Take 1 capsule (300 mg total) by mouth 3 (three) times daily. 90 capsule 11    insulin glargine U-300 conc (TOUJEO MAX U-300 SOLOSTAR) 300 unit/mL (3 mL) insulin pen INJECT 50 UNITS SUBCUTANEOUSLY ONCE DAILY 2 pen 5    losartan-hydrochlorothiazide 100-25 mg (HYZAAR) 100-25 mg per tablet Take 1 tablet by mouth once daily. 90 tablet 3    meloxicam (MOBIC) 7.5 MG tablet Take 1 tablet (7.5 mg total) by mouth once daily. 12 tablet 0    metFORMIN (GLUCOPHAGE-XR) 500 MG ER 24hr tablet Take 1 tablet (500 mg total) by mouth 2 (two) times daily with meals. 180 tablet 2    metoprolol succinate (TOPROL-XL) 50 MG 24 hr tablet Take 1 tablet (50 mg total) by mouth once daily. 30 tablet 5    OZEMPIC 1 mg/dose (4 mg/3 mL) INJECT 1 MG UNDER THE SKIN ONCE A WEEK 1 pen 11    pravastatin (PRAVACHOL) 80 MG tablet Take 1 tablet (80 mg total) by mouth once daily. 90 tablet 3    COVID-19 VACC,MRNA,PFIZER,,PF, IM COVID-19 vacc,mRNA(Pfizer)(PF)      flash glucose sensor (FREESTYLE GIULIA 14 DAY SENSOR) Kit 1 sensor kit every 14 days (requires 2 kits per month) (Patient not taking: Reported on 8/8/2022) 2 kit 6    fluocinolone acetonide oil 0.01 % Drop Place 4 drops in ear(s) 2 (two) times daily. (Patient not taking: Reported on 8/8/2022) 20 mL 5  "   HYDROcodone-acetaminophen (NORCO) 5-325 mg per tablet Take 1 tablet by mouth every 6 (six) hours as needed for Pain. 12 tablet 0    methocarbamoL (ROBAXIN) 500 MG Tab Take 1 tablet (500 mg total) by mouth every evening. for 10 days 10 tablet 0    montelukast (SINGULAIR) 10 mg tablet Take 1 tablet (10 mg total) by mouth once daily. (Patient not taking: Reported on 8/8/2022) 90 tablet 1     Current Facility-Administered Medications   Medication Dose Route Frequency Provider Last Rate Last Admin    sodium hyaluronate (EUFLEXXA) 10 mg/mL(mw 2.4 -3.6 million) injection 20 mg  20 mg Intra-articular 1 time in Clinic/HOD Maile Cuello MD        sodium hyaluronate (EUFLEXXA) 10 mg/mL(mw 2.4 -3.6 million) injection 20 mg  20 mg Intra-articular 1 time in Clinic/HOD Sumanth Erickson MD             ROS  Review of Systems   Constitutional: Negative for appetite change, chills, fatigue and fever.   HENT: Negative for congestion, ear pain, postnasal drip, rhinorrhea, sinus pressure, sneezing and sore throat.    Respiratory: Negative for shortness of breath.    Cardiovascular: Negative for chest pain and palpitations.   Gastrointestinal: Negative for abdominal pain, constipation, diarrhea, nausea and vomiting.   Genitourinary: Negative for dysuria.   Musculoskeletal: Positive for arthralgias, back pain and gait problem.   Neurological: Negative for headaches.   Psychiatric/Behavioral: Negative for sleep disturbance.           Physical Exam  Vitals:    08/08/22 1548   BP: (!) 138/56   Pulse: 82   Temp: 99.4 °F (37.4 °C)   TempSrc: Oral   SpO2: 97%   Weight: 100.6 kg (221 lb 12.5 oz)   Height: 5' 6" (1.676 m)    Body mass index is 35.8 kg/m².  Weight: 100.6 kg (221 lb 12.5 oz)   Height: 5' 6" (167.6 cm)   Physical Exam  Constitutional:       General: She is not in acute distress.  HENT:      Head: Normocephalic and atraumatic.      Right Ear: External ear normal.      Left Ear: External ear normal.      Nose: Nose normal. "   Eyes:      Pupils: Pupils are equal, round, and reactive to light.   Cardiovascular:      Rate and Rhythm: Normal rate and regular rhythm.   Pulmonary:      Effort: Pulmonary effort is normal. No respiratory distress.      Breath sounds: Normal breath sounds. No wheezing.   Abdominal:      General: Bowel sounds are normal.      Palpations: Abdomen is soft.   Musculoskeletal:      Cervical back: Neck supple.      Lumbar back: Spasms and tenderness present. Decreased range of motion.      Left hip: Tenderness present. Decreased strength.   Lymphadenopathy:      Cervical: No cervical adenopathy.   Skin:     General: Skin is warm and dry.   Neurological:      Mental Status: She is alert and oriented to person, place, and time.             Health Maintenance       Date Due Completion Date    Influenza Vaccine (1) 09/01/2022 10/9/2021    Override on 9/17/2020: Declined    DEXA Scan 11/04/2022 11/4/2019    Hemoglobin A1c 01/16/2023 7/16/2022    Lipid Panel 02/26/2023 2/26/2022    Foot Exam 03/18/2023 3/18/2022    Override on 2/9/2021: Done    Override on 4/21/2016: Done    Override on 10/20/2015: Done    Diabetes Urine Screening 03/29/2023 3/29/2022    Eye Exam 04/14/2023 4/14/2022    Colorectal Cancer Screening 09/26/2025 11/17/2020    TETANUS VACCINE 03/02/2030 3/2/2020            Assessment & Plan  Problem List Items Addressed This Visit        Cardiac/Vascular    HTN (hypertension)  -The current medical regimen is effective;  continue present plan and medications.        Endocrine    Type 2 diabetes mellitus with diabetic polyneuropathy  -Continue Diabetic regimen      Other Visit Diagnoses     Acute hip pain, left    -  Primary  -LA  reviewed  - Renewed Norco until patient is able to get a visit with Back and Spine  - Patient understands she will not get any more refills as she needs to be assessed by a Specialist for treatment plan purposes   -We discussed effective administration of Robaxin and Norco, adverse  effects and side effects with education given for reinforcement  -Physical Therapy as ordered for education and strengthening of long bones, joints and muscles    Relevant Medications    methocarbamoL (ROBAXIN) 500 MG Tab, Nightly    HYDROcodone-acetaminophen (NORCO) 5-325 mg per tablet, Q6H PRN     Other Relevant Orders    Ambulatory referral/consult to [facility]    Ambulatory referral/consult to Physical/Occupational Therapy    Acute left-sided low back pain with left-sided sciatica      - As above     Relevant Medications    methocarbamoL (ROBAXIN) 500 MG Tab    HYDROcodone-acetaminophen (NORCO) 5-325 mg per tablet            Health Maintenance reviewed: None to address at this time    Follow-up: 3 months or sooner if needed

## 2022-08-09 ENCOUNTER — TELEPHONE (OUTPATIENT)
Dept: ORTHOPEDICS | Facility: CLINIC | Age: 77
End: 2022-08-09
Payer: COMMERCIAL

## 2022-08-09 ENCOUNTER — HOSPITAL ENCOUNTER (OUTPATIENT)
Dept: RADIOLOGY | Facility: HOSPITAL | Age: 77
Discharge: HOME | End: 2022-08-09
Attending: REGISTERED NURSE
Payer: COMMERCIAL

## 2022-08-09 ENCOUNTER — OFFICE VISIT (OUTPATIENT)
Dept: ORTHOPEDICS | Facility: CLINIC | Age: 77
End: 2022-08-09
Payer: COMMERCIAL

## 2022-08-09 VITALS — BODY MASS INDEX: 35.26 KG/M2 | WEIGHT: 219.38 LBS | HEIGHT: 66 IN

## 2022-08-09 DIAGNOSIS — M51.36 DDD (DEGENERATIVE DISC DISEASE), LUMBAR: Primary | ICD-10-CM

## 2022-08-09 DIAGNOSIS — M25.552 ACUTE HIP PAIN, LEFT: ICD-10-CM

## 2022-08-09 DIAGNOSIS — M54.42 ACUTE LEFT-SIDED LOW BACK PAIN WITH LEFT-SIDED SCIATICA: ICD-10-CM

## 2022-08-09 DIAGNOSIS — M51.36 DDD (DEGENERATIVE DISC DISEASE), LUMBAR: ICD-10-CM

## 2022-08-09 DIAGNOSIS — M54.16 LUMBAR RADICULOPATHY: ICD-10-CM

## 2022-08-09 PROCEDURE — 99999 PR PBB SHADOW E&M-EST. PATIENT-LVL V: CPT | Mod: PBBFAC,,, | Performed by: REGISTERED NURSE

## 2022-08-09 PROCEDURE — 72110 X-RAY EXAM L-2 SPINE 4/>VWS: CPT | Mod: 26,,, | Performed by: RADIOLOGY

## 2022-08-09 PROCEDURE — 99214 OFFICE O/P EST MOD 30 MIN: CPT | Mod: S$GLB,,, | Performed by: REGISTERED NURSE

## 2022-08-09 PROCEDURE — 1101F PT FALLS ASSESS-DOCD LE1/YR: CPT | Mod: CPTII,S$GLB,, | Performed by: REGISTERED NURSE

## 2022-08-09 PROCEDURE — 3051F HG A1C>EQUAL 7.0%<8.0%: CPT | Mod: CPTII,S$GLB,, | Performed by: REGISTERED NURSE

## 2022-08-09 PROCEDURE — 99999 PR PBB SHADOW E&M-EST. PATIENT-LVL V: ICD-10-PCS | Mod: PBBFAC,,, | Performed by: REGISTERED NURSE

## 2022-08-09 PROCEDURE — 1125F PR PAIN SEVERITY QUANTIFIED, PAIN PRESENT: ICD-10-PCS | Mod: CPTII,S$GLB,, | Performed by: REGISTERED NURSE

## 2022-08-09 PROCEDURE — 1159F PR MEDICATION LIST DOCUMENTED IN MEDICAL RECORD: ICD-10-PCS | Mod: CPTII,S$GLB,, | Performed by: REGISTERED NURSE

## 2022-08-09 PROCEDURE — 3051F PR MOST RECENT HEMOGLOBIN A1C LEVEL 7.0 - < 8.0%: ICD-10-PCS | Mod: CPTII,S$GLB,, | Performed by: REGISTERED NURSE

## 2022-08-09 PROCEDURE — 1159F MED LIST DOCD IN RCRD: CPT | Mod: CPTII,S$GLB,, | Performed by: REGISTERED NURSE

## 2022-08-09 PROCEDURE — 3288F FALL RISK ASSESSMENT DOCD: CPT | Mod: CPTII,S$GLB,, | Performed by: REGISTERED NURSE

## 2022-08-09 PROCEDURE — 3288F PR FALLS RISK ASSESSMENT DOCUMENTED: ICD-10-PCS | Mod: CPTII,S$GLB,, | Performed by: REGISTERED NURSE

## 2022-08-09 PROCEDURE — 72110 X-RAY EXAM L-2 SPINE 4/>VWS: CPT | Mod: TC

## 2022-08-09 PROCEDURE — 1125F AMNT PAIN NOTED PAIN PRSNT: CPT | Mod: CPTII,S$GLB,, | Performed by: REGISTERED NURSE

## 2022-08-09 PROCEDURE — 1101F PR PT FALLS ASSESS DOC 0-1 FALLS W/OUT INJ PAST YR: ICD-10-PCS | Mod: CPTII,S$GLB,, | Performed by: REGISTERED NURSE

## 2022-08-09 PROCEDURE — 72110 XR LUMBAR SPINE AP AND LAT WITH FLEX/EXT: ICD-10-PCS | Mod: 26,,, | Performed by: RADIOLOGY

## 2022-08-09 PROCEDURE — 99214 PR OFFICE/OUTPT VISIT, EST, LEVL IV, 30-39 MIN: ICD-10-PCS | Mod: S$GLB,,, | Performed by: REGISTERED NURSE

## 2022-08-09 RX ORDER — METHYLPREDNISOLONE 4 MG/1
TABLET ORAL
Qty: 1 EACH | Refills: 0 | Status: SHIPPED | OUTPATIENT
Start: 2022-08-09 | End: 2022-08-30

## 2022-08-09 RX ORDER — METHOCARBAMOL 500 MG/1
500 TABLET, FILM COATED ORAL 3 TIMES DAILY
Qty: 60 TABLET | Refills: 1 | Status: SHIPPED | OUTPATIENT
Start: 2022-08-09 | End: 2022-09-18

## 2022-08-09 NOTE — LETTER
"  August 9, 2022      [id number]  [address]  [facility] [address]  Phone: [phone]       Patient: Mena Odonnell   YOB: 1945  Date of Visit: 08/09/2022    To Whom It May Concern:    Jeff Odonnell"  Accompanied her mother Mena Odonnell at an appointment at [facility] on 08/09/2022. The patient may return to work/school on 8/10/22 with no restrictions. If you have any questions or concerns, or if I can be of further assistance, please do not hesitate to contact me.    Sincerely,      ANNALISE Fontaine     "

## 2022-08-09 NOTE — LETTER
August 9, 2022      [id number]  [address]  [facility] [address]  Phone: [phone]       Patient: Mena Odonnell   YOB: 1945  Date of Visit: 08/09/2022    To Whom It May Concern:    Kurtis Odonnell  was at [facility] on 08/09/2022. The patient may return to work/school on 8/17/22 with no restrictions. If you have any questions or concerns, or if I can be of further assistance, please do not hesitate to contact me.    Sincerely,      ANNALISE Fontaine

## 2022-08-09 NOTE — LETTER
August 9, 2022      [id number]  [address]  [facility] [address]  Phone: [phone]       Patient: Mena Odonnell   YOB: 1945  Date of Visit: 08/09/2022    To Whom It May Concern:    Kurtis Odonnell  was at [facility] on 08/09/2022. The patient may return to work/school on 8/15/22 with no restrictions. If you have any questions or concerns, or if I can be of further assistance, please do not hesitate to contact me.    Sincerely,      ANNALISE Fontaine

## 2022-08-15 ENCOUNTER — TELEPHONE (OUTPATIENT)
Dept: ADMINISTRATIVE | Facility: CLINIC | Age: 77
End: 2022-08-15
Payer: COMMERCIAL

## 2022-08-15 NOTE — TELEPHONE ENCOUNTER
Called pt, informed pt I was calling to remind pt of her in office EAWV on 8/16/22; clinic location provided to patient; pt confirmed appointment

## 2022-08-16 ENCOUNTER — OFFICE VISIT (OUTPATIENT)
Dept: FAMILY MEDICINE | Facility: CLINIC | Age: 77
End: 2022-08-16
Payer: COMMERCIAL

## 2022-08-16 ENCOUNTER — OFFICE VISIT (OUTPATIENT)
Dept: ENDOCRINOLOGY | Facility: CLINIC | Age: 77
End: 2022-08-16
Payer: COMMERCIAL

## 2022-08-16 VITALS
HEART RATE: 85 BPM | DIASTOLIC BLOOD PRESSURE: 56 MMHG | HEIGHT: 66 IN | TEMPERATURE: 98 F | BODY MASS INDEX: 35.04 KG/M2 | WEIGHT: 218.06 LBS | RESPIRATION RATE: 18 BRPM | SYSTOLIC BLOOD PRESSURE: 138 MMHG | OXYGEN SATURATION: 98 %

## 2022-08-16 VITALS
SYSTOLIC BLOOD PRESSURE: 139 MMHG | HEART RATE: 82 BPM | WEIGHT: 217 LBS | BODY MASS INDEX: 35.02 KG/M2 | TEMPERATURE: 99 F | DIASTOLIC BLOOD PRESSURE: 74 MMHG

## 2022-08-16 DIAGNOSIS — E78.5 HYPERLIPIDEMIA, UNSPECIFIED HYPERLIPIDEMIA TYPE: ICD-10-CM

## 2022-08-16 DIAGNOSIS — H90.A21 SENSORINEURAL HEARING LOSS (SNHL) OF RIGHT EAR WITH RESTRICTED HEARING OF LEFT EAR: ICD-10-CM

## 2022-08-16 DIAGNOSIS — R73.9 STEROID-INDUCED HYPERGLYCEMIA: ICD-10-CM

## 2022-08-16 DIAGNOSIS — Z00.00 ENCOUNTER FOR PREVENTIVE HEALTH EXAMINATION: Primary | ICD-10-CM

## 2022-08-16 DIAGNOSIS — E78.2 MIXED HYPERLIPIDEMIA: ICD-10-CM

## 2022-08-16 DIAGNOSIS — Z79.4 TYPE 2 DIABETES MELLITUS WITH DIABETIC POLYNEUROPATHY, WITH LONG-TERM CURRENT USE OF INSULIN: Primary | ICD-10-CM

## 2022-08-16 DIAGNOSIS — G47.33 OSA (OBSTRUCTIVE SLEEP APNEA): ICD-10-CM

## 2022-08-16 DIAGNOSIS — I10 PRIMARY HYPERTENSION: ICD-10-CM

## 2022-08-16 DIAGNOSIS — I70.0 AORTIC ATHEROSCLEROSIS: ICD-10-CM

## 2022-08-16 DIAGNOSIS — E11.42 TYPE 2 DIABETES MELLITUS WITH DIABETIC POLYNEUROPATHY, WITHOUT LONG-TERM CURRENT USE OF INSULIN: ICD-10-CM

## 2022-08-16 DIAGNOSIS — T38.0X5A STEROID-INDUCED HYPERGLYCEMIA: ICD-10-CM

## 2022-08-16 DIAGNOSIS — E11.42 TYPE 2 DIABETES MELLITUS WITH DIABETIC POLYNEUROPATHY, WITH LONG-TERM CURRENT USE OF INSULIN: Primary | ICD-10-CM

## 2022-08-16 PROBLEM — R03.0 ELEVATED BLOOD PRESSURE READING: Status: RESOLVED | Noted: 2019-09-17 | Resolved: 2022-08-16

## 2022-08-16 PROCEDURE — 3051F PR MOST RECENT HEMOGLOBIN A1C LEVEL 7.0 - < 8.0%: ICD-10-PCS | Mod: CPTII,S$GLB,, | Performed by: NURSE PRACTITIONER

## 2022-08-16 PROCEDURE — 1160F RVW MEDS BY RX/DR IN RCRD: CPT | Mod: CPTII,S$GLB,, | Performed by: NURSE PRACTITIONER

## 2022-08-16 PROCEDURE — 1160F PR REVIEW ALL MEDS BY PRESCRIBER/CLIN PHARMACIST DOCUMENTED: ICD-10-PCS | Mod: CPTII,S$GLB,, | Performed by: NURSE PRACTITIONER

## 2022-08-16 PROCEDURE — 1159F MED LIST DOCD IN RCRD: CPT | Mod: CPTII,S$GLB,, | Performed by: NURSE PRACTITIONER

## 2022-08-16 PROCEDURE — 3078F PR MOST RECENT DIASTOLIC BLOOD PRESSURE < 80 MM HG: ICD-10-PCS | Mod: CPTII,S$GLB,, | Performed by: NURSE PRACTITIONER

## 2022-08-16 PROCEDURE — G0439 PR MEDICARE ANNUAL WELLNESS SUBSEQUENT VISIT: ICD-10-PCS | Mod: S$GLB,,, | Performed by: NURSE PRACTITIONER

## 2022-08-16 PROCEDURE — 99999 PR PBB SHADOW E&M-EST. PATIENT-LVL V: ICD-10-PCS | Mod: PBBFAC,,, | Performed by: NURSE PRACTITIONER

## 2022-08-16 PROCEDURE — 99214 PR OFFICE/OUTPT VISIT, EST, LEVL IV, 30-39 MIN: ICD-10-PCS | Mod: S$GLB,,, | Performed by: NURSE PRACTITIONER

## 2022-08-16 PROCEDURE — 3078F DIAST BP <80 MM HG: CPT | Mod: CPTII,S$GLB,, | Performed by: NURSE PRACTITIONER

## 2022-08-16 PROCEDURE — 3051F HG A1C>EQUAL 7.0%<8.0%: CPT | Mod: CPTII,S$GLB,, | Performed by: NURSE PRACTITIONER

## 2022-08-16 PROCEDURE — 3288F FALL RISK ASSESSMENT DOCD: CPT | Mod: CPTII,S$GLB,, | Performed by: NURSE PRACTITIONER

## 2022-08-16 PROCEDURE — 1101F PR PT FALLS ASSESS DOC 0-1 FALLS W/OUT INJ PAST YR: ICD-10-PCS | Mod: CPTII,S$GLB,, | Performed by: NURSE PRACTITIONER

## 2022-08-16 PROCEDURE — 1101F PT FALLS ASSESS-DOCD LE1/YR: CPT | Mod: CPTII,S$GLB,, | Performed by: NURSE PRACTITIONER

## 2022-08-16 PROCEDURE — 1159F PR MEDICATION LIST DOCUMENTED IN MEDICAL RECORD: ICD-10-PCS | Mod: CPTII,S$GLB,, | Performed by: NURSE PRACTITIONER

## 2022-08-16 PROCEDURE — 1126F AMNT PAIN NOTED NONE PRSNT: CPT | Mod: CPTII,S$GLB,, | Performed by: NURSE PRACTITIONER

## 2022-08-16 PROCEDURE — 3288F PR FALLS RISK ASSESSMENT DOCUMENTED: ICD-10-PCS | Mod: CPTII,S$GLB,, | Performed by: NURSE PRACTITIONER

## 2022-08-16 PROCEDURE — 99214 OFFICE O/P EST MOD 30 MIN: CPT | Mod: S$GLB,,, | Performed by: NURSE PRACTITIONER

## 2022-08-16 PROCEDURE — 1170F FXNL STATUS ASSESSED: CPT | Mod: CPTII,S$GLB,, | Performed by: NURSE PRACTITIONER

## 2022-08-16 PROCEDURE — 1170F PR FUNCTIONAL STATUS ASSESSED: ICD-10-PCS | Mod: CPTII,S$GLB,, | Performed by: NURSE PRACTITIONER

## 2022-08-16 PROCEDURE — 3075F PR MOST RECENT SYSTOLIC BLOOD PRESS GE 130-139MM HG: ICD-10-PCS | Mod: CPTII,S$GLB,, | Performed by: NURSE PRACTITIONER

## 2022-08-16 PROCEDURE — 99999 PR PBB SHADOW E&M-EST. PATIENT-LVL IV: CPT | Mod: PBBFAC,,, | Performed by: NURSE PRACTITIONER

## 2022-08-16 PROCEDURE — 95251 PR GLUCOSE MONITOR, 72 HOUR, PHYS INTERP: ICD-10-PCS | Mod: S$GLB,,, | Performed by: NURSE PRACTITIONER

## 2022-08-16 PROCEDURE — 99999 PR PBB SHADOW E&M-EST. PATIENT-LVL IV: ICD-10-PCS | Mod: PBBFAC,,, | Performed by: NURSE PRACTITIONER

## 2022-08-16 PROCEDURE — 1126F PR PAIN SEVERITY QUANTIFIED, NO PAIN PRESENT: ICD-10-PCS | Mod: CPTII,S$GLB,, | Performed by: NURSE PRACTITIONER

## 2022-08-16 PROCEDURE — 3075F SYST BP GE 130 - 139MM HG: CPT | Mod: CPTII,S$GLB,, | Performed by: NURSE PRACTITIONER

## 2022-08-16 PROCEDURE — 95251 CONT GLUC MNTR ANALYSIS I&R: CPT | Mod: S$GLB,,, | Performed by: NURSE PRACTITIONER

## 2022-08-16 PROCEDURE — G0439 PPPS, SUBSEQ VISIT: HCPCS | Mod: S$GLB,,, | Performed by: NURSE PRACTITIONER

## 2022-08-16 PROCEDURE — 99999 PR PBB SHADOW E&M-EST. PATIENT-LVL V: CPT | Mod: PBBFAC,,, | Performed by: NURSE PRACTITIONER

## 2022-08-16 RX ORDER — INSULIN GLARGINE 300 U/ML
INJECTION, SOLUTION SUBCUTANEOUS
Qty: 2 PEN | Refills: 5 | Status: SHIPPED | OUTPATIENT
Start: 2022-08-16 | End: 2023-01-10

## 2022-08-16 RX ORDER — INSULIN LISPRO 100 [IU]/ML
INJECTION, SOLUTION INTRAVENOUS; SUBCUTANEOUS
Qty: 15 ML | Refills: 0 | Status: SHIPPED | OUTPATIENT
Start: 2022-08-16 | End: 2023-01-10

## 2022-08-16 RX ORDER — METFORMIN HYDROCHLORIDE 500 MG/1
500 TABLET, EXTENDED RELEASE ORAL 2 TIMES DAILY WITH MEALS
Qty: 180 TABLET | Refills: 2 | Status: SHIPPED | OUTPATIENT
Start: 2022-08-16 | End: 2023-01-10

## 2022-08-16 NOTE — PROGRESS NOTES
"  Mena Odonnell presented for a  Medicare AWV and comprehensive Health Risk Assessment today. The following components were reviewed and updated:    · Medical history  · Family History  · Social history  · Allergies and Current Medications  · Health Risk Assessment  · Health Maintenance  · Care Team         ** See Completed Assessments for Annual Wellness Visit within the encounter summary.**         The following assessments were completed:  · Living Situation  · CAGE  · Depression Screening  · Timed Get Up and Go  · Whisper Test  · Cognitive Function Screening  · Nutrition Screening  · ADL Screening  · PAQ Screening        Vitals:    08/16/22 1256   BP: (!) 138/56   BP Location: Left arm   Patient Position: Sitting   BP Method: Large (Manual)   Pulse: 85   Resp: 18   Temp: 98.3 °F (36.8 °C)   TempSrc: Oral   SpO2: 98%   Weight: 98.9 kg (218 lb 0.6 oz)   Height: 5' 6" (1.676 m)     Body mass index is 35.19 kg/m².  Physical Exam  Vitals reviewed.   Constitutional:       General: She is not in acute distress.     Appearance: Normal appearance. She is not ill-appearing, toxic-appearing or diaphoretic.   HENT:      Head: Normocephalic and atraumatic.   Cardiovascular:      Rate and Rhythm: Normal rate and regular rhythm.      Pulses: Normal pulses.      Heart sounds: Normal heart sounds.   Pulmonary:      Effort: Pulmonary effort is normal.      Breath sounds: Normal breath sounds.   Skin:     General: Skin is warm and dry.   Neurological:      General: No focal deficit present.      Mental Status: She is alert and oriented to person, place, and time.   Psychiatric:         Mood and Affect: Mood normal.         Behavior: Behavior normal.                   Diagnoses and health risks identified today and associated recommendations/orders:    1. Encounter for preventive health examination  Provided patient with a 5-10 year written screening schedule and personal prevention plan. Recommendations were developed using the " USPSTF age appropriate recommendations. Education, counseling, and referrals were provided as needed. After Visit Summary printed and given to patient which includes a list of additional screenings\tests needed.    2. Type 2 diabetes mellitus with diabetic polyneuropathy, without long-term current use of insulin  No acute concerns.    3. MEKA (obstructive sleep apnea)  No acute concerns.    4. Primary hypertension  No acute concerns.    5. Mixed hyperlipidemia  No acute concerns.    6. Sensorineural hearing loss (SNHL) of right ear with restricted hearing of left ear  No acute concerns.    7. Aortic atherosclerosis  No acute concerns.      Provided Mena with a 5-10 year written screening schedule and personal prevention plan. Recommendations were developed using the USPSTF age appropriate recommendations. Education, counseling, and referrals were provided as needed. After Visit Summary printed and given to patient which includes a list of additional screenings\tests needed.    Follow up in about 1 year (around 8/16/2023) for AWV.    Lorne Reeves, NP  I offered to discuss advanced care planning, including how to pick a person who would make decisions for you if you were unable to make them for yourself, called a health care power of , and what kind of decisions you might make such as use of life sustaining treatments such as ventilators and tube feeding when faced with a life limiting illness recorded on a living will that they will need to know. (How you want to be cared for as you near the end of your natural life)     X Patient is interested in learning more about how to make advanced directives.  I provided them paperwork and offered to discuss this with them.

## 2022-08-16 NOTE — PATIENT INSTRUCTIONS
Counseling and Referral of Other Preventative  (Italic type indicates deductible and co-insurance are waived)    Patient Name: Mena Odonnell  Today's Date: 8/16/2022    Health Maintenance       Date Due Completion Date    Influenza Vaccine (1) 09/01/2022 10/9/2021    Override on 9/17/2020: Declined    DEXA Scan 11/04/2022 11/4/2019    Hemoglobin A1c 01/16/2023 7/16/2022    Lipid Panel 02/26/2023 2/26/2022    Foot Exam 03/18/2023 3/18/2022    Override on 2/9/2021: Done    Override on 4/21/2016: Done    Override on 10/20/2015: Done    Diabetes Urine Screening 03/29/2023 3/29/2022    Eye Exam 04/14/2023 4/14/2022    Colorectal Cancer Screening 09/26/2025 11/17/2020    TETANUS VACCINE 03/02/2030 3/2/2020        No orders of the defined types were placed in this encounter.    The following information is provided to all patients.  This information is to help you find resources for any of the problems found today that may be affecting your health:                Living healthy guide: www.[facility].[place].gov      Understanding Diabetes: www.diabetes.org      Eating healthy: www.cdc.gov/healthyweight      CDC home safety checklist: www.cdc.gov/steadi/patient.html      Agency on Aging: www.goea.[place].[facility]      Alcoholics anonymous (AA): www.aa.org      Physical Activity: www.sherrill.nih.gov/ut9pvyn      Tobacco use: www.quitwithusla.org

## 2022-08-16 NOTE — PROGRESS NOTES
CC: This 76 y.o. Black or  female  is here for evaluation of  T2DM along with comorbidities indicated in the Visit Diagnosis section of this encounter.    HPI: Mena Odonnell was diagnosed with T2DM in ~ 1995. Experienced blurry vision at the time of diagnosis r/t BG >400. Started on orals. Began insulin in 2005. She is currently on MDI.        Prior visit 4/2022  She is now off Humalog x 2 weeks ago and taking metformin  mg twice daily. C/o loose BMs.   She would like to get down < 200 lb.   Plan Call to make appointment with Dr. Jessica Rasheed in Bariatric Medicine for weight loss.   Offered to reduce metformin dose due to loose bowel movements. However, pt would like  to continue with current therapy. Can try Metamucil to help promote regular BMs.   Schedule appt with diabetes educator, per pt's request.   Continue toujeo 50 units, Ozempic and jardiance.   Return to clinic in 3 months with labs prior.     Interval history  a1c is up from 6.7 to 7.1%.   She was on prednisone x 6 days for back pain, which has improved. Last dose of prednisone was yesterday. Reports BGs prior to steroids were well controlled.   She lost 5 lb in the last 4 months. She would like to get < 200 lb.       LAST DIABETES EDUCATION: 6/2019 mira Palmer - Found visit helpful   1/14/22 with ELENO Sanchez RD for Dexcom training     HOSPITALIZED FOR DIABETES -  No.    DIABETES MEDICATIONS:   Jardiance 10 mg once daily in the AM  Ozempic 1 mg once weekly  Toujeo Max 50 units once daily in 6 am  Metformin  mg bid    Misses medication doses - no        DM COMPLICATIONS: peripheral neuropathy and peripheral vascular disease    SIGNIFICANT DIABETES MED HISTORY:   Metformin stopped December 2016 r/t GI upset and diarrhea, resumed with metformin ER 3/2022 and weaned off Humalog     SELF MONITORING BLOOD GLUCOSE: Checks blood glucose at home  with Dexcom CGM on reader.   CGM interpretation: BGs overall high especially  postprandially d/t steroid therapy. BGs prior to steroids well controlled.  No hypoglycemia.       HYPOGLYCEMIC EPISODES:  Denies     CURRENT DIET:  drinking mostly water and sometimes juice - once a week.   Breakfast ~ 8 am; lunch ~ 12 pm. Dinner - 6 pm. Eats 3 meals/day.       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     PHYSICAL EXAM:  GENERAL: Well developed, well nourished. No acute distress.   PSYCH: AAOx3, appropriate mood and affect, conversant, well-groomed. Judgement and insight good.   NEURO: Cranial nerves grossly intact. Speech clear, no tremor.   CHEST: Respirations even and unlabored.       Hemoglobin A1C   Date Value Ref Range Status   07/16/2022 7.1 (H) 4.0 - 5.6 % Final     Comment:     ADA Screening Guidelines:  5.7-6.4%  Consistent with prediabetes  >or=6.5%  Consistent with diabetes    High levels of fetal hemoglobin interfere with the HbA1C  assay. Heterozygous hemoglobin variants (HbS, HgC, etc)do  not significantly interfere with this assay.   However, presence of multiple variants may affect accuracy.     03/12/2022 6.7 (H) 4.0 - 5.6 % Final     Comment:     ADA Screening Guidelines:  5.7-6.4%  Consistent with prediabetes  >or=6.5%  Consistent with diabetes    High levels of fetal hemoglobin interfere with the HbA1C  assay. Heterozygous hemoglobin variants (HbS, HgC, etc)do  not significantly interfere with this assay.   However, presence of multiple variants may affect accuracy.     11/29/2021 6.8 (H) 4.0 - 5.6 % Final     Comment:     ADA Screening Guidelines:  5.7-6.4%  Consistent with prediabetes  >or=6.5%  Consistent with diabetes    High levels of fetal hemoglobin interfere with the HbA1C  assay. Heterozygous hemoglobin variants (HbS, HgC, etc)do  not significantly interfere with  this assay.   However, presence of multiple variants may affect accuracy.             Chemistry        Component Value Date/Time     02/26/2022 0818    K 3.4 (L) 02/26/2022 0818     02/26/2022 0818    CO2 25 02/26/2022 0818    BUN 14 02/26/2022 0818    CREATININE 0.6 02/26/2022 0818     (H) 02/26/2022 0818        Component Value Date/Time    CALCIUM 9.2 02/26/2022 0818    ALKPHOS 65 02/26/2022 0818    AST 13 02/26/2022 0818    ALT 15 02/26/2022 0818    BILITOT 0.3 02/26/2022 0818    ESTGFRAFRICA >60.0 02/26/2022 0818    EGFRNONAA >60.0 02/26/2022 0818          Lab Results   Component Value Date    LDLCALC 106.8 02/26/2022      Latest Reference Range & Units 02/26/22 08:18   Cholesterol 120 - 199 mg/dL 167 [1]   HDL 40 - 75 mg/dL 40 [2]   HDL/Cholesterol Ratio 20.0 - 50.0 % 24.0   LDL Cholesterol External 63.0 - 159.0 mg/dL 106.8 [3]   Non-HDL Cholesterol mg/dL 127 [4]   Total Cholesterol/HDL Ratio 2.0 - 5.0  4.2   Triglycerides 30 - 150 mg/dL 101 [5]       Lab Results   Component Value Date    MICALBCREAT 15.8 03/29/2022               ASSESSMENT and PLAN:    A1C GOAL: < 7%       1. Type 2 diabetes mellitus with diabetic polyneuropathy, with long-term current use of insulin  Diabetes remains controlled.     Ok to use Humalog with sliding scale -  Blood sugar 150 to 200, + 2 units  Blood sugar 201 to 250, + 4 units  Blood sugar 251 to 300, + 6 units  Blood sugar 301 to 350, + 8 units   Blood sugar greater than 350, + 10 units      Continue Toujeo 50 units, Ozempic 1 mg weekly, metformin  mg twice daily and Jardiance 10 mg. Pt declines combination Synjardy.   A1c in 3 months.   A1c again in 6 months. Return to clinic in 6 months.     Hemoglobin A1C    Creatinine, Serum    Microalbumin/Creatinine Ratio, Urine   2. Steroid-induced hyperglycemia  Resolving, expect BGs to return to baseline in the next few days.    3. Hyperlipidemia, unspecified hyperlipidemia type  Lipid Panel             Patient  requires a therapeutic CGM and is willing to use therapeutic CGM/SMBG for Necessary frequent adjustments in insulin therapy. I have assessed adherence to CGM regimen and to the plan. Due to COVID pandemic, patient requires Dexcom CGM to manage diabetes.         Orders Placed This Encounter   Procedures    Hemoglobin A1C     Standing Status:   Standing     Number of Occurrences:   2     Standing Expiration Date:   10/15/2023    Creatinine, Serum     Standing Status:   Future     Standing Expiration Date:   10/15/2023    Microalbumin/Creatinine Ratio, Urine     Standing Status:   Future     Standing Expiration Date:   10/15/2023     Order Specific Question:   Specimen Source     Answer:   Urine    Lipid Panel     Standing Status:   Future     Standing Expiration Date:   8/16/2023        Follow up in about 6 months (around 2/16/2023).

## 2022-08-16 NOTE — PATIENT INSTRUCTIONS
Ok to use Humalog with sliding scale -  Blood sugar 150 to 200, + 2 units  Blood sugar 201 to 250, + 4 units  Blood sugar 251 to 300, + 6 units  Blood sugar 301 to 350, + 8 units   Blood sugar greater than 350, + 10 units      Continue Toujeo 50 units, Ozempic 1 mg weekly, metformin  mg twice daily and Jardiance 10 mg. Pt declines combination Synjardy.   A1c in 3 months.   A1c again in 6 months. Return to clinic in 6 months.

## 2022-08-19 PROBLEM — M53.86 DECREASED RANGE OF MOTION OF LUMBAR SPINE: Status: ACTIVE | Noted: 2022-08-19

## 2022-08-19 PROBLEM — R19.8 ABDOMINAL WEAKNESS: Status: ACTIVE | Noted: 2022-08-19

## 2022-09-07 PROBLEM — Z74.09 DECREASED STRENGTH, ENDURANCE, AND MOBILITY: Status: RESOLVED | Noted: 2020-10-01 | Resolved: 2022-09-07

## 2022-09-07 PROBLEM — R68.89 DECREASED STRENGTH, ENDURANCE, AND MOBILITY: Status: RESOLVED | Noted: 2020-10-01 | Resolved: 2022-09-07

## 2022-09-07 PROBLEM — M25.561 CHRONIC PAIN OF BOTH KNEES: Status: RESOLVED | Noted: 2020-10-01 | Resolved: 2022-09-07

## 2022-09-07 PROBLEM — R26.2 DIFFICULTY WALKING: Status: RESOLVED | Noted: 2020-10-01 | Resolved: 2022-09-07

## 2022-09-07 PROBLEM — G89.29 CHRONIC PAIN OF BOTH KNEES: Status: RESOLVED | Noted: 2020-10-01 | Resolved: 2022-09-07

## 2022-09-07 PROBLEM — R53.1 DECREASED STRENGTH, ENDURANCE, AND MOBILITY: Status: RESOLVED | Noted: 2020-10-01 | Resolved: 2022-09-07

## 2022-09-07 PROBLEM — M25.562 CHRONIC PAIN OF BOTH KNEES: Status: RESOLVED | Noted: 2020-10-01 | Resolved: 2022-09-07

## 2022-09-26 ENCOUNTER — TELEPHONE (OUTPATIENT)
Dept: ORTHOPEDICS | Facility: CLINIC | Age: 77
End: 2022-09-26
Payer: COMMERCIAL

## 2022-09-29 ENCOUNTER — HOSPITAL ENCOUNTER (OUTPATIENT)
Dept: RADIOLOGY | Facility: HOSPITAL | Age: 77
Discharge: HOME | End: 2022-09-29
Attending: REGISTERED NURSE
Payer: COMMERCIAL

## 2022-09-29 DIAGNOSIS — M51.36 DDD (DEGENERATIVE DISC DISEASE), LUMBAR: ICD-10-CM

## 2022-09-29 DIAGNOSIS — M54.16 LUMBAR RADICULOPATHY: ICD-10-CM

## 2022-09-29 PROCEDURE — 72148 MRI LUMBAR SPINE W/O DYE: CPT | Mod: TC

## 2022-09-29 PROCEDURE — 72148 MRI LUMBAR SPINE WITHOUT CONTRAST: ICD-10-PCS | Mod: 26,,, | Performed by: RADIOLOGY

## 2022-09-29 PROCEDURE — 72148 MRI LUMBAR SPINE W/O DYE: CPT | Mod: 26,,, | Performed by: RADIOLOGY

## 2022-10-03 ENCOUNTER — OFFICE VISIT (OUTPATIENT)
Dept: FAMILY MEDICINE | Facility: CLINIC | Age: 77
End: 2022-10-03
Payer: COMMERCIAL

## 2022-10-03 VITALS
BODY MASS INDEX: 35.51 KG/M2 | DIASTOLIC BLOOD PRESSURE: 52 MMHG | RESPIRATION RATE: 18 BRPM | SYSTOLIC BLOOD PRESSURE: 136 MMHG | HEART RATE: 86 BPM | WEIGHT: 220 LBS | OXYGEN SATURATION: 99 %

## 2022-10-03 DIAGNOSIS — Q63.9 RENAL STRUCTURAL ABNORMALITY: ICD-10-CM

## 2022-10-03 DIAGNOSIS — Z23 INFLUENZA VACCINE NEEDED: ICD-10-CM

## 2022-10-03 DIAGNOSIS — Z71.89 COMPLEX CARE COORDINATION: ICD-10-CM

## 2022-10-03 DIAGNOSIS — E11.42 TYPE 2 DIABETES MELLITUS WITH DIABETIC POLYNEUROPATHY, WITH LONG-TERM CURRENT USE OF INSULIN: Primary | ICD-10-CM

## 2022-10-03 DIAGNOSIS — M17.11 OSTEOARTHRITIS OF RIGHT KNEE, UNSPECIFIED OSTEOARTHRITIS TYPE: ICD-10-CM

## 2022-10-03 DIAGNOSIS — Z79.4 TYPE 2 DIABETES MELLITUS WITH DIABETIC POLYNEUROPATHY, WITH LONG-TERM CURRENT USE OF INSULIN: Primary | ICD-10-CM

## 2022-10-03 DIAGNOSIS — I10 PRIMARY HYPERTENSION: ICD-10-CM

## 2022-10-03 DIAGNOSIS — I35.0 AORTIC VALVE STENOSIS, ETIOLOGY OF CARDIAC VALVE DISEASE UNSPECIFIED: ICD-10-CM

## 2022-10-03 PROCEDURE — 99999 PR PBB SHADOW E&M-EST. PATIENT-LVL V: CPT | Mod: PBBFAC,,, | Performed by: INTERNAL MEDICINE

## 2022-10-03 PROCEDURE — 99214 PR OFFICE/OUTPT VISIT, EST, LEVL IV, 30-39 MIN: ICD-10-PCS | Mod: 25,S$GLB,, | Performed by: INTERNAL MEDICINE

## 2022-10-03 PROCEDURE — 1101F PR PT FALLS ASSESS DOC 0-1 FALLS W/OUT INJ PAST YR: ICD-10-PCS | Mod: CPTII,S$GLB,, | Performed by: INTERNAL MEDICINE

## 2022-10-03 PROCEDURE — 1160F PR REVIEW ALL MEDS BY PRESCRIBER/CLIN PHARMACIST DOCUMENTED: ICD-10-PCS | Mod: CPTII,S$GLB,, | Performed by: INTERNAL MEDICINE

## 2022-10-03 PROCEDURE — 3288F PR FALLS RISK ASSESSMENT DOCUMENTED: ICD-10-PCS | Mod: CPTII,S$GLB,, | Performed by: INTERNAL MEDICINE

## 2022-10-03 PROCEDURE — 3288F FALL RISK ASSESSMENT DOCD: CPT | Mod: CPTII,S$GLB,, | Performed by: INTERNAL MEDICINE

## 2022-10-03 PROCEDURE — 3075F SYST BP GE 130 - 139MM HG: CPT | Mod: CPTII,S$GLB,, | Performed by: INTERNAL MEDICINE

## 2022-10-03 PROCEDURE — 90471 IMMUNIZATION ADMIN: CPT | Mod: S$GLB,,, | Performed by: INTERNAL MEDICINE

## 2022-10-03 PROCEDURE — 99214 OFFICE O/P EST MOD 30 MIN: CPT | Mod: 25,S$GLB,, | Performed by: INTERNAL MEDICINE

## 2022-10-03 PROCEDURE — 90694 FLU VACCINE - QUADRIVALENT - ADJUVANTED: ICD-10-PCS | Mod: S$GLB,,, | Performed by: INTERNAL MEDICINE

## 2022-10-03 PROCEDURE — 3078F DIAST BP <80 MM HG: CPT | Mod: CPTII,S$GLB,, | Performed by: INTERNAL MEDICINE

## 2022-10-03 PROCEDURE — 3078F PR MOST RECENT DIASTOLIC BLOOD PRESSURE < 80 MM HG: ICD-10-PCS | Mod: CPTII,S$GLB,, | Performed by: INTERNAL MEDICINE

## 2022-10-03 PROCEDURE — 99999 PR PBB SHADOW E&M-EST. PATIENT-LVL V: ICD-10-PCS | Mod: PBBFAC,,, | Performed by: INTERNAL MEDICINE

## 2022-10-03 PROCEDURE — 90694 VACC AIIV4 NO PRSRV 0.5ML IM: CPT | Mod: S$GLB,,, | Performed by: INTERNAL MEDICINE

## 2022-10-03 PROCEDURE — 1159F PR MEDICATION LIST DOCUMENTED IN MEDICAL RECORD: ICD-10-PCS | Mod: CPTII,S$GLB,, | Performed by: INTERNAL MEDICINE

## 2022-10-03 PROCEDURE — 1101F PT FALLS ASSESS-DOCD LE1/YR: CPT | Mod: CPTII,S$GLB,, | Performed by: INTERNAL MEDICINE

## 2022-10-03 PROCEDURE — 3051F HG A1C>EQUAL 7.0%<8.0%: CPT | Mod: CPTII,S$GLB,, | Performed by: INTERNAL MEDICINE

## 2022-10-03 PROCEDURE — 90471 FLU VACCINE - QUADRIVALENT - ADJUVANTED: ICD-10-PCS | Mod: S$GLB,,, | Performed by: INTERNAL MEDICINE

## 2022-10-03 PROCEDURE — 3075F PR MOST RECENT SYSTOLIC BLOOD PRESS GE 130-139MM HG: ICD-10-PCS | Mod: CPTII,S$GLB,, | Performed by: INTERNAL MEDICINE

## 2022-10-03 PROCEDURE — 3051F PR MOST RECENT HEMOGLOBIN A1C LEVEL 7.0 - < 8.0%: ICD-10-PCS | Mod: CPTII,S$GLB,, | Performed by: INTERNAL MEDICINE

## 2022-10-03 PROCEDURE — 1159F MED LIST DOCD IN RCRD: CPT | Mod: CPTII,S$GLB,, | Performed by: INTERNAL MEDICINE

## 2022-10-03 PROCEDURE — 1160F RVW MEDS BY RX/DR IN RCRD: CPT | Mod: CPTII,S$GLB,, | Performed by: INTERNAL MEDICINE

## 2022-10-03 NOTE — PROGRESS NOTES
HISTORY OF PRESENT ILLNESS:  Mena Odonnell is a 76 y.o. female who presents to the clinic today for Follow-up    Last seen by me 3/2022.    Still driving and works as  in [place].    Aortic stenosis  Last visit with cardiology 9/2020.  No CP, dyspnea or lightheadedness.    HTN  Managed with losartan-HCTZ, amlodipine, Toprol XL.    Type 2 DM  Managed with Toujeo, metformin, Ozempic and Jardiance.  Seen by Candy Villanueva/marina.  Last A1C 7.1%.  Notes not really taking Humalog SSI.    Peripheral vascular disease, venous hypertension  Seen by Dr. Blair 5/19/22.   Attended PT for lymphedema.    Primary OA  Seen by Dr. Erickson with Euflexxa injections.    Back and hip pain  Seen in Ortho 8/2022 for this.  Seen in ED in August for right hip pain.  Referred to PT and this is done.  Notes symptoms are improved.  Doing exercises at home and using treadmill.    MRI (9/29/22):  Lumbar spondylosis most significant at L4-5 and L5-S1 with severe spinal canal stenosis and moderate to severe neural foraminal narrowing.  Partially imaged left renal T2 hyperintensity. Consider nonemergent retroperitoneal ultrasound for further evaluation.  Left adrenal nonspecific thickening.    PAST MEDICAL HISTORY:  Past Medical History:   Diagnosis Date    Cataract     Diabetes mellitus type II     Diabetes with neurologic complications     DM retinopathy     H/O vitamin D deficiency     Hyperlipidemia     Hypertension     Obesity     Osteoarthritis     Peripheral neuropathy     Polyneuropathy     Severe obesity (BMI 35.0-39.9) with comorbidity 12/30/2016    Sleep apnea     Tendonitis     left foot       PAST SURGICAL HISTORY:  Past Surgical History:   Procedure Laterality Date    BLADDER SUSPENSION      CATARACT EXTRACTION      COLONOSCOPY N/A 9/26/2015    Procedure: COLONOSCOPY;  Surgeon: Javed Wood MD;  Location: [facility] ENDO ([address] [place] [address]);  Service: Endoscopy;  Laterality: N/A;    HYSTERECTOMY  1978    fibroids    NECK  MASS EXCISION  2019    Procedure: EXCISION, MASS, NECK;  Surgeon: Edwin Barrow MD;  Location: [facility] OR;  Service: General;;  RN PREOP 10/30/2019    TONSILLECTOMY         SOCIAL HISTORY:  Social History     Socioeconomic History    Marital status:     Number of children: 2   Occupational History     Employer: [employer]   Tobacco Use    Smoking status: Former     Packs/day: 0.25     Years: 15.00     Pack years: 3.75     Types: Cigarettes     Quit date:      Years since quittin.7    Smokeless tobacco: Never   Substance and Sexual Activity    Alcohol use: No    Drug use: No    Sexual activity: Not Currently     Partners: Male     Birth control/protection: Abstinence   Social History Narrative    . One daughter. Works as an Apartment      Social Determinants of Health     Financial Resource Strain: Low Risk     Difficulty of Paying Living Expenses: Not hard at all   Food Insecurity: No Food Insecurity    Worried About Running Out of Food in the Last Year: Never true    Ran Out of Food in the Last Year: Never true   Transportation Needs: No Transportation Needs    Lack of Transportation (Medical): No    Lack of Transportation (Non-Medical): No   Physical Activity: Inactive    Days of Exercise per Week: 0 days    Minutes of Exercise per Session: 0 min   Social Connections: Socially Isolated    Frequency of Communication with Friends and Family: More than three times a week    Frequency of Social Gatherings with Friends and Family: Never    Attends Congregational Services: Never    Active Member of Clubs or Organizations: No    Attends Club or Organization Meetings: Never    Marital Status:    Housing Stability: Low Risk     Unable to Pay for Housing in the Last Year: No    Number of Places Lived in the Last Year: 1    Unstable Housing in the Last Year: No       FAMILY HISTORY:  Family History   Problem Relation Age of Onset    Thyroid disease Mother      Cataracts Mother     Diabetes Mother     Stroke Mother     Hypertension Mother     Thyroid disease Sister     Diabetes Sister     Hypertension Sister     Hypertension Daughter     Diabetes Son     Diabetes Sister     Hypertension Sister     Hypertension Brother     Hypertension Sister     Hypertension Sister     Hypertension Brother     Thyroid disease Maternal Aunt     Thyroid disease Maternal Aunt     Colon cancer Maternal Grandfather     Breast cancer Neg Hx     Ovarian cancer Neg Hx     Amblyopia Neg Hx     Blindness Neg Hx     Cancer Neg Hx     Glaucoma Neg Hx     Macular degeneration Neg Hx     Retinal detachment Neg Hx     Strabismus Neg Hx        ALLERGIES AND MEDICATIONS: updated and reviewed.  Review of patient's allergies indicates:   Allergen Reactions    Atorvastatin      Muscle pain     Crestor [rosuvastatin] Other (See Comments)     Leg Weakness.      Medication List with Changes/Refills   Current Medications    AMLODIPINE (NORVASC) 10 MG TABLET    Take 1 tablet (10 mg total) by mouth once daily.    ASPIRIN 81 MG CHEW    Take 1 tablet (81 mg total) by mouth once daily.    BLOOD-GLUCOSE SENSOR (DEXCOM G6 SENSOR) SHELLY    Change sensor every 10 days    BLOOD-GLUCOSE TRANSMITTER (DEXCOM G6 TRANSMITTER) SHELLY    Change every 3 months    CETIRIZINE (ZYRTEC) 10 MG TABLET    TAKE 1 TABLET BY MOUTH ONCE DAILY FOR 30 DAYS    CONTOUR NEXT TEST STRIPS STRP    Check glucose before breakfast and dinner.    COVID-19 VACC,MRNA,PFIZER,,PF, IM    COVID-19 vacc,mRNA(Pfizer)(PF)    DIABETIC SUPPLIES, MISCELLAN. KIT    Please dispense one 14 day FreeStyle Giulia Waterbury    DICLOFENAC SODIUM (VOLTAREN) 1 % GEL    Lower extremities: Apply the gel (4 g) to the affected area 4 times daily. Do not apply more than 16 g daily to any one affected joint of the lower extremities. Upper extremities: Apply the gel (2 g) to the affected area 4 times daily. Do not apply more than 8 g daily to any one affected joint of the upper  extremities. Total dose should not exceed 32 g per day, overall affected joints.    EMPAGLIFLOZIN (JARDIANCE) 10 MG TABLET    Take 1 tablet (10 mg total) by mouth once daily.    FLASH GLUCOSE SENSOR (FREESTYLE ROSAURA 14 DAY SENSOR) KIT    1 sensor kit every 14 days (requires 2 kits per month)    FLUOCINOLONE ACETONIDE OIL 0.01 % DROP    Place 4 drops in ear(s) 2 (two) times daily.    GABAPENTIN (NEURONTIN) 300 MG CAPSULE    Take 1 capsule (300 mg total) by mouth 3 (three) times daily.    INSULIN GLARGINE U-300 CONC (TOUJEO MAX U-300 SOLOSTAR) 300 UNIT/ML (3 ML) INSULIN PEN    INJECT 50 UNITS SUBCUTANEOUSLY ONCE DAILY    INSULIN LISPRO (HUMALOG KWIKPEN INSULIN) 100 UNIT/ML PEN    Use with sliding scale - 150-200=+2, 201-250=+4; 251-300=+6; 301-350=+8, over 350=+10 units    LOSARTAN-HYDROCHLOROTHIAZIDE 100-25 MG (HYZAAR) 100-25 MG PER TABLET    Take 1 tablet by mouth once daily.    MELOXICAM (MOBIC) 7.5 MG TABLET    Take 1 tablet (7.5 mg total) by mouth once daily.    METFORMIN (GLUCOPHAGE-XR) 500 MG ER 24HR TABLET    Take 1 tablet (500 mg total) by mouth 2 (two) times daily with meals.    METOPROLOL SUCCINATE (TOPROL-XL) 50 MG 24 HR TABLET    Take 1 tablet (50 mg total) by mouth once daily.    MONTELUKAST (SINGULAIR) 10 MG TABLET    Take 1 tablet (10 mg total) by mouth once daily.    OZEMPIC 1 MG/DOSE (4 MG/3 ML)    INJECT 1 MG UNDER THE SKIN ONCE A WEEK    PRAVASTATIN (PRAVACHOL) 80 MG TABLET    Take 1 tablet (80 mg total) by mouth once daily.          CARE TEAM:  Patient Care Team:  Leonard Wells MD as PCP - General (Internal Medicine)  Nga Benjamin MA as Care Coordinator  Bebe Gee MD as Consulting Physician (Ophthalmology)  Praneeth Rios MD as Consulting Physician (Ophthalmology)  Candy Villanueva NP as Nurse Practitioner (Endocrinology)  Jenn Arreola MD as Consulting Physician (Cardiology)  Dre Blair MD as Consulting Physician (Vascular Surgery)         REVIEW OF SYSTEMS:  Review of  Systems   Constitutional:  Negative for chills and fever.   HENT:  Negative for congestion and postnasal drip.    Eyes:  Negative for photophobia and visual disturbance.   Respiratory:  Negative for cough and shortness of breath.    Cardiovascular:  Negative for chest pain and palpitations.   Gastrointestinal:  Negative for nausea and vomiting.   Genitourinary:  Negative for dysuria and frequency.   Musculoskeletal:  Positive for arthralgias. Negative for joint swelling.   Skin:  Negative for rash and wound.   Neurological:  Negative for dizziness and light-headedness.   Psychiatric/Behavioral:  Negative for dysphoric mood. The patient is not nervous/anxious.        PHYSICAL EXAM:   Vitals:    10/03/22 0957   BP: (!) 136/52   Pulse:    Resp:              Body mass index is 35.51 kg/m².     General appearance - alert, well appearing, and in no distress and oriented to person, place, and time  Mental status - normal mood, behavior, speech, dress, motor activity, and thought processes  Eyes - sclera anicteric, left eye normal, right eye normal  Chest - clear to auscultation, no wheezes, rales or rhonchi, symmetric air entry  Heart - systolic murmur, RRR  Neurological - alert, oriented, normal speech, no focal findings or movement disorder noted, motor and sensory grossly normal bilaterally  Extremities - intact peripheral pulses      ASSESSMENT AND PLAN:  Type 2 diabetes mellitus with diabetic polyneuropathy, with long-term current use of insulin  -     Ambulatory referral/consult to Podiatry; Future; Expected date: 10/10/2022    Influenza vaccine needed  -     Influenza (FLUAD) - Quadrivalent (Adjuvanted) *Preferred* (65+) (PF)    Renal structural abnormality  -     US Retroperitoneal Complete; Future; Expected date: 10/03/2022    Aortic valve stenosis, etiology of cardiac valve disease unspecified  -     Ambulatory referral/consult to Cardiology; Future; Expected date: 10/10/2022    Primary hypertension         - Stable on current medication    Osteoarthritis of right knee, unspecified osteoarthritis type        -  To follow up with orthopedics.        Follow up 6 months or sooner as needed.

## 2022-10-05 NOTE — PROGRESS NOTES
"DATE: 10/10/2022  PATIENT: Mena Odonnell    Attending Physician: Trevin Sellers M.D.    HISTORY:  Mena Odonnell is a 76 y.o. female who returns to me today for MRI results.  She was last seen by me 8/9/2022.  Today she is doing well but notes continued bilateral leg weakness. She states she has 0/10 pain today.     The Patient denies myelopathic symptoms such as handwriting changes or difficulty with buttons/coins/keys. Denies perineal paresthesias, bowel/bladder dysfunction.    PMH/PSH/FamHx/SocHx:  Unchanged from prior visit    ROS:  REVIEW OF SYSTEMS:  Constitution: Negative. Negative for chills, fever and night sweats.   HENT: Negative for congestion and headaches.    Eyes: Negative for blurred vision, left vision loss and right vision loss.   Cardiovascular: Negative for chest pain and syncope.   Respiratory: Negative for cough and shortness of breath.    Endocrine: Negative for polydipsia, polyphagia and polyuria.   Hematologic/Lymphatic: Negative for bleeding problem. Does not bruise/bleed easily.   Skin: Negative for dry skin, itching and rash.   Musculoskeletal: Negative for falls and muscle weakness.   Gastrointestinal: Negative for abdominal pain and bowel incontinence.   Allergic/Immunologic: Negative for hives and persistent infections.  Genitourinary: Negative for urinary retention/incontinence and nocturia.   Neurological: negative for disturbances in coordination, no myelopathic symptoms such as handwriting changes or difficulty with buttons, coins, keys or small objects. No loss of balance and seizures.   Psychiatric/Behavioral: Negative for depression. The patient does not have insomnia.   Denies perineal paresthesias, bowel or bladder incontinence    EXAM:  Ht 5' 6" (1.676 m)   Wt 88.7 kg (195 lb 10.5 oz)   BMI 31.58 kg/m²     Neuro and physical exam stable.     IMAGING:    Today I personally re- reviewed AP, Lat and Flex/Ex  upright L-spine that demonstrate moderate DDD with " dextroscoliosis.     Lumbar MRI shows spondylosis most significant at L4-5 and L5-S1 with severe spinal canal stenosis and moderate to severe neural foraminal narrowing.    Body mass index is 31.58 kg/m².    Hemoglobin A1C   Date Value Ref Range Status   07/16/2022 7.1 (H) 4.0 - 5.6 % Final     Comment:     ADA Screening Guidelines:  5.7-6.4%  Consistent with prediabetes  >or=6.5%  Consistent with diabetes    High levels of fetal hemoglobin interfere with the HbA1C  assay. Heterozygous hemoglobin variants (HbS, HgC, etc)do  not significantly interfere with this assay.   However, presence of multiple variants may affect accuracy.     03/12/2022 6.7 (H) 4.0 - 5.6 % Final     Comment:     ADA Screening Guidelines:  5.7-6.4%  Consistent with prediabetes  >or=6.5%  Consistent with diabetes    High levels of fetal hemoglobin interfere with the HbA1C  assay. Heterozygous hemoglobin variants (HbS, HgC, etc)do  not significantly interfere with this assay.   However, presence of multiple variants may affect accuracy.     11/29/2021 6.8 (H) 4.0 - 5.6 % Final     Comment:     ADA Screening Guidelines:  5.7-6.4%  Consistent with prediabetes  >or=6.5%  Consistent with diabetes    High levels of fetal hemoglobin interfere with the HbA1C  assay. Heterozygous hemoglobin variants (HbS, HgC, etc)do  not significantly interfere with this assay.   However, presence of multiple variants may affect accuracy.           ASSESSMENT/PLAN:    Diagnoses and all orders for this visit:    Lumbar radiculopathy    Today we discussed at length all of the different treatment options including anti-inflammatories, acetaminophen, rest, ice, heat, physical therapy including strengthening and stretching exercises, home exercises, ROM, aerobic conditioning, aqua therapy, other modalities including ultrasound, massage, and dry needling, epidural steroid injections and finally surgical intervention.    The patient would like to hold off on an injection for  now. She would like to further discuss surgical options with her daughter and follow up in a few months.

## 2022-10-07 ENCOUNTER — OFFICE VISIT (OUTPATIENT)
Dept: ORTHOPEDICS | Facility: CLINIC | Age: 77
End: 2022-10-07
Payer: COMMERCIAL

## 2022-10-07 VITALS — BODY MASS INDEX: 31.45 KG/M2 | WEIGHT: 195.69 LBS | HEIGHT: 66 IN

## 2022-10-07 DIAGNOSIS — M54.16 LUMBAR RADICULOPATHY: Primary | ICD-10-CM

## 2022-10-07 PROCEDURE — 1159F PR MEDICATION LIST DOCUMENTED IN MEDICAL RECORD: ICD-10-PCS | Mod: CPTII,S$GLB,, | Performed by: REGISTERED NURSE

## 2022-10-07 PROCEDURE — 99213 OFFICE O/P EST LOW 20 MIN: CPT | Mod: S$GLB,,, | Performed by: REGISTERED NURSE

## 2022-10-07 PROCEDURE — 99999 PR PBB SHADOW E&M-EST. PATIENT-LVL III: CPT | Mod: PBBFAC,,, | Performed by: REGISTERED NURSE

## 2022-10-07 PROCEDURE — 1126F AMNT PAIN NOTED NONE PRSNT: CPT | Mod: CPTII,S$GLB,, | Performed by: REGISTERED NURSE

## 2022-10-07 PROCEDURE — 99999 PR PBB SHADOW E&M-EST. PATIENT-LVL III: ICD-10-PCS | Mod: PBBFAC,,, | Performed by: REGISTERED NURSE

## 2022-10-07 PROCEDURE — 3051F PR MOST RECENT HEMOGLOBIN A1C LEVEL 7.0 - < 8.0%: ICD-10-PCS | Mod: CPTII,S$GLB,, | Performed by: REGISTERED NURSE

## 2022-10-07 PROCEDURE — 1126F PR PAIN SEVERITY QUANTIFIED, NO PAIN PRESENT: ICD-10-PCS | Mod: CPTII,S$GLB,, | Performed by: REGISTERED NURSE

## 2022-10-07 PROCEDURE — 99213 PR OFFICE/OUTPT VISIT, EST, LEVL III, 20-29 MIN: ICD-10-PCS | Mod: S$GLB,,, | Performed by: REGISTERED NURSE

## 2022-10-07 PROCEDURE — 1159F MED LIST DOCD IN RCRD: CPT | Mod: CPTII,S$GLB,, | Performed by: REGISTERED NURSE

## 2022-10-07 PROCEDURE — 3051F HG A1C>EQUAL 7.0%<8.0%: CPT | Mod: CPTII,S$GLB,, | Performed by: REGISTERED NURSE

## 2022-10-10 ENCOUNTER — OFFICE VISIT (OUTPATIENT)
Dept: ORTHOPEDICS | Facility: CLINIC | Age: 77
End: 2022-10-10
Payer: COMMERCIAL

## 2022-10-10 VITALS — WEIGHT: 213.81 LBS | HEIGHT: 66 IN | BODY MASS INDEX: 34.36 KG/M2

## 2022-10-10 DIAGNOSIS — M54.16 LUMBAR RADICULOPATHY: Primary | ICD-10-CM

## 2022-10-10 PROCEDURE — 99999 PR PBB SHADOW E&M-EST. PATIENT-LVL IV: CPT | Mod: PBBFAC,,, | Performed by: REGISTERED NURSE

## 2022-10-10 PROCEDURE — 1159F MED LIST DOCD IN RCRD: CPT | Mod: CPTII,S$GLB,, | Performed by: REGISTERED NURSE

## 2022-10-10 PROCEDURE — 1160F RVW MEDS BY RX/DR IN RCRD: CPT | Mod: CPTII,S$GLB,, | Performed by: REGISTERED NURSE

## 2022-10-10 PROCEDURE — 3051F PR MOST RECENT HEMOGLOBIN A1C LEVEL 7.0 - < 8.0%: ICD-10-PCS | Mod: CPTII,S$GLB,, | Performed by: REGISTERED NURSE

## 2022-10-10 PROCEDURE — 1126F AMNT PAIN NOTED NONE PRSNT: CPT | Mod: CPTII,S$GLB,, | Performed by: REGISTERED NURSE

## 2022-10-10 PROCEDURE — 1101F PR PT FALLS ASSESS DOC 0-1 FALLS W/OUT INJ PAST YR: ICD-10-PCS | Mod: CPTII,S$GLB,, | Performed by: REGISTERED NURSE

## 2022-10-10 PROCEDURE — 3051F HG A1C>EQUAL 7.0%<8.0%: CPT | Mod: CPTII,S$GLB,, | Performed by: REGISTERED NURSE

## 2022-10-10 PROCEDURE — 3288F FALL RISK ASSESSMENT DOCD: CPT | Mod: CPTII,S$GLB,, | Performed by: REGISTERED NURSE

## 2022-10-10 PROCEDURE — 99999 PR PBB SHADOW E&M-EST. PATIENT-LVL IV: ICD-10-PCS | Mod: PBBFAC,,, | Performed by: REGISTERED NURSE

## 2022-10-10 PROCEDURE — 1101F PT FALLS ASSESS-DOCD LE1/YR: CPT | Mod: CPTII,S$GLB,, | Performed by: REGISTERED NURSE

## 2022-10-10 PROCEDURE — 1126F PR PAIN SEVERITY QUANTIFIED, NO PAIN PRESENT: ICD-10-PCS | Mod: CPTII,S$GLB,, | Performed by: REGISTERED NURSE

## 2022-10-10 PROCEDURE — 99213 OFFICE O/P EST LOW 20 MIN: CPT | Mod: S$GLB,,, | Performed by: REGISTERED NURSE

## 2022-10-10 PROCEDURE — 99213 PR OFFICE/OUTPT VISIT, EST, LEVL III, 20-29 MIN: ICD-10-PCS | Mod: S$GLB,,, | Performed by: REGISTERED NURSE

## 2022-10-10 PROCEDURE — 1160F PR REVIEW ALL MEDS BY PRESCRIBER/CLIN PHARMACIST DOCUMENTED: ICD-10-PCS | Mod: CPTII,S$GLB,, | Performed by: REGISTERED NURSE

## 2022-10-10 PROCEDURE — 1159F PR MEDICATION LIST DOCUMENTED IN MEDICAL RECORD: ICD-10-PCS | Mod: CPTII,S$GLB,, | Performed by: REGISTERED NURSE

## 2022-10-10 PROCEDURE — 3288F PR FALLS RISK ASSESSMENT DOCUMENTED: ICD-10-PCS | Mod: CPTII,S$GLB,, | Performed by: REGISTERED NURSE

## 2022-10-10 NOTE — PROGRESS NOTES
"DATE: 10/10/2022  PATIENT: Mena Odonnell    Attending Physician: Trevin Sellers M.D.    HISTORY:  Mena Odonnell is a 76 y.o. female who returns to me today for follow up.  She was last seen by me 10/7/2022.  Today she is doing well but notes continued bilateral leg weakness. She states she has 0/10 pain today.  The Patient denies myelopathic symptoms such as handwriting changes or difficulty with buttons/coins/keys. Denies perineal paresthesias, bowel/bladder dysfunction.    PMH/PSH/FamHx/SocHx:  Unchanged from prior visit     ROS:  REVIEW OF SYSTEMS:  Constitution: Negative. Negative for chills, fever and night sweats.   HENT: Negative for congestion and headaches.    Eyes: Negative for blurred vision, left vision loss and right vision loss.   Cardiovascular: Negative for chest pain and syncope.   Respiratory: Negative for cough and shortness of breath.    Endocrine: Negative for polydipsia, polyphagia and polyuria.   Hematologic/Lymphatic: Negative for bleeding problem. Does not bruise/bleed easily.   Skin: Negative for dry skin, itching and rash.   Musculoskeletal: Negative for falls and muscle weakness.   Gastrointestinal: Negative for abdominal pain and bowel incontinence.   Allergic/Immunologic: Negative for hives and persistent infections.  Genitourinary: Negative for urinary retention/incontinence and nocturia.   Neurological: negative for disturbances in coordination, no myelopathic symptoms such as handwriting changes or difficulty with buttons, coins, keys or small objects. No loss of balance and seizures.   Psychiatric/Behavioral: Negative for depression. The patient does not have insomnia.   Denies perineal paresthesias, bowel or bladder incontinence    EXAM:  Ht 5' 6" (1.676 m)   Wt 97 kg (213 lb 12.8 oz)   BMI 34.51 kg/m²     Neuro and physical exam stable.     IMAGING:    Today I personally re- reviewed AP, Lat and Flex/Ex  upright L-spine that demonstrate moderate DDD with " dextroscoliosis.      Lumbar MRI shows spondylosis most significant at L4-5 and L5-S1 with severe spinal canal stenosis and moderate to severe neural foraminal narrowing.    Body mass index is 34.51 kg/m².    Hemoglobin A1C   Date Value Ref Range Status   07/16/2022 7.1 (H) 4.0 - 5.6 % Final     Comment:     ADA Screening Guidelines:  5.7-6.4%  Consistent with prediabetes  >or=6.5%  Consistent with diabetes    High levels of fetal hemoglobin interfere with the HbA1C  assay. Heterozygous hemoglobin variants (HbS, HgC, etc)do  not significantly interfere with this assay.   However, presence of multiple variants may affect accuracy.     03/12/2022 6.7 (H) 4.0 - 5.6 % Final     Comment:     ADA Screening Guidelines:  5.7-6.4%  Consistent with prediabetes  >or=6.5%  Consistent with diabetes    High levels of fetal hemoglobin interfere with the HbA1C  assay. Heterozygous hemoglobin variants (HbS, HgC, etc)do  not significantly interfere with this assay.   However, presence of multiple variants may affect accuracy.     11/29/2021 6.8 (H) 4.0 - 5.6 % Final     Comment:     ADA Screening Guidelines:  5.7-6.4%  Consistent with prediabetes  >or=6.5%  Consistent with diabetes    High levels of fetal hemoglobin interfere with the HbA1C  assay. Heterozygous hemoglobin variants (HbS, HgC, etc)do  not significantly interfere with this assay.   However, presence of multiple variants may affect accuracy.           ASSESSMENT/PLAN:    Mena was seen today for establish care.    Diagnoses and all orders for this visit:    Lumbar radiculopathy    Today we discussed at length all of the different treatment options including anti-inflammatories, acetaminophen, rest, ice, heat, physical therapy including strengthening and stretching exercises, home exercises, ROM, aerobic conditioning, aqua therapy, other modalities including ultrasound, massage, and dry needling, epidural steroid injections and finally surgical intervention.    Spoke with  the patient and her daughter about surgical options. They would like to wait to schedule surgery until after a vacation in February. They will follow up in January to schedule.

## 2022-10-13 ENCOUNTER — OFFICE VISIT (OUTPATIENT)
Dept: ORTHOPEDICS | Facility: CLINIC | Age: 77
End: 2022-10-13
Payer: COMMERCIAL

## 2022-10-13 VITALS — HEIGHT: 66 IN | BODY MASS INDEX: 34.23 KG/M2 | WEIGHT: 213 LBS

## 2022-10-13 DIAGNOSIS — M17.0 PRIMARY OSTEOARTHRITIS OF BOTH KNEES: Primary | ICD-10-CM

## 2022-10-13 PROCEDURE — 99213 PR OFFICE/OUTPT VISIT, EST, LEVL III, 20-29 MIN: ICD-10-PCS | Mod: 25,S$GLB,, | Performed by: ORTHOPAEDIC SURGERY

## 2022-10-13 PROCEDURE — 99999 PR PBB SHADOW E&M-EST. PATIENT-LVL IV: CPT | Mod: PBBFAC,,, | Performed by: ORTHOPAEDIC SURGERY

## 2022-10-13 PROCEDURE — 1125F AMNT PAIN NOTED PAIN PRSNT: CPT | Mod: CPTII,S$GLB,, | Performed by: ORTHOPAEDIC SURGERY

## 2022-10-13 PROCEDURE — 1159F MED LIST DOCD IN RCRD: CPT | Mod: CPTII,S$GLB,, | Performed by: ORTHOPAEDIC SURGERY

## 2022-10-13 PROCEDURE — 3051F PR MOST RECENT HEMOGLOBIN A1C LEVEL 7.0 - < 8.0%: ICD-10-PCS | Mod: CPTII,S$GLB,, | Performed by: ORTHOPAEDIC SURGERY

## 2022-10-13 PROCEDURE — 1159F PR MEDICATION LIST DOCUMENTED IN MEDICAL RECORD: ICD-10-PCS | Mod: CPTII,S$GLB,, | Performed by: ORTHOPAEDIC SURGERY

## 2022-10-13 PROCEDURE — 3051F HG A1C>EQUAL 7.0%<8.0%: CPT | Mod: CPTII,S$GLB,, | Performed by: ORTHOPAEDIC SURGERY

## 2022-10-13 PROCEDURE — 99999 PR PBB SHADOW E&M-EST. PATIENT-LVL IV: ICD-10-PCS | Mod: PBBFAC,,, | Performed by: ORTHOPAEDIC SURGERY

## 2022-10-13 PROCEDURE — 20610 DRAIN/INJ JOINT/BURSA W/O US: CPT | Mod: 50,S$GLB,, | Performed by: ORTHOPAEDIC SURGERY

## 2022-10-13 PROCEDURE — 1125F PR PAIN SEVERITY QUANTIFIED, PAIN PRESENT: ICD-10-PCS | Mod: CPTII,S$GLB,, | Performed by: ORTHOPAEDIC SURGERY

## 2022-10-13 PROCEDURE — 20610 LARGE JOINT ASPIRATION/INJECTION: BILATERAL KNEE: ICD-10-PCS | Mod: 50,S$GLB,, | Performed by: ORTHOPAEDIC SURGERY

## 2022-10-13 PROCEDURE — 99213 OFFICE O/P EST LOW 20 MIN: CPT | Mod: 25,S$GLB,, | Performed by: ORTHOPAEDIC SURGERY

## 2022-10-13 RX ORDER — TRIAMCINOLONE ACETONIDE 40 MG/ML
40 INJECTION, SUSPENSION INTRA-ARTICULAR; INTRAMUSCULAR
Status: DISCONTINUED | OUTPATIENT
Start: 2022-10-13 | End: 2022-10-13 | Stop reason: HOSPADM

## 2022-10-13 RX ADMIN — TRIAMCINOLONE ACETONIDE 40 MG: 40 INJECTION, SUSPENSION INTRA-ARTICULAR; INTRAMUSCULAR at 09:10

## 2022-10-13 NOTE — PROGRESS NOTES
Follow Up PATIENT ORTHOPAEDIC: Knee    PRIMARY CARE PHYSICIAN: Leonard Wells MD   REFERRING PROVIDER: No referring provider defined for this encounter.     ASSESSMENT & PLAN:    Impression:  Bilateral Knee Severe Degenerative Osteoarthritis, Primary     Follow Up Plan: For Euflexxa Injections    Non operative care:    Mena Odonnell has physical exam evidence of above and wishes to pursue an non-operative care. I am recommending the following: Euflexxa series, activity modification.      To reivew: She would be an appropriate candidate for a right total knee replacement in the future should her symptoms warrant.  She was referred to vascular for intermittent claudication and peripheral vascular disease seen on x-ray .  There evaluation has referred her to lymphedema therapy. Swelling is improving. She is interested in repeat gel injections, however, while she had good relief for 5 months is not due for new series until November.  She is not interested in surgical intervention at this time. She is also diabetic so this is a good treatment modaliity for her. However, she is in too much pain to wait for next gel series. So, she is interested in steroid injections today. I discussed her elevation in her blood sugars that are likely to result from this, she will cover it with PRN insulin.      The patient has been ordered:  Euflexxa Series    CONSULTS:   None    ACTIVE PROBLEM LIST  Patient Active Problem List   Diagnosis    Type 2 diabetes mellitus with diabetic polyneuropathy    Hyperlipidemia    HTN (hypertension)    Knee pain    H/O vitamin D deficiency    Non morbid obesity due to excess calories    Bilateral carotid bruits    Osteoarthritis of right knee    Morbid obesity with body mass index (BMI) of 40.0 or higher    Dyspnea on exertion    Chronic cough    MEKA (obstructive sleep apnea)    Sensorineural hearing loss (SNHL) of right ear with restricted hearing of left ear    Tinnitus of both ears    Allergic  rhinitis    Dysfunction of both eustachian tubes    Nasal septal deviation    Chronic eczematous otitis externa of both ears    Neck abscess    Epidermal inclusion cyst    Polyneuropathy associated with underlying disease    Pain and swelling of left knee    Pain and swelling of right knee    Weakness    Leg swelling    Aortic atherosclerosis    Decreased range of motion of lumbar spine    Abdominal weakness           SUBJECTIVE    CHIEF COMPLAINT: Knee Pain    HPI:   Mena Odonnell is a 76 y.o. female here for evaluation and management of right knee pain. There is not a specific incident that brought about this pain. she has had progressive problems with the knee(s) starting 2 years ago but progressing to multiple times a day over the past 6 months which is interfering with activities which include: walking 2 blocks and standing for prolonged periods of time    Currently the pain in the joint is rated at 5 out of 10 with moderate activity.  The pain is intermittent and is located in the Right knee, at level of joint line, located medially and located laterally. The pain is described as aching and sharp. Relieving factors include ambulatory device and rest.       Mena Odonnell also complaints leg cramping on that side primarily in her posterior aspect of her knee and calf.  This typically improves proves with rest and over-the-counter topical spray.    Interval History 4/18/22: Injections wearing off. Saw vascular.   Interval History 5/9/22: Here for 1-3 Euflexxa series   Interval History 5/19/22: Here for 2-3 Euflexxa series. Mild improvement in pain.   Interval History 5/27/22: Here for 3-3 Euflexxa series. Moderate improvement in pain.  Interval History 10/13/22: Here with return of pain.     PROGRESSIVE SYMPTOMS:  Pain worsened by weight bearing    FUNCTIONAL STATUS:   Do light to moderate work around the house    PREVIOUS TREATMENTS:  Medical: Attempted Weight Loss, Steroid Injections and Biologic  Injections  Physical Therapy: Use of Ambulatory Aid and Activities Modified   Previous Orthopaedic Surgery: None    REVIEW OF SYSTEMS:  PAIN ASSESSMENT:  See HPI.  MUSCULOSKELETAL: See HPI.  OTHER 10 point review of systems is negative except as stated in HPI above    PAST MEDICAL HISTORY   has a past medical history of Cataract, Diabetes mellitus type II, Diabetes with neurologic complications, DM retinopathy, H/O vitamin D deficiency, Hyperlipidemia, Hypertension, Obesity, Osteoarthritis, Peripheral neuropathy, Polyneuropathy, Severe obesity (BMI 35.0-39.9) with comorbidity (12/30/2016), Sleep apnea, and Tendonitis.     PAST SURGICAL HISTORY   has a past surgical history that includes Bladder suspension; Hysterectomy (1978); Tonsillectomy; Colonoscopy (N/A, 9/26/2015); Cataract extraction; and Neck mass excision (11/6/2019).     FAMILY HISTORY  family history includes Cataracts in her mother; Colon cancer in her maternal grandfather; Diabetes in her mother, sister, sister, and son; Hypertension in her brother, brother, daughter, mother, sister, sister, sister, and sister; Stroke in her mother; Thyroid disease in her maternal aunt, maternal aunt, mother, and sister.     SOCIAL HISTORY   reports that she quit smoking about 51 years ago. She has a 3.75 pack-year smoking history. She has never used smokeless tobacco. She reports that she does not drink alcohol and does not use drugs.     ALLERGIES   Review of patient's allergies indicates:   Allergen Reactions    Atorvastatin      Muscle pain     Crestor [rosuvastatin] Other (See Comments)     Leg Weakness.         MEDICATIONS  Current Outpatient Medications on File Prior to Visit   Medication Sig Dispense Refill    amLODIPine (NORVASC) 10 MG tablet Take 1 tablet (10 mg total) by mouth once daily. 90 tablet 2    aspirin 81 MG Chew Take 1 tablet (81 mg total) by mouth once daily. 30 tablet 0    blood-glucose sensor (DEXCOM G6 SENSOR) Sujey Change sensor every 10 days 3  each 11    blood-glucose transmitter (DEXCOM G6 TRANSMITTER) Sujey Change every 3 months 1 each 3    cetirizine (ZYRTEC) 10 MG tablet TAKE 1 TABLET BY MOUTH ONCE DAILY FOR 30 DAYS  3    CONTOUR NEXT TEST STRIPS Strp Check glucose before breakfast and dinner. 100 each 5    COVID-19 VACC,MRNA,PFIZER,,PF, IM COVID-19 vacc,mRNA(Pfizer)(PF)      diabetic supplies, miscellan. Kit Please dispense one 14 day FreeStyle Giulia [place] 1 kit 0    diclofenac sodium (VOLTAREN) 1 % Gel Lower extremities: Apply the gel (4 g) to the affected area 4 times daily. Do not apply more than 16 g daily to any one affected joint of the lower extremities. Upper extremities: Apply the gel (2 g) to the affected area 4 times daily. Do not apply more than 8 g daily to any one affected joint of the upper extremities. Total dose should not exceed 32 g per day, overall affected joints. 100 g 5    empagliflozin (JARDIANCE) 10 mg tablet Take 1 tablet (10 mg total) by mouth once daily. 90 tablet 2    flash glucose sensor (FREESTYLE GIULIA 14 DAY SENSOR) Kit 1 sensor kit every 14 days (requires 2 kits per month) 2 kit 6    fluocinolone acetonide oil 0.01 % Drop Place 4 drops in ear(s) 2 (two) times daily. 20 mL 5    gabapentin (NEURONTIN) 300 MG capsule Take 1 capsule (300 mg total) by mouth 3 (three) times daily. 90 capsule 11    insulin glargine U-300 conc (TOUJEO MAX U-300 SOLOSTAR) 300 unit/mL (3 mL) insulin pen INJECT 50 UNITS SUBCUTANEOUSLY ONCE DAILY 2 pen 5    insulin lispro (HUMALOG KWIKPEN INSULIN) 100 unit/mL pen Use with sliding scale - 150-200=+2, 201-250=+4; 251-300=+6; 301-350=+8, over 350=+10 units 15 mL 0    losartan-hydrochlorothiazide 100-25 mg (HYZAAR) 100-25 mg per tablet Take 1 tablet by mouth once daily. 90 tablet 3    meloxicam (MOBIC) 7.5 MG tablet Take 1 tablet (7.5 mg total) by mouth once daily. 12 tablet 0    metFORMIN (GLUCOPHAGE-XR) 500 MG ER 24hr tablet Take 1 tablet (500 mg total) by mouth 2 (two) times daily with meals. 180  "tablet 2    montelukast (SINGULAIR) 10 mg tablet Take 1 tablet (10 mg total) by mouth once daily. 90 tablet 1    OZEMPIC 1 mg/dose (4 mg/3 mL) INJECT 1 MG UNDER THE SKIN ONCE A WEEK 1 pen 11    pravastatin (PRAVACHOL) 80 MG tablet Take 1 tablet (80 mg total) by mouth once daily. 90 tablet 3    metoprolol succinate (TOPROL-XL) 50 MG 24 hr tablet Take 1 tablet (50 mg total) by mouth once daily. 30 tablet 5     Current Facility-Administered Medications on File Prior to Visit   Medication Dose Route Frequency Provider Last Rate Last Admin    sodium hyaluronate (EUFLEXXA) 10 mg/mL(mw 2.4 -3.6 million) injection 20 mg  20 mg Intra-articular 1 time in Clinic/HOD Maile Cuello MD        sodium hyaluronate (EUFLEXXA) 10 mg/mL(mw 2.4 -3.6 million) injection 20 mg  20 mg Intra-articular 1 time in Clinic/HOD Sumanth Erickson MD              PHYSICAL EXAM   height is 5' 6" (1.676 m) and weight is 96.6 kg (213 lb).   Body mass index is 34.38 kg/m².      All other systems deferred.  GENERAL:  No acute distress  HABITUS: Obese  GAIT: Antalgic  SKIN: Normal     KNEE EXAM:    Bilateral:   Effusion: Minimal joint effusion  TTP: yes over Medial Joint Line and Lateral Joint Line   no crepitus with passive knee ROM  Passive ROM: Extension 0, Flexion 130  No pain with manipulation of patella  Stable to varus/valgus stress. No increased laxity to anterior/posterior drawer testing  negative Ngozi's test  No pain with IR/ER rotation of the hip  5/5 strength in knee flexion and extension, ankle plantarflexion and dorsiflexion  Neurovascular Status: Sensation intact to light touch in Sural, Saphenous, SPN, DPN, Tibial nerve distribution  2+ pulse DP/PT, normal capillary refill, foot has normal warmth    DATA:  Diagnostic tests reviewed for today's visit:     4v of the bilateral knee reveal Moderate degenerative changes of the Lateral and Patellofemoral compartment. There is evidence of advanced osteoarthritis changes with Subchondral " sclerosis, Osteophyte formation and Joint space narrowing. The limb is in netural alignment. The patella is tracking midline and is in normal alignment. Vascular calcifications present. Kellegren-Lawerence grade 4 changes on the right, grade 3 on the left.

## 2022-10-13 NOTE — PROCEDURES
Large Joint Aspiration/Injection: bilateral knee    Date/Time: 10/13/2022 9:40 AM  Performed by: Sumanth Erickson MD  Authorized by: Sumanth Erickson MD     Consent Done?:  Yes (Verbal)  Indications:  Arthritis and pain  Site marked: the procedure site was marked    Timeout: prior to procedure the correct patient, procedure, and site was verified    Prep: patient was prepped and draped in usual sterile fashion      Local anesthesia used?: Yes    Anesthesia:  Local infiltration  Local anesthetic:  Lidocaine 1% without epinephrine and topical anesthetic    Details:  Needle Size:  22 G  Approach:  Anterolateral  Location:  Knee  Laterality:  Bilateral  Site:  Bilateral knee  Medications (Right):  40 mg triamcinolone acetonide 40 mg/mL  Medications (Left):  40 mg triamcinolone acetonide 40 mg/mL  Patient tolerance:  Patient tolerated the procedure well with no immediate complications

## 2022-11-01 LAB
LEFT ABI: 0.57
LEFT ARM BP: 130 MMHG
LEFT DORSALIS PEDIS: 75 MMHG
LEFT POSTERIOR TIBIAL: 69 MMHG
LEFT TBI: 0.34
LEFT TOE PRESSURE: 45 MMHG
RIGHT ABI: 0.95
RIGHT ARM BP: 132 MMHG
RIGHT DORSALIS PEDIS: 77 MMHG
RIGHT POSTERIOR TIBIAL: 125 MMHG
RIGHT TBI: 0.39
RIGHT TOE PRESSURE: 51 MMHG

## 2022-11-12 ENCOUNTER — HOSPITAL ENCOUNTER (OUTPATIENT)
Dept: RADIOLOGY | Facility: HOSPITAL | Age: 77
Discharge: HOME | End: 2022-11-12
Attending: INTERNAL MEDICINE
Payer: COMMERCIAL

## 2022-11-12 DIAGNOSIS — Q63.9 RENAL STRUCTURAL ABNORMALITY: ICD-10-CM

## 2022-11-12 PROCEDURE — 76770 US EXAM ABDO BACK WALL COMP: CPT | Mod: 26,,, | Performed by: RADIOLOGY

## 2022-11-12 PROCEDURE — 76770 US EXAM ABDO BACK WALL COMP: CPT | Mod: TC

## 2022-11-12 PROCEDURE — 76770 US RETROPERITONEAL COMPLETE: ICD-10-PCS | Mod: 26,,, | Performed by: RADIOLOGY

## 2022-11-14 DIAGNOSIS — N28.1 RENAL CYST: Primary | ICD-10-CM

## 2022-11-19 ENCOUNTER — LAB VISIT (OUTPATIENT)
Dept: LAB | Facility: HOSPITAL | Age: 77
End: 2022-11-19
Attending: INTERNAL MEDICINE
Payer: MEDICARE

## 2022-11-19 DIAGNOSIS — E11.42 TYPE 2 DIABETES MELLITUS WITH DIABETIC POLYNEUROPATHY, WITH LONG-TERM CURRENT USE OF INSULIN: ICD-10-CM

## 2022-11-19 DIAGNOSIS — Z79.4 TYPE 2 DIABETES MELLITUS WITH DIABETIC POLYNEUROPATHY, WITH LONG-TERM CURRENT USE OF INSULIN: ICD-10-CM

## 2022-11-19 LAB
ESTIMATED AVG GLUCOSE: 146 MG/DL (ref 68–131)
HBA1C MFR BLD: 6.7 % (ref 4–5.6)

## 2022-11-19 PROCEDURE — 83036 HEMOGLOBIN GLYCOSYLATED A1C: CPT | Performed by: NURSE PRACTITIONER

## 2022-11-19 PROCEDURE — 36415 COLL VENOUS BLD VENIPUNCTURE: CPT | Mod: PO | Performed by: NURSE PRACTITIONER

## 2022-11-28 ENCOUNTER — OFFICE VISIT (OUTPATIENT)
Dept: ORTHOPEDICS | Facility: CLINIC | Age: 77
End: 2022-11-28
Payer: COMMERCIAL

## 2022-11-28 VITALS — BODY MASS INDEX: 34.23 KG/M2 | HEIGHT: 66 IN | WEIGHT: 213 LBS

## 2022-11-28 DIAGNOSIS — M17.0 PRIMARY OSTEOARTHRITIS OF BOTH KNEES: Primary | ICD-10-CM

## 2022-11-28 PROCEDURE — 1126F PR PAIN SEVERITY QUANTIFIED, NO PAIN PRESENT: ICD-10-PCS | Mod: CPTII,S$GLB,, | Performed by: ORTHOPAEDIC SURGERY

## 2022-11-28 PROCEDURE — 99999 PR PBB SHADOW E&M-EST. PATIENT-LVL IV: ICD-10-PCS | Mod: PBBFAC,,, | Performed by: ORTHOPAEDIC SURGERY

## 2022-11-28 PROCEDURE — 1159F PR MEDICATION LIST DOCUMENTED IN MEDICAL RECORD: ICD-10-PCS | Mod: CPTII,S$GLB,, | Performed by: ORTHOPAEDIC SURGERY

## 2022-11-28 PROCEDURE — 99499 UNLISTED E&M SERVICE: CPT | Mod: S$GLB,,, | Performed by: ORTHOPAEDIC SURGERY

## 2022-11-28 PROCEDURE — 1159F MED LIST DOCD IN RCRD: CPT | Mod: CPTII,S$GLB,, | Performed by: ORTHOPAEDIC SURGERY

## 2022-11-28 PROCEDURE — 1126F AMNT PAIN NOTED NONE PRSNT: CPT | Mod: CPTII,S$GLB,, | Performed by: ORTHOPAEDIC SURGERY

## 2022-11-28 PROCEDURE — 99499 NO LOS: ICD-10-PCS | Mod: S$GLB,,, | Performed by: ORTHOPAEDIC SURGERY

## 2022-11-28 PROCEDURE — 99999 PR PBB SHADOW E&M-EST. PATIENT-LVL IV: CPT | Mod: PBBFAC,,, | Performed by: ORTHOPAEDIC SURGERY

## 2022-11-28 PROCEDURE — 20610 PR DRAIN/INJECT LARGE JOINT/BURSA: ICD-10-PCS | Mod: 50,S$GLB,, | Performed by: ORTHOPAEDIC SURGERY

## 2022-11-28 PROCEDURE — 20610 DRAIN/INJ JOINT/BURSA W/O US: CPT | Mod: 50,S$GLB,, | Performed by: ORTHOPAEDIC SURGERY

## 2022-11-28 NOTE — PROCEDURES
Large Joint Aspiration/Injection    Date/Time: 11/28/2022 8:40 AM  Performed by: Sumanth Erickson MD  Authorized by: Sumanth Erickson MD     Consent Done?:  Yes (Verbal)  Indications:  Arthritis and pain  Site marked: the procedure site was marked    Timeout: prior to procedure the correct patient, procedure, and site was verified    Prep: patient was prepped and draped in usual sterile fashion      Local anesthesia used?: Yes    Local anesthetic:  Topical anesthetic    Details:  Needle Size:  22 G  Approach:  Anterolateral  Location:  Knee  Medications:  20 mg sodium hyaluronate (EUFLEXXA) 10 mg/mL injection ([place] formulary preferred)  Patient tolerance:  Patient tolerated the procedure well with no immediate complications

## 2022-11-28 NOTE — PROGRESS NOTES
Follow Up PATIENT ORTHOPAEDIC: Knee    PRIMARY CARE PHYSICIAN: Leonard Wells MD   REFERRING PROVIDER: Sumanth Erickson MD  [address] [place] [address]     ASSESSMENT & PLAN:    Impression:  Bilateral Knee Severe Degenerative Osteoarthritis, Primary     Follow Up Plan: For Euflexxa Injections    Non operative care:    Mena Odonnell has physical exam evidence of above and wishes to pursue an non-operative care. I am recommending the following: Euflexxa series, activity modification.      To reivew: She would be an appropriate candidate for a right total knee replacement in the future should her symptoms warrant.  She was referred to vascular for intermittent claudication and peripheral vascular disease seen on x-ray .  There evaluation has referred her to lymphedema therapy. Swelling is improving. She is interested in repeat gel injections, however, while she had good relief for 5 months is not due for new series until November.  She is not interested in surgical intervention at this time. She is also diabetic so this is a good treatment modaliity for her.     The patient has been ordered:  Euflexxa Series    CONSULTS:   None    ACTIVE PROBLEM LIST  Patient Active Problem List   Diagnosis    Type 2 diabetes mellitus with diabetic polyneuropathy    Hyperlipidemia    HTN (hypertension)    Knee pain    H/O vitamin D deficiency    Non morbid obesity due to excess calories    Bilateral carotid bruits    Osteoarthritis of right knee    Morbid obesity with body mass index (BMI) of 40.0 or higher    Dyspnea on exertion    Chronic cough    MEKA (obstructive sleep apnea)    Sensorineural hearing loss (SNHL) of right ear with restricted hearing of left ear    Tinnitus of both ears    Allergic rhinitis    Dysfunction of both eustachian tubes    Nasal septal deviation    Chronic eczematous otitis externa of both ears    Neck abscess    Epidermal inclusion cyst    Polyneuropathy associated with underlying disease     Pain and swelling of left knee    Pain and swelling of right knee    Weakness    Leg swelling    Aortic atherosclerosis    Decreased range of motion of lumbar spine    Abdominal weakness           SUBJECTIVE    CHIEF COMPLAINT: Knee Pain    HPI:   Mena Odonnell is a 77 y.o. female here for evaluation and management of right knee pain. There is not a specific incident that brought about this pain. she has had progressive problems with the knee(s) starting 2 years ago but progressing to multiple times a day over the past 6 months which is interfering with activities which include: walking 2 blocks and standing for prolonged periods of time    Currently the pain in the joint is rated at 5 out of 10 with moderate activity.  The pain is intermittent and is located in the Right knee, at level of joint line, located medially and located laterally. The pain is described as aching and sharp. Relieving factors include ambulatory device and rest.       Mena Odonnell also complaints leg cramping on that side primarily in her posterior aspect of her knee and calf.  This typically improves proves with rest and over-the-counter topical spray.    Interval History 4/18/22: Injections wearing off. Saw vascular.   Interval History 5/9/22: Here for 1-3 Euflexxa series   Interval History 5/19/22: Here for 2-3 Euflexxa series. Mild improvement in pain.   Interval History 5/27/22: Here for 3-3 Euflexxa series. Moderate improvement in pain.  Interval History 10/13/22: Here with return of pain.   Interval History 11/28/22: Here for Euflexxa injections. Previous steroid with good relief     PROGRESSIVE SYMPTOMS:  Pain worsened by weight bearing    FUNCTIONAL STATUS:   Do light to moderate work around the house    PREVIOUS TREATMENTS:  Medical: Attempted Weight Loss, Steroid Injections and Biologic Injections  Physical Therapy: Use of Ambulatory Aid and Activities Modified   Previous Orthopaedic Surgery: None    REVIEW OF  SYSTEMS:  PAIN ASSESSMENT:  See HPI.  MUSCULOSKELETAL: See HPI.  OTHER 10 point review of systems is negative except as stated in HPI above    PAST MEDICAL HISTORY   has a past medical history of Cataract, Diabetes mellitus type II, Diabetes with neurologic complications, DM retinopathy, H/O vitamin D deficiency, Hyperlipidemia, Hypertension, Obesity, Osteoarthritis, Peripheral neuropathy, Polyneuropathy, Severe obesity (BMI 35.0-39.9) with comorbidity (12/30/2016), Sleep apnea, and Tendonitis.     PAST SURGICAL HISTORY   has a past surgical history that includes Bladder suspension; Hysterectomy (1978); Tonsillectomy; Colonoscopy (N/A, 9/26/2015); Cataract extraction; and Neck mass excision (11/6/2019).     FAMILY HISTORY  family history includes Cataracts in her mother; Colon cancer in her maternal grandfather; Diabetes in her mother, sister, sister, and son; Hypertension in her brother, brother, daughter, mother, sister, sister, sister, and sister; Stroke in her mother; Thyroid disease in her maternal aunt, maternal aunt, mother, and sister.     SOCIAL HISTORY   reports that she quit smoking about 51 years ago. She has a 3.75 pack-year smoking history. She has never used smokeless tobacco. She reports that she does not drink alcohol and does not use drugs.     ALLERGIES   Review of patient's allergies indicates:   Allergen Reactions    Atorvastatin      Muscle pain     Crestor [rosuvastatin] Other (See Comments)     Leg Weakness.         MEDICATIONS  Current Outpatient Medications on File Prior to Visit   Medication Sig Dispense Refill    amLODIPine (NORVASC) 10 MG tablet Take 1 tablet (10 mg total) by mouth once daily. 90 tablet 2    aspirin 81 MG Chew Take 1 tablet (81 mg total) by mouth once daily. 30 tablet 0    blood-glucose sensor (DEXCOM G6 SENSOR) Sujey Change sensor every 10 days 3 each 11    blood-glucose transmitter (DEXCOM G6 TRANSMITTER) Sujey Change every 3 months 1 each 3    cetirizine (ZYRTEC) 10 MG  tablet TAKE 1 TABLET BY MOUTH ONCE DAILY FOR 30 DAYS  3    CONTOUR NEXT TEST STRIPS Strp Check glucose before breakfast and dinner. 100 each 5    COVID-19 VACC,MRNA,PFIZER,,PF, IM COVID-19 vacc,mRNA(Pfizer)(PF)      diabetic supplies, miscellan. Kit Please dispense one 14 day FreeStyle Giulia Bard 1 kit 0    diclofenac sodium (VOLTAREN) 1 % Gel Lower extremities: Apply the gel (4 g) to the affected area 4 times daily. Do not apply more than 16 g daily to any one affected joint of the lower extremities. Upper extremities: Apply the gel (2 g) to the affected area 4 times daily. Do not apply more than 8 g daily to any one affected joint of the upper extremities. Total dose should not exceed 32 g per day, overall affected joints. 100 g 5    empagliflozin (JARDIANCE) 10 mg tablet Take 1 tablet (10 mg total) by mouth once daily. 90 tablet 2    flash glucose sensor (FREESTYLE GIULIA 14 DAY SENSOR) Kit 1 sensor kit every 14 days (requires 2 kits per month) 2 kit 6    fluocinolone acetonide oil 0.01 % Drop Place 4 drops in ear(s) 2 (two) times daily. 20 mL 5    gabapentin (NEURONTIN) 300 MG capsule Take 1 capsule (300 mg total) by mouth 3 (three) times daily. 90 capsule 11    insulin glargine U-300 conc (TOUJEO MAX U-300 SOLOSTAR) 300 unit/mL (3 mL) insulin pen INJECT 50 UNITS SUBCUTANEOUSLY ONCE DAILY 2 pen 5    insulin lispro (HUMALOG KWIKPEN INSULIN) 100 unit/mL pen Use with sliding scale - 150-200=+2, 201-250=+4; 251-300=+6; 301-350=+8, over 350=+10 units 15 mL 0    losartan-hydrochlorothiazide 100-25 mg (HYZAAR) 100-25 mg per tablet Take 1 tablet by mouth once daily. 90 tablet 3    meloxicam (MOBIC) 7.5 MG tablet Take 1 tablet (7.5 mg total) by mouth once daily. 12 tablet 0    metFORMIN (GLUCOPHAGE-XR) 500 MG ER 24hr tablet Take 1 tablet (500 mg total) by mouth 2 (two) times daily with meals. 180 tablet 2    metoprolol succinate (TOPROL-XL) 50 MG 24 hr tablet Take 1 tablet (50 mg total) by mouth once daily. 30 tablet 5  "   montelukast (SINGULAIR) 10 mg tablet Take 1 tablet (10 mg total) by mouth once daily. 90 tablet 1    OZEMPIC 1 mg/dose (4 mg/3 mL) INJECT 1 MG UNDER THE SKIN ONCE A WEEK 1 pen 11    pravastatin (PRAVACHOL) 80 MG tablet Take 1 tablet (80 mg total) by mouth once daily. 90 tablet 3     Current Facility-Administered Medications on File Prior to Visit   Medication Dose Route Frequency Provider Last Rate Last Admin    sodium hyaluronate (EUFLEXXA) 10 mg/mL(mw 2.4 -3.6 million) injection 20 mg  20 mg Intra-articular 1 time in Clinic/HOD Sumanth Erickson MD              PHYSICAL EXAM   height is 5' 6" (1.676 m) and weight is 96.6 kg (213 lb).   Body mass index is 34.38 kg/m².      All other systems deferred.  GENERAL:  No acute distress  HABITUS: Obese  GAIT: Antalgic  SKIN: Normal     KNEE EXAM:    Bilateral:   Effusion: Minimal joint effusion  TTP: yes over Medial Joint Line and Lateral Joint Line   no crepitus with passive knee ROM  Passive ROM: Extension 0, Flexion 130  No pain with manipulation of patella  Stable to varus/valgus stress. No increased laxity to anterior/posterior drawer testing  negative Ngozi's test  No pain with IR/ER rotation of the hip  5/5 strength in knee flexion and extension, ankle plantarflexion and dorsiflexion  Neurovascular Status: Sensation intact to light touch in Sural, Saphenous, SPN, DPN, Tibial nerve distribution  2+ pulse DP/PT, normal capillary refill, foot has normal warmth    DATA:  Diagnostic tests reviewed for today's visit:     4v of the bilateral knee reveal Moderate degenerative changes of the Lateral and Patellofemoral compartment. There is evidence of advanced osteoarthritis changes with Subchondral sclerosis, Osteophyte formation and Joint space narrowing. The limb is in netural alignment. The patella is tracking midline and is in normal alignment. Vascular calcifications present. Kellegren-Lawerence grade 4 changes on the right, grade 3 on the left.         "

## 2022-12-01 ENCOUNTER — OFFICE VISIT (OUTPATIENT)
Dept: CARDIOLOGY | Facility: CLINIC | Age: 77
End: 2022-12-01
Payer: COMMERCIAL

## 2022-12-01 VITALS
DIASTOLIC BLOOD PRESSURE: 62 MMHG | SYSTOLIC BLOOD PRESSURE: 132 MMHG | RESPIRATION RATE: 15 BRPM | OXYGEN SATURATION: 99 % | BODY MASS INDEX: 35.04 KG/M2 | HEART RATE: 88 BPM | HEIGHT: 66 IN | WEIGHT: 218.06 LBS

## 2022-12-01 DIAGNOSIS — G47.33 OSA (OBSTRUCTIVE SLEEP APNEA): ICD-10-CM

## 2022-12-01 DIAGNOSIS — I70.0 AORTIC ATHEROSCLEROSIS: ICD-10-CM

## 2022-12-01 DIAGNOSIS — R06.09 DYSPNEA ON EXERTION: ICD-10-CM

## 2022-12-01 DIAGNOSIS — E78.2 MIXED HYPERLIPIDEMIA: ICD-10-CM

## 2022-12-01 DIAGNOSIS — R09.89 BILATERAL CAROTID BRUITS: ICD-10-CM

## 2022-12-01 DIAGNOSIS — E66.09 NON MORBID OBESITY DUE TO EXCESS CALORIES: ICD-10-CM

## 2022-12-01 DIAGNOSIS — I10 PRIMARY HYPERTENSION: Primary | ICD-10-CM

## 2022-12-01 DIAGNOSIS — E11.42 TYPE 2 DIABETES MELLITUS WITH DIABETIC POLYNEUROPATHY, WITHOUT LONG-TERM CURRENT USE OF INSULIN: ICD-10-CM

## 2022-12-01 DIAGNOSIS — I35.0 AORTIC VALVE STENOSIS, ETIOLOGY OF CARDIAC VALVE DISEASE UNSPECIFIED: ICD-10-CM

## 2022-12-01 PROCEDURE — 1101F PR PT FALLS ASSESS DOC 0-1 FALLS W/OUT INJ PAST YR: ICD-10-PCS | Mod: CPTII,S$GLB,, | Performed by: INTERNAL MEDICINE

## 2022-12-01 PROCEDURE — 93000 EKG 12-LEAD: ICD-10-PCS | Mod: S$GLB,,, | Performed by: INTERNAL MEDICINE

## 2022-12-01 PROCEDURE — 1160F RVW MEDS BY RX/DR IN RCRD: CPT | Mod: CPTII,S$GLB,, | Performed by: INTERNAL MEDICINE

## 2022-12-01 PROCEDURE — 1101F PT FALLS ASSESS-DOCD LE1/YR: CPT | Mod: CPTII,S$GLB,, | Performed by: INTERNAL MEDICINE

## 2022-12-01 PROCEDURE — 3078F PR MOST RECENT DIASTOLIC BLOOD PRESSURE < 80 MM HG: ICD-10-PCS | Mod: CPTII,S$GLB,, | Performed by: INTERNAL MEDICINE

## 2022-12-01 PROCEDURE — 99999 PR PBB SHADOW E&M-EST. PATIENT-LVL V: ICD-10-PCS | Mod: PBBFAC,,, | Performed by: INTERNAL MEDICINE

## 2022-12-01 PROCEDURE — 3288F FALL RISK ASSESSMENT DOCD: CPT | Mod: CPTII,S$GLB,, | Performed by: INTERNAL MEDICINE

## 2022-12-01 PROCEDURE — 3075F SYST BP GE 130 - 139MM HG: CPT | Mod: CPTII,S$GLB,, | Performed by: INTERNAL MEDICINE

## 2022-12-01 PROCEDURE — 99999 PR PBB SHADOW E&M-EST. PATIENT-LVL V: CPT | Mod: PBBFAC,,, | Performed by: INTERNAL MEDICINE

## 2022-12-01 PROCEDURE — 3078F DIAST BP <80 MM HG: CPT | Mod: CPTII,S$GLB,, | Performed by: INTERNAL MEDICINE

## 2022-12-01 PROCEDURE — 93000 ELECTROCARDIOGRAM COMPLETE: CPT | Mod: S$GLB,,, | Performed by: INTERNAL MEDICINE

## 2022-12-01 PROCEDURE — 1159F MED LIST DOCD IN RCRD: CPT | Mod: CPTII,S$GLB,, | Performed by: INTERNAL MEDICINE

## 2022-12-01 PROCEDURE — 1126F PR PAIN SEVERITY QUANTIFIED, NO PAIN PRESENT: ICD-10-PCS | Mod: CPTII,S$GLB,, | Performed by: INTERNAL MEDICINE

## 2022-12-01 PROCEDURE — 1160F PR REVIEW ALL MEDS BY PRESCRIBER/CLIN PHARMACIST DOCUMENTED: ICD-10-PCS | Mod: CPTII,S$GLB,, | Performed by: INTERNAL MEDICINE

## 2022-12-01 PROCEDURE — 1159F PR MEDICATION LIST DOCUMENTED IN MEDICAL RECORD: ICD-10-PCS | Mod: CPTII,S$GLB,, | Performed by: INTERNAL MEDICINE

## 2022-12-01 PROCEDURE — 3288F PR FALLS RISK ASSESSMENT DOCUMENTED: ICD-10-PCS | Mod: CPTII,S$GLB,, | Performed by: INTERNAL MEDICINE

## 2022-12-01 PROCEDURE — 99214 PR OFFICE/OUTPT VISIT, EST, LEVL IV, 30-39 MIN: ICD-10-PCS | Mod: S$GLB,,, | Performed by: INTERNAL MEDICINE

## 2022-12-01 PROCEDURE — 3075F PR MOST RECENT SYSTOLIC BLOOD PRESS GE 130-139MM HG: ICD-10-PCS | Mod: CPTII,S$GLB,, | Performed by: INTERNAL MEDICINE

## 2022-12-01 PROCEDURE — 99214 OFFICE O/P EST MOD 30 MIN: CPT | Mod: S$GLB,,, | Performed by: INTERNAL MEDICINE

## 2022-12-01 PROCEDURE — 1126F AMNT PAIN NOTED NONE PRSNT: CPT | Mod: CPTII,S$GLB,, | Performed by: INTERNAL MEDICINE

## 2022-12-01 NOTE — PROGRESS NOTES
CARDIOVASCULAR CONSULTATION    REASON FOR CONSULT:   Mena Odonnell is a 77 y.o. female who presents for aortic valve stenosis and dyspnea on exertion      HISTORY OF PRESENT ILLNESS:     Patient is a pleasant 74-year-old lady.  Multiple risk factors for coronary artery disease.  Recently had an echocardiogram done.  Showed normal left ventricle systolic function with mild aortic valve stenosis.  Has been sent to Cardiology Clinic further evaluation.  Denies any chest pains at rest on exertion, however does complain of dyspnea on exertion.  Does have elevated blood pressure in the clinic and states that home when she checks it stays around 150-160 mm systolic.  She is currently on losartan hydrochlorothiazide as well as Norvasc at home.  Denies orthopnea, PND, swelling of feet.      Hyperdynamic left ventricular systolic function. The estimated ejection fraction is 75%.  Concentric left ventricular hypertrophy.  Indeterminate left ventricular diastolic function.  Normal right ventricular systolic function.  Mild left atrial enlargement.  Mild aortic valve stenosis.  Aortic valve area is 1.55 cm2; peak velocity is 2.58 m/s; mean gradient is 14 mmHg.  The estimated PA systolic pressure is 13 mmHg.      Notes from September 2020:    Patient doing fine.  Denies any chest pains at rest on exertion.  Stress test did not show any significant blockage.    The study shows normal myocardial perfusion.    The perfusion scan is free of evidence from myocardial ischemia or injury.    There is a mild to moderate intensity defect in the anterior wall of the left ventricle, secondary to breast attenuation.    Gated perfusion images showed an ejection fraction of 78%    There is normal wall motion at rest and post stress.    The EKG portion of this study is negative for ischemia.    The patient reported no chest pain during the stress test.    There were no arrhythmias during stress.      Notes from November 2022: Patient  here for follow-up.  No new symptomatology.  No chest pains, orthopnea, PND.  Chronic dyspnea on exertion.    PAST MEDICAL HISTORY:     Past Medical History:   Diagnosis Date    Cataract     Diabetes mellitus type II     Diabetes with neurologic complications     DM retinopathy     H/O vitamin D deficiency     Hyperlipidemia     Hypertension     Obesity     Osteoarthritis     Peripheral neuropathy     Polyneuropathy     Severe obesity (BMI 35.0-39.9) with comorbidity 12/30/2016    Sleep apnea     Tendonitis     left foot       PAST SURGICAL HISTORY:     Past Surgical History:   Procedure Laterality Date    BLADDER SUSPENSION      CATARACT EXTRACTION      COLONOSCOPY N/A 9/26/2015    Procedure: COLONOSCOPY;  Surgeon: Javed Wood MD;  [facility]  Service: Endoscopy;  Laterality: N/A;    HYSTERECTOMY  1978    fibroids    NECK MASS EXCISION  11/6/2019    Procedure: EXCISION, MASS, NECK;  Surgeon: Edwin Barrow MD;  Location: [facility];  Service: General;;  RN PREOP 10/30/2019    TONSILLECTOMY         ALLERGIES AND MEDICATION:     Review of patient's allergies indicates:   Allergen Reactions    Atorvastatin      Muscle pain     Crestor [rosuvastatin] Other (See Comments)     Leg Weakness.         Medication List            Accurate as of December 1, 2022 10:15 AM. If you have any questions, ask your nurse or doctor.                CONTINUE taking these medications      amLODIPine 10 MG tablet  Commonly known as: NORVASC  Take 1 tablet (10 mg total) by mouth once daily.     aspirin 81 MG Chew  Take 1 tablet (81 mg total) by mouth once daily.     cetirizine 10 MG tablet  Commonly known as: ZYRTEC     CONTOUR NEXT TEST STRIPS Strp  Generic drug: blood sugar diagnostic  Check glucose before breakfast and dinner.     COVID-19 VACC,MRNA(PFIZER)(PF) IM     DEXCOM G6 SENSOR Sujey  Generic drug: blood-glucose sensor  Change sensor every 10 days     DEXCOM G6 TRANSMITTER Sujey  Generic drug:  blood-glucose transmitter  Change every 3 months     diabetic supplies, miscellan. Kit  Please dispense one 14 day FreeStyle Giulia Covington     diclofenac sodium 1 % Gel  Commonly known as: VOLTAREN  Lower extremities: Apply the gel (4 g) to the affected area 4 times daily. Do not apply more than 16 g daily to any one affected joint of the lower extremities. Upper extremities: Apply the gel (2 g) to the affected area 4 times daily. Do not apply more than 8 g daily to any one affected joint of the upper extremities. Total dose should not exceed 32 g per day, overall affected joints.     fluocinolone acetonide oiL 0.01 % Drop  Place 4 drops in ear(s) 2 (two) times daily.     FREESTYLE GIULIA 14 DAY SENSOR Kit  Generic drug: flash glucose sensor  1 sensor kit every 14 days (requires 2 kits per month)     gabapentin 300 MG capsule  Commonly known as: NEURONTIN  Take 1 capsule (300 mg total) by mouth 3 (three) times daily.     insulin lispro 100 unit/mL pen  Commonly known as: HumaLOG KwikPen Insulin  Use with sliding scale - 150-200=+2, 201-250=+4; 251-300=+6; 301-350=+8, over 350=+10 units     JARDIANCE 10 mg tablet  Generic drug: empagliflozin  Take 1 tablet (10 mg total) by mouth once daily.     losartan-hydrochlorothiazide 100-25 mg 100-25 mg per tablet  Commonly known as: HYZAAR  Take 1 tablet by mouth once daily.     meloxicam 7.5 MG tablet  Commonly known as: MOBIC  Take 1 tablet (7.5 mg total) by mouth once daily.     metFORMIN 500 MG ER 24hr tablet  Commonly known as: GLUCOPHAGE-XR  Take 1 tablet (500 mg total) by mouth 2 (two) times daily with meals.     metoprolol succinate 50 MG 24 hr tablet  Commonly known as: TOPROL-XL  Take 1 tablet (50 mg total) by mouth once daily.     montelukast 10 mg tablet  Commonly known as: SINGULAIR  Take 1 tablet (10 mg total) by mouth once daily.     OZEMPIC 1 mg/dose (4 mg/3 mL)  Generic drug: semaglutide  INJECT 1 MG UNDER THE SKIN ONCE A WEEK     pravastatin 80 MG  tablet  Commonly known as: PRAVACHOL  Take 1 tablet (80 mg total) by mouth once daily.     TOUJEO MAX U-300 SOLOSTAR 300 unit/mL (3 mL) insulin pen  Generic drug: insulin glargine U-300 conc  INJECT 50 UNITS SUBCUTANEOUSLY ONCE DAILY              SOCIAL HISTORY:     Social History     Socioeconomic History    Marital status:     Number of children: 2   Occupational History     Employer: [employer]   Tobacco Use    Smoking status: Former     Packs/day: 0.25     Years: 15.00     Pack years: 3.75     Types: Cigarettes     Quit date:      Years since quittin.9    Smokeless tobacco: Never   Substance and Sexual Activity    Alcohol use: No    Drug use: No    Sexual activity: Not Currently     Partners: Male     Birth control/protection: Abstinence   Social History Narrative    . One daughter. Works as an Apartment      Social Determinants of Health     Financial Resource Strain: Low Risk     Difficulty of Paying Living Expenses: Not hard at all   Food Insecurity: No Food Insecurity    Worried About Running Out of Food in the Last Year: Never true    Ran Out of Food in the Last Year: Never true   Transportation Needs: No Transportation Needs    Lack of Transportation (Medical): No    Lack of Transportation (Non-Medical): No   Physical Activity: Inactive    Days of Exercise per Week: 0 days    Minutes of Exercise per Session: 0 min   Social Connections: Socially Isolated    Frequency of Communication with Friends and Family: More than three times a week    Frequency of Social Gatherings with Friends and Family: Never    Attends Tenriism Services: Never    Active Member of Clubs or Organizations: No    Attends Club or Organization Meetings: Never    Marital Status:    Housing Stability: Low Risk     Unable to Pay for Housing in the Last Year: No    Number of Places Lived in the Last Year: 1    Unstable Housing in the Last Year: No       FAMILY HISTORY:     Family  "History   Problem Relation Age of Onset    Thyroid disease Mother     Cataracts Mother     Diabetes Mother     Stroke Mother     Hypertension Mother     Thyroid disease Sister     Diabetes Sister     Hypertension Sister     Hypertension Daughter     Diabetes Son     Diabetes Sister     Hypertension Sister     Hypertension Brother     Hypertension Sister     Hypertension Sister     Hypertension Brother     Thyroid disease Maternal Aunt     Thyroid disease Maternal Aunt     Colon cancer Maternal Grandfather     Breast cancer Neg Hx     Ovarian cancer Neg Hx     Amblyopia Neg Hx     Blindness Neg Hx     Cancer Neg Hx     Glaucoma Neg Hx     Macular degeneration Neg Hx     Retinal detachment Neg Hx     Strabismus Neg Hx        REVIEW OF SYSTEMS:   Review of Systems   Constitutional: Negative.   HENT: Negative.     Eyes: Negative.    Cardiovascular:  Positive for dyspnea on exertion.   Respiratory: Negative.     Endocrine: Negative.    Hematologic/Lymphatic: Negative.    Skin: Negative.    Musculoskeletal: Negative.    Gastrointestinal: Negative.    Genitourinary: Negative.    Neurological: Negative.    Psychiatric/Behavioral: Negative.     Allergic/Immunologic: Negative.      A 10 point review of systems was performed and all the pertinent positives have been mentioned. Rest of review of systems was negative.        PHYSICAL EXAM:     Vitals:    12/01/22 0839   BP: 132/62   Pulse: 88   Resp: 15    Body mass index is 35.19 kg/m².  Weight: 98.9 kg (218 lb 0.6 oz)   Height: 5' 6" (167.6 cm)     Physical Exam  Constitutional:       Appearance: Normal appearance. She is well-developed.   HENT:      Head: Normocephalic.   Eyes:      Pupils: Pupils are equal, round, and reactive to light.   Cardiovascular:      Rate and Rhythm: Normal rate and regular rhythm.      Heart sounds: Murmur heard.   Pulmonary:      Effort: Pulmonary effort is normal.      Breath sounds: Normal breath sounds.   Abdominal:      General: Bowel sounds " are normal.      Palpations: Abdomen is soft.      Tenderness: There is no abdominal tenderness.   Musculoskeletal:         General: Normal range of motion.      Cervical back: Normal range of motion and neck supple.   Skin:     General: Skin is warm.   Neurological:      Mental Status: She is alert and oriented to person, place, and time.         DATA:     Laboratory:  CBC:  Recent Labs   Lab 12/07/19  0831   WBC 7.07   Hemoglobin 13.4   Hematocrit 43.3   Platelets 288       CHEMISTRIES:  Recent Labs   Lab 11/21/20  0845 02/09/21  1316 02/26/22  0818   Glucose 122 H 118 H 113 H   Sodium 144 143 143   Potassium 3.8 4.1 3.4 L   BUN 19 18 14   Creatinine 0.7 0.7 0.6   eGFR if African American >60.0 >60 >60.0   eGFR if non African American >60.0 >60 >60.0   Calcium 9.3 9.1 9.2       CARDIAC BIOMARKERS:        COAGS:        LIPIDS/LFTS:  Recent Labs   Lab 06/24/20  0715 11/04/20  1519 06/05/21  0828 02/26/22  0818   Cholesterol 203 H  --  155 167   Triglycerides 136  --  120 101   HDL 42  --  44 40   LDL Cholesterol 133.8  --  87.0 106.8   Non-HDL Cholesterol 161  --  111 127   AST 15 12  --  13   ALT 19 16  --  15       Hemoglobin A1C   Date Value Ref Range Status   11/19/2022 6.7 (H) 4.0 - 5.6 % Final     Comment:     ADA Screening Guidelines:  5.7-6.4%  Consistent with prediabetes  >or=6.5%  Consistent with diabetes    High levels of fetal hemoglobin interfere with the HbA1C  assay. Heterozygous hemoglobin variants (HbS, HgC, etc)do  not significantly interfere with this assay.   However, presence of multiple variants may affect accuracy.     07/16/2022 7.1 (H) 4.0 - 5.6 % Final     Comment:     ADA Screening Guidelines:  5.7-6.4%  Consistent with prediabetes  >or=6.5%  Consistent with diabetes    High levels of fetal hemoglobin interfere with the HbA1C  assay. Heterozygous hemoglobin variants (HbS, HgC, etc)do  not significantly interfere with this assay.   However, presence of multiple variants may affect  accuracy.     03/12/2022 6.7 (H) 4.0 - 5.6 % Final     Comment:     ADA Screening Guidelines:  5.7-6.4%  Consistent with prediabetes  >or=6.5%  Consistent with diabetes    High levels of fetal hemoglobin interfere with the HbA1C  assay. Heterozygous hemoglobin variants (HbS, HgC, etc)do  not significantly interfere with this assay.   However, presence of multiple variants may affect accuracy.         TSH  Recent Labs   Lab 11/04/20  1519 02/09/21  1316 02/26/22  0818   TSH 0.208 L 0.771 0.699       The 10-year ASCVD risk score (Best CORNELIUS, et al., 2019) is: 27.3%    Values used to calculate the score:      Age: 77 years      Sex: Female      Is Non- : Yes      Diabetic: Yes      Tobacco smoker: No      Systolic Blood Pressure: 132 mmHg      Is BP treated: Yes      HDL Cholesterol: 40 mg/dL      Total Cholesterol: 167 mg/dL             ASSESSMENT AND PLAN     Patient Active Problem List   Diagnosis    Type 2 diabetes mellitus with diabetic polyneuropathy    Hyperlipidemia    HTN (hypertension)    Knee pain    H/O vitamin D deficiency    Non morbid obesity due to excess calories    Bilateral carotid bruits    Osteoarthritis of right knee    Morbid obesity with body mass index (BMI) of 40.0 or higher    Dyspnea on exertion    Chronic cough    MEKA (obstructive sleep apnea)    Sensorineural hearing loss (SNHL) of right ear with restricted hearing of left ear    Tinnitus of both ears    Allergic rhinitis    Dysfunction of both eustachian tubes    Nasal septal deviation    Chronic eczematous otitis externa of both ears    Neck abscess    Epidermal inclusion cyst    Polyneuropathy associated with underlying disease    Pain and swelling of left knee    Pain and swelling of right knee    Weakness    Leg swelling    Aortic atherosclerosis    Decreased range of motion of lumbar spine    Abdominal weakness       Patient with multiple risk factors for coronary artery disease.  Complaining of dyspnea on  exertion for the past 6 months.  Normal LV systolic function.  Mild AS.  Stress test did not show any significant ischemia.    Mild AS:  Recheck 2D echocardiogram.    Diabetes    Hypertension:  Well controlled on current therapy.          Thank you very much for involving me in the care of your patient.  Please do not hesitate to contact me if there are any questions.      Jenn Arreola MD, FACC, [facility]  Interventional Cardiologist, [facility].     Follow-up in 3 months      This note was dictated with the help of speech recognition software.  There might be un-intended errors and/or substitutions.

## 2022-12-05 ENCOUNTER — OFFICE VISIT (OUTPATIENT)
Dept: ORTHOPEDICS | Facility: CLINIC | Age: 77
End: 2022-12-05
Payer: COMMERCIAL

## 2022-12-05 VITALS — BODY MASS INDEX: 35.03 KG/M2 | HEIGHT: 66 IN | WEIGHT: 218 LBS

## 2022-12-05 DIAGNOSIS — M17.0 PRIMARY OSTEOARTHRITIS OF BOTH KNEES: Primary | ICD-10-CM

## 2022-12-05 PROCEDURE — 1126F PR PAIN SEVERITY QUANTIFIED, NO PAIN PRESENT: ICD-10-PCS | Mod: CPTII,S$GLB,, | Performed by: ORTHOPAEDIC SURGERY

## 2022-12-05 PROCEDURE — 99999 PR PBB SHADOW E&M-EST. PATIENT-LVL IV: CPT | Mod: PBBFAC,,, | Performed by: ORTHOPAEDIC SURGERY

## 2022-12-05 PROCEDURE — 20610 DRAIN/INJ JOINT/BURSA W/O US: CPT | Mod: 50,S$GLB,, | Performed by: ORTHOPAEDIC SURGERY

## 2022-12-05 PROCEDURE — 99999 PR PBB SHADOW E&M-EST. PATIENT-LVL IV: ICD-10-PCS | Mod: PBBFAC,,, | Performed by: ORTHOPAEDIC SURGERY

## 2022-12-05 PROCEDURE — 99499 NO LOS: ICD-10-PCS | Mod: S$GLB,,, | Performed by: ORTHOPAEDIC SURGERY

## 2022-12-05 PROCEDURE — 1159F MED LIST DOCD IN RCRD: CPT | Mod: CPTII,S$GLB,, | Performed by: ORTHOPAEDIC SURGERY

## 2022-12-05 PROCEDURE — 20610 LARGE JOINT ASPIRATION/INJECTION: BILATERAL KNEE: ICD-10-PCS | Mod: 50,S$GLB,, | Performed by: ORTHOPAEDIC SURGERY

## 2022-12-05 PROCEDURE — 1126F AMNT PAIN NOTED NONE PRSNT: CPT | Mod: CPTII,S$GLB,, | Performed by: ORTHOPAEDIC SURGERY

## 2022-12-05 PROCEDURE — 1159F PR MEDICATION LIST DOCUMENTED IN MEDICAL RECORD: ICD-10-PCS | Mod: CPTII,S$GLB,, | Performed by: ORTHOPAEDIC SURGERY

## 2022-12-05 PROCEDURE — 99499 UNLISTED E&M SERVICE: CPT | Mod: S$GLB,,, | Performed by: ORTHOPAEDIC SURGERY

## 2022-12-05 NOTE — PROGRESS NOTES
Follow Up PATIENT ORTHOPAEDIC: Knee    PRIMARY CARE PHYSICIAN: Leonard Wells MD   REFERRING PROVIDER: Sumanth Erickson MD  [address] [place] [address]     ASSESSMENT & PLAN:    Impression:  Bilateral Knee Severe Degenerative Osteoarthritis, Primary     Follow Up Plan: For Euflexxa Injections    Non operative care:    Mena Odonnell has physical exam evidence of above and wishes to pursue an non-operative care. I am recommending the following: Euflexxa series, activity modification.      To reivew: She would be an appropriate candidate for a right total knee replacement in the future should her symptoms warrant.  She was referred to vascular for intermittent claudication and peripheral vascular disease seen on x-ray .  There evaluation has referred her to lymphedema therapy. Swelling is improving. She is interested in repeat gel injections, however, while she had good relief for 5 months is not due for new series until November.  She is not interested in surgical intervention at this time. She is also diabetic so this is a good treatment modaliity for her.     The patient has been ordered:  Euflexxa Series    CONSULTS:   None    ACTIVE PROBLEM LIST  Patient Active Problem List   Diagnosis    Type 2 diabetes mellitus with diabetic polyneuropathy    Hyperlipidemia    HTN (hypertension)    Knee pain    H/O vitamin D deficiency    Non morbid obesity due to excess calories    Bilateral carotid bruits    Osteoarthritis of right knee    Morbid obesity with body mass index (BMI) of 40.0 or higher    Dyspnea on exertion    Chronic cough    MEKA (obstructive sleep apnea)    Sensorineural hearing loss (SNHL) of right ear with restricted hearing of left ear    Tinnitus of both ears    Allergic rhinitis    Dysfunction of both eustachian tubes    Nasal septal deviation    Chronic eczematous otitis externa of both ears    Neck abscess    Epidermal inclusion cyst    Polyneuropathy associated with underlying disease     Pain and swelling of left knee    Pain and swelling of right knee    Weakness    Leg swelling    Aortic atherosclerosis    Decreased range of motion of lumbar spine    Abdominal weakness           SUBJECTIVE    CHIEF COMPLAINT: Knee Pain    HPI:   Mena Odonnell is a 77 y.o. female here for evaluation and management of right knee pain. There is not a specific incident that brought about this pain. she has had progressive problems with the knee(s) starting 2 years ago but progressing to multiple times a day over the past 6 months which is interfering with activities which include: walking 2 blocks and standing for prolonged periods of time    Currently the pain in the joint is rated at 5 out of 10 with moderate activity.  The pain is intermittent and is located in the Right knee, at level of joint line, located medially and located laterally. The pain is described as aching and sharp. Relieving factors include ambulatory device and rest.       Mena Odonnell also complaints leg cramping on that side primarily in her posterior aspect of her knee and calf.  This typically improves proves with rest and over-the-counter topical spray.    Interval History 4/18/22: Injections wearing off. Saw vascular.   Interval History 5/9/22: Here for 1-3 Euflexxa series   Interval History 5/19/22: Here for 2-3 Euflexxa series. Mild improvement in pain.   Interval History 5/27/22: Here for 3-3 Euflexxa series. Moderate improvement in pain.  Interval History 10/13/22: Here with return of pain.   Interval History 11/28/22: Here for Euflexxa injections. Previous steroid with good relief   12/5/22: Here for 2-3 Euflexxa series. Continued relief of pain.     PROGRESSIVE SYMPTOMS:  Pain worsened by weight bearing    FUNCTIONAL STATUS:   Do light to moderate work around the house    PREVIOUS TREATMENTS:  Medical: Attempted Weight Loss, Steroid Injections and Biologic Injections  Physical Therapy: Use of Ambulatory Aid and Activities  Modified   Previous Orthopaedic Surgery: None    REVIEW OF SYSTEMS:  PAIN ASSESSMENT:  See HPI.  MUSCULOSKELETAL: See HPI.  OTHER 10 point review of systems is negative except as stated in HPI above    PAST MEDICAL HISTORY   has a past medical history of Cataract, Diabetes mellitus type II, Diabetes with neurologic complications, DM retinopathy, H/O vitamin D deficiency, Hyperlipidemia, Hypertension, Obesity, Osteoarthritis, Peripheral neuropathy, Polyneuropathy, Severe obesity (BMI 35.0-39.9) with comorbidity (12/30/2016), Sleep apnea, and Tendonitis.     PAST SURGICAL HISTORY   has a past surgical history that includes Bladder suspension; Hysterectomy (1978); Tonsillectomy; Colonoscopy (N/A, 9/26/2015); Cataract extraction; and Neck mass excision (11/6/2019).     FAMILY HISTORY  family history includes Cataracts in her mother; Colon cancer in her maternal grandfather; Diabetes in her mother, sister, sister, and son; Hypertension in her brother, brother, daughter, mother, sister, sister, sister, and sister; Stroke in her mother; Thyroid disease in her maternal aunt, maternal aunt, mother, and sister.     SOCIAL HISTORY   reports that she quit smoking about 51 years ago. She has a 3.75 pack-year smoking history. She has never used smokeless tobacco. She reports that she does not drink alcohol and does not use drugs.     ALLERGIES   Review of patient's allergies indicates:   Allergen Reactions    Atorvastatin      Muscle pain     Crestor [rosuvastatin] Other (See Comments)     Leg Weakness.         MEDICATIONS  Current Outpatient Medications on File Prior to Visit   Medication Sig Dispense Refill    amLODIPine (NORVASC) 10 MG tablet Take 1 tablet (10 mg total) by mouth once daily. 90 tablet 2    blood-glucose sensor (DEXCOM G6 SENSOR) Sujey Change sensor every 10 days 3 each 11    blood-glucose transmitter (DEXCOM G6 TRANSMITTER) Sujey Change every 3 months 1 each 3    cetirizine (ZYRTEC) 10 MG tablet TAKE 1 TABLET BY  MOUTH ONCE DAILY FOR 30 DAYS  3    CONTOUR NEXT TEST STRIPS Strp Check glucose before breakfast and dinner. 100 each 5    COVID-19 VACC,MRNA,PFIZER,,PF, IM COVID-19 vacc,mRNA(Pfizer)(PF)      diabetic supplies, miscellan. Kit Please dispense one 14 day FreeStyle Giulia [place] 1 kit 0    diclofenac sodium (VOLTAREN) 1 % Gel Lower extremities: Apply the gel (4 g) to the affected area 4 times daily. Do not apply more than 16 g daily to any one affected joint of the lower extremities. Upper extremities: Apply the gel (2 g) to the affected area 4 times daily. Do not apply more than 8 g daily to any one affected joint of the upper extremities. Total dose should not exceed 32 g per day, overall affected joints. 100 g 5    empagliflozin (JARDIANCE) 10 mg tablet Take 1 tablet (10 mg total) by mouth once daily. 90 tablet 2    flash glucose sensor (FREESTYLE GIULIA 14 DAY SENSOR) Kit 1 sensor kit every 14 days (requires 2 kits per month) 2 kit 6    fluocinolone acetonide oil 0.01 % Drop Place 4 drops in ear(s) 2 (two) times daily. 20 mL 5    insulin glargine U-300 conc (TOUJEO MAX U-300 SOLOSTAR) 300 unit/mL (3 mL) insulin pen INJECT 50 UNITS SUBCUTANEOUSLY ONCE DAILY 2 pen 5    insulin lispro (HUMALOG KWIKPEN INSULIN) 100 unit/mL pen Use with sliding scale - 150-200=+2, 201-250=+4; 251-300=+6; 301-350=+8, over 350=+10 units 15 mL 0    losartan-hydrochlorothiazide 100-25 mg (HYZAAR) 100-25 mg per tablet Take 1 tablet by mouth once daily. 90 tablet 3    meloxicam (MOBIC) 7.5 MG tablet Take 1 tablet (7.5 mg total) by mouth once daily. 12 tablet 0    metFORMIN (GLUCOPHAGE-XR) 500 MG ER 24hr tablet Take 1 tablet (500 mg total) by mouth 2 (two) times daily with meals. 180 tablet 2    montelukast (SINGULAIR) 10 mg tablet Take 1 tablet (10 mg total) by mouth once daily. 90 tablet 1    OZEMPIC 1 mg/dose (4 mg/3 mL) INJECT 1 MG UNDER THE SKIN ONCE A WEEK 1 pen 11    pravastatin (PRAVACHOL) 80 MG tablet Take 1 tablet (80 mg total) by  "mouth once daily. 90 tablet 3    aspirin 81 MG Chew Take 1 tablet (81 mg total) by mouth once daily. 30 tablet 0    gabapentin (NEURONTIN) 300 MG capsule Take 1 capsule (300 mg total) by mouth 3 (three) times daily. 90 capsule 11    metoprolol succinate (TOPROL-XL) 50 MG 24 hr tablet Take 1 tablet (50 mg total) by mouth once daily. 30 tablet 5     No current facility-administered medications on file prior to visit.          PHYSICAL EXAM   height is 5' 6" (1.676 m) and weight is 98.9 kg (218 lb).   Body mass index is 35.19 kg/m².      All other systems deferred.  GENERAL:  No acute distress  HABITUS: Obese  GAIT: Antalgic  SKIN: Normal     KNEE EXAM:    Bilateral:   Effusion: Minimal joint effusion  TTP: yes over Medial Joint Line and Lateral Joint Line   no crepitus with passive knee ROM  Passive ROM: Extension 0, Flexion 130  No pain with manipulation of patella  Stable to varus/valgus stress. No increased laxity to anterior/posterior drawer testing  negative Ngozi's test  No pain with IR/ER rotation of the hip  5/5 strength in knee flexion and extension, ankle plantarflexion and dorsiflexion  Neurovascular Status: Sensation intact to light touch in Sural, Saphenous, SPN, DPN, Tibial nerve distribution  2+ pulse DP/PT, normal capillary refill, foot has normal warmth    DATA:  Diagnostic tests reviewed for today's visit:     4v of the bilateral knee reveal Moderate degenerative changes of the Lateral and Patellofemoral compartment. There is evidence of advanced osteoarthritis changes with Subchondral sclerosis, Osteophyte formation and Joint space narrowing. The limb is in netural alignment. The patella is tracking midline and is in normal alignment. Vascular calcifications present. Kellegren-Lawerence grade 4 changes on the right, grade 3 on the left.           "

## 2022-12-05 NOTE — PROCEDURES
Large Joint Aspiration/Injection: bilateral knee    Date/Time: 12/5/2022 8:40 AM  Performed by: Sumanth Erickson MD  Authorized by: Sumanth Erickson MD     Consent Done?:  Yes (Verbal)  Indications:  Arthritis and pain  Site marked: the procedure site was marked    Timeout: prior to procedure the correct patient, procedure, and site was verified    Prep: patient was prepped and draped in usual sterile fashion      Local anesthesia used?: Yes    Local anesthetic:  Topical anesthetic    Details:  Needle Size:  22 G  Approach:  Anterolateral  Location:  Knee  Laterality:  Bilateral  Site:  Bilateral knee  Medications (Right):  20 mg sodium hyaluronate (EUFLEXXA) 10 mg/mL(mw 2.4 -3.6 million)  Medications (Left):  20 mg sodium hyaluronate (EUFLEXXA) 10 mg/mL(mw 2.4 -3.6 million)  Patient tolerance:  Patient tolerated the procedure well with no immediate complications

## 2022-12-06 ENCOUNTER — HOSPITAL ENCOUNTER (OUTPATIENT)
Dept: CARDIOLOGY | Facility: HOSPITAL | Age: 77
Discharge: HOME | End: 2022-12-06
Attending: INTERNAL MEDICINE
Payer: COMMERCIAL

## 2022-12-06 DIAGNOSIS — I35.0 AORTIC VALVE STENOSIS, ETIOLOGY OF CARDIAC VALVE DISEASE UNSPECIFIED: ICD-10-CM

## 2022-12-06 DIAGNOSIS — I10 PRIMARY HYPERTENSION: ICD-10-CM

## 2022-12-06 LAB
AORTIC ROOT ANNULUS: 2.88 CM
AORTIC VALVE CUSP SEPERATION: 0.92 CM
ASCENDING AORTA: 2.58 CM
AV INDEX (PROSTH): 0.41
AV MEAN GRADIENT: 29 MMHG
AV PEAK GRADIENT: 48 MMHG
AV VALVE AREA: 1.21 CM2
AV VELOCITY RATIO: 0.41
CV ECHO LV RWT: 0.73 CM
DOP CALC AO PEAK VEL: 3.47 M/S
DOP CALC AO VTI: 63.3 CM
DOP CALC LVOT AREA: 3 CM2
DOP CALC LVOT DIAMETER: 1.94 CM
DOP CALC LVOT PEAK VEL: 1.42 M/S
DOP CALC LVOT STROKE VOLUME: 76.52 CM3
DOP CALC MV VTI: 46 CM
DOP CALCLVOT PEAK VEL VTI: 25.9 CM
E WAVE DECELERATION TIME: 324.97 MSEC
E/A RATIO: 0.61
E/E' RATIO: 3.83 M/S
ECHO LV POSTERIOR WALL: 1.46 CM (ref 0.6–1.1)
EJECTION FRACTION: 65 %
FRACTIONAL SHORTENING: 33 % (ref 28–44)
INTERVENTRICULAR SEPTUM: 1.57 CM (ref 0.6–1.1)
IVC DIAMETER: 2.09 CM
IVRT: 125.59 MSEC
LA MAJOR: 5.58 CM
LA MINOR: 5.49 CM
LA WIDTH: 4.2 CM
LEFT ATRIUM SIZE: 3.68 CM
LEFT ATRIUM VOLUME: 72.71 CM3
LEFT INTERNAL DIMENSION IN SYSTOLE: 2.68 CM (ref 2.1–4)
LEFT VENTRICLE DIASTOLIC VOLUME: 70.53 ML
LEFT VENTRICLE SYSTOLIC VOLUME: 26.59 ML
LEFT VENTRICULAR INTERNAL DIMENSION IN DIASTOLE: 4.01 CM (ref 3.5–6)
LEFT VENTRICULAR MASS: 237.25 G
LV LATERAL E/E' RATIO: 2.05 M/S
LV SEPTAL E/E' RATIO: 28.75 M/S
LVOT MG: 5.25 MMHG
LVOT MV: 1.09 CM/S
MV MEAN GRADIENT: 6 MMHG
MV PEAK A VEL: 1.87 M/S
MV PEAK E VEL: 1.15 M/S
MV PEAK GRADIENT: 19 MMHG
MV STENOSIS PRESSURE HALF TIME: 94.24 MS
MV VALVE AREA BY CONTINUITY EQUATION: 1.66 CM2
MV VALVE AREA P 1/2 METHOD: 2.33 CM2
PISA TR MAX VEL: 2.38 M/S
PULM VEIN S/D RATIO: 1.68
PV PEAK D VEL: 0.34 M/S
PV PEAK S VEL: 0.57 M/S
PV PEAK VELOCITY: 1.05 CM/S
RA MAJOR: 4.94 CM
RA PRESSURE: 3 MMHG
RA WIDTH: 3.2 CM
RIGHT VENTRICULAR END-DIASTOLIC DIMENSION: 3.77 CM
RV TISSUE DOPPLER FREE WALL SYSTOLIC VELOCITY 1 (APICAL 4 CHAMBER VIEW): 0.02 CM/S
SINUS: 2.56 CM
STJ: 2.35 CM
TDI LATERAL: 0.56 M/S
TDI SEPTAL: 0.04 M/S
TDI: 0.3 M/S
TR MAX PG: 23 MMHG
TRICUSPID ANNULAR PLANE SYSTOLIC EXCURSION: 1.81 CM
TV REST PULMONARY ARTERY PRESSURE: 26 MMHG

## 2022-12-06 PROCEDURE — 93306 TTE W/DOPPLER COMPLETE: CPT

## 2022-12-06 PROCEDURE — 93306 ECHO (CUPID ONLY): ICD-10-PCS | Mod: 26,,, | Performed by: INTERNAL MEDICINE

## 2022-12-06 PROCEDURE — 93306 TTE W/DOPPLER COMPLETE: CPT | Mod: 26,,, | Performed by: INTERNAL MEDICINE

## 2022-12-08 DIAGNOSIS — Z78.0 MENOPAUSE: ICD-10-CM

## 2022-12-12 ENCOUNTER — OFFICE VISIT (OUTPATIENT)
Dept: ORTHOPEDICS | Facility: CLINIC | Age: 77
End: 2022-12-12
Payer: COMMERCIAL

## 2022-12-12 DIAGNOSIS — M17.0 PRIMARY OSTEOARTHRITIS OF BOTH KNEES: Primary | ICD-10-CM

## 2022-12-12 PROCEDURE — 20610 LARGE JOINT ASPIRATION/INJECTION: BILATERAL KNEE: ICD-10-PCS | Mod: 50,S$GLB,, | Performed by: ORTHOPAEDIC SURGERY

## 2022-12-12 PROCEDURE — 99499 NO LOS: ICD-10-PCS | Mod: S$GLB,,, | Performed by: ORTHOPAEDIC SURGERY

## 2022-12-12 PROCEDURE — 20610 DRAIN/INJ JOINT/BURSA W/O US: CPT | Mod: 50,S$GLB,, | Performed by: ORTHOPAEDIC SURGERY

## 2022-12-12 PROCEDURE — 99499 UNLISTED E&M SERVICE: CPT | Mod: S$GLB,,, | Performed by: ORTHOPAEDIC SURGERY

## 2022-12-12 NOTE — PROCEDURES
Large Joint Aspiration/Injection: bilateral knee    Date/Time: 12/12/2022 8:40 AM  Performed by: Sumanth Erickson MD  Authorized by: Sumanth Erickson MD     Consent Done?:  Yes (Verbal)  Indications:  Arthritis and pain  Site marked: the procedure site was marked    Timeout: prior to procedure the correct patient, procedure, and site was verified    Prep: patient was prepped and draped in usual sterile fashion      Local anesthesia used?: Yes    Anesthesia:  Local infiltration  Local anesthetic:  Topical anesthetic    Details:  Needle Size:  22 G  Approach:  Anterolateral  Location:  Knee  Laterality:  Bilateral  Site:  Bilateral knee  Medications (Right):  10 mg sodium hyaluronate (EUFLEXXA) 10 mg/mL(mw 2.4 -3.6 million)  Medications (Left):  10 mg sodium hyaluronate (EUFLEXXA) 10 mg/mL(mw 2.4 -3.6 million)  Patient tolerance:  Patient tolerated the procedure well with no immediate complications

## 2022-12-12 NOTE — PROGRESS NOTES
Follow Up PATIENT ORTHOPAEDIC: Knee    PRIMARY CARE PHYSICIAN: Leonard Wells MD   REFERRING PROVIDER: Sumanth Erickson MD  [address] [place] [address]     ASSESSMENT & PLAN:    Impression:  Bilateral Knee Severe Degenerative Osteoarthritis, Primary     Follow Up Plan: PRN    Non operative care:    Mena Odonnell has physical exam evidence of above and wishes to pursue an non-operative care. I am recommending the following: Euflexxa series, activity modification.      To reivew: She would be an appropriate candidate for a right total knee replacement in the future should her symptoms warrant.  She was referred to vascular for intermittent claudication and peripheral vascular disease seen on x-ray .  There evaluation has referred her to lymphedema therapy. Swelling is improving. She is interested in repeat gel injections.  She is not interested in surgical intervention at this time. She is also diabetic so this is a good treatment modaliity for her.     The patient has been ordered:  None    CONSULTS:   None    ACTIVE PROBLEM LIST  Patient Active Problem List   Diagnosis    Type 2 diabetes mellitus with diabetic polyneuropathy    Hyperlipidemia    HTN (hypertension)    Knee pain    H/O vitamin D deficiency    Non morbid obesity due to excess calories    Bilateral carotid bruits    Osteoarthritis of right knee    Morbid obesity with body mass index (BMI) of 40.0 or higher    Dyspnea on exertion    Chronic cough    MEKA (obstructive sleep apnea)    Sensorineural hearing loss (SNHL) of right ear with restricted hearing of left ear    Tinnitus of both ears    Allergic rhinitis    Dysfunction of both eustachian tubes    Nasal septal deviation    Chronic eczematous otitis externa of both ears    Neck abscess    Epidermal inclusion cyst    Polyneuropathy associated with underlying disease    Pain and swelling of left knee    Pain and swelling of right knee    Weakness    Leg swelling    Aortic atherosclerosis     Decreased range of motion of lumbar spine    Abdominal weakness           SUBJECTIVE    CHIEF COMPLAINT: Knee Pain    HPI:   Mena Odonnell is a 77 y.o. female here for evaluation and management of right knee pain. There is not a specific incident that brought about this pain. she has had progressive problems with the knee(s) starting 2 years ago but progressing to multiple times a day over the past 6 months which is interfering with activities which include: walking 2 blocks and standing for prolonged periods of time    Currently the pain in the joint is rated at 5 out of 10 with moderate activity.  The pain is intermittent and is located in the Right knee, at level of joint line, located medially and located laterally. The pain is described as aching and sharp. Relieving factors include ambulatory device and rest.       Mena Odonnell also complaints leg cramping on that side primarily in her posterior aspect of her knee and calf.  This typically improves proves with rest and over-the-counter topical spray.    Interval History 4/18/22: Injections wearing off. Saw vascular.   Interval History 5/9/22: Here for 1-3 Euflexxa series   Interval History 5/19/22: Here for 2-3 Euflexxa series. Mild improvement in pain.   Interval History 5/27/22: Here for 3-3 Euflexxa series. Moderate improvement in pain.  Interval History 10/13/22: Here with return of pain.   Interval History 11/28/22: Here for Euflexxa injections. Previous steroid with good relief   12/5/22: Here for 2-3 Euflexxa series. Continued relief of pain.   12/12/22: Here for 3-3 Euflexxa series. Continued relief of pain.     PROGRESSIVE SYMPTOMS:  Pain worsened by weight bearing    FUNCTIONAL STATUS:   Do light to moderate work around the house    PREVIOUS TREATMENTS:  Medical: Attempted Weight Loss, Steroid Injections and Biologic Injections  Physical Therapy: Use of Ambulatory Aid and Activities Modified   Previous Orthopaedic Surgery: None    REVIEW OF  SYSTEMS:  PAIN ASSESSMENT:  See HPI.  MUSCULOSKELETAL: See HPI.  OTHER 10 point review of systems is negative except as stated in HPI above    PAST MEDICAL HISTORY   has a past medical history of Cataract, Diabetes mellitus type II, Diabetes with neurologic complications, DM retinopathy, H/O vitamin D deficiency, Hyperlipidemia, Hypertension, Obesity, Osteoarthritis, Peripheral neuropathy, Polyneuropathy, Severe obesity (BMI 35.0-39.9) with comorbidity (12/30/2016), Sleep apnea, and Tendonitis.     PAST SURGICAL HISTORY   has a past surgical history that includes Bladder suspension; Hysterectomy (1978); Tonsillectomy; Colonoscopy (N/A, 9/26/2015); Cataract extraction; and Neck mass excision (11/6/2019).     FAMILY HISTORY  family history includes Cataracts in her mother; Colon cancer in her maternal grandfather; Diabetes in her mother, sister, sister, and son; Hypertension in her brother, brother, daughter, mother, sister, sister, sister, and sister; Stroke in her mother; Thyroid disease in her maternal aunt, maternal aunt, mother, and sister.     SOCIAL HISTORY   reports that she quit smoking about 51 years ago. She has a 3.75 pack-year smoking history. She has never used smokeless tobacco. She reports that she does not drink alcohol and does not use drugs.     ALLERGIES   Review of patient's allergies indicates:   Allergen Reactions    Atorvastatin      Muscle pain     Crestor [rosuvastatin] Other (See Comments)     Leg Weakness.         MEDICATIONS  Current Outpatient Medications on File Prior to Visit   Medication Sig Dispense Refill    amLODIPine (NORVASC) 10 MG tablet Take 1 tablet (10 mg total) by mouth once daily. 90 tablet 2    aspirin 81 MG Chew Take 1 tablet (81 mg total) by mouth once daily. 30 tablet 0    blood-glucose sensor (DEXCOM G6 SENSOR) Sujey Change sensor every 10 days 3 each 11    blood-glucose transmitter (DEXCOM G6 TRANSMITTER) Sujey Change every 3 months 1 each 3    cetirizine (ZYRTEC) 10 MG  tablet TAKE 1 TABLET BY MOUTH ONCE DAILY FOR 30 DAYS  3    CONTOUR NEXT TEST STRIPS Strp Check glucose before breakfast and dinner. 100 each 5    COVID-19 VACC,MRNA,PFIZER,,PF, IM COVID-19 vacc,mRNA(Pfizer)(PF)      diabetic supplies, miscellan. Kit Please dispense one 14 day FreeStyle Giulia [place] 1 kit 0    diclofenac sodium (VOLTAREN) 1 % Gel Lower extremities: Apply the gel (4 g) to the affected area 4 times daily. Do not apply more than 16 g daily to any one affected joint of the lower extremities. Upper extremities: Apply the gel (2 g) to the affected area 4 times daily. Do not apply more than 8 g daily to any one affected joint of the upper extremities. Total dose should not exceed 32 g per day, overall affected joints. 100 g 5    empagliflozin (JARDIANCE) 10 mg tablet Take 1 tablet (10 mg total) by mouth once daily. 90 tablet 2    flash glucose sensor (FREESTYLE GIULIA 14 DAY SENSOR) Kit 1 sensor kit every 14 days (requires 2 kits per month) 2 kit 6    fluocinolone acetonide oil 0.01 % Drop Place 4 drops in ear(s) 2 (two) times daily. 20 mL 5    gabapentin (NEURONTIN) 300 MG capsule Take 1 capsule (300 mg total) by mouth 3 (three) times daily. 90 capsule 11    insulin glargine U-300 conc (TOUJEO MAX U-300 SOLOSTAR) 300 unit/mL (3 mL) insulin pen INJECT 50 UNITS SUBCUTANEOUSLY ONCE DAILY 2 pen 5    insulin lispro (HUMALOG KWIKPEN INSULIN) 100 unit/mL pen Use with sliding scale - 150-200=+2, 201-250=+4; 251-300=+6; 301-350=+8, over 350=+10 units 15 mL 0    losartan-hydrochlorothiazide 100-25 mg (HYZAAR) 100-25 mg per tablet Take 1 tablet by mouth once daily. 90 tablet 3    meloxicam (MOBIC) 7.5 MG tablet Take 1 tablet (7.5 mg total) by mouth once daily. 12 tablet 0    metFORMIN (GLUCOPHAGE-XR) 500 MG ER 24hr tablet Take 1 tablet (500 mg total) by mouth 2 (two) times daily with meals. 180 tablet 2    metoprolol succinate (TOPROL-XL) 50 MG 24 hr tablet Take 1 tablet (50 mg total) by mouth once daily. 30 tablet 5     montelukast (SINGULAIR) 10 mg tablet Take 1 tablet (10 mg total) by mouth once daily. 90 tablet 1    OZEMPIC 1 mg/dose (4 mg/3 mL) INJECT 1 MG UNDER THE SKIN ONCE A WEEK 1 pen 11    pravastatin (PRAVACHOL) 80 MG tablet Take 1 tablet (80 mg total) by mouth once daily. 90 tablet 3     No current facility-administered medications on file prior to visit.          PHYSICAL EXAM   vitals were not taken for this visit.   There is no height or weight on file to calculate BMI.      All other systems deferred.  GENERAL:  No acute distress  HABITUS: Obese  GAIT: Antalgic  SKIN: Normal     KNEE EXAM:    Bilateral:   Effusion: Minimal joint effusion  TTP: yes over Medial Joint Line and Lateral Joint Line   no crepitus with passive knee ROM  Passive ROM: Extension 0, Flexion 130  No pain with manipulation of patella  Stable to varus/valgus stress. No increased laxity to anterior/posterior drawer testing  negative Ngozi's test  No pain with IR/ER rotation of the hip  5/5 strength in knee flexion and extension, ankle plantarflexion and dorsiflexion  Neurovascular Status: Sensation intact to light touch in Sural, Saphenous, SPN, DPN, Tibial nerve distribution  2+ pulse DP/PT, normal capillary refill, foot has normal warmth    DATA:  Diagnostic tests reviewed for today's visit:     4v of the bilateral knee reveal Moderate degenerative changes of the Lateral and Patellofemoral compartment. There is evidence of advanced osteoarthritis changes with Subchondral sclerosis, Osteophyte formation and Joint space narrowing. The limb is in netural alignment. The patella is tracking midline and is in normal alignment. Vascular calcifications present. Kellegren-Lawerence grade 4 changes on the right, grade 3 on the left.

## 2022-12-22 ENCOUNTER — TELEPHONE (OUTPATIENT)
Dept: UROLOGY | Facility: CLINIC | Age: 77
End: 2022-12-22
Payer: COMMERCIAL

## 2022-12-22 NOTE — TELEPHONE ENCOUNTER
I LM for pt to call office and get rescheduled on 1/26/23 Due to Dr. Starr being out of the office.

## 2022-12-23 ENCOUNTER — OFFICE VISIT (OUTPATIENT)
Dept: UROLOGY | Facility: CLINIC | Age: 77
End: 2022-12-23
Payer: COMMERCIAL

## 2022-12-23 VITALS — BODY MASS INDEX: 35.94 KG/M2 | WEIGHT: 222.69 LBS

## 2022-12-23 DIAGNOSIS — N28.1 RENAL CYST, ACQUIRED, LEFT: Primary | ICD-10-CM

## 2022-12-23 PROCEDURE — 99204 PR OFFICE/OUTPT VISIT, NEW, LEVL IV, 45-59 MIN: ICD-10-PCS | Mod: S$GLB,,, | Performed by: UROLOGY

## 2022-12-23 PROCEDURE — 1101F PT FALLS ASSESS-DOCD LE1/YR: CPT | Mod: CPTII,S$GLB,, | Performed by: UROLOGY

## 2022-12-23 PROCEDURE — 1159F PR MEDICATION LIST DOCUMENTED IN MEDICAL RECORD: ICD-10-PCS | Mod: CPTII,S$GLB,, | Performed by: UROLOGY

## 2022-12-23 PROCEDURE — 1126F AMNT PAIN NOTED NONE PRSNT: CPT | Mod: CPTII,S$GLB,, | Performed by: UROLOGY

## 2022-12-23 PROCEDURE — 3288F PR FALLS RISK ASSESSMENT DOCUMENTED: ICD-10-PCS | Mod: CPTII,S$GLB,, | Performed by: UROLOGY

## 2022-12-23 PROCEDURE — 99999 PR PBB SHADOW E&M-EST. PATIENT-LVL III: CPT | Mod: PBBFAC,,, | Performed by: UROLOGY

## 2022-12-23 PROCEDURE — 99204 OFFICE O/P NEW MOD 45 MIN: CPT | Mod: S$GLB,,, | Performed by: UROLOGY

## 2022-12-23 PROCEDURE — 1126F PR PAIN SEVERITY QUANTIFIED, NO PAIN PRESENT: ICD-10-PCS | Mod: CPTII,S$GLB,, | Performed by: UROLOGY

## 2022-12-23 PROCEDURE — 3288F FALL RISK ASSESSMENT DOCD: CPT | Mod: CPTII,S$GLB,, | Performed by: UROLOGY

## 2022-12-23 PROCEDURE — 1159F MED LIST DOCD IN RCRD: CPT | Mod: CPTII,S$GLB,, | Performed by: UROLOGY

## 2022-12-23 PROCEDURE — 99999 PR PBB SHADOW E&M-EST. PATIENT-LVL III: ICD-10-PCS | Mod: PBBFAC,,, | Performed by: UROLOGY

## 2022-12-23 PROCEDURE — 1101F PR PT FALLS ASSESS DOC 0-1 FALLS W/OUT INJ PAST YR: ICD-10-PCS | Mod: CPTII,S$GLB,, | Performed by: UROLOGY

## 2022-12-23 NOTE — PROGRESS NOTES
[facility]   Clinic Note    SUBJECTIVE:     Chief Complaint   Patient presents with    Cyst       Referral from: Self, Aaareferral.    History of Present Illness:  Mena Odonnell is a 77 y.o. female who presents to clinic for left renal cyst.    Patient presents as referral for a simple left renal cyst was found on ultrasound.  Patient denies any hematuria.  Denies any flank pain.  Denies any family history of kidney or bladder cancer.  Overall doing well.    Patient endorses no additional complaints at this time.    Past Medical History:   Diagnosis Date    Cataract     Diabetes mellitus type II     Diabetes with neurologic complications     DM retinopathy     H/O vitamin D deficiency     Hyperlipidemia     Hypertension     Obesity     Osteoarthritis     Peripheral neuropathy     Polyneuropathy     Severe obesity (BMI 35.0-39.9) with comorbidity 12/30/2016    Sleep apnea     Tendonitis     left foot       Past Surgical History:   Procedure Laterality Date    BLADDER SUSPENSION      CATARACT EXTRACTION      COLONOSCOPY N/A 9/26/2015    Procedure: COLONOSCOPY;  Surgeon: Javed Wood MD;  Location: [address]);  Service: Endoscopy;  Laterality: N/A;    HYSTERECTOMY  1978    fibroids    NECK MASS EXCISION  11/6/2019    Procedure: EXCISION, MASS, NECK;  Surgeon: Edwin Barrow MD;  Location: [facility];  Service: General;;  RN PREOP 10/30/2019    TONSILLECTOMY         Family History   Problem Relation Age of Onset    Thyroid disease Mother     Cataracts Mother     Diabetes Mother     Stroke Mother     Hypertension Mother     Thyroid disease Sister     Diabetes Sister     Hypertension Sister     Hypertension Daughter     Diabetes Son     Diabetes Sister     Hypertension Sister     Hypertension Brother     Hypertension Sister     Hypertension Sister     Hypertension Brother     Thyroid disease Maternal Aunt     Thyroid disease Maternal Aunt     Colon cancer Maternal Grandfather     Breast cancer  Neg Hx     Ovarian cancer Neg Hx     Amblyopia Neg Hx     Blindness Neg Hx     Cancer Neg Hx     Glaucoma Neg Hx     Macular degeneration Neg Hx     Retinal detachment Neg Hx     Strabismus Neg Hx        Social History     Tobacco Use    Smoking status: Former     Packs/day: 0.25     Years: 15.00     Pack years: 3.75     Types: Cigarettes     Quit date:      Years since quittin.0    Smokeless tobacco: Never   Substance Use Topics    Alcohol use: No    Drug use: No       Current Outpatient Medications on File Prior to Visit   Medication Sig Dispense Refill    amLODIPine (NORVASC) 10 MG tablet Take 1 tablet (10 mg total) by mouth once daily. 90 tablet 2    blood-glucose sensor (DEXCOM G6 SENSOR) Sujey Change sensor every 10 days 3 each 11    blood-glucose transmitter (DEXCOM G6 TRANSMITTER) Sujey Change every 3 months 1 each 3    cetirizine (ZYRTEC) 10 MG tablet TAKE 1 TABLET BY MOUTH ONCE DAILY FOR 30 DAYS  3    CONTOUR NEXT TEST STRIPS Strp Check glucose before breakfast and dinner. 100 each 5    COVID-19 VACC,MRNA,PFIZER,,PF, IM COVID-19 vacc,mRNA(Pfizer)(PF)      diabetic supplies, miscellan. Kit Please dispense one 14 day FreeStyle Giulia [place] 1 kit 0    diclofenac sodium (VOLTAREN) 1 % Gel Lower extremities: Apply the gel (4 g) to the affected area 4 times daily. Do not apply more than 16 g daily to any one affected joint of the lower extremities. Upper extremities: Apply the gel (2 g) to the affected area 4 times daily. Do not apply more than 8 g daily to any one affected joint of the upper extremities. Total dose should not exceed 32 g per day, overall affected joints. 100 g 5    empagliflozin (JARDIANCE) 10 mg tablet Take 1 tablet (10 mg total) by mouth once daily. 90 tablet 2    flash glucose sensor (FREESTYLE GIULIA 14 DAY SENSOR) Kit 1 sensor kit every 14 days (requires 2 kits per month) 2 kit 6    fluocinolone acetonide oil 0.01 % Drop Place 4 drops in ear(s) 2 (two) times daily. 20 mL 5    insulin  "glargine U-300 conc (TOUJEO MAX U-300 SOLOSTAR) 300 unit/mL (3 mL) insulin pen INJECT 50 UNITS SUBCUTANEOUSLY ONCE DAILY 2 pen 5    insulin lispro (HUMALOG KWIKPEN INSULIN) 100 unit/mL pen Use with sliding scale - 150-200=+2, 201-250=+4; 251-300=+6; 301-350=+8, over 350=+10 units 15 mL 0    losartan-hydrochlorothiazide 100-25 mg (HYZAAR) 100-25 mg per tablet Take 1 tablet by mouth once daily. 90 tablet 3    meloxicam (MOBIC) 7.5 MG tablet Take 1 tablet (7.5 mg total) by mouth once daily. 12 tablet 0    metFORMIN (GLUCOPHAGE-XR) 500 MG ER 24hr tablet Take 1 tablet (500 mg total) by mouth 2 (two) times daily with meals. 180 tablet 2    montelukast (SINGULAIR) 10 mg tablet Take 1 tablet (10 mg total) by mouth once daily. 90 tablet 1    OZEMPIC 1 mg/dose (4 mg/3 mL) INJECT 1 MG UNDER THE SKIN ONCE A WEEK 1 pen 11    pravastatin (PRAVACHOL) 80 MG tablet Take 1 tablet (80 mg total) by mouth once daily. 90 tablet 3    aspirin 81 MG Chew Take 1 tablet (81 mg total) by mouth once daily. 30 tablet 0    gabapentin (NEURONTIN) 300 MG capsule Take 1 capsule (300 mg total) by mouth 3 (three) times daily. 90 capsule 11    metoprolol succinate (TOPROL-XL) 50 MG 24 hr tablet Take 1 tablet (50 mg total) by mouth once daily. 30 tablet 5     No current facility-administered medications on file prior to visit.       Review of patient's allergies indicates:   Allergen Reactions    Atorvastatin      Muscle pain     Crestor [rosuvastatin] Other (See Comments)     Leg Weakness.        Review of Systems:  A review of 10+ systems was conducted with pertinent positive and negative findings documented in HPI with all other systems reviewed and negative.    OBJECTIVE:     Estimated body mass index is 35.94 kg/m² as calculated from the following:    Height as of 12/5/22: 5' 6" (1.676 m).    Weight as of this encounter: 101 kg (222 lb 10.6 oz).    Vital Signs (Most Recent)  There were no vitals filed for this visit.    Physical Exam:  GENERAL: " patient sitting comfortably  HEENT: normocephalic  NECK: supple, no JVD  PULM: normal chest rise, no increased WOB  HEART: non-diaphoretic  ABDO: soft, nondistended, nontender  BACK: no CVA tenderness bilaterally  SKIN: warm, dry, well perfused  EXT: no bruising or edema  NEURO: grossly normal with no focal deficits  PSYCH: appropriate mood and affect    Genitourinary Exam:  deferred    Lab Results   Component Value Date    BUN 14 02/26/2022    CREATININE 0.6 02/26/2022    WBC 7.07 12/07/2019    HGB 13.4 12/07/2019    HCT 43.3 12/07/2019     12/07/2019    AST 13 02/26/2022    ALT 15 02/26/2022    ALKPHOS 65 02/26/2022    ALBUMIN 3.4 (L) 02/26/2022    HGBA1C 6.7 (H) 11/19/2022    INR 1.0 06/20/2013        Imaging:  I have personally reviewed all relevant imaging studies.    No results found for this or any previous visit (from the past 2160 hour(s)).  Results for orders placed or performed during the hospital encounter of 09/29/22 (from the past 2160 hour(s))   MRI Lumbar Spine Without Contrast    Impression    Lumbar spondylosis most significant at L4-5 and L5-S1 with severe spinal canal stenosis and moderate to severe neural foraminal narrowing.    Partially imaged left renal T2 hyperintensity.  Consider nonemergent retroperitoneal ultrasound for further evaluation.    Left adrenal nonspecific thickening.    Electronically signed by resident: Kevin Hart  Date:    09/29/2022  Time:    15:43    Electronically signed by: Neto Woodruff MD  Date:    09/29/2022  Time:    16:08     Echo  · The left ventricle is normal in size with mild concentric hypertrophy   and normal systolic function.  · The estimated ejection fraction is 65%.  · Normal left ventricular diastolic function.  · Normal right ventricular size with normal right ventricular systolic   function.  · There is moderate aortic valve stenosis.  · Aortic valve area is 1.21 cm2; peak velocity is 3.47 m/s; mean gradient   is 29 mmHg.  · Normal central  venous pressure (3 mmHg).  · The estimated PA systolic pressure is 26 mmHg.  · There is no pulmonary hypertension.        Renal US  Impression:     4.6 cm simple appearing cyst upper left kidney.     Elevated resistive indices bilaterally suggesting possibility of underlying medical renal disease.    PSA:  No results found for: PSA, PSADIAG, PSATOTAL, PSAFREE    Testosterone:  No results found for: TOTALTESTOST, TOTALTESTOST, TESTOSTERONE     ASSESSMENT     1. Renal cyst, acquired, left        PLAN:     Left Renal Cyst     - We discussed the complexity and grading of renal cysts termed the Bosniak classification. Explained that per my read the patient exhibited a grade 1 classification.     Bosniak 1 - simple cyst with: imperceptible wall, round characterization. No further workup is needed as the likelihood of malignancy is ~0%.    Bosniak 2 - minimally complex with: a few thin <1mm septa or thin calcifications (thickness not measurable); non-enhancing high attenuation (due to proteinaceous or hemorrhagic contents); renal lesions of <3cm are also included; well marginated. No further workup is needed as the likelihood of malignancy is ~0%.    Bosniak 2F - minimally complex with: an increased number of septa, minimally thickened with nodular or thick calcifications; enhancement of hairlin-thin smooth septa; hyperdense cyst >3cm in diameter, mostly intrarenal with less than 25% of wall visible; no enhancement. This type of lesion requires additional followup with Ultrasound/CT/MRI. The likelihood of malignancy is ~5%.    Bosniak 3 - indeterminate with: thick, nodular multiple septa or wall with measurable enhancement, hyperdense on CT. Due to the higher likelihood of malignancy of ~55%, typically treatment is recommended: partial nephrectomy or radiofrequency ablation.     Bosniak 4 - clearly malignancy with: solid mass with a large cystic or necrotic component. Due to the very high likelihood of malignancy nearing  ~80% or greater the recommendation for treatment include a partial or a radical nephrectomy.     As this patient has a Bosniak 1 cyst, we will plan to proceed with no further evaluation, no further monitoring is necessary. Follow up PRN    Alex Britton MD  Urology  [facility]  [facility]    Disclaimer: This note has been generated using voice-recognition software. There may be typographical errors that have been missed during proof-reading.

## 2023-01-01 NOTE — PROGRESS NOTES
Care Everywhere: n/a  Immunization: updated  Health Maintenance: upated  Media Review: reviewed for possible outside eye exam report  Legacy Review: n/a  Order placed: n/a  Upcoming appts:n/a  E-fax sent to Dr. Bebe Gee to obtain eye exam report.       pt presents w/hair wound around pinky finger on left hand.  mom states noticing it 20min PTA.  Lidocaine and topical applied.  MD bedside.

## 2023-01-10 ENCOUNTER — TELEPHONE (OUTPATIENT)
Dept: ENDOCRINOLOGY | Facility: CLINIC | Age: 78
End: 2023-01-10
Payer: COMMERCIAL

## 2023-01-10 DIAGNOSIS — E11.42 TYPE 2 DIABETES MELLITUS WITH DIABETIC POLYNEUROPATHY, WITH LONG-TERM CURRENT USE OF INSULIN: ICD-10-CM

## 2023-01-10 DIAGNOSIS — E78.2 MIXED HYPERLIPIDEMIA: ICD-10-CM

## 2023-01-10 DIAGNOSIS — G63 POLYNEUROPATHY ASSOCIATED WITH UNDERLYING DISEASE: ICD-10-CM

## 2023-01-10 DIAGNOSIS — Z79.4 TYPE 2 DIABETES MELLITUS WITH DIABETIC POLYNEUROPATHY, WITH LONG-TERM CURRENT USE OF INSULIN: ICD-10-CM

## 2023-01-10 DIAGNOSIS — I10 ESSENTIAL HYPERTENSION: ICD-10-CM

## 2023-01-10 RX ORDER — INSULIN GLARGINE 300 U/ML
INJECTION, SOLUTION SUBCUTANEOUS
Qty: 2 PEN | Refills: 5 | Status: SHIPPED | OUTPATIENT
Start: 2023-01-10 | End: 2023-06-19 | Stop reason: SDUPTHER

## 2023-01-10 RX ORDER — PRAVASTATIN SODIUM 80 MG/1
80 TABLET ORAL DAILY
Qty: 90 TABLET | Refills: 0 | Status: SHIPPED | OUTPATIENT
Start: 2023-01-10 | End: 2023-04-03 | Stop reason: SDUPTHER

## 2023-01-10 RX ORDER — LOSARTAN POTASSIUM AND HYDROCHLOROTHIAZIDE 25; 100 MG/1; MG/1
1 TABLET ORAL DAILY
Qty: 90 TABLET | Refills: 3 | Status: SHIPPED | OUTPATIENT
Start: 2023-01-10 | End: 2023-10-26 | Stop reason: SDUPTHER

## 2023-01-10 RX ORDER — INSULIN LISPRO 100 [IU]/ML
INJECTION, SOLUTION INTRAVENOUS; SUBCUTANEOUS
Qty: 15 ML | Refills: 0 | Status: SHIPPED | OUTPATIENT
Start: 2023-01-10

## 2023-01-10 RX ORDER — METFORMIN HYDROCHLORIDE 500 MG/1
500 TABLET, EXTENDED RELEASE ORAL 2 TIMES DAILY WITH MEALS
Qty: 180 TABLET | Refills: 2 | Status: SHIPPED | OUTPATIENT
Start: 2023-01-10 | End: 2023-06-19 | Stop reason: SDUPTHER

## 2023-01-10 RX ORDER — GABAPENTIN 300 MG/1
300 CAPSULE ORAL 3 TIMES DAILY
Qty: 90 CAPSULE | Refills: 11 | Status: SHIPPED | OUTPATIENT
Start: 2023-01-10 | End: 2023-10-26 | Stop reason: SDUPTHER

## 2023-01-10 RX ORDER — INSULIN LISPRO 100 [IU]/ML
INJECTION, SOLUTION INTRAVENOUS; SUBCUTANEOUS
Qty: 15 ML | Refills: 0 | Status: SHIPPED | OUTPATIENT
Start: 2023-01-10 | End: 2023-01-10

## 2023-01-10 RX ORDER — METOPROLOL SUCCINATE 50 MG/1
50 TABLET, EXTENDED RELEASE ORAL DAILY
Qty: 90 TABLET | Refills: 2 | Status: SHIPPED | OUTPATIENT
Start: 2023-01-10 | End: 2023-10-26 | Stop reason: SDUPTHER

## 2023-01-10 RX ORDER — MELOXICAM 7.5 MG/1
7.5 TABLET ORAL DAILY
Qty: 12 TABLET | Refills: 0 | Status: SHIPPED | OUTPATIENT
Start: 2023-01-10 | End: 2023-10-26 | Stop reason: SDUPTHER

## 2023-01-10 RX ORDER — SEMAGLUTIDE 1.34 MG/ML
INJECTION, SOLUTION SUBCUTANEOUS
Qty: 1 PEN | Refills: 5 | Status: SHIPPED | OUTPATIENT
Start: 2023-01-10 | End: 2023-03-02

## 2023-01-10 NOTE — TELEPHONE ENCOUNTER
----- Message from Lala Patel sent at 1/10/2023 11:06 AM CST -----  Regarding: Rosemary/  Gregory/  [phone]  Type: Patient Call Back    Who called:  Rosemary    What is the request in detail:  Pharmacy is needing max daily dosage for insulin lispro (HUMALOG KWIKPEN INSULIN) 100 unit/mL pen.  Thank you    Would the patient rather a call back or a response via [facility]?  Call back    Best call back number:  [phone]        Thank you     Prophylactic measure

## 2023-01-10 NOTE — TELEPHONE ENCOUNTER
Refill Routing Note   Medication(s) are not appropriate for processing by [facility] for the following reason(s):      - Outside of protocol  - Required laboratory values are abnormal    [facility] action(s):  Defer  Route  Approve Medication-related problems identified: Requires labs     Medication Therapy Plan: cmp. approve toprol XL, pravastatin; mobic, gabapentin: outsideof protocol; losartan: lab values abnormal  Medication reconciliation completed: Yes     Appointments  past 12m or future 3m with PCP    Date Provider   Last Visit   10/3/2022 Leonard Wells MD   Next Visit   4/3/2023 Leonard Wells MD   ED visits in past 90 days: 0        Note composed:12:07 PM 01/10/2023

## 2023-01-10 NOTE — TELEPHONE ENCOUNTER
Care Due:                  Date            Visit Type   Department     Provider  --------------------------------------------------------------------------------                                [facility]                              PRIMARY      MEDICINE/  Last Visit: 10-      CARE ([facility])   INTERNAL MED   Leonard Wells                              [facility]                              PRIMARY      MEDICINE/  Next Visit: 04-      CARE ([facility])   INTERNAL MED   Leonard Wells                                                            Last  Test          Frequency    Reason                     Performed    Due Date  --------------------------------------------------------------------------------    CMP.........  12 months..  losartan-hydrochlorothiaz  02- 02-                             nayana, pravastatin.........    Lipid Panel.  12 months..  pravastatin..............  02- 02-    Health [facility] Embedded Care Gaps. Reference number: [id number]. 1/10/2023   11:40:38 AM CST

## 2023-01-10 NOTE — TELEPHONE ENCOUNTER
----- Message from Elli Ordoñez sent at 1/10/2023 10:46 AM CST -----  Type: RX Refill Request    Who Called: self    Have you contacted your pharmacy:    Refill or New Rx:refill    RX Name and Strength:  meloxicam (MOBIC) 7.5 MG tablet  losartan-hydrochlorothiazide 100-25 mg (HYZAAR) 100-25 mg per tablet  gabapentin (NEURONTIN) 300 MG capsule  metoprolol succinate (TOPROL-XL) 50 MG 24 hr tablet  pravastatin (PRAVACHOL) 80 MG tablet    Preferred Pharmacy with phone number:  [id number] - [place]ONATHAN HUFFMAN - [address]  [address]  [place] [address]  Phone: [phone] Fax: [phone]        Local or Mail Order:local    Would the patient rather a call back or a response via [facility]? call    Best Call Back Number:[phone] (home) [phone] (work)

## 2023-01-17 ENCOUNTER — TELEPHONE (OUTPATIENT)
Dept: OTOLARYNGOLOGY | Facility: CLINIC | Age: 78
End: 2023-01-17
Payer: COMMERCIAL

## 2023-01-17 NOTE — TELEPHONE ENCOUNTER
Returned patient's phone call and scheduled appointment for hearing aid checkup.    ----- Message from Laureen Palmer sent at 1/17/2023  8:44 AM CST -----  Regarding: Same Day Appt Request  Name of Who is Calling: EDDY BENDER [[id number]]           What is the request in detail: Patient is requesting a call back to schedule a same day appointment with audiologist.  Patient is having issues with hearing aids.  Please assist.           Can the clinic reply by [facility]: No           What Number to Call Back if not in [facility]: [phone]

## 2023-01-19 ENCOUNTER — CLINICAL SUPPORT (OUTPATIENT)
Dept: OTOLARYNGOLOGY | Facility: CLINIC | Age: 78
End: 2023-01-19
Payer: MEDICARE

## 2023-01-19 DIAGNOSIS — Z46.1 ENCOUNTER FOR FITTING AND ADJUSTMENT OF HEARING AID OF BOTH EARS: Primary | ICD-10-CM

## 2023-01-19 PROCEDURE — 99499 UNLISTED E&M SERVICE: CPT | Mod: ,,, | Performed by: AUDIOLOGIST-HEARING AID FITTER

## 2023-01-19 PROCEDURE — 99499 NO LOS: ICD-10-PCS | Mod: ,,, | Performed by: AUDIOLOGIST-HEARING AID FITTER

## 2023-01-19 NOTE — PROGRESS NOTES
Keenan Hernandes, CCC-A  Audiologist - [facility] [address] [place] [address]  [email or web address]  [phone]    Patient: Mena Odonnell   MRN: [id number]  [address]  Home Phone [phone]   Work Phone [phone]   Mobile [phone]   : 1945  TRACY: 2023      HEARING AID FOLLOW-UP      Mena Odonnell is here today reporting that her hearing aids are not working.  Troubleshooting revealed  is not working and unable to reset.  Per Felix at [facility], sending replacement.  Otoscopy showed some wax AU, AS>AD but TMs visible AU.  Opened 21 session to connect devices to verify firmware is up to date.  Saved for datalog and closed without any changes to settings.  Call patient once receive replacement  in clinic.  Mena Odonnell verbalized understanding and satisfaction with today's visit.  She will call if service is needed.    _______________________________  Keenan Hernandes, GARTH-A  Audiologist

## 2023-01-23 ENCOUNTER — OFFICE VISIT (OUTPATIENT)
Dept: VASCULAR SURGERY | Facility: CLINIC | Age: 78
End: 2023-01-23
Payer: COMMERCIAL

## 2023-01-23 VITALS
BODY MASS INDEX: 35.58 KG/M2 | WEIGHT: 220.44 LBS | SYSTOLIC BLOOD PRESSURE: 137 MMHG | DIASTOLIC BLOOD PRESSURE: 94 MMHG

## 2023-01-23 DIAGNOSIS — I70.203 ATHEROSCLEROSIS OF NATIVE ARTERY OF BOTH LOWER EXTREMITIES, WITH UNSPECIFIED PRESENCE OF CLINICAL MANIFESTATION: ICD-10-CM

## 2023-01-23 DIAGNOSIS — I89.0 LYMPHEDEMA OF BOTH LOWER EXTREMITIES: Primary | ICD-10-CM

## 2023-01-23 PROCEDURE — 3080F DIAST BP >= 90 MM HG: CPT | Mod: CPTII,S$GLB,, | Performed by: SURGERY

## 2023-01-23 PROCEDURE — 1126F AMNT PAIN NOTED NONE PRSNT: CPT | Mod: CPTII,S$GLB,, | Performed by: SURGERY

## 2023-01-23 PROCEDURE — 3080F PR MOST RECENT DIASTOLIC BLOOD PRESSURE >= 90 MM HG: ICD-10-PCS | Mod: CPTII,S$GLB,, | Performed by: SURGERY

## 2023-01-23 PROCEDURE — 3288F FALL RISK ASSESSMENT DOCD: CPT | Mod: CPTII,S$GLB,, | Performed by: SURGERY

## 2023-01-23 PROCEDURE — 99999 PR PBB SHADOW E&M-EST. PATIENT-LVL III: CPT | Mod: PBBFAC,,, | Performed by: SURGERY

## 2023-01-23 PROCEDURE — 1126F PR PAIN SEVERITY QUANTIFIED, NO PAIN PRESENT: ICD-10-PCS | Mod: CPTII,S$GLB,, | Performed by: SURGERY

## 2023-01-23 PROCEDURE — 99213 PR OFFICE/OUTPT VISIT, EST, LEVL III, 20-29 MIN: ICD-10-PCS | Mod: S$GLB,,, | Performed by: SURGERY

## 2023-01-23 PROCEDURE — 3075F PR MOST RECENT SYSTOLIC BLOOD PRESS GE 130-139MM HG: ICD-10-PCS | Mod: CPTII,S$GLB,, | Performed by: SURGERY

## 2023-01-23 PROCEDURE — 1159F MED LIST DOCD IN RCRD: CPT | Mod: CPTII,S$GLB,, | Performed by: SURGERY

## 2023-01-23 PROCEDURE — 99213 OFFICE O/P EST LOW 20 MIN: CPT | Mod: S$GLB,,, | Performed by: SURGERY

## 2023-01-23 PROCEDURE — 1101F PT FALLS ASSESS-DOCD LE1/YR: CPT | Mod: CPTII,S$GLB,, | Performed by: SURGERY

## 2023-01-23 PROCEDURE — 1159F PR MEDICATION LIST DOCUMENTED IN MEDICAL RECORD: ICD-10-PCS | Mod: CPTII,S$GLB,, | Performed by: SURGERY

## 2023-01-23 PROCEDURE — 99999 PR PBB SHADOW E&M-EST. PATIENT-LVL III: ICD-10-PCS | Mod: PBBFAC,,, | Performed by: SURGERY

## 2023-01-23 PROCEDURE — 3075F SYST BP GE 130 - 139MM HG: CPT | Mod: CPTII,S$GLB,, | Performed by: SURGERY

## 2023-01-23 PROCEDURE — 1101F PR PT FALLS ASSESS DOC 0-1 FALLS W/OUT INJ PAST YR: ICD-10-PCS | Mod: CPTII,S$GLB,, | Performed by: SURGERY

## 2023-01-23 PROCEDURE — 3288F PR FALLS RISK ASSESSMENT DOCUMENTED: ICD-10-PCS | Mod: CPTII,S$GLB,, | Performed by: SURGERY

## 2023-01-23 NOTE — PROGRESS NOTES
Dre Blair MD [facility]                         01/23/2023    HPI:  Mena Odonnell is a 77 y.o. female with   Patient Active Problem List   Diagnosis    Type 2 diabetes mellitus with diabetic polyneuropathy    Hyperlipidemia    HTN (hypertension)    Knee pain    H/O vitamin D deficiency    Non morbid obesity due to excess calories    Bilateral carotid bruits    Osteoarthritis of right knee    Morbid obesity with body mass index (BMI) of 40.0 or higher    Dyspnea on exertion    Chronic cough    MEKA (obstructive sleep apnea)    Sensorineural hearing loss (SNHL) of right ear with restricted hearing of left ear    Tinnitus of both ears    Allergic rhinitis    Dysfunction of both eustachian tubes    Nasal septal deviation    Chronic eczematous otitis externa of both ears    Neck abscess    Epidermal inclusion cyst    Polyneuropathy associated with underlying disease    Pain and swelling of left knee    Pain and swelling of right knee    Weakness    Leg swelling    Aortic atherosclerosis    Decreased range of motion of lumbar spine    Abdominal weakness    being managed by PCP and specialists who is here today for evaluation of PVD.  Patient has no complaints of claudication.  Patient states location is RLE occurring for months.  Associated signs and symptoms include edema.  Quality is burning and severity is 6/10.  Symptoms began months ago.  Alleviating factors include elevation.  Worsening factors include dependency.  no rest pain.  no tissue loss.  Patient is diabetic.  Is not on Pletal.  no previous lower extremity interventions.    no MI  no Stroke  Tobacco use: denies  Daily Aspirin: yes  Anticoagulation: no    5/2022:  No new issues    7/2022:  States feels better, +therapy, has worn compression and elevating legs for greater than 3 mo.    Past Medical History:   Diagnosis Date    Cataract     Diabetes mellitus type II     Diabetes with neurologic  complications     DM retinopathy     H/O vitamin D deficiency     Hyperlipidemia     Hypertension     Obesity     Osteoarthritis     Peripheral neuropathy     Polyneuropathy     Severe obesity (BMI 35.0-39.9) with comorbidity 2016    Sleep apnea     Tendonitis     left foot     Past Surgical History:   Procedure Laterality Date    BLADDER SUSPENSION      CATARACT EXTRACTION      COLONOSCOPY N/A 2015    Procedure: COLONOSCOPY;  Surgeon: Javed Wood MD;  Location: [facility] ([address] [place] [address]);  Service: Endoscopy;  Laterality: N/A;    HYSTERECTOMY  1978    fibroids    NECK MASS EXCISION  2019    Procedure: EXCISION, MASS, NECK;  Surgeon: Edwin Barrow MD;  Location: [facility];  Service: General;;  RN PREOP 10/30/2019    TONSILLECTOMY       Family History   Problem Relation Age of Onset    Thyroid disease Mother     Cataracts Mother     Diabetes Mother     Stroke Mother     Hypertension Mother     Thyroid disease Sister     Diabetes Sister     Hypertension Sister     Hypertension Daughter     Diabetes Son     Diabetes Sister     Hypertension Sister     Hypertension Brother     Hypertension Sister     Hypertension Sister     Hypertension Brother     Thyroid disease Maternal Aunt     Thyroid disease Maternal Aunt     Colon cancer Maternal Grandfather     Breast cancer Neg Hx     Ovarian cancer Neg Hx     Amblyopia Neg Hx     Blindness Neg Hx     Cancer Neg Hx     Glaucoma Neg Hx     Macular degeneration Neg Hx     Retinal detachment Neg Hx     Strabismus Neg Hx      Social History     Socioeconomic History    Marital status:     Number of children: 2   Occupational History     Employer: [employer]   Tobacco Use    Smoking status: Former     Packs/day: 0.25     Years: 15.00     Pack years: 3.75     Types: Cigarettes     Quit date:      Years since quittin.0    Smokeless tobacco: Never   Substance and Sexual Activity    Alcohol use: No    Drug use: No    Sexual activity: Not  Currently     Partners: Male     Birth control/protection: Abstinence   Social History Narrative    . One daughter. Works as an Apartment      Social Determinants of Health     Financial Resource Strain: Low Risk     Difficulty of Paying Living Expenses: Not hard at all   Food Insecurity: No Food Insecurity    Worried About Running Out of Food in the Last Year: Never true    Ran Out of Food in the Last Year: Never true   Transportation Needs: No Transportation Needs    Lack of Transportation (Medical): No    Lack of Transportation (Non-Medical): No   Physical Activity: Inactive    Days of Exercise per Week: 0 days    Minutes of Exercise per Session: 0 min   Social Connections: Socially Isolated    Frequency of Communication with Friends and Family: More than three times a week    Frequency of Social Gatherings with Friends and Family: Never    Attends Temple Services: Never    Active Member of Clubs or Organizations: No    Attends Club or Organization Meetings: Never    Marital Status:    Housing Stability: Low Risk     Unable to Pay for Housing in the Last Year: No    Number of Places Lived in the Last Year: 1    Unstable Housing in the Last Year: No       Current Outpatient Medications:     blood-glucose sensor (DEXCOM G6 SENSOR) Sujey, Change sensor every 10 days, Disp: 3 each, Rfl: 11    blood-glucose transmitter (DEXCOM G6 TRANSMITTER) Sujey, Change every 3 months, Disp: 1 each, Rfl: 3    cetirizine (ZYRTEC) 10 MG tablet, TAKE 1 TABLET BY MOUTH ONCE DAILY FOR 30 DAYS, Disp: , Rfl: 3    CONTOUR NEXT TEST STRIPS Strp, Check glucose before breakfast and dinner., Disp: 100 each, Rfl: 5    COVID-19 VACC,MRNA,PFIZER,,PF, IM, COVID-19 vacc,mRNA(Pfizer)(PF), Disp: , Rfl:     diabetic supplies, miscellan. Kit, Please dispense one 14 day FreeStyle Giulia Haskell, Disp: 1 kit, Rfl: 0    diclofenac sodium (VOLTAREN) 1 % Gel, Lower extremities: Apply the gel (4 g) to the affected area 4 times  daily. Do not apply more than 16 g daily to any one affected joint of the lower extremities. Upper extremities: Apply the gel (2 g) to the affected area 4 times daily. Do not apply more than 8 g daily to any one affected joint of the upper extremities. Total dose should not exceed 32 g per day, overall affected joints., Disp: 100 g, Rfl: 5    empagliflozin (JARDIANCE) 10 mg tablet, Take 1 tablet (10 mg total) by mouth once daily., Disp: 90 tablet, Rfl: 2    flash glucose sensor (FREESTYLE ROSAURA 14 DAY SENSOR) Kit, 1 sensor kit every 14 days (requires 2 kits per month), Disp: 2 kit, Rfl: 6    fluocinolone acetonide oil 0.01 % Drop, Place 4 drops in ear(s) 2 (two) times daily., Disp: 20 mL, Rfl: 5    gabapentin (NEURONTIN) 300 MG capsule, Take 1 capsule (300 mg total) by mouth 3 (three) times daily., Disp: 90 capsule, Rfl: 11    insulin glargine U-300 conc (TOUJEO MAX U-300 SOLOSTAR) 300 unit/mL (3 mL) insulin pen, INJECT 50 UNITS SUBCUTANEOUSLY ONCE DAILY, Disp: 2 pen, Rfl: 5    insulin lispro (HUMALOG KWIKPEN INSULIN) 100 unit/mL pen, Use with sliding scale - 150-200=+2, 201-250=+4; 251-300=+6; 301-350=+8, over 350=+10 units, Disp: 15 mL, Rfl: 0    losartan-hydrochlorothiazide 100-25 mg (HYZAAR) 100-25 mg per tablet, Take 1 tablet by mouth once daily., Disp: 90 tablet, Rfl: 3    meloxicam (MOBIC) 7.5 MG tablet, Take 1 tablet (7.5 mg total) by mouth once daily., Disp: 12 tablet, Rfl: 0    metFORMIN (GLUCOPHAGE-XR) 500 MG ER 24hr tablet, Take 1 tablet (500 mg total) by mouth 2 (two) times daily with meals., Disp: 180 tablet, Rfl: 2    metoprolol succinate (TOPROL-XL) 50 MG 24 hr tablet, Take 1 tablet (50 mg total) by mouth once daily., Disp: 90 tablet, Rfl: 2    montelukast (SINGULAIR) 10 mg tablet, Take 1 tablet (10 mg total) by mouth once daily., Disp: 90 tablet, Rfl: 1    pravastatin (PRAVACHOL) 80 MG tablet, Take 1 tablet (80 mg total) by mouth once daily., Disp: 90 tablet, Rfl: 0    semaglutide (OZEMPIC) 1  mg/dose (4 mg/3 mL), INJECT 1 MG UNDER THE SKIN ONCE A WEEK, Disp: 1 pen, Rfl: 5    amLODIPine (NORVASC) 10 MG tablet, Take 1 tablet (10 mg total) by mouth once daily., Disp: 90 tablet, Rfl: 2    aspirin 81 MG Chew, Take 1 tablet (81 mg total) by mouth once daily., Disp: 30 tablet, Rfl: 0    REVIEW OF SYSTEMS:  General: No fevers or chills; ENT: No sore throat; Allergy and Immunology: no persistent infections; Hematological and Lymphatic: No history of bleeding or easy bruising; Endocrine: negative; Respiratory: no cough, shortness of breath, or wheezing; Cardiovascular: no chest pain or dyspnea on exertion; no claudication, no rest pain; Gastrointestinal: no abdominal pain/back, change in bowel habits, or bloody stools; Genito-Urinary: no dysuria, trouble voiding, or hematuria; Musculoskeletal: negative, no wound; Neurological: no TIA or stroke symptoms; Psychiatric: no nervousness, anxiety or depression.    PHYSICAL EXAM:                General appearance:  Alert, well-appearing, and in no distress.  Oriented to person, place, and time                    Neurological: Normal speech, no focal findings noted; CN II - XII grossly intact. RLE with sensation to light touch, LLE with sensation to light touch.            Musculoskeletal: Digits/nail without cyanosis/clubbing.  Strength 5/5 BLE.                    Neck: Supple, no significant adenopathy                  Chest:  Clear to auscultation, no wheezes, rales or rhonchi, symmetric air entry. No use of accessory muscles               Cardiac: Normal rate and regular rhythm, S1 and S2 normal            Abdomen: Soft, nontender, nondistended, no masses or organomegaly, no hernia     No rebound tenderness noted; bowel sounds normal     Pulsatile aortic mass is non palpable.     No groin adenopathy      Extremities:     2+ R DP pulse, 2+ L DP pulse     2+ RLE edema, 1+ LLE edema    Skin: RLE no tissue loss; LLE no tissue loss    LAB RESULTS:  No results found for:  CBC  Lab Results   Component Value Date    LABPROT 10.5 06/20/2013    INR 1.0 06/20/2013     Lab Results   Component Value Date     02/26/2022    K 3.4 (L) 02/26/2022     02/26/2022    CO2 25 02/26/2022     (H) 02/26/2022    BUN 14 02/26/2022    CREATININE 0.6 02/26/2022    CALCIUM 9.2 02/26/2022    ANIONGAP 12 02/26/2022    EGFRNONAA >60.0 02/26/2022     Lab Results   Component Value Date    WBC 7.07 12/07/2019    RBC 4.52 12/07/2019    HGB 13.4 12/07/2019    HCT 43.3 12/07/2019    MCV 96 12/07/2019    MCH 29.6 12/07/2019    MCHC 30.9 (L) 12/07/2019    RDW 14.5 12/07/2019     12/07/2019    MPV 12.2 12/07/2019    GRAN 3.4 12/07/2019    GRAN 47.3 12/07/2019    LYMPH 2.8 12/07/2019    LYMPH 39.2 12/07/2019    MONO 0.6 12/07/2019    MONO 8.8 12/07/2019    EOS 0.2 12/07/2019    BASO 0.07 12/07/2019    EOSINOPHIL 3.4 12/07/2019    BASOPHIL 1.0 12/07/2019    DIFFMETHOD Automated 12/07/2019     .  Lab Results   Component Value Date    HGBA1C 6.7 (H) 11/19/2022       IMAGING:  All pertinent imaging has been reviewed and interpreted independently.    BERRY 1/0.65    Arterial US BLE Right lower extremity arterial ultrasound shows R PT hemodynamically significant stenosis.      7/2022  Right lower extremity pressures and waveforms indicate mild arterial occlusive disease.  Left lower extremity pressures and waveforms indicate moderate arterial occlusive disease.      IMP/PLAN:  77 y.o. female with   Patient Active Problem List   Diagnosis    Type 2 diabetes mellitus with diabetic polyneuropathy    Hyperlipidemia    HTN (hypertension)    Knee pain    H/O vitamin D deficiency    Non morbid obesity due to excess calories    Bilateral carotid bruits    Osteoarthritis of right knee    Morbid obesity with body mass index (BMI) of 40.0 or higher    Dyspnea on exertion    Chronic cough    MEKA (obstructive sleep apnea)    Sensorineural hearing loss (SNHL) of right ear with restricted hearing of left ear     Tinnitus of both ears    Allergic rhinitis    Dysfunction of both eustachian tubes    Nasal septal deviation    Chronic eczematous otitis externa of both ears    Neck abscess    Epidermal inclusion cyst    Polyneuropathy associated with underlying disease    Pain and swelling of left knee    Pain and swelling of right knee    Weakness    Leg swelling    Aortic atherosclerosis    Decreased range of motion of lumbar spine    Abdominal weakness    being managed by PCP and specialists who is here today for evaluation of PVD.    -No RLE PVD with no claudication, no rest pain, no wound.  Imaging reviewed. - cont daily ASA; mild RLE/moderate LLE PVD 7/2022  -BLE lymphedema and venous hypertension - cont lymphedema clinic recs and pumps Rx today  -Patient is diagnosed with lymphedema and presents with hyperpigmentation of the lower extremity.  Patient has previously attempted compression, elevation, and exercise for at least 4 weeks.  Patient has secondary lymphedema and has tried and failed compression of 20-30 mm Hg, elevation and exercise for >1 month period however symptoms persist.  Basic pump trial performed and discontinued due to sensitivity and pain to static pressure.  Symptoms of swelling, pain and hyperplasia present.  A compression device is recommended to treat current symptoms and prevent disease progression. The patient has tried elevation, exercise and compression and symptoms remain.  The patient has marked hyperkeratosis with hyperplasia and hyperpigmentation.  - recommend lymphedema clinic therapy and pumps  -Exercise  -Heart healthy lifestyle  -RTC 6 mo with BERRY/TPs    I spent 13 minutes evaluating this patient and greater than 50% of the time was spent counseling, coordinator care and discussing the plan of care.  All questions were answered and patient stated understanding with agreement with the above treatment plan.    Dre Blair MD [facility]  Vascular and Endovascular Surgery

## 2023-01-25 DIAGNOSIS — I70.203 ATHEROSCLEROSIS OF NATIVE ARTERY OF BOTH LOWER EXTREMITIES, WITH UNSPECIFIED PRESENCE OF CLINICAL MANIFESTATION: Primary | ICD-10-CM

## 2023-02-18 ENCOUNTER — LAB VISIT (OUTPATIENT)
Dept: LAB | Facility: HOSPITAL | Age: 78
End: 2023-02-18
Attending: INTERNAL MEDICINE
Payer: MEDICARE

## 2023-02-18 DIAGNOSIS — E11.42 TYPE 2 DIABETES MELLITUS WITH DIABETIC POLYNEUROPATHY, WITH LONG-TERM CURRENT USE OF INSULIN: ICD-10-CM

## 2023-02-18 DIAGNOSIS — Z79.4 TYPE 2 DIABETES MELLITUS WITH DIABETIC POLYNEUROPATHY, WITH LONG-TERM CURRENT USE OF INSULIN: ICD-10-CM

## 2023-02-18 DIAGNOSIS — E78.5 HYPERLIPIDEMIA, UNSPECIFIED HYPERLIPIDEMIA TYPE: ICD-10-CM

## 2023-02-18 LAB
CHOLEST SERPL-MCNC: 195 MG/DL (ref 120–199)
CHOLEST/HDLC SERPL: 4 {RATIO} (ref 2–5)
CREAT SERPL-MCNC: 0.7 MG/DL (ref 0.5–1.4)
EST. GFR  (NO RACE VARIABLE): >60 ML/MIN/1.73 M^2
ESTIMATED AVG GLUCOSE: 151 MG/DL (ref 68–131)
HBA1C MFR BLD: 6.9 % (ref 4–5.6)
HDLC SERPL-MCNC: 49 MG/DL (ref 40–75)
HDLC SERPL: 25.1 % (ref 20–50)
LDLC SERPL CALC-MCNC: 116.8 MG/DL (ref 63–159)
NONHDLC SERPL-MCNC: 146 MG/DL
TRIGL SERPL-MCNC: 146 MG/DL (ref 30–150)

## 2023-02-18 PROCEDURE — 83036 HEMOGLOBIN GLYCOSYLATED A1C: CPT | Performed by: NURSE PRACTITIONER

## 2023-02-18 PROCEDURE — 36415 COLL VENOUS BLD VENIPUNCTURE: CPT | Mod: PO | Performed by: NURSE PRACTITIONER

## 2023-02-18 PROCEDURE — 80061 LIPID PANEL: CPT | Performed by: NURSE PRACTITIONER

## 2023-02-18 PROCEDURE — 82565 ASSAY OF CREATININE: CPT | Performed by: NURSE PRACTITIONER

## 2023-03-02 ENCOUNTER — OFFICE VISIT (OUTPATIENT)
Dept: ENDOCRINOLOGY | Facility: CLINIC | Age: 78
End: 2023-03-02
Payer: COMMERCIAL

## 2023-03-02 VITALS
HEART RATE: 85 BPM | TEMPERATURE: 98 F | BODY MASS INDEX: 35.51 KG/M2 | WEIGHT: 220 LBS | SYSTOLIC BLOOD PRESSURE: 160 MMHG | DIASTOLIC BLOOD PRESSURE: 76 MMHG

## 2023-03-02 DIAGNOSIS — E78.5 HYPERLIPIDEMIA, UNSPECIFIED HYPERLIPIDEMIA TYPE: ICD-10-CM

## 2023-03-02 DIAGNOSIS — Z79.4 TYPE 2 DIABETES MELLITUS WITH DIABETIC POLYNEUROPATHY, WITH LONG-TERM CURRENT USE OF INSULIN: Primary | ICD-10-CM

## 2023-03-02 DIAGNOSIS — E11.42 TYPE 2 DIABETES MELLITUS WITH DIABETIC POLYNEUROPATHY, WITH LONG-TERM CURRENT USE OF INSULIN: Primary | ICD-10-CM

## 2023-03-02 DIAGNOSIS — E66.01 SEVERE OBESITY (BMI 35.0-39.9) WITH COMORBIDITY: ICD-10-CM

## 2023-03-02 PROCEDURE — 1160F PR REVIEW ALL MEDS BY PRESCRIBER/CLIN PHARMACIST DOCUMENTED: ICD-10-PCS | Mod: CPTII,S$GLB,, | Performed by: NURSE PRACTITIONER

## 2023-03-02 PROCEDURE — 99214 PR OFFICE/OUTPT VISIT, EST, LEVL IV, 30-39 MIN: ICD-10-PCS | Mod: S$GLB,,, | Performed by: NURSE PRACTITIONER

## 2023-03-02 PROCEDURE — 3077F PR MOST RECENT SYSTOLIC BLOOD PRESSURE >= 140 MM HG: ICD-10-PCS | Mod: CPTII,S$GLB,, | Performed by: NURSE PRACTITIONER

## 2023-03-02 PROCEDURE — 3077F SYST BP >= 140 MM HG: CPT | Mod: CPTII,S$GLB,, | Performed by: NURSE PRACTITIONER

## 2023-03-02 PROCEDURE — 99999 PR PBB SHADOW E&M-EST. PATIENT-LVL IV: CPT | Mod: PBBFAC,,, | Performed by: NURSE PRACTITIONER

## 2023-03-02 PROCEDURE — 99214 OFFICE O/P EST MOD 30 MIN: CPT | Mod: S$GLB,,, | Performed by: NURSE PRACTITIONER

## 2023-03-02 PROCEDURE — 1160F RVW MEDS BY RX/DR IN RCRD: CPT | Mod: CPTII,S$GLB,, | Performed by: NURSE PRACTITIONER

## 2023-03-02 PROCEDURE — 99999 PR PBB SHADOW E&M-EST. PATIENT-LVL IV: ICD-10-PCS | Mod: PBBFAC,,, | Performed by: NURSE PRACTITIONER

## 2023-03-02 PROCEDURE — 1159F PR MEDICATION LIST DOCUMENTED IN MEDICAL RECORD: ICD-10-PCS | Mod: CPTII,S$GLB,, | Performed by: NURSE PRACTITIONER

## 2023-03-02 PROCEDURE — 95251 PR GLUCOSE MONITOR, 72 HOUR, PHYS INTERP: ICD-10-PCS | Mod: S$GLB,,, | Performed by: NURSE PRACTITIONER

## 2023-03-02 PROCEDURE — 3078F DIAST BP <80 MM HG: CPT | Mod: CPTII,S$GLB,, | Performed by: NURSE PRACTITIONER

## 2023-03-02 PROCEDURE — 1159F MED LIST DOCD IN RCRD: CPT | Mod: CPTII,S$GLB,, | Performed by: NURSE PRACTITIONER

## 2023-03-02 PROCEDURE — 3078F PR MOST RECENT DIASTOLIC BLOOD PRESSURE < 80 MM HG: ICD-10-PCS | Mod: CPTII,S$GLB,, | Performed by: NURSE PRACTITIONER

## 2023-03-02 PROCEDURE — 95251 CONT GLUC MNTR ANALYSIS I&R: CPT | Mod: S$GLB,,, | Performed by: NURSE PRACTITIONER

## 2023-03-02 RX ORDER — TIRZEPATIDE 10 MG/.5ML
10 INJECTION, SOLUTION SUBCUTANEOUS
Qty: 4 PEN | Refills: 3 | Status: SHIPPED | OUTPATIENT
Start: 2023-03-02 | End: 2023-05-26 | Stop reason: RX

## 2023-03-02 NOTE — PROGRESS NOTES
CC: This 77 y.o. Black or  female  is here for evaluation of  T2DM along with comorbidities indicated in the Visit Diagnosis section of this encounter.    HPI: Mena Odonnell was diagnosed with T2DM in ~ 1995. Experienced blurry vision at the time of diagnosis r/t BG >400. Started on orals. Began insulin in 2005. She is currently on MDI.      Prior visit 11/2022  a1c is up from 6.7 to 7.1%.   She was on prednisone x 6 days for back pain, which has improved. Last dose of prednisone was yesterday. Reports BGs prior to steroids were well controlled.   She lost 5 lb in the last 4 months. She would like to get < 200 lb.   Plan Diabetes remains controlled.   Ok to use Humalog with sliding scale - 150-200=+1, 201-250=+2, 251-300=+3; etc.   Continue Toujeo 50 units, Ozempic 1 mg weekly, metformin  mg twice daily and Jardiance 10 mg. Pt declines combination Synjardy.   A1c in 3 months.   A1c again in 6 months. Return to clinic in 6 months.     Interval hx  A1c remains low, from 7.1% in July to 6.7% in Nov. Now at 6.9%.       LAST DIABETES EDUCATION: 6/2019 mira Palmer - Found visit helpful   1/14/22 with ELENO Sanchez RD for Dexcom training     HOSPITALIZED FOR DIABETES -  No.    DIABETES MEDICATIONS:   Jardiance 10 mg once daily in the AM  Ozempic 1 mg once weekly  Toujeo Max 50 units once daily in 6 am  Metformin  mg bid  Humalog per ss: 150-200=+1, 201-250=+2, 251-300=+3; 301-350=+4, over 350=+5 units    Misses medication doses - no        DM COMPLICATIONS: peripheral neuropathy and peripheral vascular disease    SIGNIFICANT DIABETES MED HISTORY:   Metformin stopped December 2016 r/t GI upset and diarrhea, resumed with metformin ER 3/2022 and weaned off Humalog     SELF MONITORING BLOOD GLUCOSE: Checks blood glucose at home  with Dexcom CGM on reader.   90 day CGM average 149   CGM interpretation: BGs overall high especially postprandially d/t steroid therapy. BGs prior to steroids well  controlled.  No hypoglycemia.         HYPOGLYCEMIC EPISODES:  Denies     CURRENT DIET:  drinking mostly water and sometimes juice - once a week.   Breakfast ~ 8 am; lunch ~ 12 pm. Dinner - 6 pm. Eats 3 meals/day.       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 No constipation     PHYSICAL EXAM:  GENERAL: Well developed, well nourished. No acute distress.   PSYCH: AAOx3, appropriate mood and affect, conversant, well-groomed. Judgement and insight good.   NEURO: Cranial nerves grossly intact. Speech clear, no tremor.   CHEST: Respirations even and unlabored.       Hemoglobin A1C   Date Value Ref Range Status   02/18/2023 6.9 (H) 4.0 - 5.6 % Final     Comment:     ADA Screening Guidelines:  5.7-6.4%  Consistent with prediabetes  >or=6.5%  Consistent with diabetes    High levels of fetal hemoglobin interfere with the HbA1C  assay. Heterozygous hemoglobin variants (HbS, HgC, etc)do  not significantly interfere with this assay.   However, presence of multiple variants may affect accuracy.     11/19/2022 6.7 (H) 4.0 - 5.6 % Final     Comment:     ADA Screening Guidelines:  5.7-6.4%  Consistent with prediabetes  >or=6.5%  Consistent with diabetes    High levels of fetal hemoglobin interfere with the HbA1C  assay. Heterozygous hemoglobin variants (HbS, HgC, etc)do  not significantly interfere with this assay.   However, presence of multiple variants may affect accuracy.     07/16/2022 7.1 (H) 4.0 - 5.6 % Final     Comment:     ADA Screening Guidelines:  5.7-6.4%  Consistent with prediabetes  >or=6.5%  Consistent with diabetes    High levels of fetal hemoglobin interfere with the HbA1C  assay. Heterozygous hemoglobin variants (HbS, HgC, etc)do  not significantly interfere with this assay.   However, presence of  multiple variants may affect accuracy.             Chemistry        Component Value Date/Time     02/26/2022 0818    K 3.4 (L) 02/26/2022 0818     02/26/2022 0818    CO2 25 02/26/2022 0818    BUN 14 02/26/2022 0818    CREATININE 0.7 02/18/2023 0837     (H) 02/26/2022 0818        Component Value Date/Time    CALCIUM 9.2 02/26/2022 0818    ALKPHOS 65 02/26/2022 0818    AST 13 02/26/2022 0818    ALT 15 02/26/2022 0818    BILITOT 0.3 02/26/2022 0818    ESTGFRAFRICA >60.0 02/26/2022 0818    EGFRNONAA >60.0 02/26/2022 0818         Latest Reference Range & Units 02/18/23 08:37   Creatinine 0.5 - 1.4 mg/dL 0.7   eGFR >60 mL/min/1.73 m^2 >60.0     Lab Results   Component Value Date    LDLCALC 116.8 02/18/2023      Latest Reference Range & Units 02/26/22 08:18 02/18/23 08:37   Cholesterol 120 - 199 mg/dL 167 195   HDL 40 - 75 mg/dL 40 49   HDL/Cholesterol Ratio 20.0 - 50.0 % 24.0 25.1   LDL Cholesterol External 63.0 - 159.0 mg/dL 106.8 116.8   Non-HDL Cholesterol mg/dL 127 146   Total Cholesterol/HDL Ratio 2.0 - 5.0  4.2 4.0   Triglycerides 30 - 150 mg/dL 101 146       Lab Results   Component Value Date    MICALBCREAT 44.2 (H) 02/18/2023           ASSESSMENT and PLAN:    A1C GOAL: < 7%       1. Type 2 diabetes mellitus with diabetic polyneuropathy, with long-term current use of insulin  Switch from Ozempic 1 mg to Mounjaro 10 mg once weekly. Reviewed side effects.   Continue metformin and Jardiance.   Reduce Toujeo insulin from 50 to 40 units once Mounjaro is started.   Contact office if blood sugars drop less than 80 regularly so that insulin dose can be further reduced. If Mounjaro not covered, will start Ozempic 2 mg instead.   Return to clinic in 3 months with labs prior.     tirzepatide (MOUNJARO) 10 mg/0.5 mL PnIj    Hemoglobin A1C      2. Hyperlipidemia, unspecified hyperlipidemia type  Continue pravastatin. Avoid saturated fat.    3. Severe obesity - switch to Mounjaro              Patient requires  a therapeutic CGM and is willing to use therapeutic CGM/SMBG for Necessary frequent adjustments in insulin therapy. I have assessed adherence to CGM regimen and to the plan. Due to COVID pandemic, patient requires Dexcom CGM to manage diabetes.         Orders Placed This Encounter   Procedures    Hemoglobin A1C     Standing Status:   Future     Standing Expiration Date:   4/30/2024        Follow up in about 3 months (around 6/2/2023).

## 2023-03-02 NOTE — PATIENT INSTRUCTIONS
Switch from Ozempic 1 mg to Mounjaro 10 mg once weekly. Reviewed side effects.   Continue metformin and Jardiance.   Reduce Toujeo insulin from 50 to 40 units once Mounjaro is started.   Contact office if blood sugars drop less than 80 regularly so that insulin dose can be further reduced. If Mounjaro not covered, will start Ozempic 2 mg instead.   Return to clinic in 3 months with labs prior.

## 2023-03-23 ENCOUNTER — PATIENT MESSAGE (OUTPATIENT)
Dept: ADMINISTRATIVE | Facility: HOSPITAL | Age: 78
End: 2023-03-23
Payer: COMMERCIAL

## 2023-04-03 ENCOUNTER — OFFICE VISIT (OUTPATIENT)
Dept: FAMILY MEDICINE | Facility: CLINIC | Age: 78
End: 2023-04-03
Payer: COMMERCIAL

## 2023-04-03 VITALS
SYSTOLIC BLOOD PRESSURE: 138 MMHG | DIASTOLIC BLOOD PRESSURE: 64 MMHG | WEIGHT: 218.25 LBS | HEIGHT: 66 IN | OXYGEN SATURATION: 97 % | TEMPERATURE: 98 F | HEART RATE: 72 BPM | BODY MASS INDEX: 35.08 KG/M2

## 2023-04-03 DIAGNOSIS — I10 ESSENTIAL HYPERTENSION: ICD-10-CM

## 2023-04-03 DIAGNOSIS — I35.0 AORTIC VALVE STENOSIS, ETIOLOGY OF CARDIAC VALVE DISEASE UNSPECIFIED: ICD-10-CM

## 2023-04-03 DIAGNOSIS — Z86.39 H/O VITAMIN D DEFICIENCY: Chronic | ICD-10-CM

## 2023-04-03 DIAGNOSIS — G63 POLYNEUROPATHY ASSOCIATED WITH UNDERLYING DISEASE: ICD-10-CM

## 2023-04-03 DIAGNOSIS — I10 PRIMARY HYPERTENSION: ICD-10-CM

## 2023-04-03 DIAGNOSIS — B37.31 YEAST VAGINITIS: ICD-10-CM

## 2023-04-03 DIAGNOSIS — E78.2 MIXED HYPERLIPIDEMIA: ICD-10-CM

## 2023-04-03 DIAGNOSIS — Z00.00 HEALTHCARE MAINTENANCE: Primary | ICD-10-CM

## 2023-04-03 DIAGNOSIS — J30.2 SEASONAL ALLERGIES: ICD-10-CM

## 2023-04-03 DIAGNOSIS — E11.42 TYPE 2 DIABETES MELLITUS WITH DIABETIC POLYNEUROPATHY, WITHOUT LONG-TERM CURRENT USE OF INSULIN: ICD-10-CM

## 2023-04-03 PROCEDURE — 99999 PR PBB SHADOW E&M-EST. PATIENT-LVL V: ICD-10-PCS | Mod: PBBFAC,,, | Performed by: INTERNAL MEDICINE

## 2023-04-03 PROCEDURE — 3288F FALL RISK ASSESSMENT DOCD: CPT | Mod: CPTII,S$GLB,, | Performed by: INTERNAL MEDICINE

## 2023-04-03 PROCEDURE — 99999 PR PBB SHADOW E&M-EST. PATIENT-LVL V: CPT | Mod: PBBFAC,,, | Performed by: INTERNAL MEDICINE

## 2023-04-03 PROCEDURE — 3078F DIAST BP <80 MM HG: CPT | Mod: CPTII,S$GLB,, | Performed by: INTERNAL MEDICINE

## 2023-04-03 PROCEDURE — 99214 OFFICE O/P EST MOD 30 MIN: CPT | Mod: S$GLB,,, | Performed by: INTERNAL MEDICINE

## 2023-04-03 PROCEDURE — 1160F PR REVIEW ALL MEDS BY PRESCRIBER/CLIN PHARMACIST DOCUMENTED: ICD-10-PCS | Mod: CPTII,S$GLB,, | Performed by: INTERNAL MEDICINE

## 2023-04-03 PROCEDURE — 1160F RVW MEDS BY RX/DR IN RCRD: CPT | Mod: CPTII,S$GLB,, | Performed by: INTERNAL MEDICINE

## 2023-04-03 PROCEDURE — 3288F PR FALLS RISK ASSESSMENT DOCUMENTED: ICD-10-PCS | Mod: CPTII,S$GLB,, | Performed by: INTERNAL MEDICINE

## 2023-04-03 PROCEDURE — 1159F PR MEDICATION LIST DOCUMENTED IN MEDICAL RECORD: ICD-10-PCS | Mod: CPTII,S$GLB,, | Performed by: INTERNAL MEDICINE

## 2023-04-03 PROCEDURE — 1101F PR PT FALLS ASSESS DOC 0-1 FALLS W/OUT INJ PAST YR: ICD-10-PCS | Mod: CPTII,S$GLB,, | Performed by: INTERNAL MEDICINE

## 2023-04-03 PROCEDURE — 1159F MED LIST DOCD IN RCRD: CPT | Mod: CPTII,S$GLB,, | Performed by: INTERNAL MEDICINE

## 2023-04-03 PROCEDURE — 3078F PR MOST RECENT DIASTOLIC BLOOD PRESSURE < 80 MM HG: ICD-10-PCS | Mod: CPTII,S$GLB,, | Performed by: INTERNAL MEDICINE

## 2023-04-03 PROCEDURE — 3075F PR MOST RECENT SYSTOLIC BLOOD PRESS GE 130-139MM HG: ICD-10-PCS | Mod: CPTII,S$GLB,, | Performed by: INTERNAL MEDICINE

## 2023-04-03 PROCEDURE — 1126F AMNT PAIN NOTED NONE PRSNT: CPT | Mod: CPTII,S$GLB,, | Performed by: INTERNAL MEDICINE

## 2023-04-03 PROCEDURE — 3075F SYST BP GE 130 - 139MM HG: CPT | Mod: CPTII,S$GLB,, | Performed by: INTERNAL MEDICINE

## 2023-04-03 PROCEDURE — 99214 PR OFFICE/OUTPT VISIT, EST, LEVL IV, 30-39 MIN: ICD-10-PCS | Mod: S$GLB,,, | Performed by: INTERNAL MEDICINE

## 2023-04-03 PROCEDURE — 1126F PR PAIN SEVERITY QUANTIFIED, NO PAIN PRESENT: ICD-10-PCS | Mod: CPTII,S$GLB,, | Performed by: INTERNAL MEDICINE

## 2023-04-03 PROCEDURE — 1101F PT FALLS ASSESS-DOCD LE1/YR: CPT | Mod: CPTII,S$GLB,, | Performed by: INTERNAL MEDICINE

## 2023-04-03 RX ORDER — FLUCONAZOLE 150 MG/1
150 TABLET ORAL DAILY
Qty: 1 TABLET | Refills: 1 | Status: SHIPPED | OUTPATIENT
Start: 2023-04-03 | End: 2023-04-05

## 2023-04-03 RX ORDER — CETIRIZINE HYDROCHLORIDE 10 MG/1
10 TABLET ORAL DAILY
Qty: 30 TABLET | Refills: 3 | Status: SHIPPED | OUTPATIENT
Start: 2023-04-03 | End: 2023-10-26 | Stop reason: SDUPTHER

## 2023-04-03 RX ORDER — AMLODIPINE BESYLATE 10 MG/1
10 TABLET ORAL DAILY
Qty: 90 TABLET | Refills: 2 | Status: SHIPPED | OUTPATIENT
Start: 2023-04-03 | End: 2023-10-26 | Stop reason: SDUPTHER

## 2023-04-03 RX ORDER — PRAVASTATIN SODIUM 80 MG/1
80 TABLET ORAL DAILY
Qty: 90 TABLET | Refills: 3 | Status: SHIPPED | OUTPATIENT
Start: 2023-04-03 | End: 2023-10-26 | Stop reason: SDUPTHER

## 2023-04-03 NOTE — PROGRESS NOTES
HISTORY OF PRESENT ILLNESS:  Mena Odonnell is a 77 y.o. female who presents to the clinic today for Follow-up.    Last seen by me 10/2022.    Aortic stenosis  Last visit with cardiology 12/2022 and due for follow up.  Noted some TRACY with long distance and housework.  Last echo (12/6/22):  The left ventricle is normal in size with mild concentric hypertrophy and normal systolic function.  The estimated ejection fraction is 65%.  Normal left ventricular diastolic function.  Normal right ventricular size with normal right ventricular systolic function.  There is moderate aortic valve stenosis.  Aortic valve area is 1.21 cm2; peak velocity is 3.47 m/s; mean gradient is 29 mmHg.  Normal central venous pressure (3 mmHg).  The estimated PA systolic pressure is 26 mmHg.  There is no pulmonary hypertension.     HTN  Managed with losartan-HCTZ, amlodipine, Toprol XL.    Renal cyst  Noted simple cyst, seen by urology 12/2022 - no further workup.    Type 2 DM  Seen by endo 3/2/23.  Last A1C 6.9% on 2/18/22.  Managed with Toujeo, metformin, Mounjaro and Jardiance.  Seen by Candy Villanueva/marian.  Last A1C 7.1%.  Notes not really taking Humalog SSI.     Peripheral vascular disease, venous hypertension, lymphedema  Seen by Dr. Blair 5/19/22.   Attended PT for lymphedema.  Seen by vascular 1/2023.  Recommended for lymphedema clinic therapy and pumps (did not get pumps).  Feels symptoms are improved.     Primary OA  Seen in Ortho 12/2022 for this.  Hyaluronate injection to knees done - symptoms improved.     Back and hip pain, knee osteoarthritis  Possible spine surgery? - planning to hold off at present.    MRI (9/29/22):  Lumbar spondylosis most significant at L4-5 and L5-S1 with severe spinal canal stenosis and moderate to severe neural foraminal narrowing.  Partially imaged left renal T2 hyperintensity. Consider nonemergent retroperitoneal ultrasound for further evaluation.  Left adrenal nonspecific thickening.    PAST  MEDICAL HISTORY:  Past Medical History:   Diagnosis Date    Cataract     Diabetes mellitus type II     Diabetes with neurologic complications     DM retinopathy     H/O vitamin D deficiency     Hyperlipidemia     Hypertension     Obesity     Osteoarthritis     Peripheral neuropathy     Polyneuropathy     Severe obesity (BMI 35.0-39.9) with comorbidity 2016    Sleep apnea     Tendonitis     left foot       PAST SURGICAL HISTORY:  Past Surgical History:   Procedure Laterality Date    BLADDER SUSPENSION      CATARACT EXTRACTION      COLONOSCOPY N/A 2015    Procedure: COLONOSCOPY;  Surgeon: Javed Wood MD;  Location: [address]);  Service: Endoscopy;  Laterality: N/A;    HYSTERECTOMY  1978    fibroids    NECK MASS EXCISION  2019    Procedure: EXCISION, MASS, NECK;  Surgeon: Edwin Barrow MD;  Location[facility];  Service: General;;  RN PREOP 10/30/2019    TONSILLECTOMY         SOCIAL HISTORY:  Social History     Socioeconomic History    Marital status:     Number of children: 2   Occupational History     Employer: [employer]   Tobacco Use    Smoking status: Former     Packs/day: 0.25     Years: 15.00     Pack years: 3.75     Types: Cigarettes     Quit date:      Years since quittin.2    Smokeless tobacco: Never   Substance and Sexual Activity    Alcohol use: No    Drug use: No    Sexual activity: Not Currently     Partners: Male     Birth control/protection: Abstinence   Social History Narrative    . One daughter. Works as an Apartment      Social Determinants of Health     Financial Resource Strain: Low Risk     Difficulty of Paying Living Expenses: Not hard at all   Food Insecurity: No Food Insecurity    Worried About Running Out of Food in the Last Year: Never true    Ran Out of Food in the Last Year: Never true   Transportation Needs: No Transportation Needs    Lack of Transportation (Medical): No    Lack of Transportation  (Non-Medical): No   Physical Activity: Inactive    Days of Exercise per Week: 0 days    Minutes of Exercise per Session: 0 min   Social Connections: Socially Isolated    Frequency of Communication with Friends and Family: More than three times a week    Frequency of Social Gatherings with Friends and Family: Never    Attends [employer]ats[employer] Services: Never    Active Member of Clubs or Organizations: No    Attends Club or Organization Meetings: Never    Marital Status:    Housing Stability: Low Risk     Unable to Pay for Housing in the Last Year: No    Number of Places Lived in the Last Year: 1    Unstable Housing in the Last Year: No       FAMILY HISTORY:  Family History   Problem Relation Age of Onset    Thyroid disease Mother     Cataracts Mother     Diabetes Mother     Stroke Mother     Hypertension Mother     Thyroid disease Sister     Diabetes Sister     Hypertension Sister     Hypertension Daughter     Diabetes Son     Diabetes Sister     Hypertension Sister     Hypertension Brother     Hypertension Sister     Hypertension Sister     Hypertension Brother     Thyroid disease Maternal Aunt     Thyroid disease Maternal Aunt     Colon cancer Maternal Grandfather     Breast cancer Neg Hx     Ovarian cancer Neg Hx     Amblyopia Neg Hx     Blindness Neg Hx     Cancer Neg Hx     Glaucoma Neg Hx     Macular degeneration Neg Hx     Retinal detachment Neg Hx     Strabismus Neg Hx        ALLERGIES AND MEDICATIONS: updated and reviewed.  Review of patient's allergies indicates:   Allergen Reactions    Atorvastatin      Muscle pain     Crestor [rosuvastatin] Other (See Comments)     Leg Weakness.      Medication List with Changes/Refills   Current Medications    AMLODIPINE (NORVASC) 10 MG TABLET    Take 1 tablet (10 mg total) by mouth once daily.    ASPIRIN 81 MG CHEW    Take 1 tablet (81 mg total) by mouth once daily.    BLOOD-GLUCOSE SENSOR (DEXCOM G6 SENSOR) SHELLY    Change sensor every 10 days    BLOOD-GLUCOSE  TRANSMITTER (DEXCOM G6 TRANSMITTER) SHELLY    Change every 3 months    CETIRIZINE (ZYRTEC) 10 MG TABLET    TAKE 1 TABLET BY MOUTH ONCE DAILY FOR 30 DAYS    CONTOUR NEXT TEST STRIPS STRP    Check glucose before breakfast and dinner.    COVID-19 VACC,MRNA,PFIZER,,PF, IM    COVID-19 vacc,mRNA(Pfizer)(PF)    DIABETIC SUPPLIES, MISCELLAN. KIT    Please dispense one 14 day FreeStyle Giulia Flushing    DICLOFENAC SODIUM (VOLTAREN) 1 % GEL    Lower extremities: Apply the gel (4 g) to the affected area 4 times daily. Do not apply more than 16 g daily to any one affected joint of the lower extremities. Upper extremities: Apply the gel (2 g) to the affected area 4 times daily. Do not apply more than 8 g daily to any one affected joint of the upper extremities. Total dose should not exceed 32 g per day, overall affected joints.    EMPAGLIFLOZIN (JARDIANCE) 10 MG TABLET    Take 1 tablet (10 mg total) by mouth once daily.    FLASH GLUCOSE SENSOR (FREESTYLE GIULIA 14 DAY SENSOR) KIT    1 sensor kit every 14 days (requires 2 kits per month)    FLUOCINOLONE ACETONIDE OIL 0.01 % DROP    Place 4 drops in ear(s) 2 (two) times daily.    GABAPENTIN (NEURONTIN) 300 MG CAPSULE    Take 1 capsule (300 mg total) by mouth 3 (three) times daily.    INSULIN GLARGINE U-300 CONC (TOUJEO MAX U-300 SOLOSTAR) 300 UNIT/ML (3 ML) INSULIN PEN    INJECT 50 UNITS SUBCUTANEOUSLY ONCE DAILY    INSULIN LISPRO (HUMALOG KWIKPEN INSULIN) 100 UNIT/ML PEN    Use with sliding scale - 150-200=+2, 201-250=+4; 251-300=+6; 301-350=+8, over 350=+10 units    LOSARTAN-HYDROCHLOROTHIAZIDE 100-25 MG (HYZAAR) 100-25 MG PER TABLET    Take 1 tablet by mouth once daily.    MELOXICAM (MOBIC) 7.5 MG TABLET    Take 1 tablet (7.5 mg total) by mouth once daily.    METFORMIN (GLUCOPHAGE-XR) 500 MG ER 24HR TABLET    Take 1 tablet (500 mg total) by mouth 2 (two) times daily with meals.    METOPROLOL SUCCINATE (TOPROL-XL) 50 MG 24 HR TABLET    Take 1 tablet (50 mg total) by mouth once daily.     MONTELUKAST (SINGULAIR) 10 MG TABLET    Take 1 tablet (10 mg total) by mouth once daily.    PRAVASTATIN (PRAVACHOL) 80 MG TABLET    Take 1 tablet (80 mg total) by mouth once daily.    TIRZEPATIDE (MOUNJARO) 10 MG/0.5 ML PNIJ    Inject 10 mg into the skin every 7 days.          CARE TEAM:  Patient Care Team:  Leonard Wells MD as PCP - General (Internal Medicine)  Nga Benjamin MA as Care Coordinator  Bebe Gee MD as Consulting Physician (Ophthalmology)  Praneeth Rios MD as Consulting Physician (Ophthalmology)  Candy Villanueva NP as Nurse Practitioner (Endocrinology)  Jenn Arreola MD as Consulting Physician (Cardiology)  Dre Blair MD as Consulting Physician (Vascular Surgery)         REVIEW OF SYSTEMS:  Review of Systems   Constitutional:  Negative for chills and fever.   HENT:  Negative for congestion and postnasal drip.    Eyes:  Negative for photophobia and visual disturbance.   Respiratory:  Positive for shortness of breath. Negative for cough.    Cardiovascular:  Negative for chest pain and palpitations.   Gastrointestinal:  Negative for nausea and vomiting.   Genitourinary:  Negative for dysuria and frequency.   Musculoskeletal:  Positive for back pain. Negative for arthralgias and gait problem.   Neurological:  Negative for light-headedness and headaches.   Psychiatric/Behavioral:  Negative for dysphoric mood. The patient is not nervous/anxious.        PHYSICAL EXAM:   Vitals:    04/03/23 0938   BP: 138/64   Pulse: 72   Temp: 98.4 °F (36.9 °C)             Body mass index is 35.23 kg/m².     General appearance - alert, well appearing, and in no distress, oriented to person, place, and time, and obese  Mental status - normal mood, behavior, speech, dress, motor activity, and thought processes  Eyes - sclera anicteric, left eye normal, right eye normal  Chest - clear to auscultation, no wheezes, rales or rhonchi, symmetric air entry, no tachypnea, retractions or cyanosis  Heart - normal  rate and regular rhythm, systolic murmur   Neurological - alert, oriented, normal speech, no focal findings or movement disorder noted  Extremities - peripheral pulses normal, no pedal edema, no clubbing or cyanosis      ASSESSMENT AND PLAN:  Healthcare maintenance  -     DXA Bone Density Axial Skeleton 1 or more sites; Future; Expected date: 04/03/2023  -     Comprehensive Metabolic Panel; Future; Expected date: 06/17/2023  -     Vitamin D; Future; Expected date: 06/17/2023    Primary hypertension  Essential hypertension  -     amLODIPine (NORVASC) 10 MG tablet; Take 1 tablet (10 mg total) by mouth once daily.  Dispense: 90 tablet; Refill: 2  - Stable on current medication  -     Comprehensive Metabolic Panel; Future; Expected date: 06/17/2023    Aortic valve stenosis, etiology of cardiac valve disease unspecified        - Stable symptoms.  To follow up with cardiology. Advised that is she experiences chest pain or increased dyspnea, to seek evaluation in ED.    Mixed hyperlipidemia  -     Comprehensive Metabolic Panel; Future; Expected date: 06/17/2023  -     pravastatin (PRAVACHOL) 80 MG tablet; Take 1 tablet (80 mg total) by mouth once daily.  Dispense: 90 tablet; Refill: 3    Type 2 diabetes mellitus with diabetic polyneuropathy, without long-term current use of insulin  -     Comprehensive Metabolic Panel; Future; Expected date: 06/17/2023    H/O vitamin D deficiency  -     Vitamin D; Future; Expected date: 06/17/2023    Yeast vaginitis  -     fluconazole (DIFLUCAN) 150 MG Tab; Take 1 tablet (150 mg total) by mouth once daily. for 2 days  Dispense: 1 tablet; Refill: 1    Seasonal allergies  -     cetirizine (ZYRTEC) 10 MG tablet; Take 1 tablet (10 mg total) by mouth once daily. For 30 days  Dispense: 30 tablet; Refill: 3    Polyneuropathy associated with underlying disease         - Prescribed gabapentin                 Follow up 6 months or sooner as needed.

## 2023-04-26 ENCOUNTER — CLINICAL SUPPORT (OUTPATIENT)
Dept: OTOLARYNGOLOGY | Facility: CLINIC | Age: 78
End: 2023-04-26
Payer: COMMERCIAL

## 2023-04-26 ENCOUNTER — OFFICE VISIT (OUTPATIENT)
Dept: OTOLARYNGOLOGY | Facility: CLINIC | Age: 78
End: 2023-04-26
Payer: COMMERCIAL

## 2023-04-26 VITALS
HEIGHT: 66 IN | BODY MASS INDEX: 35.2 KG/M2 | DIASTOLIC BLOOD PRESSURE: 67 MMHG | HEART RATE: 71 BPM | SYSTOLIC BLOOD PRESSURE: 156 MMHG | WEIGHT: 219 LBS | TEMPERATURE: 98 F

## 2023-04-26 DIAGNOSIS — H90.3 SENSORINEURAL HEARING LOSS OF BOTH EARS: Primary | ICD-10-CM

## 2023-04-26 DIAGNOSIS — Z97.4 WEARS HEARING AID IN BOTH EARS: ICD-10-CM

## 2023-04-26 DIAGNOSIS — Z46.1 ENCOUNTER FOR FITTING AND ADJUSTMENT OF HEARING AID OF BOTH EARS: Primary | ICD-10-CM

## 2023-04-26 DIAGNOSIS — J34.2 NASAL SEPTAL DEVIATION: ICD-10-CM

## 2023-04-26 DIAGNOSIS — J30.9 ALLERGIC RHINITIS, UNSPECIFIED SEASONALITY, UNSPECIFIED TRIGGER: ICD-10-CM

## 2023-04-26 DIAGNOSIS — H91.90 HEARING DISORDER, UNSPECIFIED LATERALITY: ICD-10-CM

## 2023-04-26 DIAGNOSIS — H69.91 EUSTACHIAN TUBE DYSFUNCTION, RIGHT: ICD-10-CM

## 2023-04-26 DIAGNOSIS — H91.90 HEARING DISORDER, UNSPECIFIED LATERALITY: Primary | ICD-10-CM

## 2023-04-26 DIAGNOSIS — H90.3 SENSORINEURAL HEARING LOSS, BILATERAL: ICD-10-CM

## 2023-04-26 PROCEDURE — 99499 UNLISTED E&M SERVICE: CPT | Mod: S$GLB,,, | Performed by: AUDIOLOGIST-HEARING AID FITTER

## 2023-04-26 PROCEDURE — 99214 OFFICE O/P EST MOD 30 MIN: CPT | Mod: S$GLB,,, | Performed by: OTOLARYNGOLOGY

## 2023-04-26 PROCEDURE — 92567 PR TYMPA2METRY: ICD-10-PCS | Mod: S$GLB,,, | Performed by: AUDIOLOGIST

## 2023-04-26 PROCEDURE — 92567 TYMPANOMETRY: CPT | Mod: S$GLB,,, | Performed by: AUDIOLOGIST

## 2023-04-26 PROCEDURE — 92557 PR COMPREHENSIVE HEARING TEST: ICD-10-PCS | Mod: S$GLB,,, | Performed by: AUDIOLOGIST

## 2023-04-26 PROCEDURE — 99499 NO LOS: ICD-10-PCS | Mod: S$GLB,,, | Performed by: AUDIOLOGIST-HEARING AID FITTER

## 2023-04-26 PROCEDURE — 99214 PR OFFICE/OUTPT VISIT, EST, LEVL IV, 30-39 MIN: ICD-10-PCS | Mod: S$GLB,,, | Performed by: OTOLARYNGOLOGY

## 2023-04-26 PROCEDURE — 92557 COMPREHENSIVE HEARING TEST: CPT | Mod: S$GLB,,, | Performed by: AUDIOLOGIST

## 2023-04-26 NOTE — PROGRESS NOTES
Keenan Hernandes, CCC-A  Audiologist - [facility] [address] [place] [address]  [email or web address]  [phone]    Patient: Mena Odonnell   MRN: [id number]  [address]  Home Phone [phone]   Work Phone [phone]   Mobile [phone]   : 1945  TRACY: 2023      HEARING AID FOLLOW-UP      Mena Odonnell is here today for an updated audiogram and hearing aid check before her warranty expires.  She has decided not to extend her warranty and will send her devices to [facility] today for repair and battery replacement.  Mena Odonnell verbalized understanding and satisfaction with today's visit.  She will call if service is needed before her next appointment to  her hearing aids from repair.  At that time she will receive her new  and pair her devices to her phone.    Taya Moss M50-RT  #[id number] (R)  #[id number] (L)  Warranty expires: 2023 (today)    _______________________________  Keenan Hernandes, GARTH-A  Audiologist

## 2023-04-26 NOTE — PROGRESS NOTES
Ms. Mena Odonnell was seen in the clinic today for an audiological evaluation.  Ms. Odonnell reported a long-standing history of bilateral hearing loss for which she utilizes binaural amplification.    Audiological testing revealed normal hearing sloping to a mild to moderate sensorineural hearing loss for the right ear and normal hearing sloping to a mild to moderate sensorineural hearing loss for the left ear.  A speech reception threshold was obtained at 30 dBHL for the right ear and at 15 dBHL for the left ear.  Speech discrimination was 100% for the right ear and 96% for the left ear.      Tympanometry testing revealed a Type A tympanogram for the right ear and a Type A tympanogram for the left ear.      Recommendations:  1. Otologic evaluation  2. Annual audiological evaluation  3. Hearing protection when in noise   4. Continue use of binaural amplification  5. Hearing aid follow-up with Dr. Lydia Márquez following today's audiological evaluation

## 2023-05-03 DIAGNOSIS — Z71.89 COMPLEX CARE COORDINATION: ICD-10-CM

## 2023-05-22 ENCOUNTER — OFFICE VISIT (OUTPATIENT)
Dept: CARDIOLOGY | Facility: CLINIC | Age: 78
End: 2023-05-22
Payer: COMMERCIAL

## 2023-05-22 VITALS
DIASTOLIC BLOOD PRESSURE: 52 MMHG | RESPIRATION RATE: 15 BRPM | WEIGHT: 220.25 LBS | OXYGEN SATURATION: 96 % | HEIGHT: 66 IN | SYSTOLIC BLOOD PRESSURE: 128 MMHG | BODY MASS INDEX: 35.4 KG/M2 | HEART RATE: 78 BPM

## 2023-05-22 DIAGNOSIS — E66.01 MORBID OBESITY WITH BODY MASS INDEX (BMI) OF 40.0 OR HIGHER: ICD-10-CM

## 2023-05-22 DIAGNOSIS — I70.0 AORTIC ATHEROSCLEROSIS: ICD-10-CM

## 2023-05-22 DIAGNOSIS — R06.02 SOB (SHORTNESS OF BREATH): ICD-10-CM

## 2023-05-22 DIAGNOSIS — G47.33 OSA (OBSTRUCTIVE SLEEP APNEA): ICD-10-CM

## 2023-05-22 DIAGNOSIS — E78.2 MIXED HYPERLIPIDEMIA: ICD-10-CM

## 2023-05-22 DIAGNOSIS — I35.0 NONRHEUMATIC AORTIC VALVE STENOSIS: ICD-10-CM

## 2023-05-22 DIAGNOSIS — I10 PRIMARY HYPERTENSION: Primary | ICD-10-CM

## 2023-05-22 DIAGNOSIS — E11.42 TYPE 2 DIABETES MELLITUS WITH DIABETIC POLYNEUROPATHY, WITHOUT LONG-TERM CURRENT USE OF INSULIN: ICD-10-CM

## 2023-05-22 DIAGNOSIS — E66.01 MORBID OBESITY: ICD-10-CM

## 2023-05-22 DIAGNOSIS — I35.0 AORTIC VALVE STENOSIS, ETIOLOGY OF CARDIAC VALVE DISEASE UNSPECIFIED: ICD-10-CM

## 2023-05-22 DIAGNOSIS — R06.09 DYSPNEA ON EXERTION: ICD-10-CM

## 2023-05-22 PROCEDURE — 99214 PR OFFICE/OUTPT VISIT, EST, LEVL IV, 30-39 MIN: ICD-10-PCS | Mod: S$GLB,,, | Performed by: INTERNAL MEDICINE

## 2023-05-22 PROCEDURE — 99999 PR PBB SHADOW E&M-EST. PATIENT-LVL V: CPT | Mod: PBBFAC,,, | Performed by: INTERNAL MEDICINE

## 2023-05-22 PROCEDURE — 99999 PR PBB SHADOW E&M-EST. PATIENT-LVL V: ICD-10-PCS | Mod: PBBFAC,,, | Performed by: INTERNAL MEDICINE

## 2023-05-22 PROCEDURE — 99214 OFFICE O/P EST MOD 30 MIN: CPT | Mod: S$GLB,,, | Performed by: INTERNAL MEDICINE

## 2023-05-22 PROCEDURE — 93005 ELECTROCARDIOGRAM TRACING: CPT | Mod: PBBFAC,PO | Performed by: INTERNAL MEDICINE

## 2023-05-22 PROCEDURE — 99215 OFFICE O/P EST HI 40 MIN: CPT | Mod: PBBFAC,PO | Performed by: INTERNAL MEDICINE

## 2023-05-22 PROCEDURE — 93000 EKG 12-LEAD: ICD-10-PCS | Mod: S$GLB,,, | Performed by: INTERNAL MEDICINE

## 2023-05-22 PROCEDURE — 93000 ELECTROCARDIOGRAM COMPLETE: CPT | Mod: S$GLB,,, | Performed by: INTERNAL MEDICINE

## 2023-05-22 NOTE — PROGRESS NOTES
CARDIOVASCULAR CONSULTATION    REASON FOR CONSULT:   Mena Odonnell is a 77 y.o. female who presents for aortic valve stenosis and dyspnea on exertion      HISTORY OF PRESENT ILLNESS:     Patient is a pleasant 74-year-old lady.  Multiple risk factors for coronary artery disease.  Recently had an echocardiogram done.  Showed normal left ventricle systolic function with mild aortic valve stenosis.  Has been sent to Cardiology Clinic further evaluation.  Denies any chest pains at rest on exertion, however does complain of dyspnea on exertion.  Does have elevated blood pressure in the clinic and states that home when she checks it stays around 150-160 mm systolic.  She is currently on losartan hydrochlorothiazide as well as Norvasc at home.  Denies orthopnea, PND, swelling of feet.      Hyperdynamic left ventricular systolic function. The estimated ejection fraction is 75%.  Concentric left ventricular hypertrophy.  Indeterminate left ventricular diastolic function.  Normal right ventricular systolic function.  Mild left atrial enlargement.  Mild aortic valve stenosis.  Aortic valve area is 1.55 cm2; peak velocity is 2.58 m/s; mean gradient is 14 mmHg.  The estimated PA systolic pressure is 13 mmHg.      Notes from September 2020:    Patient doing fine.  Denies any chest pains at rest on exertion.  Stress test did not show any significant blockage.    The study shows normal myocardial perfusion.    The perfusion scan is free of evidence from myocardial ischemia or injury.    There is a mild to moderate intensity defect in the anterior wall of the left ventricle, secondary to breast attenuation.    Gated perfusion images showed an ejection fraction of 78%    There is normal wall motion at rest and post stress.    The EKG portion of this study is negative for ischemia.    The patient reported no chest pain during the stress test.    There were no arrhythmias during stress.      Notes from November 2022: Patient  here for follow-up.  No new symptomatology.  No chest pains, orthopnea, PND.  Chronic dyspnea on exertion.      Notes from May 23:  Patient here for follow-up.  Denies any chest pains at rest on exertion, orthopnea, PND.  Does complain of worsening dyspnea on exertion and shortness breath.  Also occasional pain in the left arm no correlation with activity.    PAST MEDICAL HISTORY:     Past Medical History:   Diagnosis Date    Cataract     Diabetes mellitus type II     Diabetes with neurologic complications     DM retinopathy     H/O vitamin D deficiency     Hyperlipidemia     Hypertension     Obesity     Osteoarthritis     Peripheral neuropathy     Polyneuropathy     Severe obesity (BMI 35.0-39.9) with comorbidity 12/30/2016    Sleep apnea     Tendonitis     left foot       PAST SURGICAL HISTORY:     Past Surgical History:   Procedure Laterality Date    BLADDER SUSPENSION      CATARACT EXTRACTION      COLONOSCOPY N/A 9/26/2015    Procedure: COLONOSCOPY;  Surgeon: Javed Wood MD;  Location: [address]);  Service: Endoscopy;  Laterality: N/A;    HYSTERECTOMY  1978    fibroids    NECK MASS EXCISION  11/6/2019    Procedure: EXCISION, MASS, NECK;  Surgeon: Edwin Barrow MD;  Location: [facility];  Service: General;;  RN PREOP 10/30/2019    TONSILLECTOMY         ALLERGIES AND MEDICATION:     Review of patient's allergies indicates:   Allergen Reactions    Atorvastatin      Muscle pain     Crestor [rosuvastatin] Other (See Comments)     Leg Weakness.         Medication List            Accurate as of May 22, 2023 10:21 AM. If you have any questions, ask your nurse or doctor.                CONTINUE taking these medications      amLODIPine 10 MG tablet  Commonly known as: NORVASC  Take 1 tablet (10 mg total) by mouth once daily.     aspirin 81 MG Chew  Take 1 tablet (81 mg total) by mouth once daily.     cetirizine 10 MG tablet  Commonly known as: ZYRTEC  Take 1 tablet (10 mg total) by mouth once daily. For  30 days     CONTOUR NEXT TEST STRIPS Strp  Generic drug: blood sugar diagnostic  Check glucose before breakfast and dinner.     DEXCOM G6 SENSOR Sujey  Generic drug: blood-glucose sensor  Change sensor every 10 days     DEXCOM G6 TRANSMITTER Sujey  Generic drug: blood-glucose transmitter  Change every 3 months     diabetic supplies, miscellan. Kit  Please dispense one 14 day FreeStyle Giulia Kathleen     diclofenac sodium 1 % Gel  Commonly known as: VOLTAREN  Lower extremities: Apply the gel (4 g) to the affected area 4 times daily. Do not apply more than 16 g daily to any one affected joint of the lower extremities. Upper extremities: Apply the gel (2 g) to the affected area 4 times daily. Do not apply more than 8 g daily to any one affected joint of the upper extremities. Total dose should not exceed 32 g per day, overall affected joints.     empagliflozin 10 mg tablet  Commonly known as: JARDIANCE  Take 1 tablet (10 mg total) by mouth once daily.     FREESTYLE GIULIA 14 DAY SENSOR Kit  Generic drug: flash glucose sensor  1 sensor kit every 14 days (requires 2 kits per month)     gabapentin 300 MG capsule  Commonly known as: NEURONTIN  Take 1 capsule (300 mg total) by mouth 3 (three) times daily.     insulin lispro 100 unit/mL pen  Commonly known as: HumaLOG KwikPen Insulin  Use with sliding scale - 150-200=+2, 201-250=+4; 251-300=+6; 301-350=+8, over 350=+10 units     losartan-hydrochlorothiazide 100-25 mg 100-25 mg per tablet  Commonly known as: HYZAAR  Take 1 tablet by mouth once daily.     meloxicam 7.5 MG tablet  Commonly known as: MOBIC  Take 1 tablet (7.5 mg total) by mouth once daily.     metFORMIN 500 MG ER 24hr tablet  Commonly known as: GLUCOPHAGE-XR  Take 1 tablet (500 mg total) by mouth 2 (two) times daily with meals.     metoprolol succinate 50 MG 24 hr tablet  Commonly known as: TOPROL-XL  Take 1 tablet (50 mg total) by mouth once daily.     montelukast 10 mg tablet  Commonly known as: SINGULAIR  Take 1  tablet (10 mg total) by mouth once daily.     MOUNJARO 10 mg/0.5 mL Pnij  Generic drug: tirzepatide  Inject 10 mg into the skin every 7 days.     pravastatin 80 MG tablet  Commonly known as: PRAVACHOL  Take 1 tablet (80 mg total) by mouth once daily.     TOUJEO MAX U-300 SOLOSTAR 300 unit/mL (3 mL) insulin pen  Generic drug: insulin glargine U-300 conc  INJECT 50 UNITS SUBCUTANEOUSLY ONCE DAILY              SOCIAL HISTORY:     Social History     Socioeconomic History    Marital status:     Number of children: 2   Occupational History     Employer: [employer]   Tobacco Use    Smoking status: Former     Packs/day: 0.25     Years: 15.00     Pack years: 3.75     Types: Cigarettes     Quit date:      Years since quittin.4    Smokeless tobacco: Never   Substance and Sexual Activity    Alcohol use: No    Drug use: No    Sexual activity: Not Currently     Partners: Male     Birth control/protection: Abstinence   Social History Narrative    . One daughter. Works as an Apartment      Social Determinants of Health     Financial Resource Strain: Low Risk     Difficulty of Paying Living Expenses: Not hard at all   Food Insecurity: No Food Insecurity    Worried About Running Out of Food in the Last Year: Never true    Ran Out of Food in the Last Year: Never true   Transportation Needs: No Transportation Needs    Lack of Transportation (Medical): No    Lack of Transportation (Non-Medical): No   Physical Activity: Inactive    Days of Exercise per Week: 0 days    Minutes of Exercise per Session: 0 min   Social Connections: Socially Isolated    Frequency of Communication with Friends and Family: More than three times a week    Frequency of Social Gatherings with Friends and Family: Never    Attends Restoration Services: Never    Active Member of Clubs or Organizations: No    Attends Club or Organization Meetings: Never    Marital Status:    Housing Stability: Low Risk     Unable  "to Pay for Housing in the Last Year: No    Number of Places Lived in the Last Year: 1    Unstable Housing in the Last Year: No       FAMILY HISTORY:     Family History   Problem Relation Age of Onset    Thyroid disease Mother     Cataracts Mother     Diabetes Mother     Stroke Mother     Hypertension Mother     Thyroid disease Sister     Diabetes Sister     Hypertension Sister     Hypertension Daughter     Diabetes Son     Diabetes Sister     Hypertension Sister     Hypertension Brother     Hypertension Sister     Hypertension Sister     Hypertension Brother     Thyroid disease Maternal Aunt     Thyroid disease Maternal Aunt     Colon cancer Maternal Grandfather     Breast cancer Neg Hx     Ovarian cancer Neg Hx     Amblyopia Neg Hx     Blindness Neg Hx     Cancer Neg Hx     Glaucoma Neg Hx     Macular degeneration Neg Hx     Retinal detachment Neg Hx     Strabismus Neg Hx        REVIEW OF SYSTEMS:   Review of Systems   Constitutional: Negative.   HENT: Negative.     Eyes: Negative.    Cardiovascular:  Positive for dyspnea on exertion.   Respiratory:  Positive for shortness of breath.    Endocrine: Negative.    Hematologic/Lymphatic: Negative.    Skin: Negative.    Musculoskeletal: Negative.    Gastrointestinal: Negative.    Genitourinary: Negative.    Neurological: Negative.    Psychiatric/Behavioral: Negative.     Allergic/Immunologic: Negative.      A 10 point review of systems was performed and all the pertinent positives have been mentioned. Rest of review of systems was negative.        PHYSICAL EXAM:     Vitals:    05/22/23 0947   BP: (!) 128/52   Pulse: 78   Resp: 15    Body mass index is 35.55 kg/m².  Weight: 99.9 kg (220 lb 3.8 oz)   Height: 5' 6" (167.6 cm)     Physical Exam  Constitutional:       Appearance: Normal appearance. She is well-developed.   HENT:      Head: Normocephalic.   Eyes:      Pupils: Pupils are equal, round, and reactive to light.   Cardiovascular:      Rate and Rhythm: Normal rate " and regular rhythm.      Heart sounds: Murmur heard.   Pulmonary:      Effort: Pulmonary effort is normal.      Breath sounds: Normal breath sounds.   Abdominal:      General: Bowel sounds are normal.      Palpations: Abdomen is soft.      Tenderness: There is no abdominal tenderness.   Musculoskeletal:         General: Normal range of motion.      Cervical back: Normal range of motion and neck supple.   Skin:     General: Skin is warm.   Neurological:      Mental Status: She is alert and oriented to person, place, and time.         DATA:     Laboratory:  CBC:          CHEMISTRIES:  Recent Labs   Lab 11/21/20  0845 02/09/21  1316 02/26/22  0818 02/18/23  0837   Glucose 122 H 118 H 113 H  --    Sodium 144 143 143  --    Potassium 3.8 4.1 3.4 L  --    BUN 19 18 14  --    Creatinine 0.7 0.7 0.6 0.7   eGFR if  >60.0 >60 >60.0  --    eGFR if non African American >60.0 >60 >60.0  --    Calcium 9.3 9.1 9.2  --        CARDIAC BIOMARKERS:        COAGS:        LIPIDS/LFTS:  Recent Labs   Lab 06/24/20  0715 11/04/20  1519 06/05/21  0828 02/26/22  0818 02/18/23  0837   Cholesterol 203 H  --  155 167 195   Triglycerides 136  --  120 101 146   HDL 42  --  44 40 49   LDL Cholesterol 133.8  --  87.0 106.8 116.8   Non-HDL Cholesterol 161  --  111 127 146   AST 15 12  --  13  --    ALT 19 16  --  15  --        Hemoglobin A1C   Date Value Ref Range Status   02/18/2023 6.9 (H) 4.0 - 5.6 % Final     Comment:     ADA Screening Guidelines:  5.7-6.4%  Consistent with prediabetes  >or=6.5%  Consistent with diabetes    High levels of fetal hemoglobin interfere with the HbA1C  assay. Heterozygous hemoglobin variants (HbS, HgC, etc)do  not significantly interfere with this assay.   However, presence of multiple variants may affect accuracy.     11/19/2022 6.7 (H) 4.0 - 5.6 % Final     Comment:     ADA Screening Guidelines:  5.7-6.4%  Consistent with prediabetes  >or=6.5%  Consistent with diabetes    High levels of fetal  hemoglobin interfere with the HbA1C  assay. Heterozygous hemoglobin variants (HbS, HgC, etc)do  not significantly interfere with this assay.   However, presence of multiple variants may affect accuracy.     07/16/2022 7.1 (H) 4.0 - 5.6 % Final     Comment:     ADA Screening Guidelines:  5.7-6.4%  Consistent with prediabetes  >or=6.5%  Consistent with diabetes    High levels of fetal hemoglobin interfere with the HbA1C  assay. Heterozygous hemoglobin variants (HbS, HgC, etc)do  not significantly interfere with this assay.   However, presence of multiple variants may affect accuracy.         TSH  Recent Labs   Lab 11/04/20  1519 02/09/21  1316 02/26/22  0818   TSH 0.208 L 0.771 0.699       The 10-year ASCVD risk score (Best CORNELIUS, et al., 2019) is: 32.2%    Values used to calculate the score:      Age: 77 years      Sex: Female      Is Non- : Yes      Diabetic: Yes      Tobacco smoker: No      Systolic Blood Pressure: 128 mmHg      Is BP treated: Yes      HDL Cholesterol: 49 mg/dL      Total Cholesterol: 195 mg/dL             ASSESSMENT AND PLAN     Patient Active Problem List   Diagnosis    Type 2 diabetes mellitus with diabetic polyneuropathy    Hyperlipidemia    HTN (hypertension)    Knee pain    H/O vitamin D deficiency    Non morbid obesity due to excess calories    Bilateral carotid bruits    Osteoarthritis of right knee    Morbid obesity with body mass index (BMI) of 40.0 or higher    Dyspnea on exertion    Chronic cough    MEKA (obstructive sleep apnea)    Sensorineural hearing loss (SNHL) of right ear with restricted hearing of left ear    Tinnitus of both ears    Allergic rhinitis    Dysfunction of both eustachian tubes    Nasal septal deviation    Chronic eczematous otitis externa of both ears    Neck abscess    Epidermal inclusion cyst    Polyneuropathy associated with underlying disease    Pain and swelling of left knee    Pain and swelling of right knee    Weakness    Leg swelling     Aortic atherosclerosis    Decreased range of motion of lumbar spine    Abdominal weakness    Morbid obesity    Nonrheumatic aortic valve stenosis       Patient with multiple risk factors for coronary artery disease.  Complaining of dyspnea on exertion.  Recheck echocardiogram and stress test.        Mod AS:  Recheck 2D echocardiogram.    Diabetes    Hypertension:  Well controlled on current therapy.      Follow-up after testing          Thank you very much for involving me in the care of your patient.  Please do not hesitate to contact me if there are any questions.      Jenn Arreola MD, FACC, [facility]  Interventional Cardiologist, [facility].     Follow-up in 3 months      This note was dictated with the help of speech recognition software.  There might be un-intended errors and/or substitutions.

## 2023-05-26 RX ORDER — TIRZEPATIDE 12.5 MG/.5ML
12.5 INJECTION, SOLUTION SUBCUTANEOUS
Qty: 4 PEN | Refills: 1 | Status: SHIPPED | OUTPATIENT
Start: 2023-05-26 | End: 2023-06-19 | Stop reason: SDUPTHER

## 2023-05-29 ENCOUNTER — HOSPITAL ENCOUNTER (OUTPATIENT)
Dept: RADIOLOGY | Facility: CLINIC | Age: 78
Discharge: HOME | End: 2023-05-29
Attending: INTERNAL MEDICINE
Payer: COMMERCIAL

## 2023-05-29 DIAGNOSIS — Z00.00 HEALTHCARE MAINTENANCE: ICD-10-CM

## 2023-05-29 PROCEDURE — 77080 DXA BONE DENSITY AXIAL: CPT | Mod: TC,PO

## 2023-05-29 PROCEDURE — 77080 DXA BONE DENSITY AXIAL: CPT | Mod: 26,,, | Performed by: INTERNAL MEDICINE

## 2023-05-29 PROCEDURE — 77080 DXA BONE DENSITY AXIAL SKELETON 1 OR MORE SITES: ICD-10-PCS | Mod: 26,,, | Performed by: INTERNAL MEDICINE

## 2023-05-30 NOTE — PROGRESS NOTES
DATE: 8/9/2022  PATIENT: Mena Odonnell    Supervising Physician: Trevin Sellers M.D.    CHIEF COMPLAINT: low back pain    HISTORY:  Mena Odonnell is a 76 y.o. female with pmhx of DM2 (A1c:7.1) and HTN here for initial evaluation of low back and left leg pain (Back - 10, Leg - 10).  The pain in the left low back is what bothers her most.  The pain has been present for 6 days. Denies specific injury. She is miserable. The patient describes the pain as constant dull pain with intermittent electric shooting pain down her left let into her foot.  The pain is worse with standing and improved by sitting. There is associated numbness and tingling. There is  subjective weakness. she presents today in a wheelchair. Prior treatments have included tylenol, gabapentin and robaxin, but no PT, AMANUEL or surgery.   The patient denies myelopathic symptoms such as handwriting changes or difficulty with buttons/coins/keys. Denies perineal paresthesias, bowel/bladder dysfunction.    PAST MEDICAL/SURGICAL HISTORY:  Past Medical History:   Diagnosis Date    Cataract     Diabetes mellitus type II     Diabetes with neurologic complications     DM retinopathy     Hyperlipidemia     Hypertension     Obesity     Osteoarthritis     Peripheral neuropathy     Polyneuropathy     Severe obesity (BMI 35.0-39.9) with comorbidity 12/30/2016    Sleep apnea     Tendonitis     left foot     Past Surgical History:   Procedure Laterality Date    BLADDER SUSPENSION      CATARACT EXTRACTION      COLONOSCOPY N/A 9/26/2015    Procedure: COLONOSCOPY;  Surgeon: Javed Wood MD;  Location: [address];  Service: Endoscopy;  Laterality: N/A;    HYSTERECTOMY  1978    fibroids    NECK MASS EXCISION  11/6/2019    Procedure: EXCISION, MASS, NECK;  Surgeon: Edwin Barrow MD;  Location: [facility];  Service: General;;  RN PREOP 10/30/2019    TONSILLECTOMY         Medications:   Current Outpatient Medications on File Prior to  Visit   Medication Sig Dispense Refill    amLODIPine (NORVASC) 10 MG tablet Take 1 tablet (10 mg total) by mouth once daily. 90 tablet 2    aspirin 81 MG Chew Take 1 tablet (81 mg total) by mouth once daily. 30 tablet 0    blood-glucose sensor (DEXCOM G6 SENSOR) Sujey Change sensor every 10 days 3 each 11    blood-glucose transmitter (DEXCOM G6 TRANSMITTER) Sujey Change every 3 months 1 each 3    cetirizine (ZYRTEC) 10 MG tablet TAKE 1 TABLET BY MOUTH ONCE DAILY FOR 30 DAYS  3    CONTOUR NEXT TEST STRIPS Strp Check glucose before breakfast and dinner. 100 each 5    COVID-19 VACC,MRNA,PFIZER,,PF, IM COVID-19 vacc,mRNA(Pfizer)(PF)      diabetic supplies, miscellan. Kit Please dispense one 14 day FreeStyle Giulia Merced 1 kit 0    diclofenac sodium (VOLTAREN) 1 % Gel Lower extremities: Apply the gel (4 g) to the affected area 4 times daily. Do not apply more than 16 g daily to any one affected joint of the lower extremities. Upper extremities: Apply the gel (2 g) to the affected area 4 times daily. Do not apply more than 8 g daily to any one affected joint of the upper extremities. Total dose should not exceed 32 g per day, overall affected joints. 100 g 5    empagliflozin (JARDIANCE) 10 mg tablet Take 1 tablet (10 mg total) by mouth once daily. 90 tablet 2    gabapentin (NEURONTIN) 300 MG capsule Take 1 capsule (300 mg total) by mouth 3 (three) times daily. 90 capsule 11    HYDROcodone-acetaminophen (NORCO) 5-325 mg per tablet Take 1 tablet by mouth every 6 (six) hours as needed for Pain. 12 tablet 0    insulin glargine U-300 conc (TOUJEO MAX U-300 SOLOSTAR) 300 unit/mL (3 mL) insulin pen INJECT 50 UNITS SUBCUTANEOUSLY ONCE DAILY 2 pen 5    losartan-hydrochlorothiazide 100-25 mg (HYZAAR) 100-25 mg per tablet Take 1 tablet by mouth once daily. 90 tablet 3    meloxicam (MOBIC) 7.5 MG tablet Take 1 tablet (7.5 mg total) by mouth once daily. 12 tablet 0    metFORMIN (GLUCOPHAGE-XR) 500 MG ER 24hr tablet Take  1 tablet (500 mg total) by mouth 2 (two) times daily with meals. 180 tablet 2    montelukast (SINGULAIR) 10 mg tablet Take 1 tablet (10 mg total) by mouth once daily. 90 tablet 1    OZEMPIC 1 mg/dose (4 mg/3 mL) INJECT 1 MG UNDER THE SKIN ONCE A WEEK 1 pen 11    pravastatin (PRAVACHOL) 80 MG tablet Take 1 tablet (80 mg total) by mouth once daily. 90 tablet 3    [DISCONTINUED] methocarbamoL (ROBAXIN) 500 MG Tab Take 1 tablet (500 mg total) by mouth every evening. for 10 days 10 tablet 0    flash glucose sensor (FREESTYLE ROSAURA 14 DAY SENSOR) Kit 1 sensor kit every 14 days (requires 2 kits per month) (Patient not taking: No sig reported) 2 kit 6    fluocinolone acetonide oil 0.01 % Drop Place 4 drops in ear(s) 2 (two) times daily. (Patient not taking: No sig reported) 20 mL 5    metoprolol succinate (TOPROL-XL) 50 MG 24 hr tablet Take 1 tablet (50 mg total) by mouth once daily. 30 tablet 5     Current Facility-Administered Medications on File Prior to Visit   Medication Dose Route Frequency Provider Last Rate Last Admin    sodium hyaluronate (EUFLEXXA) 10 mg/mL(mw 2.4 -3.6 million) injection 20 mg  20 mg Intra-articular 1 time in Clinic/HOD Maile Cuello MD        sodium hyaluronate (EUFLEXXA) 10 mg/mL(mw 2.4 -3.6 million) injection 20 mg  20 mg Intra-articular 1 time in Clinic/HOD Sumanth Erickson MD           Social History:   Social History     Socioeconomic History    Marital status:     Number of children: 2   Occupational History     Employer: [employer]   Tobacco Use    Smoking status: Former Smoker     Packs/day: 0.25     Years: 15.00     Pack years: 3.75     Quit date:      Years since quittin.6    Smokeless tobacco: Never Used   Substance and Sexual Activity    Alcohol use: No    Drug use: No    Sexual activity: Not Currently     Partners: Male     Birth control/protection: Abstinence   Social History Narrative    . One daughter. Works as an Apartment  "       REVIEW OF SYSTEMS:  Constitution: Negative. Negative for chills, fever and night sweats.   Cardiovascular: Negative for chest pain and syncope.   Respiratory: Negative for cough and shortness of breath.   Gastrointestinal: See HPI. Negative for nausea/vomiting. Negative for abdominal pain.  Genitourinary: See HPI. Negative for discoloration or dysuria.  Skin: Negative for dry skin, itching and rash.   Hematologic/Lymphatic: Negative for bleeding problem. Does not bruise/bleed easily.   Musculoskeletal: Negative for falls and muscle weakness.   Neurological: See HPI. No seizures.   Endocrine: Negative for polydipsia, polyphagia and polyuria.   Allergic/Immunologic: Negative for hives and persistent infections.     EXAM:  Ht 5' 6" (1.676 m)   Wt 99.5 kg (219 lb 5.7 oz)   BMI 35.41 kg/m²     General: The patient is a very pleasant 76 y.o. female in no apparent distress, the patient is oriented to person, place and time.  Psych: Normal mood and affect  HEENT: Vision grossly intact, hearing intact to the spoken word.  Lungs: Respirations unlabored.  Gait: Normal station and gait, no difficulty with toe or heel walk.   Skin: Dorsal lumbar skin negative for rashes, lesions, hairy patches and surgical scars. There is mild lumbar tenderness to palpation.  Range of motion: Lumbar range of motion is acceptable.  Spinal Balance: Global saggital and coronal spinal balance acceptable, not significant for scoliosis and kyphosis.  Musculoskeletal: No pain with the range of motion of the bilateral hips. No trochanteric tenderness to palpation.  Vascular: Bilateral lower extremities warm and well perfused, dorsalis pedis pulses 2+ bilaterally.  Neurological: decreased strength and tone in all major motor groups in the bilateral lower extremities. Decreased sensation to light touch in the L2-S1 dermatomes bilaterally.  Deep tendon reflexes symmetric 2+ in the bilateral lower extremities.  Negative Babinski " bilaterally. Straight leg raise positive bilaterally.    IMAGING:      Today I personally reviewed AP, Lat and Flex/Ex  upright L-spine films that demonstrate moderate DDD with dextroscoliosis.      Body mass index is 35.41 kg/m².    Hemoglobin A1C   Date Value Ref Range Status   07/16/2022 7.1 (H) 4.0 - 5.6 % Final     Comment:     ADA Screening Guidelines:  5.7-6.4%  Consistent with prediabetes  >or=6.5%  Consistent with diabetes    High levels of fetal hemoglobin interfere with the HbA1C  assay. Heterozygous hemoglobin variants (HbS, HgC, etc)do  not significantly interfere with this assay.   However, presence of multiple variants may affect accuracy.     03/12/2022 6.7 (H) 4.0 - 5.6 % Final     Comment:     ADA Screening Guidelines:  5.7-6.4%  Consistent with prediabetes  >or=6.5%  Consistent with diabetes    High levels of fetal hemoglobin interfere with the HbA1C  assay. Heterozygous hemoglobin variants (HbS, HgC, etc)do  not significantly interfere with this assay.   However, presence of multiple variants may affect accuracy.     11/29/2021 6.8 (H) 4.0 - 5.6 % Final     Comment:     ADA Screening Guidelines:  5.7-6.4%  Consistent with prediabetes  >or=6.5%  Consistent with diabetes    High levels of fetal hemoglobin interfere with the HbA1C  assay. Heterozygous hemoglobin variants (HbS, HgC, etc)do  not significantly interfere with this assay.   However, presence of multiple variants may affect accuracy.             ASSESSMENT/PLAN:    Mena was seen today for establish care and pain.    Diagnoses and all orders for this visit:    DDD (degenerative disc disease), lumbar  -     MRI Lumbar Spine Without Contrast; Future    Acute hip pain, left  -     Ambulatory referral/consult to [facility]    Acute left-sided low back pain with left-sided sciatica  -     Ambulatory referral/consult to Physical/Occupational Therapy; Future  -     methylPREDNISolone (MEDROL DOSEPACK) 4 mg tablet; use as directed  -      methocarbamoL (ROBAXIN) 500 MG Tab; Take 1 tablet (500 mg total) by mouth 3 (three) times daily.    Lumbar radiculopathy  -     Ambulatory referral/consult to Physical/Occupational Therapy; Future  -     methylPREDNISolone (MEDROL DOSEPACK) 4 mg tablet; use as directed  -     MRI Lumbar Spine Without Contrast; Future        Today we discussed at length all of the different treatment options including anti-inflammatories, acetaminophen, rest, ice, heat, physical therapy including strengthening and stretching exercises, home exercises, ROM, aerobic conditioning, aqua therapy, other modalities including ultrasound, massage, and dry needling, epidural steroid injections and finally surgical intervention.  medrol dose pack and robaxin sent to pharmacy. PT orders placed. Lumbar MRI ordered, I will call with results.      Aklief counseling:  Patient advised to apply a pea-sized amount only at bedtime and wait 30 minutes after washing their face before applying.  If too drying, patient may add a non-comedogenic moisturizer.  The most commonly reported side effects including irritation, redness, scaling, dryness, stinging, burning, itching, and increased risk of sunburn.  The patient verbalized understanding of the proper use and possible adverse effects of retinoids.  All of the patient's questions and concerns were addressed.

## 2023-06-01 ENCOUNTER — HOSPITAL ENCOUNTER (OUTPATIENT)
Dept: RADIOLOGY | Facility: HOSPITAL | Age: 78
Discharge: HOME | End: 2023-06-01
Attending: INTERNAL MEDICINE
Payer: COMMERCIAL

## 2023-06-01 ENCOUNTER — HOSPITAL ENCOUNTER (OUTPATIENT)
Dept: CARDIOLOGY | Facility: HOSPITAL | Age: 78
Discharge: HOME | End: 2023-06-01
Attending: INTERNAL MEDICINE
Payer: COMMERCIAL

## 2023-06-01 DIAGNOSIS — I35.0 AORTIC VALVE STENOSIS, ETIOLOGY OF CARDIAC VALVE DISEASE UNSPECIFIED: ICD-10-CM

## 2023-06-01 DIAGNOSIS — R06.02 SOB (SHORTNESS OF BREATH): ICD-10-CM

## 2023-06-01 DIAGNOSIS — I10 PRIMARY HYPERTENSION: ICD-10-CM

## 2023-06-01 LAB
ASCENDING AORTA: 2.72 CM
AV INDEX (PROSTH): 0.46
AV MEAN GRADIENT: 32 MMHG
AV PEAK GRADIENT: 49 MMHG
AV VALVE AREA: 1.55 CM2
AV VELOCITY RATIO: 0.41
CV ECHO LV RWT: 0.65 CM
CV STRESS BASE HR: 66 BPM
DIASTOLIC BLOOD PRESSURE: 59 MMHG
DOP CALC AO PEAK VEL: 3.51 M/S
DOP CALC AO VTI: 77.4 CM
DOP CALC LVOT AREA: 3.4 CM2
DOP CALC LVOT DIAMETER: 2.08 CM
DOP CALC LVOT PEAK VEL: 1.43 M/S
DOP CALC LVOT STROKE VOLUME: 120.23 CM3
DOP CALC MV VTI: 56 CM
DOP CALCLVOT PEAK VEL VTI: 35.4 CM
E WAVE DECELERATION TIME: 327.85 MSEC
E/A RATIO: 0.72
E/E' RATIO: 29.56 M/S
ECHO LV POSTERIOR WALL: 1.32 CM (ref 0.6–1.1)
EJECTION FRACTION: 65 %
FRACTIONAL SHORTENING: 44 % (ref 28–44)
INTERVENTRICULAR SEPTUM: 1.27 CM (ref 0.6–1.1)
IVC DIAMETER: 1.93 CM
IVRT: 93.24 MSEC
LA MAJOR: 6.11 CM
LA MINOR: 5.74 CM
LA WIDTH: 3.9 CM
LEFT ATRIUM SIZE: 4.56 CM
LEFT ATRIUM VOLUME: 89.48 CM3
LEFT INTERNAL DIMENSION IN SYSTOLE: 2.28 CM (ref 2.1–4)
LEFT VENTRICLE DIASTOLIC VOLUME: 73.41 ML
LEFT VENTRICLE SYSTOLIC VOLUME: 17.66 ML
LEFT VENTRICULAR INTERNAL DIMENSION IN DIASTOLE: 4.08 CM (ref 3.5–6)
LEFT VENTRICULAR MASS: 190.98 G
LV LATERAL E/E' RATIO: 26.6 M/S
LV SEPTAL E/E' RATIO: 33.25 M/S
LVOT MG: 5.03 MMHG
LVOT MV: 1.05 CM/S
MV MEAN GRADIENT: 5 MMHG
MV PEAK A VEL: 1.85 M/S
MV PEAK E VEL: 1.33 M/S
MV PEAK GRADIENT: 16 MMHG
MV STENOSIS PRESSURE HALF TIME: 95.08 MS
MV VALVE AREA BY CONTINUITY EQUATION: 2.15 CM2
MV VALVE AREA P 1/2 METHOD: 2.31 CM2
NUC REST EJECTION FRACTION: 67
OHS CV CPX 85 PERCENT MAX PREDICTED HEART RATE MALE: 118
OHS CV CPX MAX PREDICTED HEART RATE: 139
OHS CV CPX PATIENT IS FEMALE: 1
OHS CV CPX PATIENT IS MALE: 0
OHS CV CPX PEAK DIASTOLIC BLOOD PRESSURE: 55 MMHG
OHS CV CPX PEAK HEAR RATE: 96 BPM
OHS CV CPX PEAK RATE PRESSURE PRODUCT: NORMAL
OHS CV CPX PEAK SYSTOLIC BLOOD PRESSURE: 137 MMHG
OHS CV CPX PERCENT MAX PREDICTED HEART RATE ACHIEVED: 69
OHS CV CPX RATE PRESSURE PRODUCT PRESENTING: 9702
PISA TR MAX VEL: 2.47 M/S
PV PEAK VELOCITY: 0.99 CM/S
RA MAJOR: 4.62 CM
RA PRESSURE: 3 MMHG
RIGHT VENTRICULAR END-DIASTOLIC DIMENSION: 3.62 CM
RV TISSUE DOPPLER FREE WALL SYSTOLIC VELOCITY 1 (APICAL 4 CHAMBER VIEW): 0.02 CM/S
SINUS: 2.9 CM
STJ: 2.03 CM
SYSTOLIC BLOOD PRESSURE: 147 MMHG
TDI LATERAL: 0.05 M/S
TDI SEPTAL: 0.04 M/S
TDI: 0.05 M/S
TR MAX PG: 24 MMHG
TRICUSPID ANNULAR PLANE SYSTOLIC EXCURSION: 2.29 CM
TV PEAK GRADIENT: 2.06 MMHG
TV REST PULMONARY ARTERY PRESSURE: 27 MMHG

## 2023-06-01 PROCEDURE — 93016 NUCLEAR STRESS - CARDIOLOGY INTERPRETED (CUPID ONLY): ICD-10-PCS | Mod: ,,, | Performed by: INTERNAL MEDICINE

## 2023-06-01 PROCEDURE — 93018 CV STRESS TEST I&R ONLY: CPT | Mod: ,,, | Performed by: INTERNAL MEDICINE

## 2023-06-01 PROCEDURE — 78452 NUCLEAR STRESS - CARDIOLOGY INTERPRETED (CUPID ONLY): ICD-10-PCS | Mod: 26,,, | Performed by: INTERNAL MEDICINE

## 2023-06-01 PROCEDURE — 63600175 PHARM REV CODE 636 W HCPCS: Performed by: INTERNAL MEDICINE

## 2023-06-01 PROCEDURE — A9502 TC99M TETROFOSMIN: HCPCS

## 2023-06-01 PROCEDURE — 78452 HT MUSCLE IMAGE SPECT MULT: CPT | Mod: 26,,, | Performed by: INTERNAL MEDICINE

## 2023-06-01 PROCEDURE — 93306 TTE W/DOPPLER COMPLETE: CPT | Mod: 26,,, | Performed by: INTERNAL MEDICINE

## 2023-06-01 PROCEDURE — 93306 ECHO (CUPID ONLY): ICD-10-PCS | Mod: 26,,, | Performed by: INTERNAL MEDICINE

## 2023-06-01 PROCEDURE — 78452 HT MUSCLE IMAGE SPECT MULT: CPT

## 2023-06-01 PROCEDURE — 93018 NUCLEAR STRESS - CARDIOLOGY INTERPRETED (CUPID ONLY): ICD-10-PCS | Mod: ,,, | Performed by: INTERNAL MEDICINE

## 2023-06-01 PROCEDURE — 93016 CV STRESS TEST SUPVJ ONLY: CPT | Mod: ,,, | Performed by: INTERNAL MEDICINE

## 2023-06-01 PROCEDURE — 93306 TTE W/DOPPLER COMPLETE: CPT

## 2023-06-01 PROCEDURE — 93017 CV STRESS TEST TRACING ONLY: CPT

## 2023-06-01 RX ORDER — REGADENOSON 0.08 MG/ML
0.4 INJECTION, SOLUTION INTRAVENOUS ONCE
Status: COMPLETED | OUTPATIENT
Start: 2023-06-01 | End: 2023-06-01

## 2023-06-01 RX ADMIN — REGADENOSON 0.4 MG: 0.08 INJECTION, SOLUTION INTRAVENOUS at 08:06

## 2023-06-09 ENCOUNTER — OFFICE VISIT (OUTPATIENT)
Dept: CARDIOLOGY | Facility: CLINIC | Age: 78
End: 2023-06-09
Payer: COMMERCIAL

## 2023-06-09 VITALS
RESPIRATION RATE: 15 BRPM | SYSTOLIC BLOOD PRESSURE: 152 MMHG | BODY MASS INDEX: 35.29 KG/M2 | DIASTOLIC BLOOD PRESSURE: 86 MMHG | WEIGHT: 219.56 LBS | OXYGEN SATURATION: 97 % | HEART RATE: 93 BPM | HEIGHT: 66 IN

## 2023-06-09 DIAGNOSIS — I70.0 AORTIC ATHEROSCLEROSIS: ICD-10-CM

## 2023-06-09 DIAGNOSIS — I35.0 NONRHEUMATIC AORTIC VALVE STENOSIS: ICD-10-CM

## 2023-06-09 DIAGNOSIS — E11.42 TYPE 2 DIABETES MELLITUS WITH DIABETIC POLYNEUROPATHY, WITHOUT LONG-TERM CURRENT USE OF INSULIN: Primary | ICD-10-CM

## 2023-06-09 DIAGNOSIS — I10 PRIMARY HYPERTENSION: ICD-10-CM

## 2023-06-09 DIAGNOSIS — G47.33 OSA (OBSTRUCTIVE SLEEP APNEA): ICD-10-CM

## 2023-06-09 DIAGNOSIS — R06.09 DOE (DYSPNEA ON EXERTION): ICD-10-CM

## 2023-06-09 DIAGNOSIS — E78.2 MIXED HYPERLIPIDEMIA: ICD-10-CM

## 2023-06-09 DIAGNOSIS — E66.01 MORBID OBESITY WITH BODY MASS INDEX (BMI) OF 40.0 OR HIGHER: ICD-10-CM

## 2023-06-09 PROCEDURE — 99999 PR PBB SHADOW E&M-EST. PATIENT-LVL V: CPT | Mod: PBBFAC,,, | Performed by: INTERNAL MEDICINE

## 2023-06-09 PROCEDURE — 99999 PR PBB SHADOW E&M-EST. PATIENT-LVL V: ICD-10-PCS | Mod: PBBFAC,,, | Performed by: INTERNAL MEDICINE

## 2023-06-09 PROCEDURE — 99214 PR OFFICE/OUTPT VISIT, EST, LEVL IV, 30-39 MIN: ICD-10-PCS | Mod: S$GLB,,, | Performed by: INTERNAL MEDICINE

## 2023-06-09 PROCEDURE — 99214 OFFICE O/P EST MOD 30 MIN: CPT | Mod: S$GLB,,, | Performed by: INTERNAL MEDICINE

## 2023-06-09 PROCEDURE — 99215 OFFICE O/P EST HI 40 MIN: CPT | Mod: PBBFAC,PO | Performed by: INTERNAL MEDICINE

## 2023-06-09 NOTE — PROGRESS NOTES
CARDIOVASCULAR CONSULTATION    REASON FOR CONSULT:   Mena Odonnell is a 77 y.o. female who presents for aortic valve stenosis and dyspnea on exertion      HISTORY OF PRESENT ILLNESS:     Patient is a pleasant 74-year-old lady.  Multiple risk factors for coronary artery disease.  Recently had an echocardiogram done.  Showed normal left ventricle systolic function with mild aortic valve stenosis.  Has been sent to Cardiology Clinic further evaluation.  Denies any chest pains at rest on exertion, however does complain of dyspnea on exertion.  Does have elevated blood pressure in the clinic and states that home when she checks it stays around 150-160 mm systolic.  She is currently on losartan hydrochlorothiazide as well as Norvasc at home.  Denies orthopnea, PND, swelling of feet.      Hyperdynamic left ventricular systolic function. The estimated ejection fraction is 75%.  Concentric left ventricular hypertrophy.  Indeterminate left ventricular diastolic function.  Normal right ventricular systolic function.  Mild left atrial enlargement.  Mild aortic valve stenosis.  Aortic valve area is 1.55 cm2; peak velocity is 2.58 m/s; mean gradient is 14 mmHg.  The estimated PA systolic pressure is 13 mmHg.      Notes from September 2020:    Patient doing fine.  Denies any chest pains at rest on exertion.  Stress test did not show any significant blockage.    The study shows normal myocardial perfusion.    The perfusion scan is free of evidence from myocardial ischemia or injury.    There is a mild to moderate intensity defect in the anterior wall of the left ventricle, secondary to breast attenuation.    Gated perfusion images showed an ejection fraction of 78%    There is normal wall motion at rest and post stress.    The EKG portion of this study is negative for ischemia.    The patient reported no chest pain during the stress test.    There were no arrhythmias during stress.      Notes from November 2022: Patient  here for follow-up.  No new symptomatology.  No chest pains, orthopnea, PND.  Chronic dyspnea on exertion.      Notes from May 23:  Patient here for follow-up.  Denies any chest pains at rest on exertion, orthopnea, PND.  Does complain of worsening dyspnea on exertion and shortness breath.  Also occasional pain in the left arm no correlation with activity.      Notes from June 23:  Patient here for follow-up.  Main complaint is dyspnea on exertion.  Echocardiogram showed normal left ventricle systolic function with moderate aortic stenosis.  Stress test did not show any significant ischemia.    PAST MEDICAL HISTORY:     Past Medical History:   Diagnosis Date    Cataract     Diabetes mellitus type II     Diabetes with neurologic complications     DM retinopathy     H/O vitamin D deficiency     Hyperlipidemia     Hypertension     Obesity     Osteoarthritis     Peripheral neuropathy     Polyneuropathy     Severe obesity (BMI 35.0-39.9) with comorbidity 12/30/2016    Sleep apnea     Tendonitis     left foot       PAST SURGICAL HISTORY:     Past Surgical History:   Procedure Laterality Date    BLADDER SUSPENSION      CATARACT EXTRACTION      COLONOSCOPY N/A 9/26/2015    Procedure: COLONOSCOPY;  Surgeon: Javed Wood MD;  Location: [address]);  Service: Endoscopy;  Laterality: N/A;    HYSTERECTOMY  1978    fibroids    NECK MASS EXCISION  11/6/2019    Procedure: EXCISION, MASS, NECK;  Surgeon: Edwin Barrow MD;  Location: [facility];  Service: General;;  RN PREOP 10/30/2019    TONSILLECTOMY         ALLERGIES AND MEDICATION:     Review of patient's allergies indicates:   Allergen Reactions    Atorvastatin      Muscle pain     Crestor [rosuvastatin] Other (See Comments)     Leg Weakness.         Medication List            Accurate as of June 9, 2023 11:33 AM. If you have any questions, ask your nurse or doctor.                CONTINUE taking these medications      amLODIPine 10 MG tablet  Commonly known as:  NORVASC  Take 1 tablet (10 mg total) by mouth once daily.     aspirin 81 MG Chew  Take 1 tablet (81 mg total) by mouth once daily.     cetirizine 10 MG tablet  Commonly known as: ZYRTEC  Take 1 tablet (10 mg total) by mouth once daily. For 30 days     CONTOUR NEXT TEST STRIPS Strp  Generic drug: blood sugar diagnostic  Check glucose before breakfast and dinner.     DEXCOM G6 SENSOR Sujey  Generic drug: blood-glucose sensor  Change sensor every 10 days     DEXCOM G6 TRANSMITTER Sujey  Generic drug: blood-glucose transmitter  Change every 3 months     diabetic supplies, miscellan. Kit  Please dispense one 14 day FreeStyle Giulia Warsaw     diclofenac sodium 1 % Gel  Commonly known as: VOLTAREN  Lower extremities: Apply the gel (4 g) to the affected area 4 times daily. Do not apply more than 16 g daily to any one affected joint of the lower extremities. Upper extremities: Apply the gel (2 g) to the affected area 4 times daily. Do not apply more than 8 g daily to any one affected joint of the upper extremities. Total dose should not exceed 32 g per day, overall affected joints.     empagliflozin 10 mg tablet  Commonly known as: JARDIANCE  Take 1 tablet (10 mg total) by mouth once daily.     FREESTYLE GIULIA 14 DAY SENSOR Kit  Generic drug: flash glucose sensor  1 sensor kit every 14 days (requires 2 kits per month)     gabapentin 300 MG capsule  Commonly known as: NEURONTIN  Take 1 capsule (300 mg total) by mouth 3 (three) times daily.     insulin lispro 100 unit/mL pen  Commonly known as: HumaLOG KwikPen Insulin  Use with sliding scale - 150-200=+2, 201-250=+4; 251-300=+6; 301-350=+8, over 350=+10 units     losartan-hydrochlorothiazide 100-25 mg 100-25 mg per tablet  Commonly known as: HYZAAR  Take 1 tablet by mouth once daily.     meloxicam 7.5 MG tablet  Commonly known as: MOBIC  Take 1 tablet (7.5 mg total) by mouth once daily.     metFORMIN 500 MG ER 24hr tablet  Commonly known as: GLUCOPHAGE-XR  Take 1 tablet (500 mg  total) by mouth 2 (two) times daily with meals.     metoprolol succinate 50 MG 24 hr tablet  Commonly known as: TOPROL-XL  Take 1 tablet (50 mg total) by mouth once daily.     montelukast 10 mg tablet  Commonly known as: SINGULAIR  Take 1 tablet (10 mg total) by mouth once daily.     MOUNJARO 12.5 mg/0.5 mL Pnij  Generic drug: tirzepatide  Inject 12.5 mg (one pen) into the skin every 7 days.     pravastatin 80 MG tablet  Commonly known as: PRAVACHOL  Take 1 tablet (80 mg total) by mouth once daily.     TOUJEO MAX U-300 SOLOSTAR 300 unit/mL (3 mL) insulin pen  Generic drug: insulin glargine U-300 conc  INJECT 50 UNITS SUBCUTANEOUSLY ONCE DAILY              SOCIAL HISTORY:     Social History     Socioeconomic History    Marital status:     Number of children: 2   Occupational History     Employer: [employer]   Tobacco Use    Smoking status: Former     Packs/day: 0.25     Years: 15.00     Pack years: 3.75     Types: Cigarettes     Quit date:      Years since quittin.4    Smokeless tobacco: Never   Substance and Sexual Activity    Alcohol use: No    Drug use: No    Sexual activity: Not Currently     Partners: Male     Birth control/protection: Abstinence   Social History Narrative    . One daughter. Works as an Apartment      Social Determinants of Health     Financial Resource Strain: Low Risk     Difficulty of Paying Living Expenses: Not hard at all   Food Insecurity: No Food Insecurity    Worried About Running Out of Food in the Last Year: Never true    Ran Out of Food in the Last Year: Never true   Transportation Needs: No Transportation Needs    Lack of Transportation (Medical): No    Lack of Transportation (Non-Medical): No   Physical Activity: Inactive    Days of Exercise per Week: 0 days    Minutes of Exercise per Session: 0 min   Social Connections: Socially Isolated    Frequency of Communication with Friends and Family: More than three times a week    Frequency  "of Social Gatherings with Friends and Family: Never    Attends Uatsdin Services: Never    Active Member of Clubs or Organizations: No    Attends Club or Organization Meetings: Never    Marital Status:    Housing Stability: Low Risk     Unable to Pay for Housing in the Last Year: No    Number of Places Lived in the Last Year: 1    Unstable Housing in the Last Year: No       FAMILY HISTORY:     Family History   Problem Relation Age of Onset    Thyroid disease Mother     Cataracts Mother     Diabetes Mother     Stroke Mother     Hypertension Mother     Thyroid disease Sister     Diabetes Sister     Hypertension Sister     Hypertension Daughter     Diabetes Son     Diabetes Sister     Hypertension Sister     Hypertension Brother     Hypertension Sister     Hypertension Sister     Hypertension Brother     Thyroid disease Maternal Aunt     Thyroid disease Maternal Aunt     Colon cancer Maternal Grandfather     Breast cancer Neg Hx     Ovarian cancer Neg Hx     Amblyopia Neg Hx     Blindness Neg Hx     Cancer Neg Hx     Glaucoma Neg Hx     Macular degeneration Neg Hx     Retinal detachment Neg Hx     Strabismus Neg Hx        REVIEW OF SYSTEMS:   Review of Systems   Constitutional: Negative.   HENT: Negative.     Eyes: Negative.    Cardiovascular:  Positive for dyspnea on exertion.   Respiratory:  Positive for shortness of breath.    Endocrine: Negative.    Hematologic/Lymphatic: Negative.    Skin: Negative.    Musculoskeletal: Negative.    Gastrointestinal: Negative.    Genitourinary: Negative.    Neurological: Negative.    Psychiatric/Behavioral: Negative.     Allergic/Immunologic: Negative.      A 10 point review of systems was performed and all the pertinent positives have been mentioned. Rest of review of systems was negative.        PHYSICAL EXAM:     Vitals:    06/09/23 1112   BP: (!) 152/86   Pulse: 93   Resp: 15    Body mass index is 35.44 kg/m².  Weight: 99.6 kg (219 lb 9.3 oz)   Height: 5' 6" (167.6 cm) "     Physical Exam  Constitutional:       Appearance: Normal appearance. She is well-developed.   HENT:      Head: Normocephalic.   Eyes:      Pupils: Pupils are equal, round, and reactive to light.   Cardiovascular:      Rate and Rhythm: Normal rate and regular rhythm.      Heart sounds: Murmur heard.   Pulmonary:      Effort: Pulmonary effort is normal.      Breath sounds: Normal breath sounds.   Abdominal:      General: Bowel sounds are normal.      Palpations: Abdomen is soft.      Tenderness: There is no abdominal tenderness.   Musculoskeletal:         General: Normal range of motion.      Cervical back: Normal range of motion and neck supple.   Skin:     General: Skin is warm.   Neurological:      Mental Status: She is alert and oriented to person, place, and time.         DATA:     Laboratory:  CBC:          CHEMISTRIES:  Recent Labs   Lab 11/21/20  0845 02/09/21  1316 02/26/22  0818 02/18/23  0837   Glucose 122 H 118 H 113 H  --    Sodium 144 143 143  --    Potassium 3.8 4.1 3.4 L  --    BUN 19 18 14  --    Creatinine 0.7 0.7 0.6 0.7   eGFR if  >60.0 >60 >60.0  --    eGFR if non African American >60.0 >60 >60.0  --    Calcium 9.3 9.1 9.2  --          CARDIAC BIOMARKERS:        COAGS:        LIPIDS/LFTS:  Recent Labs   Lab 06/24/20  0715 11/04/20  1519 06/05/21  0828 02/26/22  0818 02/18/23  0837   Cholesterol 203 H  --  155 167 195   Triglycerides 136  --  120 101 146   HDL 42  --  44 40 49   LDL Cholesterol 133.8  --  87.0 106.8 116.8   Non-HDL Cholesterol 161  --  111 127 146   AST 15 12  --  13  --    ALT 19 16  --  15  --          Hemoglobin A1C   Date Value Ref Range Status   02/18/2023 6.9 (H) 4.0 - 5.6 % Final     Comment:     ADA Screening Guidelines:  5.7-6.4%  Consistent with prediabetes  >or=6.5%  Consistent with diabetes    High levels of fetal hemoglobin interfere with the HbA1C  assay. Heterozygous hemoglobin variants (HbS, HgC, etc)do  not significantly interfere with this  assay.   However, presence of multiple variants may affect accuracy.     11/19/2022 6.7 (H) 4.0 - 5.6 % Final     Comment:     ADA Screening Guidelines:  5.7-6.4%  Consistent with prediabetes  >or=6.5%  Consistent with diabetes    High levels of fetal hemoglobin interfere with the HbA1C  assay. Heterozygous hemoglobin variants (HbS, HgC, etc)do  not significantly interfere with this assay.   However, presence of multiple variants may affect accuracy.     07/16/2022 7.1 (H) 4.0 - 5.6 % Final     Comment:     ADA Screening Guidelines:  5.7-6.4%  Consistent with prediabetes  >or=6.5%  Consistent with diabetes    High levels of fetal hemoglobin interfere with the HbA1C  assay. Heterozygous hemoglobin variants (HbS, HgC, etc)do  not significantly interfere with this assay.   However, presence of multiple variants may affect accuracy.         TSH  Recent Labs   Lab 11/04/20  1519 02/09/21  1316 02/26/22  0818   TSH 0.208 L 0.771 0.699         The 10-year ASCVD risk score (Best DK, et al., 2019) is: 39.1%    Values used to calculate the score:      Age: 77 years      Sex: Female      Is Non- : Yes      Diabetic: Yes      Tobacco smoker: No      Systolic Blood Pressure: 152 mmHg      Is BP treated: Yes      HDL Cholesterol: 49 mg/dL      Total Cholesterol: 195 mg/dL             ASSESSMENT AND PLAN     Patient Active Problem List   Diagnosis    Type 2 diabetes mellitus with diabetic polyneuropathy    Hyperlipidemia    HTN (hypertension)    Knee pain    H/O vitamin D deficiency    Non morbid obesity due to excess calories    Bilateral carotid bruits    Osteoarthritis of right knee    Morbid obesity with body mass index (BMI) of 40.0 or higher    Dyspnea on exertion    Chronic cough    EMKA (obstructive sleep apnea)    Sensorineural hearing loss (SNHL) of right ear with restricted hearing of left ear    Tinnitus of both ears    Allergic rhinitis    Dysfunction of both eustachian tubes    Nasal septal  deviation    Chronic eczematous otitis externa of both ears    Neck abscess    Epidermal inclusion cyst    Polyneuropathy associated with underlying disease    Pain and swelling of left knee    Pain and swelling of right knee    Weakness    Leg swelling    Aortic atherosclerosis    Decreased range of motion of lumbar spine    Abdominal weakness    Morbid obesity    Nonrheumatic aortic valve stenosis       Patient with multiple risk factors for coronary artery disease.  Complaining of dyspnea on exertion.  Stress test in 2023 did not show any significant ischemia.      Mod AS:  Recent echocardiogram showed moderate aortic stenosis.  Follow-up echocardiogram in 6 - 12 months.    Diabetes    Hypertension:  Well controlled on current therapy.      Follow-up after 6 m          Thank you very much for involving me in the care of your patient.  Please do not hesitate to contact me if there are any questions.      Jenn Arreola MD, FACC, [facility]  Interventional Cardiologist, [facility].     Follow-up in 3 months      This note was dictated with the help of speech recognition software.  There might be un-intended errors and/or substitutions.

## 2023-06-17 ENCOUNTER — LAB VISIT (OUTPATIENT)
Dept: LAB | Facility: HOSPITAL | Age: 78
End: 2023-06-17
Payer: COMMERCIAL

## 2023-06-17 DIAGNOSIS — E11.42 TYPE 2 DIABETES MELLITUS WITH DIABETIC POLYNEUROPATHY, WITHOUT LONG-TERM CURRENT USE OF INSULIN: ICD-10-CM

## 2023-06-17 DIAGNOSIS — Z86.39 H/O VITAMIN D DEFICIENCY: Chronic | ICD-10-CM

## 2023-06-17 DIAGNOSIS — Z00.00 HEALTHCARE MAINTENANCE: ICD-10-CM

## 2023-06-17 DIAGNOSIS — I10 PRIMARY HYPERTENSION: ICD-10-CM

## 2023-06-17 DIAGNOSIS — E78.2 MIXED HYPERLIPIDEMIA: ICD-10-CM

## 2023-06-17 LAB
25(OH)D3+25(OH)D2 SERPL-MCNC: 33 NG/ML (ref 30–96)
ALBUMIN SERPL BCP-MCNC: 3.8 G/DL (ref 3.5–5.2)
ALP SERPL-CCNC: 63 U/L (ref 55–135)
ALT SERPL W/O P-5'-P-CCNC: 15 U/L (ref 10–44)
ANION GAP SERPL CALC-SCNC: 12 MMOL/L (ref 8–16)
AST SERPL-CCNC: 17 U/L (ref 10–40)
BILIRUB SERPL-MCNC: 0.5 MG/DL (ref 0.1–1)
BUN SERPL-MCNC: 21 MG/DL (ref 8–23)
CALCIUM SERPL-MCNC: 10.1 MG/DL (ref 8.7–10.5)
CHLORIDE SERPL-SCNC: 105 MMOL/L (ref 95–110)
CO2 SERPL-SCNC: 27 MMOL/L (ref 23–29)
CREAT SERPL-MCNC: 0.7 MG/DL (ref 0.5–1.4)
EST. GFR  (NO RACE VARIABLE): >60 ML/MIN/1.73 M^2
GLUCOSE SERPL-MCNC: 114 MG/DL (ref 70–110)
POTASSIUM SERPL-SCNC: 3.9 MMOL/L (ref 3.5–5.1)
PROT SERPL-MCNC: 7.2 G/DL (ref 6–8.4)
SODIUM SERPL-SCNC: 144 MMOL/L (ref 136–145)

## 2023-06-17 PROCEDURE — 36415 COLL VENOUS BLD VENIPUNCTURE: CPT | Mod: PO | Performed by: INTERNAL MEDICINE

## 2023-06-17 PROCEDURE — 82306 VITAMIN D 25 HYDROXY: CPT | Performed by: INTERNAL MEDICINE

## 2023-06-17 PROCEDURE — 80053 COMPREHEN METABOLIC PANEL: CPT | Performed by: INTERNAL MEDICINE

## 2023-06-19 ENCOUNTER — LAB VISIT (OUTPATIENT)
Dept: LAB | Facility: HOSPITAL | Age: 78
End: 2023-06-19
Attending: NURSE PRACTITIONER
Payer: COMMERCIAL

## 2023-06-19 ENCOUNTER — OFFICE VISIT (OUTPATIENT)
Dept: ENDOCRINOLOGY | Facility: CLINIC | Age: 78
End: 2023-06-19
Payer: COMMERCIAL

## 2023-06-19 VITALS
SYSTOLIC BLOOD PRESSURE: 138 MMHG | BODY MASS INDEX: 34.86 KG/M2 | DIASTOLIC BLOOD PRESSURE: 62 MMHG | WEIGHT: 216 LBS | HEART RATE: 75 BPM | TEMPERATURE: 98 F

## 2023-06-19 DIAGNOSIS — R80.9 MICROALBUMINURIA: ICD-10-CM

## 2023-06-19 DIAGNOSIS — Z79.4 TYPE 2 DIABETES MELLITUS WITH DIABETIC POLYNEUROPATHY, WITH LONG-TERM CURRENT USE OF INSULIN: Primary | ICD-10-CM

## 2023-06-19 DIAGNOSIS — E66.9 NON MORBID OBESITY, UNSPECIFIED OBESITY TYPE: ICD-10-CM

## 2023-06-19 DIAGNOSIS — E11.42 TYPE 2 DIABETES MELLITUS WITH DIABETIC POLYNEUROPATHY, WITH LONG-TERM CURRENT USE OF INSULIN: ICD-10-CM

## 2023-06-19 DIAGNOSIS — I10 ESSENTIAL HYPERTENSION: ICD-10-CM

## 2023-06-19 DIAGNOSIS — E11.42 TYPE 2 DIABETES MELLITUS WITH DIABETIC POLYNEUROPATHY, WITH LONG-TERM CURRENT USE OF INSULIN: Primary | ICD-10-CM

## 2023-06-19 DIAGNOSIS — Z79.4 TYPE 2 DIABETES MELLITUS WITH DIABETIC POLYNEUROPATHY, WITH LONG-TERM CURRENT USE OF INSULIN: ICD-10-CM

## 2023-06-19 LAB
ESTIMATED AVG GLUCOSE: 140 MG/DL (ref 68–131)
HBA1C MFR BLD: 6.5 % (ref 4–5.6)

## 2023-06-19 PROCEDURE — 83036 HEMOGLOBIN GLYCOSYLATED A1C: CPT | Performed by: NURSE PRACTITIONER

## 2023-06-19 PROCEDURE — 99214 OFFICE O/P EST MOD 30 MIN: CPT | Mod: S$GLB,,, | Performed by: NURSE PRACTITIONER

## 2023-06-19 PROCEDURE — 95251 PR GLUCOSE MONITOR, 72 HOUR, PHYS INTERP: ICD-10-PCS | Mod: S$GLB,,, | Performed by: NURSE PRACTITIONER

## 2023-06-19 PROCEDURE — 99999 PR PBB SHADOW E&M-EST. PATIENT-LVL IV: CPT | Mod: PBBFAC,,, | Performed by: NURSE PRACTITIONER

## 2023-06-19 PROCEDURE — 36415 COLL VENOUS BLD VENIPUNCTURE: CPT | Mod: PO | Performed by: NURSE PRACTITIONER

## 2023-06-19 PROCEDURE — 99214 OFFICE O/P EST MOD 30 MIN: CPT | Mod: PBBFAC | Performed by: NURSE PRACTITIONER

## 2023-06-19 PROCEDURE — 99214 PR OFFICE/OUTPT VISIT, EST, LEVL IV, 30-39 MIN: ICD-10-PCS | Mod: S$GLB,,, | Performed by: NURSE PRACTITIONER

## 2023-06-19 PROCEDURE — 95251 CONT GLUC MNTR ANALYSIS I&R: CPT | Mod: S$GLB,,, | Performed by: NURSE PRACTITIONER

## 2023-06-19 PROCEDURE — 99999 PR PBB SHADOW E&M-EST. PATIENT-LVL IV: ICD-10-PCS | Mod: PBBFAC,,, | Performed by: NURSE PRACTITIONER

## 2023-06-19 RX ORDER — METFORMIN HYDROCHLORIDE 500 MG/1
1000 TABLET, EXTENDED RELEASE ORAL DAILY
Qty: 180 TABLET | Refills: 2 | Status: SHIPPED | OUTPATIENT
Start: 2023-06-19 | End: 2023-10-26 | Stop reason: SDUPTHER

## 2023-06-19 RX ORDER — TIRZEPATIDE 12.5 MG/.5ML
12.5 INJECTION, SOLUTION SUBCUTANEOUS
Qty: 4 PEN | Refills: 3 | Status: SHIPPED | OUTPATIENT
Start: 2023-06-19 | End: 2023-09-13 | Stop reason: SDUPTHER

## 2023-06-19 RX ORDER — INSULIN GLARGINE 300 U/ML
INJECTION, SOLUTION SUBCUTANEOUS
Qty: 2 PEN | Refills: 5 | Status: SHIPPED | OUTPATIENT
Start: 2023-06-19 | End: 2024-01-29 | Stop reason: SDUPTHER

## 2023-06-19 NOTE — PATIENT INSTRUCTIONS
Reduce Toujeo to 40 units once daily.   Continue metformin, Jardiance, and Mounjaro 12.5 mg weekly.   Ok to take metformin 2 tablets once daily or 1 tablet with breakfast and 1 tablet with lunch.   If glucoses start dropping less than 80, then reduce Toujeo from 40 to 30 units once daily.   Return to clinic in 4 months with labs prior.   A1c today.

## 2023-06-19 NOTE — PROGRESS NOTES
CC: This 77 y.o. Black or  female  is here for evaluation of  T2DM along with comorbidities indicated in the Visit Diagnosis section of this encounter.    HPI: Mena Odonnell was diagnosed with T2DM in ~ 1995. Experienced blurry vision at the time of diagnosis r/t BG >400. Started on orals. Began insulin in 2005. She is currently on MDI.          Prior visit 3/2/23  A1c remains low, from 7.1% in July to 6.7% in Nov. Now at 6.9%.   Plan Switch from Ozempic 1 mg to Mounjaro 10 mg once weekly. Reviewed side effects.   Continue metformin and Jardiance.   Reduce Toujeo insulin from 50 to 40 units once Mounjaro is started.   Contact office if blood sugars drop less than 80 regularly so that insulin dose can be further reduced. If Mounjaro not covered, will start Ozempic 2 mg instead.   Return to clinic in 3 months with labs prior.     Interval hx  No recent a1c available.   90 day cgm average 144.   4 lb weight loss since lov.   She increased to Mounjaro 12.5 mg a week ago d/t backorder of 10 mg.   She continues to take Toujeo 50 units, did not reduce dose as advised.   Requests a visit with Diabetes Education.       LAST DIABETES EDUCATION: 6/2019 mira Palmer - Found visit helpful   1/14/22 with ELENO Sanchez RD for Dexcom training     HOSPITALIZED FOR DIABETES -  No.    DIABETES MEDICATIONS:   Jardiance 10 mg once daily in the AM  Metformin  mg bid  Mounjaro 12.5 mg once weekly  Toujeo Max 50 units once daily in 6 am    Humalog per ss: 150-200=+1, 201-250=+2, 251-300=+3; 301-350=+4, over 350=+5 units    Misses medication doses - no        DM COMPLICATIONS: peripheral neuropathy and peripheral vascular disease    SIGNIFICANT DIABETES MED HISTORY:   Metformin stopped December 2016 r/t GI upset and diarrhea, resumed with metformin ER 3/2022 and weaned off Humalog       CGM interpretation: BGs are very stable with quite minimal fluctuation. No hypoglycemia.         HYPOGLYCEMIC EPISODES:  Denies      CURRENT DIET:  drinking mostly water and sometimes juice - once a week.   Breakfast ~ 8 am; lunch ~ 12 pm. Dinner - 6 pm, often eats dinner just so she can take metformin. Eats 3 meals/day.       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 No constipation     PHYSICAL EXAM:  GENERAL: Well developed, well nourished. No acute distress.   PSYCH: AAOx3, appropriate mood and affect, conversant, well-groomed. Judgement and insight good.   NEURO: Cranial nerves grossly intact. Speech clear, no tremor.   CHEST: Respirations even and unlabored.       Hemoglobin A1C   Date Value Ref Range Status   02/18/2023 6.9 (H) 4.0 - 5.6 % Final     Comment:     ADA Screening Guidelines:  5.7-6.4%  Consistent with prediabetes  >or=6.5%  Consistent with diabetes    High levels of fetal hemoglobin interfere with the HbA1C  assay. Heterozygous hemoglobin variants (HbS, HgC, etc)do  not significantly interfere with this assay.   However, presence of multiple variants may affect accuracy.     11/19/2022 6.7 (H) 4.0 - 5.6 % Final     Comment:     ADA Screening Guidelines:  5.7-6.4%  Consistent with prediabetes  >or=6.5%  Consistent with diabetes    High levels of fetal hemoglobin interfere with the HbA1C  assay. Heterozygous hemoglobin variants (HbS, HgC, etc)do  not significantly interfere with this assay.   However, presence of multiple variants may affect accuracy.     07/16/2022 7.1 (H) 4.0 - 5.6 % Final     Comment:     ADA Screening Guidelines:  5.7-6.4%  Consistent with prediabetes  >or=6.5%  Consistent with diabetes    High levels of fetal hemoglobin interfere with the HbA1C  assay. Heterozygous hemoglobin variants (HbS, HgC, etc)do  not significantly interfere with this assay.   However, presence of multiple variants may affect  accuracy.             Chemistry        Component Value Date/Time     06/17/2023 0815    K 3.9 06/17/2023 0815     06/17/2023 0815    CO2 27 06/17/2023 0815    BUN 21 06/17/2023 0815    CREATININE 0.7 06/17/2023 0815     (H) 06/17/2023 0815        Component Value Date/Time    CALCIUM 10.1 06/17/2023 0815    ALKPHOS 63 06/17/2023 0815    AST 17 06/17/2023 0815    ALT 15 06/17/2023 0815    BILITOT 0.5 06/17/2023 0815    ESTGFRAFRICA >60.0 02/26/2022 0818    EGFRNONAA >60.0 02/26/2022 0818         Latest Reference Range & Units 02/18/23 08:37   Creatinine 0.5 - 1.4 mg/dL 0.7   eGFR >60 mL/min/1.73 m^2 >60.0     Lab Results   Component Value Date    LDLCALC 116.8 02/18/2023      Latest Reference Range & Units 02/26/22 08:18 02/18/23 08:37   Cholesterol 120 - 199 mg/dL 167 195   HDL 40 - 75 mg/dL 40 49   HDL/Cholesterol Ratio 20.0 - 50.0 % 24.0 25.1   LDL Cholesterol External 63.0 - 159.0 mg/dL 106.8 116.8   Non-HDL Cholesterol mg/dL 127 146   Total Cholesterol/HDL Ratio 2.0 - 5.0  4.2 4.0   Triglycerides 30 - 150 mg/dL 101 146       Lab Results   Component Value Date    MICALBCREAT 44.2 (H) 02/18/2023           ASSESSMENT and PLAN:    A1C GOAL: < 7%     1. Type 2 diabetes mellitus with diabetic polyneuropathy, with long-term current use of insulin  Reduce Toujeo to 40 units once daily.   Continue metformin, Jardiance, and Mounjaro 12.5 mg weekly.   Ok to take metformin 2 tablets once daily or 1 tablet with breakfast and 1 tablet with lunch.   If glucoses start dropping less than 80, then reduce Toujeo from 40 to 30 units once daily.   Start exercise regimen.   Return to clinic in 4 months with labs prior.   A1c today.     insulin glargine U-300 conc (TOUJEO MAX U-300 SOLOSTAR) 300 unit/mL (3 mL) insulin pen    empagliflozin (JARDIANCE) 10 mg tablet    metFORMIN (GLUCOPHAGE-XR) 500 MG ER 24hr tablet    tirzepatide (MOUNJARO) 12.5 mg/0.5 mL PnIj      2. Essential hypertension  Controlled       3. Non  morbid obesity, unspecified obesity type  tirzepatide (MOUNJARO) 12.5 mg/0.5 mL PnIj      4. Microalbuminuria  empagliflozin (JARDIANCE) 10 mg tablet                Patient requires a therapeutic CGM and is willing to use therapeutic CGM/SMBG for Necessary frequent adjustments in insulin therapy. I have assessed adherence to CGM regimen and to the plan. Due to COVID pandemic, patient requires Dexcom CGM to manage diabetes.         Orders Placed This Encounter   Procedures    Hemoglobin A1C     Standing Status:   Future     Standing Expiration Date:   8/17/2024    Ambulatory referral/consult to Diabetes Education     Standing Status:   Future     Standing Expiration Date:   6/19/2024     Referral Priority:   Routine     Referral Type:   Consultation     Referral Reason:   Specialty Services Required     Requested Specialty:   Endocrinology     Number of Visits Requested:   1     Expiration Date:   6/19/2024        Follow up in about 3 months (around 9/19/2023).

## 2023-06-20 ENCOUNTER — CLINICAL SUPPORT (OUTPATIENT)
Dept: DIABETES | Facility: CLINIC | Age: 78
End: 2023-06-20
Payer: COMMERCIAL

## 2023-06-20 DIAGNOSIS — E11.42 TYPE 2 DIABETES MELLITUS WITH DIABETIC POLYNEUROPATHY, WITH LONG-TERM CURRENT USE OF INSULIN: ICD-10-CM

## 2023-06-20 DIAGNOSIS — M17.0 PRIMARY OSTEOARTHRITIS OF BOTH KNEES: Primary | ICD-10-CM

## 2023-06-20 DIAGNOSIS — Z79.4 TYPE 2 DIABETES MELLITUS WITH DIABETIC POLYNEUROPATHY, WITH LONG-TERM CURRENT USE OF INSULIN: ICD-10-CM

## 2023-06-20 DIAGNOSIS — E66.9 NON MORBID OBESITY, UNSPECIFIED OBESITY TYPE: ICD-10-CM

## 2023-06-20 PROCEDURE — 99999 PR PBB SHADOW E&M-EST. PATIENT-LVL II: ICD-10-PCS | Mod: PBBFAC,,,

## 2023-06-20 PROCEDURE — 99999 PR PBB SHADOW E&M-EST. PATIENT-LVL II: CPT | Mod: PBBFAC,,,

## 2023-06-20 PROCEDURE — G0108 PR DIAB MANAGE TRN  PER INDIV: ICD-10-PCS | Mod: S$GLB,,, | Performed by: FAMILY MEDICINE

## 2023-06-20 PROCEDURE — 99212 OFFICE O/P EST SF 10 MIN: CPT | Mod: PBBFAC

## 2023-06-20 PROCEDURE — G0108 DIAB MANAGE TRN  PER INDIV: HCPCS | Mod: PBBFAC

## 2023-06-20 PROCEDURE — G0108 DIAB MANAGE TRN  PER INDIV: HCPCS | Mod: S$GLB,,, | Performed by: FAMILY MEDICINE

## 2023-06-21 VITALS — WEIGHT: 218 LBS | BODY MASS INDEX: 35.03 KG/M2 | HEIGHT: 66 IN

## 2023-06-21 NOTE — PROGRESS NOTES
Diabetes Care Specialist Progress Note  Author: Nelia King RN  Date: 6/20/2023    Program Intake  Reason for Diabetes Program Visit:: Initial Diabetes Assessment  Current diabetes risk level:: low  In the last 12 months, have you:: none  Permission to speak with others about care:: yes    Lab Results   Component Value Date    HGBA1C 6.5 (H) 06/19/2023       Clinical  Patient Health Rating  Compared to other people your age, how would you rate your health?: Good    Clinical Assessment  Current Diabetes Treatment: Oral Medication, Injectable, Insulin (Monujaro 12.5mg weekly, Metformin XR 500mg BID w/meals, Toujeo 40U keyanna and Jardiance 10mg daily)  Have you ever experienced hypoglycemia (low blood sugar)?: no  Have you ever experienced hyperglycemia (high blood sugar)?: no    Medication Information  How do you obtain your medications?: Patient drives  How many days a week do you miss your medications?: Never  Do you sometimes have difficulty refilling your medications?: No  Medication adherence impacting ability to self-manage diabetes?: No    Labs  Do you have regular lab work to monitor your medications?: Yes  Type of Regular Lab Work: A1c  Where do you get your labs drawn?: [facility]  Lab Compliance Barriers: No    Nutritional Status  Diet: Regular  Meal Plan 24 Hour Recall: Breakfast, Lunch, Dinner (drinks mini-cokes 3 X's week)  Meal Plan 24 Hour Recall - Breakfast: ham sandwich, eggs, 4oz juice or water  Meal Plan 24 Hour Recall - Lunch: hamburger and vegetables or red beans and rice  Meal Plan 24 Hour Recall - Dinner: meat and vegetables or sandwich w/fruit cup or lettuce, tomatoe, and cheese on crackers  Change in appetite?: No  Recent Changes in Weight: No Recent Weight Change  Current nutritional status an area of need that is impacting patient's ability to self-manage diabetes?: No    Additional Social History  Support  Does anyone support you with your diabetes care?: yes  Who supports you?: self  Who  takes you to your medical appointments?: self  Does the current support meet the patient's needs?: Yes  Is Support an area impacting ability to self-manage diabetes?: No    Access to Mass Media & Technology  Does the patient have access to any of the following devices or technologies?: Smart phone  Media or technology needs impacting ability to self-manage diabetes?: No    Cognitive/Behavioral Health  Alert and Oriented: Yes  Difficulty Thinking: No  Requires Prompting: No  Requires assistance for routine expression?: No  Cognitive or behavioral barriers impacting ability to self-manage diabetes?: No    Culture/Mosque  Culture or Jehovah's witness beliefs that may impact ability to access healthcare: No    Communication  Language preference: English  Hearing Problems: Yes  Hearing Assistance: Hearing aid  Vision Problems: Yes  Vision problem type:: Decreased Vision  Vision Assistance: Glasses  Communication needs impacting ability to self-manage diabetes?: No    Health Literacy  Preferred Learning Method: Face to Face, Reading Materials  How often do you need to have someone help you read instructions, pamphlets, or written material from your doctor or pharmacy?: Never  Health literacy needs impacting ability to self-manage diabetes?: No      Diabetes Self-Management Skills Assessment  Diabetes Disease Process/Treatment Options  Patient/caregiver able to state what happens when someone has diabetes.: somewhat  Patient/caregiver knows what type of diabetes they have.: yes  Diabetes Type : Type II  Patient/caregiver able to identify at least three signs and symptoms of diabetes.: no  Patient able to identify at least three risk factors for diabetes.: no  Diabetes Disease Process/Treatment Options: Skills Assessment Completed: Yes  Assessment indicates:: Instruction Needed, Knowledge deficit  Area of need?: Yes    Nutrition/Healthy Eating  Challenges to healthy eating:: portion control  Method of carbohydrate measurement::  no method  Patient can identify foods that impact blood sugar.: yes  Patient-identified foods:: starches (bread, pasta, rice, cereal), sweets, soda  Nutrition/Healthy Eating Skills Assessment Completed:: Yes  Assessment indicates:: Instruction Needed, Knowledge deficit  Area of need?: Yes    Physical Activity/Exercise  Physical Activity/Exercise Skills Assessment Completed: : No  Deffered due to:: Time    Medications  Patient is able to describe current diabetes management routine.: yes  Diabetes management routine:: diet, injectable medications, insulin  Patient is able to identify current diabetes medications, dosages, and appropriate timing of medications.: no  Patient understands the purpose of the medications taken for diabetes.: no  Patient reports problems or concerns with current medication regimen.: no  Medication Skills Assessment Completed:: Yes  Assessment indicates:: Instruction Needed  Area of need?: No    Home Blood Glucose Monitoring  Patient states that blood sugar is checked at home daily.: yes  Monitoring Method:: personal continuous glucose monitor  Personal CGM type:: Dexcom G6  Patient is able to use personal CGM appropriately.: yes  CGM Report reviewed?: no  Home Blood Glucose Monitoring Skills Assessment Completed: : Yes  Assessment indicates:: Instruction Needed  Area of need?: Yes    Acute Complications  Patient is able to identify types of acute complications: No  Acute Complications Skills Assessment Completed: : Yes  Assessment indicates:: Instruction Needed, Knowledge deficit  Area of need?: Yes    Chronic Complications  Chronic Complications Skills Assessment Completed: : No  Deferred due to:: Time    Psychosocial/Coping  Psychosocial/Coping Skills Assessment Completed: : No  Deffered due to:: Time    Assessment Summary and Plan    Based on today's diabetes care assessment, the following areas of need were identified:      Social 6/20/2023   Support No   Access to Mass Media/Tech No    Cognitive/Behavioral Health No   Culture/Confucianist No   Communication No   Health Literacy No        Clinical 6/20/2023   Medication Adherence No   Lab Compliance No   Nutritional Status No        Diabetes Self-Management Skills 6/20/2023   Diabetes Disease Process/Treatment Options Yes- Provided DM management guide and discussed risk factors of diabetes. Also, instructed patient on what is diabetes and the progression of the disease. Discussed significance of current A1c    Nutrition/Healthy Eating Yes- see care planning   Medication No   Home Blood Glucose Monitoring Yes- Pt has Dexcom G6 CGMS; pt 99% in range.    Acute Complications Yes- Reviewed blood sugar values, prevention, detection, signs and symptoms, and treatment of hypoglycemia following rule of 15. Advised to carry a source of fast acting carbs.           Today's interventions were provided through individual discussion, instruction, and written materials were provided.      Patient verbalized understanding of instruction and written materials.  Pt was able to return back demonstration of instructions today. Patient understood key points, needs reinforcement and further instruction.     Diabetes Self-Management Care Plan:    Today's Diabetes Self-Management Care Plan was developed with Mena's input. Mena has agreed to work toward the following goal(s) to improve his/her overall diabetes control.      Care Plan: Diabetes Management   Updates made since 5/22/2023 12:00 AM        Problem: Healthy Eating         Goal: Eat 2-3 meals daily with 30-45g/2-3 servings of Carbohydrate per meal. Limit snacking in between meal to 1 serving (15 grams).    Start Date: 6/20/2023   Priority: High   Barriers: No Barriers Identified   Note:    6/20/23 - - We concentrated on portion sizes at today's session. Overall meal planning and various choices for meals. Discussed carb vs non-carb foods, appropriate amount of carbs to have at meals/snacks and acceptable serving  sizes of individual carb items.     - - Instructed on appropriate label reading and serving sizes of specific carb containing foods. Discussed other options of measuring portion sizes by using the hand method and/OR using the plate method for meal planning. Discussed having lean protein at all meals and adding non starchy vegetables at lunch and dinner. Instructed pt to aim for 2-3 evenly spaced meals or a small carb snack in place of a missed meal. Written education information provided to patient for use at home. Pt verbalized understanding of all the above.         Task: Reviewed the sources and role of Carbohydrate, Protein, and Fat and how each nutrient impacts blood sugar. Completed 6/21/2023        Task: Provided visual examples using dry measuring cups, food models, and other familiar objects such as computer mouse, deck or cards, tennis ball etc. to help with visualization of portions. Completed 6/20/2023     Task: Recommended replacing beverages containing high sugar content with noncaloric/sugar free options and/or water. Completed 6/20/2023        Task: Review the importance of balancing carbohydrates with each meal using portion control techniques to count servings of carbohydrate and label reading to identify serving size and amount of total carbs per serving. Completed 6/20/2023        Task: Provided Sample plate method and reviewed the use of the plate to estimate amounts of carbohydrate per meal. Completed 6/20/2023          Follow Up Plan     Follow up if symptoms worsen or fail to improve.    Today's care plan and follow up schedule was discussed with patient.  Mena verbalized understanding of the care plan, goals, and agrees to follow up plan.        The patient was encouraged to communicate with his/her health care provider/physician and care team regarding his/her condition(s) and treatment.  I provided the patient with my contact information today and encouraged to contact me via phone or  [facility] Patient Portal as needed.     Length of Visit   Total Time: 60 Minutes

## 2023-06-28 ENCOUNTER — CLINICAL SUPPORT (OUTPATIENT)
Dept: OTOLARYNGOLOGY | Facility: CLINIC | Age: 78
End: 2023-06-28
Payer: MEDICARE

## 2023-06-28 DIAGNOSIS — Z46.1 ENCOUNTER FOR FITTING AND ADJUSTMENT OF HEARING AID OF BOTH EARS: Primary | ICD-10-CM

## 2023-06-28 PROCEDURE — 99499 NO LOS: ICD-10-PCS | Mod: ,,, | Performed by: AUDIOLOGIST-HEARING AID FITTER

## 2023-06-28 PROCEDURE — 99499 UNLISTED E&M SERVICE: CPT | Mod: ,,, | Performed by: AUDIOLOGIST-HEARING AID FITTER

## 2023-06-28 NOTE — PROGRESS NOTES
Keenan Hernandes, CCC-A  Audiologist - [facility] [address] [place] [address]  [email or web address]  [phone]    Patient: Mena Odonnell   MRN: [id number]  [address]  Home Phone [phone]   Work Phone [phone]   Mobile [phone]   : 1945  TRACY: 2023      HEARING AID FOLLOW-UP      Mena Odonnell is here today to  her hearing aids from repair, as well as, her replacement .  Counseled hearing aids are now out of warranty so if any issues arise, approximate cost for repair is about $250.   was replaced with [employer] #[id number].  Confirmed correct hardware on hearing aids - M1 R/L receivers & medium vented domes AU.  Opened 23 session and saved without any changes to settings, per patient request.  Patient also stated she did not want to connect her devices to her phone today.  Mena Odonnell verbalized understanding and satisfaction with today's visit.  She will call if service is needed before her next appointment.    Phonak [employer] [id number]  #[id number] (R)  #[id number] (L)  Warranty     _______________________________  Keenan Hernandes, GARTH-A  Audiologist

## 2023-06-30 ENCOUNTER — PES CALL (OUTPATIENT)
Dept: ADMINISTRATIVE | Facility: CLINIC | Age: 78
End: 2023-06-30
Payer: COMMERCIAL

## 2023-07-10 NOTE — PROGRESS NOTES
Subjective:       Patient ID: Mena dOonnell is a 77 y.o. female.    Chief Complaint: Follow-up    Here today for audiogram and follow-up.  Has binaural hearing aids which she wears regularly for quite some time.  No subjective change in hearing.  No acoustical trauma.  Stable mild tinnitus present for years noted when quiet.  Periodically has sensation of fluid AD.  Also notes increased nasal secretions in am.  No other AR or sinonasal symptoms.  Has been given oral antihistamines, topical nasal steroids, and insufflation exercises in the past.  Has some questions about this.  No other otolaryngologic complaints.          Review of Systems     Constitutional: Negative for fever.      HENT: Positive for hearing loss and ringing in the ears.  Negative for ear pain, sore throat, stuffy nose and voice change.      Respiratory:  Negative for cough and shortness of breath.      Cardiovascular:  Negative for chest pain.     Gastrointestinal:  Negative for abdominal pain.     Neurological: Negative for dizziness and headaches.      Hematologic: Negative for swollen glands.              Objective:        Vitals:    04/26/23 0955   BP: (!) 156/67   Pulse: 71   Temp: 97.5 °F (36.4 °C)     Body mass index is 35.35 kg/m².  Physical Exam  Constitutional:       General: She is not in acute distress.     Appearance: She is well-developed.   HENT:      Head: Normocephalic and atraumatic.      Right Ear: Tympanic membrane, ear canal and external ear normal.      Left Ear: Tympanic membrane, ear canal and external ear normal.      Nose: Septal deviation present. No nasal deformity, mucosal edema or rhinorrhea.      Mouth/Throat:      Mouth: Mucous membranes are moist.      Pharynx: No pharyngeal swelling, oropharyngeal exudate or posterior oropharyngeal erythema.   Neck:      Trachea: Phonation normal.   Pulmonary:      Effort: Pulmonary effort is normal. No respiratory distress.   Musculoskeletal:      Cervical back: Neck supple.    Lymphadenopathy:      Cervical: No cervical adenopathy.   Skin:     General: Skin is warm and dry.   Neurological:      Mental Status: She is alert and oriented to person, place, and time.   Psychiatric:         Speech: Speech normal.         Behavior: Behavior normal.       Tests / Results:        Prior audiograms and MRI again noted.        Assessment:       1. Sensorineural hearing loss, bilateral    2. Wears hearing aid in both ears    3. Eustachian tube dysfunction, right    4. Nasal septal deviation    5. Allergic rhinitis, unspecified seasonality, unspecified trigger        Plan:       Reviewed/discussed today's audiogram above which demonstrates bilateral sensorineural hearing loss with symmetrical bone scores and mild air bone gap right with type A tymps and excellent speech discrimination bilaterally.    Discussed above ear and nasal complaints and eustachian tube dysfunction and options.  Declines fluticasone or other eval/treatments.  Will keep follow-ups with audiology regarding hearing aids.    Proper ear care and hearing protection.    Follow-up one year and as needed.                   No indicators present

## 2023-07-11 ENCOUNTER — OFFICE VISIT (OUTPATIENT)
Dept: ORTHOPEDICS | Facility: CLINIC | Age: 78
End: 2023-07-11
Payer: COMMERCIAL

## 2023-07-11 VITALS — WEIGHT: 218 LBS | BODY MASS INDEX: 35.03 KG/M2 | HEIGHT: 66 IN

## 2023-07-11 DIAGNOSIS — M17.0 PRIMARY OSTEOARTHRITIS OF BOTH KNEES: Primary | ICD-10-CM

## 2023-07-11 PROCEDURE — 20610 DRAIN/INJ JOINT/BURSA W/O US: CPT | Mod: 50,PBBFAC,PN | Performed by: ORTHOPAEDIC SURGERY

## 2023-07-11 PROCEDURE — 99999 PR PBB SHADOW E&M-EST. PATIENT-LVL III: ICD-10-PCS | Mod: PBBFAC,,, | Performed by: ORTHOPAEDIC SURGERY

## 2023-07-11 PROCEDURE — 99213 PR OFFICE/OUTPT VISIT, EST, LEVL III, 20-29 MIN: ICD-10-PCS | Mod: 25,S$GLB,, | Performed by: ORTHOPAEDIC SURGERY

## 2023-07-11 PROCEDURE — 99999 PR PBB SHADOW E&M-EST. PATIENT-LVL III: CPT | Mod: PBBFAC,,, | Performed by: ORTHOPAEDIC SURGERY

## 2023-07-11 PROCEDURE — 20610 LARGE JOINT ASPIRATION/INJECTION: BILATERAL KNEE: ICD-10-PCS | Mod: 50,S$GLB,, | Performed by: ORTHOPAEDIC SURGERY

## 2023-07-11 PROCEDURE — 99213 OFFICE O/P EST LOW 20 MIN: CPT | Mod: PBBFAC,PN,25 | Performed by: ORTHOPAEDIC SURGERY

## 2023-07-11 PROCEDURE — 99213 OFFICE O/P EST LOW 20 MIN: CPT | Mod: 25,S$GLB,, | Performed by: ORTHOPAEDIC SURGERY

## 2023-07-11 RX ADMIN — Medication 10 MG: at 10:07

## 2023-07-11 NOTE — PROGRESS NOTES
EST PATIENT ORTHOPAEDIC: Knee    PRIMARY CARE PHYSICIAN: Leonard Wells MD   REFERRING PROVIDER: Sumanth Erickson MD  [address] [place] [address]     ASSESSMENT & PLAN:    Impression:  Bilateral Knee Severe Degenerative Osteoarthritis, Primary     Follow Up Plan: For euflexxa series    Non operative care:    Mena Odonnell has physical exam evidence of above and wishes to pursue an non-operative care. I am recommending the following: Euflexxa series, activity modification.      To reivew: She would be an appropriate candidate for a right total knee replacement in the future should her symptoms warrant.  She was referred to vascular for intermittent claudication and peripheral vascular disease seen on x-ray .  There evaluation has referred her to lymphedema therapy. Swelling is improving. She is interested in repeat gel injections.  She is not interested in surgical intervention at this time. She is also diabetic so this is a good treatment modaliity for her.     The patient has been ordered:  Euflexxa    CONSULTS:   None    ACTIVE PROBLEM LIST  Patient Active Problem List   Diagnosis    Type 2 diabetes mellitus with diabetic polyneuropathy    Hyperlipidemia    HTN (hypertension)    Knee pain    H/O vitamin D deficiency    Non morbid obesity due to excess calories    Bilateral carotid bruits    Osteoarthritis of right knee    Morbid obesity with body mass index (BMI) of 40.0 or higher    Dyspnea on exertion    Chronic cough    MEKA (obstructive sleep apnea)    Sensorineural hearing loss (SNHL) of right ear with restricted hearing of left ear    Tinnitus of both ears    Allergic rhinitis    Dysfunction of both eustachian tubes    Nasal septal deviation    Chronic eczematous otitis externa of both ears    Neck abscess    Epidermal inclusion cyst    Polyneuropathy associated with underlying disease    Pain and swelling of left knee    Pain and swelling of right knee    Weakness    Leg swelling    Aortic  atherosclerosis    Decreased range of motion of lumbar spine    Abdominal weakness    Morbid obesity    Nonrheumatic aortic valve stenosis           SUBJECTIVE    CHIEF COMPLAINT: Knee Pain    HPI:   Mena Odonnell is a 77 y.o. female here for evaluation and management of right knee pain. There is not a specific incident that brought about this pain. she has had progressive problems with the knee(s) starting 2 years ago but progressing to multiple times a day over the past 6 months which is interfering with activities which include: walking 2 blocks and standing for prolonged periods of time    Currently the pain in the joint is rated at 5 out of 10 with moderate activity.  The pain is intermittent and is located in the Right knee, at level of joint line, located medially and located laterally. The pain is described as aching and sharp. Relieving factors include ambulatory device and rest.       Mena Odonnell also complaints leg cramping on that side primarily in her posterior aspect of her knee and calf.  This typically improves proves with rest and over-the-counter topical spray.    Interval History 4/18/22: Injections wearing off. Saw vascular.   Interval History 5/9/22: Here for 1-3 Euflexxa series   Interval History 5/19/22: Here for 2-3 Euflexxa series. Mild improvement in pain.   Interval History 5/27/22: Here for 3-3 Euflexxa series. Moderate improvement in pain.  Interval History 10/13/22: Here with return of pain.   Interval History 11/28/22: Here for Euflexxa injections. Previous steroid with good relief   12/5/22: Here for 2-3 Euflexxa series. Continued relief of pain.   12/12/22: Here for 3-3 Euflexxa series. Continued relief of pain.   7/11/23: Patient would like to restart Euflexxa injections. 6 months or relief with previous injections.     PROGRESSIVE SYMPTOMS:  Pain worsened by weight bearing    FUNCTIONAL STATUS:   Do light to moderate work around the house    PREVIOUS TREATMENTS:  Medical:  Attempted Weight Loss, Steroid Injections and Biologic Injections  Physical Therapy: Use of Ambulatory Aid and Activities Modified   Previous Orthopaedic Surgery: None    REVIEW OF SYSTEMS:  PAIN ASSESSMENT:  See HPI.  MUSCULOSKELETAL: See HPI.  OTHER 10 point review of systems is negative except as stated in HPI above    PAST MEDICAL HISTORY   has a past medical history of Cataract, Diabetes mellitus type II, Diabetes with neurologic complications, DM retinopathy, H/O vitamin D deficiency, Hyperlipidemia, Hypertension, Obesity, Osteoarthritis, Peripheral neuropathy, Polyneuropathy, Severe obesity (BMI 35.0-39.9) with comorbidity (12/30/2016), Sleep apnea, and Tendonitis.     PAST SURGICAL HISTORY   has a past surgical history that includes Bladder suspension; Hysterectomy (1978); Tonsillectomy; Colonoscopy (N/A, 9/26/2015); Cataract extraction; and Neck mass excision (11/6/2019).     FAMILY HISTORY  family history includes Cataracts in her mother; Colon cancer in her maternal grandfather; Diabetes in her mother, sister, sister, and son; Hypertension in her brother, brother, daughter, mother, sister, sister, sister, and sister; Stroke in her mother; Thyroid disease in her maternal aunt, maternal aunt, mother, and sister.     SOCIAL HISTORY   reports that she quit smoking about 52 years ago. Her smoking use included cigarettes. She has a 3.75 pack-year smoking history. She has never used smokeless tobacco. She reports that she does not drink alcohol and does not use drugs.     ALLERGIES   Review of patient's allergies indicates:   Allergen Reactions    Atorvastatin      Muscle pain     Crestor [rosuvastatin] Other (See Comments)     Leg Weakness.         MEDICATIONS  Current Outpatient Medications on File Prior to Visit   Medication Sig Dispense Refill    amLODIPine (NORVASC) 10 MG tablet Take 1 tablet (10 mg total) by mouth once daily. 90 tablet 2    blood-glucose sensor (DEXCOM G6 SENSOR) Sujey Change sensor every  10 days 3 each 11    blood-glucose transmitter (DEXCOM G6 TRANSMITTER) Sujey Change every 3 months 1 each 3    cetirizine (ZYRTEC) 10 MG tablet Take 1 tablet (10 mg total) by mouth once daily. For 30 days 30 tablet 3    CONTOUR NEXT TEST STRIPS Strp Check glucose before breakfast and dinner. 100 each 5    diabetic supplies, miscellan. Kit Please dispense one 14 day FreeStyle Giulia [place] 1 kit 0    diclofenac sodium (VOLTAREN) 1 % Gel Lower extremities: Apply the gel (4 g) to the affected area 4 times daily. Do not apply more than 16 g daily to any one affected joint of the lower extremities. Upper extremities: Apply the gel (2 g) to the affected area 4 times daily. Do not apply more than 8 g daily to any one affected joint of the upper extremities. Total dose should not exceed 32 g per day, overall affected joints. 100 g 5    empagliflozin (JARDIANCE) 10 mg tablet Take 1 tablet (10 mg total) by mouth once daily. 90 tablet 2    gabapentin (NEURONTIN) 300 MG capsule Take 1 capsule (300 mg total) by mouth 3 (three) times daily. 90 capsule 11    insulin glargine U-300 conc (TOUJEO MAX U-300 SOLOSTAR) 300 unit/mL (3 mL) insulin pen INJECT 40 UNITS SUBCUTANEOUSLY ONCE DAILY 2 pen 5    insulin lispro (HUMALOG KWIKPEN INSULIN) 100 unit/mL pen Use with sliding scale - 150-200=+2, 201-250=+4; 251-300=+6; 301-350=+8, over 350=+10 units 15 mL 0    losartan-hydrochlorothiazide 100-25 mg (HYZAAR) 100-25 mg per tablet Take 1 tablet by mouth once daily. 90 tablet 3    meloxicam (MOBIC) 7.5 MG tablet Take 1 tablet (7.5 mg total) by mouth once daily. 12 tablet 0    metFORMIN (GLUCOPHAGE-XR) 500 MG ER 24hr tablet Take 2 tablets (1,000 mg total) by mouth Daily. 180 tablet 2    metoprolol succinate (TOPROL-XL) 50 MG 24 hr tablet Take 1 tablet (50 mg total) by mouth once daily. 90 tablet 2    montelukast (SINGULAIR) 10 mg tablet Take 1 tablet (10 mg total) by mouth once daily. 90 tablet 1    pravastatin (PRAVACHOL) 80 MG tablet Take 1  "tablet (80 mg total) by mouth once daily. 90 tablet 3    tirzepatide (MOUNJARO) 12.5 mg/0.5 mL PnIj Inject 12.5 mg (one pen) into the skin every 7 days. 4 pen 3    aspirin 81 MG Chew Take 1 tablet (81 mg total) by mouth once daily. 30 tablet 0    flash glucose sensor (FREESTYLE ROSAURA 14 DAY SENSOR) Kit 1 sensor kit every 14 days (requires 2 kits per month) (Patient not taking: Reported on 2023) 2 kit 6     Current Facility-Administered Medications on File Prior to Visit   Medication Dose Route Frequency Provider Last Rate Last Admin    [] sodium hyaluronate (EUFLEXXA) 10 mg/mL(mw 2.4 -3.6 million) injection 20 mg  20 mg Intra-articular Weekly Sumanth Erickson MD              PHYSICAL EXAM   height is 5' 6" (1.676 m) and weight is 98.9 kg (218 lb).   Body mass index is 35.19 kg/m².      All other systems deferred.  GENERAL:  No acute distress  HABITUS: Obese  GAIT: Antalgic  SKIN: Normal     KNEE EXAM:    Bilateral:   Effusion: Minimal joint effusion  TTP: yes over Medial Joint Line and Lateral Joint Line   no crepitus with passive knee ROM  Passive ROM: Extension 0, Flexion 130  No pain with manipulation of patella  Stable to varus/valgus stress. No increased laxity to anterior/posterior drawer testing  negative Ngozi's test  No pain with IR/ER rotation of the hip  5/5 strength in knee flexion and extension, ankle plantarflexion and dorsiflexion  Neurovascular Status: Sensation intact to light touch in Sural, Saphenous, SPN, DPN, Tibial nerve distribution  2+ pulse DP/PT, normal capillary refill, foot has normal warmth    DATA:  Diagnostic tests reviewed for today's visit:     4v of the bilateral knee reveal Moderate degenerative changes of the Lateral and Patellofemoral compartment. There is evidence of advanced osteoarthritis changes with Subchondral sclerosis, Osteophyte formation and Joint space narrowing. The limb is in netural alignment. The patella is tracking midline and is in normal " alignment. Vascular calcifications present. Kellegren-Lawerence grade 4 changes on the right, grade 3 on the left.

## 2023-07-11 NOTE — PROCEDURES
Large Joint Aspiration/Injection: bilateral knee    Date/Time: 7/11/2023 10:00 AM  Performed by: Sumanth Erickson MD  Authorized by: Sumanth Erickson MD     Consent Done?:  Yes (Verbal)  Indications:  Arthritis and pain  Site marked: the procedure site was marked    Timeout: prior to procedure the correct patient, procedure, and site was verified    Prep: patient was prepped and draped in usual sterile fashion      Local anesthesia used?: Yes    Local anesthetic:  Topical anesthetic    Details:  Needle Size:  22 G  Approach:  Anterolateral  Location:  Knee  Laterality:  Bilateral  Site:  Bilateral knee  Medications (Right):  10 mg sodium hyaluronate (EUFLEXXA) 10 mg/mL(mw 2.4 -3.6 million)  Medications (Left):  10 mg sodium hyaluronate (EUFLEXXA) 10 mg/mL(mw 2.4 -3.6 million)  Patient tolerance:  Patient tolerated the procedure well with no immediate complications

## 2023-07-18 ENCOUNTER — OFFICE VISIT (OUTPATIENT)
Dept: ORTHOPEDICS | Facility: CLINIC | Age: 78
End: 2023-07-18
Payer: COMMERCIAL

## 2023-07-18 VITALS — BODY MASS INDEX: 35.03 KG/M2 | HEIGHT: 66 IN | WEIGHT: 218 LBS

## 2023-07-18 DIAGNOSIS — M17.0 PRIMARY OSTEOARTHRITIS OF BOTH KNEES: Primary | ICD-10-CM

## 2023-07-18 PROCEDURE — 99499 UNLISTED E&M SERVICE: CPT | Mod: S$GLB,,, | Performed by: ORTHOPAEDIC SURGERY

## 2023-07-18 PROCEDURE — 99999 PR PBB SHADOW E&M-EST. PATIENT-LVL III: CPT | Mod: PBBFAC,,, | Performed by: ORTHOPAEDIC SURGERY

## 2023-07-18 PROCEDURE — 99213 OFFICE O/P EST LOW 20 MIN: CPT | Mod: PBBFAC,PN | Performed by: ORTHOPAEDIC SURGERY

## 2023-07-18 PROCEDURE — 99499 NO LOS: ICD-10-PCS | Mod: S$GLB,,, | Performed by: ORTHOPAEDIC SURGERY

## 2023-07-18 PROCEDURE — 99999 PR PBB SHADOW E&M-EST. PATIENT-LVL III: ICD-10-PCS | Mod: PBBFAC,,, | Performed by: ORTHOPAEDIC SURGERY

## 2023-07-18 PROCEDURE — 20610 DRAIN/INJ JOINT/BURSA W/O US: CPT | Mod: 50,PBBFAC,PN | Performed by: ORTHOPAEDIC SURGERY

## 2023-07-18 PROCEDURE — 20610 LARGE JOINT ASPIRATION/INJECTION: BILATERAL KNEE: ICD-10-PCS | Mod: 50,S$GLB,, | Performed by: ORTHOPAEDIC SURGERY

## 2023-07-18 RX ADMIN — Medication 10 MG: at 10:07

## 2023-07-18 NOTE — PROCEDURES
Large Joint Aspiration/Injection: bilateral knee    Date/Time: 7/18/2023 10:00 AM  Performed by: Sumanth Erickson MD  Authorized by: Sumanth Erickson MD     Consent Done?:  Yes (Verbal)  Indications:  Arthritis and pain  Site marked: the procedure site was marked    Timeout: prior to procedure the correct patient, procedure, and site was verified    Prep: patient was prepped and draped in usual sterile fashion      Local anesthesia used?: Yes    Local anesthetic:  Topical anesthetic  Approach:  Anterolateral  Location:  Knee  Laterality:  Bilateral  Site:  Bilateral knee  Medications (Right):  10 mg sodium hyaluronate (EUFLEXXA) 10 mg/mL(mw 2.4 -3.6 million)  Medications (Left):  10 mg sodium hyaluronate (EUFLEXXA) 10 mg/mL(mw 2.4 -3.6 million)  Patient tolerance:  Patient tolerated the procedure well with no immediate complications

## 2023-07-18 NOTE — PROGRESS NOTES
EST PATIENT ORTHOPAEDIC: Knee    PRIMARY CARE PHYSICIAN: Leonard Wells MD   REFERRING PROVIDER: Sumanth Erickson MD  [address] [place] [address]     ASSESSMENT & PLAN:    Impression:  Bilateral Knee Severe Degenerative Osteoarthritis, Primary     Follow Up Plan: For euflexxa series    Non operative care:    Mena Odonnell has physical exam evidence of above and wishes to pursue an non-operative care. I am recommending the following: Euflexxa series, activity modification.      To reivew: She would be an appropriate candidate for a right total knee replacement in the future should her symptoms warrant.  She was referred to vascular for intermittent claudication and peripheral vascular disease seen on x-ray .  There evaluation has referred her to lymphedema therapy. Swelling is improving. She is interested in repeat gel injections.  She is not interested in surgical intervention at this time. She is also diabetic so this is a good treatment modaliity for her.     The patient has been ordered:  Euflexxa    CONSULTS:   None    ACTIVE PROBLEM LIST  Patient Active Problem List   Diagnosis    Type 2 diabetes mellitus with diabetic polyneuropathy    Hyperlipidemia    HTN (hypertension)    Knee pain    H/O vitamin D deficiency    Non morbid obesity due to excess calories    Bilateral carotid bruits    Osteoarthritis of right knee    Morbid obesity with body mass index (BMI) of 40.0 or higher    Dyspnea on exertion    Chronic cough    MEKA (obstructive sleep apnea)    Sensorineural hearing loss (SNHL) of right ear with restricted hearing of left ear    Tinnitus of both ears    Allergic rhinitis    Dysfunction of both eustachian tubes    Nasal septal deviation    Chronic eczematous otitis externa of both ears    Neck abscess    Epidermal inclusion cyst    Polyneuropathy associated with underlying disease    Pain and swelling of left knee    Pain and swelling of right knee    Weakness    Leg swelling    Aortic  atherosclerosis    Decreased range of motion of lumbar spine    Abdominal weakness    Morbid obesity    Nonrheumatic aortic valve stenosis           SUBJECTIVE    CHIEF COMPLAINT: Knee Pain    HPI:   Mena Odonnell is a 77 y.o. female here for evaluation and management of right knee pain. There is not a specific incident that brought about this pain. she has had progressive problems with the knee(s) starting 2 years ago but progressing to multiple times a day over the past 6 months which is interfering with activities which include: walking 2 blocks and standing for prolonged periods of time    Currently the pain in the joint is rated at 5 out of 10 with moderate activity.  The pain is intermittent and is located in the Right knee, at level of joint line, located medially and located laterally. The pain is described as aching and sharp. Relieving factors include ambulatory device and rest.       Mena Odonnell also complaints leg cramping on that side primarily in her posterior aspect of her knee and calf.  This typically improves proves with rest and over-the-counter topical spray.    Interval History 4/18/22: Injections wearing off. Saw vascular.   Interval History 5/9/22: Here for 1-3 Euflexxa series   Interval History 5/19/22: Here for 2-3 Euflexxa series. Mild improvement in pain.   Interval History 5/27/22: Here for 3-3 Euflexxa series. Moderate improvement in pain.  Interval History 10/13/22: Here with return of pain.   Interval History 11/28/22: Here for Euflexxa injections. Previous steroid with good relief   12/5/22: Here for 2-3 Euflexxa series. Continued relief of pain.   12/12/22: Here for 3-3 Euflexxa series. Continued relief of pain.   7/11/23: Patient would like to restart Euflexxa injections. 6 months or relief with previous injections.  7/18/23: Here for 2-3 Euflexxa. 0-10 pain today.      PROGRESSIVE SYMPTOMS:  Pain worsened by weight bearing    FUNCTIONAL STATUS:   Do light to moderate work  around the house    PREVIOUS TREATMENTS:  Medical: Attempted Weight Loss, Steroid Injections and Biologic Injections  Physical Therapy: Use of Ambulatory Aid and Activities Modified   Previous Orthopaedic Surgery: None    REVIEW OF SYSTEMS:  PAIN ASSESSMENT:  See HPI.  MUSCULOSKELETAL: See HPI.  OTHER 10 point review of systems is negative except as stated in HPI above    PAST MEDICAL HISTORY   has a past medical history of Cataract, Diabetes mellitus type II, Diabetes with neurologic complications, DM retinopathy, H/O vitamin D deficiency, Hyperlipidemia, Hypertension, Obesity, Osteoarthritis, Peripheral neuropathy, Polyneuropathy, Severe obesity (BMI 35.0-39.9) with comorbidity (12/30/2016), Sleep apnea, and Tendonitis.     PAST SURGICAL HISTORY   has a past surgical history that includes Bladder suspension; Hysterectomy (1978); Tonsillectomy; Colonoscopy (N/A, 9/26/2015); Cataract extraction; and Neck mass excision (11/6/2019).     FAMILY HISTORY  family history includes Cataracts in her mother; Colon cancer in her maternal grandfather; Diabetes in her mother, sister, sister, and son; Hypertension in her brother, brother, daughter, mother, sister, sister, sister, and sister; Stroke in her mother; Thyroid disease in her maternal aunt, maternal aunt, mother, and sister.     SOCIAL HISTORY   reports that she quit smoking about 52 years ago. Her smoking use included cigarettes. She has a 3.75 pack-year smoking history. She has never used smokeless tobacco. She reports that she does not drink alcohol and does not use drugs.     ALLERGIES   Review of patient's allergies indicates:   Allergen Reactions    Atorvastatin      Muscle pain     Crestor [rosuvastatin] Other (See Comments)     Leg Weakness.         MEDICATIONS  Current Outpatient Medications on File Prior to Visit   Medication Sig Dispense Refill    amLODIPine (NORVASC) 10 MG tablet Take 1 tablet (10 mg total) by mouth once daily. 90 tablet 2    aspirin 81 MG  Chew Take 1 tablet (81 mg total) by mouth once daily. 30 tablet 0    blood-glucose sensor (DEXCOM G6 SENSOR) Sujey Change sensor every 10 days 3 each 11    blood-glucose transmitter (DEXCOM G6 TRANSMITTER) Sujey Change every 3 months 1 each 3    cetirizine (ZYRTEC) 10 MG tablet Take 1 tablet (10 mg total) by mouth once daily. For 30 days 30 tablet 3    CONTOUR NEXT TEST STRIPS Strp Check glucose before breakfast and dinner. 100 each 5    diabetic supplies, miscellan. Kit Please dispense one 14 day FreeStyle Giulia [place] 1 kit 0    diclofenac sodium (VOLTAREN) 1 % Gel Lower extremities: Apply the gel (4 g) to the affected area 4 times daily. Do not apply more than 16 g daily to any one affected joint of the lower extremities. Upper extremities: Apply the gel (2 g) to the affected area 4 times daily. Do not apply more than 8 g daily to any one affected joint of the upper extremities. Total dose should not exceed 32 g per day, overall affected joints. 100 g 5    empagliflozin (JARDIANCE) 10 mg tablet Take 1 tablet (10 mg total) by mouth once daily. 90 tablet 2    flash glucose sensor (FREESTYLE GIULIA 14 DAY SENSOR) Kit 1 sensor kit every 14 days (requires 2 kits per month) (Patient not taking: Reported on 6/19/2023) 2 kit 6    gabapentin (NEURONTIN) 300 MG capsule Take 1 capsule (300 mg total) by mouth 3 (three) times daily. 90 capsule 11    insulin glargine U-300 conc (TOUJEO MAX U-300 SOLOSTAR) 300 unit/mL (3 mL) insulin pen INJECT 40 UNITS SUBCUTANEOUSLY ONCE DAILY 2 pen 5    insulin lispro (HUMALOG KWIKPEN INSULIN) 100 unit/mL pen Use with sliding scale - 150-200=+2, 201-250=+4; 251-300=+6; 301-350=+8, over 350=+10 units 15 mL 0    losartan-hydrochlorothiazide 100-25 mg (HYZAAR) 100-25 mg per tablet Take 1 tablet by mouth once daily. 90 tablet 3    meloxicam (MOBIC) 7.5 MG tablet Take 1 tablet (7.5 mg total) by mouth once daily. 12 tablet 0    metFORMIN (GLUCOPHAGE-XR) 500 MG ER 24hr tablet Take 2 tablets (1,000 mg  total) by mouth Daily. 180 tablet 2    metoprolol succinate (TOPROL-XL) 50 MG 24 hr tablet Take 1 tablet (50 mg total) by mouth once daily. 90 tablet 2    montelukast (SINGULAIR) 10 mg tablet Take 1 tablet (10 mg total) by mouth once daily. 90 tablet 1    pravastatin (PRAVACHOL) 80 MG tablet Take 1 tablet (80 mg total) by mouth once daily. 90 tablet 3    tirzepatide (MOUNJARO) 12.5 mg/0.5 mL PnIj Inject 12.5 mg (one pen) into the skin every 7 days. 4 pen 3     No current facility-administered medications on file prior to visit.          PHYSICAL EXAM   vitals were not taken for this visit.   There is no height or weight on file to calculate BMI.      All other systems deferred.  GENERAL:  No acute distress  HABITUS: Obese  GAIT: Antalgic  SKIN: Normal     KNEE EXAM:    Bilateral:   Effusion: Minimal joint effusion  TTP: yes over Medial Joint Line and Lateral Joint Line   no crepitus with passive knee ROM  Passive ROM: Extension 0, Flexion 130  No pain with manipulation of patella  Stable to varus/valgus stress. No increased laxity to anterior/posterior drawer testing  negative Ngozi's test  No pain with IR/ER rotation of the hip  5/5 strength in knee flexion and extension, ankle plantarflexion and dorsiflexion  Neurovascular Status: Sensation intact to light touch in Sural, Saphenous, SPN, DPN, Tibial nerve distribution  2+ pulse DP/PT, normal capillary refill, foot has normal warmth    DATA:  Diagnostic tests reviewed for today's visit:     4v of the bilateral knee reveal Moderate degenerative changes of the Lateral and Patellofemoral compartment. There is evidence of advanced osteoarthritis changes with Subchondral sclerosis, Osteophyte formation and Joint space narrowing. The limb is in netural alignment. The patella is tracking midline and is in normal alignment. Vascular calcifications present. Kellegren-Lawerence grade 4 changes on the right, grade 3 on the left.

## 2023-07-25 ENCOUNTER — OFFICE VISIT (OUTPATIENT)
Dept: ORTHOPEDICS | Facility: CLINIC | Age: 78
End: 2023-07-25
Payer: COMMERCIAL

## 2023-07-25 VITALS — HEIGHT: 66 IN | WEIGHT: 218 LBS | BODY MASS INDEX: 35.03 KG/M2

## 2023-07-25 DIAGNOSIS — M17.0 PRIMARY OSTEOARTHRITIS OF BOTH KNEES: Primary | ICD-10-CM

## 2023-07-25 PROCEDURE — 99999 PR PBB SHADOW E&M-EST. PATIENT-LVL III: CPT | Mod: PBBFAC,,, | Performed by: ORTHOPAEDIC SURGERY

## 2023-07-25 PROCEDURE — 99999 PR PBB SHADOW E&M-EST. PATIENT-LVL III: ICD-10-PCS | Mod: PBBFAC,,, | Performed by: ORTHOPAEDIC SURGERY

## 2023-07-25 PROCEDURE — 20610 LARGE JOINT ASPIRATION/INJECTION: BILATERAL KNEE: ICD-10-PCS | Mod: 50,S$GLB,, | Performed by: ORTHOPAEDIC SURGERY

## 2023-07-25 PROCEDURE — 99499 UNLISTED E&M SERVICE: CPT | Mod: S$GLB,,, | Performed by: ORTHOPAEDIC SURGERY

## 2023-07-25 PROCEDURE — 99499 NO LOS: ICD-10-PCS | Mod: S$GLB,,, | Performed by: ORTHOPAEDIC SURGERY

## 2023-07-25 PROCEDURE — 20610 DRAIN/INJ JOINT/BURSA W/O US: CPT | Mod: 50,PBBFAC,PN | Performed by: ORTHOPAEDIC SURGERY

## 2023-07-25 PROCEDURE — 99213 OFFICE O/P EST LOW 20 MIN: CPT | Mod: PBBFAC,PN | Performed by: ORTHOPAEDIC SURGERY

## 2023-07-25 PROCEDURE — 20610 DRAIN/INJ JOINT/BURSA W/O US: CPT | Mod: PBBFAC,50,PN | Performed by: ORTHOPAEDIC SURGERY

## 2023-07-25 RX ADMIN — Medication 10 MG: at 10:07

## 2023-07-25 NOTE — PROGRESS NOTES
EST PATIENT ORTHOPAEDIC: Knee    PRIMARY CARE PHYSICIAN: Leonard Wells MD   REFERRING PROVIDER: Sumanth Erickson MD  [address] [place] [address]     ASSESSMENT & PLAN:    Impression:  Bilateral Knee Severe Degenerative Osteoarthritis, Primary     Follow Up Plan: PRN    Non operative care:    Mena Odonnell has physical exam evidence of above and wishes to pursue an non-operative care. I am recommending the following: Euflexxa series, activity modification.      To reivew: She would be an appropriate candidate for a right total knee replacement in the future should her symptoms warrant.  She was referred to vascular for intermittent claudication and peripheral vascular disease seen on x-ray .  There evaluation has referred her to lymphedema therapy. Swelling is improving. She is interested in repeat gel injections.  She is not interested in surgical intervention at this time. She is also diabetic so this is a good treatment modaliity for her.     The patient has been ordered:  None    CONSULTS:   None    ACTIVE PROBLEM LIST  Patient Active Problem List   Diagnosis    Type 2 diabetes mellitus with diabetic polyneuropathy    Hyperlipidemia    HTN (hypertension)    Knee pain    H/O vitamin D deficiency    Non morbid obesity due to excess calories    Bilateral carotid bruits    Osteoarthritis of right knee    Morbid obesity with body mass index (BMI) of 40.0 or higher    Dyspnea on exertion    Chronic cough    MEKA (obstructive sleep apnea)    Sensorineural hearing loss (SNHL) of right ear with restricted hearing of left ear    Tinnitus of both ears    Allergic rhinitis    Dysfunction of both eustachian tubes    Nasal septal deviation    Chronic eczematous otitis externa of both ears    Neck abscess    Epidermal inclusion cyst    Polyneuropathy associated with underlying disease    Pain and swelling of left knee    Pain and swelling of right knee    Weakness    Leg swelling    Aortic atherosclerosis     Decreased range of motion of lumbar spine    Abdominal weakness    Morbid obesity    Nonrheumatic aortic valve stenosis           SUBJECTIVE    CHIEF COMPLAINT: Knee Pain    HPI:   Mena Odonnell is a 77 y.o. female here for evaluation and management of right knee pain. There is not a specific incident that brought about this pain. she has had progressive problems with the knee(s) starting 2 years ago but progressing to multiple times a day over the past 6 months which is interfering with activities which include: walking 2 blocks and standing for prolonged periods of time    Currently the pain in the joint is rated at 5 out of 10 with moderate activity.  The pain is intermittent and is located in the Right knee, at level of joint line, located medially and located laterally. The pain is described as aching and sharp. Relieving factors include ambulatory device and rest.       Mena Odonnell also complaints leg cramping on that side primarily in her posterior aspect of her knee and calf.  This typically improves proves with rest and over-the-counter topical spray.    Interval History 4/18/22: Injections wearing off. Saw vascular.   Interval History 5/9/22: Here for 1-3 Euflexxa series   Interval History 5/19/22: Here for 2-3 Euflexxa series. Mild improvement in pain.   Interval History 5/27/22: Here for 3-3 Euflexxa series. Moderate improvement in pain.  Interval History 10/13/22: Here with return of pain.   Interval History 11/28/22: Here for Euflexxa injections. Previous steroid with good relief   12/5/22: Here for 2-3 Euflexxa series. Continued relief of pain.   12/12/22: Here for 3-3 Euflexxa series. Continued relief of pain.   7/11/23: Patient would like to restart Euflexxa injections. 6 months or relief with previous injections.  7/18/23: Here for 2-3 Euflexxa. 0-10 pain today.    7/25/23: Here for 3-3 Euflexxa. Some more pain today. Went to conference last week and had increase in pain.     PROGRESSIVE  SYMPTOMS:  Pain worsened by weight bearing    FUNCTIONAL STATUS:   Do light to moderate work around the house    PREVIOUS TREATMENTS:  Medical: Attempted Weight Loss, Steroid Injections and Biologic Injections  Physical Therapy: Use of Ambulatory Aid and Activities Modified   Previous Orthopaedic Surgery: None    REVIEW OF SYSTEMS:  PAIN ASSESSMENT:  See HPI.  MUSCULOSKELETAL: See HPI.  OTHER 10 point review of systems is negative except as stated in HPI above    PAST MEDICAL HISTORY   has a past medical history of Cataract, Diabetes mellitus type II, Diabetes with neurologic complications, DM retinopathy, H/O vitamin D deficiency, Hyperlipidemia, Hypertension, Obesity, Osteoarthritis, Peripheral neuropathy, Polyneuropathy, Severe obesity (BMI 35.0-39.9) with comorbidity (12/30/2016), Sleep apnea, and Tendonitis.     PAST SURGICAL HISTORY   has a past surgical history that includes Bladder suspension; Hysterectomy (1978); Tonsillectomy; Colonoscopy (N/A, 9/26/2015); Cataract extraction; and Neck mass excision (11/6/2019).     FAMILY HISTORY  family history includes Cataracts in her mother; Colon cancer in her maternal grandfather; Diabetes in her mother, sister, sister, and son; Hypertension in her brother, brother, daughter, mother, sister, sister, sister, and sister; Stroke in her mother; Thyroid disease in her maternal aunt, maternal aunt, mother, and sister.     SOCIAL HISTORY   reports that she quit smoking about 52 years ago. Her smoking use included cigarettes. She has a 3.75 pack-year smoking history. She has never used smokeless tobacco. She reports that she does not drink alcohol and does not use drugs.     ALLERGIES   Review of patient's allergies indicates:   Allergen Reactions    Atorvastatin      Muscle pain     Crestor [rosuvastatin] Other (See Comments)     Leg Weakness.         MEDICATIONS  Current Outpatient Medications on File Prior to Visit   Medication Sig Dispense Refill    amLODIPine (NORVASC)  10 MG tablet Take 1 tablet (10 mg total) by mouth once daily. 90 tablet 2    aspirin 81 MG Chew Take 1 tablet (81 mg total) by mouth once daily. 30 tablet 0    blood-glucose sensor (DEXCOM G6 SENSOR) Sujey Change sensor every 10 days 3 each 11    blood-glucose transmitter (DEXCOM G6 TRANSMITTER) Sujey Change every 3 months 1 each 3    cetirizine (ZYRTEC) 10 MG tablet Take 1 tablet (10 mg total) by mouth once daily. For 30 days 30 tablet 3    CONTOUR NEXT TEST STRIPS Strp Check glucose before breakfast and dinner. 100 each 5    diabetic supplies, miscellan. Kit Please dispense one 14 day FreeStyle Giulia Rupert 1 kit 0    diclofenac sodium (VOLTAREN) 1 % Gel Lower extremities: Apply the gel (4 g) to the affected area 4 times daily. Do not apply more than 16 g daily to any one affected joint of the lower extremities. Upper extremities: Apply the gel (2 g) to the affected area 4 times daily. Do not apply more than 8 g daily to any one affected joint of the upper extremities. Total dose should not exceed 32 g per day, overall affected joints. 100 g 5    empagliflozin (JARDIANCE) 10 mg tablet Take 1 tablet (10 mg total) by mouth once daily. 90 tablet 2    flash glucose sensor (FREESTYLE GIULIA 14 DAY SENSOR) Kit 1 sensor kit every 14 days (requires 2 kits per month) (Patient not taking: Reported on 6/19/2023) 2 kit 6    gabapentin (NEURONTIN) 300 MG capsule Take 1 capsule (300 mg total) by mouth 3 (three) times daily. 90 capsule 11    insulin glargine U-300 conc (TOUJEO MAX U-300 SOLOSTAR) 300 unit/mL (3 mL) insulin pen INJECT 40 UNITS SUBCUTANEOUSLY ONCE DAILY 2 pen 5    insulin lispro (HUMALOG KWIKPEN INSULIN) 100 unit/mL pen Use with sliding scale - 150-200=+2, 201-250=+4; 251-300=+6; 301-350=+8, over 350=+10 units 15 mL 0    losartan-hydrochlorothiazide 100-25 mg (HYZAAR) 100-25 mg per tablet Take 1 tablet by mouth once daily. 90 tablet 3    meloxicam (MOBIC) 7.5 MG tablet Take 1 tablet (7.5 mg total) by mouth once  "daily. 12 tablet 0    metFORMIN (GLUCOPHAGE-XR) 500 MG ER 24hr tablet Take 2 tablets (1,000 mg total) by mouth Daily. 180 tablet 2    metoprolol succinate (TOPROL-XL) 50 MG 24 hr tablet Take 1 tablet (50 mg total) by mouth once daily. 90 tablet 2    montelukast (SINGULAIR) 10 mg tablet Take 1 tablet (10 mg total) by mouth once daily. 90 tablet 1    pravastatin (PRAVACHOL) 80 MG tablet Take 1 tablet (80 mg total) by mouth once daily. 90 tablet 3    tirzepatide (MOUNJARO) 12.5 mg/0.5 mL PnIj Inject 12.5 mg (one pen) into the skin every 7 days. 4 pen 3     No current facility-administered medications on file prior to visit.          PHYSICAL EXAM   height is 5' 6" (1.676 m) and weight is 98.9 kg (218 lb).   Body mass index is 35.19 kg/m².      All other systems deferred.  GENERAL:  No acute distress  HABITUS: Obese  GAIT: Antalgic  SKIN: Normal     KNEE EXAM:    Bilateral:   Effusion: Minimal joint effusion  TTP: yes over Medial Joint Line and Lateral Joint Line   no crepitus with passive knee ROM  Passive ROM: Extension 0, Flexion 130  No pain with manipulation of patella  Stable to varus/valgus stress. No increased laxity to anterior/posterior drawer testing  negative Ngozi's test  No pain with IR/ER rotation of the hip  5/5 strength in knee flexion and extension, ankle plantarflexion and dorsiflexion  Neurovascular Status: Sensation intact to light touch in Sural, Saphenous, SPN, DPN, Tibial nerve distribution  2+ pulse DP/PT, normal capillary refill, foot has normal warmth    DATA:  Diagnostic tests reviewed for today's visit:     4v of the bilateral knee reveal Moderate degenerative changes of the Lateral and Patellofemoral compartment. There is evidence of advanced osteoarthritis changes with Subchondral sclerosis, Osteophyte formation and Joint space narrowing. The limb is in netural alignment. The patella is tracking midline and is in normal alignment. Vascular calcifications present. Kellegren-Lawerence " grade 4 changes on the right, grade 3 on the left.

## 2023-07-25 NOTE — PROCEDURES
Large Joint Aspiration/Injection: bilateral knee    Date/Time: 7/25/2023 10:00 AM  Performed by: Sumanth Erickson MD  Authorized by: Sumanth Erickson MD     Consent Done?:  Yes (Verbal)  Indications:  Arthritis and pain  Site marked: the procedure site was marked    Timeout: prior to procedure the correct patient, procedure, and site was verified    Prep: patient was prepped and draped in usual sterile fashion      Local anesthesia used?: Yes    Anesthesia:  Local infiltration  Local anesthetic:  Topical anesthetic    Details:  Needle Size:  22 G  Approach:  Anterolateral  Location:  Knee  Laterality:  Bilateral  Site:  Bilateral knee  Medications (Right):  10 mg sodium hyaluronate (EUFLEXXA) 10 mg/mL(mw 2.4 -3.6 million)  Medications (Left):  10 mg sodium hyaluronate (EUFLEXXA) 10 mg/mL(mw 2.4 -3.6 million)  Patient tolerance:  Patient tolerated the procedure well with no immediate complications

## 2023-08-02 ENCOUNTER — PES CALL (OUTPATIENT)
Dept: ADMINISTRATIVE | Facility: CLINIC | Age: 78
End: 2023-08-02
Payer: COMMERCIAL

## 2023-08-24 ENCOUNTER — HOSPITAL ENCOUNTER (EMERGENCY)
Facility: HOSPITAL | Age: 78
Discharge: HOME | End: 2023-08-24
Attending: EMERGENCY MEDICINE
Payer: COMMERCIAL

## 2023-08-24 VITALS
HEART RATE: 63 BPM | BODY MASS INDEX: 35.99 KG/M2 | TEMPERATURE: 98 F | OXYGEN SATURATION: 99 % | WEIGHT: 216 LBS | SYSTOLIC BLOOD PRESSURE: 120 MMHG | HEIGHT: 65 IN | DIASTOLIC BLOOD PRESSURE: 60 MMHG | RESPIRATION RATE: 20 BRPM
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
## 2023-08-24 DIAGNOSIS — S39.012A STRAIN OF LUMBAR REGION, INITIAL ENCOUNTER: Primary | ICD-10-CM

## 2023-08-24 PROCEDURE — 99283 EMERGENCY DEPT VISIT LOW MDM: CPT

## 2023-08-24 RX ORDER — ACETAMINOPHEN 500 MG
500 TABLET ORAL
Status: DISCONTINUED | OUTPATIENT
Start: 2023-08-24 | End: 2023-08-24 | Stop reason: HOSPADM

## 2023-08-24 RX ORDER — LIDOCAINE 50 MG/G
1 PATCH TOPICAL DAILY
Qty: 15 PATCH | Refills: 0 | Status: SHIPPED | OUTPATIENT
Start: 2023-08-24 | End: 2023-09-08

## 2023-08-24 RX ORDER — ACETAMINOPHEN 500 MG
500 TABLET ORAL EVERY 4 HOURS PRN
Qty: 20 TABLET | Refills: 0 | Status: SHIPPED | OUTPATIENT
Start: 2023-08-24 | End: 2023-08-29
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
## 2023-08-24 NOTE — DISCHARGE INSTRUCTIONS

## 2023-08-29 ENCOUNTER — TELEPHONE (OUTPATIENT)
Dept: FAMILY MEDICINE | Facility: CLINIC | Age: 78
End: 2023-08-29

## 2023-08-29 NOTE — TELEPHONE ENCOUNTER
----- Message from Rosetta Lynne sent at 8/29/2023 10:33 AM CDT -----  Regarding: self [phone]    Type: Patient Call Back       Who called: patient       What is the request in detail: Patient requesting a call back to reschedule her 9/7/23 enhanced annual wellness. Any day except Tuesday or Thursday, and after 9am.        Can the clinic reply by [facility]? no       Would the patient rather a call back or a response via [facility]? Call back       Best call back number: [phone]       Additional Information:

## 2023-09-13 ENCOUNTER — LAB VISIT (OUTPATIENT)
Dept: LAB | Facility: HOSPITAL | Age: 78
End: 2023-09-13
Attending: NURSE PRACTITIONER
Payer: COMMERCIAL

## 2023-09-13 DIAGNOSIS — Z79.4 TYPE 2 DIABETES MELLITUS WITH DIABETIC POLYNEUROPATHY, WITH LONG-TERM CURRENT USE OF INSULIN: ICD-10-CM

## 2023-09-13 DIAGNOSIS — E66.9 NON MORBID OBESITY, UNSPECIFIED OBESITY TYPE: ICD-10-CM

## 2023-09-13 DIAGNOSIS — E11.42 TYPE 2 DIABETES MELLITUS WITH DIABETIC POLYNEUROPATHY, WITH LONG-TERM CURRENT USE OF INSULIN: ICD-10-CM

## 2023-09-13 LAB
ESTIMATED AVG GLUCOSE: 148 MG/DL (ref 68–131)
HBA1C MFR BLD: 6.8 % (ref 4–5.6)

## 2023-09-13 PROCEDURE — 83036 HEMOGLOBIN GLYCOSYLATED A1C: CPT | Performed by: NURSE PRACTITIONER

## 2023-09-13 PROCEDURE — 36415 COLL VENOUS BLD VENIPUNCTURE: CPT | Mod: PO | Performed by: NURSE PRACTITIONER

## 2023-09-13 RX ORDER — TIRZEPATIDE 12.5 MG/.5ML
12.5 INJECTION, SOLUTION SUBCUTANEOUS
Qty: 4 PEN | Refills: 3 | Status: SHIPPED | OUTPATIENT
Start: 2023-09-13 | End: 2023-09-20 | Stop reason: SDUPTHER

## 2023-09-20 ENCOUNTER — OFFICE VISIT (OUTPATIENT)
Dept: ENDOCRINOLOGY | Facility: CLINIC | Age: 78
End: 2023-09-20
Payer: COMMERCIAL

## 2023-09-20 VITALS
WEIGHT: 217 LBS | DIASTOLIC BLOOD PRESSURE: 58 MMHG | SYSTOLIC BLOOD PRESSURE: 138 MMHG | TEMPERATURE: 98 F | HEART RATE: 78 BPM | BODY MASS INDEX: 36.11 KG/M2

## 2023-09-20 DIAGNOSIS — E66.01 SEVERE OBESITY (BMI 35.0-39.9) WITH COMORBIDITY: ICD-10-CM

## 2023-09-20 DIAGNOSIS — E66.9 NON MORBID OBESITY, UNSPECIFIED OBESITY TYPE: ICD-10-CM

## 2023-09-20 DIAGNOSIS — E11.42 TYPE 2 DIABETES MELLITUS WITH DIABETIC POLYNEUROPATHY, WITH LONG-TERM CURRENT USE OF INSULIN: Primary | ICD-10-CM

## 2023-09-20 DIAGNOSIS — R80.9 MICROALBUMINURIA: ICD-10-CM

## 2023-09-20 DIAGNOSIS — Z79.4 TYPE 2 DIABETES MELLITUS WITH DIABETIC POLYNEUROPATHY, WITH LONG-TERM CURRENT USE OF INSULIN: Primary | ICD-10-CM

## 2023-09-20 PROCEDURE — 1159F MED LIST DOCD IN RCRD: CPT | Mod: CPTII,S$GLB,, | Performed by: NURSE PRACTITIONER

## 2023-09-20 PROCEDURE — 99214 OFFICE O/P EST MOD 30 MIN: CPT | Mod: S$GLB,,, | Performed by: NURSE PRACTITIONER

## 2023-09-20 PROCEDURE — 95251 CONT GLUC MNTR ANALYSIS I&R: CPT | Mod: S$GLB,,, | Performed by: NURSE PRACTITIONER

## 2023-09-20 PROCEDURE — 3075F SYST BP GE 130 - 139MM HG: CPT | Mod: CPTII,S$GLB,, | Performed by: NURSE PRACTITIONER

## 2023-09-20 PROCEDURE — 3078F DIAST BP <80 MM HG: CPT | Mod: CPTII,S$GLB,, | Performed by: NURSE PRACTITIONER

## 2023-09-20 PROCEDURE — 99999 PR PBB SHADOW E&M-EST. PATIENT-LVL IV: ICD-10-PCS | Mod: PBBFAC,,, | Performed by: NURSE PRACTITIONER

## 2023-09-20 PROCEDURE — 3078F PR MOST RECENT DIASTOLIC BLOOD PRESSURE < 80 MM HG: ICD-10-PCS | Mod: CPTII,S$GLB,, | Performed by: NURSE PRACTITIONER

## 2023-09-20 PROCEDURE — 1160F RVW MEDS BY RX/DR IN RCRD: CPT | Mod: CPTII,S$GLB,, | Performed by: NURSE PRACTITIONER

## 2023-09-20 PROCEDURE — 1159F PR MEDICATION LIST DOCUMENTED IN MEDICAL RECORD: ICD-10-PCS | Mod: CPTII,S$GLB,, | Performed by: NURSE PRACTITIONER

## 2023-09-20 PROCEDURE — 3075F PR MOST RECENT SYSTOLIC BLOOD PRESS GE 130-139MM HG: ICD-10-PCS | Mod: CPTII,S$GLB,, | Performed by: NURSE PRACTITIONER

## 2023-09-20 PROCEDURE — 1160F PR REVIEW ALL MEDS BY PRESCRIBER/CLIN PHARMACIST DOCUMENTED: ICD-10-PCS | Mod: CPTII,S$GLB,, | Performed by: NURSE PRACTITIONER

## 2023-09-20 PROCEDURE — 99214 PR OFFICE/OUTPT VISIT, EST, LEVL IV, 30-39 MIN: ICD-10-PCS | Mod: S$GLB,,, | Performed by: NURSE PRACTITIONER

## 2023-09-20 PROCEDURE — 95251 PR GLUCOSE MONITOR, 72 HOUR, PHYS INTERP: ICD-10-PCS | Mod: S$GLB,,, | Performed by: NURSE PRACTITIONER

## 2023-09-20 PROCEDURE — 99999 PR PBB SHADOW E&M-EST. PATIENT-LVL IV: CPT | Mod: PBBFAC,,, | Performed by: NURSE PRACTITIONER

## 2023-09-20 RX ORDER — TIRZEPATIDE 12.5 MG/.5ML
12.5 INJECTION, SOLUTION SUBCUTANEOUS
Qty: 12 PEN | Refills: 2 | Status: SHIPPED | OUTPATIENT
Start: 2023-09-20 | End: 2023-10-26 | Stop reason: SDUPTHER

## 2023-09-20 RX ORDER — BLOOD-GLUCOSE SENSOR
EACH MISCELLANEOUS
Qty: 12 EACH | Refills: 3 | Status: SHIPPED | OUTPATIENT
Start: 2023-09-20

## 2023-09-20 RX ORDER — BLOOD-GLUCOSE,RECEIVER,CONT
EACH MISCELLANEOUS
Qty: 1 EACH | Refills: 0 | Status: SHIPPED | OUTPATIENT
Start: 2023-09-20

## 2023-09-20 NOTE — PATIENT INSTRUCTIONS
Continue current treatment.   Contact office if glucose drops less than 80.   Return to clinic in 4 months with labs prior. - in person     Prescription for Dexcom G7 sent to [facility] to see if it's covered through pharmacy. If not covered through pharmacy, will need orders from [facility] faxed to clinic.

## 2023-10-03 ENCOUNTER — TELEPHONE (OUTPATIENT)
Dept: ADMINISTRATIVE | Facility: CLINIC | Age: 78
End: 2023-10-03
Payer: COMMERCIAL

## 2023-10-26 DIAGNOSIS — J30.2 SEASONAL ALLERGIES: ICD-10-CM

## 2023-10-26 DIAGNOSIS — E11.42 TYPE 2 DIABETES MELLITUS WITH DIABETIC POLYNEUROPATHY, WITH LONG-TERM CURRENT USE OF INSULIN: ICD-10-CM

## 2023-10-26 DIAGNOSIS — R05.3 CHRONIC COUGH: ICD-10-CM

## 2023-10-26 DIAGNOSIS — E66.9 NON MORBID OBESITY, UNSPECIFIED OBESITY TYPE: ICD-10-CM

## 2023-10-26 DIAGNOSIS — E78.2 MIXED HYPERLIPIDEMIA: ICD-10-CM

## 2023-10-26 DIAGNOSIS — G63 POLYNEUROPATHY ASSOCIATED WITH UNDERLYING DISEASE: ICD-10-CM

## 2023-10-26 DIAGNOSIS — I10 ESSENTIAL HYPERTENSION: ICD-10-CM

## 2023-10-26 DIAGNOSIS — Z79.4 TYPE 2 DIABETES MELLITUS WITH DIABETIC POLYNEUROPATHY, WITH LONG-TERM CURRENT USE OF INSULIN: ICD-10-CM

## 2023-10-26 NOTE — TELEPHONE ENCOUNTER
Refill Routing Note   Medication(s) are not appropriate for processing by [facility] for the following reason(s):      Medication outside of protocol  Patient seen in ED/Hospital since LOV with PCP  No active prescription written by PCP    [facility] action(s):  Defer  Route Care Due:  Labs due          Appointments  past 12m or future 3m with PCP    Date Provider   Last Visit   4/3/2023 Leonard Wells MD   Next Visit   3/5/2024 Leonard Wells MD   ED visits in past 90 days: 1        Note composed:1:57 PM 10/26/2023

## 2023-10-26 NOTE — TELEPHONE ENCOUNTER
----- Message from Ranulfo Dickinson sent at 10/26/2023 10:15 AM CDT -----  Regarding: self  Type: RX Refill Request    Who Called:self    Have you contacted your pharmacy:no    Refill    RX Name and Strength:All medications for dr Wells    Preferred Pharmacy with phone number:  [id number] - [place] - [address]  [address]  [place] [address]  Phone: [phone] Fax: [phone]      Local or Mail Order:local    Would the patient rather a call back or a response via [facility]?callback     Best Call Back Number:[phone]    Additional Information:

## 2023-10-26 NOTE — TELEPHONE ENCOUNTER
Care Due:                  Date            Visit Type   Department     Provider  --------------------------------------------------------------------------------                                [facility]                              PRIMARY      MEDICINE/  Last Visit: 04-      CARE ([facility])   INTERNAL MED   Leonard Wells                              [facility]                              PRIMARY      MEDICINE/  Next Visit: 03-      CARE ([facility])   INTERNAL MED   Leonard Wells                                                            Last  Test          Frequency    Reason                     Performed    Due Date  --------------------------------------------------------------------------------    CBC.........  12 months..  diclofenac, meloxicam....  Not Found    Overdue    [employer] Embedded Care Due Messages. Reference number: [id number].   10/26/2023 11:25:22 AM CDT

## 2023-10-27 ENCOUNTER — TELEPHONE (OUTPATIENT)
Dept: FAMILY MEDICINE | Facility: CLINIC | Age: 78
End: 2023-10-27
Payer: COMMERCIAL

## 2023-10-27 RX ORDER — MELOXICAM 7.5 MG/1
7.5 TABLET ORAL DAILY
Qty: 12 TABLET | Refills: 0 | OUTPATIENT
Start: 2023-10-27

## 2023-10-27 RX ORDER — METOPROLOL SUCCINATE 50 MG/1
50 TABLET, EXTENDED RELEASE ORAL DAILY
Qty: 90 TABLET | Refills: 1 | Status: SHIPPED | OUTPATIENT
Start: 2023-10-27

## 2023-10-27 RX ORDER — MONTELUKAST SODIUM 10 MG/1
10 TABLET ORAL DAILY
Qty: 90 TABLET | Refills: 1 | Status: SHIPPED | OUTPATIENT
Start: 2023-10-27

## 2023-10-27 RX ORDER — CETIRIZINE HYDROCHLORIDE 10 MG/1
10 TABLET ORAL DAILY
Qty: 90 TABLET | Refills: 1 | Status: SHIPPED | OUTPATIENT
Start: 2023-10-27

## 2023-10-27 RX ORDER — AMLODIPINE BESYLATE 10 MG/1
10 TABLET ORAL DAILY
Qty: 90 TABLET | Refills: 1 | Status: SHIPPED | OUTPATIENT
Start: 2023-10-27 | End: 2024-04-24

## 2023-10-27 RX ORDER — TIRZEPATIDE 12.5 MG/.5ML
12.5 INJECTION, SOLUTION SUBCUTANEOUS
Qty: 12 PEN | Refills: 0 | Status: SHIPPED | OUTPATIENT
Start: 2023-10-27 | End: 2024-01-29 | Stop reason: SDUPTHER

## 2023-10-27 RX ORDER — METFORMIN HYDROCHLORIDE 500 MG/1
1000 TABLET, EXTENDED RELEASE ORAL DAILY
Qty: 180 TABLET | Refills: 0 | Status: SHIPPED | OUTPATIENT
Start: 2023-10-27 | End: 2024-01-29 | Stop reason: SDUPTHER

## 2023-10-27 RX ORDER — LOSARTAN POTASSIUM AND HYDROCHLOROTHIAZIDE 25; 100 MG/1; MG/1
1 TABLET ORAL DAILY
Qty: 90 TABLET | Refills: 1 | Status: SHIPPED | OUTPATIENT
Start: 2023-10-27 | End: 2024-10-26

## 2023-10-27 RX ORDER — GABAPENTIN 300 MG/1
300 CAPSULE ORAL 3 TIMES DAILY
Qty: 90 CAPSULE | Refills: 11 | Status: SHIPPED | OUTPATIENT
Start: 2023-10-27 | End: 2024-10-26

## 2023-10-27 RX ORDER — PRAVASTATIN SODIUM 80 MG/1
80 TABLET ORAL DAILY
Qty: 90 TABLET | Refills: 1 | Status: SHIPPED | OUTPATIENT
Start: 2023-10-27

## 2023-10-27 NOTE — TELEPHONE ENCOUNTER
----- Message from Betsy Murry sent at 10/27/2023 12:12 PM CDT -----  .Type: Patient Call Back    Who called: Paul Jenkins    What is the request in detail: Have questions about Prior Authorization not sure what you're requesting     Can the clinic reply by [facility]? No     Would the patient rather a call back or a response via [facility]? Call Back     Best call back number: [phone]      Additional Information:

## 2023-12-03 DIAGNOSIS — Z71.89 COMPLEX CARE COORDINATION: ICD-10-CM

## 2023-12-19 ENCOUNTER — CLINICAL SUPPORT (OUTPATIENT)
Dept: OTOLARYNGOLOGY | Facility: CLINIC | Age: 78
End: 2023-12-19
Payer: COMMERCIAL

## 2023-12-19 DIAGNOSIS — Z46.1 ENCOUNTER FOR FITTING AND ADJUSTMENT OF HEARING AID OF BOTH EARS: Primary | ICD-10-CM

## 2023-12-19 PROCEDURE — 99499 NO LOS: ICD-10-PCS | Mod: ,,, | Performed by: AUDIOLOGIST-HEARING AID FITTER

## 2023-12-19 PROCEDURE — 99499 UNLISTED E&M SERVICE: CPT | Mod: ,,, | Performed by: AUDIOLOGIST-HEARING AID FITTER

## 2023-12-19 NOTE — PROGRESS NOTES
Keenan Hernandes, CCC-A  Audiologist - [facility] [address] [place] [address]  [email or web address]  [phone]    Patient: Mena Odonnell   MRN: [id number]  [address]  Home Phone [phone]   Work Phone [phone]   Mobile [phone]   : 1945  TRACY: 2023      HEARING AID FOLLOW-UP      Mena Odonnell is here today stating that she is doing well with her hearing aids and would like them cleaned/checked.  Cleaned hearing aids and receivers (M1R & M1L) with wipe AU, then changed out cerushields and medium open domes from stock AU.  Input latest audiogram into LOVE and then opened 23 session.  Connected devices and verified no firmware updates needed.  Recalculated audiometric settings and increased overall gain from 90% --> 100%, then saved without any other changes to settings.  Mena Odonnell verbalized understanding and satisfaction with today's visit.  She will call if service is needed before her next appointment for otoscopy, an updated audiogram, and a hearing aid clean/check/adjustment.    Taya Moss M50-RT  #[id number] (R)  #[id number] (L)  Warranty     _______________________________  Keenan Hernandes, GARTH-A  Audiologist

## 2024-01-22 ENCOUNTER — LAB VISIT (OUTPATIENT)
Dept: LAB | Facility: HOSPITAL | Age: 79
End: 2024-01-22
Payer: COMMERCIAL

## 2024-01-22 DIAGNOSIS — Z79.4 TYPE 2 DIABETES MELLITUS WITH DIABETIC POLYNEUROPATHY, WITH LONG-TERM CURRENT USE OF INSULIN: ICD-10-CM

## 2024-01-22 DIAGNOSIS — E11.42 TYPE 2 DIABETES MELLITUS WITH DIABETIC POLYNEUROPATHY, WITH LONG-TERM CURRENT USE OF INSULIN: ICD-10-CM

## 2024-01-22 LAB
CHOLEST SERPL-MCNC: 191 MG/DL (ref 120–199)
CHOLEST/HDLC SERPL: 4.4 {RATIO} (ref 2–5)
ESTIMATED AVG GLUCOSE: 177 MG/DL (ref 68–131)
HBA1C MFR BLD: 7.8 % (ref 4–5.6)
HDLC SERPL-MCNC: 43 MG/DL (ref 40–75)
HDLC SERPL: 22.5 % (ref 20–50)
LDLC SERPL CALC-MCNC: 125.4 MG/DL (ref 63–159)
NONHDLC SERPL-MCNC: 148 MG/DL
TRIGL SERPL-MCNC: 113 MG/DL (ref 30–150)

## 2024-01-22 PROCEDURE — 80061 LIPID PANEL: CPT | Performed by: NURSE PRACTITIONER

## 2024-01-22 PROCEDURE — 83036 HEMOGLOBIN GLYCOSYLATED A1C: CPT | Performed by: NURSE PRACTITIONER

## 2024-01-22 PROCEDURE — 36415 COLL VENOUS BLD VENIPUNCTURE: CPT | Mod: PO | Performed by: NURSE PRACTITIONER

## 2024-01-25 DIAGNOSIS — M17.0 PRIMARY OSTEOARTHRITIS OF BOTH KNEES: Primary | ICD-10-CM

## 2024-01-29 ENCOUNTER — OFFICE VISIT (OUTPATIENT)
Dept: ENDOCRINOLOGY | Facility: CLINIC | Age: 79
End: 2024-01-29
Payer: COMMERCIAL

## 2024-01-29 VITALS
HEART RATE: 83 BPM | DIASTOLIC BLOOD PRESSURE: 62 MMHG | BODY MASS INDEX: 35.61 KG/M2 | SYSTOLIC BLOOD PRESSURE: 138 MMHG | WEIGHT: 214 LBS | TEMPERATURE: 98 F

## 2024-01-29 DIAGNOSIS — E11.42 TYPE 2 DIABETES MELLITUS WITH DIABETIC POLYNEUROPATHY, WITH LONG-TERM CURRENT USE OF INSULIN: Primary | ICD-10-CM

## 2024-01-29 DIAGNOSIS — Z79.4 TYPE 2 DIABETES MELLITUS WITH DIABETIC POLYNEUROPATHY, WITH LONG-TERM CURRENT USE OF INSULIN: Primary | ICD-10-CM

## 2024-01-29 DIAGNOSIS — E78.5 HYPERLIPIDEMIA, UNSPECIFIED HYPERLIPIDEMIA TYPE: ICD-10-CM

## 2024-01-29 DIAGNOSIS — R80.9 MICROALBUMINURIA: ICD-10-CM

## 2024-01-29 DIAGNOSIS — E66.9 NON MORBID OBESITY, UNSPECIFIED OBESITY TYPE: ICD-10-CM

## 2024-01-29 LAB — GLUCOSE SERPL-MCNC: 201 MG/DL (ref 70–110)

## 2024-01-29 PROCEDURE — 1160F RVW MEDS BY RX/DR IN RCRD: CPT | Mod: CPTII,S$GLB,, | Performed by: NURSE PRACTITIONER

## 2024-01-29 PROCEDURE — 3078F DIAST BP <80 MM HG: CPT | Mod: CPTII,S$GLB,, | Performed by: NURSE PRACTITIONER

## 2024-01-29 PROCEDURE — 3075F SYST BP GE 130 - 139MM HG: CPT | Mod: CPTII,S$GLB,, | Performed by: NURSE PRACTITIONER

## 2024-01-29 PROCEDURE — 99214 OFFICE O/P EST MOD 30 MIN: CPT | Mod: S$GLB,,, | Performed by: NURSE PRACTITIONER

## 2024-01-29 PROCEDURE — 82962 GLUCOSE BLOOD TEST: CPT | Mod: S$GLB,,, | Performed by: NURSE PRACTITIONER

## 2024-01-29 PROCEDURE — 99999 PR PBB SHADOW E&M-EST. PATIENT-LVL V: CPT | Mod: PBBFAC,,, | Performed by: NURSE PRACTITIONER

## 2024-01-29 PROCEDURE — 1159F MED LIST DOCD IN RCRD: CPT | Mod: CPTII,S$GLB,, | Performed by: NURSE PRACTITIONER

## 2024-01-29 RX ORDER — METFORMIN HYDROCHLORIDE 500 MG/1
1000 TABLET, EXTENDED RELEASE ORAL DAILY
Qty: 180 TABLET | Refills: 0 | Status: SHIPPED | OUTPATIENT
Start: 2024-01-29 | End: 2024-04-29 | Stop reason: SDUPTHER

## 2024-01-29 RX ORDER — INSULIN GLARGINE 300 U/ML
INJECTION, SOLUTION SUBCUTANEOUS
Qty: 2 PEN | Refills: 5 | Status: SHIPPED | OUTPATIENT
Start: 2024-01-29 | End: 2024-04-29 | Stop reason: SDUPTHER

## 2024-01-29 RX ORDER — LANCETS
EACH MISCELLANEOUS
Qty: 200 EACH | Refills: 3 | Status: SHIPPED | OUTPATIENT
Start: 2024-01-29

## 2024-01-29 RX ORDER — TIRZEPATIDE 12.5 MG/.5ML
12.5 INJECTION, SOLUTION SUBCUTANEOUS
Qty: 12 PEN | Refills: 1 | Status: SHIPPED | OUTPATIENT
Start: 2024-01-29 | End: 2024-03-14 | Stop reason: SDUPTHER

## 2024-01-29 RX ORDER — EZETIMIBE 10 MG/1
10 TABLET ORAL DAILY
Qty: 90 TABLET | Refills: 2 | Status: SHIPPED | OUTPATIENT
Start: 2024-01-29 | End: 2024-05-23 | Stop reason: SDUPTHER
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
## 2024-01-29 NOTE — PATIENT INSTRUCTIONS
See Diabetes Education for Dexcom G7 training.   Increase Toujeo from 40 to 50 units once daily.   Avoid excess consumption of prunes/sweets.   Continue metformin, Jardiance, and Mounjaro 12.5 mg. Pt declines higher dose of Jardiance or Mounjaro. She does not want her appetite further reduced.   Return to clinic in 3 months with labs prior.

## 2024-02-01 ENCOUNTER — CLINICAL SUPPORT (OUTPATIENT)
Dept: DIABETES | Facility: CLINIC | Age: 79
End: 2024-02-01
Payer: COMMERCIAL

## 2024-02-01 VITALS — HEIGHT: 65 IN | WEIGHT: 216.81 LBS | BODY MASS INDEX: 36.12 KG/M2

## 2024-02-01 DIAGNOSIS — E11.42 TYPE 2 DIABETES MELLITUS WITH DIABETIC POLYNEUROPATHY, WITH LONG-TERM CURRENT USE OF INSULIN: ICD-10-CM

## 2024-02-01 DIAGNOSIS — Z79.4 TYPE 2 DIABETES MELLITUS WITH DIABETIC POLYNEUROPATHY, WITH LONG-TERM CURRENT USE OF INSULIN: ICD-10-CM

## 2024-02-01 PROCEDURE — 99999 PR PBB SHADOW E&M-EST. PATIENT-LVL II: CPT | Mod: PBBFAC,,,

## 2024-02-01 PROCEDURE — G0108 DIAB MANAGE TRN  PER INDIV: HCPCS | Mod: S$GLB,,, | Performed by: FAMILY MEDICINE

## 2024-02-01 NOTE — PROGRESS NOTES
"Diabetes Care Specialist Progress Note  Author: Nelia King RN  Date: 2/1/2024    Program Intake  Reason for Diabetes Program Visit:: Intervention  Type of Intervention:: Individual  Individual: Device Training  Device Training: Personal CGM  Current diabetes risk level:: moderate  In the last 12 months, have you:: none  Permission to speak with others about care:: yes    Lab Results   Component Value Date    HGBA1C 7.8 (H) 01/22/2024       Clinical  Weight: 98.3 kg (216 lb 12.8 oz)   Height: 5' 5" (165.1 cm)   Body mass index is 36.08 kg/m².    Clinical Assessment  Current Diabetes Treatment: Oral Medication, Injectable, Insulin (Mounjaro 12.5mg weekly, Metformin 1000mg daily, Jardiance 10mg daily, Toujeo 40U daily, and Humalog SSI +2>150 etc.)  Have you ever experienced hypoglycemia (low blood sugar)?: no  Have you ever experienced hyperglycemia (high blood sugar)?: no    Medication Information  How do you obtain your medications?: Patient drives  How many days a week do you miss your medications?:  (Pt is not using her SSI, reviewed how to use her SSI. Reported some BS >150, pt will start using her SSI)  Do you sometimes have difficulty refilling your medications?: No  Medication adherence impacting ability to self-manage diabetes?: No       Additional Social History  Access to Mass Media & Technology  Does the patient have access to any of the following devices or technologies?: Smart phone  Media or technology needs impacting ability to self-manage diabetes?: No    Cognitive/Behavioral Health  Alert and Oriented: Yes  Difficulty Thinking: No  Requires Prompting: No  Requires assistance for routine expression?: No  Cognitive or behavioral barriers impacting ability to self-manage diabetes?: No    Culture/Denominational  Culture or Religion beliefs that may impact ability to access healthcare: No    Communication  Language preference: English  Hearing Problems: No  Communication needs impacting ability to " self-manage diabetes?: No    Health Literacy  Preferred Learning Method: Face to Face, Video, Reading Materials  How often do you need to have someone help you read instructions, pamphlets, or written material from your doctor or pharmacy?: Never  Health literacy needs impacting ability to self-manage diabetes?: No      Diabetes Self-Management Skills Assessment  Diabetes Disease Process/Treatment Options  Diabetes Disease Process/Treatment Options: Skills Assessment Completed: No  Deferred due to:: Time    Nutrition/Healthy Eating  Nutrition/Healthy Eating Skills Assessment Completed:: No  Deffered due to:: Time    Physical Activity/Exercise  Physical Activity/Exercise Skills Assessment Completed: : No  Deffered due to:: Time    Medications  Patient is able to describe current diabetes management routine.: yes  Diabetes management routine:: injectable medications, insulin, oral medications  Patient is able to identify current diabetes medications, dosages, and appropriate timing of medications.: yes  Patient understands the purpose of the medications taken for diabetes.: no  Patient reports problems or concerns with current medication regimen.: no  Medication Skills Assessment Completed:: Yes  Assessment indicates:: Instruction Needed, Knowledge deficit  Area of need?: Yes    Home Blood Glucose Monitoring  Patient states that blood sugar is checked at home daily.: yes  Monitoring Method:: personal continuous glucose monitor  Personal CGM type:: Dexcom G7 start today  Patient is able to use personal CGM appropriately.: no  CGM Report reviewed?: no  Home Blood Glucose Monitoring Skills Assessment Completed: : No  Assessment indicates:: Instruction Needed, Knowledge deficit  Area of need?: Yes    Acute Complications  Patient is able to identify types of acute complications: Yes  Patient Identified:: Hypoglycemia  Patient is able to state the basic meaning of hypoglycemia?: Yes  Able to state the blood sugar range for  hypoglycemia?: yes  Patient stated range:: <70  Patient can identify general symptoms of hypoglycemia: yes  Patient identified:: anxiety  Able to state proper treatment of hypoglycemia?: yes  Patient identified:: 1/2 can soda/fruit juice  Acute Complications Skills Assessment Completed: : Yes  Assessment indicates:: Instruction Needed  Area of need?: Yes    Chronic Complications  Chronic Complications Skills Assessment Completed: : No  Deferred due to:: Time    Psychosocial/Coping  Psychosocial/Coping Skills Assessment Completed: : No  Deffered due to:: Time      Assessment Summary and Plan    Based on today's diabetes care assessment, the following areas of need were identified:          2/1/2024    12:01 AM   Social   Access to Mass Media/Tech No   Cognitive/Behavioral Health No   Culture/Judaism No   Communication No   Health Literacy No            2/1/2024    12:01 AM   Clinical   Medication Adherence No            2/1/2024    12:01 AM   Diabetes Self-Management Skills   Medication Yes- Reviewed Patient's current medication regimen. Reviewed how to use sliding scale insulin.    Home Blood Glucose Monitoring Yes- see care planning   Acute Complications Yes- Discussed blood sugar values, prevention, detection, signs and symptoms, and treatment of hypoglycemia following rule of 15.      Today's interventions were provided through individual discussion, instruction, and written materials were provided.      Patient verbalized understanding of instruction and written materials.  Pt was able to return back demonstration of instructions today. Patient understood key points, needs reinforcement and further instruction.     Diabetes Self-Management Care Plan:    Today's Diabetes Self-Management Care Plan was developed with Mena's input. Mena has agreed to work toward the following goal(s) to improve his/her overall diabetes control.      Care Plan: Diabetes Management   Updates made since 1/2/2024 12:00 AM         Problem: Healthy Eating         Goal: Eat 2-3 meals daily with 30-45g/2-3 servings of Carbohydrate per meal. Limit snacking in between meal to 1 serving (15 grams).    Start Date: 6/20/2023   This Visit's Progress: Deferred   Priority: High   Barriers: No Barriers Identified   Note:    6/20/23 - - We concentrated on portion sizes at today's session. Overall meal planning and various choices for meals. Discussed carb vs non-carb foods, appropriate amount of carbs to have at meals/snacks and acceptable serving sizes of individual carb items.     - - Instructed on appropriate label reading and serving sizes of specific carb containing foods. Discussed other options of measuring portion sizes by using the hand method and/OR using the plate method for meal planning. Discussed having lean protein at all meals and adding non starchy vegetables at lunch and dinner. Instructed pt to aim for 2-3 evenly spaced meals or a small carb snack in place of a missed meal. Written education information provided to patient for use at home. Pt verbalized understanding of all the above.  Deferred 2/1/24       Problem: Blood Glucose Self-Monitoring         Goal: Patient agrees to change Dexcom G7 sensor every 10 days and review blood sugars per  3 times per day.    Start Date: 2/1/2024   Expected End Date: 2/12/2024   Priority: High   Barriers: No Barriers Identified   Note:    2/1/24- - DEXCOM G7 CGM TRAINING  Patient referred to clinic today for Dexcom G7 continuous glucose sensor system training. Pt used the dexcom  and reviewed video on starting dexcom G7 using the reciever. Also reviewed instructions per Dexcom G7 manual.   Overview:  5min glucose reading updates, trending arrows, BG graph screens, battery life indicator, Blue Tooth Symbol.  Menus: trend Graph, start sensor, enter BG, events, Alerts, Settings, Shutdown, Stop Sensor   Settings:                          * Urgent Low: 55 mg/dL                           * Urgent Low Soon: on                          * Low alert: 70 mg/dl repeat 15 min                          * High alert: 300 mg/dl                           * Rise rate: off                          * Fall Rate: off                          * Signal Loss: on repeat 20 min                          * No Reading: on repeat 20 min                          * Always sound: on  Reviewed additional setting options with patient, including Graph Height and Transmitter ID. Dexcom G7 was paired.    Reviewed where to find sensor insertion time and sensor expiration date.   Discussed no need to calibrate sensor during the entirety of the 10 day wear. Discussed that pt can calibrate sensor if desired, but at that time he/she will need to continue calibrating every 12 hours for sensor to remain accurate. Reviewed appropriate calibration techniques.  Reviewed Dexcom G7 site selection. Site selected and prepped using aseptic technique Inserted to back of left upper arm. Transmitter/sensor placed per instructions.  Patient able to demonstrate without difficulty.  Encouraged to review manual prior to starting another sensor.    range 20 feet, but the first 3 hrs keep within 3 feet of transmitter.  Pt instructed on lag time of interstitial fluid from CBG and was advised to tx hypoglycemia and dose insulin based on SMBG values.  Dexcom technical support contact number given and examples of when to contact them discussed. Patient advised to always check glucose with glucometer should readings do not match symptoms felt (ex. Hypo or hyperglycemia).          Follow Up Plan     Follow up in about 11 days (around 2/12/2024) for F/U #1 upload G7 and finish assessment/MNT.    Today's care plan and follow up schedule was discussed with patient.  Mena verbalized understanding of the care plan, goals, and agrees to follow up plan.        The patient was encouraged to communicate with his/her health care provider/physician and care  team regarding his/her condition(s) and treatment.  I provided the patient with my contact information today and encouraged to contact me via phone or [facility] Patient Portal as needed.     Length of Visit   Total Time: 60 Minutes

## 2024-02-05 ENCOUNTER — OFFICE VISIT (OUTPATIENT)
Dept: PODIATRY | Facility: CLINIC | Age: 79
End: 2024-02-05
Payer: COMMERCIAL

## 2024-02-05 VITALS
HEART RATE: 78 BPM | WEIGHT: 216.69 LBS | BODY MASS INDEX: 36.1 KG/M2 | SYSTOLIC BLOOD PRESSURE: 147 MMHG | DIASTOLIC BLOOD PRESSURE: 63 MMHG | HEIGHT: 65 IN

## 2024-02-05 DIAGNOSIS — L60.0 INGROWN NAIL: Primary | ICD-10-CM

## 2024-02-05 DIAGNOSIS — M79.674 TOE PAIN, RIGHT: ICD-10-CM

## 2024-02-05 DIAGNOSIS — E11.42 TYPE 2 DIABETES MELLITUS WITH DIABETIC POLYNEUROPATHY, WITH LONG-TERM CURRENT USE OF INSULIN: ICD-10-CM

## 2024-02-05 DIAGNOSIS — Z79.4 TYPE 2 DIABETES MELLITUS WITH DIABETIC POLYNEUROPATHY, WITH LONG-TERM CURRENT USE OF INSULIN: ICD-10-CM

## 2024-02-05 DIAGNOSIS — L03.031 PARONYCHIA, TOE, RIGHT: ICD-10-CM

## 2024-02-05 PROCEDURE — 3078F DIAST BP <80 MM HG: CPT | Mod: CPTII,S$GLB,, | Performed by: PODIATRIST

## 2024-02-05 PROCEDURE — 1101F PT FALLS ASSESS-DOCD LE1/YR: CPT | Mod: CPTII,S$GLB,, | Performed by: PODIATRIST

## 2024-02-05 PROCEDURE — 1126F AMNT PAIN NOTED NONE PRSNT: CPT | Mod: CPTII,S$GLB,, | Performed by: PODIATRIST

## 2024-02-05 PROCEDURE — 3077F SYST BP >= 140 MM HG: CPT | Mod: CPTII,S$GLB,, | Performed by: PODIATRIST

## 2024-02-05 PROCEDURE — 1159F MED LIST DOCD IN RCRD: CPT | Mod: CPTII,S$GLB,, | Performed by: PODIATRIST

## 2024-02-05 PROCEDURE — 99999 PR PBB SHADOW E&M-EST. PATIENT-LVL V: CPT | Mod: PBBFAC,,, | Performed by: PODIATRIST

## 2024-02-05 PROCEDURE — 99204 OFFICE O/P NEW MOD 45 MIN: CPT | Mod: S$GLB,,, | Performed by: PODIATRIST

## 2024-02-05 PROCEDURE — 3288F FALL RISK ASSESSMENT DOCD: CPT | Mod: CPTII,S$GLB,, | Performed by: PODIATRIST

## 2024-02-05 RX ORDER — AZELASTINE 1 MG/ML
2 SPRAY, METERED NASAL
COMMUNITY
Start: 2023-12-26

## 2024-02-05 RX ORDER — FLUCONAZOLE 150 MG/1
150 TABLET ORAL
COMMUNITY
Start: 2023-10-15

## 2024-02-05 RX ORDER — CARBINOXAMINE MALEATE 6 MG/1
6 TABLET ORAL EVERY 8 HOURS PRN
COMMUNITY
Start: 2023-12-26

## 2024-02-05 RX ORDER — TOBRAMYCIN 3 MG/ML
SOLUTION/ DROPS OPHTHALMIC
Qty: 5 ML | Refills: 3 | Status: SHIPPED | OUTPATIENT
Start: 2024-02-05

## 2024-02-05 NOTE — PROGRESS NOTES
Subjective:      Patient ID: Mena Odonnell is a 78 y.o. female.    Chief Complaint: Diabetic Foot Exam (Candy Brown, NP 01/29/2024) and Ingrown Toenail (R great toe)    Diabetes, increased risk amputation needing evaluation/management/optomization of foot care.    Cc2 sharp throbbing pain right big toe/nail.  Gradual onset, worsening over past several weeks, aggravated by increased weight bearing, shoe gear, pressure.  No previous medical treatment.  OTC pain med not helping. Denies trauma, surgery right big toe.    Chief Complaint   Patient presents with    Diabetic Foot Exam     Candy Brown, NP 01/29/2024    Ingrown Toenail     R great toe       Casual shoes both feet.    Review of Systems   Constitutional: Negative for chills, diaphoresis, fever, malaise/fatigue and night sweats.   Cardiovascular:  Positive for leg swelling. Negative for claudication, cyanosis and syncope.   Skin:  Positive for nail changes. Negative for color change, dry skin, rash, suspicious lesions and unusual hair distribution.   Musculoskeletal:  Negative for falls, joint pain, joint swelling, muscle cramps, muscle weakness and stiffness.   Gastrointestinal:  Negative for constipation, diarrhea, nausea and vomiting.   Neurological:  Positive for sensory change. Negative for brief paralysis, disturbances in coordination, focal weakness, numbness, paresthesias and tremors.         Objective:      Physical Exam  Constitutional:       General: She is not in acute distress.     Appearance: She is well-developed. She is not diaphoretic.   Cardiovascular:      Pulses:           Dorsalis pedis pulses are 1+ on the right side and 1+ on the left side.        Posterior tibial pulses are 1+ on the right side and 1+ on the left side.      Comments: Capillary refill 3 seconds all toes/distal feet, all toes/both feet warm to touch.      Negative lymphadenopathy bilateral popliteal fossa and tarsal tunnel.     <2+ ptiting lower  extremity edema bilateral.    Musculoskeletal:      Right ankle: No swelling, deformity, ecchymosis or lacerations. Normal range of motion. Normal pulse.      Right Achilles Tendon: Normal. No defects. Dhillon's test negative.      Comments: Normal angle, base, station of gait. All ten toes without clubbing, cyanosis, or signs of ischemia.  No pain to palpation bilateral lower extremities.  Range of motion, stability, muscle strength, and muscle tone normal bilateral feet and legs.    Lymphadenopathy:      Lower Body: No right inguinal adenopathy. No left inguinal adenopathy.      Comments: Negative lymphadenopathy bilateral popliteal fossa and tarsal tunnel.    Negative lymphangitic streaking bilateral feet/ankles/legs.   Skin:     General: Skin is warm and dry.      Capillary Refill: Capillary refill takes 2 to 3 seconds.      Coloration: Skin is not pale.      Findings: No abrasion, bruising, burn, ecchymosis, erythema, laceration, lesion or rash.      Nails: There is no clubbing.      Comments: Skin is normal age and health appropriate color, turgor, texture, and temperature bilateral lower extremities without ulceration, hyperpigmentation, discoloration, masses nodules or cords palpated.  No ecchymosis, erythema, edema, or cardinal signs of infection bilateral lower extremities.    Visible and palpable ingrowth of toenail medial border right hallux with pain to palpation, and focal localized erythema and edema,  without ulceration, drainage, pus, tracking, fluctuance, malodor, or cardinal signs infection.       Neurological:      Mental Status: She is alert and oriented to person, place, and time.      Sensory: Sensory deficit present.      Motor: No tremor, atrophy or abnormal muscle tone.      Gait: Gait normal.      Comments: Subjective numbness without lops.    Decreased/absent vibratory sensation bilateral feet to 128Hz tuning fork.     Psychiatric:         Behavior: Behavior is cooperative.            Assessment:       Encounter Diagnoses   Name Primary?    Type 2 diabetes mellitus with diabetic polyneuropathy, with long-term current use of insulin     Ingrown nail Yes    Toe pain, right     Paronychia, toe, right          Plan:       Mena was seen today for diabetic foot exam and ingrown toenail.    Diagnoses and all orders for this visit:    Ingrown nail    Type 2 diabetes mellitus with diabetic polyneuropathy, with long-term current use of insulin  -     Ambulatory referral/consult to Podiatry    Toe pain, right    Paronychia, toe, right    Other orders  -     tobramycin sulfate 0.3% (TOBREX) 0.3 % ophthalmic solution; 1-2 drops topically twice daily to affected toe(s).      I counseled the patient on her conditions, their implications and medical management.        Diabetes, increased risk amputation needing evaluation/management/optomization of foot care.    Inspect feet multiple times daily for signs of occurrence/recurrence ulceration.    With the patient's permission, I debrided all ten toenails with a sterile nipper and curette, removing all offending nail and debris.  Patient tolerated the procedure well and related significant relief.    Tobramycin topically right hallux bid.    Cover right hallus all times with band aid/similar changing daily.    Discussed conservative treatment with shoes of adequate dimensions, material, and style to alleviate symptoms and delay or prevent surgical intervention.           Follow up in about 1 year (around 2/5/2025).

## 2024-02-12 ENCOUNTER — CLINICAL SUPPORT (OUTPATIENT)
Dept: DIABETES | Facility: CLINIC | Age: 79
End: 2024-02-12
Payer: COMMERCIAL

## 2024-02-12 VITALS — HEIGHT: 65 IN | BODY MASS INDEX: 36.79 KG/M2 | WEIGHT: 220.81 LBS

## 2024-02-12 DIAGNOSIS — E11.42 TYPE 2 DIABETES MELLITUS WITH DIABETIC POLYNEUROPATHY, WITH LONG-TERM CURRENT USE OF INSULIN: Primary | ICD-10-CM

## 2024-02-12 DIAGNOSIS — Z79.4 TYPE 2 DIABETES MELLITUS WITH DIABETIC POLYNEUROPATHY, WITH LONG-TERM CURRENT USE OF INSULIN: Primary | ICD-10-CM

## 2024-02-12 PROCEDURE — 99999 PR PBB SHADOW E&M-EST. PATIENT-LVL II: CPT | Mod: PBBFAC,,,

## 2024-02-12 PROCEDURE — 95249 CONT GLUC MNTR PT PROV EQP: CPT | Mod: S$GLB,,, | Performed by: FAMILY MEDICINE

## 2024-02-12 PROCEDURE — G0108 DIAB MANAGE TRN  PER INDIV: HCPCS | Mod: S$GLB,,, | Performed by: FAMILY MEDICINE

## 2024-02-12 NOTE — PROGRESS NOTES
"Diabetes Care Specialist Progress Note  Author: Nelia King RN  Date: 2/12/2024        Program Intake  Reason for Diabetes Program Visit:: Initial Diabetes Assessment  Individual: Education  Education: Self-Management Skill Review, Nutrition and Meal Planning  Current diabetes risk level:: moderate  In the last 12 months, have you:: none  Permission to speak with others about care:: yes  Continuous Glucose Monitoring  Patient has CGM: Yes  Personal CGM type:: Dexcom G7  GMI Date: 02/01/24    Lab Results   Component Value Date    HGBA1C 7.8 (H) 01/22/2024       Clinical  Weight: 100.2 kg (220 lb 12.8 oz)   Height: 5' 5" (165.1 cm)   Body mass index is 36.74 kg/m².    Clinical Assessment  Current Diabetes Treatment: Oral Medication, Injectable, Insulin (Mounjaro 12.5mg weekly, Metformin 1000mg daily, Jardiance 10mg daily, Toujeo 40U daily, and Humalog SSI +2>150 etc.)  Have you ever experienced hypoglycemia (low blood sugar)?: no  Have you ever experienced hyperglycemia (high blood sugar)?: no    Medication Information  How do you obtain your medications?: Patient drives  How many days a week do you miss your medications?: Never  Do you sometimes have difficulty refilling your medications?: No  Medication adherence impacting ability to self-manage diabetes?: No    Labs  Do you have regular lab work to monitor your medications?: Yes  Type of Regular Lab Work: A1c  Where do you get your labs drawn?: [facility]  Lab Compliance Barriers: No    Nutritional Status  Diet: Regular  Meal Plan 24 Hour Recall: Breakfast, Lunch, Dinner, Snack  Meal Plan 24 Hour Recall - Breakfast: ham sandwich, 4oz juice or mini coke  Meal Plan 24 Hour Recall - Lunch: beans, 1/2cup rice and chicken  Meal Plan 24 Hour Recall - Dinner: salad w/chicken, vegetables, and fruit cup  Meal Plan 24 Hour Recall - Snack: applesauce, or peaches, or fruit cup, or pineapples  Change in appetite?: No  Recent Changes in Weight: Weight Gain  Current nutritional " status an area of need that is impacting patient's ability to self-manage diabetes?: No    Additional Social History  Support  Does anyone support you with your diabetes care?: yes  Who supports you?: self  Who takes you to your medical appointments?: self  Does the current support meet the patient's needs?: Yes  Is Support an area impacting ability to self-manage diabetes?: No    Access to Mass Media & Technology  Does the patient have access to any of the following devices or technologies?: Smart phone  Media or technology needs impacting ability to self-manage diabetes?: No    Cognitive/Behavioral Health  Alert and Oriented: Yes  Difficulty Thinking: No  Requires Prompting: No  Requires assistance for routine expression?: No  Cognitive or behavioral barriers impacting ability to self-manage diabetes?: No    Culture/Anabaptism  Culture or Confucianist beliefs that may impact ability to access healthcare: No    Communication  Language preference: English  Hearing Problems: No  Vision Problems: Yes  Vision problem type:: Decreased Vision  Vision Assistance: Glasses  Communication needs impacting ability to self-manage diabetes?: No    Health Literacy  Preferred Learning Method: Face to Face, Reading Materials  How often do you need to have someone help you read instructions, pamphlets, or written material from your doctor or pharmacy?: Never  Health literacy needs impacting ability to self-manage diabetes?: No      Diabetes Self-Management Skills Assessment  Diabetes Disease Process/Treatment Options  Patient/caregiver able to state what happens when someone has diabetes.: somewhat  Patient/caregiver knows what type of diabetes they have.: yes  Diabetes Type : Type II  Patient/caregiver able to identify at least three signs and symptoms of diabetes.: no  Patient able to identify at least three risk factors for diabetes.: yes  Identified risk factors:: family history, reduced activity, age over 40  Diabetes Disease  Process/Treatment Options: Skills Assessment Completed: Yes  Assessment indicates:: Instruction Needed  Area of need?: Yes    Nutrition/Healthy Eating  Challenges to healthy eating:: portion control  Method of carbohydrate measurement:: no method  Patient can identify foods that impact blood sugar.: yes  Patient-identified foods:: soda, fruit/fruit juice, starches (bread, pasta, rice, cereal), sweets  Nutrition/Healthy Eating Skills Assessment Completed:: Yes  Assessment indicates:: Instruction Needed, Knowledge deficit  Area of need?: Yes    Physical Activity/Exercise  Physical Activity/Exercise Skills Assessment Completed: : No  Area of need?: Deferred    Medications  Patient is able to describe current diabetes management routine.: yes  Diabetes management routine:: diet, injectable medications, insulin, oral medications  Patient is able to identify current diabetes medications, dosages, and appropriate timing of medications.: yes  Patient understands the purpose of the medications taken for diabetes.: no  Patient reports problems or concerns with current medication regimen.: no  Medication Skills Assessment Completed:: Yes  Assessment indicates:: Instruction Needed, Knowledge deficit  Area of need?: Yes    Home Blood Glucose Monitoring  Patient states that blood sugar is checked at home daily.: yes  Monitoring Method:: personal continuous glucose monitor  Personal CGM type:: Dexcom G7  Patient is able to use personal CGM appropriately.: yes  CGM Report reviewed?: yes  Home Blood Glucose Monitoring Skills Assessment Completed: : Yes  Assessment indicates:: Instruction Needed  Area of need?: Yes    Acute Complications  Patient is able to identify types of acute complications: Yes  Patient Identified:: Hypoglycemia  Patient is able to state the basic meaning of hypoglycemia?: Yes  Able to state the blood sugar range for hypoglycemia?: yes  Patient stated range:: <70  Patient can identify general symptoms of  hypoglycemia: yes  Patient identified:: shakiness  Able to state proper treatment of hypoglycemia?: yes  Patient identified:: 1/2 can soda/fruit juice  Acute Complications Skills Assessment Completed: : Yes  Assessment indicates:: Instruction Needed  Area of need?: Yes    Chronic Complications  Chronic Complications Skills Assessment Completed: : No  Deferred due to:: Time    Psychosocial/Coping  Psychosocial/Coping Skills Assessment Completed: : No  Deffered due to:: Time      Assessment Summary and Plan    Based on today's diabetes care assessment, the following areas of need were identified:          2/12/2024    12:01 AM   Social   Support No   Access to Mass Media/Tech No   Cognitive/Behavioral Health No   Culture/Latter day No   Communication No   Health Literacy No            2/12/2024    12:01 AM   Clinical   Medication Adherence No   Lab Compliance No   Nutritional Status No            2/12/2024    12:01 AM   Diabetes Self-Management Skills   Diabetes Disease Process/Treatment Options Yes- Provided DM management guide and discussed risk factors of diabetes. Also, instructed patient on what is diabetes and the significance of current A1c and blood glucose goals.   Nutrition/Healthy Eating Yes- see care planning   Physical Activity/Exercise Deferred   Medication Yes- Reviewed Patient's current medication regimen. Reviewed how to use sliding scale insulin for correction dose.   Home Blood Glucose Monitoring Yes- see care planning   Acute Complications Yes- Discussed blood sugar values, prevention, detection, signs and symptoms, and treatment of hypoglycemia following rule of 15.       Today's interventions were provided through individual discussion, instruction, and written materials were provided.      Patient verbalized understanding of instruction and written materials.  Pt was able to return back demonstration of instructions today. Patient understood key points, needs reinforcement and further instruction.      Diabetes Self-Management Care Plan:    Today's Diabetes Self-Management Care Plan was developed with Mena's input. Mena has agreed to work toward the following goal(s) to improve his/her overall diabetes control.      Care Plan: Diabetes Management   Updates made since 1/13/2024 12:00 AM        Problem: Healthy Eating         Goal: Eat 2-3 meals daily with 30-45g/2-3 servings of Carbohydrate per meal. Limit snacking in between meal to 1 serving (15 grams).    Start Date: 6/20/2023   This Visit's Progress: On track   Recent Progress: Deferred   Priority: High   Barriers: No Barriers Identified   Note:    6/20/23 - - We concentrated on portion sizes at today's session. Overall meal planning and various choices for meals. Discussed carb vs non-carb foods, appropriate amount of carbs to have at meals/snacks and acceptable serving sizes of individual carb items.     - - Instructed on appropriate label reading and serving sizes of specific carb containing foods. Discussed other options of measuring portion sizes by using the hand method and/OR using the plate method for meal planning. Discussed having lean protein at all meals and adding non starchy vegetables at lunch and dinner. Instructed pt to aim for 2-3 evenly spaced meals or a small carb snack in place of a missed meal. Written education information provided to patient for use at home. Pt verbalized understanding of all the above.  Deferred 2/1/24    Care Plan Update 2/12/24 - - Discussed carb vs non-carb foods, appropriate amount of carbs to have at meals/snacks and acceptable serving sizes of individual carb items. Obtained a 24-hr food recall from patient. Discussed various meal plan options to promote healthy eating.    - - Practiced reading food labels on various food items with patient focusing on serving size and total carbohydrate intake (not sugar intake). Instructed on appropriate serving sizes of specific carb containing foods. Discussed having lean  protein at all meals and adding non starchy vegetables at lunch and dinner. Instructed pt to aim for 2-3 evenly spaced meals or a small carb snack in place of a missed meal. Written education information provided to patient for use at home. Pt verbalized understanding of all the above.         Problem: Blood Glucose Self-Monitoring         Goal: Patient agrees to change Dexcom G7 sensor every 10 days and review blood sugars per  3 times per day.    Start Date: 2/1/2024   Expected End Date: 2/12/2024   This Visit's Progress: On track   Priority: High   Barriers: No Barriers Identified   Note:    2/1/24- - DEXCOM G7 CGM TRAINING  Patient referred to clinic today for Dexcom G7 continuous glucose sensor system training. Pt used the dexcom  and reviewed video on starting dexcom G7 using the reciever. Also reviewed instructions per Dexcom G7 manual.   Overview:  5min glucose reading updates, trending arrows, BG graph screens, battery life indicator, Blue Tooth Symbol.  Menus: trend Graph, start sensor, enter BG, events, Alerts, Settings, Shutdown, Stop Sensor   Settings:                          * Urgent Low: 55 mg/dL                          * Urgent Low Soon: on                          * Low alert: 70 mg/dl repeat 15 min                          * High alert: 300 mg/dl                           * Rise rate: off                          * Fall Rate: off                          * Signal Loss: on repeat 20 min                          * No Reading: on repeat 20 min                          * Always sound: on  Reviewed additional setting options with patient, including Graph Height and Transmitter ID. Dexcom G7 was paired.    Reviewed where to find sensor insertion time and sensor expiration date.   Discussed no need to calibrate sensor during the entirety of the 10 day wear. Discussed that pt can calibrate sensor if desired, but at that time he/she will need to continue calibrating every 12 hours  for sensor to remain accurate. Reviewed appropriate calibration techniques.  Reviewed Dexcom G7 site selection. Site selected and prepped using aseptic technique Inserted to back of left upper arm. Transmitter/sensor placed per instructions.  Patient able to demonstrate without difficulty.  Encouraged to review manual prior to starting another sensor.    range 20 feet, but the first 3 hrs keep within 3 feet of transmitter.  Pt instructed on lag time of interstitial fluid from CBG and was advised to tx hypoglycemia and dose insulin based on SMBG values.  Dexcom technical support contact number given and examples of when to contact them discussed. Patient advised to always check glucose with glucometer should readings do not match symptoms felt (ex. Hypo or hyperglycemia).      Care Plan Update 2/12/24 - - Reviewed Pt's dexcom BS report, pattern management, and BS elevations in relation to food and/or possible causes. Discussed ways to improve. Pt's time in range was 63%.       Follow Up Plan     Follow up in about 5 weeks (around 3/20/2024) for F/U #2 review BS report and meal planning.    Today's care plan and follow up schedule was discussed with patient.  Mena verbalized understanding of the care plan, goals, and agrees to follow up plan.        The patient was encouraged to communicate with his/her health care provider/physician and care team regarding his/her condition(s) and treatment.  I provided the patient with my contact information today and encouraged to contact me via phone or [facility] Patient Portal as needed.     Length of Visit   Total Time: 60 Minutes

## 2024-02-19 ENCOUNTER — OFFICE VISIT (OUTPATIENT)
Dept: ORTHOPEDICS | Facility: CLINIC | Age: 79
End: 2024-02-19
Payer: COMMERCIAL

## 2024-02-19 VITALS — HEIGHT: 65 IN | BODY MASS INDEX: 36.65 KG/M2 | WEIGHT: 220 LBS

## 2024-02-19 DIAGNOSIS — M17.0 PRIMARY OSTEOARTHRITIS OF BOTH KNEES: Primary | ICD-10-CM

## 2024-02-19 PROCEDURE — 20610 DRAIN/INJ JOINT/BURSA W/O US: CPT | Mod: 50,S$GLB,, | Performed by: ORTHOPAEDIC SURGERY

## 2024-02-19 PROCEDURE — 1159F MED LIST DOCD IN RCRD: CPT | Mod: CPTII,S$GLB,, | Performed by: ORTHOPAEDIC SURGERY

## 2024-02-19 PROCEDURE — 99999 PR PBB SHADOW E&M-EST. PATIENT-LVL IV: CPT | Mod: PBBFAC,,, | Performed by: ORTHOPAEDIC SURGERY

## 2024-02-19 PROCEDURE — 1125F AMNT PAIN NOTED PAIN PRSNT: CPT | Mod: CPTII,S$GLB,, | Performed by: ORTHOPAEDIC SURGERY

## 2024-02-19 PROCEDURE — 99213 OFFICE O/P EST LOW 20 MIN: CPT | Mod: 25,S$GLB,, | Performed by: ORTHOPAEDIC SURGERY

## 2024-02-19 NOTE — PROCEDURES
Large Joint Aspiration/Injection: bilateral knee    Date/Time: 2/19/2024 7:40 AM    Performed by: Sumanth Erickson MD  Authorized by: Sumanth Erickson MD    Consent Done?:  Yes (Verbal)  Indications:  Arthritis  Prep: patient was prepped and draped in usual sterile fashion      Local anesthesia used?: Yes    Local anesthetic:  Topical anesthetic    Details:  Needle Size:  22 G  Location:  Knee  Laterality:  Bilateral  Site:  Bilateral knee  Medications (Right):  10 mg sodium hyaluronate (EUFLEXXA) 10 mg/mL(mw 2.4 -3.6 million)  Medications (Left):  10 mg sodium hyaluronate (EUFLEXXA) 10 mg/mL(mw 2.4 -3.6 million)  Patient tolerance:  Patient tolerated the procedure well with no immediate complications

## 2024-02-19 NOTE — PROGRESS NOTES
EST PATIENT ORTHOPAEDIC: Knee    PRIMARY CARE PHYSICIAN: Leonard Wells MD   REFERRING PROVIDER: Sumanth Erickson MD  [address] [place] [address]     ASSESSMENT & PLAN:    Impression:  Bilateral Knee Severe Degenerative Osteoarthritis, Primary     Follow Up Plan: For euflexxa    Non operative care:    Mena Odonnell has physical exam evidence of above and wishes to pursue an non-operative care. I am recommending the following: Euflexxa series, activity modification.      To reivew: She would be an appropriate candidate for a right total knee replacement in the future should her symptoms warrant.  She was referred to vascular for intermittent claudication and peripheral vascular disease seen on x-ray .  There evaluation has referred her to lymphedema therapy. Swelling is improving. She is interested in repeat gel injections.  She is not interested in surgical intervention at this time. She is also diabetic so this is a good treatment modaliity for her.     The patient has been ordered:  Euflexxa    CONSULTS:   None    ACTIVE PROBLEM LIST  Patient Active Problem List   Diagnosis    Type 2 diabetes mellitus with diabetic polyneuropathy    Hyperlipidemia    HTN (hypertension)    Knee pain    H/O vitamin D deficiency    Non morbid obesity due to excess calories    Bilateral carotid bruits    Osteoarthritis of right knee    Morbid obesity with body mass index (BMI) of 40.0 or higher    Dyspnea on exertion    Chronic cough    MEKA (obstructive sleep apnea)    Sensorineural hearing loss (SNHL) of right ear with restricted hearing of left ear    Tinnitus of both ears    Allergic rhinitis    Dysfunction of both eustachian tubes    Nasal septal deviation    Chronic eczematous otitis externa of both ears    Neck abscess    Epidermal inclusion cyst    Polyneuropathy associated with underlying disease    Pain and swelling of left knee    Pain and swelling of right knee    Weakness    Leg swelling    Aortic  atherosclerosis    Decreased range of motion of lumbar spine    Abdominal weakness    Morbid obesity    Nonrheumatic aortic valve stenosis           SUBJECTIVE    CHIEF COMPLAINT: Knee Pain    HPI:   Mena Odonnell is a 78 y.o. female here for evaluation and management of right knee pain. There is not a specific incident that brought about this pain. she has had progressive problems with the knee(s) starting 2 years ago but progressing to multiple times a day over the past 6 months which is interfering with activities which include: walking 2 blocks and standing for prolonged periods of time    Currently the pain in the joint is rated at 5 out of 10 with moderate activity.  The pain is intermittent and is located in the Right knee, at level of joint line, located medially and located laterally. The pain is described as aching and sharp. Relieving factors include ambulatory device and rest.       Mena Odonnell also complaints leg cramping on that side primarily in her posterior aspect of her knee and calf.  This typically improves proves with rest and over-the-counter topical spray.    Interval History 4/18/22: Injections wearing off. Saw vascular.   Interval History 5/9/22: Here for 1-3 Euflexxa series   Interval History 5/19/22: Here for 2-3 Euflexxa series. Mild improvement in pain.   Interval History 5/27/22: Here for 3-3 Euflexxa series. Moderate improvement in pain.  Interval History 10/13/22: Here with return of pain.   Interval History 11/28/22: Here for Euflexxa injections. Previous steroid with good relief   12/5/22: Here for 2-3 Euflexxa series. Continued relief of pain.   12/12/22: Here for 3-3 Euflexxa series. Continued relief of pain.   7/11/23: Patient would like to restart Euflexxa injections. 6 months or relief with previous injections.  7/18/23: Here for 2-3 Euflexxa. 0-10 pain today.    7/25/23: Here for 3-3 Euflexxa. Some more pain today. Went to conference last week and had increase in  pain.   2/19/24:  Patient here for follow up regarding her bilateral knee pain.  Previously seen over 6 months ago with good relief knee pain after Euflexxa series.  She called prior to this appointment for repeat Euflexxa injections which we put in for and were approved.  She is here for the 1st series of these injections. No changes in characteristics of pain.     PROGRESSIVE SYMPTOMS:  Pain worsened by weight bearing    FUNCTIONAL STATUS:   Do light to moderate work around the house    PREVIOUS TREATMENTS:  Medical: Attempted Weight Loss, Steroid Injections and Biologic Injections  Physical Therapy: Use of Ambulatory Aid and Activities Modified   Previous Orthopaedic Surgery: None    REVIEW OF SYSTEMS:  PAIN ASSESSMENT:  See HPI.  MUSCULOSKELETAL: See HPI.  OTHER 10 point review of systems is negative except as stated in HPI above    PAST MEDICAL HISTORY   has a past medical history of Cataract, Diabetes mellitus type II, Diabetes with neurologic complications, DM retinopathy, H/O vitamin D deficiency, Hyperlipidemia, Hypertension, Obesity, Osteoarthritis, Peripheral neuropathy, Polyneuropathy, Severe obesity (BMI 35.0-39.9) with comorbidity (12/30/2016), Sleep apnea, and Tendonitis.     PAST SURGICAL HISTORY   has a past surgical history that includes Bladder suspension; Hysterectomy (1978); Tonsillectomy; Colonoscopy (N/A, 9/26/2015); Cataract extraction; and Neck mass excision (11/6/2019).     FAMILY HISTORY  family history includes Cataracts in her mother; Colon cancer in her maternal grandfather; Diabetes in her mother, sister, sister, and son; Hypertension in her brother, brother, daughter, mother, sister, sister, sister, and sister; Stroke in her mother; Thyroid disease in her maternal aunt, maternal aunt, mother, and sister.     SOCIAL HISTORY   reports that she quit smoking about 53 years ago. Her smoking use included cigarettes. She started smoking about 68 years ago. She has a 3.8 pack-year smoking  history. She has never used smokeless tobacco. She reports that she does not drink alcohol and does not use drugs.     ALLERGIES   Review of patient's allergies indicates:   Allergen Reactions    Atorvastatin      Muscle pain     Crestor [rosuvastatin] Other (See Comments)     Leg Weakness.         MEDICATIONS  Current Outpatient Medications on File Prior to Visit   Medication Sig Dispense Refill    amLODIPine (NORVASC) 10 MG tablet Take 1 tablet (10 mg total) by mouth once daily. 90 tablet 1    azelastine (ASTELIN) 137 mcg (0.1 %) nasal spray 2 sprays by Nasal route.      blood sugar diagnostic Strp Check blood glucose 2 times a day 200 strip 3    carbinoxamine maleate 6 mg Tab Take 6 mg by mouth every 8 (eight) hours as needed.      cetirizine (ZYRTEC) 10 MG tablet Take 1 tablet (10 mg total) by mouth once daily. For 30 days 90 tablet 1    diclofenac sodium (VOLTAREN) 1 % Gel Lower extremities: Apply the gel (4 g) to the affected area 4 times daily. Do not apply more than 16 g daily to any one affected joint of the lower extremities. Upper extremities: Apply the gel (2 g) to the affected area 4 times daily. Do not apply more than 8 g daily to any one affected joint of the upper extremities. Total dose should not exceed 32 g per day, overall affected joints. 100 g 5    empagliflozin (JARDIANCE) 10 mg tablet Take 1 tablet (10 mg total) by mouth once daily. 90 tablet 2    ezetimibe (ZETIA) 10 mg tablet Take 1 tablet (10 mg total) by mouth once daily. 90 tablet 2    fluconazole (DIFLUCAN) 150 MG Tab Take 150 mg by mouth.      gabapentin (NEURONTIN) 300 MG capsule Take 1 capsule (300 mg total) by mouth 3 (three) times daily. 90 capsule 11    insulin glargine U-300 conc (TOUJEO MAX U-300 SOLOSTAR) 300 unit/mL (3 mL) insulin pen INJECT 40 UNITS SUBCUTANEOUSLY ONCE DAILY 2 pen 5    lancets Misc Check blood glucose 2x/day 200 each 3    losartan-hydrochlorothiazide 100-25 mg (HYZAAR) 100-25 mg per tablet Take 1 tablet by  "mouth once daily. 90 tablet 1    meloxicam (MOBIC) 7.5 MG tablet Take 1 tablet (7.5 mg total) by mouth once daily. 12 tablet 0    metFORMIN (GLUCOPHAGE-XR) 500 MG ER 24hr tablet Take 2 tablets (1,000 mg total) by mouth Daily. 180 tablet 0    metoprolol succinate (TOPROL-XL) 50 MG 24 hr tablet Take 1 tablet (50 mg total) by mouth once daily. 90 tablet 1    montelukast (SINGULAIR) 10 mg tablet Take 1 tablet (10 mg total) by mouth once daily. 90 tablet 1    pravastatin (PRAVACHOL) 80 MG tablet Take 1 tablet (80 mg total) by mouth once daily. 90 tablet 1    tirzepatide (MOUNJARO) 12.5 mg/0.5 mL PnIj Inject 12.5 mg (one pen) into the skin every 7 days. 12 pen 1    tobramycin sulfate 0.3% (TOBREX) 0.3 % ophthalmic solution 1-2 drops topically twice daily to affected toe(s). 5 mL 3    aspirin 81 MG Chew Take 1 tablet (81 mg total) by mouth once daily. 30 tablet 0    blood-glucose meter,continuous (DEXCOM G7 ) Misc Use with Dexcom G7 sensors (Patient not taking: Reported on 1/29/2024) 1 each 0    blood-glucose sensor (DEXCOM G7 SENSOR) Sujey Change every 10 days (Patient not taking: Reported on 1/29/2024) 12 each 3    insulin lispro (HUMALOG KWIKPEN INSULIN) 100 unit/mL pen Use with sliding scale - 150-200=+2, 201-250=+4; 251-300=+6; 301-350=+8, over 350=+10 units (Patient not taking: Reported on 1/29/2024) 15 mL 0     Current Facility-Administered Medications on File Prior to Visit   Medication Dose Route Frequency Provider Last Rate Last Admin    sodium hyaluronate (EUFLEXXA) 10 mg/mL(mw 2.4 -3.6 million) injection 20 mg  20 mg Intra-articular 1 time in Clinic/HOD Sumanth Erickson MD              PHYSICAL EXAM   height is 5' 5" (1.651 m) and weight is 99.8 kg (220 lb).   Body mass index is 36.61 kg/m².      All other systems deferred.  GENERAL:  No acute distress  HABITUS: Obese  GAIT: Antalgic  SKIN: Normal     KNEE EXAM:    Bilateral:   Effusion: Minimal joint effusion  TTP: yes over Medial Joint Line and " Lateral Joint Line   no crepitus with passive knee ROM  Passive ROM: Extension 0, Flexion 130  No pain with manipulation of patella  Stable to varus/valgus stress. No increased laxity to anterior/posterior drawer testing  negative Ngozi's test  No pain with IR/ER rotation of the hip  5/5 strength in knee flexion and extension, ankle plantarflexion and dorsiflexion  Neurovascular Status: Sensation intact to light touch in Sural, Saphenous, SPN, DPN, Tibial nerve distribution  2+ pulse DP/PT, normal capillary refill, foot has normal warmth    DATA:  Diagnostic tests reviewed for today's visit:     4v of the bilateral knee reveal Moderate degenerative changes of the Lateral and Patellofemoral compartment. There is evidence of advanced osteoarthritis changes with Subchondral sclerosis, Osteophyte formation and Joint space narrowing. The limb is in netural alignment. The patella is tracking midline and is in normal alignment. Vascular calcifications present. Kellegren-Lawerence grade 4 changes on the right, grade 3 on the left.

## 2024-02-26 ENCOUNTER — OFFICE VISIT (OUTPATIENT)
Dept: ORTHOPEDICS | Facility: CLINIC | Age: 79
End: 2024-02-26
Payer: COMMERCIAL

## 2024-02-26 VITALS — WEIGHT: 220 LBS | BODY MASS INDEX: 36.65 KG/M2 | HEIGHT: 65 IN

## 2024-02-26 DIAGNOSIS — M54.12 CERVICAL RADICULOPATHY: Primary | ICD-10-CM

## 2024-02-26 DIAGNOSIS — M17.0 PRIMARY OSTEOARTHRITIS OF BOTH KNEES: ICD-10-CM

## 2024-02-26 PROCEDURE — 99999 PR PBB SHADOW E&M-EST. PATIENT-LVL IV: CPT | Mod: PBBFAC,,, | Performed by: ORTHOPAEDIC SURGERY

## 2024-02-26 PROCEDURE — 99499 UNLISTED E&M SERVICE: CPT | Mod: S$GLB,,, | Performed by: ORTHOPAEDIC SURGERY

## 2024-02-26 NOTE — PROGRESS NOTES
EST PATIENT ORTHOPAEDIC: Knee    PRIMARY CARE PHYSICIAN: Leonard Wells MD   REFERRING PROVIDER: Sumanth Erickson MD  [address] [place] [address]     ASSESSMENT & PLAN:    Impression:  Bilateral Knee Severe Degenerative Osteoarthritis, Primary     Follow Up Plan: For euflexxa    Non operative care:    Mena Odonnell has physical exam evidence of above and wishes to pursue an non-operative care. I am recommending the following: Euflexxa series, activity modification.      To reivew: She would be an appropriate candidate for a right total knee replacement in the future should her symptoms warrant.  She was referred to vascular for intermittent claudication and peripheral vascular disease seen on x-ray .  There evaluation has referred her to lymphedema therapy. Swelling is improving. She is interested in repeat gel injections.  She is not interested in surgical intervention at this time. She is also diabetic so this is a good treatment modaliity for her.     The patient has been ordered:  Euflexxa    CONSULTS:   None    ACTIVE PROBLEM LIST  Patient Active Problem List   Diagnosis    Type 2 diabetes mellitus with diabetic polyneuropathy    Hyperlipidemia    HTN (hypertension)    Knee pain    H/O vitamin D deficiency    Non morbid obesity due to excess calories    Bilateral carotid bruits    Osteoarthritis of right knee    Morbid obesity with body mass index (BMI) of 40.0 or higher    Dyspnea on exertion    Chronic cough    MEKA (obstructive sleep apnea)    Sensorineural hearing loss (SNHL) of right ear with restricted hearing of left ear    Tinnitus of both ears    Allergic rhinitis    Dysfunction of both eustachian tubes    Nasal septal deviation    Chronic eczematous otitis externa of both ears    Neck abscess    Epidermal inclusion cyst    Polyneuropathy associated with underlying disease    Pain and swelling of left knee    Pain and swelling of right knee    Weakness    Leg swelling    Aortic  atherosclerosis    Decreased range of motion of lumbar spine    Abdominal weakness    Morbid obesity    Nonrheumatic aortic valve stenosis           SUBJECTIVE    CHIEF COMPLAINT: Knee Pain    HPI:   Mena Odonnell is a 78 y.o. female here for evaluation and management of right knee pain. There is not a specific incident that brought about this pain. she has had progressive problems with the knee(s) starting 2 years ago but progressing to multiple times a day over the past 6 months which is interfering with activities which include: walking 2 blocks and standing for prolonged periods of time    Currently the pain in the joint is rated at 5 out of 10 with moderate activity.  The pain is intermittent and is located in the Right knee, at level of joint line, located medially and located laterally. The pain is described as aching and sharp. Relieving factors include ambulatory device and rest.       Mena Odonnell also complaints leg cramping on that side primarily in her posterior aspect of her knee and calf.  This typically improves proves with rest and over-the-counter topical spray.    Interval History 4/18/22: Injections wearing off. Saw vascular.   Interval History 5/9/22: Here for 1-3 Euflexxa series   Interval History 5/19/22: Here for 2-3 Euflexxa series. Mild improvement in pain.   Interval History 5/27/22: Here for 3-3 Euflexxa series. Moderate improvement in pain.  Interval History 10/13/22: Here with return of pain.   Interval History 11/28/22: Here for Euflexxa injections. Previous steroid with good relief   12/5/22: Here for 2-3 Euflexxa series. Continued relief of pain.   12/12/22: Here for 3-3 Euflexxa series. Continued relief of pain.   7/11/23: Patient would like to restart Euflexxa injections. 6 months or relief with previous injections.  7/18/23: Here for 2-3 Euflexxa. 0-10 pain today.    7/25/23: Here for 3-3 Euflexxa. Some more pain today. Went to conference last week and had increase in  pain.   2/19/24:  Patient here for follow up regarding her bilateral knee pain.  Previously seen over 6 months ago with good relief knee pain after Euflexxa series.  She called prior to this appointment for repeat Euflexxa injections which we put in for and were approved.  She is here for the 1st series of these injections. No changes in characteristics of pain.   2/26/24: Here for 2-3 euflexxa. Improvement after first injection    PROGRESSIVE SYMPTOMS:  Pain worsened by weight bearing    FUNCTIONAL STATUS:   Do light to moderate work around the house    PREVIOUS TREATMENTS:  Medical: Attempted Weight Loss, Steroid Injections and Biologic Injections  Physical Therapy: Use of Ambulatory Aid and Activities Modified   Previous Orthopaedic Surgery: None    REVIEW OF SYSTEMS:  PAIN ASSESSMENT:  See HPI.  MUSCULOSKELETAL: See HPI.  OTHER 10 point review of systems is negative except as stated in HPI above    PAST MEDICAL HISTORY   has a past medical history of Cataract, Diabetes mellitus type II, Diabetes with neurologic complications, DM retinopathy, H/O vitamin D deficiency, Hyperlipidemia, Hypertension, Obesity, Osteoarthritis, Peripheral neuropathy, Polyneuropathy, Severe obesity (BMI 35.0-39.9) with comorbidity (12/30/2016), Sleep apnea, and Tendonitis.     PAST SURGICAL HISTORY   has a past surgical history that includes Bladder suspension; Hysterectomy (1978); Tonsillectomy; Colonoscopy (N/A, 9/26/2015); Cataract extraction; and Neck mass excision (11/6/2019).     FAMILY HISTORY  family history includes Cataracts in her mother; Colon cancer in her maternal grandfather; Diabetes in her mother, sister, sister, and son; Hypertension in her brother, brother, daughter, mother, sister, sister, sister, and sister; Stroke in her mother; Thyroid disease in her maternal aunt, maternal aunt, mother, and sister.     SOCIAL HISTORY   reports that she quit smoking about 53 years ago. Her smoking use included cigarettes. She  started smoking about 68 years ago. She has a 3.8 pack-year smoking history. She has never used smokeless tobacco. She reports that she does not drink alcohol and does not use drugs.     ALLERGIES   Review of patient's allergies indicates:   Allergen Reactions    Atorvastatin      Muscle pain     Crestor [rosuvastatin] Other (See Comments)     Leg Weakness.         MEDICATIONS  Current Outpatient Medications on File Prior to Visit   Medication Sig Dispense Refill    amLODIPine (NORVASC) 10 MG tablet Take 1 tablet (10 mg total) by mouth once daily. 90 tablet 1    azelastine (ASTELIN) 137 mcg (0.1 %) nasal spray 2 sprays by Nasal route.      blood sugar diagnostic Strp Check blood glucose 2 times a day 200 strip 3    carbinoxamine maleate 6 mg Tab Take 6 mg by mouth every 8 (eight) hours as needed.      cetirizine (ZYRTEC) 10 MG tablet Take 1 tablet (10 mg total) by mouth once daily. For 30 days 90 tablet 1    diclofenac sodium (VOLTAREN) 1 % Gel Lower extremities: Apply the gel (4 g) to the affected area 4 times daily. Do not apply more than 16 g daily to any one affected joint of the lower extremities. Upper extremities: Apply the gel (2 g) to the affected area 4 times daily. Do not apply more than 8 g daily to any one affected joint of the upper extremities. Total dose should not exceed 32 g per day, overall affected joints. 100 g 5    empagliflozin (JARDIANCE) 10 mg tablet Take 1 tablet (10 mg total) by mouth once daily. 90 tablet 2    ezetimibe (ZETIA) 10 mg tablet Take 1 tablet (10 mg total) by mouth once daily. 90 tablet 2    fluconazole (DIFLUCAN) 150 MG Tab Take 150 mg by mouth.      gabapentin (NEURONTIN) 300 MG capsule Take 1 capsule (300 mg total) by mouth 3 (three) times daily. 90 capsule 11    insulin glargine U-300 conc (TOUJEO MAX U-300 SOLOSTAR) 300 unit/mL (3 mL) insulin pen INJECT 40 UNITS SUBCUTANEOUSLY ONCE DAILY 2 pen 5    insulin lispro (HUMALOG KWIKPEN INSULIN) 100 unit/mL pen Use with sliding  "scale - 150-200=+2, 201-250=+4; 251-300=+6; 301-350=+8, over 350=+10 units 15 mL 0    lancets Misc Check blood glucose 2x/day 200 each 3    losartan-hydrochlorothiazide 100-25 mg (HYZAAR) 100-25 mg per tablet Take 1 tablet by mouth once daily. 90 tablet 1    meloxicam (MOBIC) 7.5 MG tablet Take 1 tablet (7.5 mg total) by mouth once daily. 12 tablet 0    metFORMIN (GLUCOPHAGE-XR) 500 MG ER 24hr tablet Take 2 tablets (1,000 mg total) by mouth Daily. 180 tablet 0    metoprolol succinate (TOPROL-XL) 50 MG 24 hr tablet Take 1 tablet (50 mg total) by mouth once daily. 90 tablet 1    montelukast (SINGULAIR) 10 mg tablet Take 1 tablet (10 mg total) by mouth once daily. 90 tablet 1    pravastatin (PRAVACHOL) 80 MG tablet Take 1 tablet (80 mg total) by mouth once daily. 90 tablet 1    tirzepatide (MOUNJARO) 12.5 mg/0.5 mL PnIj Inject 12.5 mg (one pen) into the skin every 7 days. 12 pen 1    tobramycin sulfate 0.3% (TOBREX) 0.3 % ophthalmic solution 1-2 drops topically twice daily to affected toe(s). 5 mL 3    aspirin 81 MG Chew Take 1 tablet (81 mg total) by mouth once daily. 30 tablet 0    blood-glucose meter,continuous (DEXCOM G7 ) Misc Use with Dexcom G7 sensors (Patient not taking: Reported on 1/29/2024) 1 each 0    blood-glucose sensor (DEXCOM G7 SENSOR) Sujey Change every 10 days (Patient not taking: Reported on 1/29/2024) 12 each 3     Current Facility-Administered Medications on File Prior to Visit   Medication Dose Route Frequency Provider Last Rate Last Admin    sodium hyaluronate (EUFLEXXA) 10 mg/mL(mw 2.4 -3.6 million) injection 20 mg  20 mg Intra-articular 1 time in Clinic/HOD Sumanth Erickson MD              PHYSICAL EXAM   height is 5' 5" (1.651 m) and weight is 99.8 kg (220 lb).   Body mass index is 36.61 kg/m².      All other systems deferred.  GENERAL:  No acute distress  HABITUS: Obese  GAIT: Antalgic  SKIN: Normal     KNEE EXAM:    Bilateral:   Effusion: Minimal joint effusion  TTP: yes over Medial " Joint Line and Lateral Joint Line   no crepitus with passive knee ROM  Passive ROM: Extension 0, Flexion 130  No pain with manipulation of patella  Stable to varus/valgus stress. No increased laxity to anterior/posterior drawer testing  negative Ngozi's test  No pain with IR/ER rotation of the hip  5/5 strength in knee flexion and extension, ankle plantarflexion and dorsiflexion  Neurovascular Status: Sensation intact to light touch in Sural, Saphenous, SPN, DPN, Tibial nerve distribution  2+ pulse DP/PT, normal capillary refill, foot has normal warmth    DATA:  Diagnostic tests reviewed for today's visit:     4v of the bilateral knee reveal Moderate degenerative changes of the Lateral and Patellofemoral compartment. There is evidence of advanced osteoarthritis changes with Subchondral sclerosis, Osteophyte formation and Joint space narrowing. The limb is in netural alignment. The patella is tracking midline and is in normal alignment. Vascular calcifications present. Kellegren-Lawerence grade 4 changes on the right, grade 3 on the left.

## 2024-02-27 ENCOUNTER — OFFICE VISIT (OUTPATIENT)
Dept: PAIN MEDICINE | Facility: CLINIC | Age: 79
End: 2024-02-27
Payer: COMMERCIAL

## 2024-02-27 VITALS
RESPIRATION RATE: 18 BRPM | HEART RATE: 78 BPM | OXYGEN SATURATION: 97 % | DIASTOLIC BLOOD PRESSURE: 70 MMHG | BODY MASS INDEX: 35.88 KG/M2 | SYSTOLIC BLOOD PRESSURE: 165 MMHG | WEIGHT: 215.63 LBS

## 2024-02-27 DIAGNOSIS — M54.12 CERVICAL RADICULOPATHY: ICD-10-CM

## 2024-02-27 DIAGNOSIS — M50.30 DDD (DEGENERATIVE DISC DISEASE), CERVICAL: Primary | ICD-10-CM

## 2024-02-27 DIAGNOSIS — M47.812 CERVICAL SPONDYLOSIS: ICD-10-CM

## 2024-02-27 PROCEDURE — 1125F AMNT PAIN NOTED PAIN PRSNT: CPT | Mod: CPTII,S$GLB,, | Performed by: PAIN MEDICINE

## 2024-02-27 PROCEDURE — 3078F DIAST BP <80 MM HG: CPT | Mod: CPTII,S$GLB,, | Performed by: PAIN MEDICINE

## 2024-02-27 PROCEDURE — 1101F PT FALLS ASSESS-DOCD LE1/YR: CPT | Mod: CPTII,S$GLB,, | Performed by: PAIN MEDICINE

## 2024-02-27 PROCEDURE — 3288F FALL RISK ASSESSMENT DOCD: CPT | Mod: CPTII,S$GLB,, | Performed by: PAIN MEDICINE

## 2024-02-27 PROCEDURE — 1159F MED LIST DOCD IN RCRD: CPT | Mod: CPTII,S$GLB,, | Performed by: PAIN MEDICINE

## 2024-02-27 PROCEDURE — 3077F SYST BP >= 140 MM HG: CPT | Mod: CPTII,S$GLB,, | Performed by: PAIN MEDICINE

## 2024-02-27 PROCEDURE — 1160F RVW MEDS BY RX/DR IN RCRD: CPT | Mod: CPTII,S$GLB,, | Performed by: PAIN MEDICINE

## 2024-02-27 PROCEDURE — 99999 PR PBB SHADOW E&M-EST. PATIENT-LVL V: CPT | Mod: PBBFAC,,, | Performed by: PAIN MEDICINE

## 2024-02-27 PROCEDURE — 99204 OFFICE O/P NEW MOD 45 MIN: CPT | Mod: S$GLB,,, | Performed by: PAIN MEDICINE

## 2024-02-27 NOTE — PROGRESS NOTES
Subjective:     Patient ID: Mena Odonnell is a 78 y.o. female    Chief Complaint: Neck Pain (Left sided tingling down into fingers)      Referred by: Sumanth Erickson MD      HPI:    Initial Encounter (2/27/24):  Mena Odonnell is a 78 y.o. female who presents today with left-sided neck and upper extremity pain/numbness.  This pain started roughly 1 week ago.  No specific inciting events or injuries noted.  Pain is located the left lower cervical paraspinal region left upper back.  Pain will extend to the left upper extremity all way to her hand with associated paresthesias.  Denies any weakness or bowel bladder dysfunction.  Symptoms are constant.  States that symptoms are very mild and more annoying and painful.  Symptoms do disrupts her sleep..   This pain is described in detail below.    Physical Therapy:  No.    Non-pharmacologic Treatment:  Rest helps         TENS?  No    Pain Medications:         Currently taking:  Gabapentin    Has tried in the past:      Has not tried:  Opioids, NSAIDs, Tylenol, Muscle relaxants, TCAs, SNRIs, topical creams    Blood thinners:  Aspirin 81 mg    Interventional Therapies:  None    Relevant Surgeries:  None    Affecting sleep?  Yes    Affecting daily activities? yes    Depressive symptoms? No          SI/HI? No    Work status: Employed    Pain Scores:    Best:       6/10  Worst:     6/10  Usually:   6/10  Today:    6/10    Pain Disability Index  Family/Home Responsibilities:: 5  Recreation:: 5  Social Activity:: 5  Occupation:: 5  Sexual Behavior:: 0  Self Care:: 5  Life-Support Activities:: 5  Pain Disability Index (PDI): 30    Review of Systems   Constitutional:  Negative for activity change, appetite change, chills, fatigue, fever and unexpected weight change.   HENT:  Negative for hearing loss.    Eyes:  Negative for visual disturbance.   Respiratory:  Negative for chest tightness and shortness of breath.    Cardiovascular:  Negative for chest pain.    Gastrointestinal:  Negative for abdominal pain, constipation, diarrhea, nausea and vomiting.   Genitourinary:  Negative for difficulty urinating.   Musculoskeletal:  Positive for myalgias, neck pain and neck stiffness. Negative for back pain and gait problem.   Skin:  Negative for rash.   Neurological:  Positive for numbness. Negative for dizziness, weakness, light-headedness and headaches.   Psychiatric/Behavioral:  Positive for sleep disturbance. Negative for hallucinations and suicidal ideas. The patient is not nervous/anxious.        Past Medical History:   Diagnosis Date    Cataract     Diabetes mellitus type II     Diabetes with neurologic complications     DM retinopathy     H/O vitamin D deficiency     Hyperlipidemia     Hypertension     Obesity     Osteoarthritis     Peripheral neuropathy     Polyneuropathy     Severe obesity (BMI 35.0-39.9) with comorbidity 2016    Sleep apnea     Tendonitis     left foot       Past Surgical History:   Procedure Laterality Date    BLADDER SUSPENSION      CATARACT EXTRACTION      COLONOSCOPY N/A 2015    Procedure: COLONOSCOPY;  Surgeon: Javed Wood MD;  Location: [address]);  Service: Endoscopy;  Laterality: N/A;    HYSTERECTOMY  1978    fibroids    NECK MASS EXCISION  2019    Procedure: EXCISION, MASS, NECK;  Surgeon: Edwin Barrow MD;  Location: [facility];  Service: General;;  RN PREOP 10/30/2019    TONSILLECTOMY         Social History     Socioeconomic History    Marital status:     Number of children: 2   Occupational History     Employer: [employer]   Tobacco Use    Smoking status: Former     Current packs/day: 0.00     Average packs/day: 0.3 packs/day for 15.0 years (3.8 ttl pk-yrs)     Types: Cigarettes     Start date:      Quit date:      Years since quittin.1    Smokeless tobacco: Never   Substance and Sexual Activity    Alcohol use: No    Drug use: No    Sexual activity: Not Currently     Partners:  Male     Birth control/protection: Abstinence   Social History Narrative    . One daughter. Works as an Apartment      Social Determinants of Health     Financial Resource Strain: Low Risk  (8/16/2022)    Overall Financial Resource Strain (CARDIA)     Difficulty of Paying Living Expenses: Not hard at all   Food Insecurity: No Food Insecurity (8/16/2022)    Hunger Vital Sign     Worried About Running Out of Food in the Last Year: Never true     Ran Out of Food in the Last Year: Never true   Transportation Needs: No Transportation Needs (8/16/2022)    PRAPARE - Transportation     Lack of Transportation (Medical): No     Lack of Transportation (Non-Medical): No   Physical Activity: Inactive (8/16/2022)    Exercise Vital Sign     Days of Exercise per Week: 0 days     Minutes of Exercise per Session: 0 min   Social Connections: Socially Isolated (8/16/2022)    Social Connection and Isolation Panel [NHANES]     Frequency of Communication with Friends and Family: More than three times a week     Frequency of Social Gatherings with Friends and Family: Never     Attends [employer] Services: Never     Active Member of Clubs or Organizations: No     Attends Club or Organization Meetings: Never     Marital Status:    Housing Stability: Low Risk  (8/16/2022)    Housing Stability Vital Sign     Unable to Pay for Housing in the Last Year: No     Number of Places Lived in the Last Year: 1     Unstable Housing in the Last Year: No       Review of patient's allergies indicates:   Allergen Reactions    Atorvastatin      Muscle pain     Crestor [rosuvastatin] Other (See Comments)     Leg Weakness.        Current Outpatient Medications on File Prior to Visit   Medication Sig Dispense Refill    amLODIPine (NORVASC) 10 MG tablet Take 1 tablet (10 mg total) by mouth once daily. 90 tablet 1    azelastine (ASTELIN) 137 mcg (0.1 %) nasal spray 2 sprays by Nasal route.      blood sugar diagnostic Strp Check blood  glucose 2 times a day 200 strip 3    blood-glucose meter,continuous (DEXCOM G7 ) Misc Use with Dexcom G7 sensors 1 each 0    blood-glucose sensor (DEXCOM G7 SENSOR) Sujey Change every 10 days 12 each 3    carbinoxamine maleate 6 mg Tab Take 6 mg by mouth every 8 (eight) hours as needed.      cetirizine (ZYRTEC) 10 MG tablet Take 1 tablet (10 mg total) by mouth once daily. For 30 days 90 tablet 1    diclofenac sodium (VOLTAREN) 1 % Gel Lower extremities: Apply the gel (4 g) to the affected area 4 times daily. Do not apply more than 16 g daily to any one affected joint of the lower extremities. Upper extremities: Apply the gel (2 g) to the affected area 4 times daily. Do not apply more than 8 g daily to any one affected joint of the upper extremities. Total dose should not exceed 32 g per day, overall affected joints. 100 g 5    empagliflozin (JARDIANCE) 10 mg tablet Take 1 tablet (10 mg total) by mouth once daily. 90 tablet 2    ezetimibe (ZETIA) 10 mg tablet Take 1 tablet (10 mg total) by mouth once daily. 90 tablet 2    fluconazole (DIFLUCAN) 150 MG Tab Take 150 mg by mouth.      gabapentin (NEURONTIN) 300 MG capsule Take 1 capsule (300 mg total) by mouth 3 (three) times daily. 90 capsule 11    insulin glargine U-300 conc (TOUJEO MAX U-300 SOLOSTAR) 300 unit/mL (3 mL) insulin pen INJECT 40 UNITS SUBCUTANEOUSLY ONCE DAILY 2 pen 5    insulin lispro (HUMALOG KWIKPEN INSULIN) 100 unit/mL pen Use with sliding scale - 150-200=+2, 201-250=+4; 251-300=+6; 301-350=+8, over 350=+10 units 15 mL 0    lancets Misc Check blood glucose 2x/day 200 each 3    losartan-hydrochlorothiazide 100-25 mg (HYZAAR) 100-25 mg per tablet Take 1 tablet by mouth once daily. 90 tablet 1    meloxicam (MOBIC) 7.5 MG tablet Take 1 tablet (7.5 mg total) by mouth once daily. 12 tablet 0    metFORMIN (GLUCOPHAGE-XR) 500 MG ER 24hr tablet Take 2 tablets (1,000 mg total) by mouth Daily. 180 tablet 0    metoprolol succinate (TOPROL-XL) 50 MG 24 hr  tablet Take 1 tablet (50 mg total) by mouth once daily. 90 tablet 1    montelukast (SINGULAIR) 10 mg tablet Take 1 tablet (10 mg total) by mouth once daily. 90 tablet 1    pravastatin (PRAVACHOL) 80 MG tablet Take 1 tablet (80 mg total) by mouth once daily. 90 tablet 1    tirzepatide (MOUNJARO) 12.5 mg/0.5 mL PnIj Inject 12.5 mg (one pen) into the skin every 7 days. 12 pen 1    tobramycin sulfate 0.3% (TOBREX) 0.3 % ophthalmic solution 1-2 drops topically twice daily to affected toe(s). 5 mL 3    aspirin 81 MG Chew Take 1 tablet (81 mg total) by mouth once daily. 30 tablet 0     Current Facility-Administered Medications on File Prior to Visit   Medication Dose Route Frequency Provider Last Rate Last Admin    sodium hyaluronate (EUFLEXXA) 10 mg/mL(mw 2.4 -3.6 million) injection 20 mg  20 mg Intra-articular 1 time in Clinic/HOD Sumanth Erickson MD           Objective:      BP (!) 165/70 (BP Location: Left arm, Patient Position: Sitting, BP Method: Medium (Automatic))   Pulse 78   Resp 18   Wt 97.8 kg (215 lb 9.8 oz)   SpO2 97%   BMI 35.88 kg/m²     Exam:  GEN:  Well developed, well nourished.  No acute distress.  Normal pain behavior.  HEENT:  No trauma.  Mucous membranes moist.  Nares patent bilaterally.  PSYCH: Normal affect. Thought content appropriate.  CHEST:  Breathing symmetric.  No audible wheezing.  ABD: Soft, non-distended.  SKIN:  Warm, pink, dry.  No rash on exposed areas.    EXT:  No cyanosis, clubbing, or edema.  No color change or changes in nail or hair growth.  NEURO/MUSCULOSKELETAL:  Fully alert, oriented, and appropriate. Speech normal laurel. No cranial nerve deficits.   Gait:  Normal.  5/5 motor strength throughout upper extremities.   Sensory:  No sensory deficit in the upper extremities.   Reflexes:  2 + and symmetric throughout.  Absent Jimenez's bilaterally.  C-Spine:  Full ROM with pain on extension more than flexion.  Negative facet loading bilaterally.  Negative Spurling's  bilaterally.    Positive TTP over left lower cervical paraspinal muscles, left upper trapezius       Imaging:  No pertinent imaging    Assessment:       Encounter Diagnoses   Name Primary?    Cervical radiculopathy     DDD (degenerative disc disease), cervical Yes    Cervical spondylosis          Plan:       Mena was seen today for neck pain.    Diagnoses and all orders for this visit:    DDD (degenerative disc disease), cervical  -     Ambulatory referral/consult to Physical/Occupational Therapy; Future  -     X-Ray Cervical Spine AP Lat with Flexion  Extension; Future    Cervical radiculopathy  -     Ambulatory referral/consult to Pain Clinic  -     Ambulatory referral/consult to Physical/Occupational Therapy; Future  -     X-Ray Cervical Spine AP Lat with Flexion  Extension; Future    Cervical spondylosis  -     Ambulatory referral/consult to Physical/Occupational Therapy; Future  -     X-Ray Cervical Spine AP Lat with Flexion  Extension; Future        Mena Odonnell is a 78 y.o. female with acute left-sided neck and upper extremity pain/paresthesias.  Likely related to cervical radiculopathy.  No overt neurologic deficits on examination.  Symptoms overall mild at this time.    Cervical x-rays to get baseline imaging.    Refer to PT for ROM, strengthening, stretching and HEP.  Return to clinic in 8 weeks or sooner if needed.  At that time we will discuss efficacy of physical therapy/home exercise program and review x-ray results.  May consider cervical MRI and possibly epidural steroid injection if pain persists or worsens.            This note was created by combination of typed  and M-Modal dictation. Transcription and phonetic errors may be present.  If there are any questions, please contact me.

## 2024-03-04 ENCOUNTER — OFFICE VISIT (OUTPATIENT)
Dept: ORTHOPEDICS | Facility: CLINIC | Age: 79
End: 2024-03-04
Payer: COMMERCIAL

## 2024-03-04 VITALS — BODY MASS INDEX: 35.82 KG/M2 | HEIGHT: 65 IN | WEIGHT: 215 LBS

## 2024-03-04 DIAGNOSIS — M17.0 PRIMARY OSTEOARTHRITIS OF BOTH KNEES: Primary | ICD-10-CM

## 2024-03-04 PROCEDURE — 20610 DRAIN/INJ JOINT/BURSA W/O US: CPT | Mod: 50,S$GLB,, | Performed by: ORTHOPAEDIC SURGERY

## 2024-03-04 PROCEDURE — 99999 PR PBB SHADOW E&M-EST. PATIENT-LVL II: CPT | Mod: PBBFAC,,, | Performed by: ORTHOPAEDIC SURGERY

## 2024-03-04 PROCEDURE — 99499 UNLISTED E&M SERVICE: CPT | Mod: S$GLB,,, | Performed by: ORTHOPAEDIC SURGERY

## 2024-03-04 NOTE — PROGRESS NOTES
EST PATIENT ORTHOPAEDIC: Knee    PRIMARY CARE PHYSICIAN: Leonard Wells MD   REFERRING PROVIDER: Sumanth Erickson MD  [address] [place] [address]     ASSESSMENT & PLAN:    Impression:  Bilateral Knee Severe Degenerative Osteoarthritis, Primary     Follow Up Plan: PRN    Non operative care:    Mena Odonnell has physical exam evidence of above and wishes to pursue an non-operative care. I am recommending the following: Euflexxa series, activity modification.      To reivew: She would be an appropriate candidate for a right total knee replacement in the future should her symptoms warrant.  She was referred to vascular for intermittent claudication and peripheral vascular disease seen on x-ray .  There evaluation has referred her to lymphedema therapy. Swelling is improving. She is interested in repeat gel injections.  She is not interested in surgical intervention at this time. She is also diabetic so this is a good treatment modaliity for her.     The patient has been ordered:  Euflexxa    CONSULTS:   None    ACTIVE PROBLEM LIST  Patient Active Problem List   Diagnosis    Type 2 diabetes mellitus with diabetic polyneuropathy    Hyperlipidemia    HTN (hypertension)    Knee pain    H/O vitamin D deficiency    Non morbid obesity due to excess calories    Bilateral carotid bruits    Osteoarthritis of right knee    Morbid obesity with body mass index (BMI) of 40.0 or higher    Dyspnea on exertion    Chronic cough    MEKA (obstructive sleep apnea)    Sensorineural hearing loss (SNHL) of right ear with restricted hearing of left ear    Tinnitus of both ears    Allergic rhinitis    Dysfunction of both eustachian tubes    Nasal septal deviation    Chronic eczematous otitis externa of both ears    Neck abscess    Epidermal inclusion cyst    Polyneuropathy associated with underlying disease    Pain and swelling of left knee    Pain and swelling of right knee    Weakness    Leg swelling    Aortic atherosclerosis     Decreased range of motion of lumbar spine    Abdominal weakness    Morbid obesity    Nonrheumatic aortic valve stenosis    Cervical spondylosis    DDD (degenerative disc disease), cervical    Cervical radiculopathy           SUBJECTIVE    CHIEF COMPLAINT: Knee Pain    HPI:   Mena Odonnell is a 78 y.o. female here for evaluation and management of right knee pain. There is not a specific incident that brought about this pain. she has had progressive problems with the knee(s) starting 2 years ago but progressing to multiple times a day over the past 6 months which is interfering with activities which include: walking 2 blocks and standing for prolonged periods of time    Currently the pain in the joint is rated at 5 out of 10 with moderate activity.  The pain is intermittent and is located in the Right knee, at level of joint line, located medially and located laterally. The pain is described as aching and sharp. Relieving factors include ambulatory device and rest.       Mena Odonnell also complaints leg cramping on that side primarily in her posterior aspect of her knee and calf.  This typically improves proves with rest and over-the-counter topical spray.    Interval History 4/18/22: Injections wearing off. Saw vascular.   Interval History 5/9/22: Here for 1-3 Euflexxa series   Interval History 5/19/22: Here for 2-3 Euflexxa series. Mild improvement in pain.   Interval History 5/27/22: Here for 3-3 Euflexxa series. Moderate improvement in pain.  Interval History 10/13/22: Here with return of pain.   Interval History 11/28/22: Here for Euflexxa injections. Previous steroid with good relief   12/5/22: Here for 2-3 Euflexxa series. Continued relief of pain.   12/12/22: Here for 3-3 Euflexxa series. Continued relief of pain.   7/11/23: Patient would like to restart Euflexxa injections. 6 months or relief with previous injections.  7/18/23: Here for 2-3 Euflexxa. 0-10 pain today.    7/25/23: Here for 3-3  Euflexxa. Some more pain today. Went to conference last week and had increase in pain.   2/19/24:  Patient here for follow up regarding her bilateral knee pain.  Previously seen over 6 months ago with good relief knee pain after Euflexxa series.  She called prior to this appointment for repeat Euflexxa injections which we put in for and were approved.  She is here for the 1st series of these injections. No changes in characteristics of pain.   2/26/24: Here for 2-3 euflexxa. Improvement after first injection  3/4/24: Here for 3-3 euflexxa. Sustained relief after last set of injections.    PROGRESSIVE SYMPTOMS:  Pain worsened by weight bearing    FUNCTIONAL STATUS:   Do light to moderate work around the house    PREVIOUS TREATMENTS:  Medical: Attempted Weight Loss, Steroid Injections and Biologic Injections  Physical Therapy: Use of Ambulatory Aid and Activities Modified   Previous Orthopaedic Surgery: None    REVIEW OF SYSTEMS:  PAIN ASSESSMENT:  See HPI.  MUSCULOSKELETAL: See HPI.  OTHER 10 point review of systems is negative except as stated in HPI above    PAST MEDICAL HISTORY   has a past medical history of Cataract, Diabetes mellitus type II, Diabetes with neurologic complications, DM retinopathy, H/O vitamin D deficiency, Hyperlipidemia, Hypertension, Obesity, Osteoarthritis, Peripheral neuropathy, Polyneuropathy, Severe obesity (BMI 35.0-39.9) with comorbidity (12/30/2016), Sleep apnea, and Tendonitis.     PAST SURGICAL HISTORY   has a past surgical history that includes Bladder suspension; Hysterectomy (1978); Tonsillectomy; Colonoscopy (N/A, 9/26/2015); Cataract extraction; and Neck mass excision (11/6/2019).     FAMILY HISTORY  family history includes Cataracts in her mother; Colon cancer in her maternal grandfather; Diabetes in her mother, sister, sister, and son; Hypertension in her brother, brother, daughter, mother, sister, sister, sister, and sister; Stroke in her mother; Thyroid disease in her  maternal aunt, maternal aunt, mother, and sister.     SOCIAL HISTORY   reports that she quit smoking about 53 years ago. Her smoking use included cigarettes. She started smoking about 68 years ago. She has a 3.8 pack-year smoking history. She has never used smokeless tobacco. She reports that she does not drink alcohol and does not use drugs.     ALLERGIES   Review of patient's allergies indicates:   Allergen Reactions    Atorvastatin      Muscle pain     Crestor [rosuvastatin] Other (See Comments)     Leg Weakness.         MEDICATIONS  Current Outpatient Medications on File Prior to Visit   Medication Sig Dispense Refill    amLODIPine (NORVASC) 10 MG tablet Take 1 tablet (10 mg total) by mouth once daily. 90 tablet 1    azelastine (ASTELIN) 137 mcg (0.1 %) nasal spray 2 sprays by Nasal route.      blood sugar diagnostic Strp Check blood glucose 2 times a day 200 strip 3    blood-glucose meter,continuous (DEXCOM G7 ) Misc Use with Dexcom G7 sensors 1 each 0    blood-glucose sensor (DEXCOM G7 SENSOR) Sujey Change every 10 days 12 each 3    carbinoxamine maleate 6 mg Tab Take 6 mg by mouth every 8 (eight) hours as needed.      cetirizine (ZYRTEC) 10 MG tablet Take 1 tablet (10 mg total) by mouth once daily. For 30 days 90 tablet 1    diclofenac sodium (VOLTAREN) 1 % Gel Lower extremities: Apply the gel (4 g) to the affected area 4 times daily. Do not apply more than 16 g daily to any one affected joint of the lower extremities. Upper extremities: Apply the gel (2 g) to the affected area 4 times daily. Do not apply more than 8 g daily to any one affected joint of the upper extremities. Total dose should not exceed 32 g per day, overall affected joints. 100 g 5    empagliflozin (JARDIANCE) 10 mg tablet Take 1 tablet (10 mg total) by mouth once daily. 90 tablet 2    ezetimibe (ZETIA) 10 mg tablet Take 1 tablet (10 mg total) by mouth once daily. 90 tablet 2    fluconazole (DIFLUCAN) 150 MG Tab Take 150 mg by mouth.  "     gabapentin (NEURONTIN) 300 MG capsule Take 1 capsule (300 mg total) by mouth 3 (three) times daily. 90 capsule 11    insulin glargine U-300 conc (TOUJEO MAX U-300 SOLOSTAR) 300 unit/mL (3 mL) insulin pen INJECT 40 UNITS SUBCUTANEOUSLY ONCE DAILY 2 pen 5    insulin lispro (HUMALOG KWIKPEN INSULIN) 100 unit/mL pen Use with sliding scale - 150-200=+2, 201-250=+4; 251-300=+6; 301-350=+8, over 350=+10 units 15 mL 0    lancets Misc Check blood glucose 2x/day 200 each 3    losartan-hydrochlorothiazide 100-25 mg (HYZAAR) 100-25 mg per tablet Take 1 tablet by mouth once daily. 90 tablet 1    meloxicam (MOBIC) 7.5 MG tablet Take 1 tablet (7.5 mg total) by mouth once daily. 12 tablet 0    metFORMIN (GLUCOPHAGE-XR) 500 MG ER 24hr tablet Take 2 tablets (1,000 mg total) by mouth Daily. 180 tablet 0    metoprolol succinate (TOPROL-XL) 50 MG 24 hr tablet Take 1 tablet (50 mg total) by mouth once daily. 90 tablet 1    montelukast (SINGULAIR) 10 mg tablet Take 1 tablet (10 mg total) by mouth once daily. 90 tablet 1    pravastatin (PRAVACHOL) 80 MG tablet Take 1 tablet (80 mg total) by mouth once daily. 90 tablet 1    tirzepatide (MOUNJARO) 12.5 mg/0.5 mL PnIj Inject 12.5 mg (one pen) into the skin every 7 days. 12 pen 1    tobramycin sulfate 0.3% (TOBREX) 0.3 % ophthalmic solution 1-2 drops topically twice daily to affected toe(s). 5 mL 3    aspirin 81 MG Chew Take 1 tablet (81 mg total) by mouth once daily. 30 tablet 0     Current Facility-Administered Medications on File Prior to Visit   Medication Dose Route Frequency Provider Last Rate Last Admin    sodium hyaluronate (EUFLEXXA) 10 mg/mL(mw 2.4 -3.6 million) injection 20 mg  20 mg Intra-articular 1 time in Clinic/HOD Sumnath Erickson MD              PHYSICAL EXAM   height is 5' 5" (1.651 m) and weight is 97.5 kg (215 lb).   Body mass index is 35.78 kg/m².      All other systems deferred.  GENERAL:  No acute distress  HABITUS: Obese  GAIT: Antalgic  SKIN: Normal     KNEE " EXAM:    Bilateral:   Effusion: Minimal joint effusion  TTP: yes over Medial Joint Line and Lateral Joint Line   no crepitus with passive knee ROM  Passive ROM: Extension 0, Flexion 130  No pain with manipulation of patella  Stable to varus/valgus stress. No increased laxity to anterior/posterior drawer testing  negative Ngozi's test  No pain with IR/ER rotation of the hip  5/5 strength in knee flexion and extension, ankle plantarflexion and dorsiflexion  Neurovascular Status: Sensation intact to light touch in Sural, Saphenous, SPN, DPN, Tibial nerve distribution  2+ pulse DP/PT, normal capillary refill, foot has normal warmth    DATA:  Diagnostic tests reviewed for today's visit:     4v of the bilateral knee reveal Moderate degenerative changes of the Lateral and Patellofemoral compartment. There is evidence of advanced osteoarthritis changes with Subchondral sclerosis, Osteophyte formation and Joint space narrowing. The limb is in netural alignment. The patella is tracking midline and is in normal alignment. Vascular calcifications present. Kellegren-Lawerence grade 4 changes on the right, grade 3 on the left.

## 2024-03-04 NOTE — PROCEDURES
Large Joint Aspiration/Injection: bilateral knee    Date/Time: 3/4/2024 7:40 AM    Performed by: Sumanth Erickson MD  Authorized by: Sumanth Erickson MD    Consent Done?:  Yes (Verbal)  Indications:  Arthritis  Prep: patient was prepped and draped in usual sterile fashion      Local anesthesia used?: Yes    Anesthesia:  Local infiltration  Local anesthetic:  Topical anesthetic    Details:  Needle Size:  22 G  Approach:  Anterolateral  Location:  Knee  Laterality:  Bilateral  Site:  Bilateral knee  Medications (Right):  10 mg sodium hyaluronate (EUFLEXXA) 10 mg/mL(mw 2.4 -3.6 million)  Medications (Left):  10 mg sodium hyaluronate (EUFLEXXA) 10 mg/mL(mw 2.4 -3.6 million)  Patient tolerance:  Patient tolerated the procedure well with no immediate complications

## 2024-03-05 ENCOUNTER — OFFICE VISIT (OUTPATIENT)
Dept: FAMILY MEDICINE | Facility: CLINIC | Age: 79
End: 2024-03-05
Payer: COMMERCIAL

## 2024-03-05 VITALS
DIASTOLIC BLOOD PRESSURE: 68 MMHG | OXYGEN SATURATION: 95 % | WEIGHT: 192.44 LBS | SYSTOLIC BLOOD PRESSURE: 112 MMHG | BODY MASS INDEX: 32.06 KG/M2 | RESPIRATION RATE: 18 BRPM | HEART RATE: 86 BPM | HEIGHT: 65 IN | TEMPERATURE: 99 F

## 2024-03-05 DIAGNOSIS — E11.42 TYPE 2 DIABETES MELLITUS WITH DIABETIC POLYNEUROPATHY, WITHOUT LONG-TERM CURRENT USE OF INSULIN: ICD-10-CM

## 2024-03-05 DIAGNOSIS — E66.09 CLASS 1 OBESITY DUE TO EXCESS CALORIES WITH SERIOUS COMORBIDITY AND BODY MASS INDEX (BMI) OF 32.0 TO 32.9 IN ADULT: ICD-10-CM

## 2024-03-05 DIAGNOSIS — G63 POLYNEUROPATHY ASSOCIATED WITH UNDERLYING DISEASE: ICD-10-CM

## 2024-03-05 DIAGNOSIS — I35.0 NONRHEUMATIC AORTIC VALVE STENOSIS: Primary | ICD-10-CM

## 2024-03-05 DIAGNOSIS — M17.0 PRIMARY OSTEOARTHRITIS OF BOTH KNEES: ICD-10-CM

## 2024-03-05 DIAGNOSIS — I10 PRIMARY HYPERTENSION: ICD-10-CM

## 2024-03-05 PROBLEM — E66.811 CLASS 1 OBESITY DUE TO EXCESS CALORIES WITH SERIOUS COMORBIDITY AND BODY MASS INDEX (BMI) OF 32.0 TO 32.9 IN ADULT: Status: ACTIVE | Noted: 2018-11-28

## 2024-03-05 PROCEDURE — 1160F RVW MEDS BY RX/DR IN RCRD: CPT | Mod: CPTII,S$GLB,, | Performed by: INTERNAL MEDICINE

## 2024-03-05 PROCEDURE — 3078F DIAST BP <80 MM HG: CPT | Mod: CPTII,S$GLB,, | Performed by: INTERNAL MEDICINE

## 2024-03-05 PROCEDURE — 99999 PR PBB SHADOW E&M-EST. PATIENT-LVL V: CPT | Mod: PBBFAC,,, | Performed by: INTERNAL MEDICINE

## 2024-03-05 PROCEDURE — 3074F SYST BP LT 130 MM HG: CPT | Mod: CPTII,S$GLB,, | Performed by: INTERNAL MEDICINE

## 2024-03-05 PROCEDURE — 3288F FALL RISK ASSESSMENT DOCD: CPT | Mod: CPTII,S$GLB,, | Performed by: INTERNAL MEDICINE

## 2024-03-05 PROCEDURE — 1159F MED LIST DOCD IN RCRD: CPT | Mod: CPTII,S$GLB,, | Performed by: INTERNAL MEDICINE

## 2024-03-05 PROCEDURE — 1101F PT FALLS ASSESS-DOCD LE1/YR: CPT | Mod: CPTII,S$GLB,, | Performed by: INTERNAL MEDICINE

## 2024-03-05 PROCEDURE — 99214 OFFICE O/P EST MOD 30 MIN: CPT | Mod: S$GLB,,, | Performed by: INTERNAL MEDICINE

## 2024-03-05 RX ORDER — DICLOFENAC SODIUM 10 MG/G
GEL TOPICAL
Qty: 100 G | Refills: 5 | Status: SHIPPED | OUTPATIENT
Start: 2024-03-05

## 2024-03-05 NOTE — PROGRESS NOTES
HISTORY OF PRESENT ILLNESS:  Mena Odonnell is a 78 y.o. female who presents to the clinic today for Follow-up (6m)    Patient is without major complaints today.    Aortic stenosis  Stress test 2023 normal.  Echo (2023) - repeat recommended 6-12 months.  Left Ventricle The left ventricle is normal in size with normal systolic function. The estimated ejection fraction is 65%. Diastolic function is indeterminate.   Right Ventricle Normal cavity size, wall thickness and systolic function. Wall motion normal.   Left Atrium There is moderate to severe left atrial enlargement.   Right Atrium The right atrium is normal in size.   Aortic Valve Aortic valve sclerosis is mild. There is mild-to-moderate stenosis. CHRIS is 1.55 cm2; mean gradient is 32 mmHg; peak gradient is 49 mmHg; AV peak velocity is 3.51 m/s. The LVOT diameter is 2.08 cm.   Mitral Valve The mean pressure gradient across the mitral valve is 5 mmHg at a heart rate of. There is trace mitral valve regurgitation. The estimated mitral valve area by pressure half time is 2.31 cm2.   Tricuspid Valve There is trace regurgitation. The estimated PA systolic pressure is greater than 24 mmHg.   Pulmonic Valve Pulmonic valve is not well visualized due to poor sonic window.   IVC/SVC Normal central venous pressure (3 mm Hg).   Ascending Aorta The aortic root and ascending aorta are normal in size.   Pericardium and Other Findings There is no pericardial effusion.     MEKA  Recommended for CPAP but declines to use.    HTN, HLP  Managed with losartan-HCTZ, amlodipine, Toprol XL.  On ezetimibe.     Renal cyst  Noted simple cyst, seen by urology 12/2022 - no further workup.     Type 2 DM  Seen by endo 3/2/23.    Managed with Toujeo 40 u daily, Humalog, metformin XR, Mounjaro and Jardiance.  Seen by Candy Villanueva/marina.  Last A1C increased to 7.8% in Jan 2024.  Saw DM educ last month.     Peripheral vascular disease, venous hypertension, lymphedema  Seen by Dr. Blair.  Attended PT for lymphedema.  Recommended for lymphedema clinic therapy and pumps (not using pumps).  Feels symptoms are improved.     Primary OA  Seen in Ortho.  Hyaluronate injection to knees done yesterday and doing well.     Back and hip pain, Cervical radiculopathy  Seen by ortho and pain clinic.  Referred to PT.  Notes tingling down the left arm.  Back pain is controlled.    MRI (9/29/22):  Lumbar spondylosis most significant at L4-5 and L5-S1 with severe spinal canal stenosis and moderate to severe neural foraminal narrowing.  Partially imaged left renal T2 hyperintensity. Consider nonemergent retroperitoneal ultrasound for further evaluation.  Left adrenal nonspecific thickening.    Declines colon cancer screening now age greater than 75 years with last Cologuard negative 2020.    PAST MEDICAL HISTORY:  Past Medical History:   Diagnosis Date    Cataract     Diabetes mellitus type II     Diabetes with neurologic complications     DM retinopathy     H/O vitamin D deficiency     Hyperlipidemia     Hypertension     Obesity     Osteoarthritis     Peripheral neuropathy     Polyneuropathy     Severe obesity (BMI 35.0-39.9) with comorbidity 12/30/2016    Sleep apnea     Tendonitis     left foot       PAST SURGICAL HISTORY:  Past Surgical History:   Procedure Laterality Date    BLADDER SUSPENSION      CATARACT EXTRACTION      COLONOSCOPY N/A 9/26/2015    Procedure: COLONOSCOPY;  Surgeon: Javed Wood MD;  Location: [address]);  Service: Endoscopy;  Laterality: N/A;    HYSTERECTOMY  1978    fibroids    NECK MASS EXCISION  11/6/2019    Procedure: EXCISION, MASS, NECK;  Surgeon: Edwin Barrow MD;  Location: [facility];  Service: General;;  RN PREOP 10/30/2019    TONSILLECTOMY         SOCIAL HISTORY:  Social History     Socioeconomic History    Marital status:     Number of children: 2   Occupational History     Employer: [employer]   Tobacco Use    Smoking status: Former     Current  packs/day: 0.00     Average packs/day: 0.3 packs/day for 15.0 years (3.8 ttl pk-yrs)     Types: Cigarettes     Start date:      Quit date:      Years since quittin.2    Smokeless tobacco: Never   Substance and Sexual Activity    Alcohol use: No    Drug use: No    Sexual activity: Not Currently     Partners: Male     Birth control/protection: Abstinence   Social History Narrative    . One daughter. Works as an Apartment      Social Determinants of Health     Financial Resource Strain: Low Risk  (2022)    Overall Financial Resource Strain (CARDIA)     Difficulty of Paying Living Expenses: Not hard at all   Food Insecurity: No Food Insecurity (2022)    Hunger Vital Sign     Worried About Running Out of Food in the Last Year: Never true     Ran Out of Food in the Last Year: Never true   Transportation Needs: No Transportation Needs (2022)    PRAPARE - Transportation     Lack of Transportation (Medical): No     Lack of Transportation (Non-Medical): No   Physical Activity: Inactive (2022)    Exercise Vital Sign     Days of Exercise per Week: 0 days     Minutes of Exercise per Session: 0 min   Social Connections: Socially Isolated (2022)    Social Connection and Isolation Panel [NHANES]     Frequency of Communication with Friends and Family: More than three times a week     Frequency of Social Gatherings with Friends and Family: Never     Attends Adventist Services: Never     Active Member of Clubs or Organizations: No     Attends Club or Organization Meetings: Never     Marital Status:    Housing Stability: Low Risk  (2022)    Housing Stability Vital Sign     Unable to Pay for Housing in the Last Year: No     Number of Places Lived in the Last Year: 1     Unstable Housing in the Last Year: No       FAMILY HISTORY:  Family History   Problem Relation Age of Onset    Thyroid disease Mother     Cataracts Mother     Diabetes Mother     Stroke Mother      Hypertension Mother     Thyroid disease Sister     Diabetes Sister     Hypertension Sister     Hypertension Daughter     Diabetes Son     Diabetes Sister     Hypertension Sister     Hypertension Brother     Hypertension Sister     Hypertension Sister     Hypertension Brother     Thyroid disease Maternal Aunt     Thyroid disease Maternal Aunt     Colon cancer Maternal Grandfather     Breast cancer Neg Hx     Ovarian cancer Neg Hx     Amblyopia Neg Hx     Blindness Neg Hx     Cancer Neg Hx     Glaucoma Neg Hx     Macular degeneration Neg Hx     Retinal detachment Neg Hx     Strabismus Neg Hx        ALLERGIES AND MEDICATIONS: updated and reviewed.  Review of patient's allergies indicates:   Allergen Reactions    Atorvastatin      Muscle pain     Crestor [rosuvastatin] Other (See Comments)     Leg Weakness.      Medication List with Changes/Refills   Current Medications    AMLODIPINE (NORVASC) 10 MG TABLET    Take 1 tablet (10 mg total) by mouth once daily.    ASPIRIN 81 MG CHEW    Take 1 tablet (81 mg total) by mouth once daily.    AZELASTINE (ASTELIN) 137 MCG (0.1 %) NASAL SPRAY    2 sprays by Nasal route.    BLOOD SUGAR DIAGNOSTIC STRP    Check blood glucose 2 times a day    BLOOD-GLUCOSE METER,CONTINUOUS (DEXCOM G7 ) MISC    Use with Dexcom G7 sensors    BLOOD-GLUCOSE SENSOR (DEXCOM G7 SENSOR) SHELLY    Change every 10 days    CARBINOXAMINE MALEATE 6 MG TAB    Take 6 mg by mouth every 8 (eight) hours as needed.    CETIRIZINE (ZYRTEC) 10 MG TABLET    Take 1 tablet (10 mg total) by mouth once daily. For 30 days    DICLOFENAC SODIUM (VOLTAREN) 1 % GEL    Lower extremities: Apply the gel (4 g) to the affected area 4 times daily. Do not apply more than 16 g daily to any one affected joint of the lower extremities. Upper extremities: Apply the gel (2 g) to the affected area 4 times daily. Do not apply more than 8 g daily to any one affected joint of the upper extremities. Total dose should not exceed 32 g per day,  overall affected joints.    EMPAGLIFLOZIN (JARDIANCE) 10 MG TABLET    Take 1 tablet (10 mg total) by mouth once daily.    EZETIMIBE (ZETIA) 10 MG TABLET    Take 1 tablet (10 mg total) by mouth once daily.    FLUCONAZOLE (DIFLUCAN) 150 MG TAB    Take 150 mg by mouth.    GABAPENTIN (NEURONTIN) 300 MG CAPSULE    Take 1 capsule (300 mg total) by mouth 3 (three) times daily.    INSULIN GLARGINE U-300 CONC (TOUJEO MAX U-300 SOLOSTAR) 300 UNIT/ML (3 ML) INSULIN PEN    INJECT 40 UNITS SUBCUTANEOUSLY ONCE DAILY    INSULIN LISPRO (HUMALOG KWIKPEN INSULIN) 100 UNIT/ML PEN    Use with sliding scale - 150-200=+2, 201-250=+4; 251-300=+6; 301-350=+8, over 350=+10 units    LANCETS MISC    Check blood glucose 2x/day    LOSARTAN-HYDROCHLOROTHIAZIDE 100-25 MG (HYZAAR) 100-25 MG PER TABLET    Take 1 tablet by mouth once daily.    MELOXICAM (MOBIC) 7.5 MG TABLET    Take 1 tablet (7.5 mg total) by mouth once daily.    METFORMIN (GLUCOPHAGE-XR) 500 MG ER 24HR TABLET    Take 2 tablets (1,000 mg total) by mouth Daily.    METOPROLOL SUCCINATE (TOPROL-XL) 50 MG 24 HR TABLET    Take 1 tablet (50 mg total) by mouth once daily.    MONTELUKAST (SINGULAIR) 10 MG TABLET    Take 1 tablet (10 mg total) by mouth once daily.    PRAVASTATIN (PRAVACHOL) 80 MG TABLET    Take 1 tablet (80 mg total) by mouth once daily.    TIRZEPATIDE (MOUNJARO) 12.5 MG/0.5 ML PNIJ    Inject 12.5 mg (one pen) into the skin every 7 days.    TOBRAMYCIN SULFATE 0.3% (TOBREX) 0.3 % OPHTHALMIC SOLUTION    1-2 drops topically twice daily to affected toe(s).          CARE TEAM:  Patient Care Team:  Leonard Wells MD as PCP - General (Internal Medicine)  Bebe Gee MD as Consulting Physician (Ophthalmology)  Praneeth Rios MD as Consulting Physician (Ophthalmology)  Candy Villanueva NP as Nurse Practitioner (Endocrinology)  Jenn Arreola MD as Consulting Physician (Cardiology)  Dre Blair MD as Consulting Physician (Vascular Surgery)         REVIEW OF  SYSTEMS:  Review of Systems   Constitutional:  Negative for chills and fever.   HENT:  Negative for congestion and postnasal drip.    Eyes:  Negative for photophobia and visual disturbance.   Respiratory:  Negative for cough and shortness of breath.    Cardiovascular:  Negative for chest pain and palpitations.   Gastrointestinal:  Negative for nausea and vomiting.   Genitourinary:  Negative for dysuria and frequency.   Musculoskeletal:  Positive for arthralgias. Negative for gait problem.   Neurological:  Negative for light-headedness and headaches.   Psychiatric/Behavioral:  Negative for dysphoric mood. The patient is not nervous/anxious.          PHYSICAL EXAM:   Vitals:    03/05/24 0832   BP: 112/68   Pulse: 86   Resp: 18   Temp: 99.1 °F (37.3 °C)             Body mass index is 32.03 kg/m².     General appearance - alert, well appearing, and in no distress and obese  Mental status - normal mood, behavior, speech, dress, motor activity, and thought processes  Eyes - sclera anicteric, left eye normal, right eye normal  Chest - clear to auscultation, no wheezes, rales or rhonchi, symmetric air entry  Heart - normal rate, regular rhythm, normal S1, S2, no murmurs, rubs, clicks or gallops  Neurological - alert, oriented, normal speech, no focal findings or movement disorder noted  Extremities - no pedal edema noted      ASSESSMENT AND PLAN:  Nonrheumatic aortic valve stenosis  -     Echo; Future; Expected date: 06/05/2024  - Symptoms are stable.  Follow up cardiology 6/2024.    Type 2 diabetes mellitus with diabetic polyneuropathy, without long-term current use of insulin  Polyneuropathy associated with underlying disease  -     Ambulatory referral/consult to Optometry; Future; Expected date: 05/06/2024  -     Comprehensive Metabolic Panel; Future; Expected date: 03/05/2024  -     Diabetes Digital Medicine (DDMP) Enrollment Order  -     Hypertension Digital Medicine (HDMP) Enrollment Order  - Stable on current medical  management.  - On gabapentin    Primary osteoarthritis of both knees  -     diclofenac sodium (VOLTAREN) 1 % Gel; Lower extremities: Apply the gel (4 g) to the affected area 4 times daily. Do not apply more than 16 g daily to any one affected joint of the lower extremities. Upper extremities: Apply the gel (2 g) to the affected area 4 times daily. Do not apply more than 8 g daily to any one affected joint of the upper extremities. Total dose should not exceed 32 g per day, overall affected joints.  Dispense: 100 g; Refill: 5    Class 1 obesity due to excess calories with serious comorbidity and body mass index (BMI) of 32.0 to 32.9 in adult        - continue efforts for healthy eating (focus on lean protein, veggies, whole grain, adequate fiber intake, minimize snacking) and exercise at least 150 min per week of moderate intensity exercise    Primary hypertension  -     Diabetes Digital Medicine (DDMP) Enrollment Order  -     Hypertension Digital Medicine (HDMP) Enrollment Order  - Stable on current medical management.                Follow up 6 months or sooner as needed.

## 2024-03-05 NOTE — PROGRESS NOTES
Health Maintenance Due   Topic     Aspirin/Antiplatelet Therapy  Consult pcp    COVID-19 Vaccine (6 - 2023-24 season) Not offered at this office    Eye Exam  Consult pcp

## 2024-03-06 PROBLEM — M79.89 LEG SWELLING: Status: RESOLVED | Noted: 2022-04-05 | Resolved: 2024-03-06

## 2024-03-06 PROBLEM — M25.561 PAIN AND SWELLING OF RIGHT KNEE: Status: RESOLVED | Noted: 2021-02-03 | Resolved: 2024-03-06

## 2024-03-06 PROBLEM — R29.898 DECREASED STRENGTH OF UPPER EXTREMITY: Status: ACTIVE | Noted: 2024-03-06

## 2024-03-06 PROBLEM — M25.461 PAIN AND SWELLING OF RIGHT KNEE: Status: RESOLVED | Noted: 2021-02-03 | Resolved: 2024-03-06

## 2024-03-06 PROBLEM — M53.86 DECREASED RANGE OF MOTION OF LUMBAR SPINE: Status: RESOLVED | Noted: 2022-08-19 | Resolved: 2024-03-06

## 2024-03-06 PROBLEM — M25.562 PAIN AND SWELLING OF LEFT KNEE: Status: RESOLVED | Noted: 2021-02-03 | Resolved: 2024-03-06

## 2024-03-06 PROBLEM — M25.462 PAIN AND SWELLING OF LEFT KNEE: Status: RESOLVED | Noted: 2021-02-03 | Resolved: 2024-03-06

## 2024-03-06 PROBLEM — M53.82 DECREASED RANGE OF MOTION OF INTERVERTEBRAL DISCS OF CERVICAL SPINE: Status: ACTIVE | Noted: 2024-03-06

## 2024-03-06 PROBLEM — R53.1 WEAKNESS: Status: RESOLVED | Noted: 2021-02-03 | Resolved: 2024-03-06

## 2024-03-06 PROBLEM — R19.8 ABDOMINAL WEAKNESS: Status: RESOLVED | Noted: 2022-08-19 | Resolved: 2024-03-06

## 2024-03-14 ENCOUNTER — PATIENT MESSAGE (OUTPATIENT)
Dept: ENDOCRINOLOGY | Facility: CLINIC | Age: 79
End: 2024-03-14
Payer: COMMERCIAL

## 2024-03-14 DIAGNOSIS — E66.9 NON MORBID OBESITY, UNSPECIFIED OBESITY TYPE: ICD-10-CM

## 2024-03-14 DIAGNOSIS — Z79.4 TYPE 2 DIABETES MELLITUS WITH DIABETIC POLYNEUROPATHY, WITH LONG-TERM CURRENT USE OF INSULIN: ICD-10-CM

## 2024-03-14 DIAGNOSIS — E11.42 TYPE 2 DIABETES MELLITUS WITH DIABETIC POLYNEUROPATHY, WITH LONG-TERM CURRENT USE OF INSULIN: ICD-10-CM

## 2024-03-15 RX ORDER — TIRZEPATIDE 12.5 MG/.5ML
12.5 INJECTION, SOLUTION SUBCUTANEOUS
Qty: 12 PEN | Refills: 1 | Status: SHIPPED | OUTPATIENT
Start: 2024-03-15 | End: 2024-04-29 | Stop reason: SDUPTHER

## 2024-04-27 ENCOUNTER — LAB VISIT (OUTPATIENT)
Dept: LAB | Facility: HOSPITAL | Age: 79
End: 2024-04-27
Attending: NURSE PRACTITIONER
Payer: COMMERCIAL

## 2024-04-27 DIAGNOSIS — E11.42 TYPE 2 DIABETES MELLITUS WITH DIABETIC POLYNEUROPATHY, WITHOUT LONG-TERM CURRENT USE OF INSULIN: ICD-10-CM

## 2024-04-27 DIAGNOSIS — E78.5 HYPERLIPIDEMIA, UNSPECIFIED HYPERLIPIDEMIA TYPE: ICD-10-CM

## 2024-04-27 DIAGNOSIS — E11.42 TYPE 2 DIABETES MELLITUS WITH DIABETIC POLYNEUROPATHY, WITH LONG-TERM CURRENT USE OF INSULIN: ICD-10-CM

## 2024-04-27 DIAGNOSIS — Z79.4 TYPE 2 DIABETES MELLITUS WITH DIABETIC POLYNEUROPATHY, WITH LONG-TERM CURRENT USE OF INSULIN: ICD-10-CM

## 2024-04-27 LAB
ALBUMIN SERPL BCP-MCNC: 3.8 G/DL (ref 3.5–5.2)
ALP SERPL-CCNC: 76 U/L (ref 55–135)
ALT SERPL W/O P-5'-P-CCNC: 22 U/L (ref 10–44)
ANION GAP SERPL CALC-SCNC: 10 MMOL/L (ref 8–16)
AST SERPL-CCNC: 20 U/L (ref 10–40)
BILIRUB SERPL-MCNC: 0.4 MG/DL (ref 0.1–1)
BUN SERPL-MCNC: 21 MG/DL (ref 8–23)
CALCIUM SERPL-MCNC: 9.9 MG/DL (ref 8.7–10.5)
CHLORIDE SERPL-SCNC: 106 MMOL/L (ref 95–110)
CHOLEST SERPL-MCNC: 147 MG/DL (ref 120–199)
CHOLEST/HDLC SERPL: 3.7 {RATIO} (ref 2–5)
CO2 SERPL-SCNC: 26 MMOL/L (ref 23–29)
CREAT SERPL-MCNC: 0.6 MG/DL (ref 0.5–1.4)
CREAT SERPL-MCNC: 0.6 MG/DL (ref 0.5–1.4)
EST. GFR  (NO RACE VARIABLE): >60 ML/MIN/1.73 M^2
EST. GFR  (NO RACE VARIABLE): >60 ML/MIN/1.73 M^2
ESTIMATED AVG GLUCOSE: 166 MG/DL (ref 68–131)
GLUCOSE SERPL-MCNC: 95 MG/DL (ref 70–110)
HBA1C MFR BLD: 7.4 % (ref 4–5.6)
HDLC SERPL-MCNC: 40 MG/DL (ref 40–75)
HDLC SERPL: 27.2 % (ref 20–50)
LDLC SERPL CALC-MCNC: 84.6 MG/DL (ref 63–159)
NONHDLC SERPL-MCNC: 107 MG/DL
POTASSIUM SERPL-SCNC: 3.4 MMOL/L (ref 3.5–5.1)
PROT SERPL-MCNC: 7.2 G/DL (ref 6–8.4)
SODIUM SERPL-SCNC: 142 MMOL/L (ref 136–145)
TRIGL SERPL-MCNC: 112 MG/DL (ref 30–150)

## 2024-04-27 PROCEDURE — 36415 COLL VENOUS BLD VENIPUNCTURE: CPT | Mod: PO | Performed by: NURSE PRACTITIONER

## 2024-04-27 PROCEDURE — 80061 LIPID PANEL: CPT | Performed by: NURSE PRACTITIONER

## 2024-04-27 PROCEDURE — 80053 COMPREHEN METABOLIC PANEL: CPT | Performed by: INTERNAL MEDICINE

## 2024-04-27 PROCEDURE — 83036 HEMOGLOBIN GLYCOSYLATED A1C: CPT | Performed by: NURSE PRACTITIONER

## 2024-04-29 DIAGNOSIS — R80.9 MICROALBUMINURIA: ICD-10-CM

## 2024-04-29 DIAGNOSIS — E66.9 NON MORBID OBESITY, UNSPECIFIED OBESITY TYPE: ICD-10-CM

## 2024-04-29 DIAGNOSIS — E11.42 TYPE 2 DIABETES MELLITUS WITH DIABETIC POLYNEUROPATHY, WITH LONG-TERM CURRENT USE OF INSULIN: ICD-10-CM

## 2024-04-29 DIAGNOSIS — Z79.4 TYPE 2 DIABETES MELLITUS WITH DIABETIC POLYNEUROPATHY, WITH LONG-TERM CURRENT USE OF INSULIN: ICD-10-CM

## 2024-04-29 RX ORDER — METFORMIN HYDROCHLORIDE 500 MG/1
1000 TABLET, EXTENDED RELEASE ORAL DAILY
Qty: 180 TABLET | Refills: 2 | Status: SHIPPED | OUTPATIENT
Start: 2024-04-29 | End: 2025-04-29

## 2024-04-29 RX ORDER — INSULIN LISPRO 100 [IU]/ML
INJECTION, SOLUTION INTRAVENOUS; SUBCUTANEOUS
Qty: 15 ML | Refills: 0 | Status: SHIPPED | OUTPATIENT
Start: 2024-04-29

## 2024-04-29 RX ORDER — TIRZEPATIDE 12.5 MG/.5ML
12.5 INJECTION, SOLUTION SUBCUTANEOUS
Qty: 12 PEN | Refills: 1 | Status: SHIPPED | OUTPATIENT
Start: 2024-04-29 | End: 2024-05-23

## 2024-04-29 RX ORDER — INSULIN GLARGINE 300 [IU]/ML
INJECTION, SOLUTION SUBCUTANEOUS
Qty: 6 PEN | Refills: 2 | Status: SHIPPED | OUTPATIENT
Start: 2024-04-29

## 2024-05-13 LAB
LEFT EYE DM RETINOPATHY: POSITIVE
RIGHT EYE DM RETINOPATHY: POSITIVE

## 2024-05-14 ENCOUNTER — PATIENT OUTREACH (OUTPATIENT)
Dept: ADMINISTRATIVE | Facility: HOSPITAL | Age: 79
End: 2024-05-14
Payer: COMMERCIAL

## 2024-05-14 DIAGNOSIS — E11.319 DIABETIC RETINOPATHY OF BOTH EYES ASSOCIATED WITH TYPE 2 DIABETES MELLITUS, MACULAR EDEMA PRESENCE UNSPECIFIED, UNSPECIFIED RETINOPATHY SEVERITY: Primary | ICD-10-CM

## 2024-05-23 ENCOUNTER — OFFICE VISIT (OUTPATIENT)
Dept: ENDOCRINOLOGY | Facility: CLINIC | Age: 79
End: 2024-05-23
Payer: COMMERCIAL

## 2024-05-23 VITALS
HEART RATE: 81 BPM | BODY MASS INDEX: 33.95 KG/M2 | WEIGHT: 204 LBS | SYSTOLIC BLOOD PRESSURE: 130 MMHG | DIASTOLIC BLOOD PRESSURE: 60 MMHG | TEMPERATURE: 99 F

## 2024-05-23 DIAGNOSIS — E78.5 HYPERLIPIDEMIA, UNSPECIFIED HYPERLIPIDEMIA TYPE: ICD-10-CM

## 2024-05-23 DIAGNOSIS — R80.9 MICROALBUMINURIA: ICD-10-CM

## 2024-05-23 DIAGNOSIS — E11.42 TYPE 2 DIABETES MELLITUS WITH DIABETIC POLYNEUROPATHY, WITH LONG-TERM CURRENT USE OF INSULIN: Primary | ICD-10-CM

## 2024-05-23 DIAGNOSIS — Z79.4 TYPE 2 DIABETES MELLITUS WITH DIABETIC POLYNEUROPATHY, WITH LONG-TERM CURRENT USE OF INSULIN: Primary | ICD-10-CM

## 2024-05-23 DIAGNOSIS — E66.9 NON MORBID OBESITY, UNSPECIFIED OBESITY TYPE: ICD-10-CM

## 2024-05-23 DIAGNOSIS — E78.2 MIXED HYPERLIPIDEMIA: ICD-10-CM

## 2024-05-23 PROCEDURE — 1159F MED LIST DOCD IN RCRD: CPT | Mod: CPTII,S$GLB,, | Performed by: NURSE PRACTITIONER

## 2024-05-23 PROCEDURE — 3078F DIAST BP <80 MM HG: CPT | Mod: CPTII,S$GLB,, | Performed by: NURSE PRACTITIONER

## 2024-05-23 PROCEDURE — 99999 PR PBB SHADOW E&M-EST. PATIENT-LVL IV: CPT | Mod: PBBFAC,,, | Performed by: NURSE PRACTITIONER

## 2024-05-23 PROCEDURE — 3075F SYST BP GE 130 - 139MM HG: CPT | Mod: CPTII,S$GLB,, | Performed by: NURSE PRACTITIONER

## 2024-05-23 PROCEDURE — 99214 OFFICE O/P EST MOD 30 MIN: CPT | Mod: S$GLB,,, | Performed by: NURSE PRACTITIONER

## 2024-05-23 PROCEDURE — 1160F RVW MEDS BY RX/DR IN RCRD: CPT | Mod: CPTII,S$GLB,, | Performed by: NURSE PRACTITIONER

## 2024-05-23 PROCEDURE — G2211 COMPLEX E/M VISIT ADD ON: HCPCS | Mod: S$GLB,,, | Performed by: NURSE PRACTITIONER

## 2024-05-23 RX ORDER — EZETIMIBE 10 MG/1
10 TABLET ORAL DAILY
Qty: 90 TABLET | Refills: 2 | Status: SHIPPED | OUTPATIENT
Start: 2024-05-23 | End: 2025-05-23

## 2024-05-23 RX ORDER — PRAVASTATIN SODIUM 80 MG/1
80 TABLET ORAL DAILY
Qty: 90 TABLET | Refills: 1 | Status: SHIPPED | OUTPATIENT
Start: 2024-05-23

## 2024-05-23 RX ORDER — TIRZEPATIDE 15 MG/.5ML
15 INJECTION, SOLUTION SUBCUTANEOUS
Qty: 4 PEN | Refills: 4 | Status: SHIPPED | OUTPATIENT
Start: 2024-05-23

## 2024-05-23 NOTE — PROGRESS NOTES
CC: This 78 y.o. Black or  female  is here for evaluation of  T2DM along with comorbidities indicated in the Visit Diagnosis section of this encounter.    HPI: Mena Odonnell was diagnosed with T2DM in ~ . Experienced blurry vision at the time of diagnosis r/t BG >400. Started on orals. Began insulin in . She is currently on MDI.      Prior visit 24  A1c is up from 6.8 to 7.8%.   She has  not been using Dexcom because she didn't know how to work G7.   Wonders if her glucose strips are .   She reports BGs started rising to the 200s in early to mid Dec for unknown reason. No recent steroid tx, no new meds and supplements.   Denies change in diet. She is unsure if her readings are correct because the test strips are .   Appetite continues to be low.   Plan See Diabetes Education for Dexcom G7 training.   Increase Toujeo from 40 to 50 units once daily.   Avoid excess consumption of prunes/sweets.   Continue metformin, Jardiance, and Mounjaro 12.5 mg. Pt declines higher dose of Jardiance or Mounjaro. She does not want her appetite further reduced.   Return to clinic in 3 months with labs prior.     Interval hx  A1c is down from 7.8 to 7.4%.   Not using Dexcom because she is awaiting a new  because it broke.   She has lost 10 lb since lov. She wants to lose more weight.     LAST DIABETES EDUCATION: 2019 mira Palmer - Found visit helpful   22 with ELENO Sanchez RD for Dexcom training     HOSPITALIZED FOR DIABETES -  No.    DIABETES MEDICATIONS:   Jardiance 10 mg once daily in the AM  Metformin ER 1000 mg once daily at lunch   Mounjaro 12.5 mg once weekly  Toujeo Max 50 units qhs     Humalog per ss: 150-200=+1, 201-250=+2, 251-300=+3; 301-350=+4, over 350=+5 units    Misses medication doses - no        DM COMPLICATIONS: peripheral neuropathy and peripheral vascular disease    SIGNIFICANT DIABETES MED HISTORY:   Metformin stopped 2016 r/t GI upset and  diarrhea, resumed with metformin ER 3/2022 and weaned off Humalog   Ozempic switched to Mounjaro 3/2023      GLUCOSE MONITORING: reports when she was monitoring with Dexcom:   FBGs were low 100s. Post meal BGs as high as 260s.   Predinner low 100s.       HYPOGLYCEMIC EPISODES:  Denies     CURRENT DIET:  drinking mostly water and once a day in coke.     Eats 2-3 meals/day. Dinner skipped sometimes.   Eats lighter foods now.  Usually has a peach cup at bedtime or applesauce.   Eats 6 prunes a day to help with constipation.         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 No constipation; + occ nausea.   Denies polydipsia     PHYSICAL EXAM:  GENERAL: Well developed, well nourished. No acute distress.   PSYCH: AAOx3, appropriate mood and affect, conversant, well-groomed. Judgement and insight good.   NEURO: Cranial nerves grossly intact. Speech clear, no tremor.   CHEST: Respirations even and unlabored.       Hemoglobin A1C   Date Value Ref Range Status   04/27/2024 7.4 (H) 4.0 - 5.6 % Final     Comment:     ADA Screening Guidelines:  5.7-6.4%  Consistent with prediabetes  >or=6.5%  Consistent with diabetes    High levels of fetal hemoglobin interfere with the HbA1C  assay. Heterozygous hemoglobin variants (HbS, HgC, etc)do  not significantly interfere with this assay.   However, presence of multiple variants may affect accuracy.     01/22/2024 7.8 (H) 4.0 - 5.6 % Final     Comment:     ADA Screening Guidelines:  5.7-6.4%  Consistent with prediabetes  >or=6.5%  Consistent with diabetes    High levels of fetal hemoglobin interfere with the HbA1C  assay. Heterozygous hemoglobin variants (HbS, HgC, etc)do  not significantly interfere with this assay.   However, presence of multiple variants may affect accuracy.      09/13/2023 6.8 (H) 4.0 - 5.6 % Final     Comment:     ADA Screening Guidelines:  5.7-6.4%  Consistent with prediabetes  >or=6.5%  Consistent with diabetes    High levels of fetal hemoglobin interfere with the HbA1C  assay. Heterozygous hemoglobin variants (HbS, HgC, etc)do  not significantly interfere with this assay.   However, presence of multiple variants may affect accuracy.           Component Value Date/Time     04/27/2024 0845    K 3.4 (L) 04/27/2024 0845     04/27/2024 0845    CO2 26 04/27/2024 0845    BUN 21 04/27/2024 0845    CREATININE 0.6 04/27/2024 0845    CREATININE 0.6 04/27/2024 0845    GLU 95 04/27/2024 0845    CALCIUM 9.9 04/27/2024 0845    ALKPHOS 76 04/27/2024 0845    AST 20 04/27/2024 0845    ALT 22 04/27/2024 0845    BILITOT 0.4 04/27/2024 0845    EGFRNORACEVR >60.0 04/27/2024 0845    EGFRNORACEVR >60.0 04/27/2024 0845    ESTGFRAFRICA >60.0 02/26/2022 0818       Lab Results   Component Value Date    CHOL 147 04/27/2024    CHOL 191 01/22/2024    CHOL 195 02/18/2023     Lab Results   Component Value Date    HDL 40 04/27/2024    HDL 43 01/22/2024    HDL 49 02/18/2023     Lab Results   Component Value Date    LDLCALC 84.6 04/27/2024    LDLCALC 125.4 01/22/2024    LDLCALC 116.8 02/18/2023     Lab Results   Component Value Date    TRIG 112 04/27/2024    TRIG 113 01/22/2024    TRIG 146 02/18/2023       Lab Results   Component Value Date    CHOLHDL 27.2 04/27/2024    CHOLHDL 22.5 01/22/2024    CHOLHDL 25.1 02/18/2023         Lab Results   Component Value Date    MICALBCREAT 49.2 (H) 01/22/2024           ASSESSMENT and PLAN:    A1C GOAL: < 7%       1. Type 2 diabetes mellitus with diabetic polyneuropathy, with long-term current use of insulin  Avoid beverages with sugar.   Increase Mounjaro to 15 mg weekly.   Potential side effects: nausea, diarrhea, constipation, bloating. Nausea for the first week or two is common.  Avoid big food portions and greasy/heavy foods.     Ok to place Dexcom  sensor on abdomen.   Start exercise regimen.   Return to clinic in 4 months with A1c prior.     tirzepatide (MOUNJARO) 15 mg/0.5 mL PnIj    empagliflozin (JARDIANCE) 10 mg tablet    Hemoglobin A1C      2. Hyperlipidemia, unspecified hyperlipidemia type  Improved with zetia. Continue  rx's     ezetimibe (ZETIA) 10 mg tablet      3. Mixed hyperlipidemia  pravastatin (PRAVACHOL) 80 MG tablet      4. Non morbid obesity, unspecified obesity type  tirzepatide (MOUNJARO) 15 mg/0.5 mL PnIj      5. Microalbuminuria  empagliflozin (JARDIANCE) 10 mg tablet          Patient requires a therapeutic CGM and is willing to use therapeutic CGM/SMBG for Necessary frequent adjustments in insulin therapy. I have assessed adherence to CGM regimen and to the plan. Due to COVID pandemic, patient requires Dexcom CGM to manage diabetes.         Orders Placed This Encounter   Procedures    Hemoglobin A1C     Standing Status:   Future     Standing Expiration Date:   7/22/2025        Follow up in about 4 months (around 9/23/2024).

## 2024-05-23 NOTE — PATIENT INSTRUCTIONS
Avoid beverages with sugar.   Increase Mounjaro to 15 mg weekly.   Potential side effects: nausea, diarrhea, constipation, bloating. Nausea for the first week or two is common.  Avoid big food portions and greasy/heavy foods.     Resume Dexcom when  is sent but ok to place sensor on abdomen.   Start exercise regimen.   Return to clinic in 4 months with A1c prior.

## 2024-06-05 ENCOUNTER — HOSPITAL ENCOUNTER (OUTPATIENT)
Dept: CARDIOLOGY | Facility: HOSPITAL | Age: 79
Discharge: HOME | End: 2024-06-05
Attending: INTERNAL MEDICINE
Payer: COMMERCIAL

## 2024-06-05 VITALS — BODY MASS INDEX: 33.99 KG/M2 | HEIGHT: 65 IN | WEIGHT: 204 LBS

## 2024-06-05 DIAGNOSIS — I35.0 NONRHEUMATIC AORTIC VALVE STENOSIS: ICD-10-CM

## 2024-06-05 LAB
AORTIC ROOT ANNULUS: 3.08 CM
AORTIC VALVE CUSP SEPERATION: 0.66 CM
ASCENDING AORTA: 2.71 CM
AV INDEX (PROSTH): 0.35
AV MEAN GRADIENT: 38 MMHG
AV PEAK GRADIENT: 59 MMHG
AV VALVE AREA BY VELOCITY RATIO: 1.14 CM²
AV VALVE AREA: 1.06 CM²
AV VELOCITY RATIO: 0.38
BSA FOR ECHO PROCEDURE: 2.06 M2
CV ECHO LV RWT: 0.85 CM
DOP CALC AO PEAK VEL: 3.85 M/S
DOP CALC AO VTI: 79.6 CM
DOP CALC LVOT AREA: 3 CM2
DOP CALC LVOT DIAMETER: 1.96 CM
DOP CALC LVOT PEAK VEL: 1.46 M/S
DOP CALC LVOT STROKE VOLUME: 84.44 CM3
DOP CALC MV VTI: 50.4 CM
DOP CALCLVOT PEAK VEL VTI: 28 CM
E WAVE DECELERATION TIME: 364.15 MSEC
E/A RATIO: 0.55
E/E' RATIO: 30.86 M/S
ECHO LV POSTERIOR WALL: 1.52 CM (ref 0.6–1.1)
FRACTIONAL SHORTENING: 29 % (ref 28–44)
INTERVENTRICULAR SEPTUM: 1.3 CM (ref 0.6–1.1)
IVC DIAMETER: 1.66 CM
LA MAJOR: 4.47 CM
LA MINOR: 4.29 CM
LA WIDTH: 4.2 CM
LEFT ATRIUM SIZE: 4.58 CM
LEFT ATRIUM VOLUME INDEX: 36 ML/M2
LEFT ATRIUM VOLUME: 71.59 CM3
LEFT INTERNAL DIMENSION IN SYSTOLE: 2.55 CM (ref 2.1–4)
LEFT VENTRICLE DIASTOLIC VOLUME INDEX: 26.97 ML/M2
LEFT VENTRICLE DIASTOLIC VOLUME: 53.67 ML
LEFT VENTRICLE MASS INDEX: 91 G/M2
LEFT VENTRICLE SYSTOLIC VOLUME INDEX: 11.8 ML/M2
LEFT VENTRICLE SYSTOLIC VOLUME: 23.55 ML
LEFT VENTRICULAR INTERNAL DIMENSION IN DIASTOLE: 3.58 CM (ref 3.5–6)
LEFT VENTRICULAR MASS: 180.53 G
LV LATERAL E/E' RATIO: 36 M/S
LV SEPTAL E/E' RATIO: 27 M/S
LVOT MG: 5.78 MMHG
LVOT MV: 1.18 CM/S
MV MEAN GRADIENT: 5 MMHG
MV PEAK A VEL: 1.95 M/S
MV PEAK E VEL: 1.08 M/S
MV PEAK GRADIENT: 18 MMHG
MV STENOSIS PRESSURE HALF TIME: 105.6 MS
MV VALVE AREA BY CONTINUITY EQUATION: 1.68 CM2
MV VALVE AREA P 1/2 METHOD: 2.08 CM2
OHS CV RV/LV RATIO: 0.82 CM
PISA TR MAX VEL: 2.79 M/S
RA MAJOR: 3.62 CM
RA PRESSURE ESTIMATED: 3 MMHG
RA WIDTH: 2.7 CM
RIGHT VENTRICULAR END-DIASTOLIC DIMENSION: 2.93 CM
RV TB RVSP: 6 MMHG
RV TISSUE DOPPLER FREE WALL SYSTOLIC VELOCITY 1 (APICAL 4 CHAMBER VIEW): 13.8 CM/S
SINUS: 2.78 CM
STJ: 2.01 CM
TDI LATERAL: 0.03 M/S
TDI SEPTAL: 0.04 M/S
TDI: 0.04 M/S
TR MAX PG: 31 MMHG
TRICUSPID ANNULAR PLANE SYSTOLIC EXCURSION: 2.31 CM
TV REST PULMONARY ARTERY PRESSURE: 34 MMHG
Z-SCORE OF LEFT VENTRICULAR DIMENSION IN END DIASTOLE: -4.78
Z-SCORE OF LEFT VENTRICULAR DIMENSION IN END SYSTOLE: -2.6

## 2024-06-05 PROCEDURE — 93306 TTE W/DOPPLER COMPLETE: CPT

## 2024-06-05 PROCEDURE — 93306 TTE W/DOPPLER COMPLETE: CPT | Mod: 26,,, | Performed by: INTERNAL MEDICINE

## 2024-06-06 NOTE — TELEPHONE ENCOUNTER
----- Message from Maile Cuello MD sent at 9/15/2020  6:55 PM CDT -----  Can you let her know that her MRI shows arthritis.  We can try euflexxa or refer her to pain management for RFA   Adequate: hears normal conversation without difficulty oriented to person, place, time and situation

## 2024-06-11 DIAGNOSIS — G63 POLYNEUROPATHY ASSOCIATED WITH UNDERLYING DISEASE: ICD-10-CM

## 2024-06-11 RX ORDER — GABAPENTIN 300 MG/1
300 CAPSULE ORAL 3 TIMES DAILY
Qty: 90 CAPSULE | Refills: 11 | Status: SHIPPED | OUTPATIENT
Start: 2024-06-11 | End: 2025-06-11

## 2024-06-11 NOTE — TELEPHONE ENCOUNTER
Care Due:                  Date            Visit Type   Department     Provider  --------------------------------------------------------------------------------                                [facility]                              PRIMARY      MEDICINE/  Last Visit: 03-      CARE ([facility])   INTERNAL MED   Leonard Wells                              [facility]                              PRIMARY      MEDICINE/  Next Visit: 09-      CARE ([facility])   INTERNAL MED   Leonard Wells                                                            Last  Test          Frequency    Reason                     Performed    Due Date  --------------------------------------------------------------------------------    CBC.........  12 months..  diclofenac, meloxicam....  Not Found    Overdue    [employer] Embedded Care Due Messages. Reference number: [id number].   6/11/2024 10:01:39 AM CDT

## 2024-07-03 DIAGNOSIS — Z71.89 COMPLEX CARE COORDINATION: ICD-10-CM

## 2024-07-22 DIAGNOSIS — H91.90 HEARING DISORDER, UNSPECIFIED LATERALITY: Primary | ICD-10-CM

## 2024-07-23 ENCOUNTER — TELEPHONE (OUTPATIENT)
Dept: VASCULAR SURGERY | Facility: CLINIC | Age: 79
End: 2024-07-23
Payer: COMMERCIAL

## 2024-07-23 NOTE — TELEPHONE ENCOUNTER
Called and spoke with pt. Wanted appt. to see vascular. Appt. scheduled with NP. Pt. Aware of location of appt.

## 2024-07-24 ENCOUNTER — CLINICAL SUPPORT (OUTPATIENT)
Dept: OTOLARYNGOLOGY | Facility: CLINIC | Age: 79
End: 2024-07-24
Payer: COMMERCIAL

## 2024-07-24 ENCOUNTER — OFFICE VISIT (OUTPATIENT)
Dept: OTOLARYNGOLOGY | Facility: CLINIC | Age: 79
End: 2024-07-24
Payer: COMMERCIAL

## 2024-07-24 VITALS
HEIGHT: 65 IN | TEMPERATURE: 98 F | DIASTOLIC BLOOD PRESSURE: 66 MMHG | WEIGHT: 200 LBS | SYSTOLIC BLOOD PRESSURE: 134 MMHG | BODY MASS INDEX: 33.32 KG/M2 | HEART RATE: 74 BPM

## 2024-07-24 DIAGNOSIS — Z46.1 ENCOUNTER FOR FITTING AND ADJUSTMENT OF HEARING AID OF BOTH EARS: Primary | ICD-10-CM

## 2024-07-24 DIAGNOSIS — H93.13 BILATERAL TINNITUS: ICD-10-CM

## 2024-07-24 DIAGNOSIS — J30.9 ALLERGIC RHINITIS, UNSPECIFIED SEASONALITY, UNSPECIFIED TRIGGER: ICD-10-CM

## 2024-07-24 DIAGNOSIS — H61.23 EXCESSIVE CERUMEN IN EAR CANAL, BILATERAL: ICD-10-CM

## 2024-07-24 DIAGNOSIS — Z97.4 WEARS HEARING AID IN BOTH EARS: ICD-10-CM

## 2024-07-24 DIAGNOSIS — R29.2 ABNORMAL ACOUSTIC REFLEX: ICD-10-CM

## 2024-07-24 DIAGNOSIS — H90.3 SENSORINEURAL HEARING LOSS, BILATERAL: ICD-10-CM

## 2024-07-24 DIAGNOSIS — H90.3 BILATERAL SENSORINEURAL HEARING LOSS: Primary | ICD-10-CM

## 2024-07-24 PROCEDURE — 3075F SYST BP GE 130 - 139MM HG: CPT | Mod: CPTII,S$GLB,, | Performed by: OTOLARYNGOLOGY

## 2024-07-24 PROCEDURE — 1159F MED LIST DOCD IN RCRD: CPT | Mod: CPTII,S$GLB,, | Performed by: OTOLARYNGOLOGY

## 2024-07-24 PROCEDURE — 92557 COMPREHENSIVE HEARING TEST: CPT | Mod: ,,, | Performed by: AUDIOLOGIST-HEARING AID FITTER

## 2024-07-24 PROCEDURE — 99214 OFFICE O/P EST MOD 30 MIN: CPT | Mod: S$GLB,,, | Performed by: OTOLARYNGOLOGY

## 2024-07-24 PROCEDURE — 92550 TYMPANOMETRY & REFLEX THRESH: CPT | Mod: ,,, | Performed by: AUDIOLOGIST-HEARING AID FITTER

## 2024-07-24 PROCEDURE — 3078F DIAST BP <80 MM HG: CPT | Mod: CPTII,S$GLB,, | Performed by: OTOLARYNGOLOGY

## 2024-07-24 PROCEDURE — 1160F RVW MEDS BY RX/DR IN RCRD: CPT | Mod: CPTII,S$GLB,, | Performed by: OTOLARYNGOLOGY

## 2024-07-24 NOTE — PATIENT INSTRUCTIONS
Consider OTC Debrox ear wax drops and use every 2 - 3 days as discussed.    Follow up one year unless change or problems prior.

## 2024-07-24 NOTE — Clinical Note
Your patient, Mena Odonnell, was recently seen for an audiogram.  My assessment and recommendations are enclosed.  If you should have any questions or concerns, please contact me at [phone].   Sincerely, Keenan Hernandes, CCC-A Audiologist [facility]

## 2024-07-24 NOTE — PROGRESS NOTES
Keenan Hernandes, CCC-A  Audiologist - [facility] [address] [place] [address]  [email or web address]  [phone]    Patient: Mena Odonnell   MRN: [id number]  [address]  Home Phone [phone]   Work Phone [phone]   Mobile [phone]   : 1945  TRACY: 2024      HEARING AID FOLLOW-UP      Mena Odonnell is here today for an updated audiogram and hearing aid clean/check.  She reported that she has not been wearing her hearing aids much since our last visit, even though she hears a difference when she wears them, because they irritate her, and she has concerns about how to change the cerushields.  Otoscopy revealed cerumen with TMs visible AU for testing.  Completed audiogram and noted no significant changes - sent to Dr. Chaparro for review.  Cleaned hearing aids, mics, & receivers (M1L & M1R) using wipe.  Changed out medium vented domes from stock and listening check good AU.  Per patient request, reviewed how to change out the cerushields and demonstrated/practiced in the office - patient did well.  Input new audiogram into LOVE, then opened 23 session.  Connected devices and applied new audiometric thresholds, then verified acoustic parameters.  Selected estimated vent, recalculated gain, then maxed MPO and saved.  Patient noted sound quality in office was good, and Mena Odonnell verbalized understanding and satisfaction with today's visit.  She will call or send a message if service is needed before her next appointment for her annual audiogram and hearing aid clean/check/adjustment.      Taya Moss [id number]  #[id number] (R)  #[id number] (L)  Warranty     _______________________________  Keenan Hernandes, GARTH-A  Audiologist

## 2024-07-31 ENCOUNTER — OFFICE VISIT (OUTPATIENT)
Dept: VASCULAR SURGERY | Facility: CLINIC | Age: 79
End: 2024-07-31
Payer: COMMERCIAL

## 2024-07-31 VITALS
DIASTOLIC BLOOD PRESSURE: 72 MMHG | HEIGHT: 65 IN | WEIGHT: 205.69 LBS | SYSTOLIC BLOOD PRESSURE: 119 MMHG | HEART RATE: 70 BPM | BODY MASS INDEX: 34.27 KG/M2

## 2024-07-31 DIAGNOSIS — I89.0 LYMPHEDEMA OF BOTH LOWER EXTREMITIES: ICD-10-CM

## 2024-07-31 DIAGNOSIS — I70.203 ATHEROSCLEROSIS OF NATIVE ARTERY OF BOTH LOWER EXTREMITIES, WITH UNSPECIFIED PRESENCE OF CLINICAL MANIFESTATION: Primary | ICD-10-CM

## 2024-07-31 PROCEDURE — 99999 PR PBB SHADOW E&M-EST. PATIENT-LVL IV: CPT | Mod: PBBFAC,,, | Performed by: NURSE PRACTITIONER

## 2024-07-31 NOTE — PROGRESS NOTES
[facility]                         07/31/2024    HPI:  Mena Odonnell is a 78 y.o. female with   Patient Active Problem List   Diagnosis    Type 2 diabetes mellitus with diabetic polyneuropathy    Hyperlipidemia    HTN (hypertension)    Knee pain    H/O vitamin D deficiency    Non morbid obesity due to excess calories    Bilateral carotid bruits    Osteoarthritis of right knee    Class 1 obesity due to excess calories with serious comorbidity and body mass index (BMI) of 32.0 to 32.9 in adult    Dyspnea on exertion    Chronic cough    MEKA (obstructive sleep apnea)    Sensorineural hearing loss (SNHL) of right ear with restricted hearing of left ear    Tinnitus of both ears    Allergic rhinitis    Dysfunction of both eustachian tubes    Nasal septal deviation    Chronic eczematous otitis externa of both ears    Neck abscess    Epidermal inclusion cyst    Polyneuropathy associated with underlying disease    Aortic atherosclerosis    Morbid obesity    Nonrheumatic aortic valve stenosis    Cervical spondylosis    DDD (degenerative disc disease), cervical    Cervical radiculopathy    Decreased range of motion of intervertebral discs of cervical spine    Decreased strength of upper extremity    being managed by PCP and specialists who is here today for evaluation of PVD.  Patient has no complaints of claudication.  Patient states location is RLE occurring for months.  Associated signs and symptoms include edema.  Quality is burning and severity is 6/10.  Symptoms began months ago.  Alleviating factors include elevation.  Worsening factors include dependency.  no rest pain.  no tissue loss.  Patient is diabetic.  Is not on Pletal.  no previous lower extremity interventions.    no MI  no Stroke  Tobacco use: denies  Daily Aspirin: yes  Anticoagulation: no    5/2022:  No new issues    7/2022:  States feels better, +therapy, has worn compression and elevating legs for greater  than 3 mo.    07/2024: Patient s/p RLE great toe nail removal. Podiatrist concerned for circulation and healing.    Past Medical History:   Diagnosis Date    Cataract     Diabetes mellitus type II     Diabetes with neurologic complications     DM retinopathy     H/O vitamin D deficiency     Hyperlipidemia     Hypertension     Obesity     Osteoarthritis     Peripheral neuropathy     Polyneuropathy     Severe obesity (BMI 35.0-39.9) with comorbidity 12/30/2016    Sleep apnea     Tendonitis     left foot     Past Surgical History:   Procedure Laterality Date    BLADDER SUSPENSION      CATARACT EXTRACTION      COLONOSCOPY N/A 9/26/2015    Procedure: COLONOSCOPY;  Surgeon: Javed Wood MD;  Location: [facility] ENDO ([address] [place] [address]);  Service: Endoscopy;  Laterality: N/A;    HYSTERECTOMY  1978    fibroids    NECK MASS EXCISION  11/6/2019    Procedure: EXCISION, MASS, NECK;  Surgeon: Edwin Barrow MD;  Location: [facility];  Service: General;;  RN PREOP 10/30/2019    TONSILLECTOMY       Family History   Problem Relation Name Age of Onset    Thyroid disease Mother      Cataracts Mother      Diabetes Mother      Stroke Mother      Hypertension Mother      Thyroid disease Sister      Diabetes Sister      Hypertension Sister      Hypertension Daughter      Diabetes Son      Diabetes Sister      Hypertension Sister      Hypertension Brother      Hypertension Sister      Hypertension Sister      Hypertension Brother      Thyroid disease Maternal Aunt      Thyroid disease Maternal Aunt      Colon cancer Maternal Grandfather      Breast cancer Neg Hx      Ovarian cancer Neg Hx      Amblyopia Neg Hx      Blindness Neg Hx      Cancer Neg Hx      Glaucoma Neg Hx      Macular degeneration Neg Hx      Retinal detachment Neg Hx      Strabismus Neg Hx       Social History     Socioeconomic History    Marital status:     Number of children: 2   Occupational History     Employer: [employer]   Tobacco Use    Smoking status:  Former     Current packs/day: 0.00     Average packs/day: 0.3 packs/day for 15.0 years (3.8 ttl pk-yrs)     Types: Cigarettes     Start date:      Quit date:      Years since quittin.6    Smokeless tobacco: Never   Substance and Sexual Activity    Alcohol use: No    Drug use: No    Sexual activity: Not Currently     Partners: Male     Birth control/protection: Abstinence   Social History Narrative    . One daughter. Works as an Apartment      Social Determinants of Health     Financial Resource Strain: Low Risk  (2022)    Overall Financial Resource Strain (CARDIA)     Difficulty of Paying Living Expenses: Not hard at all   Food Insecurity: No Food Insecurity (2022)    Hunger Vital Sign     Worried About Running Out of Food in the Last Year: Never true     Ran Out of Food in the Last Year: Never true   Transportation Needs: No Transportation Needs (2022)    PRAPARE - Transportation     Lack of Transportation (Medical): No     Lack of Transportation (Non-Medical): No   Physical Activity: Inactive (2022)    Exercise Vital Sign     Days of Exercise per Week: 0 days     Minutes of Exercise per Session: 0 min   Housing Stability: Low Risk  (2022)    Housing Stability Vital Sign     Unable to Pay for Housing in the Last Year: No     Number of Places Lived in the Last Year: 1     Unstable Housing in the Last Year: No       Current Outpatient Medications:     azelastine (ASTELIN) 137 mcg (0.1 %) nasal spray, 2 sprays by Nasal route., Disp: , Rfl:     blood sugar diagnostic Strp, Check blood glucose 2 times a day, Disp: 200 strip, Rfl: 3    blood-glucose meter,continuous (DEXCOM G7 ) Misc, Use with Dexcom G7 sensors, Disp: 1 each, Rfl: 0    blood-glucose sensor (DEXCOM G7 SENSOR) Sujey, Change every 10 days, Disp: 12 each, Rfl: 3    carbinoxamine maleate 6 mg Tab, Take 6 mg by mouth every 8 (eight) hours as needed., Disp: , Rfl:     cetirizine (ZYRTEC) 10 MG  tablet, Take 1 tablet (10 mg total) by mouth once daily. For 30 days, Disp: 90 tablet, Rfl: 1    diclofenac sodium (VOLTAREN) 1 % Gel, Lower extremities: Apply the gel (4 g) to the affected area 4 times daily. Do not apply more than 16 g daily to any one affected joint of the lower extremities. Upper extremities: Apply the gel (2 g) to the affected area 4 times daily. Do not apply more than 8 g daily to any one affected joint of the upper extremities. Total dose should not exceed 32 g per day, overall affected joints., Disp: 100 g, Rfl: 5    empagliflozin (JARDIANCE) 10 mg tablet, Take 1 tablet (10 mg total) by mouth once daily., Disp: 90 tablet, Rfl: 2    ezetimibe (ZETIA) 10 mg tablet, Take 1 tablet (10 mg total) by mouth once daily., Disp: 90 tablet, Rfl: 2    fluconazole (DIFLUCAN) 150 MG Tab, Take 150 mg by mouth., Disp: , Rfl:     gabapentin (NEURONTIN) 300 MG capsule, Take 1 capsule (300 mg total) by mouth 3 (three) times daily., Disp: 90 capsule, Rfl: 11    insulin glargine U-300 conc (TOUJEO MAX U-300 SOLOSTAR) 300 unit/mL (3 mL) insulin pen, INJECT 50 UNITS SUBCUTANEOUSLY ONCE DAILY, Disp: 6 Pen, Rfl: 2    insulin lispro (HUMALOG KWIKPEN INSULIN) 100 unit/mL pen, Use with sliding scale - 150-200=+2, 201-250=+4; 251-300=+6; 301-350=+8, over 350=+10 units, Disp: 15 mL, Rfl: 0    lancets Misc, Check blood glucose 2x/day, Disp: 200 each, Rfl: 3    losartan-hydrochlorothiazide 100-25 mg (HYZAAR) 100-25 mg per tablet, Take 1 tablet by mouth once daily., Disp: 90 tablet, Rfl: 1    meloxicam (MOBIC) 7.5 MG tablet, Take 1 tablet (7.5 mg total) by mouth once daily., Disp: 12 tablet, Rfl: 0    metFORMIN (GLUCOPHAGE-XR) 500 MG ER 24hr tablet, Take 2 tablets (1,000 mg total) by mouth Daily., Disp: 180 tablet, Rfl: 2    metoprolol succinate (TOPROL-XL) 50 MG 24 hr tablet, Take 1 tablet (50 mg total) by mouth once daily., Disp: 90 tablet, Rfl: 1    montelukast (SINGULAIR) 10 mg tablet, Take 1 tablet (10 mg total) by  mouth once daily., Disp: 90 tablet, Rfl: 1    pravastatin (PRAVACHOL) 80 MG tablet, Take 1 tablet (80 mg total) by mouth once daily., Disp: 90 tablet, Rfl: 1    tirzepatide (MOUNJARO) 15 mg/0.5 mL PnIj, Inject 15 mg into the skin every 7 days., Disp: 4 Pen, Rfl: 4    tobramycin sulfate 0.3% (TOBREX) 0.3 % ophthalmic solution, 1-2 drops topically twice daily to affected toe(s)., Disp: 5 mL, Rfl: 3    amLODIPine (NORVASC) 10 MG tablet, Take 1 tablet (10 mg total) by mouth once daily., Disp: 90 tablet, Rfl: 1    aspirin 81 MG Chew, Take 1 tablet (81 mg total) by mouth once daily., Disp: 30 tablet, Rfl: 0    Current Facility-Administered Medications:     sodium hyaluronate (EUFLEXXA) 10 mg/mL(mw 2.4 -3.6 million) injection 20 mg, 20 mg, Intra-articular, 1 time in Clinic/HOD, Sumanth Erickson MD    REVIEW OF SYSTEMS:  General: No fevers or chills; ENT: No sore throat; Allergy and Immunology: no persistent infections; Hematological and Lymphatic: No history of bleeding or easy bruising; Endocrine: negative; Respiratory: no cough, shortness of breath, or wheezing; Cardiovascular: no chest pain or dyspnea on exertion; no claudication, no rest pain; Gastrointestinal: no abdominal pain/back, change in bowel habits, or bloody stools; Genito-Urinary: no dysuria, trouble voiding, or hematuria; Musculoskeletal: negative, no wound; Neurological: no TIA or stroke symptoms; Psychiatric: no nervousness, anxiety or depression.    PHYSICAL EXAM:      Pulse: 70         General appearance:  Alert, well-appearing, and in no distress.  Oriented to person, place, and time                    Neurological: Normal speech, no focal findings noted; CN II - XII grossly intact. RLE with sensation to light touch, LLE with sensation to light touch.            Musculoskeletal: Digits/nail without cyanosis/clubbing.  Strength 5/5 BLE.                    Neck: Supple, no significant adenopathy                  Chest:  Clear to auscultation, no  "wheezes, rales or rhonchi, symmetric air entry. No use of accessory muscles               Cardiac: Normal rate and regular rhythm, S1 and S2 normal            Abdomen: Soft, nontender, nondistended, no masses or organomegaly, no hernia     No rebound tenderness noted; bowel sounds normal     Pulsatile aortic mass is non palpable.     No groin adenopathy      Extremities:     2+ R DP pulse, 2+ L DP pulse     2+ RLE edema, 1+ LLE edema    Skin: RLE no tissue loss; LLE no tissue loss    LAB RESULTS:  No results found for: "CBC"  Lab Results   Component Value Date    LABPROT 10.5 06/20/2013    INR 1.0 06/20/2013     Lab Results   Component Value Date     04/27/2024    K 3.4 (L) 04/27/2024     04/27/2024    CO2 26 04/27/2024    GLU 95 04/27/2024    BUN 21 04/27/2024    CREATININE 0.6 04/27/2024    CREATININE 0.6 04/27/2024    CALCIUM 9.9 04/27/2024    ANIONGAP 10 04/27/2024    EGFRNONAA >60.0 02/26/2022     Lab Results   Component Value Date    WBC 7.07 12/07/2019    RBC 4.52 12/07/2019    HGB 13.4 12/07/2019    HCT 43.3 12/07/2019    MCV 96 12/07/2019    MCH 29.6 12/07/2019    MCHC 30.9 (L) 12/07/2019    RDW 14.5 12/07/2019     12/07/2019    MPV 12.2 12/07/2019    GRAN 3.4 12/07/2019    GRAN 47.3 12/07/2019    LYMPH 2.8 12/07/2019    LYMPH 39.2 12/07/2019    MONO 0.6 12/07/2019    MONO 8.8 12/07/2019    EOS 0.2 12/07/2019    BASO 0.07 12/07/2019    EOSINOPHIL 3.4 12/07/2019    BASOPHIL 1.0 12/07/2019    DIFFMETHOD Automated 12/07/2019     .  Lab Results   Component Value Date    HGBA1C 7.4 (H) 04/27/2024       IMAGING:  All pertinent imaging has been reviewed and interpreted independently.    BERRY 1/0.65    Arterial US BLE Right lower extremity arterial ultrasound shows R PT hemodynamically significant stenosis.      7/2022  Right lower extremity pressures and waveforms indicate mild arterial occlusive disease.  Left lower extremity pressures and waveforms indicate moderate arterial occlusive " disease.      IMP/PLAN:  78 y.o. female with   Patient Active Problem List   Diagnosis    Type 2 diabetes mellitus with diabetic polyneuropathy    Hyperlipidemia    HTN (hypertension)    Knee pain    H/O vitamin D deficiency    Non morbid obesity due to excess calories    Bilateral carotid bruits    Osteoarthritis of right knee    Class 1 obesity due to excess calories with serious comorbidity and body mass index (BMI) of 32.0 to 32.9 in adult    Dyspnea on exertion    Chronic cough    MEKA (obstructive sleep apnea)    Sensorineural hearing loss (SNHL) of right ear with restricted hearing of left ear    Tinnitus of both ears    Allergic rhinitis    Dysfunction of both eustachian tubes    Nasal septal deviation    Chronic eczematous otitis externa of both ears    Neck abscess    Epidermal inclusion cyst    Polyneuropathy associated with underlying disease    Aortic atherosclerosis    Morbid obesity    Nonrheumatic aortic valve stenosis    Cervical spondylosis    DDD (degenerative disc disease), cervical    Cervical radiculopathy    Decreased range of motion of intervertebral discs of cervical spine    Decreased strength of upper extremity    being managed by PCP and specialists who is here today for evaluation of PVD.    -No RLE PVD with no claudication, no rest pain, no wound.  Imaging reviewed. - cont daily ASA; mild RLE/moderate LLE PVD 7/2022. Patient s/p RLE great toe nail removal with concern for poor healing.  -BLE lymphedema and venous hypertension - cont lymphedema clinic recs and pumps Rx today  -Patient is diagnosed with lymphedema and presents with hyperpigmentation of the lower extremity.  Patient has previously attempted compression, elevation, and exercise for at least 4 weeks.  Patient has secondary lymphedema and has tried and failed compression of 20-30 mm Hg, elevation and exercise for >1 month period however symptoms persist.  Basic pump trial performed and discontinued due to sensitivity and pain  to static pressure.  Symptoms of swelling, pain and hyperplasia present.  A compression device is recommended to treat current symptoms and prevent disease progression. The patient has tried elevation, exercise and compression and symptoms remain.  The patient has marked hyperkeratosis with hyperplasia and hyperpigmentation.  - recommend lymphedema clinic therapy and pumps  -Exercise  -Heart healthy lifestyle  -RTC 3-4 weeks with BERRY/Tps and Arterial US    I spent 30 minutes evaluating this patient and greater than 50% of the time was spent counseling, coordinator care and discussing the plan of care.  All questions were answered and patient stated understanding with agreement with the above treatment plan.    Isaiah Dickson NP  Vascular and Endovascular Surgery

## 2024-08-06 DIAGNOSIS — M17.0 PRIMARY OSTEOARTHRITIS OF BOTH KNEES: Primary | ICD-10-CM

## 2024-08-12 NOTE — PROGRESS NOTES
Keenan Hernandes, CCC-A  Audiologist - [facility] [address] [place] [address]  [email or web address]  [phone]    Patient: Mena Odonnell   MRN: [id number]  [address]   Home Phone [phone]   Work Phone [phone]   Mobile [phone]   : 1945  TRACY: 2024      AUDIOLOGICAL EVALUATION    Ms. Mena Odonnell was seen in the clinic today for an audiological evaluation.  Ms. Odonnell reported bilateral tinnitus.  Ms. Odonnell denied dizziness, history of noise exposure, and family history of hearing loss.    Audiological testing revealed a mild to moderate sensorineural hearing loss for the Right ear and mild to moderate sensorineural hearing loss for the Left ear.  A speech reception threshold was obtained at 30 dB HL for the Right ear and at 30 dB HL for the Left ear.  Speech discrimination was 88% for the Right ear and 92% for the Left ear.      Tympanometry testing revealed a Type A tympanogram for the Right ear and a Type A tympanogram for the Left ear.          RECOMMENDATIONS:   It is recommended that Mena Odonnell:  Follow up medically with Dr. Chaparro.  Continue wearing her binaural hearing aids to improve speech understanding.  Continue to receive audiological monitoring annually.  Use precaution and/or hearing protection in noisy environments.    If you should have any questions or concerns regarding the above information, please do not hesitate to contact me at [phone].      _______________________________  Keenan Hernandes, GARTH-A  Audiologist

## 2024-08-28 ENCOUNTER — OFFICE VISIT (OUTPATIENT)
Dept: VASCULAR SURGERY | Facility: CLINIC | Age: 79
End: 2024-08-28
Payer: COMMERCIAL

## 2024-08-28 ENCOUNTER — HOSPITAL ENCOUNTER (OUTPATIENT)
Dept: CARDIOLOGY | Facility: HOSPITAL | Age: 79
Discharge: HOME | End: 2024-08-28
Attending: NURSE PRACTITIONER
Payer: COMMERCIAL

## 2024-08-28 VITALS
HEIGHT: 65 IN | HEART RATE: 78 BPM | SYSTOLIC BLOOD PRESSURE: 143 MMHG | DIASTOLIC BLOOD PRESSURE: 76 MMHG | BODY MASS INDEX: 34.39 KG/M2 | WEIGHT: 206.44 LBS

## 2024-08-28 DIAGNOSIS — I70.203 ATHEROSCLEROSIS OF NATIVE ARTERY OF BOTH LOWER EXTREMITIES, WITH UNSPECIFIED PRESENCE OF CLINICAL MANIFESTATION: ICD-10-CM

## 2024-08-28 DIAGNOSIS — I89.0 LYMPHEDEMA OF BOTH LOWER EXTREMITIES: ICD-10-CM

## 2024-08-28 DIAGNOSIS — I70.203 ATHEROSCLEROSIS OF NATIVE ARTERY OF BOTH LOWER EXTREMITIES, WITH UNSPECIFIED PRESENCE OF CLINICAL MANIFESTATION: Primary | ICD-10-CM

## 2024-08-28 LAB
LEFT ABI: 0.8
LEFT ANT TIBIAL SYS PSV: 26 CM/S
LEFT ARM BP: 141 MMHG
LEFT CFA PSV: 165 CM/S
LEFT DORSALIS PEDIS: 85 MMHG
LEFT EXTERNAL ILIAC PSV: 158 CM/S
LEFT PERONEAL SYS PSV: 61 CM/S
LEFT POPLITEAL PSV: 75 CM/S
LEFT POST TIBIAL SYS PSV: 50 CM/S
LEFT POSTERIOR TIBIAL: 113 MMHG
LEFT PROFUNDA SYS PSV: 132 CM/S
LEFT SUPER FEMORAL DIST SYS PSV: 93 CM/S
LEFT SUPER FEMORAL MID SYS PSV: 100 CM/S
LEFT SUPER FEMORAL OSTIAL SYS PSV: 105 CM/S
LEFT SUPER FEMORAL PROX SYS PSV: 108 CM/S
LEFT TBI: 0.14
LEFT TIB/PER TRUNK SYS PSV: 39 CM/S
LEFT TOE PRESSURE: 20 MMHG
OHS CV LEFT LOWER EXTREMITY ABI (NO CALC): 0.8
OHS CV RIGHT ABI LOWER EXTREMITY (NO CALC): 0.53
RIGHT ABI: 0.53
RIGHT ANT TIBIAL SYS PSV: 23 CM/S
RIGHT ARM BP: 136 MMHG
RIGHT CFA PSV: 86 CM/S
RIGHT DORSALIS PEDIS: 49 MMHG
RIGHT EXTERNAL ILLIAC PSV: 118 CM/S
RIGHT PERONEAL SYS PSV: 30 CM/S
RIGHT POPLITEAL PSV: 29 CM/S
RIGHT POST TIBIAL SYS PSV: 125 CM/S
RIGHT POSTERIOR TIBIAL: 75 MMHG
RIGHT PROFUNDA SYS PSV: 87 CM/S
RIGHT SUPER FEMORAL DIST SYS PSV: 0 CM/S
RIGHT SUPER FEMORAL MID SYS PSV: 56 CM/S
RIGHT SUPER FEMORAL OSTIAL SYS PSV: 83 CM/S
RIGHT SUPER FEMORAL PROX SYS PSV: 76 CM/S
RIGHT TIB/PER TRUNK SYS PSV: 37 CM/S

## 2024-08-28 PROCEDURE — 93922 UPR/L XTREMITY ART 2 LEVELS: CPT

## 2024-08-28 PROCEDURE — 93925 LOWER EXTREMITY STUDY: CPT

## 2024-08-28 PROCEDURE — 93922 UPR/L XTREMITY ART 2 LEVELS: CPT | Mod: 26,,, | Performed by: SURGERY

## 2024-08-28 PROCEDURE — 93925 LOWER EXTREMITY STUDY: CPT | Mod: 26,,, | Performed by: SURGERY

## 2024-08-28 PROCEDURE — 99999 PR PBB SHADOW E&M-EST. PATIENT-LVL IV: CPT | Mod: PBBFAC,,, | Performed by: NURSE PRACTITIONER

## 2024-08-28 NOTE — PROGRESS NOTES
[facility]                         08/28/2024    HPI:  Mena Odonnell is a 78 y.o. female with   Patient Active Problem List   Diagnosis    Type 2 diabetes mellitus with diabetic polyneuropathy    Hyperlipidemia    HTN (hypertension)    Knee pain    H/O vitamin D deficiency    Non morbid obesity due to excess calories    Bilateral carotid bruits    Osteoarthritis of right knee    Class 1 obesity due to excess calories with serious comorbidity and body mass index (BMI) of 32.0 to 32.9 in adult    Dyspnea on exertion    Chronic cough    MEKA (obstructive sleep apnea)    Sensorineural hearing loss (SNHL) of right ear with restricted hearing of left ear    Tinnitus of both ears    Allergic rhinitis    Dysfunction of both eustachian tubes    Nasal septal deviation    Chronic eczematous otitis externa of both ears    Neck abscess    Epidermal inclusion cyst    Polyneuropathy associated with underlying disease    Aortic atherosclerosis    Morbid obesity    Nonrheumatic aortic valve stenosis    Cervical spondylosis    DDD (degenerative disc disease), cervical    Cervical radiculopathy    Decreased range of motion of intervertebral discs of cervical spine    Decreased strength of upper extremity    being managed by PCP and specialists who is here today for evaluation of PVD.  Patient has no complaints of claudication.  Patient states location is RLE occurring for months.  Associated signs and symptoms include edema.  Quality is burning and severity is 6/10.  Symptoms began months ago.  Alleviating factors include elevation.  Worsening factors include dependency.  no rest pain.  no tissue loss.  Patient is diabetic.  Is not on Pletal.  no previous lower extremity interventions.    no MI  no Stroke  Tobacco use: denies  Daily Aspirin: yes  Anticoagulation: no    5/2022:  No new issues    7/2022:  States feels better, +therapy, has worn compression and elevating legs for greater  than 3 mo.    07/2024: Patient s/p RLE great toe nail removal. Podiatrist concerned for circulation and healing.    08/2024: No complaints today. RLE toe wound well healed.     Past Medical History:   Diagnosis Date    Cataract     Diabetes mellitus type II     Diabetes with neurologic complications     DM retinopathy     H/O vitamin D deficiency     Hyperlipidemia     Hypertension     Obesity     Osteoarthritis     Peripheral neuropathy     Polyneuropathy     Severe obesity (BMI 35.0-39.9) with comorbidity 12/30/2016    Sleep apnea     Tendonitis     left foot     Past Surgical History:   Procedure Laterality Date    BLADDER SUSPENSION      CATARACT EXTRACTION      COLONOSCOPY N/A 9/26/2015    Procedure: COLONOSCOPY;  Surgeon: Javed Wood MD;  [facility]  Service: Endoscopy;  Laterality: N/A;    HYSTERECTOMY  1978    fibroids    NECK MASS EXCISION  11/6/2019    Procedure: EXCISION, MASS, NECK;  Surgeon: Edwin Barrow MD;  Location: [facility];  Service: General;;  RN PREOP 10/30/2019    TONSILLECTOMY       Family History   Problem Relation Name Age of Onset    Thyroid disease Mother      Cataracts Mother      Diabetes Mother      Stroke Mother      Hypertension Mother      Thyroid disease Sister      Diabetes Sister      Hypertension Sister      Hypertension Daughter      Diabetes Son      Diabetes Sister      Hypertension Sister      Hypertension Brother      Hypertension Sister      Hypertension Sister      Hypertension Brother      Thyroid disease Maternal Aunt      Thyroid disease Maternal Aunt      Colon cancer Maternal Grandfather      Breast cancer Neg Hx      Ovarian cancer Neg Hx      Amblyopia Neg Hx      Blindness Neg Hx      Cancer Neg Hx      Glaucoma Neg Hx      Macular degeneration Neg Hx      Retinal detachment Neg Hx      Strabismus Neg Hx       Social History     Socioeconomic History    Marital status:     Number of children: 2   Occupational History      Employer: [employer]   Tobacco Use    Smoking status: Former     Current packs/day: 0.00     Average packs/day: 0.3 packs/day for 15.0 years (3.8 ttl pk-yrs)     Types: Cigarettes     Start date:      Quit date:      Years since quittin.6    Smokeless tobacco: Never   Substance and Sexual Activity    Alcohol use: No    Drug use: No    Sexual activity: Not Currently     Partners: Male     Birth control/protection: Abstinence   Social History Narrative    . One daughter. Works as an Apartment      Social Determinants of Health     Financial Resource Strain: Low Risk  (2022)    Overall Financial Resource Strain (CARDIA)     Difficulty of Paying Living Expenses: Not hard at all   Food Insecurity: No Food Insecurity (2022)    Hunger Vital Sign     Worried About Running Out of Food in the Last Year: Never true     Ran Out of Food in the Last Year: Never true   Transportation Needs: No Transportation Needs (2022)    PRAPARE - Transportation     Lack of Transportation (Medical): No     Lack of Transportation (Non-Medical): No   Physical Activity: Inactive (2022)    Exercise Vital Sign     Days of Exercise per Week: 0 days     Minutes of Exercise per Session: 0 min   Housing Stability: Low Risk  (2022)    Housing Stability Vital Sign     Unable to Pay for Housing in the Last Year: No     Number of Places Lived in the Last Year: 1     Unstable Housing in the Last Year: No       Current Outpatient Medications:     azelastine (ASTELIN) 137 mcg (0.1 %) nasal spray, 2 sprays by Nasal route., Disp: , Rfl:     blood sugar diagnostic Strp, Check blood glucose 2 times a day, Disp: 200 strip, Rfl: 3    blood-glucose meter,continuous (DEXCOM G7 ) Misc, Use with Dexcom G7 sensors, Disp: 1 each, Rfl: 0    blood-glucose sensor (DEXCOM G7 SENSOR) Sujey, Change every 10 days, Disp: 12 each, Rfl: 3    carbinoxamine maleate 6 mg Tab, Take 6 mg by mouth every 8  (eight) hours as needed., Disp: , Rfl:     cetirizine (ZYRTEC) 10 MG tablet, Take 1 tablet (10 mg total) by mouth once daily. For 30 days, Disp: 90 tablet, Rfl: 1    diclofenac sodium (VOLTAREN) 1 % Gel, Lower extremities: Apply the gel (4 g) to the affected area 4 times daily. Do not apply more than 16 g daily to any one affected joint of the lower extremities. Upper extremities: Apply the gel (2 g) to the affected area 4 times daily. Do not apply more than 8 g daily to any one affected joint of the upper extremities. Total dose should not exceed 32 g per day, overall affected joints., Disp: 100 g, Rfl: 5    empagliflozin (JARDIANCE) 10 mg tablet, Take 1 tablet (10 mg total) by mouth once daily., Disp: 90 tablet, Rfl: 2    ezetimibe (ZETIA) 10 mg tablet, Take 1 tablet (10 mg total) by mouth once daily., Disp: 90 tablet, Rfl: 2    fluconazole (DIFLUCAN) 150 MG Tab, Take 150 mg by mouth., Disp: , Rfl:     gabapentin (NEURONTIN) 300 MG capsule, Take 1 capsule (300 mg total) by mouth 3 (three) times daily., Disp: 90 capsule, Rfl: 11    insulin glargine U-300 conc (TOUJEO MAX U-300 SOLOSTAR) 300 unit/mL (3 mL) insulin pen, INJECT 50 UNITS SUBCUTANEOUSLY ONCE DAILY, Disp: 6 Pen, Rfl: 2    insulin lispro (HUMALOG KWIKPEN INSULIN) 100 unit/mL pen, Use with sliding scale - 150-200=+2, 201-250=+4; 251-300=+6; 301-350=+8, over 350=+10 units, Disp: 15 mL, Rfl: 0    lancets Misc, Check blood glucose 2x/day, Disp: 200 each, Rfl: 3    losartan-hydrochlorothiazide 100-25 mg (HYZAAR) 100-25 mg per tablet, Take 1 tablet by mouth once daily., Disp: 90 tablet, Rfl: 1    meloxicam (MOBIC) 7.5 MG tablet, Take 1 tablet (7.5 mg total) by mouth once daily., Disp: 12 tablet, Rfl: 0    metFORMIN (GLUCOPHAGE-XR) 500 MG ER 24hr tablet, Take 2 tablets (1,000 mg total) by mouth Daily., Disp: 180 tablet, Rfl: 2    metoprolol succinate (TOPROL-XL) 50 MG 24 hr tablet, Take 1 tablet (50 mg total) by mouth once daily., Disp: 90 tablet, Rfl: 1     montelukast (SINGULAIR) 10 mg tablet, Take 1 tablet (10 mg total) by mouth once daily., Disp: 90 tablet, Rfl: 1    pravastatin (PRAVACHOL) 80 MG tablet, Take 1 tablet (80 mg total) by mouth once daily., Disp: 90 tablet, Rfl: 1    tirzepatide (MOUNJARO) 15 mg/0.5 mL PnIj, Inject 15 mg into the skin every 7 days., Disp: 4 Pen, Rfl: 4    tobramycin sulfate 0.3% (TOBREX) 0.3 % ophthalmic solution, 1-2 drops topically twice daily to affected toe(s)., Disp: 5 mL, Rfl: 3    amLODIPine (NORVASC) 10 MG tablet, Take 1 tablet (10 mg total) by mouth once daily., Disp: 90 tablet, Rfl: 1    aspirin 81 MG Chew, Take 1 tablet (81 mg total) by mouth once daily., Disp: 30 tablet, Rfl: 0    Current Facility-Administered Medications:     sodium hyaluronate (EUFLEXXA) 10 mg/mL(mw 2.4 -3.6 million) injection 20 mg, 20 mg, Intra-articular, 1 time in Clinic/HOD,     REVIEW OF SYSTEMS:  General: No fevers or chills; ENT: No sore throat; Allergy and Immunology: no persistent infections; Hematological and Lymphatic: No history of bleeding or easy bruising; Endocrine: negative; Respiratory: no cough, shortness of breath, or wheezing; Cardiovascular: no chest pain or dyspnea on exertion; no claudication, no rest pain; Gastrointestinal: no abdominal pain/back, change in bowel habits, or bloody stools; Genito-Urinary: no dysuria, trouble voiding, or hematuria; Musculoskeletal: negative, no wound; Neurological: no TIA or stroke symptoms; Psychiatric: no nervousness, anxiety or depression.    PHYSICAL EXAM:      Pulse: 78         General appearance:  Alert, well-appearing, and in no distress.  Oriented to person, place, and time                    Neurological: Normal speech, no focal findings noted; CN II - XII grossly intact. RLE with sensation to light touch, LLE with sensation to light touch.            Musculoskeletal: Digits/nail without cyanosis/clubbing.  Strength 5/5 BLE.                    Neck: Supple, no significant adenopathy            "       Chest:  Clear to auscultation, no wheezes, rales or rhonchi, symmetric air entry. No use of accessory muscles               Cardiac: Normal rate and regular rhythm, S1 and S2 normal            Abdomen: Soft, nontender, nondistended, no masses or organomegaly, no hernia     No rebound tenderness noted; bowel sounds normal     Pulsatile aortic mass is non palpable.     No groin adenopathy      Extremities:     2+ R DP pulse, 2+ L DP pulse     2+ RLE edema, 1+ LLE edema    Skin: RLE no tissue loss; LLE no tissue loss    LAB RESULTS:  No results found for: "CBC"  Lab Results   Component Value Date    LABPROT 10.5 06/20/2013    INR 1.0 06/20/2013     Lab Results   Component Value Date     04/27/2024    K 3.4 (L) 04/27/2024     04/27/2024    CO2 26 04/27/2024    GLU 95 04/27/2024    BUN 21 04/27/2024    CREATININE 0.6 04/27/2024    CREATININE 0.6 04/27/2024    CALCIUM 9.9 04/27/2024    ANIONGAP 10 04/27/2024    EGFRNONAA >60.0 02/26/2022     Lab Results   Component Value Date    WBC 7.07 12/07/2019    RBC 4.52 12/07/2019    HGB 13.4 12/07/2019    HCT 43.3 12/07/2019    MCV 96 12/07/2019    MCH 29.6 12/07/2019    MCHC 30.9 (L) 12/07/2019    RDW 14.5 12/07/2019     12/07/2019    MPV 12.2 12/07/2019    GRAN 3.4 12/07/2019    GRAN 47.3 12/07/2019    LYMPH 2.8 12/07/2019    LYMPH 39.2 12/07/2019    MONO 0.6 12/07/2019    MONO 8.8 12/07/2019    EOS 0.2 12/07/2019    BASO 0.07 12/07/2019    EOSINOPHIL 3.4 12/07/2019    BASOPHIL 1.0 12/07/2019    DIFFMETHOD Automated 12/07/2019     .  Lab Results   Component Value Date    HGBA1C 7.4 (H) 04/27/2024       IMAGING:  All pertinent imaging has been reviewed and interpreted independently.    BERRY 1/0.65    Arterial US BLE Right lower extremity arterial ultrasound shows R PT hemodynamically significant stenosis.      7/2022  Right lower extremity pressures and waveforms indicate mild arterial occlusive disease.  Left lower extremity pressures and waveforms " indicate moderate arterial occlusive disease.      IMP/PLAN:  78 y.o. female with   Patient Active Problem List   Diagnosis    Type 2 diabetes mellitus with diabetic polyneuropathy    Hyperlipidemia    HTN (hypertension)    Knee pain    H/O vitamin D deficiency    Non morbid obesity due to excess calories    Bilateral carotid bruits    Osteoarthritis of right knee    Class 1 obesity due to excess calories with serious comorbidity and body mass index (BMI) of 32.0 to 32.9 in adult    Dyspnea on exertion    Chronic cough    MEKA (obstructive sleep apnea)    Sensorineural hearing loss (SNHL) of right ear with restricted hearing of left ear    Tinnitus of both ears    Allergic rhinitis    Dysfunction of both eustachian tubes    Nasal septal deviation    Chronic eczematous otitis externa of both ears    Neck abscess    Epidermal inclusion cyst    Polyneuropathy associated with underlying disease    Aortic atherosclerosis    Morbid obesity    Nonrheumatic aortic valve stenosis    Cervical spondylosis    DDD (degenerative disc disease), cervical    Cervical radiculopathy    Decreased range of motion of intervertebral discs of cervical spine    Decreased strength of upper extremity    being managed by PCP and specialists who is here today for evaluation of PVD.    -No RLE PVD with no claudication, no rest pain, with wound.  Imaging reviewed. - cont daily ASA; mild RLE/moderate LLE PVD 7/2022. Patient s/p RLE great toe nail removal wound well healed  -BLE lymphedema and venous hypertension - cont lymphedema clinic recs and pumps Rx today  -Patient is diagnosed with lymphedema and presents with hyperpigmentation of the lower extremity.  Patient has previously attempted compression, elevation, and exercise for at least 4 weeks.  Patient has secondary lymphedema and has tried and failed compression of 20-30 mm Hg, elevation and exercise for >1 month period however symptoms persist.  Basic pump trial performed and discontinued  due to sensitivity and pain to static pressure.  Symptoms of swelling, pain and hyperplasia present.  A compression device is recommended to treat current symptoms and prevent disease progression. The patient has tried elevation, exercise and compression and symptoms remain.  The patient has marked hyperkeratosis with hyperplasia and hyperpigmentation.  - recommend lymphedema clinic therapy and pumps  -Exercise  -Heart healthy lifestyle  -RTC 6 months with BERRY/Tps and Arterial US    I spent 30 minutes evaluating this patient and greater than 50% of the time was spent counseling, coordinator care and discussing the plan of care.  All questions were answered and patient stated understanding with agreement with the above treatment plan.    Isaiah Dickson NP  Vascular and Endovascular Surgery

## 2024-09-05 ENCOUNTER — OFFICE VISIT (OUTPATIENT)
Dept: ORTHOPEDICS | Facility: CLINIC | Age: 79
End: 2024-09-05
Payer: COMMERCIAL

## 2024-09-05 VITALS — BODY MASS INDEX: 34.38 KG/M2 | WEIGHT: 206.38 LBS | HEIGHT: 65 IN

## 2024-09-05 DIAGNOSIS — M17.0 PRIMARY OSTEOARTHRITIS OF BOTH KNEES: Primary | ICD-10-CM

## 2024-09-05 PROCEDURE — 99999 PR PBB SHADOW E&M-EST. PATIENT-LVL III: CPT | Mod: PBBFAC,,, | Performed by: ORTHOPAEDIC SURGERY

## 2024-09-05 NOTE — PROCEDURES
Large Joint Aspiration/Injection: bilateral knee    Date/Time: 9/5/2024 8:40 AM    Performed by: Sumanth Erickson MD  Authorized by: Sumanth Erickson MD    Consent Done?:  Yes (Verbal)  Indications:  Arthritis  Prep: patient was prepped and draped in usual sterile fashion      Local anesthesia used?: Yes    Anesthesia:  Local infiltration  Local anesthetic:  Topical anesthetic and lidocaine 1% without epinephrine    Details:  Needle Size:  22 G  Approach:  Anterolateral  Location:  Knee  Laterality:  Bilateral  Site:  Bilateral knee  Medications (Right):  10 mg sodium hyaluronate (EUFLEXXA) 10 mg/mL(mw 2.4 -3.6 million)  Medications (Left):  10 mg sodium hyaluronate (EUFLEXXA) 10 mg/mL(mw 2.4 -3.6 million)  Patient tolerance:  Patient tolerated the procedure well with no immediate complications

## 2024-09-05 NOTE — PROGRESS NOTES
EST PATIENT ORTHOPAEDIC: Knee    PRIMARY CARE PHYSICIAN: Leonard Wells MD   REFERRING PROVIDER: Sumanth Erickson MD  [address] [place] [address]     ASSESSMENT & PLAN:    Impression:  Bilateral Knee Severe Degenerative Osteoarthritis, Primary     Follow Up Plan: For Euflexxa    Non operative care:    Mena Odonnell has physical exam evidence of above and wishes to pursue an non-operative care. I am recommending the following: Euflexxa series, activity modification.      To reivew: She would be an appropriate candidate for a right total knee replacement in the future should her symptoms warrant.  She was referred to vascular for intermittent claudication and peripheral vascular disease seen on x-ray .  There evaluation has referred her to lymphedema therapy. Swelling is improving. She is interested in repeat gel injections.  She is not interested in surgical intervention at this time. She is also diabetic so this is a good treatment modaliity for her.     Updated XR at next visit.     The patient has been ordered:  Euflexxa    CONSULTS:   None    ACTIVE PROBLEM LIST  Patient Active Problem List   Diagnosis    Type 2 diabetes mellitus with diabetic polyneuropathy    Hyperlipidemia    HTN (hypertension)    Knee pain    H/O vitamin D deficiency    Non morbid obesity due to excess calories    Bilateral carotid bruits    Osteoarthritis of right knee    Class 1 obesity due to excess calories with serious comorbidity and body mass index (BMI) of 32.0 to 32.9 in adult    Dyspnea on exertion    Chronic cough    MEKA (obstructive sleep apnea)    Sensorineural hearing loss (SNHL) of right ear with restricted hearing of left ear    Tinnitus of both ears    Allergic rhinitis    Dysfunction of both eustachian tubes    Nasal septal deviation    Chronic eczematous otitis externa of both ears    Neck abscess    Epidermal inclusion cyst    Polyneuropathy associated with underlying disease    Aortic atherosclerosis     Morbid obesity    Nonrheumatic aortic valve stenosis    Cervical spondylosis    DDD (degenerative disc disease), cervical    Cervical radiculopathy    Decreased range of motion of intervertebral discs of cervical spine    Decreased strength of upper extremity           SUBJECTIVE    CHIEF COMPLAINT: Knee Pain    HPI:   Mena Odonnell is a 78 y.o. female here for evaluation and management of right knee pain. There is not a specific incident that brought about this pain. she has had progressive problems with the knee(s) starting 2 years ago but progressing to multiple times a day over the past 6 months which is interfering with activities which include: walking 2 blocks and standing for prolonged periods of time    Currently the pain in the joint is rated at 5 out of 10 with moderate activity.  The pain is intermittent and is located in the Right knee, at level of joint line, located medially and located laterally. The pain is described as aching and sharp. Relieving factors include ambulatory device and rest.       Mena Odonnell also complaints leg cramping on that side primarily in her posterior aspect of her knee and calf.  This typically improves proves with rest and over-the-counter topical spray.    Interval History 4/18/22: Injections wearing off. Saw vascular.   Interval History 5/9/22: Here for 1-3 Euflexxa series   Interval History 5/19/22: Here for 2-3 Euflexxa series. Mild improvement in pain.   Interval History 5/27/22: Here for 3-3 Euflexxa series. Moderate improvement in pain.  Interval History 10/13/22: Here with return of pain.   Interval History 11/28/22: Here for Euflexxa injections. Previous steroid with good relief   12/5/22: Here for 2-3 Euflexxa series. Continued relief of pain.   12/12/22: Here for 3-3 Euflexxa series. Continued relief of pain.   7/11/23: Patient would like to restart Euflexxa injections. 6 months or relief with previous injections.  7/18/23: Here for 2-3 Euflexxa. 0-10  pain today.    7/25/23: Here for 3-3 Euflexxa. Some more pain today. Went to conference last week and had increase in pain.   2/19/24:  Patient here for follow up regarding her bilateral knee pain.  Previously seen over 6 months ago with good relief knee pain after Euflexxa series.  She called prior to this appointment for repeat Euflexxa injections which we put in for and were approved.  She is here for the 1st series of these injections. No changes in characteristics of pain.   2/26/24: Here for 2-3 euflexxa. Improvement after first injection  3/4/24: Here for 3-3 euflexxa. Sustained relief after last set of injections.  9/5/24: Here for repeat euflexxa. Pain doing ok today, would like to stay on top of injections as they are working.     PROGRESSIVE SYMPTOMS:  Pain worsened by weight bearing    FUNCTIONAL STATUS:   Do light to moderate work around the house    PREVIOUS TREATMENTS:  Medical: Attempted Weight Loss, Steroid Injections and Biologic Injections  Physical Therapy: Use of Ambulatory Aid and Activities Modified   Previous Orthopaedic Surgery: None    REVIEW OF SYSTEMS:  PAIN ASSESSMENT:  See HPI.  MUSCULOSKELETAL: See HPI.  OTHER 10 point review of systems is negative except as stated in HPI above    PAST MEDICAL HISTORY   has a past medical history of Cataract, Diabetes mellitus type II, Diabetes with neurologic complications, DM retinopathy, H/O vitamin D deficiency, Hyperlipidemia, Hypertension, Obesity, Osteoarthritis, Peripheral neuropathy, Polyneuropathy, Severe obesity (BMI 35.0-39.9) with comorbidity (12/30/2016), Sleep apnea, and Tendonitis.     PAST SURGICAL HISTORY   has a past surgical history that includes Bladder suspension; Hysterectomy (1978); Tonsillectomy; Colonoscopy (N/A, 9/26/2015); Cataract extraction; and Neck mass excision (11/6/2019).     FAMILY HISTORY  family history includes Cataracts in her mother; Colon cancer in her maternal grandfather; Diabetes in her mother, sister,  sister, and son; Hypertension in her brother, brother, daughter, mother, sister, sister, sister, and sister; Stroke in her mother; Thyroid disease in her maternal aunt, maternal aunt, mother, and sister.     SOCIAL HISTORY   reports that she quit smoking about 53 years ago. Her smoking use included cigarettes. She started smoking about 68 years ago. She has a 3.8 pack-year smoking history. She has never used smokeless tobacco. She reports that she does not drink alcohol and does not use drugs.     ALLERGIES   Review of patient's allergies indicates:   Allergen Reactions    Atorvastatin      Muscle pain     Crestor [rosuvastatin] Other (See Comments)     Leg Weakness.         MEDICATIONS  Current Outpatient Medications on File Prior to Visit   Medication Sig Dispense Refill    azelastine (ASTELIN) 137 mcg (0.1 %) nasal spray 2 sprays by Nasal route.      blood sugar diagnostic Strp Check blood glucose 2 times a day 200 strip 3    blood-glucose meter,continuous (DEXCOM G7 ) Misc Use with Dexcom G7 sensors 1 each 0    blood-glucose sensor (DEXCOM G7 SENSOR) Sujey Change every 10 days 12 each 3    carbinoxamine maleate 6 mg Tab Take 6 mg by mouth every 8 (eight) hours as needed.      cetirizine (ZYRTEC) 10 MG tablet Take 1 tablet (10 mg total) by mouth once daily. For 30 days 90 tablet 1    diclofenac sodium (VOLTAREN) 1 % Gel Lower extremities: Apply the gel (4 g) to the affected area 4 times daily. Do not apply more than 16 g daily to any one affected joint of the lower extremities. Upper extremities: Apply the gel (2 g) to the affected area 4 times daily. Do not apply more than 8 g daily to any one affected joint of the upper extremities. Total dose should not exceed 32 g per day, overall affected joints. 100 g 5    empagliflozin (JARDIANCE) 10 mg tablet Take 1 tablet (10 mg total) by mouth once daily. 90 tablet 2    ezetimibe (ZETIA) 10 mg tablet Take 1 tablet (10 mg total) by mouth once daily. 90 tablet 2     "fluconazole (DIFLUCAN) 150 MG Tab Take 150 mg by mouth.      gabapentin (NEURONTIN) 300 MG capsule Take 1 capsule (300 mg total) by mouth 3 (three) times daily. 90 capsule 11    insulin glargine U-300 conc (TOUJEO MAX U-300 SOLOSTAR) 300 unit/mL (3 mL) insulin pen INJECT 50 UNITS SUBCUTANEOUSLY ONCE DAILY 6 Pen 2    insulin lispro (HUMALOG KWIKPEN INSULIN) 100 unit/mL pen Use with sliding scale - 150-200=+2, 201-250=+4; 251-300=+6; 301-350=+8, over 350=+10 units 15 mL 0    lancets Misc Check blood glucose 2x/day 200 each 3    losartan-hydrochlorothiazide 100-25 mg (HYZAAR) 100-25 mg per tablet Take 1 tablet by mouth once daily. 90 tablet 1    meloxicam (MOBIC) 7.5 MG tablet Take 1 tablet (7.5 mg total) by mouth once daily. 12 tablet 0    metFORMIN (GLUCOPHAGE-XR) 500 MG ER 24hr tablet Take 2 tablets (1,000 mg total) by mouth Daily. 180 tablet 2    metoprolol succinate (TOPROL-XL) 50 MG 24 hr tablet Take 1 tablet (50 mg total) by mouth once daily. 90 tablet 1    montelukast (SINGULAIR) 10 mg tablet Take 1 tablet (10 mg total) by mouth once daily. 90 tablet 1    pravastatin (PRAVACHOL) 80 MG tablet Take 1 tablet (80 mg total) by mouth once daily. 90 tablet 1    tirzepatide (MOUNJARO) 15 mg/0.5 mL PnIj Inject 15 mg into the skin every 7 days. 4 Pen 4    tobramycin sulfate 0.3% (TOBREX) 0.3 % ophthalmic solution 1-2 drops topically twice daily to affected toe(s). 5 mL 3    amLODIPine (NORVASC) 10 MG tablet Take 1 tablet (10 mg total) by mouth once daily. 90 tablet 1    aspirin 81 MG Chew Take 1 tablet (81 mg total) by mouth once daily. 30 tablet 0     Current Facility-Administered Medications on File Prior to Visit   Medication Dose Route Frequency Provider Last Rate Last Admin    sodium hyaluronate (EUFLEXXA) 10 mg/mL(mw 2.4 -3.6 million) injection 20 mg  20 mg Intra-articular 1 time in Clinic/HOD               PHYSICAL EXAM   height is 5' 5" (1.651 m) and weight is 93.6 kg (206 lb 5.6 oz).   Body mass index is 34.34 " kg/m².      All other systems deferred.  GENERAL:  No acute distress  HABITUS: Obese  GAIT: Antalgic  SKIN: Normal     KNEE EXAM:    Bilateral:   Effusion: Minimal joint effusion  TTP: yes over Medial Joint Line and Lateral Joint Line   no crepitus with passive knee ROM  Passive ROM: Extension 0, Flexion 130  No pain with manipulation of patella  Stable to varus/valgus stress. No increased laxity to anterior/posterior drawer testing  negative Ngozi's test  No pain with IR/ER rotation of the hip  5/5 strength in knee flexion and extension, ankle plantarflexion and dorsiflexion  Neurovascular Status: Sensation intact to light touch in Sural, Saphenous, SPN, DPN, Tibial nerve distribution  2+ pulse DP/PT, normal capillary refill, foot has normal warmth    DATA:  Diagnostic tests reviewed for today's visit:     4v of the bilateral knee reveal Moderate degenerative changes of the Lateral and Patellofemoral compartment. There is evidence of advanced osteoarthritis changes with Subchondral sclerosis, Osteophyte formation and Joint space narrowing. The limb is in netural alignment. The patella is tracking midline and is in normal alignment. Vascular calcifications present. Kellegren-Lawerence grade 4 changes on the right, grade 3 on the left.

## 2024-09-10 ENCOUNTER — OFFICE VISIT (OUTPATIENT)
Dept: FAMILY MEDICINE | Facility: CLINIC | Age: 79
End: 2024-09-10
Attending: ORTHOPAEDIC SURGERY
Payer: COMMERCIAL

## 2024-09-10 ENCOUNTER — PATIENT MESSAGE (OUTPATIENT)
Dept: FAMILY MEDICINE | Facility: CLINIC | Age: 79
End: 2024-09-10

## 2024-09-10 VITALS
SYSTOLIC BLOOD PRESSURE: 136 MMHG | BODY MASS INDEX: 34.05 KG/M2 | WEIGHT: 204.38 LBS | HEART RATE: 77 BPM | TEMPERATURE: 98 F | HEIGHT: 65 IN | DIASTOLIC BLOOD PRESSURE: 62 MMHG | OXYGEN SATURATION: 98 %

## 2024-09-10 DIAGNOSIS — E66.09 CLASS 1 OBESITY DUE TO EXCESS CALORIES WITH SERIOUS COMORBIDITY AND BODY MASS INDEX (BMI) OF 34.0 TO 34.9 IN ADULT: ICD-10-CM

## 2024-09-10 DIAGNOSIS — E11.42 TYPE 2 DIABETES MELLITUS WITH DIABETIC POLYNEUROPATHY, WITHOUT LONG-TERM CURRENT USE OF INSULIN: ICD-10-CM

## 2024-09-10 DIAGNOSIS — R80.9 MICROALBUMINURIA: ICD-10-CM

## 2024-09-10 DIAGNOSIS — I35.0 NONRHEUMATIC AORTIC VALVE STENOSIS: ICD-10-CM

## 2024-09-10 DIAGNOSIS — I73.9 PERIPHERAL ARTERIAL DISEASE: ICD-10-CM

## 2024-09-10 DIAGNOSIS — I10 PRIMARY HYPERTENSION: ICD-10-CM

## 2024-09-10 DIAGNOSIS — E78.2 MIXED HYPERLIPIDEMIA: ICD-10-CM

## 2024-09-10 DIAGNOSIS — I70.0 AORTIC ATHEROSCLEROSIS: ICD-10-CM

## 2024-09-10 DIAGNOSIS — G47.33 OSA (OBSTRUCTIVE SLEEP APNEA): ICD-10-CM

## 2024-09-10 DIAGNOSIS — E11.42 TYPE 2 DIABETES MELLITUS WITH DIABETIC POLYNEUROPATHY, WITH LONG-TERM CURRENT USE OF INSULIN: ICD-10-CM

## 2024-09-10 DIAGNOSIS — Z00.00 HEALTHCARE MAINTENANCE: Primary | ICD-10-CM

## 2024-09-10 DIAGNOSIS — Z79.4 TYPE 2 DIABETES MELLITUS WITH DIABETIC POLYNEUROPATHY, WITH LONG-TERM CURRENT USE OF INSULIN: ICD-10-CM

## 2024-09-10 DIAGNOSIS — M17.11 OSTEOARTHRITIS OF RIGHT KNEE, UNSPECIFIED OSTEOARTHRITIS TYPE: ICD-10-CM

## 2024-09-10 DIAGNOSIS — M50.30 DDD (DEGENERATIVE DISC DISEASE), CERVICAL: ICD-10-CM

## 2024-09-10 DIAGNOSIS — I10 ESSENTIAL HYPERTENSION: ICD-10-CM

## 2024-09-10 DIAGNOSIS — E78.5 HYPERLIPIDEMIA, UNSPECIFIED HYPERLIPIDEMIA TYPE: ICD-10-CM

## 2024-09-10 PROBLEM — E66.811 CLASS 1 OBESITY DUE TO EXCESS CALORIES WITH SERIOUS COMORBIDITY AND BODY MASS INDEX (BMI) OF 34.0 TO 34.9 IN ADULT: Status: ACTIVE | Noted: 2023-05-22

## 2024-09-10 PROCEDURE — 3078F DIAST BP <80 MM HG: CPT | Mod: CPTII,S$GLB,, | Performed by: INTERNAL MEDICINE

## 2024-09-10 PROCEDURE — 1160F RVW MEDS BY RX/DR IN RCRD: CPT | Mod: CPTII,S$GLB,, | Performed by: INTERNAL MEDICINE

## 2024-09-10 PROCEDURE — 99999 PR PBB SHADOW E&M-EST. PATIENT-LVL V: CPT | Mod: PBBFAC,,, | Performed by: INTERNAL MEDICINE

## 2024-09-10 PROCEDURE — 99214 OFFICE O/P EST MOD 30 MIN: CPT | Mod: S$GLB,,, | Performed by: INTERNAL MEDICINE

## 2024-09-10 PROCEDURE — 1101F PT FALLS ASSESS-DOCD LE1/YR: CPT | Mod: CPTII,S$GLB,, | Performed by: INTERNAL MEDICINE

## 2024-09-10 PROCEDURE — 1159F MED LIST DOCD IN RCRD: CPT | Mod: CPTII,S$GLB,, | Performed by: INTERNAL MEDICINE

## 2024-09-10 PROCEDURE — 1126F AMNT PAIN NOTED NONE PRSNT: CPT | Mod: CPTII,S$GLB,, | Performed by: INTERNAL MEDICINE

## 2024-09-10 PROCEDURE — 3075F SYST BP GE 130 - 139MM HG: CPT | Mod: CPTII,S$GLB,, | Performed by: INTERNAL MEDICINE

## 2024-09-10 PROCEDURE — 3288F FALL RISK ASSESSMENT DOCD: CPT | Mod: CPTII,S$GLB,, | Performed by: INTERNAL MEDICINE

## 2024-09-10 RX ORDER — AMLODIPINE BESYLATE 10 MG/1
10 TABLET ORAL DAILY
Qty: 90 TABLET | Refills: 1 | Status: SHIPPED | OUTPATIENT
Start: 2024-09-10 | End: 2025-03-09

## 2024-09-10 RX ORDER — METOPROLOL SUCCINATE 50 MG/1
50 TABLET, EXTENDED RELEASE ORAL DAILY
Qty: 90 TABLET | Refills: 1 | Status: SHIPPED | OUTPATIENT
Start: 2024-09-10

## 2024-09-10 RX ORDER — LOSARTAN POTASSIUM AND HYDROCHLOROTHIAZIDE 25; 100 MG/1; MG/1
1 TABLET ORAL DAILY
Qty: 90 TABLET | Refills: 1 | Status: SHIPPED | OUTPATIENT
Start: 2024-09-10 | End: 2025-03-09

## 2024-09-10 RX ORDER — NAPROXEN SODIUM 220 MG/1
81 TABLET, FILM COATED ORAL DAILY
Qty: 90 TABLET | Refills: 1 | Status: SHIPPED | OUTPATIENT
Start: 2024-09-10 | End: 2025-03-09

## 2024-09-10 NOTE — PROGRESS NOTES
HISTORY OF PRESENT ILLNESS:  Mena Odonnell is a 78 y.o. female who presents to the clinic today for Follow-up (6 Months fu) and Medication Refill    Last seen by me 3/2024.    Still working and driving.  Is an apt mgr.  No falls.    Aortic stenosis  Stress test 2023 normal.  Echo (6/2024)    Left Ventricle: Mildly increased wall thickness. There is normal systolic function with a visually estimated ejection fraction of 65 - 70%. Grade I diastolic dysfunction.    Right Ventricle: Normal right ventricular cavity size. Systolic function is normal.    Left Atrium: Left atrium is severely dilated.    Right Atrium: Right atrium is mildly dilated.    Aortic Valve: There is moderate stenosis. Aortic valve area by VTI is 1.06 cm². Aortic valve peak velocity is 3.85 m/s. Mean gradient is 38 mmHg. The dimensionless index is 0.35.    Mitral Valve: There is mild regurgitation.    Tricuspid Valve: There is mild regurgitation.    Pulmonary Artery: The estimated pulmonary artery systolic pressure is 34 mmHg.    IVC/SVC: Normal venous pressure at 3 mmHg.    MEKA  Recommended for CPAP but declines to use.     HTN, HLP  Managed with losartan-HCTZ, amlodipine (not taking x 2 months), Toprol XL.  On ezetimibe, pravastatin.     Renal cyst  Noted simple cyst, seen by urology 12/2022 - no further workup.     Peripheral vascular disease, venous hypertension, lymphedema  Seen by Dr. Blair. Attended PT for lymphedema.  Mod-severe arterial occlusive disease by recent BERRY.     Primary OA  Seen in Ortho with plan for non operative mgmt.  Hyaluronate injection done.  Feels improvement of the pain.     Back and hip pain, Cervical radiculopathy  Seen by ortho and pain clinic.    Back pain is controlled.     Type 2 DM  Seen by marina  On Mounjaro, Jardiance, metformin, Toujeo, Humalog.    Hemoglobin A1C   Date Value Ref Range Status   04/27/2024 7.4 (H) 4.0 - 5.6 % Final     Comment:     ADA Screening Guidelines:  5.7-6.4%  Consistent with  prediabetes  >or=6.5%  Consistent with diabetes    High levels of fetal hemoglobin interfere with the HbA1C  assay. Heterozygous hemoglobin variants (HbS, HgC, etc)do  not significantly interfere with this assay.   However, presence of multiple variants may affect accuracy.     01/22/2024 7.8 (H) 4.0 - 5.6 % Final     Comment:     ADA Screening Guidelines:  5.7-6.4%  Consistent with prediabetes  >or=6.5%  Consistent with diabetes    High levels of fetal hemoglobin interfere with the HbA1C  assay. Heterozygous hemoglobin variants (HbS, HgC, etc)do  not significantly interfere with this assay.   However, presence of multiple variants may affect accuracy.     09/13/2023 6.8 (H) 4.0 - 5.6 % Final     Comment:     ADA Screening Guidelines:  5.7-6.4%  Consistent with prediabetes  >or=6.5%  Consistent with diabetes    High levels of fetal hemoglobin interfere with the HbA1C  assay. Heterozygous hemoglobin variants (HbS, HgC, etc)do  not significantly interfere with this assay.   However, presence of multiple variants may affect accuracy.             PAST MEDICAL HISTORY:  Past Medical History:   Diagnosis Date    Cataract     Diabetes mellitus type II     Diabetes with neurologic complications     DM retinopathy     H/O vitamin D deficiency     Hyperlipidemia     Hypertension     Obesity     Osteoarthritis     Peripheral neuropathy     Polyneuropathy     Severe obesity (BMI 35.0-39.9) with comorbidity 12/30/2016    Sleep apnea     Tendonitis     left foot       PAST SURGICAL HISTORY:  Past Surgical History:   Procedure Laterality Date    BLADDER SUSPENSION      CATARACT EXTRACTION      COLONOSCOPY N/A 9/26/2015    Procedure: COLONOSCOPY;  Surgeon: Javed Wood MD;  Location: [address]);  Service: Endoscopy;  Laterality: N/A;    HYSTERECTOMY  1978    fibroids    NECK MASS EXCISION  11/6/2019    Procedure: EXCISION, MASS, NECK;  Surgeon: Edwin Barrow MD;  Location: [facility];  Service: General;;  RN PREOP  10/30/2019    TONSILLECTOMY         SOCIAL HISTORY:  Social History     Socioeconomic History    Marital status:     Number of children: 2   Occupational History     Employer: [employer]   Tobacco Use    Smoking status: Former     Current packs/day: 0.00     Average packs/day: 0.3 packs/day for 15.0 years (3.8 ttl pk-yrs)     Types: Cigarettes     Start date:      Quit date:      Years since quittin.7    Smokeless tobacco: Never   Substance and Sexual Activity    Alcohol use: No    Drug use: No    Sexual activity: Not Currently     Partners: Male     Birth control/protection: Abstinence   Social History Narrative    . One daughter. Works as an Apartment      Social Determinants of Health     Financial Resource Strain: Low Risk  (2022)    Overall Financial Resource Strain (CARDIA)     Difficulty of Paying Living Expenses: Not hard at all   Food Insecurity: No Food Insecurity (2022)    Hunger Vital Sign     Worried About Running Out of Food in the Last Year: Never true     Ran Out of Food in the Last Year: Never true   Transportation Needs: No Transportation Needs (2022)    PRAPARE - Transportation     Lack of Transportation (Medical): No     Lack of Transportation (Non-Medical): No   Physical Activity: Inactive (2022)    Exercise Vital Sign     Days of Exercise per Week: 0 days     Minutes of Exercise per Session: 0 min   Housing Stability: Low Risk  (2022)    Housing Stability Vital Sign     Unable to Pay for Housing in the Last Year: No     Number of Places Lived in the Last Year: 1     Unstable Housing in the Last Year: No       FAMILY HISTORY:  Family History   Problem Relation Name Age of Onset    Thyroid disease Mother      Cataracts Mother      Diabetes Mother      Stroke Mother      Hypertension Mother      Thyroid disease Sister      Diabetes Sister      Hypertension Sister      Hypertension Daughter      Diabetes Son      Diabetes  Sister      Hypertension Sister      Hypertension Brother      Hypertension Sister      Hypertension Sister      Hypertension Brother      Thyroid disease Maternal Aunt      Thyroid disease Maternal Aunt      Colon cancer Maternal Grandfather      Breast cancer Neg Hx      Ovarian cancer Neg Hx      Amblyopia Neg Hx      Blindness Neg Hx      Cancer Neg Hx      Glaucoma Neg Hx      Macular degeneration Neg Hx      Retinal detachment Neg Hx      Strabismus Neg Hx         ALLERGIES AND MEDICATIONS: updated and reviewed.  Review of patient's allergies indicates:   Allergen Reactions    Atorvastatin      Muscle pain     Crestor [rosuvastatin] Other (See Comments)     Leg Weakness.      Medication List with Changes/Refills   Current Medications    AMLODIPINE (NORVASC) 10 MG TABLET    Take 1 tablet (10 mg total) by mouth once daily.    ASPIRIN 81 MG CHEW    Take 1 tablet (81 mg total) by mouth once daily.    AZELASTINE (ASTELIN) 137 MCG (0.1 %) NASAL SPRAY    2 sprays by Nasal route.    BLOOD SUGAR DIAGNOSTIC STRP    Check blood glucose 2 times a day    BLOOD-GLUCOSE METER,CONTINUOUS (DEXCOM G7 ) MISC    Use with Dexcom G7 sensors    BLOOD-GLUCOSE SENSOR (DEXCOM G7 SENSOR) SHELLY    Change every 10 days    CARBINOXAMINE MALEATE 6 MG TAB    Take 6 mg by mouth every 8 (eight) hours as needed.    CETIRIZINE (ZYRTEC) 10 MG TABLET    Take 1 tablet (10 mg total) by mouth once daily. For 30 days    DICLOFENAC SODIUM (VOLTAREN) 1 % GEL    Lower extremities: Apply the gel (4 g) to the affected area 4 times daily. Do not apply more than 16 g daily to any one affected joint of the lower extremities. Upper extremities: Apply the gel (2 g) to the affected area 4 times daily. Do not apply more than 8 g daily to any one affected joint of the upper extremities. Total dose should not exceed 32 g per day, overall affected joints.    EMPAGLIFLOZIN (JARDIANCE) 10 MG TABLET    Take 1 tablet (10 mg total) by mouth once daily.     EZETIMIBE (ZETIA) 10 MG TABLET    Take 1 tablet (10 mg total) by mouth once daily.    FLUCONAZOLE (DIFLUCAN) 150 MG TAB    Take 150 mg by mouth.    GABAPENTIN (NEURONTIN) 300 MG CAPSULE    Take 1 capsule (300 mg total) by mouth 3 (three) times daily.    INSULIN GLARGINE U-300 CONC (TOUJEO MAX U-300 SOLOSTAR) 300 UNIT/ML (3 ML) INSULIN PEN    INJECT 50 UNITS SUBCUTANEOUSLY ONCE DAILY    INSULIN LISPRO (HUMALOG KWIKPEN INSULIN) 100 UNIT/ML PEN    Use with sliding scale - 150-200=+2, 201-250=+4; 251-300=+6; 301-350=+8, over 350=+10 units    LANCETS MISC    Check blood glucose 2x/day    LOSARTAN-HYDROCHLOROTHIAZIDE 100-25 MG (HYZAAR) 100-25 MG PER TABLET    Take 1 tablet by mouth once daily.    MELOXICAM (MOBIC) 7.5 MG TABLET    Take 1 tablet (7.5 mg total) by mouth once daily.    METFORMIN (GLUCOPHAGE-XR) 500 MG ER 24HR TABLET    Take 2 tablets (1,000 mg total) by mouth Daily.    METOPROLOL SUCCINATE (TOPROL-XL) 50 MG 24 HR TABLET    Take 1 tablet (50 mg total) by mouth once daily.    MONTELUKAST (SINGULAIR) 10 MG TABLET    Take 1 tablet (10 mg total) by mouth once daily.    PRAVASTATIN (PRAVACHOL) 80 MG TABLET    Take 1 tablet (80 mg total) by mouth once daily.    TIRZEPATIDE (MOUNJARO) 15 MG/0.5 ML PNIJ    Inject 15 mg into the skin every 7 days.    TOBRAMYCIN SULFATE 0.3% (TOBREX) 0.3 % OPHTHALMIC SOLUTION    1-2 drops topically twice daily to affected toe(s).          CARE TEAM:  Patient Care Team:  Leonard Wells MD as PCP - General (Internal Medicine)  Bebe Gee MD as Consulting Physician (Ophthalmology)  Praneeth Rios MD as Consulting Physician (Ophthalmology)  Candy Villanueva NP as Nurse Practitioner (Endocrinology)  Jenn Arreola MD as Consulting Physician (Cardiology)  Dre Blair MD as Consulting Physician (Vascular Surgery)         REVIEW OF SYSTEMS:  Review of Systems   Constitutional:  Negative for chills and fever.   HENT:  Negative for congestion and postnasal drip.    Eyes:   Negative for photophobia and visual disturbance.   Respiratory:  Negative for cough and shortness of breath.    Cardiovascular:  Negative for chest pain and palpitations.   Gastrointestinal:  Negative for nausea and vomiting.   Genitourinary:  Negative for dysuria and frequency.   Musculoskeletal:  Positive for arthralgias. Negative for gait problem.   Neurological:  Negative for light-headedness and headaches.   Psychiatric/Behavioral:  Negative for dysphoric mood. The patient is not nervous/anxious.    All other systems reviewed and are negative.        PHYSICAL EXAM:   Vitals:    09/10/24 0843   BP: 136/62   Pulse: 77   Temp: 98.1 °F (36.7 °C)             Body mass index is 34.01 kg/m².     General appearance - alert, well appearing, and in no distress  Mental status - alert, oriented to person, place, and time  Eyes - pupils equal and reactive, extraocular eye movements intact  Chest - clear to auscultation, no wheezes, rales or rhonchi, symmetric air entry  Heart - normal rate, regular rhythm, normal S1, S2, no murmurs, rubs, clicks or gallops  Neurological - alert, oriented, normal speech, no focal findings or movement disorder noted  Extremities - peripheral pulses normal, no pedal edema, no clubbing or cyanosis      ASSESSMENT AND PLAN:  Healthcare maintenance  -     Hemoglobin A1C; Future; Expected date: 09/10/2024  -     Comprehensive Metabolic Panel; Future; Expected date: 09/10/2024  -     Lipid Panel; Future; Expected date: 09/10/2024  -     CBC Auto Differential; Future; Expected date: 09/10/2024  -     TSH; Future; Expected date: 09/10/2024    Essential hypertension  Primary hypertension  -     amLODIPine (NORVASC) 10 MG tablet; Take 1 tablet (10 mg total) by mouth once daily.  Dispense: 90 tablet; Refill: 1  -     losartan-hydrochlorothiazide 100-25 mg (HYZAAR) 100-25 mg per tablet; Take 1 tablet by mouth once daily.  Dispense: 90 tablet; Refill: 1  -     metoprolol succinate (TOPROL-XL) 50 MG 24 hr  tablet; Take 1 tablet (50 mg total) by mouth once daily.  Dispense: 90 tablet; Refill: 1  -     Hypertension Digital Medicine (HDMP) Enrollment Order    Mixed hyperlipidemia  Hyperlipidemia, unspecified hyperlipidemia type  Aortic atherosclerosis  -     Comprehensive Metabolic Panel; Future; Expected date: 09/10/2024  -     Lipid Panel; Future; Expected date: 09/10/2024  - Stable on current medical management.    Microalbuminuria  Type 2 diabetes mellitus with diabetic polyneuropathy, with long-term current use of insulin  Type 2 diabetes mellitus with diabetic polyneuropathy, without long-term current use of insulin  Class 1 obesity due to excess calories with serious comorbidity and body mass index (BMI) of 34.0 to 34.9 in adult  -     Hemoglobin A1C; Future; Expected date: 09/10/2024  -     Comprehensive Metabolic Panel; Future; Expected date: 09/10/2024  -     Lipid Panel; Future; Expected date: 09/10/2024  -     Diabetes Digital Medicine (DDMP) Enrollment Order  -     empagliflozin (JARDIANCE) 10 mg tablet; Take 1 tablet (10 mg total) by mouth once daily.  Dispense: 90 tablet; Refill: 2    Nonrheumatic aortic valve stenosis  -     Ambulatory referral/consult to Cardiology; Future; Expected date: 09/17/2024    MEKA (obstructive sleep apnea)        - Stable on current mgmt.    DDD (degenerative disc disease), cervical  Osteoarthritis of right knee, unspecified osteoarthritis type        - Stable on current mgmt.    Peripheral arterial disease  -     aspirin 81 MG Chew; Take 1 tablet (81 mg total) by mouth once daily.  Dispense: 90 tablet; Refill: 1              Follow up 6 months or sooner as needed.

## 2024-09-12 ENCOUNTER — OFFICE VISIT (OUTPATIENT)
Dept: ORTHOPEDICS | Facility: CLINIC | Age: 79
End: 2024-09-12
Payer: COMMERCIAL

## 2024-09-12 DIAGNOSIS — M17.0 PRIMARY OSTEOARTHRITIS OF BOTH KNEES: Primary | ICD-10-CM

## 2024-09-12 NOTE — PROCEDURES
Large Joint Aspiration/Injection: bilateral knee    Date/Time: 9/12/2024 10:00 AM    Performed by: Sumanth Erickson MD  Authorized by: Sumanth Erickson MD    Consent Done?:  Yes (Verbal)  Indications:  Arthritis and pain  Prep: patient was prepped and draped in usual sterile fashion      Local anesthesia used?: Yes    Anesthesia:  Local infiltration  Local anesthetic:  Topical anesthetic    Details:  Needle Size:  22 G  Approach:  Anterolateral  Location:  Knee  Laterality:  Bilateral  Site:  Bilateral knee  Medications (Right):  10 mg sodium hyaluronate (EUFLEXXA) 10 mg/mL(mw 2.4 -3.6 million)  Medications (Left):  10 mg sodium hyaluronate (EUFLEXXA) 10 mg/mL(mw 2.4 -3.6 million)  Patient tolerance:  Patient tolerated the procedure well with no immediate complications

## 2024-09-12 NOTE — PROGRESS NOTES
EST PATIENT ORTHOPAEDIC: Knee    PRIMARY CARE PHYSICIAN: Leonard Wells MD   REFERRING PROVIDER: Sumanth Erickson MD  [address] [place] [address]     ASSESSMENT & PLAN:    Impression:  Bilateral Knee Severe Degenerative Osteoarthritis, Primary     Follow Up Plan: For Euflexxa    Non operative care:    Mena Odonnell has physical exam evidence of above and wishes to pursue an non-operative care. I am recommending the following: Euflexxa series, activity modification.      To reivew: She would be an appropriate candidate for a right total knee replacement in the future should her symptoms warrant.  She was referred to vascular for intermittent claudication and peripheral vascular disease seen on x-ray .  There evaluation has referred her to lymphedema therapy. Swelling is improving. She is interested in repeat gel injections.  She is not interested in surgical intervention at this time. She is also diabetic so this is a good treatment modaliity for her.     The patient has been ordered:  Euflexxa    CONSULTS:   None    ACTIVE PROBLEM LIST  Patient Active Problem List   Diagnosis    Type 2 diabetes mellitus with diabetic polyneuropathy    Hyperlipidemia    HTN (hypertension)    Knee pain    H/O vitamin D deficiency    Non morbid obesity due to excess calories    Bilateral carotid bruits    Osteoarthritis of right knee    Dyspnea on exertion    Chronic cough    MEKA (obstructive sleep apnea)    Sensorineural hearing loss (SNHL) of right ear with restricted hearing of left ear    Tinnitus of both ears    Allergic rhinitis    Dysfunction of both eustachian tubes    Nasal septal deviation    Chronic eczematous otitis externa of both ears    Neck abscess    Epidermal inclusion cyst    Polyneuropathy associated with underlying disease    Aortic atherosclerosis    Class 1 obesity due to excess calories with serious comorbidity and body mass index (BMI) of 34.0 to 34.9 in adult    Nonrheumatic aortic valve  stenosis    Cervical spondylosis    DDD (degenerative disc disease), cervical    Cervical radiculopathy    Decreased range of motion of intervertebral discs of cervical spine    Decreased strength of upper extremity           SUBJECTIVE    CHIEF COMPLAINT: Knee Pain    HPI:   Mena Odonnell is a 78 y.o. female here for evaluation and management of right knee pain. There is not a specific incident that brought about this pain. she has had progressive problems with the knee(s) starting 2 years ago but progressing to multiple times a day over the past 6 months which is interfering with activities which include: walking 2 blocks and standing for prolonged periods of time    Currently the pain in the joint is rated at 5 out of 10 with moderate activity.  The pain is intermittent and is located in the Right knee, at level of joint line, located medially and located laterally. The pain is described as aching and sharp. Relieving factors include ambulatory device and rest.       Mena Odonnell also complaints leg cramping on that side primarily in her posterior aspect of her knee and calf.  This typically improves proves with rest and over-the-counter topical spray.    Interval History 4/18/22: Injections wearing off. Saw vascular.   Interval History 5/9/22: Here for 1-3 Euflexxa series   Interval History 5/19/22: Here for 2-3 Euflexxa series. Mild improvement in pain.   Interval History 5/27/22: Here for 3-3 Euflexxa series. Moderate improvement in pain.  Interval History 10/13/22: Here with return of pain.   Interval History 11/28/22: Here for Euflexxa injections. Previous steroid with good relief   12/5/22: Here for 2-3 Euflexxa series. Continued relief of pain.   12/12/22: Here for 3-3 Euflexxa series. Continued relief of pain.   7/11/23: Patient would like to restart Euflexxa injections. 6 months or relief with previous injections.  7/18/23: Here for 2-3 Euflexxa. 0-10 pain today.    7/25/23: Here for 3-3  Euflexxa. Some more pain today. Went to conference last week and had increase in pain.   2/19/24:  Patient here for follow up regarding her bilateral knee pain.  Previously seen over 6 months ago with good relief knee pain after Euflexxa series.  She called prior to this appointment for repeat Euflexxa injections which we put in for and were approved.  She is here for the 1st series of these injections. No changes in characteristics of pain.   2/26/24: Here for 2-3 euflexxa. Improvement after first injection  3/4/24: Here for 3-3 euflexxa. Sustained relief after last set of injections.  9/5/24: Here for repeat euflexxa. Pain doing ok today, would like to stay on top of injections as they are working.   9/12/24: Here for 2-3 euflexxa injections.     PROGRESSIVE SYMPTOMS:  Pain worsened by weight bearing    FUNCTIONAL STATUS:   Do light to moderate work around the house    PREVIOUS TREATMENTS:  Medical: Attempted Weight Loss, Steroid Injections and Biologic Injections  Physical Therapy: Use of Ambulatory Aid and Activities Modified   Previous Orthopaedic Surgery: None    REVIEW OF SYSTEMS:  PAIN ASSESSMENT:  See HPI.  MUSCULOSKELETAL: See HPI.  OTHER 10 point review of systems is negative except as stated in HPI above    PAST MEDICAL HISTORY   has a past medical history of Cataract, Diabetes mellitus type II, Diabetes with neurologic complications, DM retinopathy, H/O vitamin D deficiency, Hyperlipidemia, Hypertension, Obesity, Osteoarthritis, Peripheral neuropathy, Polyneuropathy, Severe obesity (BMI 35.0-39.9) with comorbidity (12/30/2016), Sleep apnea, and Tendonitis.     PAST SURGICAL HISTORY   has a past surgical history that includes Bladder suspension; Hysterectomy (1978); Tonsillectomy; Colonoscopy (N/A, 9/26/2015); Cataract extraction; and Neck mass excision (11/6/2019).     FAMILY HISTORY  family history includes Cataracts in her mother; Colon cancer in her maternal grandfather; Diabetes in her mother,  sister, sister, and son; Hypertension in her brother, brother, daughter, mother, sister, sister, sister, and sister; Stroke in her mother; Thyroid disease in her maternal aunt, maternal aunt, mother, and sister.     SOCIAL HISTORY   reports that she quit smoking about 53 years ago. Her smoking use included cigarettes. She started smoking about 68 years ago. She has a 3.8 pack-year smoking history. She has never used smokeless tobacco. She reports that she does not drink alcohol and does not use drugs.     ALLERGIES   Review of patient's allergies indicates:   Allergen Reactions    Atorvastatin      Muscle pain     Crestor [rosuvastatin] Other (See Comments)     Leg Weakness.         MEDICATIONS  Current Outpatient Medications on File Prior to Visit   Medication Sig Dispense Refill    amLODIPine (NORVASC) 10 MG tablet Take 1 tablet (10 mg total) by mouth once daily. 90 tablet 1    aspirin 81 MG Chew Take 1 tablet (81 mg total) by mouth once daily. 90 tablet 1    azelastine (ASTELIN) 137 mcg (0.1 %) nasal spray 2 sprays by Nasal route.      blood sugar diagnostic Strp Check blood glucose 2 times a day 200 strip 3    blood-glucose meter,continuous (DEXCOM G7 ) Misc Use with Dexcom G7 sensors 1 each 0    blood-glucose sensor (DEXCOM G7 SENSOR) Sujey Change every 10 days 12 each 3    carbinoxamine maleate 6 mg Tab Take 6 mg by mouth every 8 (eight) hours as needed.      cetirizine (ZYRTEC) 10 MG tablet Take 1 tablet (10 mg total) by mouth once daily. For 30 days 90 tablet 1    diclofenac sodium (VOLTAREN) 1 % Gel Lower extremities: Apply the gel (4 g) to the affected area 4 times daily. Do not apply more than 16 g daily to any one affected joint of the lower extremities. Upper extremities: Apply the gel (2 g) to the affected area 4 times daily. Do not apply more than 8 g daily to any one affected joint of the upper extremities. Total dose should not exceed 32 g per day, overall affected joints. 100 g 5     empagliflozin (JARDIANCE) 10 mg tablet Take 1 tablet (10 mg total) by mouth once daily. 90 tablet 2    ezetimibe (ZETIA) 10 mg tablet Take 1 tablet (10 mg total) by mouth once daily. 90 tablet 2    fluconazole (DIFLUCAN) 150 MG Tab Take 150 mg by mouth.      gabapentin (NEURONTIN) 300 MG capsule Take 1 capsule (300 mg total) by mouth 3 (three) times daily. 90 capsule 11    insulin glargine U-300 conc (TOUJEO MAX U-300 SOLOSTAR) 300 unit/mL (3 mL) insulin pen INJECT 50 UNITS SUBCUTANEOUSLY ONCE DAILY 6 Pen 2    insulin lispro (HUMALOG KWIKPEN INSULIN) 100 unit/mL pen Use with sliding scale - 150-200=+2, 201-250=+4; 251-300=+6; 301-350=+8, over 350=+10 units 15 mL 0    lancets Misc Check blood glucose 2x/day 200 each 3    losartan-hydrochlorothiazide 100-25 mg (HYZAAR) 100-25 mg per tablet Take 1 tablet by mouth once daily. 90 tablet 1    meloxicam (MOBIC) 7.5 MG tablet Take 1 tablet (7.5 mg total) by mouth once daily. 12 tablet 0    metFORMIN (GLUCOPHAGE-XR) 500 MG ER 24hr tablet Take 2 tablets (1,000 mg total) by mouth Daily. 180 tablet 2    metoprolol succinate (TOPROL-XL) 50 MG 24 hr tablet Take 1 tablet (50 mg total) by mouth once daily. 90 tablet 1    montelukast (SINGULAIR) 10 mg tablet Take 1 tablet (10 mg total) by mouth once daily. 90 tablet 1    pravastatin (PRAVACHOL) 80 MG tablet Take 1 tablet (80 mg total) by mouth once daily. 90 tablet 1    tirzepatide (MOUNJARO) 15 mg/0.5 mL PnIj Inject 15 mg into the skin every 7 days. 4 Pen 4    tobramycin sulfate 0.3% (TOBREX) 0.3 % ophthalmic solution 1-2 drops topically twice daily to affected toe(s). 5 mL 3     Current Facility-Administered Medications on File Prior to Visit   Medication Dose Route Frequency Provider Last Rate Last Admin    sodium hyaluronate (EUFLEXXA) 10 mg/mL(mw 2.4 -3.6 million) injection 20 mg  20 mg Intra-articular 1 time in Clinic/HOD               PHYSICAL EXAM   vitals were not taken for this visit.   There is no height or weight on file  to calculate BMI.      All other systems deferred.  GENERAL:  No acute distress  HABITUS: Obese  GAIT: Antalgic  SKIN: Normal     KNEE EXAM:    Bilateral:   Effusion: Minimal joint effusion  TTP: yes over Medial Joint Line and Lateral Joint Line   no crepitus with passive knee ROM  Passive ROM: Extension 0, Flexion 130  No pain with manipulation of patella  Stable to varus/valgus stress. No increased laxity to anterior/posterior drawer testing  negative Ngozi's test  No pain with IR/ER rotation of the hip  5/5 strength in knee flexion and extension, ankle plantarflexion and dorsiflexion  Neurovascular Status: Sensation intact to light touch in Sural, Saphenous, SPN, DPN, Tibial nerve distribution  2+ pulse DP/PT, normal capillary refill, foot has normal warmth    DATA:  Diagnostic tests reviewed for today's visit:     4v of the bilateral knee reveal Moderate degenerative changes of the Lateral and Patellofemoral compartment. There is evidence of advanced osteoarthritis changes with Subchondral sclerosis, Osteophyte formation and Joint space narrowing. The limb is in netural alignment. The patella is tracking midline and is in normal alignment. Vascular calcifications present. Kellegren-Lawerence grade 4 changes on the right, grade 3 on the left.

## 2024-09-19 ENCOUNTER — OFFICE VISIT (OUTPATIENT)
Dept: ORTHOPEDICS | Facility: CLINIC | Age: 79
End: 2024-09-19
Payer: COMMERCIAL

## 2024-09-19 VITALS — BODY MASS INDEX: 34.05 KG/M2 | HEIGHT: 65 IN | WEIGHT: 204.38 LBS

## 2024-09-19 DIAGNOSIS — M17.0 PRIMARY OSTEOARTHRITIS OF BOTH KNEES: Primary | ICD-10-CM

## 2024-09-19 PROCEDURE — 99999 PR PBB SHADOW E&M-EST. PATIENT-LVL IV: CPT | Mod: PBBFAC,,, | Performed by: ORTHOPAEDIC SURGERY

## 2024-09-19 NOTE — PROCEDURES
Large Joint Aspiration/Injection: bilateral knee    Date/Time: 9/19/2024 8:40 AM    Performed by: Sumanth Erickson MD  Authorized by: Sumanth Erickson MD    Indications:  Arthritis  Prep: patient was prepped and draped in usual sterile fashion      Local anesthesia used?: Yes    Local anesthetic:  Topical anesthetic    Details:  Needle Size:  22 G  Approach:  Anterolateral  Location:  Knee  Laterality:  Bilateral  Site:  Bilateral knee  Medications (Right):  10 mg sodium hyaluronate (EUFLEXXA) 10 mg/mL(mw 2.4 -3.6 million)  Medications (Left):  10 mg sodium hyaluronate (EUFLEXXA) 10 mg/mL(mw 2.4 -3.6 million)  Patient tolerance:  Patient tolerated the procedure well with no immediate complications

## 2024-09-19 NOTE — PROGRESS NOTES
EST PATIENT ORTHOPAEDIC: Knee    PRIMARY CARE PHYSICIAN: Leonard Wells MD   REFERRING PROVIDER: Sumanth Erickson MD  [address] [place] [address]     ASSESSMENT & PLAN:    Impression:  Bilateral Knee Severe Degenerative Osteoarthritis, Primary     Follow Up Plan: PRN    Non operative care:    Mena Odonnell has physical exam evidence of above and wishes to pursue an non-operative care. I am recommending the following: Euflexxa series, activity modification.      To reivew: She would be an appropriate candidate for a right total knee replacement in the future should her symptoms warrant.  She was referred to vascular for intermittent claudication and peripheral vascular disease seen on x-ray .  There evaluation has referred her to lymphedema therapy. Swelling is improving. She is interested in repeat gel injections.  She is not interested in surgical intervention at this time. She is also diabetic so this is a good treatment modaliity for her.     The patient has been ordered:  Euflexxa    CONSULTS:   None    ACTIVE PROBLEM LIST  Patient Active Problem List   Diagnosis    Type 2 diabetes mellitus with diabetic polyneuropathy    Hyperlipidemia    HTN (hypertension)    Knee pain    H/O vitamin D deficiency    Non morbid obesity due to excess calories    Bilateral carotid bruits    Osteoarthritis of right knee    Dyspnea on exertion    Chronic cough    MEKA (obstructive sleep apnea)    Sensorineural hearing loss (SNHL) of right ear with restricted hearing of left ear    Tinnitus of both ears    Allergic rhinitis    Dysfunction of both eustachian tubes    Nasal septal deviation    Chronic eczematous otitis externa of both ears    Neck abscess    Epidermal inclusion cyst    Polyneuropathy associated with underlying disease    Aortic atherosclerosis    Class 1 obesity due to excess calories with serious comorbidity and body mass index (BMI) of 34.0 to 34.9 in adult    Nonrheumatic aortic valve stenosis     Cervical spondylosis    DDD (degenerative disc disease), cervical    Cervical radiculopathy    Decreased range of motion of intervertebral discs of cervical spine    Decreased strength of upper extremity           SUBJECTIVE    CHIEF COMPLAINT: Knee Pain    HPI:   Mena Odonnell is a 78 y.o. female here for evaluation and management of right knee pain. There is not a specific incident that brought about this pain. she has had progressive problems with the knee(s) starting 2 years ago but progressing to multiple times a day over the past 6 months which is interfering with activities which include: walking 2 blocks and standing for prolonged periods of time    Currently the pain in the joint is rated at 5 out of 10 with moderate activity.  The pain is intermittent and is located in the Right knee, at level of joint line, located medially and located laterally. The pain is described as aching and sharp. Relieving factors include ambulatory device and rest.       Mena Odonnell also complaints leg cramping on that side primarily in her posterior aspect of her knee and calf.  This typically improves proves with rest and over-the-counter topical spray.    Interval History 4/18/22: Injections wearing off. Saw vascular.   Interval History 5/9/22: Here for 1-3 Euflexxa series   Interval History 5/19/22: Here for 2-3 Euflexxa series. Mild improvement in pain.   Interval History 5/27/22: Here for 3-3 Euflexxa series. Moderate improvement in pain.  Interval History 10/13/22: Here with return of pain.   Interval History 11/28/22: Here for Euflexxa injections. Previous steroid with good relief   12/5/22: Here for 2-3 Euflexxa series. Continued relief of pain.   12/12/22: Here for 3-3 Euflexxa series. Continued relief of pain.   7/11/23: Patient would like to restart Euflexxa injections. 6 months or relief with previous injections.  7/18/23: Here for 2-3 Euflexxa. 0-10 pain today.    7/25/23: Here for 3-3 Euflexxa. Some more  pain today. Went to conference last week and had increase in pain.   2/19/24:  Patient here for follow up regarding her bilateral knee pain.  Previously seen over 6 months ago with good relief knee pain after Euflexxa series.  She called prior to this appointment for repeat Euflexxa injections which we put in for and were approved.  She is here for the 1st series of these injections. No changes in characteristics of pain.   2/26/24: Here for 2-3 euflexxa. Improvement after first injection  3/4/24: Here for 3-3 euflexxa. Sustained relief after last set of injections.  9/5/24: Here for repeat euflexxa. Pain doing ok today, would like to stay on top of injections as they are working.   9/12/24: Here for 2-3 euflexxa injections.   9/19/24: Here for 3-3 euflexxa.     PROGRESSIVE SYMPTOMS:  Pain worsened by weight bearing    FUNCTIONAL STATUS:   Do light to moderate work around the house    PREVIOUS TREATMENTS:  Medical: Attempted Weight Loss, Steroid Injections and Biologic Injections  Physical Therapy: Use of Ambulatory Aid and Activities Modified   Previous Orthopaedic Surgery: None    REVIEW OF SYSTEMS:  PAIN ASSESSMENT:  See HPI.  MUSCULOSKELETAL: See HPI.  OTHER 10 point review of systems is negative except as stated in HPI above    PAST MEDICAL HISTORY   has a past medical history of Cataract, Diabetes mellitus type II, Diabetes with neurologic complications, DM retinopathy, H/O vitamin D deficiency, Hyperlipidemia, Hypertension, Obesity, Osteoarthritis, Peripheral neuropathy, Polyneuropathy, Severe obesity (BMI 35.0-39.9) with comorbidity (12/30/2016), Sleep apnea, and Tendonitis.     PAST SURGICAL HISTORY   has a past surgical history that includes Bladder suspension; Hysterectomy (1978); Tonsillectomy; Colonoscopy (N/A, 9/26/2015); Cataract extraction; and Neck mass excision (11/6/2019).     FAMILY HISTORY  family history includes Cataracts in her mother; Colon cancer in her maternal grandfather; Diabetes in her  mother, sister, sister, and son; Hypertension in her brother, brother, daughter, mother, sister, sister, sister, and sister; Stroke in her mother; Thyroid disease in her maternal aunt, maternal aunt, mother, and sister.     SOCIAL HISTORY   reports that she quit smoking about 53 years ago. Her smoking use included cigarettes. She started smoking about 68 years ago. She has a 3.8 pack-year smoking history. She has never used smokeless tobacco. She reports that she does not drink alcohol and does not use drugs.     ALLERGIES   Review of patient's allergies indicates:   Allergen Reactions    Atorvastatin      Muscle pain     Crestor [rosuvastatin] Other (See Comments)     Leg Weakness.         MEDICATIONS  Current Outpatient Medications on File Prior to Visit   Medication Sig Dispense Refill    amLODIPine (NORVASC) 10 MG tablet Take 1 tablet (10 mg total) by mouth once daily. 90 tablet 1    aspirin 81 MG Chew Take 1 tablet (81 mg total) by mouth once daily. 90 tablet 1    azelastine (ASTELIN) 137 mcg (0.1 %) nasal spray 2 sprays by Nasal route.      blood sugar diagnostic Strp Check blood glucose 2 times a day 200 strip 3    blood-glucose meter,continuous (DEXCOM G7 ) Misc Use with Dexcom G7 sensors 1 each 0    blood-glucose sensor (DEXCOM G7 SENSOR) Sujey Change every 10 days 12 each 3    carbinoxamine maleate 6 mg Tab Take 6 mg by mouth every 8 (eight) hours as needed.      cetirizine (ZYRTEC) 10 MG tablet Take 1 tablet (10 mg total) by mouth once daily. For 30 days 90 tablet 1    diclofenac sodium (VOLTAREN) 1 % Gel Lower extremities: Apply the gel (4 g) to the affected area 4 times daily. Do not apply more than 16 g daily to any one affected joint of the lower extremities. Upper extremities: Apply the gel (2 g) to the affected area 4 times daily. Do not apply more than 8 g daily to any one affected joint of the upper extremities. Total dose should not exceed 32 g per day, overall affected joints. 100 g 5     empagliflozin (JARDIANCE) 10 mg tablet Take 1 tablet (10 mg total) by mouth once daily. 90 tablet 2    ezetimibe (ZETIA) 10 mg tablet Take 1 tablet (10 mg total) by mouth once daily. 90 tablet 2    fluconazole (DIFLUCAN) 150 MG Tab Take 150 mg by mouth.      gabapentin (NEURONTIN) 300 MG capsule Take 1 capsule (300 mg total) by mouth 3 (three) times daily. 90 capsule 11    insulin glargine U-300 conc (TOUJEO MAX U-300 SOLOSTAR) 300 unit/mL (3 mL) insulin pen INJECT 50 UNITS SUBCUTANEOUSLY ONCE DAILY 6 Pen 2    insulin lispro (HUMALOG KWIKPEN INSULIN) 100 unit/mL pen Use with sliding scale - 150-200=+2, 201-250=+4; 251-300=+6; 301-350=+8, over 350=+10 units 15 mL 0    lancets Misc Check blood glucose 2x/day 200 each 3    losartan-hydrochlorothiazide 100-25 mg (HYZAAR) 100-25 mg per tablet Take 1 tablet by mouth once daily. 90 tablet 1    meloxicam (MOBIC) 7.5 MG tablet Take 1 tablet (7.5 mg total) by mouth once daily. 12 tablet 0    metFORMIN (GLUCOPHAGE-XR) 500 MG ER 24hr tablet Take 2 tablets (1,000 mg total) by mouth Daily. 180 tablet 2    metoprolol succinate (TOPROL-XL) 50 MG 24 hr tablet Take 1 tablet (50 mg total) by mouth once daily. 90 tablet 1    montelukast (SINGULAIR) 10 mg tablet Take 1 tablet (10 mg total) by mouth once daily. 90 tablet 1    pravastatin (PRAVACHOL) 80 MG tablet Take 1 tablet (80 mg total) by mouth once daily. 90 tablet 1    tirzepatide (MOUNJARO) 15 mg/0.5 mL PnIj Inject 15 mg into the skin every 7 days. 4 Pen 4    tobramycin sulfate 0.3% (TOBREX) 0.3 % ophthalmic solution 1-2 drops topically twice daily to affected toe(s). 5 mL 3     Current Facility-Administered Medications on File Prior to Visit   Medication Dose Route Frequency Provider Last Rate Last Admin    sodium hyaluronate (EUFLEXXA) 10 mg/mL(mw 2.4 -3.6 million) injection 20 mg  20 mg Intra-articular 1 time in Clinic/HOD               PHYSICAL EXAM   vitals were not taken for this visit.   There is no height or weight on file  to calculate BMI.      All other systems deferred.  GENERAL:  No acute distress  HABITUS: Obese  GAIT: Antalgic  SKIN: Normal     KNEE EXAM:    Bilateral:   Effusion: Minimal joint effusion  TTP: yes over Medial Joint Line and Lateral Joint Line   no crepitus with passive knee ROM  Passive ROM: Extension 0, Flexion 130  No pain with manipulation of patella  Stable to varus/valgus stress. No increased laxity to anterior/posterior drawer testing  negative Ngozi's test  No pain with IR/ER rotation of the hip  5/5 strength in knee flexion and extension, ankle plantarflexion and dorsiflexion  Neurovascular Status: Sensation intact to light touch in Sural, Saphenous, SPN, DPN, Tibial nerve distribution  2+ pulse DP/PT, normal capillary refill, foot has normal warmth    DATA:  Diagnostic tests reviewed for today's visit:     4v of the bilateral knee reveal Moderate degenerative changes of the Lateral and Patellofemoral compartment. There is evidence of advanced osteoarthritis changes with Subchondral sclerosis, Osteophyte formation and Joint space narrowing. The limb is in netural alignment. The patella is tracking midline and is in normal alignment. Vascular calcifications present. Kellegren-Lawerence grade 4 changes on the right, grade 3 on the left.

## 2024-09-21 ENCOUNTER — LAB VISIT (OUTPATIENT)
Dept: LAB | Facility: HOSPITAL | Age: 79
End: 2024-09-21
Attending: NURSE PRACTITIONER
Payer: COMMERCIAL

## 2024-09-21 DIAGNOSIS — E11.42 TYPE 2 DIABETES MELLITUS WITH DIABETIC POLYNEUROPATHY, WITH LONG-TERM CURRENT USE OF INSULIN: ICD-10-CM

## 2024-09-21 DIAGNOSIS — Z79.4 TYPE 2 DIABETES MELLITUS WITH DIABETIC POLYNEUROPATHY, WITH LONG-TERM CURRENT USE OF INSULIN: ICD-10-CM

## 2024-09-21 LAB
ESTIMATED AVG GLUCOSE: 163 MG/DL (ref 68–131)
HBA1C MFR BLD: 7.3 % (ref 4–5.6)

## 2024-09-21 PROCEDURE — 36415 COLL VENOUS BLD VENIPUNCTURE: CPT | Mod: PO | Performed by: NURSE PRACTITIONER

## 2024-09-21 PROCEDURE — 83036 HEMOGLOBIN GLYCOSYLATED A1C: CPT | Performed by: NURSE PRACTITIONER

## 2024-09-25 DIAGNOSIS — Z00.00 ENCOUNTER FOR MEDICARE ANNUAL WELLNESS EXAM: ICD-10-CM

## 2024-09-27 ENCOUNTER — OFFICE VISIT (OUTPATIENT)
Dept: ENDOCRINOLOGY | Facility: CLINIC | Age: 79
End: 2024-09-27
Payer: COMMERCIAL

## 2024-09-27 VITALS
DIASTOLIC BLOOD PRESSURE: 78 MMHG | OXYGEN SATURATION: 98 % | SYSTOLIC BLOOD PRESSURE: 122 MMHG | HEART RATE: 74 BPM | BODY MASS INDEX: 33.61 KG/M2 | WEIGHT: 202 LBS

## 2024-09-27 DIAGNOSIS — Z79.4 TYPE 2 DIABETES MELLITUS WITH DIABETIC POLYNEUROPATHY, WITH LONG-TERM CURRENT USE OF INSULIN: Primary | ICD-10-CM

## 2024-09-27 DIAGNOSIS — E11.42 TYPE 2 DIABETES MELLITUS WITH DIABETIC POLYNEUROPATHY, WITH LONG-TERM CURRENT USE OF INSULIN: Primary | ICD-10-CM

## 2024-09-27 DIAGNOSIS — E78.5 HYPERLIPIDEMIA, UNSPECIFIED HYPERLIPIDEMIA TYPE: ICD-10-CM

## 2024-09-27 DIAGNOSIS — E66.9 NON MORBID OBESITY, UNSPECIFIED OBESITY TYPE: ICD-10-CM

## 2024-09-27 PROCEDURE — 99999 PR PBB SHADOW E&M-EST. PATIENT-LVL IV: CPT | Mod: PBBFAC,,, | Performed by: NURSE PRACTITIONER

## 2024-09-27 RX ORDER — TIRZEPATIDE 15 MG/.5ML
15 INJECTION, SOLUTION SUBCUTANEOUS
Qty: 4 PEN | Refills: 4 | Status: SHIPPED | OUTPATIENT
Start: 2024-09-27

## 2024-09-27 RX ORDER — INSULIN GLARGINE 300 [IU]/ML
INJECTION, SOLUTION SUBCUTANEOUS
Qty: 6 PEN | Refills: 2 | Status: SHIPPED | OUTPATIENT
Start: 2024-09-27

## 2024-09-27 NOTE — PROGRESS NOTES
"CC: This 78 y.o. Black or  female  is here for evaluation of  T2DM along with comorbidities indicated in the Visit Diagnosis section of this encounter.    HPI: Mena Odonnell was diagnosed with T2DM in ~ 1995. Experienced blurry vision at the time of diagnosis r/t BG >400. Started on orals. Began insulin in 2005. She is currently on MDI.          Prior visit 5/2024  A1c is down from 7.8 to 7.4%.   Not using Dexcom because she is awaiting a new  because it broke.   She has lost 10 lb since lov. She wants to lose more weight.   Plan Avoid beverages with sugar.   Increase Mounjaro to 15 mg weekly.   Ok to place Dexcom sensor on abdomen.   Start exercise regimen.   Return to clinic in 4 months with A1c prior.       Interval hx  A1c is about the same, from 7.4 to 7.3%.   90 day CGM average 173  Pt has not been taking Mounjaro 15 mg weekly which was sent to [facility].  She has been getting her old rx at 12.5 mg at [facility] and "did not bother" with getting the new dose. She is ready to start 15 mg, needs it at [facility].     Reports BGs are up and down.   Asks if she needs to stay on Dexcom. It's $192 for 6 months.         LAST DIABETES EDUCATION: 6/2019 mira Palmer - Found visit helpful   1/14/22 with ELENO Sanchez RD for Dexcom training     HOSPITALIZED FOR DIABETES -  No.    DIABETES MEDICATIONS:   Jardiance 10 mg once daily in the AM  Metformin ER 1000 mg once daily at lunch   Mounjaro 15 mg once weekly --- taking 12.5 mg  as above   Toujeo Max 50 units qhs     Humalog per ss: 150-200=+1, 201-250=+2, 251-300=+3; 301-350=+4, over 350=+5 units    Misses medication doses - no        DM COMPLICATIONS: peripheral neuropathy and peripheral vascular disease    SIGNIFICANT DIABETES MED HISTORY:   Metformin stopped December 2016 r/t GI upset and diarrhea, resumed with metformin ER 3/2022 and weaned off Humalog   Ozempic switched to Mounjaro 3/2023      GLUCOSE MONITORING: Dexcom G7 " ; gets sensors from [facility]     CGM interpretation:  glucoses in fasting state are controlled but routinely rise to mid 200s after meals. Diet is high in carbs/saturated fat.               HYPOGLYCEMIC EPISODES:  Denies     CURRENT DIET:  drinking mostly water and once a day in coke.     Eats 2-3 meals/day. Dinner skipped sometimes.     Diet recall: lunch was small fries, cheeseburger and coke.       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 No constipation or nausea.       PHYSICAL EXAM:  GENERAL: Well developed, well nourished. No acute distress.   PSYCH: AAOx3, appropriate mood and affect, conversant, well-groomed. Judgement and insight good.   NEURO: Cranial nerves grossly intact. Speech clear, no tremor.   CHEST: Respirations even and unlabored.       Hemoglobin A1C   Date Value Ref Range Status   09/21/2024 7.3 (H) 4.0 - 5.6 % Final     Comment:     ADA Screening Guidelines:  5.7-6.4%  Consistent with prediabetes  >or=6.5%  Consistent with diabetes    High levels of fetal hemoglobin interfere with the HbA1C  assay. Heterozygous hemoglobin variants (HbS, HgC, etc)do  not significantly interfere with this assay.   However, presence of multiple variants may affect accuracy.     04/27/2024 7.4 (H) 4.0 - 5.6 % Final     Comment:     ADA Screening Guidelines:  5.7-6.4%  Consistent with prediabetes  >or=6.5%  Consistent with diabetes    High levels of fetal hemoglobin interfere with the HbA1C  assay. Heterozygous hemoglobin variants (HbS, HgC, etc)do  not significantly interfere with this assay.   However, presence of multiple variants may affect accuracy.     01/22/2024 7.8 (H) 4.0 - 5.6 % Final     Comment:     ADA Screening Guidelines:  5.7-6.4%  Consistent with prediabetes  >or=6.5%  Consistent with diabetes    High levels  of fetal hemoglobin interfere with the HbA1C  assay. Heterozygous hemoglobin variants (HbS, HgC, etc)do  not significantly interfere with this assay.   However, presence of multiple variants may affect accuracy.           Component Value Date/Time     04/27/2024 0845    K 3.4 (L) 04/27/2024 0845     04/27/2024 0845    CO2 26 04/27/2024 0845    BUN 21 04/27/2024 0845    CREATININE 0.6 04/27/2024 0845    CREATININE 0.6 04/27/2024 0845    GLU 95 04/27/2024 0845    CALCIUM 9.9 04/27/2024 0845    ALKPHOS 76 04/27/2024 0845    AST 20 04/27/2024 0845    ALT 22 04/27/2024 0845    BILITOT 0.4 04/27/2024 0845    EGFRNORACEVR >60.0 04/27/2024 0845    EGFRNORACEVR >60.0 04/27/2024 0845    ESTGFRAFRICA >60.0 02/26/2022 0818       Lab Results   Component Value Date    CHOL 147 04/27/2024    CHOL 191 01/22/2024    CHOL 195 02/18/2023     Lab Results   Component Value Date    HDL 40 04/27/2024    HDL 43 01/22/2024    HDL 49 02/18/2023     Lab Results   Component Value Date    LDLCALC 84.6 04/27/2024    LDLCALC 125.4 01/22/2024    LDLCALC 116.8 02/18/2023     Lab Results   Component Value Date    TRIG 112 04/27/2024    TRIG 113 01/22/2024    TRIG 146 02/18/2023       Lab Results   Component Value Date    CHOLHDL 27.2 04/27/2024    CHOLHDL 22.5 01/22/2024    CHOLHDL 25.1 02/18/2023         Lab Results   Component Value Date    MICALBCREAT 49.2 (H) 01/22/2024           ASSESSMENT and PLAN:    A1C GOAL: < 7%       1. Type 2 diabetes mellitus with diabetic polyneuropathy, with long-term current use of insulin  Increase Mounjaro to 15 mg once weekly as previously planned. Rx sent to [facility].   Continue Toujeo, metfomrin, and Jardiance.   Avoid beverages with sugar.   Improve diet.   Foods that are low in carbohydrates and high in protein and non-starchy vegetables. Avoid processed foods.     Continue Dexcom monitoring if affordable. If not affordable, resuming testing with fingersticks.     Return to clinic in 4 months with  labs prior.     tirzepatide (MOUNJARO) 15 mg/0.5 mL PnIj    insulin glargine U-300 conc (TOUJEO MAX U-300 SOLOSTAR) 300 unit/mL (3 mL) insulin pen    Hemoglobin A1C      2. Non morbid obesity, unspecified obesity type  tirzepatide (MOUNJARO) 15 mg/0.5 mL PnIj      3. Hyperlipidemia, unspecified hyperlipidemia type  Lipid Panel             Patient requires a therapeutic CGM and is willing to use therapeutic CGM/SMBG for Necessary frequent adjustments in insulin therapy. I have assessed adherence to CGM regimen and to the plan. Due to COVID pandemic, patient requires Dexcom CGM to manage diabetes.         Orders Placed This Encounter   Procedures    Hemoglobin A1C     Standing Status:   Future     Standing Expiration Date:   11/26/2025    Lipid Panel     Standing Status:   Future     Standing Expiration Date:   9/27/2025     Order Specific Question:   Send normal result to authorizing provider's In Basket if patient is active on MyChart:     Answer:   Yes        Follow up in about 4 months (around 1/27/2025).

## 2024-09-27 NOTE — PATIENT INSTRUCTIONS
Increase Mounjaro to 15 mg once weekly as previously planned. Rx sent to [facility].   Continue Toujeo, metfomrin, and Jardiance.   Avoid beverages with sugar.   Improve diet.   Foods that are low in carbohydrates and high in protein and non-starchy vegetables. Avoid processed foods.     Continue Dexcom monitoring if affordable. If not affordable, resuming testing with fingersticks.     Return to clinic in 4 months with labs prior.

## 2024-10-10 ENCOUNTER — PATIENT OUTREACH (OUTPATIENT)
Dept: ADMINISTRATIVE | Facility: HOSPITAL | Age: 79
End: 2024-10-10
Payer: COMMERCIAL

## 2024-10-10 ENCOUNTER — OFFICE VISIT (OUTPATIENT)
Dept: CARDIOLOGY | Facility: CLINIC | Age: 79
End: 2024-10-10
Payer: COMMERCIAL

## 2024-10-10 VITALS
HEIGHT: 65 IN | RESPIRATION RATE: 15 BRPM | DIASTOLIC BLOOD PRESSURE: 62 MMHG | OXYGEN SATURATION: 97 % | WEIGHT: 203.69 LBS | BODY MASS INDEX: 33.94 KG/M2 | SYSTOLIC BLOOD PRESSURE: 156 MMHG | HEART RATE: 77 BPM

## 2024-10-10 DIAGNOSIS — I70.0 AORTIC ATHEROSCLEROSIS: ICD-10-CM

## 2024-10-10 DIAGNOSIS — R06.09 DOE (DYSPNEA ON EXERTION): ICD-10-CM

## 2024-10-10 DIAGNOSIS — G47.33 OSA (OBSTRUCTIVE SLEEP APNEA): ICD-10-CM

## 2024-10-10 DIAGNOSIS — I35.0 NONRHEUMATIC AORTIC VALVE STENOSIS: ICD-10-CM

## 2024-10-10 DIAGNOSIS — I70.203 ATHEROSCLEROSIS OF NATIVE ARTERY OF BOTH LOWER EXTREMITIES, WITH UNSPECIFIED PRESENCE OF CLINICAL MANIFESTATION: Primary | ICD-10-CM

## 2024-10-10 DIAGNOSIS — I35.0 AORTIC VALVE STENOSIS, ETIOLOGY OF CARDIAC VALVE DISEASE UNSPECIFIED: ICD-10-CM

## 2024-10-10 DIAGNOSIS — I10 PRIMARY HYPERTENSION: ICD-10-CM

## 2024-10-10 DIAGNOSIS — E78.2 MIXED HYPERLIPIDEMIA: ICD-10-CM

## 2024-10-10 DIAGNOSIS — E11.42 TYPE 2 DIABETES MELLITUS WITH DIABETIC POLYNEUROPATHY, WITHOUT LONG-TERM CURRENT USE OF INSULIN: ICD-10-CM

## 2024-10-10 LAB
OHS QRS DURATION: 92 MS
OHS QTC CALCULATION: 443 MS

## 2024-10-10 PROCEDURE — G2211 COMPLEX E/M VISIT ADD ON: HCPCS | Mod: S$GLB,,, | Performed by: INTERNAL MEDICINE

## 2024-10-10 PROCEDURE — 1159F MED LIST DOCD IN RCRD: CPT | Mod: CPTII,S$GLB,, | Performed by: INTERNAL MEDICINE

## 2024-10-10 PROCEDURE — 3077F SYST BP >= 140 MM HG: CPT | Mod: CPTII,S$GLB,, | Performed by: INTERNAL MEDICINE

## 2024-10-10 PROCEDURE — 3288F FALL RISK ASSESSMENT DOCD: CPT | Mod: CPTII,S$GLB,, | Performed by: INTERNAL MEDICINE

## 2024-10-10 PROCEDURE — 1101F PT FALLS ASSESS-DOCD LE1/YR: CPT | Mod: CPTII,S$GLB,, | Performed by: INTERNAL MEDICINE

## 2024-10-10 PROCEDURE — 99999 PR PBB SHADOW E&M-EST. PATIENT-LVL V: CPT | Mod: PBBFAC,,, | Performed by: INTERNAL MEDICINE

## 2024-10-10 PROCEDURE — 1126F AMNT PAIN NOTED NONE PRSNT: CPT | Mod: CPTII,S$GLB,, | Performed by: INTERNAL MEDICINE

## 2024-10-10 PROCEDURE — 99214 OFFICE O/P EST MOD 30 MIN: CPT | Mod: S$GLB,,, | Performed by: INTERNAL MEDICINE

## 2024-10-10 PROCEDURE — 3078F DIAST BP <80 MM HG: CPT | Mod: CPTII,S$GLB,, | Performed by: INTERNAL MEDICINE

## 2024-10-10 NOTE — PROGRESS NOTES
CARDIOVASCULAR CONSULTATION    REASON FOR CONSULT:   Mena Odonnell is a 78 y.o. female who presents for aortic valve stenosis and dyspnea on exertion      HISTORY OF PRESENT ILLNESS:     Patient is a pleasant 74-year-old lady.  Multiple risk factors for coronary artery disease.  Recently had an echocardiogram done.  Showed normal left ventricle systolic function with mild aortic valve stenosis.  Has been sent to Cardiology Clinic further evaluation.  Denies any chest pains at rest on exertion, however does complain of dyspnea on exertion.  Does have elevated blood pressure in the clinic and states that home when she checks it stays around 150-160 mm systolic.  She is currently on losartan hydrochlorothiazide as well as Norvasc at home.  Denies orthopnea, PND, swelling of feet.      Hyperdynamic left ventricular systolic function. The estimated ejection fraction is 75%.  Concentric left ventricular hypertrophy.  Indeterminate left ventricular diastolic function.  Normal right ventricular systolic function.  Mild left atrial enlargement.  Mild aortic valve stenosis.  Aortic valve area is 1.55 cm2; peak velocity is 2.58 m/s; mean gradient is 14 mmHg.  The estimated PA systolic pressure is 13 mmHg.      Notes from September 2020:    Patient doing fine.  Denies any chest pains at rest on exertion.  Stress test did not show any significant blockage.    The study shows normal myocardial perfusion.    The perfusion scan is free of evidence from myocardial ischemia or injury.    There is a mild to moderate intensity defect in the anterior wall of the left ventricle, secondary to breast attenuation.    Gated perfusion images showed an ejection fraction of 78%    There is normal wall motion at rest and post stress.    The EKG portion of this study is negative for ischemia.    The patient reported no chest pain during the stress test.    There were no arrhythmias during stress.      Notes from November 2022: Patient  here for follow-up.  No new symptomatology.  No chest pains, orthopnea, PND.  Chronic dyspnea on exertion.      Notes from May 23:  Patient here for follow-up.  Denies any chest pains at rest on exertion, orthopnea, PND.  Does complain of worsening dyspnea on exertion and shortness breath.  Also occasional pain in the left arm no correlation with activity.      Notes from June 23:  Patient here for follow-up.  Main complaint is dyspnea on exertion.  Echocardiogram showed normal left ventricle systolic function with moderate aortic stenosis.  Stress test did not show any significant ischemia.    Notes from October 24: Patient here for follow-up.  Since last visit with me she has been experiencing more dyspnea on exertion.  No orthopnea PND     Results for orders placed during the hospital encounter of 06/05/24    Echo    Interpretation Summary    Left Ventricle: Mildly increased wall thickness. There is normal systolic function with a visually estimated ejection fraction of 65 - 70%. Grade I diastolic dysfunction.    Right Ventricle: Normal right ventricular cavity size. Systolic function is normal.    Left Atrium: Left atrium is severely dilated.    Right Atrium: Right atrium is mildly dilated.    Aortic Valve: There is moderate stenosis. Aortic valve area by VTI is 1.06 cm². Aortic valve peak velocity is 3.85 m/s. Mean gradient is 38 mmHg. The dimensionless index is 0.35.    Mitral Valve: There is mild regurgitation.    Tricuspid Valve: There is mild regurgitation.    Pulmonary Artery: The estimated pulmonary artery systolic pressure is 34 mmHg.    IVC/SVC: Normal venous pressure at 3 mmHg.              PAST MEDICAL HISTORY:     Past Medical History:   Diagnosis Date    Cataract     Diabetes mellitus type II     Diabetes with neurologic complications     DM retinopathy     H/O vitamin D deficiency     Hyperlipidemia     Hypertension     Obesity     Osteoarthritis     Peripheral neuropathy     Polyneuropathy      Severe obesity (BMI 35.0-39.9) with comorbidity 12/30/2016    Sleep apnea     Tendonitis     left foot       PAST SURGICAL HISTORY:     Past Surgical History:   Procedure Laterality Date    BLADDER SUSPENSION      CATARACT EXTRACTION      COLONOSCOPY N/A 9/26/2015    Procedure: COLONOSCOPY;  Surgeon: Javed Wood MD;  Location: [facility] ([address] [place] [address]);  Service: Endoscopy;  Laterality: N/A;    HYSTERECTOMY  1978    fibroids    NECK MASS EXCISION  11/6/2019    Procedure: EXCISION, MASS, NECK;  Surgeon: Edwin Barrow MD;  Location: [facility];  Service: General;;  RN PREOP 10/30/2019    TONSILLECTOMY         ALLERGIES AND MEDICATION:     Review of patient's allergies indicates:   Allergen Reactions    Atorvastatin      Muscle pain     Crestor [rosuvastatin] Other (See Comments)     Leg Weakness.         Medication List            Accurate as of October 10, 2024  2:15 PM. If you have any questions, ask your nurse or doctor.                CONTINUE taking these medications      amLODIPine 10 MG tablet  Commonly known as: NORVASC  Take 1 tablet (10 mg total) by mouth once daily.     aspirin 81 MG Chew  Take 1 tablet (81 mg total) by mouth once daily.     azelastine 137 mcg (0.1 %) nasal spray  Commonly known as: ASTELIN     blood sugar diagnostic Strp  Check blood glucose 2 times a day     carbinoxamine maleate 6 mg Tab     cetirizine 10 MG tablet  Commonly known as: ZYRTEC  Take 1 tablet (10 mg total) by mouth once daily. For 30 days     DEXCOM G7  Misc  Generic drug: blood-glucose meter,continuous  Use with Dexcom G7 sensors     DEXCOM G7 SENSOR Sujey  Generic drug: blood-glucose sensor  Change every 10 days     diclofenac sodium 1 % Gel  Commonly known as: VOLTAREN  Lower extremities: Apply the gel (4 g) to the affected area 4 times daily. Do not apply more than 16 g daily to any one affected joint of the lower extremities. Upper extremities: Apply the gel (2 g) to the affected area 4 times daily. Do not  apply more than 8 g daily to any one affected joint of the upper extremities. Total dose should not exceed 32 g per day, overall affected joints.     empagliflozin 10 mg tablet  Commonly known as: JARDIANCE  Take 1 tablet (10 mg total) by mouth once daily.     ezetimibe 10 mg tablet  Commonly known as: ZETIA  Take 1 tablet (10 mg total) by mouth once daily.     fluconazole 150 MG Tab  Commonly known as: DIFLUCAN     gabapentin 300 MG capsule  Commonly known as: NEURONTIN  Take 1 capsule (300 mg total) by mouth 3 (three) times daily.     insulin glargine U-300 conc 300 unit/mL (3 mL) insulin pen  Commonly known as: TOUJEO MAX U-300 SOLOSTAR  INJECT 50 UNITS SUBCUTANEOUSLY ONCE DAILY     insulin lispro 100 unit/mL pen  Commonly known as: HumaLOG KwikPen Insulin  Use with sliding scale - 150-200=+2, 201-250=+4; 251-300=+6; 301-350=+8, over 350=+10 units     lancets Misc  Check blood glucose 2x/day     losartan-hydrochlorothiazide 100-25 mg 100-25 mg per tablet  Commonly known as: HYZAAR  Take 1 tablet by mouth once daily.     meloxicam 7.5 MG tablet  Commonly known as: MOBIC  Take 1 tablet (7.5 mg total) by mouth once daily.     metFORMIN 500 MG ER 24hr tablet  Commonly known as: GLUCOPHAGE-XR  Take 2 tablets (1,000 mg total) by mouth Daily.     metoprolol succinate 50 MG 24 hr tablet  Commonly known as: TOPROL-XL  Take 1 tablet (50 mg total) by mouth once daily.     montelukast 10 mg tablet  Commonly known as: SINGULAIR  Take 1 tablet (10 mg total) by mouth once daily.     MOUNJARO 15 mg/0.5 mL Pnij  Generic drug: tirzepatide  Inject 15 mg into the skin every 7 days.     pravastatin 80 MG tablet  Commonly known as: PRAVACHOL  Take 1 tablet (80 mg total) by mouth once daily.     tobramycin sulfate 0.3% 0.3 % ophthalmic solution  Commonly known as: TOBREX  1-2 drops topically twice daily to affected toe(s).              SOCIAL HISTORY:     Social History     Socioeconomic History    Marital status:     Number of  children: 2   Occupational History     Employer: [employer]   Tobacco Use    Smoking status: Former     Current packs/day: 0.00     Average packs/day: 0.3 packs/day for 15.0 years (3.8 ttl pk-yrs)     Types: Cigarettes     Start date:      Quit date:      Years since quittin.8    Smokeless tobacco: Never   Substance and Sexual Activity    Alcohol use: No    Drug use: No    Sexual activity: Not Currently     Partners: Male     Birth control/protection: Abstinence   Social History Narrative    . One daughter. Works as an Apartment      Social Drivers of Health     Financial Resource Strain: Low Risk  (2022)    Overall Financial Resource Strain (CARDIA)     Difficulty of Paying Living Expenses: Not hard at all   Food Insecurity: No Food Insecurity (2022)    Hunger Vital Sign     Worried About Running Out of Food in the Last Year: Never true     Ran Out of Food in the Last Year: Never true   Transportation Needs: No Transportation Needs (2022)    PRAPARE - Transportation     Lack of Transportation (Medical): No     Lack of Transportation (Non-Medical): No   Physical Activity: Inactive (2022)    Exercise Vital Sign     Days of Exercise per Week: 0 days     Minutes of Exercise per Session: 0 min   Housing Stability: Low Risk  (2022)    Housing Stability Vital Sign     Unable to Pay for Housing in the Last Year: No     Number of Places Lived in the Last Year: 1     Unstable Housing in the Last Year: No       FAMILY HISTORY:     Family History   Problem Relation Name Age of Onset    Thyroid disease Mother      Cataracts Mother      Diabetes Mother      Stroke Mother      Hypertension Mother      Thyroid disease Sister      Diabetes Sister      Hypertension Sister      Hypertension Daughter      Diabetes Son      Diabetes Sister      Hypertension Sister      Hypertension Brother      Hypertension Sister      Hypertension Sister      Hypertension Brother    "   Thyroid disease Maternal Aunt      Thyroid disease Maternal Aunt      Colon cancer Maternal Grandfather      Breast cancer Neg Hx      Ovarian cancer Neg Hx      Amblyopia Neg Hx      Blindness Neg Hx      Cancer Neg Hx      Glaucoma Neg Hx      Macular degeneration Neg Hx      Retinal detachment Neg Hx      Strabismus Neg Hx         REVIEW OF SYSTEMS:   Review of Systems   Constitutional: Negative.   HENT: Negative.     Eyes: Negative.    Cardiovascular:  Positive for dyspnea on exertion.   Respiratory:  Positive for shortness of breath.    Endocrine: Negative.    Hematologic/Lymphatic: Negative.    Skin: Negative.    Musculoskeletal: Negative.    Gastrointestinal: Negative.    Genitourinary: Negative.    Neurological: Negative.    Psychiatric/Behavioral: Negative.     Allergic/Immunologic: Negative.        A 10 point review of systems was performed and all the pertinent positives have been mentioned. Rest of review of systems was negative.        PHYSICAL EXAM:     Vitals:    10/10/24 1254   BP: (!) 156/62   Pulse: 77   Resp: 15    Body mass index is 33.9 kg/m².  Weight: 92.4 kg (203 lb 11.3 oz)   Height: 5' 5" (165.1 cm)     Physical Exam  Constitutional:       Appearance: Normal appearance. She is well-developed.   HENT:      Head: Normocephalic.   Eyes:      Pupils: Pupils are equal, round, and reactive to light.   Cardiovascular:      Rate and Rhythm: Normal rate and regular rhythm.      Heart sounds: Murmur heard.   Pulmonary:      Effort: Pulmonary effort is normal.      Breath sounds: Normal breath sounds.   Abdominal:      General: Bowel sounds are normal.      Palpations: Abdomen is soft.      Tenderness: There is no abdominal tenderness.   Musculoskeletal:         General: Normal range of motion.      Cervical back: Normal range of motion and neck supple.   Skin:     General: Skin is warm.   Neurological:      Mental Status: She is alert and oriented to person, place, and time.           DATA: "     Laboratory:  CBC:          CHEMISTRIES:  Recent Labs   Lab 02/26/22  0818 02/18/23  0837 06/17/23  0815 04/27/24  0845   Glucose 113 H  --  114 H 95   Sodium 143  --  144 142   Potassium 3.4 L  --  3.9 3.4 L   BUN 14  --  21 21   Creatinine 0.6 0.7 0.7 0.6  0.6   eGFR if African American >60.0  --   --   --    eGFR if non  >60.0  --   --   --    Calcium 9.2  --  10.1 9.9       CARDIAC BIOMARKERS:        COAGS:        LIPIDS/LFTS:  Recent Labs   Lab 02/26/22  0818 02/18/23  0837 06/17/23  0815 01/22/24  0815 04/27/24  0845   Cholesterol 167 195  --  191 147   Triglycerides 101 146  --  113 112   HDL 40 49  --  43 40   LDL Cholesterol 106.8 116.8  --  125.4 84.6   Non-HDL Cholesterol 127 146  --  148 107   AST 13  --  17  --  20   ALT 15  --  15  --  22       Hemoglobin A1C   Date Value Ref Range Status   09/21/2024 7.3 (H) 4.0 - 5.6 % Final     Comment:     ADA Screening Guidelines:  5.7-6.4%  Consistent with prediabetes  >or=6.5%  Consistent with diabetes    High levels of fetal hemoglobin interfere with the HbA1C  assay. Heterozygous hemoglobin variants (HbS, HgC, etc)do  not significantly interfere with this assay.   However, presence of multiple variants may affect accuracy.     04/27/2024 7.4 (H) 4.0 - 5.6 % Final     Comment:     ADA Screening Guidelines:  5.7-6.4%  Consistent with prediabetes  >or=6.5%  Consistent with diabetes    High levels of fetal hemoglobin interfere with the HbA1C  assay. Heterozygous hemoglobin variants (HbS, HgC, etc)do  not significantly interfere with this assay.   However, presence of multiple variants may affect accuracy.     01/22/2024 7.8 (H) 4.0 - 5.6 % Final     Comment:     ADA Screening Guidelines:  5.7-6.4%  Consistent with prediabetes  >or=6.5%  Consistent with diabetes    High levels of fetal hemoglobin interfere with the HbA1C  assay. Heterozygous hemoglobin variants (HbS, HgC, etc)do  not significantly interfere with this assay.   However, presence  of multiple variants may affect accuracy.         TSH  Recent Labs   Lab 02/26/22  0818   TSH 0.699       The 10-year ASCVD risk score (Best CORNELIUS, et al., 2019) is: 30.6%    Values used to calculate the score:      Age: 78 years      Sex: Female      Is Non- : Yes      Diabetic: Yes      Tobacco smoker: No      Systolic Blood Pressure: 156 mmHg      Is BP treated: Yes      HDL Cholesterol: 40 mg/dL      Total Cholesterol: 147 mg/dL             ASSESSMENT AND PLAN     Patient Active Problem List   Diagnosis    Type 2 diabetes mellitus with diabetic polyneuropathy    Hyperlipidemia    HTN (hypertension)    Knee pain    H/O vitamin D deficiency    Non morbid obesity due to excess calories    Bilateral carotid bruits    Osteoarthritis of right knee    Dyspnea on exertion    Chronic cough    MEKA (obstructive sleep apnea)    Sensorineural hearing loss (SNHL) of right ear with restricted hearing of left ear    Tinnitus of both ears    Allergic rhinitis    Dysfunction of both eustachian tubes    Nasal septal deviation    Chronic eczematous otitis externa of both ears    Neck abscess    Epidermal inclusion cyst    Polyneuropathy associated with underlying disease    Aortic atherosclerosis    Class 1 obesity due to excess calories with serious comorbidity and body mass index (BMI) of 34.0 to 34.9 in adult    Nonrheumatic aortic valve stenosis    Cervical spondylosis    DDD (degenerative disc disease), cervical    Cervical radiculopathy    Decreased range of motion of intervertebral discs of cervical spine    Decreased strength of upper extremity       Patient with multiple risk factors for coronary artery disease.  Complaining of dyspnea on exertion.  Stress test in 2023 did not show any significant ischemia.    Mod AS:  Recent echocardiogram showed moderate aortic stenosis.  Follow-up echocardiogram in 3 months.  Discussed in detail process of TAVR versus surgical aortic valve replacement.  Follow-up  with echo in 3 months    Diabetes    Hypertension:  Well controlled on current therapy.      Follow-up after 3 m          Thank you very much for involving me in the care of your patient.  Please do not hesitate to contact me if there are any questions.      Jenn Arreola MD, FACC, [facility]  Interventional Cardiologist, [facility].     Follow-up in 3 months      This note was dictated with the help of speech recognition software.  There might be un-intended errors and/or substitutions.        Visit today included increased complexity associated with the care of the episodic problem hypertension, dyslipidemia, aortic valve stenosis, obesity addressed and managing the longitudinal care of the patient due to the serious and/or complex managed problem(s)   Patient Active Problem List   Diagnosis    Type 2 diabetes mellitus with diabetic polyneuropathy    Hyperlipidemia    HTN (hypertension)    Knee pain    H/O vitamin D deficiency    Non morbid obesity due to excess calories    Bilateral carotid bruits    Osteoarthritis of right knee    Dyspnea on exertion    Chronic cough    MEKA (obstructive sleep apnea)    Sensorineural hearing loss (SNHL) of right ear with restricted hearing of left ear    Tinnitus of both ears    Allergic rhinitis    Dysfunction of both eustachian tubes    Nasal septal deviation    Chronic eczematous otitis externa of both ears    Neck abscess    Epidermal inclusion cyst    Polyneuropathy associated with underlying disease    Aortic atherosclerosis    Class 1 obesity due to excess calories with serious comorbidity and body mass index (BMI) of 34.0 to 34.9 in adult    Nonrheumatic aortic valve stenosis    Cervical spondylosis    DDD (degenerative disc disease), cervical    Cervical radiculopathy    Decreased range of motion of intervertebral discs of cervical spine    Decreased strength of upper extremity     .

## 2024-10-10 NOTE — PROGRESS NOTES
Population Health Chart Review & Patient Outreach Details      Additional Pop Health Notes:    Per ISI in basket message, pt declined flu vaccine. HM updated.           Updates Requested / Reviewed:      Updated Care Coordination Note, Care Everywhere, and Immunizations Reconciliation Completed or Queried: [place]         Health Maintenance Topics Overdue:      [facility] Score: 1     Uncontrolled BP                       Health Maintenance Topic(s) Outreach Outcomes & Actions Taken:

## 2024-10-15 ENCOUNTER — PATIENT MESSAGE (OUTPATIENT)
Dept: ADMINISTRATIVE | Facility: HOSPITAL | Age: 79
End: 2024-10-15
Payer: COMMERCIAL

## 2024-10-29 ENCOUNTER — HOSPITAL ENCOUNTER (OUTPATIENT)
Dept: CARDIOLOGY | Facility: HOSPITAL | Age: 79
Discharge: HOME | End: 2024-10-29
Attending: INTERNAL MEDICINE
Payer: COMMERCIAL

## 2024-10-29 DIAGNOSIS — I35.0 NONRHEUMATIC AORTIC VALVE STENOSIS: ICD-10-CM

## 2024-10-29 PROCEDURE — 93306 TTE W/DOPPLER COMPLETE: CPT

## 2024-10-29 PROCEDURE — 93306 TTE W/DOPPLER COMPLETE: CPT | Mod: 26,,, | Performed by: INTERNAL MEDICINE

## 2024-10-30 LAB
AV INDEX (PROSTH): 0.47
AV MEAN GRADIENT: 28.5 MMHG
AV PEAK GRADIENT: 46.2 MMHG
AV VALVE AREA BY VELOCITY RATIO: 1.3 CM²
AV VALVE AREA: 1.3 CM²
AV VELOCITY RATIO: 0.44
CV ECHO LV RWT: 0.97 CM
DOP CALC AO PEAK VEL: 3.4 M/S
DOP CALC AO VTI: 67.7 CM
DOP CALC LVOT AREA: 2.8 CM2
DOP CALC LVOT DIAMETER: 1.9 CM
DOP CALC LVOT PEAK VEL: 1.5 M/S
DOP CALC LVOT STROKE VOLUME: 90.1 CM3
DOP CALC MV VTI: 53.7 CM
DOP CALCLVOT PEAK VEL VTI: 31.8 CM
E WAVE DECELERATION TIME: 412.43 MSEC
E/A RATIO: 0.5
E/E' RATIO: 32.67 M/S
ECHO LV POSTERIOR WALL: 1.5 CM (ref 0.6–1.1)
FRACTIONAL SHORTENING: 41.9 % (ref 28–44)
INTERVENTRICULAR SEPTUM: 1.4 CM (ref 0.6–1.1)
IVC DIAMETER: 1.66 CM
IVRT: 116.08 MSEC
LA MAJOR: 5.6 CM
LA MINOR: 4.07 CM
LA WIDTH: 4.1 CM
LEFT ATRIUM SIZE: 4.49 CM
LEFT ATRIUM VOLUME: 73.76 CM3
LEFT INTERNAL DIMENSION IN SYSTOLE: 1.8 CM (ref 2.1–4)
LEFT VENTRICLE DIASTOLIC VOLUME: 36.65 ML
LEFT VENTRICLE SYSTOLIC VOLUME: 9.62 ML
LEFT VENTRICULAR INTERNAL DIMENSION IN DIASTOLE: 3.1 CM (ref 3.5–6)
LEFT VENTRICULAR MASS: 155.5 G
LV LATERAL E/E' RATIO: 32.67 M/S
LV SEPTAL E/E' RATIO: 32.67 M/S
LVED V (TEICH): 36.65 ML
LVES V (TEICH): 9.62 ML
LVOT MG: 6.01 MMHG
LVOT MV: 1.15 CM/S
MV MEAN GRADIENT: 6 MMHG
MV PEAK A VEL: 1.96 M/S
MV PEAK E VEL: 0.98 M/S
MV PEAK GRADIENT: 18 MMHG
MV STENOSIS PRESSURE HALF TIME: 119.61 MS
MV VALVE AREA BY CONTINUITY EQUATION: 1.68 CM2
MV VALVE AREA P 1/2 METHOD: 1.84 CM2
OHS CV RV/LV RATIO: 1.26 CM
PISA TR MAX VEL: 2.63 M/S
PULM VEIN S/D RATIO: 2.17
PV PEAK D VEL: 0.24 M/S
PV PEAK GRADIENT: 3 MMHG
PV PEAK S VEL: 0.52 M/S
PV PEAK VELOCITY: 0.91 M/S
RA MAJOR: 4.47 CM
RA PRESSURE ESTIMATED: 3 MMHG
RA WIDTH: 3.29 CM
RIGHT VENTRICLE DIASTOLIC BASEL DIMENSION: 3.9 CM
RIGHT VENTRICULAR END-DIASTOLIC DIMENSION: 3.94 CM
RV TB RVSP: 6 MMHG
SINUS: 2.78 CM
STJ: 2.04 CM
TDI LATERAL: 0.03 M/S
TDI SEPTAL: 0.03 M/S
TDI: 0.03 M/S
TR MAX PG: 28 MMHG
TRICUSPID ANNULAR PLANE SYSTOLIC EXCURSION: 2.21 CM
TV PEAK GRADIENT: 1 MMHG
TV REST PULMONARY ARTERY PRESSURE: 31 MMHG

## 2024-11-05 ENCOUNTER — OFFICE VISIT (OUTPATIENT)
Dept: CARDIOLOGY | Facility: CLINIC | Age: 79
End: 2024-11-05
Payer: COMMERCIAL

## 2024-11-05 VITALS
BODY MASS INDEX: 33.65 KG/M2 | WEIGHT: 201.94 LBS | HEIGHT: 65 IN | HEART RATE: 77 BPM | RESPIRATION RATE: 15 BRPM | DIASTOLIC BLOOD PRESSURE: 63 MMHG | SYSTOLIC BLOOD PRESSURE: 142 MMHG | OXYGEN SATURATION: 96 %

## 2024-11-05 DIAGNOSIS — I35.0 AORTIC VALVE STENOSIS, ETIOLOGY OF CARDIAC VALVE DISEASE UNSPECIFIED: Primary | ICD-10-CM

## 2024-11-05 DIAGNOSIS — R06.09 DYSPNEA ON EXERTION: ICD-10-CM

## 2024-11-05 DIAGNOSIS — E66.01 MORBID OBESITY: ICD-10-CM

## 2024-11-05 DIAGNOSIS — G47.33 OSA (OBSTRUCTIVE SLEEP APNEA): ICD-10-CM

## 2024-11-05 DIAGNOSIS — E11.42 TYPE 2 DIABETES MELLITUS WITH DIABETIC POLYNEUROPATHY, WITHOUT LONG-TERM CURRENT USE OF INSULIN: ICD-10-CM

## 2024-11-05 DIAGNOSIS — I70.203 ATHEROSCLEROSIS OF NATIVE ARTERY OF BOTH LOWER EXTREMITIES, WITH UNSPECIFIED PRESENCE OF CLINICAL MANIFESTATION: ICD-10-CM

## 2024-11-05 DIAGNOSIS — R06.09 DOE (DYSPNEA ON EXERTION): ICD-10-CM

## 2024-11-05 DIAGNOSIS — I35.0 NONRHEUMATIC AORTIC VALVE STENOSIS: ICD-10-CM

## 2024-11-05 DIAGNOSIS — I10 PRIMARY HYPERTENSION: ICD-10-CM

## 2024-11-05 PROCEDURE — 1101F PT FALLS ASSESS-DOCD LE1/YR: CPT | Mod: CPTII,S$GLB,, | Performed by: INTERNAL MEDICINE

## 2024-11-05 PROCEDURE — 99214 OFFICE O/P EST MOD 30 MIN: CPT | Mod: S$GLB,,, | Performed by: INTERNAL MEDICINE

## 2024-11-05 PROCEDURE — 99999 PR PBB SHADOW E&M-EST. PATIENT-LVL V: CPT | Mod: PBBFAC,,, | Performed by: INTERNAL MEDICINE

## 2024-11-05 PROCEDURE — 3288F FALL RISK ASSESSMENT DOCD: CPT | Mod: CPTII,S$GLB,, | Performed by: INTERNAL MEDICINE

## 2024-11-05 PROCEDURE — 3078F DIAST BP <80 MM HG: CPT | Mod: CPTII,S$GLB,, | Performed by: INTERNAL MEDICINE

## 2024-11-05 PROCEDURE — 1126F AMNT PAIN NOTED NONE PRSNT: CPT | Mod: CPTII,S$GLB,, | Performed by: INTERNAL MEDICINE

## 2024-11-05 PROCEDURE — G2211 COMPLEX E/M VISIT ADD ON: HCPCS | Mod: S$GLB,,, | Performed by: INTERNAL MEDICINE

## 2024-11-05 PROCEDURE — 3077F SYST BP >= 140 MM HG: CPT | Mod: CPTII,S$GLB,, | Performed by: INTERNAL MEDICINE

## 2024-11-05 PROCEDURE — 1159F MED LIST DOCD IN RCRD: CPT | Mod: CPTII,S$GLB,, | Performed by: INTERNAL MEDICINE

## 2024-11-05 NOTE — PROGRESS NOTES
CARDIOVASCULAR CONSULTATION    REASON FOR CONSULT:   Mena Odonnell is a 78 y.o. female who presents for aortic valve stenosis and dyspnea on exertion      HISTORY OF PRESENT ILLNESS:     Patient is a pleasant 74-year-old lady.  Multiple risk factors for coronary artery disease.  Recently had an echocardiogram done.  Showed normal left ventricle systolic function with mild aortic valve stenosis.  Has been sent to Cardiology Clinic further evaluation.  Denies any chest pains at rest on exertion, however does complain of dyspnea on exertion.  Does have elevated blood pressure in the clinic and states that home when she checks it stays around 150-160 mm systolic.  She is currently on losartan hydrochlorothiazide as well as Norvasc at home.  Denies orthopnea, PND, swelling of feet.      Hyperdynamic left ventricular systolic function. The estimated ejection fraction is 75%.  Concentric left ventricular hypertrophy.  Indeterminate left ventricular diastolic function.  Normal right ventricular systolic function.  Mild left atrial enlargement.  Mild aortic valve stenosis.  Aortic valve area is 1.55 cm2; peak velocity is 2.58 m/s; mean gradient is 14 mmHg.  The estimated PA systolic pressure is 13 mmHg.      Notes from September 2020:    Patient doing fine.  Denies any chest pains at rest on exertion.  Stress test did not show any significant blockage.    The study shows normal myocardial perfusion.    The perfusion scan is free of evidence from myocardial ischemia or injury.    There is a mild to moderate intensity defect in the anterior wall of the left ventricle, secondary to breast attenuation.    Gated perfusion images showed an ejection fraction of 78%    There is normal wall motion at rest and post stress.    The EKG portion of this study is negative for ischemia.    The patient reported no chest pain during the stress test.    There were no arrhythmias during stress.      Notes from November 2022: Patient  here for follow-up.  No new symptomatology.  No chest pains, orthopnea, PND.  Chronic dyspnea on exertion.      Notes from May 23:  Patient here for follow-up.  Denies any chest pains at rest on exertion, orthopnea, PND.  Does complain of worsening dyspnea on exertion and shortness breath.  Also occasional pain in the left arm no correlation with activity.      Notes from June 23:  Patient here for follow-up.  Main complaint is dyspnea on exertion.  Echocardiogram showed normal left ventricle systolic function with moderate aortic stenosis.  Stress test did not show any significant ischemia.    Notes from October 24: Patient here for follow-up.  Since last visit with me she has been experiencing more dyspnea on exertion.  No orthopnea PND     Results for orders placed during the hospital encounter of 06/05/24    Echo    Interpretation Summary    Left Ventricle: Mildly increased wall thickness. There is normal systolic function with a visually estimated ejection fraction of 65 - 70%. Grade I diastolic dysfunction.    Right Ventricle: Normal right ventricular cavity size. Systolic function is normal.    Left Atrium: Left atrium is severely dilated.    Right Atrium: Right atrium is mildly dilated.    Aortic Valve: There is moderate stenosis. Aortic valve area by VTI is 1.06 cm². Aortic valve peak velocity is 3.85 m/s. Mean gradient is 38 mmHg. The dimensionless index is 0.35.    Mitral Valve: There is mild regurgitation.    Tricuspid Valve: There is mild regurgitation.    Pulmonary Artery: The estimated pulmonary artery systolic pressure is 34 mmHg.    IVC/SVC: Normal venous pressure at 3 mmHg.          11/05/2024    Patient here for follow-up.  Denies chest pains at rest on exertion orthopnea PND.  Chronic dyspnea on exertion.    Results for orders placed during the hospital encounter of 10/29/24    Echo    Interpretation Summary    Left Ventricle: The left ventricle is normal in size. There is moderate concentric  hypertrophy. There is hyperdynamic systolic function with a visually estimated ejection fraction of 70 - 75%. Grade II diastolic dysfunction.    Right Ventricle: Normal right ventricular cavity size. Systolic function is normal.    Left Atrium: Left atrium is mildly dilated.    Aortic Valve: There is moderate stenosis. Aortic valve area by VTI is 1.3 cm². Aortic valve peak velocity is 3.4 m/s. Mean gradient is 28.5 mmHg. The dimensionless index is 0.47.    Mitral Valve: There is mild to moderate regurgitation.    Tricuspid Valve: There is mild regurgitation.    Pulmonary Artery: The estimated pulmonary artery systolic pressure is 31 mmHg.    IVC/SVC: Normal venous pressure at 3 mmHg.      PAST MEDICAL HISTORY:     Past Medical History:   Diagnosis Date    Cataract     Diabetes mellitus type II     Diabetes with neurologic complications     DM retinopathy     H/O vitamin D deficiency     Hyperlipidemia     Hypertension     Obesity     Osteoarthritis     Peripheral neuropathy     Polyneuropathy     Severe obesity (BMI 35.0-39.9) with comorbidity 12/30/2016    Sleep apnea     Tendonitis     left foot       PAST SURGICAL HISTORY:     Past Surgical History:   Procedure Laterality Date    BLADDER SUSPENSION      CATARACT EXTRACTION      COLONOSCOPY N/A 9/26/2015    Procedure: COLONOSCOPY;  Surgeon: Javed Wood MD;  Location: [address]);  Service: Endoscopy;  Laterality: N/A;    HYSTERECTOMY  1978    fibroids    NECK MASS EXCISION  11/6/2019    Procedure: EXCISION, MASS, NECK;  Surgeon: Edwin Barrow MD;  Location: [facility];  Service: General;;  RN PREOP 10/30/2019    TONSILLECTOMY         ALLERGIES AND MEDICATION:     Review of patient's allergies indicates:   Allergen Reactions    Atorvastatin      Muscle pain     Crestor [rosuvastatin] Other (See Comments)     Leg Weakness.         Medication List            Accurate as of November 5, 2024  8:56 AM. If you have any questions, ask your nurse or doctor.                 CONTINUE taking these medications      amLODIPine 10 MG tablet  Commonly known as: NORVASC  Take 1 tablet (10 mg total) by mouth once daily.     aspirin 81 MG Chew  Take 1 tablet (81 mg total) by mouth once daily.     azelastine 137 mcg (0.1 %) nasal spray  Commonly known as: ASTELIN     blood sugar diagnostic Strp  Check blood glucose 2 times a day     carbinoxamine maleate 6 mg Tab     cetirizine 10 MG tablet  Commonly known as: ZYRTEC  Take 1 tablet (10 mg total) by mouth once daily. For 30 days     DEXCOM G7  Misc  Generic drug: blood-glucose meter,continuous  Use with Dexcom G7 sensors     DEXCOM G7 SENSOR Sujye  Generic drug: blood-glucose sensor  Change every 10 days     diclofenac sodium 1 % Gel  Commonly known as: VOLTAREN  Lower extremities: Apply the gel (4 g) to the affected area 4 times daily. Do not apply more than 16 g daily to any one affected joint of the lower extremities. Upper extremities: Apply the gel (2 g) to the affected area 4 times daily. Do not apply more than 8 g daily to any one affected joint of the upper extremities. Total dose should not exceed 32 g per day, overall affected joints.     empagliflozin 10 mg tablet  Commonly known as: JARDIANCE  Take 1 tablet (10 mg total) by mouth once daily.     ezetimibe 10 mg tablet  Commonly known as: ZETIA  Take 1 tablet (10 mg total) by mouth once daily.     fluconazole 150 MG Tab  Commonly known as: DIFLUCAN     gabapentin 300 MG capsule  Commonly known as: NEURONTIN  Take 1 capsule (300 mg total) by mouth 3 (three) times daily.     insulin glargine U-300 conc 300 unit/mL (3 mL) insulin pen  Commonly known as: TOUJEO MAX U-300 SOLOSTAR  INJECT 50 UNITS SUBCUTANEOUSLY ONCE DAILY     insulin lispro 100 unit/mL pen  Commonly known as: HumaLOG KwikPen Insulin  Use with sliding scale - 150-200=+2, 201-250=+4; 251-300=+6; 301-350=+8, over 350=+10 units     lancets Misc  Check blood glucose 2x/day     losartan-hydrochlorothiazide  100-25 mg 100-25 mg per tablet  Commonly known as: HYZAAR  Take 1 tablet by mouth once daily.     meloxicam 7.5 MG tablet  Commonly known as: MOBIC  Take 1 tablet (7.5 mg total) by mouth once daily.     metFORMIN 500 MG ER 24hr tablet  Commonly known as: GLUCOPHAGE-XR  Take 2 tablets (1,000 mg total) by mouth Daily.     metoprolol succinate 50 MG 24 hr tablet  Commonly known as: TOPROL-XL  Take 1 tablet (50 mg total) by mouth once daily.     montelukast 10 mg tablet  Commonly known as: SINGULAIR  Take 1 tablet (10 mg total) by mouth once daily.     MOUNJARO 15 mg/0.5 mL Pnij  Generic drug: tirzepatide  Inject 15 mg into the skin every 7 days.     pravastatin 80 MG tablet  Commonly known as: PRAVACHOL  Take 1 tablet (80 mg total) by mouth once daily.     tobramycin sulfate 0.3% 0.3 % ophthalmic solution  Commonly known as: TOBREX  1-2 drops topically twice daily to affected toe(s).              SOCIAL HISTORY:     Social History     Socioeconomic History    Marital status:     Number of children: 2   Occupational History     Employer: [employer]   Tobacco Use    Smoking status: Former     Current packs/day: 0.00     Average packs/day: 0.3 packs/day for 15.0 years (3.8 ttl pk-yrs)     Types: Cigarettes     Start date:      Quit date:      Years since quittin.8    Smokeless tobacco: Never   Substance and Sexual Activity    Alcohol use: No    Drug use: No    Sexual activity: Not Currently     Partners: Male     Birth control/protection: Abstinence   Social History Narrative    . One daughter. Works as an Apartment      Social Drivers of Health     Financial Resource Strain: Low Risk  (2022)    Overall Financial Resource Strain (CARDIA)     Difficulty of Paying Living Expenses: Not hard at all   Food Insecurity: No Food Insecurity (2022)    Hunger Vital Sign     Worried About Running Out of Food in the Last Year: Never true     Ran Out of Food in the Last  Year: Never true   Transportation Needs: No Transportation Needs (8/16/2022)    PRAPARE - Transportation     Lack of Transportation (Medical): No     Lack of Transportation (Non-Medical): No   Physical Activity: Inactive (8/16/2022)    Exercise Vital Sign     Days of Exercise per Week: 0 days     Minutes of Exercise per Session: 0 min   Housing Stability: Low Risk  (8/16/2022)    Housing Stability Vital Sign     Unable to Pay for Housing in the Last Year: No     Number of Places Lived in the Last Year: 1     Unstable Housing in the Last Year: No       FAMILY HISTORY:     Family History   Problem Relation Name Age of Onset    Thyroid disease Mother      Cataracts Mother      Diabetes Mother      Stroke Mother      Hypertension Mother      Thyroid disease Sister      Diabetes Sister      Hypertension Sister      Hypertension Daughter      Diabetes Son      Diabetes Sister      Hypertension Sister      Hypertension Brother      Hypertension Sister      Hypertension Sister      Hypertension Brother      Thyroid disease Maternal Aunt      Thyroid disease Maternal Aunt      Colon cancer Maternal Grandfather      Breast cancer Neg Hx      Ovarian cancer Neg Hx      Amblyopia Neg Hx      Blindness Neg Hx      Cancer Neg Hx      Glaucoma Neg Hx      Macular degeneration Neg Hx      Retinal detachment Neg Hx      Strabismus Neg Hx         REVIEW OF SYSTEMS:   Review of Systems   Constitutional: Negative.   HENT: Negative.     Eyes: Negative.    Cardiovascular:  Positive for dyspnea on exertion.   Respiratory:  Positive for shortness of breath.    Endocrine: Negative.    Hematologic/Lymphatic: Negative.    Skin: Negative.    Musculoskeletal: Negative.    Gastrointestinal: Negative.    Genitourinary: Negative.    Neurological: Negative.    Psychiatric/Behavioral: Negative.     Allergic/Immunologic: Negative.        A 10 point review of systems was performed and all the pertinent positives have been mentioned. Rest of review of  "systems was negative.        PHYSICAL EXAM:     Vitals:    11/05/24 0842   BP: (!) 142/63   Pulse: 77   Resp: 15    Body mass index is 33.6 kg/m².  Weight: 91.6 kg (201 lb 15.1 oz)   Height: 5' 5" (165.1 cm)     Physical Exam  Constitutional:       Appearance: Normal appearance. She is well-developed.   HENT:      Head: Normocephalic.   Eyes:      Pupils: Pupils are equal, round, and reactive to light.   Cardiovascular:      Rate and Rhythm: Normal rate and regular rhythm.      Heart sounds: Murmur heard.   Pulmonary:      Effort: Pulmonary effort is normal.      Breath sounds: Normal breath sounds.   Abdominal:      General: Bowel sounds are normal.      Palpations: Abdomen is soft.      Tenderness: There is no abdominal tenderness.   Musculoskeletal:         General: Normal range of motion.      Cervical back: Normal range of motion and neck supple.   Skin:     General: Skin is warm.   Neurological:      Mental Status: She is alert and oriented to person, place, and time.           DATA:     Laboratory:  CBC:          CHEMISTRIES:  Recent Labs   Lab 02/26/22  0818 02/18/23  0837 06/17/23  0815 04/27/24  0845   Glucose 113 H  --  114 H 95   Sodium 143  --  144 142   Potassium 3.4 L  --  3.9 3.4 L   BUN 14  --  21 21   Creatinine 0.6 0.7 0.7 0.6  0.6   eGFR if African American >60.0  --   --   --    eGFR if non  >60.0  --   --   --    Calcium 9.2  --  10.1 9.9       CARDIAC BIOMARKERS:        COAGS:        LIPIDS/LFTS:  Recent Labs   Lab 02/26/22  0818 02/18/23  0837 06/17/23  0815 01/22/24  0815 04/27/24  0845   Cholesterol 167 195  --  191 147   Triglycerides 101 146  --  113 112   HDL 40 49  --  43 40   LDL Cholesterol 106.8 116.8  --  125.4 84.6   Non-HDL Cholesterol 127 146  --  148 107   AST 13  --  17  --  20   ALT 15  --  15  --  22       Hemoglobin A1C   Date Value Ref Range Status   09/21/2024 7.3 (H) 4.0 - 5.6 % Final     Comment:     ADA Screening Guidelines:  5.7-6.4%  Consistent with " prediabetes  >or=6.5%  Consistent with diabetes    High levels of fetal hemoglobin interfere with the HbA1C  assay. Heterozygous hemoglobin variants (HbS, HgC, etc)do  not significantly interfere with this assay.   However, presence of multiple variants may affect accuracy.     04/27/2024 7.4 (H) 4.0 - 5.6 % Final     Comment:     ADA Screening Guidelines:  5.7-6.4%  Consistent with prediabetes  >or=6.5%  Consistent with diabetes    High levels of fetal hemoglobin interfere with the HbA1C  assay. Heterozygous hemoglobin variants (HbS, HgC, etc)do  not significantly interfere with this assay.   However, presence of multiple variants may affect accuracy.     01/22/2024 7.8 (H) 4.0 - 5.6 % Final     Comment:     ADA Screening Guidelines:  5.7-6.4%  Consistent with prediabetes  >or=6.5%  Consistent with diabetes    High levels of fetal hemoglobin interfere with the HbA1C  assay. Heterozygous hemoglobin variants (HbS, HgC, etc)do  not significantly interfere with this assay.   However, presence of multiple variants may affect accuracy.         TSH  Recent Labs   Lab 02/26/22  0818   TSH 0.699       The 10-year ASCVD risk score (Best CORNELIUS, et al., 2019) is: 27.5%    Values used to calculate the score:      Age: 78 years      Sex: Female      Is Non- : Yes      Diabetic: Yes      Tobacco smoker: No      Systolic Blood Pressure: 142 mmHg      Is BP treated: Yes      HDL Cholesterol: 40 mg/dL      Total Cholesterol: 147 mg/dL             ASSESSMENT AND PLAN     Patient Active Problem List   Diagnosis    Type 2 diabetes mellitus with diabetic polyneuropathy    Hyperlipidemia    HTN (hypertension)    Knee pain    H/O vitamin D deficiency    Non morbid obesity due to excess calories    Bilateral carotid bruits    Osteoarthritis of right knee    Dyspnea on exertion    Chronic cough    MEKA (obstructive sleep apnea)    Sensorineural hearing loss (SNHL) of right ear with restricted hearing of left ear     Tinnitus of both ears    Allergic rhinitis    Dysfunction of both eustachian tubes    Nasal septal deviation    Chronic eczematous otitis externa of both ears    Neck abscess    Epidermal inclusion cyst    Polyneuropathy associated with underlying disease    Aortic atherosclerosis    Class 1 obesity due to excess calories with serious comorbidity and body mass index (BMI) of 34.0 to 34.9 in adult    Nonrheumatic aortic valve stenosis    Cervical spondylosis    DDD (degenerative disc disease), cervical    Cervical radiculopathy    Decreased range of motion of intervertebral discs of cervical spine    Decreased strength of upper extremity       Patient with multiple risk factors for coronary artery disease.  Complaining of dyspnea on exertion.  Stress test in 2023 did not show any significant ischemia.    Mod AS:  Recent echocardiogram showed moderate aortic stenosis.  Follow-up echocardiogram in 6 months.  Discussed in detail process of TAVR versus surgical aortic valve replacement.  Follow-up with echo in 6 months    Diabetes    Hypertension:  Well controlled on current therapy.      Follow-up after 6 m          Thank you very much for involving me in the care of your patient.  Please do not hesitate to contact me if there are any questions.      Jenn Arreola MD, FACC, [facility]  Interventional Cardiologist, [facility].     Follow-up in 6 months      This note was dictated with the help of speech recognition software.  There might be un-intended errors and/or substitutions.        Visit today included increased complexity associated with the care of the episodic problem hypertension, dyslipidemia, aortic valve stenosis, obesity addressed and managing the longitudinal care of the patient due to the serious and/or complex managed problem(s)   Patient Active Problem List   Diagnosis    Type 2 diabetes mellitus with diabetic polyneuropathy    Hyperlipidemia    HTN (hypertension)    Knee pain    H/O vitamin D  deficiency    Non morbid obesity due to excess calories    Bilateral carotid bruits    Osteoarthritis of right knee    Dyspnea on exertion    Chronic cough    MEKA (obstructive sleep apnea)    Sensorineural hearing loss (SNHL) of right ear with restricted hearing of left ear    Tinnitus of both ears    Allergic rhinitis    Dysfunction of both eustachian tubes    Nasal septal deviation    Chronic eczematous otitis externa of both ears    Neck abscess    Epidermal inclusion cyst    Polyneuropathy associated with underlying disease    Aortic atherosclerosis    Class 1 obesity due to excess calories with serious comorbidity and body mass index (BMI) of 34.0 to 34.9 in adult    Nonrheumatic aortic valve stenosis    Cervical spondylosis    DDD (degenerative disc disease), cervical    Cervical radiculopathy    Decreased range of motion of intervertebral discs of cervical spine    Decreased strength of upper extremity     .

## 2024-11-13 ENCOUNTER — PATIENT MESSAGE (OUTPATIENT)
Dept: ADMINISTRATIVE | Facility: HOSPITAL | Age: 79
End: 2024-11-13
Payer: COMMERCIAL

## 2025-01-16 ENCOUNTER — TELEPHONE (OUTPATIENT)
Dept: VASCULAR SURGERY | Facility: CLINIC | Age: 80
End: 2025-01-16
Payer: COMMERCIAL

## 2025-01-16 DIAGNOSIS — I89.0 LYMPHEDEMA OF BOTH LOWER EXTREMITIES: Primary | ICD-10-CM

## 2025-01-18 ENCOUNTER — LAB VISIT (OUTPATIENT)
Dept: LAB | Facility: HOSPITAL | Age: 80
End: 2025-01-18
Attending: NURSE PRACTITIONER
Payer: COMMERCIAL

## 2025-01-18 DIAGNOSIS — E78.5 HYPERLIPIDEMIA, UNSPECIFIED HYPERLIPIDEMIA TYPE: ICD-10-CM

## 2025-01-18 DIAGNOSIS — E11.42 TYPE 2 DIABETES MELLITUS WITH DIABETIC POLYNEUROPATHY, WITH LONG-TERM CURRENT USE OF INSULIN: ICD-10-CM

## 2025-01-18 DIAGNOSIS — Z79.4 TYPE 2 DIABETES MELLITUS WITH DIABETIC POLYNEUROPATHY, WITH LONG-TERM CURRENT USE OF INSULIN: ICD-10-CM

## 2025-01-18 LAB
CHOLEST SERPL-MCNC: 127 MG/DL (ref 120–199)
CHOLEST/HDLC SERPL: 3 {RATIO} (ref 2–5)
ESTIMATED AVG GLUCOSE: 160 MG/DL (ref 68–131)
HBA1C MFR BLD: 7.2 % (ref 4–5.6)
HDLC SERPL-MCNC: 42 MG/DL (ref 40–75)
HDLC SERPL: 33.1 % (ref 20–50)
LDLC SERPL CALC-MCNC: 67.6 MG/DL (ref 63–159)
NONHDLC SERPL-MCNC: 85 MG/DL
TRIGL SERPL-MCNC: 87 MG/DL (ref 30–150)

## 2025-01-18 PROCEDURE — 80061 LIPID PANEL: CPT | Performed by: NURSE PRACTITIONER

## 2025-01-18 PROCEDURE — 83036 HEMOGLOBIN GLYCOSYLATED A1C: CPT | Performed by: NURSE PRACTITIONER

## 2025-01-27 ENCOUNTER — OFFICE VISIT (OUTPATIENT)
Dept: ENDOCRINOLOGY | Facility: CLINIC | Age: 80
End: 2025-01-27
Payer: COMMERCIAL

## 2025-01-27 VITALS
WEIGHT: 199.63 LBS | TEMPERATURE: 97 F | DIASTOLIC BLOOD PRESSURE: 60 MMHG | SYSTOLIC BLOOD PRESSURE: 142 MMHG | BODY MASS INDEX: 33.22 KG/M2 | HEART RATE: 97 BPM

## 2025-01-27 DIAGNOSIS — Z79.4 TYPE 2 DIABETES MELLITUS WITH DIABETIC POLYNEUROPATHY, WITH LONG-TERM CURRENT USE OF INSULIN: Primary | ICD-10-CM

## 2025-01-27 DIAGNOSIS — E78.5 HYPERLIPIDEMIA, UNSPECIFIED HYPERLIPIDEMIA TYPE: ICD-10-CM

## 2025-01-27 DIAGNOSIS — E66.9 NON MORBID OBESITY, UNSPECIFIED OBESITY TYPE: ICD-10-CM

## 2025-01-27 DIAGNOSIS — E11.42 TYPE 2 DIABETES MELLITUS WITH DIABETIC POLYNEUROPATHY, WITH LONG-TERM CURRENT USE OF INSULIN: Primary | ICD-10-CM

## 2025-01-27 PROCEDURE — 3078F DIAST BP <80 MM HG: CPT | Mod: CPTII,S$GLB,, | Performed by: NURSE PRACTITIONER

## 2025-01-27 PROCEDURE — 99214 OFFICE O/P EST MOD 30 MIN: CPT | Mod: S$GLB,,, | Performed by: NURSE PRACTITIONER

## 2025-01-27 PROCEDURE — 1159F MED LIST DOCD IN RCRD: CPT | Mod: CPTII,S$GLB,, | Performed by: NURSE PRACTITIONER

## 2025-01-27 PROCEDURE — 3077F SYST BP >= 140 MM HG: CPT | Mod: CPTII,S$GLB,, | Performed by: NURSE PRACTITIONER

## 2025-01-27 PROCEDURE — 95251 CONT GLUC MNTR ANALYSIS I&R: CPT | Mod: S$GLB,,, | Performed by: NURSE PRACTITIONER

## 2025-01-27 PROCEDURE — 1160F RVW MEDS BY RX/DR IN RCRD: CPT | Mod: CPTII,S$GLB,, | Performed by: NURSE PRACTITIONER

## 2025-01-27 PROCEDURE — 99999 PR PBB SHADOW E&M-EST. PATIENT-LVL IV: CPT | Mod: PBBFAC,,, | Performed by: NURSE PRACTITIONER

## 2025-01-27 RX ORDER — TIRZEPATIDE 15 MG/.5ML
15 INJECTION, SOLUTION SUBCUTANEOUS
Qty: 4 PEN | Refills: 4 | Status: SHIPPED | OUTPATIENT
Start: 2025-01-27

## 2025-01-27 RX ORDER — EZETIMIBE 10 MG/1
10 TABLET ORAL DAILY
Qty: 90 TABLET | Refills: 1 | Status: SHIPPED | OUTPATIENT
Start: 2025-01-27 | End: 2026-01-27

## 2025-01-27 RX ORDER — INSULIN GLARGINE 300 [IU]/ML
INJECTION, SOLUTION SUBCUTANEOUS
Start: 2025-01-27

## 2025-01-27 RX ORDER — METFORMIN HYDROCHLORIDE 500 MG/1
1000 TABLET, EXTENDED RELEASE ORAL DAILY
Qty: 180 TABLET | Refills: 2 | Status: SHIPPED | OUTPATIENT
Start: 2025-01-27 | End: 2026-01-27

## 2025-01-27 RX ORDER — PRAVASTATIN SODIUM 80 MG/1
80 TABLET ORAL DAILY
Qty: 90 TABLET | Refills: 1 | Status: SHIPPED | OUTPATIENT
Start: 2025-01-27

## 2025-01-27 NOTE — PATIENT INSTRUCTIONS
Resume toujeo at 20 units once daily.   Continue Mounjaro, jardiance, and metformin.     If blood sugars drop less than 80 often, reduce Toujeo to 15 units.     Return to clinic in 4 months with labs prior.

## 2025-01-27 NOTE — PROGRESS NOTES
"CC: This 79 y.o. Black or  female  is here for evaluation of  T2DM along with comorbidities indicated in the Visit Diagnosis section of this encounter.    HPI: Mena Odonnell was diagnosed with T2DM in ~ 1995. Experienced blurry vision at the time of diagnosis r/t BG >400. Started on orals. Began insulin in 2005. She is currently on MDI.          Prior visit 9/2024  A1c is about the same, from 7.4 to 7.3%.   90 day CGM average 173  Pt has not been taking Mounjaro 15 mg weekly which was sent to [facility].  She has been getting her old rx at 12.5 mg at [facility] and "did not bother" with getting the new dose. She is ready to start 15 mg, needs it at [facility].   Reports BGs are up and down.   Asks if she needs to stay on Dexcom. It's $192 for 6 months.   Plan Increase Mounjaro to 15 mg once weekly as previously planned. Rx sent to [facility].   Continue Toujeo, metformin, and Jardiance.   Avoid beverages with sugar.   Improve diet.   Continue Dexcom monitoring if affordable. If not affordable, resuming testing with fingersticks.   Return to clinic in 4 months with labs prior.       Interval hx  A1c remains about the same from 7.3 to 7.2%.   She has increased Mounjaro to 15 mg. Denies n/v, constipation, or diarrhea.   Appetite continues to be low, no more than compared to 12.5 mg.     Pt reports she has not been taking toujeo for several months now. Does not remember when or why she stopped.           LAST DIABETES EDUCATION: 6/2019 mira Palmer - Found visit helpful   1/14/22 with ELENO Sanchez RD for Dexcom training     HOSPITALIZED FOR DIABETES -  No.    DIABETES MEDICATIONS:   Jardiance 10 mg once daily in the AM  Metformin ER 1000 mg once daily at lunch   Mounjaro 15 mg once weekly   Toujeo Max 40 units qhs     Humalog per ss: 150-200=+1, 201-250=+2, 251-300=+3; 301-350=+4, over 350=+5 units    Misses medication doses - no        DM COMPLICATIONS: peripheral neuropathy and peripheral " vascular disease    SIGNIFICANT DIABETES MED HISTORY:   Metformin stopped December 2016 r/t GI upset and diarrhea, resumed with metformin ER 3/2022 and weaned off Humalog   Ozempic switched to Mounjaro 3/2023      GLUCOSE MONITORING: Dexcom G7 ; gets sensors from [facility]     CGM interpretation:  glucoses overall at or near goal with highest glucoses morning/midday. No hypoglycemia.                   HYPOGLYCEMIC EPISODES:  Denies     CURRENT DIET:  drinking mostly water and once a day in coke.     Eats 2-3 meals/day. Dinner skipped sometimes.       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 No constipation or nausea.   + brennan       PHYSICAL EXAM:  GENERAL: Well developed, well nourished. No acute distress.   PSYCH: AAOx3, appropriate mood and affect, conversant, well-groomed. Judgement and insight good.   NEURO: Cranial nerves grossly intact. Speech clear, no tremor.   CHEST: Respirations even and unlabored.       Hemoglobin A1C   Date Value Ref Range Status   01/18/2025 7.2 (H) 4.0 - 5.6 % Final     Comment:     ADA Screening Guidelines:  5.7-6.4%  Consistent with prediabetes  >or=6.5%  Consistent with diabetes    High levels of fetal hemoglobin interfere with the HbA1C  assay. Heterozygous hemoglobin variants (HbS, HgC, etc)do  not significantly interfere with this assay.   However, presence of multiple variants may affect accuracy.     09/21/2024 7.3 (H) 4.0 - 5.6 % Final     Comment:     ADA Screening Guidelines:  5.7-6.4%  Consistent with prediabetes  >or=6.5%  Consistent with diabetes    High levels of fetal hemoglobin interfere with the HbA1C  assay. Heterozygous hemoglobin variants (HbS, HgC, etc)do  not significantly interfere with this assay.    However, presence of multiple variants may affect accuracy.     04/27/2024 7.4 (H) 4.0 - 5.6 % Final     Comment:     ADA Screening Guidelines:  5.7-6.4%  Consistent with prediabetes  >or=6.5%  Consistent with diabetes    High levels of fetal hemoglobin interfere with the HbA1C  assay. Heterozygous hemoglobin variants (HbS, HgC, etc)do  not significantly interfere with this assay.   However, presence of multiple variants may affect accuracy.           Component Value Date/Time     04/27/2024 0845    K 3.4 (L) 04/27/2024 0845     04/27/2024 0845    CO2 26 04/27/2024 0845    BUN 21 04/27/2024 0845    CREATININE 0.6 04/27/2024 0845    CREATININE 0.6 04/27/2024 0845    GLU 95 04/27/2024 0845    CALCIUM 9.9 04/27/2024 0845    ALKPHOS 76 04/27/2024 0845    AST 20 04/27/2024 0845    ALT 22 04/27/2024 0845    BILITOT 0.4 04/27/2024 0845    EGFRNORACEVR >60.0 04/27/2024 0845    EGFRNORACEVR >60.0 04/27/2024 0845    ESTGFRAFRICA >60.0 02/26/2022 0818       Lab Results   Component Value Date    CHOL 127 01/18/2025    CHOL 147 04/27/2024    CHOL 191 01/22/2024     Lab Results   Component Value Date    HDL 42 01/18/2025    HDL 40 04/27/2024    HDL 43 01/22/2024     Lab Results   Component Value Date    LDLCALC 67.6 01/18/2025    LDLCALC 84.6 04/27/2024    LDLCALC 125.4 01/22/2024     Lab Results   Component Value Date    TRIG 87 01/18/2025    TRIG 112 04/27/2024    TRIG 113 01/22/2024       Lab Results   Component Value Date    CHOLHDL 33.1 01/18/2025    CHOLHDL 27.2 04/27/2024    CHOLHDL 22.5 01/22/2024         Lab Results   Component Value Date    MICALBCREAT 49.2 (H) 01/22/2024           ASSESSMENT and PLAN:    A1C GOAL: < 7%     1. Type 2 diabetes mellitus with diabetic polyneuropathy, with long-term current use of insulin  Resume toujeo at 20 units once daily.   Continue Mounjaro, jardiance, and metformin.     If blood sugars drop less than 80 often, reduce Toujeo to 15 units.     Return to clinic in 4 months  with labs prior.     Hemoglobin A1C    tirzepatide (MOUNJARO) 15 mg/0.5 mL PnIj      2. Hyperlipidemia, unspecified hyperlipidemia type  pravastatin (PRAVACHOL) 80 MG tablet    ezetimibe (ZETIA) 10 mg tablet      3. Non morbid obesity, unspecified obesity type  tirzepatide (MOUNJARO) 15 mg/0.5 mL PnIj            Patient requires a therapeutic CGM and is willing to use therapeutic CGM/SMBG for Necessary frequent adjustments in insulin therapy. I have assessed adherence to CGM regimen and to the plan. Due to COVID pandemic, patient requires Dexcom CGM to manage diabetes.         Orders Placed This Encounter   Procedures    Hemoglobin A1C     Standing Status:   Future     Standing Expiration Date:   3/28/2026     Order Specific Question:   Send normal result to authorizing provider's In Basket if patient is active on MyChart:     Answer:   Yes        Follow up in about 4 months (around 5/27/2025).

## 2025-01-30 NOTE — PROGRESS NOTES
Subjective:       Patient ID: Mena Odonnell is a 79 y.o. female.    Chief Complaint: Follow-up (and audiogram.)    Here for audiogram and follow-up, last seen 04/26/2023 notes and prior reviewed.  Has binaural hearing aids and will have hearing aid checkup as well.  Not aware of any change in hearing.  Mild tinnitus stable as well, described as a little buzzing, not bad.  No other otologic complaints.  Nasal mucus better with daily Zyrtec.  No other sinonasal or otolaryngologic complaints.          Review of Systems     Constitutional: Negative for fever.      HENT: Positive for hearing loss.      Respiratory:  Negative for cough and shortness of breath.      Cardiovascular:  Negative for chest pain.     Gastrointestinal:  Negative for abdominal pain.     Neurological: Negative for dizziness and headaches.      Hematologic: Negative for swollen glands.                Objective:        Vitals:    07/24/24 1004   BP: 134/66   Pulse: 74   Temp: 97.9 °F (36.6 °C)     Body mass index is 33.28 kg/m².  Physical Exam  Constitutional:       General: She is not in acute distress.     Appearance: Normal appearance.   HENT:      Head: Normocephalic and atraumatic.      Right Ear: Tympanic membrane, ear canal and external ear normal.      Left Ear: Tympanic membrane, ear canal and external ear normal.      Ears:      Comments:   Increased circumferential cerumen noted, right greater than left, cleared with curette and tolerated well and otherwise canals and TMs within normal limits AU.     Nose: No mucosal edema or rhinorrhea.      Mouth/Throat:      Mouth: Mucous membranes are moist.      Pharynx: No pharyngeal swelling, oropharyngeal exudate or posterior oropharyngeal erythema.   Neck:      Trachea: Phonation normal.   Pulmonary:      Effort: Pulmonary effort is normal. No respiratory distress.   Skin:     General: Skin is warm and dry.   Neurological:      Mental Status: She is alert and oriented to person, place, and  time.   Psychiatric:         Speech: Speech normal.         Behavior: Behavior normal.         Tests / Results:                Assessment:       1. Sensorineural hearing loss, bilateral    2. Wears hearing aid in both ears    3. Excessive cerumen in ear canal, bilateral    4. Allergic rhinitis, unspecified seasonality, unspecified trigger        Plan:       Reviewed/discussed today's audiogram and compared to prior which demonstrates no significant changes.    Ears cleaned as above and ear care reviewed and consider Debrox earwax drops at bedtime every 2 - 3 days.    Keep hearing aid follow-ups.      Recheck one year unless change or problems prior.      Continue Zyrtec every evening as above.

## 2025-02-01 ENCOUNTER — LAB VISIT (OUTPATIENT)
Dept: LAB | Facility: HOSPITAL | Age: 80
End: 2025-02-01
Attending: INTERNAL MEDICINE
Payer: COMMERCIAL

## 2025-02-01 DIAGNOSIS — E78.2 MIXED HYPERLIPIDEMIA: ICD-10-CM

## 2025-02-01 DIAGNOSIS — E11.42 TYPE 2 DIABETES MELLITUS WITH DIABETIC POLYNEUROPATHY, WITHOUT LONG-TERM CURRENT USE OF INSULIN: ICD-10-CM

## 2025-02-01 DIAGNOSIS — Z00.00 HEALTHCARE MAINTENANCE: ICD-10-CM

## 2025-02-01 LAB
ALBUMIN SERPL BCP-MCNC: 3.6 G/DL (ref 3.5–5.2)
ALP SERPL-CCNC: 67 U/L (ref 40–150)
ALT SERPL W/O P-5'-P-CCNC: 15 U/L (ref 10–44)
ANION GAP SERPL CALC-SCNC: 13 MMOL/L (ref 8–16)
AST SERPL-CCNC: 14 U/L (ref 10–40)
BASOPHILS # BLD AUTO: 0.08 K/UL (ref 0–0.2)
BASOPHILS NFR BLD: 0.9 % (ref 0–1.9)
BILIRUB SERPL-MCNC: 0.4 MG/DL (ref 0.1–1)
BUN SERPL-MCNC: 20 MG/DL (ref 8–23)
CALCIUM SERPL-MCNC: 9.7 MG/DL (ref 8.7–10.5)
CHLORIDE SERPL-SCNC: 107 MMOL/L (ref 95–110)
CHOLEST SERPL-MCNC: 145 MG/DL (ref 120–199)
CHOLEST/HDLC SERPL: 3.5 {RATIO} (ref 2–5)
CO2 SERPL-SCNC: 23 MMOL/L (ref 23–29)
CREAT SERPL-MCNC: 0.6 MG/DL (ref 0.5–1.4)
DIFFERENTIAL METHOD BLD: ABNORMAL
EOSINOPHIL # BLD AUTO: 0.3 K/UL (ref 0–0.5)
EOSINOPHIL NFR BLD: 3.2 % (ref 0–8)
ERYTHROCYTE [DISTWIDTH] IN BLOOD BY AUTOMATED COUNT: 14.4 % (ref 11.5–14.5)
EST. GFR  (NO RACE VARIABLE): >60 ML/MIN/1.73 M^2
ESTIMATED AVG GLUCOSE: 157 MG/DL (ref 68–131)
GLUCOSE SERPL-MCNC: 107 MG/DL (ref 70–110)
HBA1C MFR BLD: 7.1 % (ref 4–5.6)
HCT VFR BLD AUTO: 45.2 % (ref 37–48.5)
HDLC SERPL-MCNC: 41 MG/DL (ref 40–75)
HDLC SERPL: 28.3 % (ref 20–50)
HGB BLD-MCNC: 13.7 G/DL (ref 12–16)
IMM GRANULOCYTES # BLD AUTO: 0.02 K/UL (ref 0–0.04)
IMM GRANULOCYTES NFR BLD AUTO: 0.2 % (ref 0–0.5)
LDLC SERPL CALC-MCNC: 83.8 MG/DL (ref 63–159)
LYMPHOCYTES # BLD AUTO: 2.8 K/UL (ref 1–4.8)
LYMPHOCYTES NFR BLD: 33.4 % (ref 18–48)
MCH RBC QN AUTO: 29.4 PG (ref 27–31)
MCHC RBC AUTO-ENTMCNC: 30.3 G/DL (ref 32–36)
MCV RBC AUTO: 97 FL (ref 82–98)
MONOCYTES # BLD AUTO: 0.7 K/UL (ref 0.3–1)
MONOCYTES NFR BLD: 8.5 % (ref 4–15)
NEUTROPHILS # BLD AUTO: 4.5 K/UL (ref 1.8–7.7)
NEUTROPHILS NFR BLD: 53.8 % (ref 38–73)
NONHDLC SERPL-MCNC: 104 MG/DL
NRBC BLD-RTO: 0 /100 WBC
PLATELET # BLD AUTO: 262 K/UL (ref 150–450)
PMV BLD AUTO: 12 FL (ref 9.2–12.9)
POTASSIUM SERPL-SCNC: 4.4 MMOL/L (ref 3.5–5.1)
PROT SERPL-MCNC: 6.9 G/DL (ref 6–8.4)
RBC # BLD AUTO: 4.66 M/UL (ref 4–5.4)
SODIUM SERPL-SCNC: 143 MMOL/L (ref 136–145)
TRIGL SERPL-MCNC: 101 MG/DL (ref 30–150)
TSH SERPL DL<=0.005 MIU/L-ACNC: 0.45 UIU/ML (ref 0.4–4)
WBC # BLD AUTO: 8.44 K/UL (ref 3.9–12.7)

## 2025-02-01 PROCEDURE — 80053 COMPREHEN METABOLIC PANEL: CPT | Performed by: INTERNAL MEDICINE

## 2025-02-01 PROCEDURE — 83036 HEMOGLOBIN GLYCOSYLATED A1C: CPT | Performed by: INTERNAL MEDICINE

## 2025-02-01 PROCEDURE — 36415 COLL VENOUS BLD VENIPUNCTURE: CPT | Mod: PO | Performed by: INTERNAL MEDICINE

## 2025-02-01 PROCEDURE — 84443 ASSAY THYROID STIM HORMONE: CPT | Performed by: INTERNAL MEDICINE

## 2025-02-01 PROCEDURE — 85025 COMPLETE CBC W/AUTO DIFF WBC: CPT | Performed by: INTERNAL MEDICINE

## 2025-02-01 PROCEDURE — 80061 LIPID PANEL: CPT | Performed by: INTERNAL MEDICINE

## 2025-02-10 ENCOUNTER — OFFICE VISIT (OUTPATIENT)
Dept: FAMILY MEDICINE | Facility: CLINIC | Age: 80
End: 2025-02-10
Payer: COMMERCIAL

## 2025-02-10 VITALS
SYSTOLIC BLOOD PRESSURE: 138 MMHG | TEMPERATURE: 99 F | HEIGHT: 65 IN | DIASTOLIC BLOOD PRESSURE: 60 MMHG | HEART RATE: 75 BPM | WEIGHT: 204.81 LBS | OXYGEN SATURATION: 98 % | BODY MASS INDEX: 34.12 KG/M2

## 2025-02-10 DIAGNOSIS — I10 PRIMARY HYPERTENSION: Primary | ICD-10-CM

## 2025-02-10 DIAGNOSIS — I35.0 NONRHEUMATIC AORTIC VALVE STENOSIS: ICD-10-CM

## 2025-02-10 DIAGNOSIS — E11.42 TYPE 2 DIABETES MELLITUS WITH DIABETIC POLYNEUROPATHY, WITHOUT LONG-TERM CURRENT USE OF INSULIN: ICD-10-CM

## 2025-02-10 DIAGNOSIS — E11.319 DIABETIC RETINOPATHY OF BOTH EYES ASSOCIATED WITH TYPE 2 DIABETES MELLITUS, MACULAR EDEMA PRESENCE UNSPECIFIED, UNSPECIFIED RETINOPATHY SEVERITY: ICD-10-CM

## 2025-02-10 DIAGNOSIS — Z79.4 TYPE 2 DIABETES MELLITUS WITH DIABETIC POLYNEUROPATHY, WITH LONG-TERM CURRENT USE OF INSULIN: ICD-10-CM

## 2025-02-10 DIAGNOSIS — M17.0 PRIMARY OSTEOARTHRITIS OF BOTH KNEES: ICD-10-CM

## 2025-02-10 DIAGNOSIS — E11.42 TYPE 2 DIABETES MELLITUS WITH DIABETIC POLYNEUROPATHY, WITH LONG-TERM CURRENT USE OF INSULIN: ICD-10-CM

## 2025-02-10 DIAGNOSIS — I70.203 ATHEROSCLEROSIS OF NATIVE ARTERY OF BOTH LOWER EXTREMITIES, WITH UNSPECIFIED PRESENCE OF CLINICAL MANIFESTATION: ICD-10-CM

## 2025-02-10 DIAGNOSIS — E66.811 CLASS 1 OBESITY DUE TO EXCESS CALORIES WITH SERIOUS COMORBIDITY AND BODY MASS INDEX (BMI) OF 34.0 TO 34.9 IN ADULT: ICD-10-CM

## 2025-02-10 DIAGNOSIS — E66.09 CLASS 1 OBESITY DUE TO EXCESS CALORIES WITH SERIOUS COMORBIDITY AND BODY MASS INDEX (BMI) OF 34.0 TO 34.9 IN ADULT: ICD-10-CM

## 2025-02-10 DIAGNOSIS — G47.33 OSA (OBSTRUCTIVE SLEEP APNEA): ICD-10-CM

## 2025-02-10 DIAGNOSIS — I10 ESSENTIAL HYPERTENSION: ICD-10-CM

## 2025-02-10 DIAGNOSIS — E78.2 MIXED HYPERLIPIDEMIA: ICD-10-CM

## 2025-02-10 DIAGNOSIS — G63 POLYNEUROPATHY ASSOCIATED WITH UNDERLYING DISEASE: ICD-10-CM

## 2025-02-10 DIAGNOSIS — M50.30 DDD (DEGENERATIVE DISC DISEASE), CERVICAL: ICD-10-CM

## 2025-02-10 DIAGNOSIS — R80.9 MICROALBUMINURIA: ICD-10-CM

## 2025-02-10 DIAGNOSIS — J30.2 SEASONAL ALLERGIES: ICD-10-CM

## 2025-02-10 PROCEDURE — 3075F SYST BP GE 130 - 139MM HG: CPT | Mod: CPTII,S$GLB,, | Performed by: INTERNAL MEDICINE

## 2025-02-10 PROCEDURE — 3288F FALL RISK ASSESSMENT DOCD: CPT | Mod: CPTII,S$GLB,, | Performed by: INTERNAL MEDICINE

## 2025-02-10 PROCEDURE — 1126F AMNT PAIN NOTED NONE PRSNT: CPT | Mod: CPTII,S$GLB,, | Performed by: INTERNAL MEDICINE

## 2025-02-10 PROCEDURE — 1159F MED LIST DOCD IN RCRD: CPT | Mod: CPTII,S$GLB,, | Performed by: INTERNAL MEDICINE

## 2025-02-10 PROCEDURE — 99999 PR PBB SHADOW E&M-EST. PATIENT-LVL V: CPT | Mod: PBBFAC,,, | Performed by: INTERNAL MEDICINE

## 2025-02-10 PROCEDURE — 3078F DIAST BP <80 MM HG: CPT | Mod: CPTII,S$GLB,, | Performed by: INTERNAL MEDICINE

## 2025-02-10 PROCEDURE — 1101F PT FALLS ASSESS-DOCD LE1/YR: CPT | Mod: CPTII,S$GLB,, | Performed by: INTERNAL MEDICINE

## 2025-02-10 PROCEDURE — 99214 OFFICE O/P EST MOD 30 MIN: CPT | Mod: S$GLB,,, | Performed by: INTERNAL MEDICINE

## 2025-02-10 PROCEDURE — 1160F RVW MEDS BY RX/DR IN RCRD: CPT | Mod: CPTII,S$GLB,, | Performed by: INTERNAL MEDICINE

## 2025-02-10 RX ORDER — METOPROLOL SUCCINATE 50 MG/1
50 TABLET, EXTENDED RELEASE ORAL DAILY
Qty: 90 TABLET | Refills: 3 | Status: SHIPPED | OUTPATIENT
Start: 2025-02-10

## 2025-02-10 RX ORDER — MONTELUKAST SODIUM 10 MG/1
10 TABLET ORAL DAILY
Qty: 90 TABLET | Refills: 1 | Status: SHIPPED | OUTPATIENT
Start: 2025-02-10

## 2025-02-10 RX ORDER — AMLODIPINE BESYLATE 10 MG/1
10 TABLET ORAL DAILY
Qty: 90 TABLET | Refills: 3 | Status: SHIPPED | OUTPATIENT
Start: 2025-02-10 | End: 2026-02-05

## 2025-02-10 RX ORDER — CETIRIZINE HYDROCHLORIDE 10 MG/1
10 TABLET ORAL DAILY
Qty: 90 TABLET | Refills: 1 | Status: SHIPPED | OUTPATIENT
Start: 2025-02-10

## 2025-02-10 RX ORDER — LOSARTAN POTASSIUM AND HYDROCHLOROTHIAZIDE 25; 100 MG/1; MG/1
1 TABLET ORAL DAILY
Qty: 90 TABLET | Refills: 3 | Status: SHIPPED | OUTPATIENT
Start: 2025-02-10 | End: 2026-02-05

## 2025-02-10 NOTE — PROGRESS NOTES
HISTORY OF PRESENT ILLNESS:  Mena Odonnell is a 79 y.o. female who presents to the clinic today for Follow-up  .  Last seen by me 9/2024.    Aortic stenosis  Stress test 2023 normal.  Seen by cardiology 11/2024.  Echo (6/2024)    Left Ventricle: Mildly increased wall thickness. There is normal systolic function with a visually estimated ejection fraction of 65 - 70%. Grade I diastolic dysfunction.    Right Ventricle: Normal right ventricular cavity size. Systolic function is normal.    Left Atrium: Left atrium is severely dilated.    Right Atrium: Right atrium is mildly dilated.    Aortic Valve: There is moderate stenosis. Aortic valve area by VTI is 1.06 cm². Aortic valve peak velocity is 3.85 m/s. Mean gradient is 38 mmHg. The dimensionless index is 0.35.    Mitral Valve: There is mild regurgitation.    Tricuspid Valve: There is mild regurgitation.    Pulmonary Artery: The estimated pulmonary artery systolic pressure is 34 mmHg.    IVC/SVC: Normal venous pressure at 3 mmHg.     MEKA  Recommended for CPAP but declines to use.     HTN, HLP  Managed with losartan-HCTZ, amlodipine, Toprol XL.  On ezetimibe, pravastatin.     Renal cyst  Noted simple cyst, seen by urology 12/2022 - no further workup.     Peripheral vascular disease, venous hypertension, lymphedema  Seen by Dr. Blair. Attended PT for lymphedema.  Mod-severe arterial occlusive disease by recent BERRY.     Primary OA  Seen in Ortho with plan for non operative mgmt.  Hyaluronate injection done.       Back and hip pain, Cervical radiculopathy  Seen by ortho and pain clinic.    Back pain is controlled.     Type 2 DM  Seen by endo  On Mounjaro, Jardiance, metformin, Toujeo, Humalog.  On Dexcom.    Hemoglobin A1C   Date Value Ref Range Status   02/01/2025 7.1 (H) 4.0 - 5.6 % Final     Comment:     ADA Screening Guidelines:  5.7-6.4%  Consistent with prediabetes  >or=6.5%  Consistent with diabetes    High levels of fetal hemoglobin interfere with the  "HbA1C  assay. Heterozygous hemoglobin variants (HbS, HgC, etc)do  not significantly interfere with this assay.   However, presence of multiple variants may affect accuracy.     01/18/2025 7.2 (H) 4.0 - 5.6 % Final     Comment:     ADA Screening Guidelines:  5.7-6.4%  Consistent with prediabetes  >or=6.5%  Consistent with diabetes    High levels of fetal hemoglobin interfere with the HbA1C  assay. Heterozygous hemoglobin variants (HbS, HgC, etc)do  not significantly interfere with this assay.   However, presence of multiple variants may affect accuracy.     09/21/2024 7.3 (H) 4.0 - 5.6 % Final     Comment:     ADA Screening Guidelines:  5.7-6.4%  Consistent with prediabetes  >or=6.5%  Consistent with diabetes    High levels of fetal hemoglobin interfere with the HbA1C  assay. Heterozygous hemoglobin variants (HbS, HgC, etc)do  not significantly interfere with this assay.   However, presence of multiple variants may affect accuracy.         A1C has been stable at 7.3-7.2, with previous value of 7.1. She is currently taking Mounjaro 15mg weekly, Jardiance 10mg daily, insulin glargine 20 units, Humalog/Lispro insulin for meals, and Metformin. Her diet consists of ham sandwich on white bread for breakfast, meat and vegetables for lunch, and vegetables without starches for dinner. She denies consuming sweet drinks.    She has aortic valve stenosis with associated shortness of breath that she manages by "pacing herself". She denies chest pain, lightheadedness, or dizziness. She uses compression stockings for ankle swelling, which is currently well-controlled.    She received hyaluronate injections in September 2024 for knee issues, which provided significant relief. She denies recent falls.      ROS:  General: -fever, -chills, -fatigue, -weight gain, -weight loss  Eyes: -vision changes, -redness, -discharge  ENT: -ear pain, -nasal congestion, -sore throat  Cardiovascular: -chest pain, -palpitations, -lower extremity " edema  Respiratory: -cough, +shortness of breath  Gastrointestinal: -abdominal pain, -nausea, -vomiting, -diarrhea, -constipation, -blood in stool  Genitourinary: -dysuria, -hematuria, -frequency  Musculoskeletal: -joint pain, -muscle pain  Skin: -rash, -lesion  Neurological: -headache, -dizziness, -numbness, -tingling  Psychiatric: -anxiety, -depression, -sleep difficulty             PAST MEDICAL HISTORY:  Past Medical History:   Diagnosis Date    Cataract     Diabetes mellitus type II     Diabetes with neurologic complications     DM retinopathy     H/O vitamin D deficiency     Hyperlipidemia     Hypertension     Obesity     Osteoarthritis     Peripheral neuropathy     Polyneuropathy     Severe obesity (BMI 35.0-39.9) with comorbidity 2016    Sleep apnea     Tendonitis     left foot       PAST SURGICAL HISTORY:  Past Surgical History:   Procedure Laterality Date    BLADDER SUSPENSION      CATARACT EXTRACTION      COLONOSCOPY N/A 2015    Procedure: COLONOSCOPY;  Surgeon: Javed Wood MD;  Location: [address]);  Service: Endoscopy;  Laterality: N/A;    HYSTERECTOMY  1978    fibroids    NECK MASS EXCISION  2019    Procedure: EXCISION, MASS, NECK;  Surgeon: Edwin Barrow MD;  Location: [facility];  Service: General;;  RN PREOP 10/30/2019    TONSILLECTOMY         SOCIAL HISTORY:  Social History     Socioeconomic History    Marital status:     Number of children: 2   Occupational History     Employer: [employer]   Tobacco Use    Smoking status: Former     Current packs/day: 0.00     Average packs/day: 0.3 packs/day for 15.0 years (3.8 ttl pk-yrs)     Types: Cigarettes     Start date:      Quit date:      Years since quittin.1    Smokeless tobacco: Never   Substance and Sexual Activity    Alcohol use: No    Drug use: No    Sexual activity: Not Currently     Partners: Male     Birth control/protection: Abstinence   Social History Narrative    . One  daughter. Works as an Apartment      Social Drivers of Health     Financial Resource Strain: Low Risk  (8/16/2022)    Overall Financial Resource Strain (CARDIA)     Difficulty of Paying Living Expenses: Not hard at all   Food Insecurity: No Food Insecurity (8/16/2022)    Hunger Vital Sign     Worried About Running Out of Food in the Last Year: Never true     Ran Out of Food in the Last Year: Never true   Transportation Needs: No Transportation Needs (8/16/2022)    PRAPARE - Transportation     Lack of Transportation (Medical): No     Lack of Transportation (Non-Medical): No   Physical Activity: Inactive (8/16/2022)    Exercise Vital Sign     Days of Exercise per Week: 0 days     Minutes of Exercise per Session: 0 min   Housing Stability: Low Risk  (8/16/2022)    Housing Stability Vital Sign     Unable to Pay for Housing in the Last Year: No     Number of Places Lived in the Last Year: 1     Unstable Housing in the Last Year: No       FAMILY HISTORY:  Family History   Problem Relation Name Age of Onset    Thyroid disease Mother      Cataracts Mother      Diabetes Mother      Stroke Mother      Hypertension Mother      Thyroid disease Sister      Diabetes Sister      Hypertension Sister      Hypertension Daughter      Diabetes Son      Diabetes Sister      Hypertension Sister      Hypertension Brother      Hypertension Sister      Hypertension Sister      Hypertension Brother      Thyroid disease Maternal Aunt      Thyroid disease Maternal Aunt      Colon cancer Maternal Grandfather      Breast cancer Neg Hx      Ovarian cancer Neg Hx      Amblyopia Neg Hx      Blindness Neg Hx      Cancer Neg Hx      Glaucoma Neg Hx      Macular degeneration Neg Hx      Retinal detachment Neg Hx      Strabismus Neg Hx         ALLERGIES AND MEDICATIONS: updated and reviewed.  Review of patient's allergies indicates:   Allergen Reactions    Atorvastatin      Muscle pain     Crestor [rosuvastatin] Other (See Comments)      Leg Weakness.      Medication List with Changes/Refills   Current Medications    ASPIRIN 81 MG CHEW    Take 1 tablet (81 mg total) by mouth once daily.    AZELASTINE (ASTELIN) 137 MCG (0.1 %) NASAL SPRAY    2 sprays by Nasal route.    BLOOD SUGAR DIAGNOSTIC STRP    Check blood glucose 2 times a day    BLOOD-GLUCOSE METER,CONTINUOUS (DEXCOM G7 ) MISC    Use with Dexcom G7 sensors    BLOOD-GLUCOSE SENSOR (DEXCOM G7 SENSOR) SHELLY    Change every 10 days    CARBINOXAMINE MALEATE 6 MG TAB    Take 6 mg by mouth every 8 (eight) hours as needed.    DICLOFENAC SODIUM (VOLTAREN) 1 % GEL    Lower extremities: Apply the gel (4 g) to the affected area 4 times daily. Do not apply more than 16 g daily to any one affected joint of the lower extremities. Upper extremities: Apply the gel (2 g) to the affected area 4 times daily. Do not apply more than 8 g daily to any one affected joint of the upper extremities. Total dose should not exceed 32 g per day, overall affected joints.    EMPAGLIFLOZIN (JARDIANCE) 10 MG TABLET    Take 1 tablet (10 mg total) by mouth once daily.    EZETIMIBE (ZETIA) 10 MG TABLET    Take 1 tablet (10 mg total) by mouth once daily.    FLUCONAZOLE (DIFLUCAN) 150 MG TAB    Take 150 mg by mouth.    GABAPENTIN (NEURONTIN) 300 MG CAPSULE    Take 1 capsule (300 mg total) by mouth 3 (three) times daily.    INSULIN GLARGINE U-300 CONC (TOUJEO MAX U-300 SOLOSTAR) 300 UNIT/ML (3 ML) INSULIN PEN    INJECT 20 UNITS SUBCUTANEOUSLY ONCE DAILY    INSULIN LISPRO (HUMALOG KWIKPEN INSULIN) 100 UNIT/ML PEN    Use with sliding scale - 150-200=+2, 201-250=+4; 251-300=+6; 301-350=+8, over 350=+10 units    LANCETS MISC    Check blood glucose 2x/day    MELOXICAM (MOBIC) 7.5 MG TABLET    Take 1 tablet (7.5 mg total) by mouth once daily.    METFORMIN (GLUCOPHAGE-XR) 500 MG ER 24HR TABLET    Take 2 tablets (1,000 mg total) by mouth Daily.    PRAVASTATIN (PRAVACHOL) 80 MG TABLET    Take 1 tablet (80 mg total) by mouth once daily.  "   TIRZEPATIDE (MOUNJARO) 15 MG/0.5 ML PNIJ    Inject 15 mg into the skin every 7 days.    TOBRAMYCIN SULFATE 0.3% (TOBREX) 0.3 % OPHTHALMIC SOLUTION    1-2 drops topically twice daily to affected toe(s).   Changed and/or Refilled Medications    Modified Medication Previous Medication    AMLODIPINE (NORVASC) 10 MG TABLET amLODIPine (NORVASC) 10 MG tablet       Take 1 tablet (10 mg total) by mouth once daily.    Take 1 tablet (10 mg total) by mouth once daily.    CETIRIZINE (ZYRTEC) 10 MG TABLET cetirizine (ZYRTEC) 10 MG tablet       Take 1 tablet (10 mg total) by mouth once daily. For 30 days    Take 1 tablet (10 mg total) by mouth once daily. For 30 days    LOSARTAN-HYDROCHLOROTHIAZIDE 100-25 MG (HYZAAR) 100-25 MG PER TABLET losartan-hydrochlorothiazide 100-25 mg (HYZAAR) 100-25 mg per tablet       Take 1 tablet by mouth once daily.    Take 1 tablet by mouth once daily.    METOPROLOL SUCCINATE (TOPROL-XL) 50 MG 24 HR TABLET metoprolol succinate (TOPROL-XL) 50 MG 24 hr tablet       Take 1 tablet (50 mg total) by mouth once daily.    Take 1 tablet (50 mg total) by mouth once daily.    MONTELUKAST (SINGULAIR) 10 MG TABLET montelukast (SINGULAIR) 10 mg tablet       Take 1 tablet (10 mg total) by mouth once daily.    Take 1 tablet (10 mg total) by mouth once daily.          CARE TEAM:  Patient Care Team:  Leonard Wells MD as PCP - General (Internal Medicine)  Bebe Gee MD as Consulting Physician (Ophthalmology)  Praneeth Rios MD as Consulting Physician (Ophthalmology)  Candy Villanueva NP as Nurse Practitioner (Endocrinology)  Jenn Arreola MD as Consulting Physician (Cardiology)  Dre Blair MD as Consulting Physician (Vascular Surgery)         PHYSICAL EXAM:   Vitals:    02/10/25 1552   BP: 138/60   Pulse:    Temp:      Weight: 92.9 kg (204 lb 12.9 oz)   Height: 5' 5" (165.1 cm)   Body mass index is 34.08 kg/m².    Physical Exam    General: No acute distress. Well-developed. Well-nourished.  Eyes: " EOMI. Sclerae anicteric.  HENT: Normocephalic. Atraumatic. Nares patent. Moist oral mucosa.  Ears: Bilateral TMs clear. Bilateral EACs clear.  Cardiovascular: Regular rate. Regular rhythm. Systolic murmur from aortic valve stenosis. No rubs. No gallops. Normal S1, S2.  Respiratory: Normal respiratory effort. Clear to auscultation bilaterally. No rales. No rhonchi. No wheezing.  Abdomen: Soft. Non-tender. Non-distended. Normoactive bowel sounds.  Musculoskeletal: No  obvious deformity.  Extremities: No lower extremity edema.  Neurological: Alert & oriented x3. No slurred speech. Normal gait.  Psychiatric: Normal mood. Normal affect. Good insight. Good judgment.  Skin: Warm. Dry. No rash.             ASSESSMENT AND PLAN:  Assessment & Plan    IMPRESSION:  - Assessed diabetes management: A1C stable at 7.3, slightly higher than previous 7.1  - Evaluated aortic valve stenosis: Patient reports shortness of breath but no chest pain or lightheadedness  - Noted systolic murmur during heart auscultation, consistent with known aortic valve stenosis  - To follow up with cardiology for discussion regarding Surgery vs TAVR as potential future intervention for aortic valve stenosis if patient becomes more symptomatic  - Reviewed blood pressure control    Primary hypertension  Essential hypertension  -     amLODIPine (NORVASC) 10 MG tablet; Take 1 tablet (10 mg total) by mouth once daily.  Dispense: 90 tablet; Refill: 3  -     losartan-hydrochlorothiazide 100-25 mg (HYZAAR) 100-25 mg per tablet; Take 1 tablet by mouth once daily.  Dispense: 90 tablet; Refill: 3  -     metoprolol succinate (TOPROL-XL) 50 MG 24 hr tablet; Take 1 tablet (50 mg total) by mouth once daily.  Dispense: 90 tablet; Refill: 3    Nonrheumatic aortic valve stenosis        - Follow up cardiology.    Class 1 obesity due to excess calories with serious comorbidity and body mass index (BMI) of 34.0 to 34.9 in adult  Type 2 diabetes mellitus with diabetic  polyneuropathy, without long-term current use of insulin  Diabetic retinopathy of both eyes associated with type 2 diabetes mellitus, macular edema presence unspecified, unspecified retinopathy severity  Type 2 diabetes mellitus with diabetic polyneuropathy, with long-term current use of insulin  Microalbuminuria  Polyneuropathy associated with underlying disease        - Stable on current medical management.        - Follow up endocrinology.    DDD (degenerative disc disease), cervical        - Stable.    Mixed hyperlipidemia  Atherosclerosis of native artery of both lower extremities, with unspecified presence of clinical manifestation        - She is on stable.    MEKA (obstructive sleep apnea)    Primary osteoarthritis of both knees       - Stable.  Follow up orthopedics.    Seasonal allergies  -     montelukast (SINGULAIR) 10 mg tablet; Take 1 tablet (10 mg total) by mouth once daily.  Dispense: 90 tablet; Refill: 1  -     cetirizine (ZYRTEC) 10 MG tablet; Take 1 tablet (10 mg total) by mouth once daily. For 30 days  Dispense: 90 tablet; Refill: 1       - Monitor A1C level, which have remained stable at 7.3, 7.2, and previously 7.1.  - Continue using the Dexcom continuous glucose monitor.  - Continue current medication regimen: Mounjaro 15 mg weekly, Jardiance 10 mg daily, metformin, insulin glargine (Toujeo) 20 units daily, and Humalog (insulin lispro) as per sliding scale for mealtime dosing.  - Maintain current dietary habits, focusing on protein and vegetables, limiting starches especially at night.  - Avoid sugar-sweetened beverages, large servings of starch, potatoes, rice, noodles, and consider switching from white bread to a different type for better glycemic control.  - Schedule follow-up visit in May with endocrinologist (Candy) to discuss diabetes management and continuation of Dexcom use.    - Mena reports shortness of breath but no chest pain or lightheadedness.  - Systolic murmur detected during  heart auscultation, consistent with aortic valve stenosis.  - Schedule follow-up visit on the 24th for echocardiogram to reassess aortic valve stenosis.  - Follow up cardiology for management.    - Refilled amlodipine 10 mg daily, losartan/hydrochlorothiazide 100-25 mg daily, and metoprolol XL 50 mg daily for blood pressure control.    - Continue ezetimibe and pravastatin for cholesterol management.    - Refilled montelukast and cetirizine, and continue azelastine for allergy and respiratory symptom management.    - Mena reports significant improvement in knee pain following hyaluronate injections.  - Schedule follow-up visit in April for hyaluronate knee injections with Dr. Erickson.                 Follow up 6 months or sooner as needed.    This note was generated with the assistance of ambient listening technology. Verbal consent was obtained by the patient and accompanying visitor(s) for the recording of patient appointment to facilitate this note. I attest to having reviewed and edited the generated note for accuracy, though some syntax or spelling errors may persist. Please contact the author of this note for any clarification.

## 2025-02-12 ENCOUNTER — TELEPHONE (OUTPATIENT)
Dept: ORTHOPEDICS | Facility: CLINIC | Age: 80
End: 2025-02-12
Payer: COMMERCIAL

## 2025-02-12 NOTE — TELEPHONE ENCOUNTER
I called pt to let know that she isn't due for her next set of injections until the week of 3/19/2025. Pt stated she was fine with waiting. She will see appt scheduled once order is placed.

## 2025-02-22 DIAGNOSIS — Z00.00 ENCOUNTER FOR MEDICARE ANNUAL WELLNESS EXAM: ICD-10-CM

## 2025-02-24 ENCOUNTER — HOSPITAL ENCOUNTER (OUTPATIENT)
Dept: CARDIOLOGY | Facility: HOSPITAL | Age: 80
Discharge: HOME | End: 2025-02-24
Attending: SURGERY
Payer: COMMERCIAL

## 2025-02-24 DIAGNOSIS — I89.0 LYMPHEDEMA OF BOTH LOWER EXTREMITIES: ICD-10-CM

## 2025-02-24 PROCEDURE — 93922 UPR/L XTREMITY ART 2 LEVELS: CPT | Mod: 26,,, | Performed by: SURGERY

## 2025-02-24 PROCEDURE — 93925 LOWER EXTREMITY STUDY: CPT

## 2025-02-24 PROCEDURE — 93922 UPR/L XTREMITY ART 2 LEVELS: CPT

## 2025-02-24 PROCEDURE — 93925 LOWER EXTREMITY STUDY: CPT | Mod: 26,,, | Performed by: SURGERY

## 2025-02-25 DIAGNOSIS — M17.0 PRIMARY OSTEOARTHRITIS OF BOTH KNEES: Primary | ICD-10-CM

## 2025-02-25 LAB
LEFT ABI: 0.89
LEFT ANT TIBIAL SYS PSV: 30 CM/S
LEFT ARM BP: 128 MMHG
LEFT CFA PSV: 182 CM/S
LEFT DORSALIS PEDIS: 99 MMHG
LEFT EXTERNAL ILIAC PSV: 191 CM/S
LEFT PERONEAL SYS PSV: 120 CM/S
LEFT POPLITEAL PSV: 67 CM/S
LEFT POST TIBIAL SYS PSV: 49 CM/S
LEFT POSTERIOR TIBIAL: 114 MMHG
LEFT PROFUNDA SYS PSV: 83 CM/S
LEFT SUPER FEMORAL DIST SYS PSV: 107 CM/S
LEFT SUPER FEMORAL MID SYS PSV: 19 CM/S
LEFT SUPER FEMORAL OSTIAL SYS PSV: 90 CM/S
LEFT SUPER FEMORAL PROX SYS PSV: 88 CM/S
LEFT TBI: 0.24
LEFT TIB/PER TRUNK SYS PSV: 86 CM/S
LEFT TOE PRESSURE: 31 MMHG
OHS CV LEFT LOWER EXTREMITY ABI (NO CALC): 0.89
OHS CV RIGHT ABI LOWER EXTREMITY (NO CALC): 0.56
RIGHT ABI: 0.56
RIGHT ANT TIBIAL SYS PSV: 48 CM/S
RIGHT ARM BP: 124 MMHG
RIGHT CFA PSV: 115 CM/S
RIGHT DORSALIS PEDIS: 66 MMHG
RIGHT EXTERNAL ILLIAC PSV: 108 CM/S
RIGHT PERONEAL SYS PSV: 36 CM/S
RIGHT POPLITEAL PSV: 194 CM/S
RIGHT POST TIBIAL SYS PSV: 34 CM/S
RIGHT POSTERIOR TIBIAL: 72 MMHG
RIGHT PROFUNDA SYS PSV: 88 CM/S
RIGHT SUPER FEMORAL DIST SYS PSV: 403 CM/S
RIGHT SUPER FEMORAL MID SYS PSV: 53 CM/S
RIGHT SUPER FEMORAL OSTIAL SYS PSV: 111 CM/S
RIGHT SUPER FEMORAL PROX SYS PSV: 86 CM/S
RIGHT TBI: 0
RIGHT TIB/PER TRUNK SYS PSV: 39 CM/S
RIGHT TOE PRESSURE: 0 MMHG

## 2025-02-28 ENCOUNTER — OFFICE VISIT (OUTPATIENT)
Dept: VASCULAR SURGERY | Facility: CLINIC | Age: 80
End: 2025-02-28
Payer: COMMERCIAL

## 2025-02-28 VITALS
HEART RATE: 81 BPM | BODY MASS INDEX: 33.92 KG/M2 | HEIGHT: 65 IN | WEIGHT: 203.63 LBS | SYSTOLIC BLOOD PRESSURE: 123 MMHG | DIASTOLIC BLOOD PRESSURE: 73 MMHG

## 2025-02-28 DIAGNOSIS — I89.0 LYMPHEDEMA OF BOTH LOWER EXTREMITIES: Primary | ICD-10-CM

## 2025-02-28 DIAGNOSIS — I70.203 ATHEROSCLEROSIS OF NATIVE ARTERY OF BOTH LOWER EXTREMITIES, WITH UNSPECIFIED PRESENCE OF CLINICAL MANIFESTATION: ICD-10-CM

## 2025-02-28 PROCEDURE — 99999 PR PBB SHADOW E&M-EST. PATIENT-LVL IV: CPT | Mod: PBBFAC,,, | Performed by: SURGERY

## 2025-02-28 NOTE — PROGRESS NOTES
[facility]                         02/28/2025    HPI:  Mena Odonnell is a 79 y.o. female with   Patient Active Problem List   Diagnosis    Type 2 diabetes mellitus with diabetic polyneuropathy    Hyperlipidemia    HTN (hypertension)    Knee pain    H/O vitamin D deficiency    Non morbid obesity due to excess calories    Bilateral carotid bruits    Osteoarthritis of right knee    Dyspnea on exertion    Chronic cough    MEKA (obstructive sleep apnea)    Sensorineural hearing loss (SNHL) of right ear with restricted hearing of left ear    Tinnitus of both ears    Allergic rhinitis    Dysfunction of both eustachian tubes    Nasal septal deviation    Chronic eczematous otitis externa of both ears    Neck abscess    Epidermal inclusion cyst    Polyneuropathy associated with underlying disease    Aortic atherosclerosis    Class 1 obesity due to excess calories with serious comorbidity and body mass index (BMI) of 34.0 to 34.9 in adult    Nonrheumatic aortic valve stenosis    Cervical spondylosis    DDD (degenerative disc disease), cervical    Cervical radiculopathy    Decreased range of motion of intervertebral discs of cervical spine    Decreased strength of upper extremity    being managed by PCP and specialists who is here today for evaluation of PVD.  Patient has no complaints of claudication.  Patient states location is RLE occurring for months.  Associated signs and symptoms include edema.  Quality is burning and severity is 6/10.  Symptoms began months ago.  Alleviating factors include elevation.  Worsening factors include dependency.  no rest pain.  no tissue loss.  Patient is diabetic.  Is not on Pletal.  no previous lower extremity interventions.    no MI  no Stroke  Tobacco use: denies  Daily Aspirin: yes  Anticoagulation: no    5/2022:  No new issues    7/2022:  States feels better, +therapy, has worn compression and elevating legs for greater than 3  mo.    07/2024: Patient s/p RLE great toe nail removal. Podiatrist concerned for circulation and healing.    08/2024: No complaints today. RLE toe wound well healed.     2/2025: doing well with compression and elevation, Low Na diet. Min pump use.    Past Medical History:   Diagnosis Date    Cataract     Diabetes mellitus type II     Diabetes with neurologic complications     DM retinopathy     H/O vitamin D deficiency     Hyperlipidemia     Hypertension     Obesity     Osteoarthritis     Peripheral neuropathy     Polyneuropathy     Severe obesity (BMI 35.0-39.9) with comorbidity 12/30/2016    Sleep apnea     Tendonitis     left foot     Past Surgical History:   Procedure Laterality Date    BLADDER SUSPENSION      CATARACT EXTRACTION      COLONOSCOPY N/A 9/26/2015    Procedure: COLONOSCOPY;  Surgeon: Javed Wood MD;  Location: [facility] ([address] [place] [address]);  Service: Endoscopy;  Laterality: N/A;    HYSTERECTOMY  1978    fibroids    NECK MASS EXCISION  11/6/2019    Procedure: EXCISION, MASS, NECK;  Surgeon: Edwin Barrow MD;  Location: [facility];  Service: General;;  RN PREOP 10/30/2019    TONSILLECTOMY       Family History   Problem Relation Name Age of Onset    Thyroid disease Mother      Cataracts Mother      Diabetes Mother      Stroke Mother      Hypertension Mother      Thyroid disease Sister      Diabetes Sister      Hypertension Sister      Hypertension Daughter      Diabetes Son      Diabetes Sister      Hypertension Sister      Hypertension Brother      Hypertension Sister      Hypertension Sister      Hypertension Brother      Thyroid disease Maternal Aunt      Thyroid disease Maternal Aunt      Colon cancer Maternal Grandfather      Breast cancer Neg Hx      Ovarian cancer Neg Hx      Amblyopia Neg Hx      Blindness Neg Hx      Cancer Neg Hx      Glaucoma Neg Hx      Macular degeneration Neg Hx      Retinal detachment Neg Hx      Strabismus Neg Hx       Social History     Socioeconomic History    Marital  status:     Number of children: 2   Occupational History     Employer: [employer]   Tobacco Use    Smoking status: Former     Current packs/day: 0.00     Average packs/day: 0.3 packs/day for 15.0 years (3.8 ttl pk-yrs)     Types: Cigarettes     Start date:      Quit date:      Years since quittin.1    Smokeless tobacco: Never   Substance and Sexual Activity    Alcohol use: No    Drug use: No    Sexual activity: Not Currently     Partners: Male     Birth control/protection: Abstinence   Social History Narrative    . One daughter. Works as an Apartment      Social Drivers of Health     Financial Resource Strain: Low Risk  (2022)    Overall Financial Resource Strain (CARDIA)     Difficulty of Paying Living Expenses: Not hard at all   Food Insecurity: No Food Insecurity (2022)    Hunger Vital Sign     Worried About Running Out of Food in the Last Year: Never true     Ran Out of Food in the Last Year: Never true   Transportation Needs: No Transportation Needs (2022)    PRAPARE - Transportation     Lack of Transportation (Medical): No     Lack of Transportation (Non-Medical): No   Physical Activity: Inactive (2022)    Exercise Vital Sign     Days of Exercise per Week: 0 days     Minutes of Exercise per Session: 0 min   Housing Stability: Low Risk  (2022)    Housing Stability Vital Sign     Unable to Pay for Housing in the Last Year: No     Number of Places Lived in the Last Year: 1     Unstable Housing in the Last Year: No       Current Outpatient Medications:     amLODIPine (NORVASC) 10 MG tablet, Take 1 tablet (10 mg total) by mouth once daily., Disp: 90 tablet, Rfl: 3    aspirin 81 MG Chew, Take 1 tablet (81 mg total) by mouth once daily., Disp: 90 tablet, Rfl: 1    azelastine (ASTELIN) 137 mcg (0.1 %) nasal spray, 2 sprays by Nasal route., Disp: , Rfl:     blood sugar diagnostic Strp, Check blood glucose 2 times a day, Disp: 200 strip, Rfl:  3    blood-glucose meter,continuous (DEXCOM G7 ) Misc, Use with Dexcom G7 sensors, Disp: 1 each, Rfl: 0    blood-glucose sensor (DEXCOM G7 SENSOR) Sujey, Change every 10 days, Disp: 12 each, Rfl: 3    carbinoxamine maleate 6 mg Tab, Take 6 mg by mouth every 8 (eight) hours as needed., Disp: , Rfl:     cetirizine (ZYRTEC) 10 MG tablet, Take 1 tablet (10 mg total) by mouth once daily. For 30 days, Disp: 90 tablet, Rfl: 1    diclofenac sodium (VOLTAREN) 1 % Gel, Lower extremities: Apply the gel (4 g) to the affected area 4 times daily. Do not apply more than 16 g daily to any one affected joint of the lower extremities. Upper extremities: Apply the gel (2 g) to the affected area 4 times daily. Do not apply more than 8 g daily to any one affected joint of the upper extremities. Total dose should not exceed 32 g per day, overall affected joints., Disp: 100 g, Rfl: 5    empagliflozin (JARDIANCE) 10 mg tablet, Take 1 tablet (10 mg total) by mouth once daily., Disp: 90 tablet, Rfl: 2    ezetimibe (ZETIA) 10 mg tablet, Take 1 tablet (10 mg total) by mouth once daily., Disp: 90 tablet, Rfl: 1    fluconazole (DIFLUCAN) 150 MG Tab, Take 150 mg by mouth., Disp: , Rfl:     gabapentin (NEURONTIN) 300 MG capsule, Take 1 capsule (300 mg total) by mouth 3 (three) times daily., Disp: 90 capsule, Rfl: 11    insulin glargine U-300 conc (TOUJEO MAX U-300 SOLOSTAR) 300 unit/mL (3 mL) insulin pen, INJECT 20 UNITS SUBCUTANEOUSLY ONCE DAILY, Disp: , Rfl:     insulin lispro (HUMALOG KWIKPEN INSULIN) 100 unit/mL pen, Use with sliding scale - 150-200=+2, 201-250=+4; 251-300=+6; 301-350=+8, over 350=+10 units, Disp: 15 mL, Rfl: 0    lancets Misc, Check blood glucose 2x/day, Disp: 200 each, Rfl: 3    losartan-hydrochlorothiazide 100-25 mg (HYZAAR) 100-25 mg per tablet, Take 1 tablet by mouth once daily., Disp: 90 tablet, Rfl: 3    meloxicam (MOBIC) 7.5 MG tablet, Take 1 tablet (7.5 mg total) by mouth once daily., Disp: 12 tablet, Rfl: 0     metFORMIN (GLUCOPHAGE-XR) 500 MG ER 24hr tablet, Take 2 tablets (1,000 mg total) by mouth Daily., Disp: 180 tablet, Rfl: 2    metoprolol succinate (TOPROL-XL) 50 MG 24 hr tablet, Take 1 tablet (50 mg total) by mouth once daily., Disp: 90 tablet, Rfl: 3    montelukast (SINGULAIR) 10 mg tablet, Take 1 tablet (10 mg total) by mouth once daily., Disp: 90 tablet, Rfl: 1    pravastatin (PRAVACHOL) 80 MG tablet, Take 1 tablet (80 mg total) by mouth once daily., Disp: 90 tablet, Rfl: 1    tirzepatide (MOUNJARO) 15 mg/0.5 mL PnIj, Inject 15 mg into the skin every 7 days., Disp: 4 Pen, Rfl: 4    tobramycin sulfate 0.3% (TOBREX) 0.3 % ophthalmic solution, 1-2 drops topically twice daily to affected toe(s)., Disp: 5 mL, Rfl: 3    Current Facility-Administered Medications:     sodium hyaluronate (EUFLEXXA) 10 mg/mL(mw 2.4 -3.6 million) injection 20 mg, 20 mg, Intra-articular, 1 time in Clinic/HOD,     REVIEW OF SYSTEMS:  General: No fevers or chills; ENT: No sore throat; Allergy and Immunology: no persistent infections; Hematological and Lymphatic: No history of bleeding or easy bruising; Endocrine: negative; Respiratory: no cough, shortness of breath, or wheezing; Cardiovascular: no chest pain or dyspnea on exertion; no claudication, no rest pain; Gastrointestinal: no abdominal pain/back, change in bowel habits, or bloody stools; Genito-Urinary: no dysuria, trouble voiding, or hematuria; Musculoskeletal: negative, no wound; Neurological: no TIA or stroke symptoms; Psychiatric: no nervousness, anxiety or depression.    PHYSICAL EXAM:      Pulse: 81         General appearance:  Alert, well-appearing, and in no distress.  Oriented to person, place, and time                    Neurological: Normal speech, no focal findings noted; CN II - XII grossly intact. RLE with sensation to light touch, LLE with sensation to light touch.            Musculoskeletal: Digits/nail without cyanosis/clubbing.  Strength 5/5 BLE.                     "Neck: Supple, no significant adenopathy                  Chest:  Clear to auscultation, no wheezes, rales or rhonchi, symmetric air entry. No use of accessory muscles               Cardiac: Normal rate and regular rhythm, S1 and S2 normal            Abdomen: Soft, nontender, nondistended, no masses or organomegaly, no hernia     No rebound tenderness noted; bowel sounds normal     Pulsatile aortic mass is non palpable.     No groin adenopathy      Extremities:     2+ R DP pulse, 2+ L DP pulse     2+ RLE edema, 1+ LLE edema    Skin: RLE no tissue loss; LLE no tissue loss    LAB RESULTS:  No results found for: "CBC"  Lab Results   Component Value Date    LABPROT 10.5 06/20/2013    INR 1.0 06/20/2013     Lab Results   Component Value Date     02/01/2025    K 4.4 02/01/2025     02/01/2025    CO2 23 02/01/2025     02/01/2025    BUN 20 02/01/2025    CREATININE 0.6 02/01/2025    CALCIUM 9.7 02/01/2025    ANIONGAP 13 02/01/2025    EGFRNONAA >60.0 02/26/2022     Lab Results   Component Value Date    WBC 8.44 02/01/2025    RBC 4.66 02/01/2025    HGB 13.7 02/01/2025    HCT 45.2 02/01/2025    MCV 97 02/01/2025    MCH 29.4 02/01/2025    MCHC 30.3 (L) 02/01/2025    RDW 14.4 02/01/2025     02/01/2025    MPV 12.0 02/01/2025    GRAN 4.5 02/01/2025    GRAN 53.8 02/01/2025    LYMPH 2.8 02/01/2025    LYMPH 33.4 02/01/2025    MONO 0.7 02/01/2025    MONO 8.5 02/01/2025    EOS 0.3 02/01/2025    BASO 0.08 02/01/2025    EOSINOPHIL 3.2 02/01/2025    BASOPHIL 0.9 02/01/2025    DIFFMETHOD Automated 02/01/2025     .  Lab Results   Component Value Date    HGBA1C 7.1 (H) 02/01/2025       IMAGING:  All pertinent imaging has been reviewed and interpreted independently.    BERRY 1/0.65    Arterial US BLE Right lower extremity arterial ultrasound shows R PT hemodynamically significant stenosis.      7/2022  Right lower extremity pressures and waveforms indicate mild arterial occlusive disease.  Left lower extremity pressures " and waveforms indicate moderate arterial occlusive disease.    2/2025:  Right lower extremity arterial ultrasound shows distal SFA hemodynamically significant stenosis with decreased flow in the TP trunk and PT artery.    Left lower extremity arterial ultrasound shows decreased flow in the AT artery indicating proximal stenosis.    Right lower extremity pressures and waveforms indicate moderate to severe arterial occlusive disease.  Left lower extremity pressures and waveforms indicate moderate arterial occlusive disease.    IMP/PLAN:  79 y.o. female with   Patient Active Problem List   Diagnosis    Type 2 diabetes mellitus with diabetic polyneuropathy    Hyperlipidemia    HTN (hypertension)    Knee pain    H/O vitamin D deficiency    Non morbid obesity due to excess calories    Bilateral carotid bruits    Osteoarthritis of right knee    Dyspnea on exertion    Chronic cough    MEKA (obstructive sleep apnea)    Sensorineural hearing loss (SNHL) of right ear with restricted hearing of left ear    Tinnitus of both ears    Allergic rhinitis    Dysfunction of both eustachian tubes    Nasal septal deviation    Chronic eczematous otitis externa of both ears    Neck abscess    Epidermal inclusion cyst    Polyneuropathy associated with underlying disease    Aortic atherosclerosis    Class 1 obesity due to excess calories with serious comorbidity and body mass index (BMI) of 34.0 to 34.9 in adult    Nonrheumatic aortic valve stenosis    Cervical spondylosis    DDD (degenerative disc disease), cervical    Cervical radiculopathy    Decreased range of motion of intervertebral discs of cervical spine    Decreased strength of upper extremity    being managed by PCP and specialists who is here today for evaluation of PVD.    -No RLE PVD with no claudication, no rest pain, with wound.  Imaging reviewed. - cont daily ASA; mild RLE/moderate LLE PVD 7/2022. Patient s/p RLE great toe nail removal wound well healed  -BLE lymphedema and  venous hypertension - cont lymphedema clinic recs and pumps   -Patient is diagnosed with lymphedema and presents with hyperpigmentation of the lower extremity.  Patient has previously attempted compression, elevation, and exercise for at least 4 weeks.  Patient has secondary lymphedema and has tried and failed compression of 20-30 mm Hg, elevation and exercise for >1 month period however symptoms persist.  Basic pump trial performed and discontinued due to sensitivity and pain to static pressure.  Symptoms of swelling, pain and hyperplasia present.  A compression device is recommended to treat current symptoms and prevent disease progression. The patient has tried elevation, exercise and compression and symptoms remain.  The patient has marked hyperkeratosis with hyperplasia and hyperpigmentation.  - recommend lymphedema clinic therapy and pumps  -Exercise  -Heart healthy lifestyle  -RTC 6 months with BERRY/Tps and Arterial US    I spent 11 minutes evaluating this patient and greater than 50% of the time was spent counseling, coordinator care and discussing the plan of care.  All questions were answered and patient stated understanding with agreement with the above treatment plan.    Dre Blair M.D., R.P.V.I. [facility]

## 2025-03-20 ENCOUNTER — OFFICE VISIT (OUTPATIENT)
Dept: ORTHOPEDICS | Facility: CLINIC | Age: 80
End: 2025-03-20
Payer: COMMERCIAL

## 2025-03-20 VITALS — WEIGHT: 203.69 LBS | HEIGHT: 65 IN | BODY MASS INDEX: 33.94 KG/M2

## 2025-03-20 DIAGNOSIS — M17.0 PRIMARY OSTEOARTHRITIS OF BOTH KNEES: Primary | ICD-10-CM

## 2025-03-20 PROCEDURE — 99999 PR PBB SHADOW E&M-EST. PATIENT-LVL IV: CPT | Mod: PBBFAC,,, | Performed by: ORTHOPAEDIC SURGERY

## 2025-03-20 NOTE — PROCEDURES
Large Joint Aspiration/Injection: bilateral knee    Date/Time: 3/20/2025 9:40 AM    Performed by: Sumanth Erickson MD  Authorized by: Sumanth Erickson MD    Consent Done?:  Yes (Verbal)  Indications:  Arthritis and pain  Prep: patient was prepped and draped in usual sterile fashion      Local anesthesia used?: Yes    Anesthesia:  Local infiltration  Local anesthetic:  Topical anesthetic    Details:  Needle Size:  22 G  Approach:  Anterolateral  Location:  Knee  Laterality:  Bilateral  Site:  Bilateral knee  Medications (Right):  10 mg sodium hyaluronate (EUFLEXXA) 10 mg/mL(mw 2.4 -3.6 million)  Medications (Left):  10 mg sodium hyaluronate (EUFLEXXA) 10 mg/mL(mw 2.4 -3.6 million)  Patient tolerance:  Patient tolerated the procedure well with no immediate complications

## 2025-03-20 NOTE — PROGRESS NOTES
EST PATIENT ORTHOPAEDIC: Knee    PRIMARY CARE PHYSICIAN: Leonard Wells MD   REFERRING PROVIDER: Sumanth Erickson MD  [address] [place] [address]     ASSESSMENT & PLAN:    Impression:  Bilateral Knee Severe Degenerative Osteoarthritis, Primary     Follow Up Plan: PRN    Non operative care:    Mena Odonnell has physical exam evidence of above and wishes to pursue an non-operative care. I am recommending the following: Euflexxa series, activity modification.      To reivew: She would be an appropriate candidate for a right total knee replacement in the future should her symptoms warrant.  She was referred to vascular for intermittent claudication and peripheral vascular disease seen on x-ray .  There evaluation has referred her to lymphedema therapy. Swelling is improving. She is interested in repeat gel injections.  She is not interested in surgical intervention at this time. She is also diabetic so this is a good treatment modaliity for her.     The patient has been ordered:  Euflexxa    CONSULTS:   None    ACTIVE PROBLEM LIST  Patient Active Problem List   Diagnosis    Type 2 diabetes mellitus with diabetic polyneuropathy    Hyperlipidemia    HTN (hypertension)    Knee pain    H/O vitamin D deficiency    Non morbid obesity due to excess calories    Bilateral carotid bruits    Osteoarthritis of right knee    Dyspnea on exertion    Chronic cough    MEKA (obstructive sleep apnea)    Sensorineural hearing loss (SNHL) of right ear with restricted hearing of left ear    Tinnitus of both ears    Allergic rhinitis    Dysfunction of both eustachian tubes    Nasal septal deviation    Chronic eczematous otitis externa of both ears    Neck abscess    Epidermal inclusion cyst    Polyneuropathy associated with underlying disease    Aortic atherosclerosis    Class 1 obesity due to excess calories with serious comorbidity and body mass index (BMI) of 34.0 to 34.9 in adult    Nonrheumatic aortic valve stenosis     Cervical spondylosis    DDD (degenerative disc disease), cervical    Cervical radiculopathy    Decreased range of motion of intervertebral discs of cervical spine    Decreased strength of upper extremity           SUBJECTIVE    CHIEF COMPLAINT: Knee Pain    HPI:   Mena Odonnell is a 79 y.o. female here for evaluation and management of right knee pain. There is not a specific incident that brought about this pain. she has had progressive problems with the knee(s) starting 2 years ago but progressing to multiple times a day over the past 6 months which is interfering with activities which include: walking 2 blocks and standing for prolonged periods of time    Currently the pain in the joint is rated at 5 out of 10 with moderate activity.  The pain is intermittent and is located in the Right knee, at level of joint line, located medially and located laterally. The pain is described as aching and sharp. Relieving factors include ambulatory device and rest.       Mena Odonnell also complaints leg cramping on that side primarily in her posterior aspect of her knee and calf.  This typically improves proves with rest and over-the-counter topical spray.    Interval History 4/18/22: Injections wearing off. Saw vascular.   Interval History 5/9/22: Here for 1-3 Euflexxa series   Interval History 5/19/22: Here for 2-3 Euflexxa series. Mild improvement in pain.   Interval History 5/27/22: Here for 3-3 Euflexxa series. Moderate improvement in pain.  Interval History 10/13/22: Here with return of pain.   Interval History 11/28/22: Here for Euflexxa injections. Previous steroid with good relief   12/5/22: Here for 2-3 Euflexxa series. Continued relief of pain.   12/12/22: Here for 3-3 Euflexxa series. Continued relief of pain.   7/11/23: Patient would like to restart Euflexxa injections. 6 months or relief with previous injections.  7/18/23: Here for 2-3 Euflexxa. 0-10 pain today.    7/25/23: Here for 3-3 Euflexxa. Some more  pain today. Went to conference last week and had increase in pain.   2/19/24:  Patient here for follow up regarding her bilateral knee pain.  Previously seen over 6 months ago with good relief knee pain after Euflexxa series.  She called prior to this appointment for repeat Euflexxa injections which we put in for and were approved.  She is here for the 1st series of these injections. No changes in characteristics of pain.   2/26/24: Here for 2-3 euflexxa. Improvement after first injection  3/4/24: Here for 3-3 euflexxa. Sustained relief after last set of injections.  9/5/24: Here for repeat euflexxa. Pain doing ok today, would like to stay on top of injections as they are working.   9/12/24: Here for 2-3 euflexxa injections.   9/19/24: Here for 3-3 euflexxa.   3/20/25: Here for start of euflexxa series again. Previous ones continue to be effective.     PROGRESSIVE SYMPTOMS:  Pain worsened by weight bearing    FUNCTIONAL STATUS:   Do light to moderate work around the house    PREVIOUS TREATMENTS:  Medical: Attempted Weight Loss, Steroid Injections and Biologic Injections  Physical Therapy: Use of Ambulatory Aid and Activities Modified   Previous Orthopaedic Surgery: None    REVIEW OF SYSTEMS:  PAIN ASSESSMENT:  See HPI.  MUSCULOSKELETAL: See HPI.  OTHER 10 point review of systems is negative except as stated in HPI above    PAST MEDICAL HISTORY   has a past medical history of Cataract, Diabetes mellitus type II, Diabetes with neurologic complications, DM retinopathy, H/O vitamin D deficiency, Hyperlipidemia, Hypertension, Obesity, Osteoarthritis, Peripheral neuropathy, Polyneuropathy, Severe obesity (BMI 35.0-39.9) with comorbidity (12/30/2016), Sleep apnea, and Tendonitis.     PAST SURGICAL HISTORY   has a past surgical history that includes Bladder suspension; Hysterectomy (1978); Tonsillectomy; Colonoscopy (N/A, 9/26/2015); Cataract extraction; and Neck mass excision (11/6/2019).     FAMILY HISTORY  family history  includes Cataracts in her mother; Colon cancer in her maternal grandfather; Diabetes in her mother, sister, sister, and son; Hypertension in her brother, brother, daughter, mother, sister, sister, sister, and sister; Stroke in her mother; Thyroid disease in her maternal aunt, maternal aunt, mother, and sister.     SOCIAL HISTORY   reports that she quit smoking about 54 years ago. Her smoking use included cigarettes. She started smoking about 69 years ago. She has a 3.8 pack-year smoking history. She has never used smokeless tobacco. She reports that she does not drink alcohol and does not use drugs.     ALLERGIES   Review of patient's allergies indicates:   Allergen Reactions    Atorvastatin      Muscle pain     Crestor [rosuvastatin] Other (See Comments)     Leg Weakness.         MEDICATIONS  Current Outpatient Medications on File Prior to Visit   Medication Sig Dispense Refill    amLODIPine (NORVASC) 10 MG tablet Take 1 tablet (10 mg total) by mouth once daily. 90 tablet 3    aspirin 81 MG Chew Take 1 tablet (81 mg total) by mouth once daily. 90 tablet 1    azelastine (ASTELIN) 137 mcg (0.1 %) nasal spray 2 sprays by Nasal route.      blood sugar diagnostic Strp Check blood glucose 2 times a day 200 strip 3    blood-glucose meter,continuous (DEXCOM G7 ) Misc Use with Dexcom G7 sensors 1 each 0    blood-glucose sensor (DEXCOM G7 SENSOR) Sujey Change every 10 days 12 each 3    carbinoxamine maleate 6 mg Tab Take 6 mg by mouth every 8 (eight) hours as needed.      cetirizine (ZYRTEC) 10 MG tablet Take 1 tablet (10 mg total) by mouth once daily. For 30 days 90 tablet 1    diclofenac sodium (VOLTAREN) 1 % Gel Lower extremities: Apply the gel (4 g) to the affected area 4 times daily. Do not apply more than 16 g daily to any one affected joint of the lower extremities. Upper extremities: Apply the gel (2 g) to the affected area 4 times daily. Do not apply more than 8 g daily to any one affected joint of the upper  extremities. Total dose should not exceed 32 g per day, overall affected joints. 100 g 5    empagliflozin (JARDIANCE) 10 mg tablet Take 1 tablet (10 mg total) by mouth once daily. 90 tablet 2    ezetimibe (ZETIA) 10 mg tablet Take 1 tablet (10 mg total) by mouth once daily. 90 tablet 1    fluconazole (DIFLUCAN) 150 MG Tab Take 150 mg by mouth.      gabapentin (NEURONTIN) 300 MG capsule Take 1 capsule (300 mg total) by mouth 3 (three) times daily. 90 capsule 11    insulin glargine U-300 conc (TOUJEO MAX U-300 SOLOSTAR) 300 unit/mL (3 mL) insulin pen INJECT 20 UNITS SUBCUTANEOUSLY ONCE DAILY      insulin lispro (HUMALOG KWIKPEN INSULIN) 100 unit/mL pen Use with sliding scale - 150-200=+2, 201-250=+4; 251-300=+6; 301-350=+8, over 350=+10 units 15 mL 0    lancets Misc Check blood glucose 2x/day 200 each 3    losartan-hydrochlorothiazide 100-25 mg (HYZAAR) 100-25 mg per tablet Take 1 tablet by mouth once daily. 90 tablet 3    meloxicam (MOBIC) 7.5 MG tablet Take 1 tablet (7.5 mg total) by mouth once daily. 12 tablet 0    metFORMIN (GLUCOPHAGE-XR) 500 MG ER 24hr tablet Take 2 tablets (1,000 mg total) by mouth Daily. 180 tablet 2    metoprolol succinate (TOPROL-XL) 50 MG 24 hr tablet Take 1 tablet (50 mg total) by mouth once daily. 90 tablet 3    montelukast (SINGULAIR) 10 mg tablet Take 1 tablet (10 mg total) by mouth once daily. 90 tablet 1    pravastatin (PRAVACHOL) 80 MG tablet Take 1 tablet (80 mg total) by mouth once daily. 90 tablet 1    tirzepatide (MOUNJARO) 15 mg/0.5 mL PnIj Inject 15 mg into the skin every 7 days. 4 Pen 4    tobramycin sulfate 0.3% (TOBREX) 0.3 % ophthalmic solution 1-2 drops topically twice daily to affected toe(s). 5 mL 3     Current Facility-Administered Medications on File Prior to Visit   Medication Dose Route Frequency Provider Last Rate Last Admin    sodium hyaluronate (EUFLEXXA) 10 mg/mL(mw 2.4 -3.6 million) injection 20 mg  20 mg Intra-articular 1 time in Clinic/HOD                PHYSICAL EXAM   vitals were not taken for this visit.   There is no height or weight on file to calculate BMI.      All other systems deferred.  GENERAL:  No acute distress  HABITUS: Obese  GAIT: Antalgic  SKIN: Normal     KNEE EXAM:    Bilateral:   Effusion: Minimal joint effusion  TTP: yes over Medial Joint Line and Lateral Joint Line   no crepitus with passive knee ROM  Passive ROM: Extension 0, Flexion 130  No pain with manipulation of patella  Stable to varus/valgus stress. No increased laxity to anterior/posterior drawer testing  negative Ngozi's test  No pain with IR/ER rotation of the hip  5/5 strength in knee flexion and extension, ankle plantarflexion and dorsiflexion  Neurovascular Status: Sensation intact to light touch in Sural, Saphenous, SPN, DPN, Tibial nerve distribution  2+ pulse DP/PT, normal capillary refill, foot has normal warmth    DATA:  Diagnostic tests reviewed for today's visit:     4v of the bilateral knee reveal Moderate degenerative changes of the Lateral and Patellofemoral compartment. There is evidence of advanced osteoarthritis changes with Subchondral sclerosis, Osteophyte formation and Joint space narrowing. The limb is in netural alignment. The patella is tracking midline and is in normal alignment. Vascular calcifications present. Kellegren-Lawerence grade 4 changes on the right, grade 3 on the left.

## 2025-03-27 ENCOUNTER — OFFICE VISIT (OUTPATIENT)
Dept: ORTHOPEDICS | Facility: CLINIC | Age: 80
End: 2025-03-27
Payer: COMMERCIAL

## 2025-03-27 VITALS — HEIGHT: 65 IN | WEIGHT: 203.69 LBS | BODY MASS INDEX: 33.94 KG/M2

## 2025-03-27 DIAGNOSIS — M54.16 LUMBAR RADICULOPATHY: Primary | ICD-10-CM

## 2025-03-27 DIAGNOSIS — M17.0 PRIMARY OSTEOARTHRITIS OF BOTH KNEES: Primary | ICD-10-CM

## 2025-03-27 PROCEDURE — 99999 PR PBB SHADOW E&M-EST. PATIENT-LVL IV: CPT | Mod: PBBFAC,,, | Performed by: ORTHOPAEDIC SURGERY

## 2025-03-27 NOTE — PROGRESS NOTES
EST PATIENT ORTHOPAEDIC: Knee    PRIMARY CARE PHYSICIAN: Leonard Wells MD   REFERRING PROVIDER: Sumanth Erickson MD  [address] [place] [address]     ASSESSMENT & PLAN:    Impression:  Bilateral Knee Severe Degenerative Osteoarthritis, Primary     Follow Up Plan: PRN    Non operative care:    Mena Odonnell has physical exam evidence of above and wishes to pursue an non-operative care. I am recommending the following: Euflexxa series, activity modification.      To reivew: She would be an appropriate candidate for a right total knee replacement in the future should her symptoms warrant.  She was referred to vascular for intermittent claudication and peripheral vascular disease seen on x-ray .  There evaluation has referred her to lymphedema therapy. Swelling is improving. She is interested in repeat gel injections.  She is not interested in surgical intervention at this time. She is also diabetic so this is a good treatment modaliity for her.     The patient has been ordered:  Euflexxa    CONSULTS:   None    ACTIVE PROBLEM LIST  Patient Active Problem List   Diagnosis    Type 2 diabetes mellitus with diabetic polyneuropathy    Hyperlipidemia    HTN (hypertension)    Knee pain    H/O vitamin D deficiency    Non morbid obesity due to excess calories    Bilateral carotid bruits    Osteoarthritis of right knee    Dyspnea on exertion    Chronic cough    MEKA (obstructive sleep apnea)    Sensorineural hearing loss (SNHL) of right ear with restricted hearing of left ear    Tinnitus of both ears    Allergic rhinitis    Dysfunction of both eustachian tubes    Nasal septal deviation    Chronic eczematous otitis externa of both ears    Neck abscess    Epidermal inclusion cyst    Polyneuropathy associated with underlying disease    Aortic atherosclerosis    Class 1 obesity due to excess calories with serious comorbidity and body mass index (BMI) of 34.0 to 34.9 in adult    Nonrheumatic aortic valve stenosis     Cervical spondylosis    DDD (degenerative disc disease), cervical    Cervical radiculopathy    Decreased range of motion of intervertebral discs of cervical spine    Decreased strength of upper extremity           SUBJECTIVE    CHIEF COMPLAINT: Knee Pain    HPI:   Mena Odonnell is a 79 y.o. female here for evaluation and management of right knee pain. There is not a specific incident that brought about this pain. she has had progressive problems with the knee(s) starting 2 years ago but progressing to multiple times a day over the past 6 months which is interfering with activities which include: walking 2 blocks and standing for prolonged periods of time    Currently the pain in the joint is rated at 5 out of 10 with moderate activity.  The pain is intermittent and is located in the Right knee, at level of joint line, located medially and located laterally. The pain is described as aching and sharp. Relieving factors include ambulatory device and rest.       Mena Odonnell also complaints leg cramping on that side primarily in her posterior aspect of her knee and calf.  This typically improves proves with rest and over-the-counter topical spray.    Interval History 4/18/22: Injections wearing off. Saw vascular.   Interval History 5/9/22: Here for 1-3 Euflexxa series   Interval History 5/19/22: Here for 2-3 Euflexxa series. Mild improvement in pain.   Interval History 5/27/22: Here for 3-3 Euflexxa series. Moderate improvement in pain.  Interval History 10/13/22: Here with return of pain.   Interval History 11/28/22: Here for Euflexxa injections. Previous steroid with good relief   12/5/22: Here for 2-3 Euflexxa series. Continued relief of pain.   12/12/22: Here for 3-3 Euflexxa series. Continued relief of pain.   7/11/23: Patient would like to restart Euflexxa injections. 6 months or relief with previous injections.  7/18/23: Here for 2-3 Euflexxa. 0-10 pain today.    7/25/23: Here for 3-3 Euflexxa. Some more  pain today. Went to conference last week and had increase in pain.   2/19/24:  Patient here for follow up regarding her bilateral knee pain.  Previously seen over 6 months ago with good relief knee pain after Euflexxa series.  She called prior to this appointment for repeat Euflexxa injections which we put in for and were approved.  She is here for the 1st series of these injections. No changes in characteristics of pain.   2/26/24: Here for 2-3 euflexxa. Improvement after first injection  3/4/24: Here for 3-3 euflexxa. Sustained relief after last set of injections.  9/5/24: Here for repeat euflexxa. Pain doing ok today, would like to stay on top of injections as they are working.   9/12/24: Here for 2-3 euflexxa injections.   9/19/24: Here for 3-3 euflexxa.   3/20/25: Here for start of euflexxa series again. Previous ones continue to be effective.  3/27/25: Here for 2-3 euflexxa. Also inquiring into physician for lower back pain.      PROGRESSIVE SYMPTOMS:  Pain worsened by weight bearing    FUNCTIONAL STATUS:   Do light to moderate work around the house    PREVIOUS TREATMENTS:  Medical: Attempted Weight Loss, Steroid Injections and Biologic Injections  Physical Therapy: Use of Ambulatory Aid and Activities Modified   Previous Orthopaedic Surgery: None    REVIEW OF SYSTEMS:  PAIN ASSESSMENT:  See HPI.  MUSCULOSKELETAL: See HPI.  OTHER 10 point review of systems is negative except as stated in HPI above    PAST MEDICAL HISTORY   has a past medical history of Cataract, Diabetes mellitus type II, Diabetes with neurologic complications, DM retinopathy, H/O vitamin D deficiency, Hyperlipidemia, Hypertension, Obesity, Osteoarthritis, Peripheral neuropathy, Polyneuropathy, Severe obesity (BMI 35.0-39.9) with comorbidity (12/30/2016), Sleep apnea, and Tendonitis.     PAST SURGICAL HISTORY   has a past surgical history that includes Bladder suspension; Hysterectomy (1978); Tonsillectomy; Colonoscopy (N/A, 9/26/2015);  Cataract extraction; and Neck mass excision (11/6/2019).     FAMILY HISTORY  family history includes Cataracts in her mother; Colon cancer in her maternal grandfather; Diabetes in her mother, sister, sister, and son; Hypertension in her brother, brother, daughter, mother, sister, sister, sister, and sister; Stroke in her mother; Thyroid disease in her maternal aunt, maternal aunt, mother, and sister.     SOCIAL HISTORY   reports that she quit smoking about 54 years ago. Her smoking use included cigarettes. She started smoking about 69 years ago. She has a 3.8 pack-year smoking history. She has never used smokeless tobacco. She reports that she does not drink alcohol and does not use drugs.     ALLERGIES   Review of patient's allergies indicates:   Allergen Reactions    Atorvastatin      Muscle pain     Crestor [rosuvastatin] Other (See Comments)     Leg Weakness.         MEDICATIONS  Current Outpatient Medications on File Prior to Visit   Medication Sig Dispense Refill    amLODIPine (NORVASC) 10 MG tablet Take 1 tablet (10 mg total) by mouth once daily. 90 tablet 3    azelastine (ASTELIN) 137 mcg (0.1 %) nasal spray 2 sprays by Nasal route.      blood sugar diagnostic Strp Check blood glucose 2 times a day 200 strip 3    blood-glucose meter,continuous (DEXCOM G7 ) Misc Use with Dexcom G7 sensors 1 each 0    blood-glucose sensor (DEXCOM G7 SENSOR) Sujey Change every 10 days 12 each 3    carbinoxamine maleate 6 mg Tab Take 6 mg by mouth every 8 (eight) hours as needed.      cetirizine (ZYRTEC) 10 MG tablet Take 1 tablet (10 mg total) by mouth once daily. For 30 days 90 tablet 1    diclofenac sodium (VOLTAREN) 1 % Gel Lower extremities: Apply the gel (4 g) to the affected area 4 times daily. Do not apply more than 16 g daily to any one affected joint of the lower extremities. Upper extremities: Apply the gel (2 g) to the affected area 4 times daily. Do not apply more than 8 g daily to any one affected joint of  the upper extremities. Total dose should not exceed 32 g per day, overall affected joints. 100 g 5    empagliflozin (JARDIANCE) 10 mg tablet Take 1 tablet (10 mg total) by mouth once daily. 90 tablet 2    ezetimibe (ZETIA) 10 mg tablet Take 1 tablet (10 mg total) by mouth once daily. 90 tablet 1    fluconazole (DIFLUCAN) 150 MG Tab Take 150 mg by mouth.      gabapentin (NEURONTIN) 300 MG capsule Take 1 capsule (300 mg total) by mouth 3 (three) times daily. 90 capsule 11    insulin glargine U-300 conc (TOUJEO MAX U-300 SOLOSTAR) 300 unit/mL (3 mL) insulin pen INJECT 20 UNITS SUBCUTANEOUSLY ONCE DAILY      insulin lispro (HUMALOG KWIKPEN INSULIN) 100 unit/mL pen Use with sliding scale - 150-200=+2, 201-250=+4; 251-300=+6; 301-350=+8, over 350=+10 units 15 mL 0    lancets Misc Check blood glucose 2x/day 200 each 3    losartan-hydrochlorothiazide 100-25 mg (HYZAAR) 100-25 mg per tablet Take 1 tablet by mouth once daily. 90 tablet 3    meloxicam (MOBIC) 7.5 MG tablet Take 1 tablet (7.5 mg total) by mouth once daily. 12 tablet 0    metFORMIN (GLUCOPHAGE-XR) 500 MG ER 24hr tablet Take 2 tablets (1,000 mg total) by mouth Daily. 180 tablet 2    metoprolol succinate (TOPROL-XL) 50 MG 24 hr tablet Take 1 tablet (50 mg total) by mouth once daily. 90 tablet 3    montelukast (SINGULAIR) 10 mg tablet Take 1 tablet (10 mg total) by mouth once daily. 90 tablet 1    pravastatin (PRAVACHOL) 80 MG tablet Take 1 tablet (80 mg total) by mouth once daily. 90 tablet 1    tirzepatide (MOUNJARO) 15 mg/0.5 mL PnIj Inject 15 mg into the skin every 7 days. 4 Pen 4    tobramycin sulfate 0.3% (TOBREX) 0.3 % ophthalmic solution 1-2 drops topically twice daily to affected toe(s). 5 mL 3    aspirin 81 MG Chew Take 1 tablet (81 mg total) by mouth once daily. 90 tablet 1     Current Facility-Administered Medications on File Prior to Visit   Medication Dose Route Frequency Provider Last Rate Last Admin    sodium hyaluronate (EUFLEXXA) 10 mg/mL(mw 2.4  "-3.6 million) injection 20 mg  20 mg Intra-articular 1 time in Clinic/HOD               PHYSICAL EXAM   height is 5' 5" (1.651 m) and weight is 92.4 kg (203 lb 11.3 oz).   Body mass index is 33.9 kg/m².      All other systems deferred.  GENERAL:  No acute distress  HABITUS: Obese  GAIT: Antalgic  SKIN: Normal     KNEE EXAM:    Bilateral:   Effusion: Minimal joint effusion  TTP: yes over Medial Joint Line and Lateral Joint Line   no crepitus with passive knee ROM  Passive ROM: Extension 0, Flexion 130  No pain with manipulation of patella  Stable to varus/valgus stress. No increased laxity to anterior/posterior drawer testing  negative Ngozi's test  No pain with IR/ER rotation of the hip  5/5 strength in knee flexion and extension, ankle plantarflexion and dorsiflexion  Neurovascular Status: Sensation intact to light touch in Sural, Saphenous, SPN, DPN, Tibial nerve distribution  2+ pulse DP/PT, normal capillary refill, foot has normal warmth    DATA:  Diagnostic tests reviewed for today's visit:     4v of the bilateral knee reveal Moderate degenerative changes of the Lateral and Patellofemoral compartment. There is evidence of advanced osteoarthritis changes with Subchondral sclerosis, Osteophyte formation and Joint space narrowing. The limb is in netural alignment. The patella is tracking midline and is in normal alignment. Vascular calcifications present. Kellegren-Lawerence grade 4 changes on the right, grade 3 on the left.             "

## 2025-03-27 NOTE — PROCEDURES
Large Joint Aspiration/Injection: bilateral knee    Date/Time: 3/27/2025 9:40 AM    Performed by: Sumanth Erickson MD  Authorized by: Sumanth Erickson MD    Consent Done?:  Yes (Verbal)  Indications:  Arthritis and pain  Prep: patient was prepped and draped in usual sterile fashion      Local anesthesia used?: Yes    Anesthesia:  Local infiltration  Local anesthetic:  Lidocaine/prilocaine emulsion    Details:  Needle Size:  22 G  Approach:  Anterolateral  Location:  Knee  Laterality:  Bilateral  Site:  Bilateral knee  Medications (Right):  10 mg sodium hyaluronate (EUFLEXXA) 10 mg/mL(mw 2.4 -3.6 million)  Medications (Left):  10 mg sodium hyaluronate (EUFLEXXA) 10 mg/mL(mw 2.4 -3.6 million)  Patient tolerance:  Patient tolerated the procedure well with no immediate complications

## 2025-04-03 ENCOUNTER — OFFICE VISIT (OUTPATIENT)
Dept: ORTHOPEDICS | Facility: CLINIC | Age: 80
End: 2025-04-03
Payer: COMMERCIAL

## 2025-04-03 VITALS — WEIGHT: 203.69 LBS | HEIGHT: 65 IN | BODY MASS INDEX: 33.94 KG/M2

## 2025-04-03 DIAGNOSIS — M17.0 PRIMARY OSTEOARTHRITIS OF BOTH KNEES: Primary | ICD-10-CM

## 2025-04-03 PROCEDURE — 99999 PR PBB SHADOW E&M-EST. PATIENT-LVL IV: CPT | Mod: PBBFAC,,, | Performed by: ORTHOPAEDIC SURGERY

## 2025-04-03 NOTE — PROCEDURES
Large Joint Aspiration/Injection: bilateral knee    Date/Time: 4/3/2025 9:40 AM    Performed by: Sumanth Erickson MD  Authorized by: Sumanth Erickson MD    Consent Done?:  Yes (Verbal)  Indications:  Arthritis and pain  Prep: patient was prepped and draped in usual sterile fashion      Local anesthesia used?: Yes    Anesthesia:  Local infiltration  Local anesthetic:  Topical anesthetic    Details:  Needle Size:  22 G  Approach:  Anterolateral  Location:  Knee  Laterality:  Bilateral  Site:  Bilateral knee  Medications (Right):  10 mg sodium hyaluronate (EUFLEXXA) 10 mg/mL(mw 2.4 -3.6 million)  Medications (Left):  10 mg sodium hyaluronate (EUFLEXXA) 10 mg/mL(mw 2.4 -3.6 million)  Patient tolerance:  Patient tolerated the procedure well with no immediate complications

## 2025-04-03 NOTE — PROGRESS NOTES
EST PATIENT ORTHOPAEDIC: Knee    PRIMARY CARE PHYSICIAN: Leonard Wells MD   REFERRING PROVIDER: Sumanth Erickson MD  [address] [place] [address]     ASSESSMENT & PLAN:    Impression:  Bilateral Knee Severe Degenerative Osteoarthritis, Primary     Follow Up Plan: PRN    Non operative care:    Mena Odonnell has physical exam evidence of above and wishes to pursue an non-operative care. I am recommending the following: Euflexxa series, activity modification.      To reivew: She would be an appropriate candidate for a right total knee replacement in the future should her symptoms warrant.  She was referred to vascular for intermittent claudication and peripheral vascular disease seen on x-ray .  There evaluation has referred her to lymphedema therapy. Swelling is improving. She is interested in repeat gel injections.  She is not interested in surgical intervention at this time. She is also diabetic so this is a good treatment modaliity for her.     The patient has been ordered:  Euflexxa    CONSULTS:   None    ACTIVE PROBLEM LIST  Patient Active Problem List   Diagnosis    Type 2 diabetes mellitus with diabetic polyneuropathy    Hyperlipidemia    HTN (hypertension)    Knee pain    H/O vitamin D deficiency    Non morbid obesity due to excess calories    Bilateral carotid bruits    Osteoarthritis of right knee    Dyspnea on exertion    Chronic cough    MEKA (obstructive sleep apnea)    Sensorineural hearing loss (SNHL) of right ear with restricted hearing of left ear    Tinnitus of both ears    Allergic rhinitis    Dysfunction of both eustachian tubes    Nasal septal deviation    Chronic eczematous otitis externa of both ears    Neck abscess    Epidermal inclusion cyst    Polyneuropathy associated with underlying disease    Aortic atherosclerosis    Class 1 obesity due to excess calories with serious comorbidity and body mass index (BMI) of 34.0 to 34.9 in adult    Nonrheumatic aortic valve stenosis     Cervical spondylosis    DDD (degenerative disc disease), cervical    Cervical radiculopathy    Decreased range of motion of intervertebral discs of cervical spine    Decreased strength of upper extremity           SUBJECTIVE    CHIEF COMPLAINT: Knee Pain    HPI:   Mena Odonnell is a 79 y.o. female here for evaluation and management of right knee pain. There is not a specific incident that brought about this pain. she has had progressive problems with the knee(s) starting 2 years ago but progressing to multiple times a day over the past 6 months which is interfering with activities which include: walking 2 blocks and standing for prolonged periods of time    Currently the pain in the joint is rated at 5 out of 10 with moderate activity.  The pain is intermittent and is located in the Right knee, at level of joint line, located medially and located laterally. The pain is described as aching and sharp. Relieving factors include ambulatory device and rest.       Mena Odonnell also complaints leg cramping on that side primarily in her posterior aspect of her knee and calf.  This typically improves proves with rest and over-the-counter topical spray.    Interval History 4/18/22: Injections wearing off. Saw vascular.   Interval History 5/9/22: Here for 1-3 Euflexxa series   Interval History 5/19/22: Here for 2-3 Euflexxa series. Mild improvement in pain.   Interval History 5/27/22: Here for 3-3 Euflexxa series. Moderate improvement in pain.  Interval History 10/13/22: Here with return of pain.   Interval History 11/28/22: Here for Euflexxa injections. Previous steroid with good relief   12/5/22: Here for 2-3 Euflexxa series. Continued relief of pain.   12/12/22: Here for 3-3 Euflexxa series. Continued relief of pain.   7/11/23: Patient would like to restart Euflexxa injections. 6 months or relief with previous injections.  7/18/23: Here for 2-3 Euflexxa. 0-10 pain today.    7/25/23: Here for 3-3 Euflexxa. Some more  pain today. Went to conference last week and had increase in pain.   2/19/24:  Patient here for follow up regarding her bilateral knee pain.  Previously seen over 6 months ago with good relief knee pain after Euflexxa series.  She called prior to this appointment for repeat Euflexxa injections which we put in for and were approved.  She is here for the 1st series of these injections. No changes in characteristics of pain.   2/26/24: Here for 2-3 euflexxa. Improvement after first injection  3/4/24: Here for 3-3 euflexxa. Sustained relief after last set of injections.  9/5/24: Here for repeat euflexxa. Pain doing ok today, would like to stay on top of injections as they are working.   9/12/24: Here for 2-3 euflexxa injections.   9/19/24: Here for 3-3 euflexxa.   3/20/25: Here for start of euflexxa series again. Previous ones continue to be effective.  3/27/25: Here for 2-3 euflexxa. Also inquiring into physician for lower back pain.    4/3/25: Here for 3-3 euflexxa.     PROGRESSIVE SYMPTOMS:  Pain worsened by weight bearing    FUNCTIONAL STATUS:   Do light to moderate work around the house    PREVIOUS TREATMENTS:  Medical: Attempted Weight Loss, Steroid Injections and Biologic Injections  Physical Therapy: Use of Ambulatory Aid and Activities Modified   Previous Orthopaedic Surgery: None    REVIEW OF SYSTEMS:  PAIN ASSESSMENT:  See HPI.  MUSCULOSKELETAL: See HPI.  OTHER 10 point review of systems is negative except as stated in HPI above    PAST MEDICAL HISTORY   has a past medical history of Cataract, Diabetes mellitus type II, Diabetes with neurologic complications, DM retinopathy, H/O vitamin D deficiency, Hyperlipidemia, Hypertension, Obesity, Osteoarthritis, Peripheral neuropathy, Polyneuropathy, Severe obesity (BMI 35.0-39.9) with comorbidity (12/30/2016), Sleep apnea, and Tendonitis.     PAST SURGICAL HISTORY   has a past surgical history that includes Bladder suspension; Hysterectomy (1978); Tonsillectomy;  Colonoscopy (N/A, 9/26/2015); Cataract extraction; and Neck mass excision (11/6/2019).     FAMILY HISTORY  family history includes Cataracts in her mother; Colon cancer in her maternal grandfather; Diabetes in her mother, sister, sister, and son; Hypertension in her brother, brother, daughter, mother, sister, sister, sister, and sister; Stroke in her mother; Thyroid disease in her maternal aunt, maternal aunt, mother, and sister.     SOCIAL HISTORY   reports that she quit smoking about 54 years ago. Her smoking use included cigarettes. She started smoking about 69 years ago. She has a 3.8 pack-year smoking history. She has never used smokeless tobacco. She reports that she does not drink alcohol and does not use drugs.     ALLERGIES   Review of patient's allergies indicates:   Allergen Reactions    Atorvastatin      Muscle pain     Crestor [rosuvastatin] Other (See Comments)     Leg Weakness.         MEDICATIONS  Current Outpatient Medications on File Prior to Visit   Medication Sig Dispense Refill    amLODIPine (NORVASC) 10 MG tablet Take 1 tablet (10 mg total) by mouth once daily. 90 tablet 3    azelastine (ASTELIN) 137 mcg (0.1 %) nasal spray 2 sprays by Nasal route.      blood sugar diagnostic Strp Check blood glucose 2 times a day 200 strip 3    blood-glucose meter,continuous (DEXCOM G7 ) Misc Use with Dexcom G7 sensors 1 each 0    blood-glucose sensor (DEXCOM G7 SENSOR) Sujey Change every 10 days 12 each 3    carbinoxamine maleate 6 mg Tab Take 6 mg by mouth every 8 (eight) hours as needed.      cetirizine (ZYRTEC) 10 MG tablet Take 1 tablet (10 mg total) by mouth once daily. For 30 days 90 tablet 1    diclofenac sodium (VOLTAREN) 1 % Gel Lower extremities: Apply the gel (4 g) to the affected area 4 times daily. Do not apply more than 16 g daily to any one affected joint of the lower extremities. Upper extremities: Apply the gel (2 g) to the affected area 4 times daily. Do not apply more than 8 g daily  to any one affected joint of the upper extremities. Total dose should not exceed 32 g per day, overall affected joints. 100 g 5    empagliflozin (JARDIANCE) 10 mg tablet Take 1 tablet (10 mg total) by mouth once daily. 90 tablet 2    ezetimibe (ZETIA) 10 mg tablet Take 1 tablet (10 mg total) by mouth once daily. 90 tablet 1    fluconazole (DIFLUCAN) 150 MG Tab Take 150 mg by mouth.      gabapentin (NEURONTIN) 300 MG capsule Take 1 capsule (300 mg total) by mouth 3 (three) times daily. 90 capsule 11    insulin glargine U-300 conc (TOUJEO MAX U-300 SOLOSTAR) 300 unit/mL (3 mL) insulin pen INJECT 20 UNITS SUBCUTANEOUSLY ONCE DAILY      insulin lispro (HUMALOG KWIKPEN INSULIN) 100 unit/mL pen Use with sliding scale - 150-200=+2, 201-250=+4; 251-300=+6; 301-350=+8, over 350=+10 units 15 mL 0    lancets Misc Check blood glucose 2x/day 200 each 3    losartan-hydrochlorothiazide 100-25 mg (HYZAAR) 100-25 mg per tablet Take 1 tablet by mouth once daily. 90 tablet 3    meloxicam (MOBIC) 7.5 MG tablet Take 1 tablet (7.5 mg total) by mouth once daily. 12 tablet 0    metFORMIN (GLUCOPHAGE-XR) 500 MG ER 24hr tablet Take 2 tablets (1,000 mg total) by mouth Daily. 180 tablet 2    metoprolol succinate (TOPROL-XL) 50 MG 24 hr tablet Take 1 tablet (50 mg total) by mouth once daily. 90 tablet 3    montelukast (SINGULAIR) 10 mg tablet Take 1 tablet (10 mg total) by mouth once daily. 90 tablet 1    pravastatin (PRAVACHOL) 80 MG tablet Take 1 tablet (80 mg total) by mouth once daily. 90 tablet 1    tirzepatide (MOUNJARO) 15 mg/0.5 mL PnIj Inject 15 mg into the skin every 7 days. 4 Pen 4    tobramycin sulfate 0.3% (TOBREX) 0.3 % ophthalmic solution 1-2 drops topically twice daily to affected toe(s). 5 mL 3    aspirin 81 MG Chew Take 1 tablet (81 mg total) by mouth once daily. 90 tablet 1     Current Facility-Administered Medications on File Prior to Visit   Medication Dose Route Frequency Provider Last Rate Last Admin    sodium hyaluronate  "(EUFLEXXA) 10 mg/mL(mw 2.4 -3.6 million) injection 20 mg  20 mg Intra-articular 1 time in Clinic/HOD               PHYSICAL EXAM   height is 5' 5" (1.651 m) and weight is 92.4 kg (203 lb 11.3 oz).   Body mass index is 33.9 kg/m².      All other systems deferred.  GENERAL:  No acute distress  HABITUS: Obese  GAIT: Antalgic  SKIN: Normal     KNEE EXAM:    Bilateral:   Effusion: Minimal joint effusion  TTP: yes over Medial Joint Line and Lateral Joint Line   no crepitus with passive knee ROM  Passive ROM: Extension 0, Flexion 130  No pain with manipulation of patella  Stable to varus/valgus stress. No increased laxity to anterior/posterior drawer testing  negative Ngozi's test  No pain with IR/ER rotation of the hip  5/5 strength in knee flexion and extension, ankle plantarflexion and dorsiflexion  Neurovascular Status: Sensation intact to light touch in Sural, Saphenous, SPN, DPN, Tibial nerve distribution  2+ pulse DP/PT, normal capillary refill, foot has normal warmth    DATA:  Diagnostic tests reviewed for today's visit:     4v of the bilateral knee reveal Moderate degenerative changes of the Lateral and Patellofemoral compartment. There is evidence of advanced osteoarthritis changes with Subchondral sclerosis, Osteophyte formation and Joint space narrowing. The limb is in netural alignment. The patella is tracking midline and is in normal alignment. Vascular calcifications present. Kellegren-Lawerence grade 4 changes on the right, grade 3 on the left.               "

## 2025-05-16 ENCOUNTER — PATIENT OUTREACH (OUTPATIENT)
Dept: ADMINISTRATIVE | Facility: HOSPITAL | Age: 80
End: 2025-05-16
Payer: COMMERCIAL

## 2025-05-16 DIAGNOSIS — E11.42 TYPE 2 DIABETES MELLITUS WITH DIABETIC POLYNEUROPATHY, WITHOUT LONG-TERM CURRENT USE OF INSULIN: Primary | ICD-10-CM

## 2025-05-16 NOTE — LETTER
AUTHORIZATION FOR RELEASE OF   CONFIDENTIAL INFORMATION    Dear [facility],    We are seeing Mena Odonnell, date of birth 1945, in the clinic at [facility]. Leonard Wells MD is the patient's PCP. Mena Odonnell has an outstanding lab/procedure at the time we reviewed her chart. In order to help keep her health information updated, she has authorized us to request the following medical record(s):        (  )  MAMMOGRAM                                      (  )  COLONOSCOPY      (  )  PAP SMEAR                                          (  )  OUTSIDE LAB RESULTS     (  )  DEXA SCAN                                          ( X ) DIABETIC EYE EXAM            (  )  FOOT EXAM                                          (  )  ENTIRE RECORD     (  )  OUTSIDE IMMUNIZATIONS                 (  )  _______________         Please fax records to [facility], Anand, Kiran K., MD, FAX [phone] [address]. [address] [facility] [address]       If you have any questions, please contact CMAT team at [email or web address].            Patient Name: Mena Odonnell  : 1945  Patient Phone #: [phone]

## 2025-05-22 ENCOUNTER — LAB VISIT (OUTPATIENT)
Dept: LAB | Facility: HOSPITAL | Age: 80
End: 2025-05-22
Attending: NURSE PRACTITIONER
Payer: COMMERCIAL

## 2025-05-22 DIAGNOSIS — E11.42 TYPE 2 DIABETES MELLITUS WITH DIABETIC POLYNEUROPATHY, WITH LONG-TERM CURRENT USE OF INSULIN: ICD-10-CM

## 2025-05-22 DIAGNOSIS — Z79.4 TYPE 2 DIABETES MELLITUS WITH DIABETIC POLYNEUROPATHY, WITH LONG-TERM CURRENT USE OF INSULIN: ICD-10-CM

## 2025-05-22 LAB
EAG (OHS): 160 MG/DL (ref 68–131)
HBA1C MFR BLD: 7.2 % (ref 4–5.6)

## 2025-05-22 PROCEDURE — 36415 COLL VENOUS BLD VENIPUNCTURE: CPT | Mod: PO

## 2025-05-22 PROCEDURE — 83036 HEMOGLOBIN GLYCOSYLATED A1C: CPT

## 2025-06-03 ENCOUNTER — OFFICE VISIT (OUTPATIENT)
Dept: ENDOCRINOLOGY | Facility: CLINIC | Age: 80
End: 2025-06-03
Payer: COMMERCIAL

## 2025-06-03 VITALS
WEIGHT: 208.38 LBS | TEMPERATURE: 97 F | BODY MASS INDEX: 34.68 KG/M2 | DIASTOLIC BLOOD PRESSURE: 70 MMHG | HEART RATE: 75 BPM | SYSTOLIC BLOOD PRESSURE: 149 MMHG

## 2025-06-03 DIAGNOSIS — R80.9 MICROALBUMINURIA: ICD-10-CM

## 2025-06-03 DIAGNOSIS — E11.42 TYPE 2 DIABETES MELLITUS WITH DIABETIC POLYNEUROPATHY, WITH LONG-TERM CURRENT USE OF INSULIN: Primary | ICD-10-CM

## 2025-06-03 DIAGNOSIS — Z79.4 TYPE 2 DIABETES MELLITUS WITH DIABETIC POLYNEUROPATHY, WITH LONG-TERM CURRENT USE OF INSULIN: Primary | ICD-10-CM

## 2025-06-03 DIAGNOSIS — E66.9 NON MORBID OBESITY, UNSPECIFIED OBESITY TYPE: ICD-10-CM

## 2025-06-03 PROCEDURE — 3077F SYST BP >= 140 MM HG: CPT | Mod: CPTII,S$GLB,, | Performed by: NURSE PRACTITIONER

## 2025-06-03 PROCEDURE — 99214 OFFICE O/P EST MOD 30 MIN: CPT | Mod: S$GLB,,, | Performed by: NURSE PRACTITIONER

## 2025-06-03 PROCEDURE — 3078F DIAST BP <80 MM HG: CPT | Mod: CPTII,S$GLB,, | Performed by: NURSE PRACTITIONER

## 2025-06-03 PROCEDURE — 99999 PR PBB SHADOW E&M-EST. PATIENT-LVL V: CPT | Mod: PBBFAC,,, | Performed by: NURSE PRACTITIONER

## 2025-06-03 PROCEDURE — 95251 CONT GLUC MNTR ANALYSIS I&R: CPT | Mod: S$GLB,,, | Performed by: NURSE PRACTITIONER

## 2025-06-03 PROCEDURE — 1160F RVW MEDS BY RX/DR IN RCRD: CPT | Mod: CPTII,S$GLB,, | Performed by: NURSE PRACTITIONER

## 2025-06-03 PROCEDURE — 1159F MED LIST DOCD IN RCRD: CPT | Mod: CPTII,S$GLB,, | Performed by: NURSE PRACTITIONER

## 2025-06-03 RX ORDER — INSULIN GLARGINE 300 [IU]/ML
INJECTION, SOLUTION SUBCUTANEOUS
Qty: 2 PEN | Refills: 1 | Status: SHIPPED | OUTPATIENT
Start: 2025-06-03

## 2025-06-03 RX ORDER — TIRZEPATIDE 15 MG/.5ML
15 INJECTION, SOLUTION SUBCUTANEOUS
Qty: 4 PEN | Refills: 4 | Status: SHIPPED | OUTPATIENT
Start: 2025-06-03

## 2025-06-03 RX ORDER — LANCETS
EACH MISCELLANEOUS
Qty: 200 EACH | Refills: 3 | Status: SHIPPED | OUTPATIENT
Start: 2025-06-03

## 2025-06-03 RX ORDER — METFORMIN HYDROCHLORIDE 500 MG/1
1000 TABLET, EXTENDED RELEASE ORAL DAILY
Qty: 180 TABLET | Refills: 2 | Status: SHIPPED | OUTPATIENT
Start: 2025-06-03 | End: 2026-06-03

## 2025-06-09 ENCOUNTER — PATIENT MESSAGE (OUTPATIENT)
Dept: ADMINISTRATIVE | Facility: HOSPITAL | Age: 80
End: 2025-06-09
Payer: COMMERCIAL

## 2025-07-02 DIAGNOSIS — Z78.0 MENOPAUSE: ICD-10-CM

## 2025-07-21 DIAGNOSIS — E78.5 HYPERLIPIDEMIA, UNSPECIFIED HYPERLIPIDEMIA TYPE: ICD-10-CM

## 2025-07-23 RX ORDER — PRAVASTATIN SODIUM 80 MG/1
80 TABLET ORAL
Qty: 90 TABLET | Refills: 0 | Status: SHIPPED | OUTPATIENT
Start: 2025-07-23

## 2025-07-28 ENCOUNTER — PATIENT MESSAGE (OUTPATIENT)
Dept: OTOLARYNGOLOGY | Facility: CLINIC | Age: 80
End: 2025-07-28
Payer: COMMERCIAL

## 2025-07-28 DIAGNOSIS — H91.90 HEARING DISORDER, UNSPECIFIED LATERALITY: ICD-10-CM

## 2025-07-28 DIAGNOSIS — H74.8X9 ABNORMAL ACOUSTIC REFLEX: ICD-10-CM

## 2025-07-28 DIAGNOSIS — H90.3 SENSORINEURAL HEARING LOSS, BILATERAL: Primary | ICD-10-CM

## 2025-08-12 DIAGNOSIS — J30.2 SEASONAL ALLERGIES: ICD-10-CM

## 2025-08-14 RX ORDER — CETIRIZINE HYDROCHLORIDE 10 MG/1
10 TABLET ORAL DAILY
Qty: 90 TABLET | Refills: 1 | Status: SHIPPED | OUTPATIENT
Start: 2025-08-14

## 2025-08-14 RX ORDER — MONTELUKAST SODIUM 10 MG/1
10 TABLET ORAL DAILY
Qty: 90 TABLET | Refills: 1 | Status: SHIPPED | OUTPATIENT
Start: 2025-08-14

## 2025-08-21 ENCOUNTER — APPOINTMENT (OUTPATIENT)
Dept: RADIOLOGY | Facility: HOSPITAL | Age: 80
End: 2025-08-21
Attending: INTERNAL MEDICINE
Payer: COMMERCIAL

## 2025-08-21 ENCOUNTER — OFFICE VISIT (OUTPATIENT)
Dept: FAMILY MEDICINE | Facility: CLINIC | Age: 80
End: 2025-08-21
Payer: COMMERCIAL

## 2025-08-21 VITALS
BODY MASS INDEX: 34.6 KG/M2 | DIASTOLIC BLOOD PRESSURE: 60 MMHG | HEIGHT: 65 IN | SYSTOLIC BLOOD PRESSURE: 130 MMHG | OXYGEN SATURATION: 96 % | HEART RATE: 88 BPM | WEIGHT: 207.69 LBS

## 2025-08-21 DIAGNOSIS — R06.09 DYSPNEA ON EXERTION: ICD-10-CM

## 2025-08-21 DIAGNOSIS — I70.0 AORTIC ATHEROSCLEROSIS: ICD-10-CM

## 2025-08-21 DIAGNOSIS — Z78.0 ASYMPTOMATIC POSTMENOPAUSAL STATE: ICD-10-CM

## 2025-08-21 DIAGNOSIS — Z79.4 TYPE 2 DIABETES MELLITUS WITH DIABETIC POLYNEUROPATHY, WITH LONG-TERM CURRENT USE OF INSULIN: Primary | ICD-10-CM

## 2025-08-21 DIAGNOSIS — E11.42 TYPE 2 DIABETES MELLITUS WITH DIABETIC POLYNEUROPATHY, WITH LONG-TERM CURRENT USE OF INSULIN: Primary | ICD-10-CM

## 2025-08-21 DIAGNOSIS — I10 ESSENTIAL HYPERTENSION: ICD-10-CM

## 2025-08-21 DIAGNOSIS — E66.09 CLASS 1 OBESITY DUE TO EXCESS CALORIES WITH SERIOUS COMORBIDITY AND BODY MASS INDEX (BMI) OF 34.0 TO 34.9 IN ADULT: ICD-10-CM

## 2025-08-21 DIAGNOSIS — E66.811 CLASS 1 OBESITY DUE TO EXCESS CALORIES WITH SERIOUS COMORBIDITY AND BODY MASS INDEX (BMI) OF 34.0 TO 34.9 IN ADULT: ICD-10-CM

## 2025-08-21 DIAGNOSIS — I35.0 AORTIC VALVE STENOSIS, ETIOLOGY OF CARDIAC VALVE DISEASE UNSPECIFIED: ICD-10-CM

## 2025-08-21 DIAGNOSIS — Z00.00 HEALTHCARE MAINTENANCE: ICD-10-CM

## 2025-08-21 DIAGNOSIS — E78.2 MIXED HYPERLIPIDEMIA: ICD-10-CM

## 2025-08-21 DIAGNOSIS — G47.33 OSA (OBSTRUCTIVE SLEEP APNEA): ICD-10-CM

## 2025-08-21 PROCEDURE — 99999 PR PBB SHADOW E&M-EST. PATIENT-LVL V: CPT | Mod: PBBFAC,,, | Performed by: INTERNAL MEDICINE

## 2025-08-21 PROCEDURE — 71046 X-RAY EXAM CHEST 2 VIEWS: CPT | Mod: TC,PN

## 2025-08-21 RX ORDER — INSULIN PUMP SYRINGE, 3 ML
EACH MISCELLANEOUS
Qty: 1 EACH | Refills: 0 | Status: SHIPPED | OUTPATIENT
Start: 2025-08-21 | End: 2026-08-21

## 2025-08-21 RX ORDER — INSULIN GLARGINE 300 [IU]/ML
INJECTION, SOLUTION SUBCUTANEOUS
Start: 2025-08-21

## 2025-08-21 RX ORDER — BLOOD-GLUCOSE SENSOR
EACH MISCELLANEOUS
Qty: 12 EACH | Refills: 3 | Status: SHIPPED | OUTPATIENT
Start: 2025-08-21

## 2025-08-21 RX ORDER — LANCETS
EACH MISCELLANEOUS
Qty: 100 EACH | Refills: 5 | Status: SHIPPED | OUTPATIENT
Start: 2025-08-21

## 2025-08-26 ENCOUNTER — OFFICE VISIT (OUTPATIENT)
Dept: CARDIOLOGY | Facility: CLINIC | Age: 80
End: 2025-08-26
Payer: COMMERCIAL

## 2025-08-26 VITALS
SYSTOLIC BLOOD PRESSURE: 123 MMHG | WEIGHT: 205.94 LBS | RESPIRATION RATE: 15 BRPM | HEIGHT: 62 IN | HEART RATE: 77 BPM | OXYGEN SATURATION: 98 % | DIASTOLIC BLOOD PRESSURE: 66 MMHG | BODY MASS INDEX: 37.9 KG/M2

## 2025-08-26 DIAGNOSIS — I89.0 LYMPHEDEMA OF BOTH LOWER EXTREMITIES: Primary | ICD-10-CM

## 2025-08-26 DIAGNOSIS — R06.09 DOE (DYSPNEA ON EXERTION): ICD-10-CM

## 2025-08-26 LAB
OHS QRS DURATION: 92 MS
OHS QTC CALCULATION: 452 MS

## 2025-08-26 PROCEDURE — G2211 COMPLEX E/M VISIT ADD ON: HCPCS | Mod: S$GLB,,, | Performed by: INTERNAL MEDICINE

## 2025-08-26 PROCEDURE — 3074F SYST BP LT 130 MM HG: CPT | Mod: CPTII,S$GLB,, | Performed by: INTERNAL MEDICINE

## 2025-08-26 PROCEDURE — 3078F DIAST BP <80 MM HG: CPT | Mod: CPTII,S$GLB,, | Performed by: INTERNAL MEDICINE

## 2025-08-26 PROCEDURE — 1159F MED LIST DOCD IN RCRD: CPT | Mod: CPTII,S$GLB,, | Performed by: INTERNAL MEDICINE

## 2025-08-26 PROCEDURE — 99214 OFFICE O/P EST MOD 30 MIN: CPT | Mod: S$GLB,,, | Performed by: INTERNAL MEDICINE

## 2025-08-26 PROCEDURE — 99999 PR PBB SHADOW E&M-EST. PATIENT-LVL V: CPT | Mod: PBBFAC,,, | Performed by: INTERNAL MEDICINE

## 2025-08-26 PROCEDURE — 1101F PT FALLS ASSESS-DOCD LE1/YR: CPT | Mod: CPTII,S$GLB,, | Performed by: INTERNAL MEDICINE

## 2025-08-26 PROCEDURE — 1160F RVW MEDS BY RX/DR IN RCRD: CPT | Mod: CPTII,S$GLB,, | Performed by: INTERNAL MEDICINE

## 2025-08-26 PROCEDURE — 1126F AMNT PAIN NOTED NONE PRSNT: CPT | Mod: CPTII,S$GLB,, | Performed by: INTERNAL MEDICINE

## 2025-08-26 PROCEDURE — 93000 ELECTROCARDIOGRAM COMPLETE: CPT | Mod: S$GLB,,, | Performed by: INTERNAL MEDICINE

## 2025-08-26 PROCEDURE — 3288F FALL RISK ASSESSMENT DOCD: CPT | Mod: CPTII,S$GLB,, | Performed by: INTERNAL MEDICINE

## 2025-08-27 ENCOUNTER — TELEPHONE (OUTPATIENT)
Dept: FAMILY MEDICINE | Facility: CLINIC | Age: 80
End: 2025-08-27
Payer: COMMERCIAL

## 2025-08-27 DIAGNOSIS — R93.89 ABNORMAL CHEST X-RAY: Primary | ICD-10-CM

## 2025-08-27 DIAGNOSIS — R06.09 DYSPNEA ON EXERTION: ICD-10-CM

## 2025-08-27 DIAGNOSIS — R05.3 CHRONIC COUGH: ICD-10-CM

## 2025-08-29 ENCOUNTER — TELEPHONE (OUTPATIENT)
Dept: NEUROLOGY | Facility: CLINIC | Age: 80
End: 2025-08-29
Payer: COMMERCIAL

## 2025-08-29 ENCOUNTER — HOSPITAL ENCOUNTER (OUTPATIENT)
Dept: RADIOLOGY | Facility: HOSPITAL | Age: 80
Discharge: HOME | End: 2025-08-29
Attending: INTERNAL MEDICINE
Payer: COMMERCIAL

## 2025-08-29 ENCOUNTER — OFFICE VISIT (OUTPATIENT)
Dept: VASCULAR SURGERY | Facility: CLINIC | Age: 80
End: 2025-08-29
Payer: COMMERCIAL

## 2025-08-29 VITALS
BODY MASS INDEX: 38.06 KG/M2 | HEART RATE: 80 BPM | WEIGHT: 206.81 LBS | HEIGHT: 62 IN | DIASTOLIC BLOOD PRESSURE: 73 MMHG | SYSTOLIC BLOOD PRESSURE: 120 MMHG

## 2025-08-29 DIAGNOSIS — I70.203 ATHEROSCLEROSIS OF NATIVE ARTERY OF BOTH LOWER EXTREMITIES, WITH UNSPECIFIED PRESENCE OF CLINICAL MANIFESTATION: ICD-10-CM

## 2025-08-29 DIAGNOSIS — I89.0 LYMPHEDEMA OF BOTH LOWER EXTREMITIES: Primary | ICD-10-CM

## 2025-08-29 PROCEDURE — 99999 PR PBB SHADOW E&M-EST. PATIENT-LVL IV: CPT | Mod: PBBFAC,,, | Performed by: SURGERY

## 2025-09-04 ENCOUNTER — HOSPITAL ENCOUNTER (OUTPATIENT)
Dept: CARDIOLOGY | Facility: HOSPITAL | Age: 80
Discharge: HOME | End: 2025-09-04
Attending: INTERNAL MEDICINE
Payer: COMMERCIAL

## 2025-09-04 VITALS — HEIGHT: 62 IN | WEIGHT: 205 LBS | BODY MASS INDEX: 37.73 KG/M2

## 2025-09-04 DIAGNOSIS — R06.09 DOE (DYSPNEA ON EXERTION): ICD-10-CM

## 2025-09-04 LAB
AORTIC SIZE INDEX (SOV): 1.2 CM/M2
AORTIC SIZE INDEX: 1.3 CM/M2
ASCENDING AORTA: 2.5 CM
AV INDEX (PROSTH): 0.3
AV MEAN GRADIENT: 43 MMHG
AV PEAK GRADIENT: 74 MMHG
AV REGURGITATION PRESSURE HALF TIME: 501 MS
AV VALVE AREA BY VELOCITY RATIO: 0.8 CM²
AV VALVE AREA: 0.8 CM²
AV VELOCITY RATIO: 0.28
BSA FOR ECHO PROCEDURE: 2.02 M2
CV ECHO LV RWT: 0.78 CM
DOP CALC AO PEAK VEL: 4.3 M/S
DOP CALC AO VTI: 86.3 CM
DOP CALC LVOT AREA: 2.8 CM2
DOP CALC LVOT DIAMETER: 1.9 CM
DOP CALC LVOT PEAK VEL: 1.2 M/S
DOP CALC MV VTI: 50.4 CM
DOP CALCLVOT PEAK VEL VTI: 25.8 CM
E WAVE DECELERATION TIME: 324 MSEC
E/A RATIO: 0.61
E/E' RATIO: 33 M/S
ECHO LV POSTERIOR WALL: 1.6 CM (ref 0.6–1.1)
FRACTIONAL SHORTENING: 53.7 % (ref 28–44)
INTERVENTRICULAR SEPTUM: 1.2 CM (ref 0.6–1.1)
IVC DIAMETER: 1.7 CM
LA MAJOR: 5.8 CM
LA MINOR: 5.8 CM
LA WIDTH: 4.7 CM
LEFT ATRIUM SIZE: 4 CM
LEFT ATRIUM VOLUME INDEX: 48 ML/M2
LEFT ATRIUM VOLUME: 93 CM3
LEFT INTERNAL DIMENSION IN SYSTOLE: 1.9 CM (ref 2.1–4)
LEFT VENTRICLE DIASTOLIC VOLUME INDEX: 37.82 ML/M2
LEFT VENTRICLE DIASTOLIC VOLUME: 73 ML
LEFT VENTRICLE MASS INDEX: 112.2 G/M2
LEFT VENTRICLE SYSTOLIC VOLUME INDEX: 5.7 ML/M2
LEFT VENTRICLE SYSTOLIC VOLUME: 11 ML
LEFT VENTRICULAR INTERNAL DIMENSION IN DIASTOLE: 4.1 CM (ref 3.5–6)
LEFT VENTRICULAR MASS: 216.6 G
LEFT VENTRICULAR OUTFLOW TRACT PEAK GRADIENT REST: 6 MMHG
LV LATERAL E/E' RATIO: 28.5 M/S
LV SEPTAL E/E' RATIO: 38 M/S
LVED V (TEICH): 73.27 ML
LVES V (TEICH): 10.71 ML
LVOT MG: 3.67 MMHG
LVOT MV: 0.93 CM/S
Lab: 1.5 CM/M
Lab: 1.6 CM/M
MV MEAN GRADIENT: 5 MMHG
MV PEAK A VEL: 1.88 M/S
MV PEAK E VEL: 1.14 M/S
MV PEAK GRADIENT: 17 MMHG
MV STENOSIS PRESSURE HALF TIME: 94.08 MS
MV VALVE AREA BY CONTINUITY EQUATION: 1.45 CM2
MV VALVE AREA P 1/2 METHOD: 2.34 CM2
OHS CV CPX PATIENT HEIGHT IN: 62
OHS CV RV/LV RATIO: 0.8 CM
PISA AR MAX VEL: 2.82 M/S
PISA TR MAX VEL: 3.1 M/S
PV PEAK GRADIENT: 6 MMHG
PV PEAK VELOCITY: 1.19 M/S
RA MAJOR: 3.63 CM
RA PRESSURE ESTIMATED: 3 MMHG
RA WIDTH: 3.2 CM
RIGHT VENTRICLE DIASTOLIC BASEL DIMENSION: 3.3 CM
RIGHT VENTRICULAR END-DIASTOLIC DIMENSION: 3.29 CM
RV TB RVSP: 6 MMHG
RV TISSUE DOPPLER FREE WALL SYSTOLIC VELOCITY 1 (APICAL 4 CHAMBER VIEW): 16.08 CM/S
SINUS: 2.4 CM
STJ: 2.3 CM
TDI LATERAL: 0.04 M/S
TDI SEPTAL: 0.03 M/S
TDI: 0.04 M/S
TR MAX PG: 38 MMHG
TRICUSPID ANNULAR PLANE SYSTOLIC EXCURSION: 2 CM
TV REST PULMONARY ARTERY PRESSURE: 41 MMHG
Z-SCORE OF LEFT VENTRICULAR DIMENSION IN END DIASTOLE: -2.87
Z-SCORE OF LEFT VENTRICULAR DIMENSION IN END SYSTOLE: -4.51

## 2025-09-04 PROCEDURE — 93306 TTE W/DOPPLER COMPLETE: CPT

## 2025-09-04 PROCEDURE — 93306 TTE W/DOPPLER COMPLETE: CPT | Mod: 26,,, | Performed by: INTERNAL MEDICINE

## (undated) DEVICE — SEE MEDLINE ITEM 107746

## (undated) DEVICE — SEE MEDLINE ITEM 146292

## (undated) DEVICE — SOL 9P NACL IRR PIC IL

## (undated) DEVICE — GAUZE SPONGE 4X4 12PLY

## (undated) DEVICE — NDL HYPO REG 25G X 1 1/2

## (undated) DEVICE — PAD PREP 50/CA

## (undated) DEVICE — STAPLER SKIN ROTATING HEAD

## (undated) DEVICE — Device

## (undated) DEVICE — SUT CTD VICRYL 4-0 UND PS-1

## (undated) DEVICE — GLOVE BIOGEL 7.5

## (undated) DEVICE — SEE MEDLINE ITEM 157117

## (undated) DEVICE — GLOVE BIOGEL ECLIPSE SZ 6

## (undated) DEVICE — GLOVE SURG BIOGEL LATEX SZ 7.5

## (undated) DEVICE — SUT ETHILON 4-0 PS2 18 BLK

## (undated) DEVICE — SHEET THYROID W/ISO-BAC

## (undated) DEVICE — SEE MEDLINE ITEM 146417

## (undated) DEVICE — SEE MEDLINE ITEM 152622

## (undated) DEVICE — ELECTRODE REM PLYHSV RETURN 9

## (undated) DEVICE — APPLICATOR CHLORAPREP ORN 26ML

## (undated) DEVICE — SUT VICRYL 3-0 27 SH

## (undated) DEVICE — UNDERGLOVES BIOGEL PI SIZE 7.5

## (undated) DEVICE — SYR 10CC LUER LOCK

## (undated) DEVICE — GLOVE SURGICAL LATEX SZ 7